# Patient Record
Sex: MALE | Race: WHITE | NOT HISPANIC OR LATINO | Employment: OTHER | URBAN - METROPOLITAN AREA
[De-identification: names, ages, dates, MRNs, and addresses within clinical notes are randomized per-mention and may not be internally consistent; named-entity substitution may affect disease eponyms.]

---

## 2019-04-24 ENCOUNTER — APPOINTMENT (OUTPATIENT)
Dept: GENERAL RADIOLOGY | Facility: HOSPITAL | Age: 64
End: 2019-04-24

## 2019-04-24 PROCEDURE — 71046 X-RAY EXAM CHEST 2 VIEWS: CPT | Performed by: EMERGENCY MEDICINE

## 2019-04-25 DIAGNOSIS — R93.89 ABNORMAL CHEST X-RAY: Primary | ICD-10-CM

## 2019-04-28 ENCOUNTER — APPOINTMENT (OUTPATIENT)
Dept: CT IMAGING | Facility: HOSPITAL | Age: 64
End: 2019-04-28

## 2019-04-28 ENCOUNTER — HOSPITAL ENCOUNTER (INPATIENT)
Facility: HOSPITAL | Age: 64
LOS: 5 days | Discharge: HOME | End: 2019-05-03
Attending: EMERGENCY MEDICINE | Admitting: INTERNAL MEDICINE

## 2019-04-28 DIAGNOSIS — N19 RENAL FAILURE, UNSPECIFIED CHRONICITY: ICD-10-CM

## 2019-04-28 DIAGNOSIS — D72.829 LEUKOCYTOSIS, UNSPECIFIED TYPE: ICD-10-CM

## 2019-04-28 DIAGNOSIS — C91.10 CLL (CHRONIC LYMPHOCYTIC LEUKEMIA) (HCC): ICD-10-CM

## 2019-04-28 DIAGNOSIS — A41.9 SEPSIS, DUE TO UNSPECIFIED ORGANISM: Primary | ICD-10-CM

## 2019-04-28 DIAGNOSIS — R06.00 DYSPNEA, UNSPECIFIED TYPE: ICD-10-CM

## 2019-04-28 DIAGNOSIS — R59.0 MEDIASTINAL LYMPHADENOPATHY: ICD-10-CM

## 2019-04-28 DIAGNOSIS — J18.9 COMMUNITY ACQUIRED PNEUMONIA, UNSPECIFIED LATERALITY: ICD-10-CM

## 2019-04-28 LAB
ALBUMIN SERPL-MCNC: 4.3 G/DL (ref 3.5–5.2)
ALBUMIN/GLOB SERPL: 1.7 G/DL
ALP SERPL-CCNC: 92 U/L (ref 39–117)
ALT SERPL W P-5'-P-CCNC: 11 U/L (ref 1–41)
ANION GAP SERPL CALCULATED.3IONS-SCNC: 14.6 MMOL/L
AST SERPL-CCNC: 12 U/L (ref 1–40)
B PARAPERT DNA SPEC QL NAA+PROBE: NOT DETECTED
B PERT DNA SPEC QL NAA+PROBE: NOT DETECTED
BILIRUB SERPL-MCNC: 0.2 MG/DL (ref 0.2–1.2)
BUN BLD-MCNC: 28 MG/DL (ref 8–23)
BUN/CREAT SERPL: 16.5 (ref 7–25)
C PNEUM DNA NPH QL NAA+NON-PROBE: NOT DETECTED
CALCIUM SPEC-SCNC: 9.2 MG/DL (ref 8.6–10.5)
CHLORIDE SERPL-SCNC: 98 MMOL/L (ref 98–107)
CO2 SERPL-SCNC: 26.4 MMOL/L (ref 22–29)
CREAT BLD-MCNC: 1.7 MG/DL (ref 0.76–1.27)
D-LACTATE SERPL-SCNC: 0.9 MMOL/L (ref 0.5–2)
DEPRECATED RDW RBC AUTO: 61.7 FL (ref 37–54)
EOSINOPHIL # BLD MANUAL: 0.72 10*3/MM3 (ref 0–0.4)
EOSINOPHIL NFR BLD MANUAL: 1 % (ref 0.3–6.2)
ERYTHROCYTE [DISTWIDTH] IN BLOOD BY AUTOMATED COUNT: 18.1 % (ref 12.3–15.4)
FLUAV H1 2009 PAND RNA NPH QL NAA+PROBE: NOT DETECTED
FLUAV H1 HA GENE NPH QL NAA+PROBE: NOT DETECTED
FLUAV H3 RNA NPH QL NAA+PROBE: NOT DETECTED
FLUAV SUBTYP SPEC NAA+PROBE: NOT DETECTED
FLUBV RNA ISLT QL NAA+PROBE: NOT DETECTED
GFR SERPL CREATININE-BSD FRML MDRD: 41 ML/MIN/1.73
GLOBULIN UR ELPH-MCNC: 2.5 GM/DL
GLUCOSE BLD-MCNC: 103 MG/DL (ref 65–99)
HADV DNA SPEC NAA+PROBE: NOT DETECTED
HCOV 229E RNA SPEC QL NAA+PROBE: NOT DETECTED
HCOV HKU1 RNA SPEC QL NAA+PROBE: NOT DETECTED
HCOV NL63 RNA SPEC QL NAA+PROBE: NOT DETECTED
HCOV OC43 RNA SPEC QL NAA+PROBE: NOT DETECTED
HCT VFR BLD AUTO: 39.6 % (ref 37.5–51)
HGB BLD-MCNC: 11.6 G/DL (ref 13–17.7)
HMPV RNA NPH QL NAA+NON-PROBE: NOT DETECTED
HPIV1 RNA SPEC QL NAA+PROBE: NOT DETECTED
HPIV2 RNA SPEC QL NAA+PROBE: NOT DETECTED
HPIV3 RNA NPH QL NAA+PROBE: NOT DETECTED
HPIV4 P GENE NPH QL NAA+PROBE: NOT DETECTED
LYMPHOCYTES # BLD MANUAL: 53.01 10*3/MM3 (ref 0.7–3.1)
LYMPHOCYTES NFR BLD MANUAL: 74 % (ref 19.6–45.3)
LYMPHOCYTES NFR BLD MANUAL: 9 % (ref 5–12)
M PNEUMO IGG SER IA-ACNC: NOT DETECTED
MAGNESIUM SERPL-MCNC: 2 MG/DL (ref 1.6–2.4)
MCH RBC QN AUTO: 27.9 PG (ref 26.6–33)
MCHC RBC AUTO-ENTMCNC: 29.3 G/DL (ref 31.5–35.7)
MCV RBC AUTO: 95.2 FL (ref 79–97)
MONOCYTES # BLD AUTO: 6.45 10*3/MM3 (ref 0.1–0.9)
NEUTROPHILS # BLD AUTO: 7.88 10*3/MM3 (ref 1.7–7)
NEUTROPHILS NFR BLD MANUAL: 11 % (ref 42.7–76)
NRBC BLD AUTO-RTO: 0.3 /100 WBC (ref 0–0.2)
NT-PROBNP SERPL-MCNC: 256.9 PG/ML (ref 5–900)
PLAT MORPH BLD: NORMAL
PLATELET # BLD AUTO: 163 10*3/MM3 (ref 140–450)
PMV BLD AUTO: 10.9 FL (ref 6–12)
POTASSIUM BLD-SCNC: 5.1 MMOL/L (ref 3.5–5.2)
PROCALCITONIN SERPL-MCNC: 0.14 NG/ML (ref 0.1–0.25)
PROT SERPL-MCNC: 6.8 G/DL (ref 6–8.5)
RBC # BLD AUTO: 4.16 10*6/MM3 (ref 4.14–5.8)
RBC MORPH BLD: NORMAL
RHINOVIRUS RNA SPEC NAA+PROBE: NOT DETECTED
RSV RNA NPH QL NAA+NON-PROBE: NOT DETECTED
SMUDGE CELLS BLD QL SMEAR: ABNORMAL
SODIUM BLD-SCNC: 139 MMOL/L (ref 136–145)
TROPONIN T SERPL-MCNC: <0.01 NG/ML (ref 0–0.03)
VARIANT LYMPHS NFR BLD MANUAL: 5 % (ref 0–5)
WBC NRBC COR # BLD: 71.63 10*3/MM3 (ref 3.4–10.8)

## 2019-04-28 PROCEDURE — 83605 ASSAY OF LACTIC ACID: CPT | Performed by: EMERGENCY MEDICINE

## 2019-04-28 PROCEDURE — 87798 DETECT AGENT NOS DNA AMP: CPT | Performed by: EMERGENCY MEDICINE

## 2019-04-28 PROCEDURE — 83735 ASSAY OF MAGNESIUM: CPT | Performed by: EMERGENCY MEDICINE

## 2019-04-28 PROCEDURE — 25010000002 VANCOMYCIN 10 G RECONSTITUTED SOLUTION: Performed by: EMERGENCY MEDICINE

## 2019-04-28 PROCEDURE — 94799 UNLISTED PULMONARY SVC/PX: CPT

## 2019-04-28 PROCEDURE — 87486 CHLMYD PNEUM DNA AMP PROBE: CPT | Performed by: EMERGENCY MEDICINE

## 2019-04-28 PROCEDURE — 25010000002 CEFEPIME PER 500 MG: Performed by: EMERGENCY MEDICINE

## 2019-04-28 PROCEDURE — 25010000002 CEFEPIME PER 500 MG: Performed by: INTERNAL MEDICINE

## 2019-04-28 PROCEDURE — 93010 ELECTROCARDIOGRAM REPORT: CPT | Performed by: INTERNAL MEDICINE

## 2019-04-28 PROCEDURE — 84145 PROCALCITONIN (PCT): CPT | Performed by: EMERGENCY MEDICINE

## 2019-04-28 PROCEDURE — 88189 FLOWCYTOMETRY/READ 16 & >: CPT | Performed by: INTERNAL MEDICINE

## 2019-04-28 PROCEDURE — 99284 EMERGENCY DEPT VISIT MOD MDM: CPT

## 2019-04-28 PROCEDURE — 93005 ELECTROCARDIOGRAM TRACING: CPT | Performed by: EMERGENCY MEDICINE

## 2019-04-28 PROCEDURE — 88185 FLOWCYTOMETRY/TC ADD-ON: CPT | Performed by: INTERNAL MEDICINE

## 2019-04-28 PROCEDURE — 87633 RESP VIRUS 12-25 TARGETS: CPT | Performed by: EMERGENCY MEDICINE

## 2019-04-28 PROCEDURE — 71260 CT THORAX DX C+: CPT

## 2019-04-28 PROCEDURE — 25010000002 AZITHROMYCIN PER 500 MG: Performed by: INTERNAL MEDICINE

## 2019-04-28 PROCEDURE — 85025 COMPLETE CBC W/AUTO DIFF WBC: CPT | Performed by: EMERGENCY MEDICINE

## 2019-04-28 PROCEDURE — 87581 M.PNEUMON DNA AMP PROBE: CPT | Performed by: EMERGENCY MEDICINE

## 2019-04-28 PROCEDURE — 87040 BLOOD CULTURE FOR BACTERIA: CPT | Performed by: EMERGENCY MEDICINE

## 2019-04-28 PROCEDURE — 80053 COMPREHEN METABOLIC PANEL: CPT | Performed by: EMERGENCY MEDICINE

## 2019-04-28 PROCEDURE — 88184 FLOWCYTOMETRY/ TC 1 MARKER: CPT | Performed by: INTERNAL MEDICINE

## 2019-04-28 PROCEDURE — 84484 ASSAY OF TROPONIN QUANT: CPT | Performed by: EMERGENCY MEDICINE

## 2019-04-28 PROCEDURE — 25010000002 IOPAMIDOL 61 % SOLUTION: Performed by: EMERGENCY MEDICINE

## 2019-04-28 PROCEDURE — 85007 BL SMEAR W/DIFF WBC COUNT: CPT | Performed by: EMERGENCY MEDICINE

## 2019-04-28 PROCEDURE — 94640 AIRWAY INHALATION TREATMENT: CPT

## 2019-04-28 PROCEDURE — 83880 ASSAY OF NATRIURETIC PEPTIDE: CPT | Performed by: EMERGENCY MEDICINE

## 2019-04-28 RX ORDER — SODIUM CHLORIDE 9 MG/ML
75 INJECTION, SOLUTION INTRAVENOUS CONTINUOUS
Status: DISCONTINUED | OUTPATIENT
Start: 2019-04-28 | End: 2019-04-30

## 2019-04-28 RX ORDER — ASPIRIN 325 MG
650 TABLET, DELAYED RELEASE (ENTERIC COATED) ORAL EVERY 6 HOURS PRN
COMMUNITY
End: 2020-10-13

## 2019-04-28 RX ORDER — IPRATROPIUM BROMIDE AND ALBUTEROL SULFATE 2.5; .5 MG/3ML; MG/3ML
3 SOLUTION RESPIRATORY (INHALATION)
Status: DISCONTINUED | OUTPATIENT
Start: 2019-04-28 | End: 2019-05-03 | Stop reason: HOSPADM

## 2019-04-28 RX ORDER — IPRATROPIUM BROMIDE AND ALBUTEROL SULFATE 2.5; .5 MG/3ML; MG/3ML
3 SOLUTION RESPIRATORY (INHALATION) ONCE
Status: COMPLETED | OUTPATIENT
Start: 2019-04-28 | End: 2019-04-28

## 2019-04-28 RX ORDER — GUAIFENESIN 600 MG/1
400 TABLET, EXTENDED RELEASE ORAL 2 TIMES DAILY
Status: ON HOLD | COMMUNITY
End: 2020-11-15

## 2019-04-28 RX ORDER — CHOLECALCIFEROL (VITAMIN D3) 125 MCG
5 CAPSULE ORAL NIGHTLY PRN
Status: DISCONTINUED | OUTPATIENT
Start: 2019-04-28 | End: 2019-05-03 | Stop reason: HOSPADM

## 2019-04-28 RX ORDER — IPRATROPIUM BROMIDE AND ALBUTEROL SULFATE 2.5; .5 MG/3ML; MG/3ML
3 SOLUTION RESPIRATORY (INHALATION)
Status: DISCONTINUED | OUTPATIENT
Start: 2019-04-28 | End: 2019-04-30

## 2019-04-28 RX ADMIN — IOPAMIDOL 75 ML: 612 INJECTION, SOLUTION INTRAVENOUS at 13:36

## 2019-04-28 RX ADMIN — SODIUM CHLORIDE 1000 ML: 9 INJECTION, SOLUTION INTRAVENOUS at 12:29

## 2019-04-28 RX ADMIN — VANCOMYCIN HYDROCHLORIDE 1250 MG: 10 INJECTION, POWDER, LYOPHILIZED, FOR SOLUTION INTRAVENOUS at 14:03

## 2019-04-28 RX ADMIN — CEFEPIME HYDROCHLORIDE 2 G: 2 INJECTION, POWDER, FOR SOLUTION INTRAVENOUS at 20:53

## 2019-04-28 RX ADMIN — IPRATROPIUM BROMIDE AND ALBUTEROL SULFATE 3 ML: 2.5; .5 SOLUTION RESPIRATORY (INHALATION) at 23:09

## 2019-04-28 RX ADMIN — Medication 5 MG: at 20:53

## 2019-04-28 RX ADMIN — SODIUM CHLORIDE 125 ML/HR: 9 INJECTION, SOLUTION INTRAVENOUS at 17:17

## 2019-04-28 RX ADMIN — CEFEPIME HYDROCHLORIDE 2 G: 2 INJECTION, POWDER, FOR SOLUTION INTRAVENOUS at 13:24

## 2019-04-28 RX ADMIN — IPRATROPIUM BROMIDE AND ALBUTEROL SULFATE 3 ML: 2.5; .5 SOLUTION RESPIRATORY (INHALATION) at 19:13

## 2019-04-28 RX ADMIN — AZITHROMYCIN MONOHYDRATE 500 MG: 500 INJECTION, POWDER, LYOPHILIZED, FOR SOLUTION INTRAVENOUS at 18:56

## 2019-04-28 RX ADMIN — IPRATROPIUM BROMIDE AND ALBUTEROL SULFATE 3 ML: 2.5; .5 SOLUTION RESPIRATORY (INHALATION) at 12:24

## 2019-04-28 RX ADMIN — IPRATROPIUM BROMIDE AND ALBUTEROL SULFATE 3 ML: 2.5; .5 SOLUTION RESPIRATORY (INHALATION) at 16:54

## 2019-04-29 ENCOUNTER — ANESTHESIA (OUTPATIENT)
Dept: GASTROENTEROLOGY | Facility: HOSPITAL | Age: 64
End: 2019-04-29

## 2019-04-29 ENCOUNTER — APPOINTMENT (OUTPATIENT)
Dept: GENERAL RADIOLOGY | Facility: HOSPITAL | Age: 64
End: 2019-04-29

## 2019-04-29 ENCOUNTER — ANESTHESIA EVENT (OUTPATIENT)
Dept: GASTROENTEROLOGY | Facility: HOSPITAL | Age: 64
End: 2019-04-29

## 2019-04-29 LAB
APPEARANCE FLD: ABNORMAL
APPEARANCE FLD: ABNORMAL
APTT PPP: 30 SECONDS (ref 22.7–35.4)
COLOR FLD: ABNORMAL
COLOR FLD: YELLOW
EOSINOPHIL NFR FLD MANUAL: 1 %
INR PPP: 1.01 (ref 0.9–1.1)
LYMPHOCYTES NFR FLD MANUAL: 15 %
LYMPHOCYTES NFR FLD MANUAL: 29 %
MONOS+MACROS NFR FLD: 1 %
MONOS+MACROS NFR FLD: 28 %
NEUTROPHILS NFR FLD MANUAL: 42 %
NEUTROPHILS NFR FLD MANUAL: 84 %
PROTHROMBIN TIME: 13 SECONDS (ref 11.7–14.2)
RBC # FLD AUTO: 200 /MM3
RBC # FLD AUTO: ABNORMAL /MM3
WBC # FLD AUTO: ABNORMAL /MM3
WBC # FLD AUTO: ABNORMAL /MM3

## 2019-04-29 PROCEDURE — 25010000002 AZITHROMYCIN PER 500 MG: Performed by: INTERNAL MEDICINE

## 2019-04-29 PROCEDURE — 88342 IMHCHEM/IMCYTCHM 1ST ANTB: CPT | Performed by: INTERNAL MEDICINE

## 2019-04-29 PROCEDURE — 87116 MYCOBACTERIA CULTURE: CPT | Performed by: INTERNAL MEDICINE

## 2019-04-29 PROCEDURE — 71045 X-RAY EXAM CHEST 1 VIEW: CPT

## 2019-04-29 PROCEDURE — 25010000002 METHYLPREDNISOLONE PER 125 MG: Performed by: INTERNAL MEDICINE

## 2019-04-29 PROCEDURE — 0B9M7ZX DRAINAGE OF BILATERAL LUNGS, VIA NATURAL OR ARTIFICIAL OPENING, DIAGNOSTIC: ICD-10-PCS | Performed by: INTERNAL MEDICINE

## 2019-04-29 PROCEDURE — 0WBC3ZX EXCISION OF MEDIASTINUM, PERCUTANEOUS APPROACH, DIAGNOSTIC: ICD-10-PCS | Performed by: INTERNAL MEDICINE

## 2019-04-29 PROCEDURE — 88173 CYTOPATH EVAL FNA REPORT: CPT | Performed by: INTERNAL MEDICINE

## 2019-04-29 PROCEDURE — 25010000002 SUCCINYLCHOLINE PER 20 MG: Performed by: NURSE ANESTHETIST, CERTIFIED REGISTERED

## 2019-04-29 PROCEDURE — 25010000002 PROPOFOL 10 MG/ML EMULSION: Performed by: NURSE ANESTHETIST, CERTIFIED REGISTERED

## 2019-04-29 PROCEDURE — 25010000002 ENOXAPARIN PER 10 MG: Performed by: INTERNAL MEDICINE

## 2019-04-29 PROCEDURE — 88305 TISSUE EXAM BY PATHOLOGIST: CPT | Performed by: INTERNAL MEDICINE

## 2019-04-29 PROCEDURE — 94799 UNLISTED PULMONARY SVC/PX: CPT

## 2019-04-29 PROCEDURE — 87070 CULTURE OTHR SPECIMN AEROBIC: CPT | Performed by: INTERNAL MEDICINE

## 2019-04-29 PROCEDURE — 87205 SMEAR GRAM STAIN: CPT | Performed by: INTERNAL MEDICINE

## 2019-04-29 PROCEDURE — 88112 CYTOPATH CELL ENHANCE TECH: CPT | Performed by: INTERNAL MEDICINE

## 2019-04-29 PROCEDURE — 89051 BODY FLUID CELL COUNT: CPT | Performed by: INTERNAL MEDICINE

## 2019-04-29 PROCEDURE — 88341 IMHCHEM/IMCYTCHM EA ADD ANTB: CPT | Performed by: INTERNAL MEDICINE

## 2019-04-29 PROCEDURE — 87206 SMEAR FLUORESCENT/ACID STAI: CPT | Performed by: INTERNAL MEDICINE

## 2019-04-29 PROCEDURE — 99254 IP/OBS CNSLTJ NEW/EST MOD 60: CPT | Performed by: INTERNAL MEDICINE

## 2019-04-29 PROCEDURE — 25010000002 CEFEPIME PER 500 MG: Performed by: INTERNAL MEDICINE

## 2019-04-29 PROCEDURE — 85730 THROMBOPLASTIN TIME PARTIAL: CPT | Performed by: INTERNAL MEDICINE

## 2019-04-29 PROCEDURE — 85610 PROTHROMBIN TIME: CPT | Performed by: INTERNAL MEDICINE

## 2019-04-29 PROCEDURE — C1726 CATH, BAL DIL, NON-VASCULAR: HCPCS | Performed by: INTERNAL MEDICINE

## 2019-04-29 RX ORDER — METHYLPREDNISOLONE SODIUM SUCCINATE 125 MG/2ML
125 INJECTION, POWDER, LYOPHILIZED, FOR SOLUTION INTRAMUSCULAR; INTRAVENOUS ONCE
Status: COMPLETED | OUTPATIENT
Start: 2019-04-29 | End: 2019-04-29

## 2019-04-29 RX ORDER — METHYLPREDNISOLONE SODIUM SUCCINATE 40 MG/ML
40 INJECTION, POWDER, LYOPHILIZED, FOR SOLUTION INTRAMUSCULAR; INTRAVENOUS EVERY 12 HOURS
Status: COMPLETED | OUTPATIENT
Start: 2019-04-30 | End: 2019-05-02

## 2019-04-29 RX ORDER — IPRATROPIUM BROMIDE AND ALBUTEROL SULFATE 2.5; .5 MG/3ML; MG/3ML
3 SOLUTION RESPIRATORY (INHALATION) ONCE
Status: COMPLETED | OUTPATIENT
Start: 2019-04-29 | End: 2019-04-29

## 2019-04-29 RX ORDER — MIDAZOLAM HYDROCHLORIDE 1 MG/ML
1 INJECTION INTRAMUSCULAR; INTRAVENOUS
Status: CANCELLED | OUTPATIENT
Start: 2019-04-29

## 2019-04-29 RX ORDER — IPRATROPIUM BROMIDE AND ALBUTEROL SULFATE 2.5; .5 MG/3ML; MG/3ML
3 SOLUTION RESPIRATORY (INHALATION)
Status: DISCONTINUED | OUTPATIENT
Start: 2019-04-29 | End: 2019-04-29 | Stop reason: HOSPADM

## 2019-04-29 RX ORDER — LIDOCAINE HYDROCHLORIDE 20 MG/ML
INJECTION, SOLUTION INFILTRATION; PERINEURAL AS NEEDED
Status: DISCONTINUED | OUTPATIENT
Start: 2019-04-29 | End: 2019-04-29 | Stop reason: SURG

## 2019-04-29 RX ORDER — SUCCINYLCHOLINE CHLORIDE 20 MG/ML
INJECTION INTRAMUSCULAR; INTRAVENOUS AS NEEDED
Status: DISCONTINUED | OUTPATIENT
Start: 2019-04-29 | End: 2019-04-29 | Stop reason: SURG

## 2019-04-29 RX ORDER — PROPOFOL 10 MG/ML
VIAL (ML) INTRAVENOUS AS NEEDED
Status: DISCONTINUED | OUTPATIENT
Start: 2019-04-29 | End: 2019-04-29 | Stop reason: SURG

## 2019-04-29 RX ORDER — PROPOFOL 10 MG/ML
VIAL (ML) INTRAVENOUS CONTINUOUS PRN
Status: DISCONTINUED | OUTPATIENT
Start: 2019-04-29 | End: 2019-04-29 | Stop reason: SURG

## 2019-04-29 RX ORDER — LIDOCAINE HYDROCHLORIDE 10 MG/ML
INJECTION, SOLUTION EPIDURAL; INFILTRATION; INTRACAUDAL; PERINEURAL AS NEEDED
Status: DISCONTINUED | OUTPATIENT
Start: 2019-04-29 | End: 2019-04-29 | Stop reason: HOSPADM

## 2019-04-29 RX ORDER — MIDAZOLAM HYDROCHLORIDE 1 MG/ML
2 INJECTION INTRAMUSCULAR; INTRAVENOUS
Status: CANCELLED | OUTPATIENT
Start: 2019-04-29

## 2019-04-29 RX ORDER — NICOTINE 21 MG/24HR
1 PATCH, TRANSDERMAL 24 HOURS TRANSDERMAL
Status: CANCELLED | OUTPATIENT
Start: 2019-04-29

## 2019-04-29 RX ORDER — LIDOCAINE HYDROCHLORIDE 20 MG/ML
INJECTION, SOLUTION EPIDURAL; INFILTRATION; INTRACAUDAL; PERINEURAL AS NEEDED
Status: DISCONTINUED | OUTPATIENT
Start: 2019-04-29 | End: 2019-04-29 | Stop reason: HOSPADM

## 2019-04-29 RX ORDER — SODIUM CHLORIDE, SODIUM LACTATE, POTASSIUM CHLORIDE, CALCIUM CHLORIDE 600; 310; 30; 20 MG/100ML; MG/100ML; MG/100ML; MG/100ML
INJECTION, SOLUTION INTRAVENOUS CONTINUOUS PRN
Status: DISCONTINUED | OUTPATIENT
Start: 2019-04-29 | End: 2019-04-29 | Stop reason: SURG

## 2019-04-29 RX ORDER — SODIUM CHLORIDE 9 MG/ML
30 INJECTION, SOLUTION INTRAVENOUS CONTINUOUS PRN
Status: DISCONTINUED | OUTPATIENT
Start: 2019-04-29 | End: 2019-04-30

## 2019-04-29 RX ADMIN — CEFEPIME HYDROCHLORIDE 2 G: 2 INJECTION, POWDER, FOR SOLUTION INTRAVENOUS at 21:30

## 2019-04-29 RX ADMIN — SODIUM CHLORIDE, POTASSIUM CHLORIDE, SODIUM LACTATE AND CALCIUM CHLORIDE: 600; 310; 30; 20 INJECTION, SOLUTION INTRAVENOUS at 12:03

## 2019-04-29 RX ADMIN — ENOXAPARIN SODIUM 30 MG: 30 INJECTION SUBCUTANEOUS at 21:30

## 2019-04-29 RX ADMIN — Medication 5 MG: at 21:30

## 2019-04-29 RX ADMIN — IPRATROPIUM BROMIDE AND ALBUTEROL SULFATE 3 ML: 2.5; .5 SOLUTION RESPIRATORY (INHALATION) at 23:22

## 2019-04-29 RX ADMIN — IPRATROPIUM BROMIDE AND ALBUTEROL SULFATE 3 ML: 2.5; .5 SOLUTION RESPIRATORY (INHALATION) at 03:29

## 2019-04-29 RX ADMIN — PROPOFOL 300 MCG/KG/MIN: 10 INJECTION, EMULSION INTRAVENOUS at 12:15

## 2019-04-29 RX ADMIN — SODIUM CHLORIDE 30 ML/HR: 9 INJECTION, SOLUTION INTRAVENOUS at 09:59

## 2019-04-29 RX ADMIN — CEFEPIME HYDROCHLORIDE 2 G: 2 INJECTION, POWDER, FOR SOLUTION INTRAVENOUS at 08:47

## 2019-04-29 RX ADMIN — SUCCINYLCHOLINE CHLORIDE 80 MG: 20 INJECTION, SOLUTION INTRAMUSCULAR; INTRAVENOUS at 12:16

## 2019-04-29 RX ADMIN — LIDOCAINE HYDROCHLORIDE 100 MG: 20 INJECTION, SOLUTION INFILTRATION; PERINEURAL at 12:15

## 2019-04-29 RX ADMIN — IPRATROPIUM BROMIDE AND ALBUTEROL SULFATE 3 ML: 2.5; .5 SOLUTION RESPIRATORY (INHALATION) at 20:26

## 2019-04-29 RX ADMIN — IPRATROPIUM BROMIDE AND ALBUTEROL SULFATE 3 ML: 2.5; .5 SOLUTION RESPIRATORY (INHALATION) at 11:06

## 2019-04-29 RX ADMIN — SODIUM CHLORIDE 125 ML/HR: 9 INJECTION, SOLUTION INTRAVENOUS at 00:39

## 2019-04-29 RX ADMIN — AZITHROMYCIN MONOHYDRATE 500 MG: 500 INJECTION, POWDER, LYOPHILIZED, FOR SOLUTION INTRAVENOUS at 17:36

## 2019-04-29 RX ADMIN — IPRATROPIUM BROMIDE AND ALBUTEROL SULFATE 3 ML: 2.5; .5 SOLUTION RESPIRATORY (INHALATION) at 05:10

## 2019-04-29 RX ADMIN — IPRATROPIUM BROMIDE AND ALBUTEROL SULFATE 3 ML: 2.5; .5 SOLUTION RESPIRATORY (INHALATION) at 07:09

## 2019-04-29 RX ADMIN — IPRATROPIUM BROMIDE AND ALBUTEROL SULFATE 3 ML: 2.5; .5 SOLUTION RESPIRATORY (INHALATION) at 14:41

## 2019-04-29 RX ADMIN — METHYLPREDNISOLONE SODIUM SUCCINATE 125 MG: 125 INJECTION, POWDER, FOR SOLUTION INTRAMUSCULAR; INTRAVENOUS at 18:30

## 2019-04-29 RX ADMIN — PROPOFOL 200 MG: 10 INJECTION, EMULSION INTRAVENOUS at 12:15

## 2019-04-29 NOTE — ANESTHESIA POSTPROCEDURE EVALUATION
"Patient: Dav Freitas    Procedure Summary     Date:  04/29/19 Room / Location:   [facility]    Anesthesia Start:  1202 Anesthesia Stop:  1356    Procedure:  BRONCHOSCOPY WITH WASHING ENDOBRONCHIAL ULTRASOUND WITH FNA'S (Bilateral Bronchus) Diagnosis:       Mediastinal lymphadenopathy      (Mediastinal lymphadenopathy [R59.0])    Surgeon:  Selina Lloyd MD Provider:  Jeremias Woodruff MD    Anesthesia Type:  MAC ASA Status:  3          Anesthesia Type: MAC  Last vitals  BP   116/55 (04/29/19 1520)   Temp   36.8 °C (98.3 °F) (04/29/19 0949)   Pulse   (!) 121 (04/29/19 1520)   Resp   20 (04/29/19 1520)     SpO2   94 % (04/29/19 1520)     Post Anesthesia Care and Evaluation      Comments: Patient discharged before being evaluated by an Anesthesiologist. No apparent complications per the record.  This case was not medically directed. I am completing this chart for medical records purposes; I personally have no medical involvement with this patient.    /55 (BP Location: Left arm, Patient Position: Sitting)   Pulse (!) 121   Temp 36.8 °C (98.3 °F) (Oral)   Resp 20   Ht 180.3 cm (71\")   Wt 67.7 kg (149 lb 3.2 oz)   SpO2 94%   BMI 20.81 kg/m²           "

## 2019-04-29 NOTE — ANESTHESIA PREPROCEDURE EVALUATION
Anesthesia Evaluation     Patient summary reviewed and Nursing notes reviewed   NPO Solid Status: > 8 hours  NPO Liquid Status: > 8 hours           Airway   Mallampati: II  Neck ROM: full  No difficulty expected  Dental    (+) partials    Pulmonary    (+) pneumonia , a smoker Current, wheezes,   Cardiovascular     Rhythm: regular    (+) PVD,       Neuro/Psych  GI/Hepatic/Renal/Endo      Musculoskeletal     Abdominal    Substance History      OB/GYN          Other                        Anesthesia Plan    ASA 3     MAC     intravenous induction   Anesthetic plan, all risks, benefits, and alternatives have been provided, discussed and informed consent has been obtained with: patient.

## 2019-04-29 NOTE — ANESTHESIA PROCEDURE NOTES
Airway  Urgency: elective    Date/Time: 4/29/2019 12:20 PM  End Time:4/29/2019 12:20 PM    General Information and Staff    Patient location during procedure: OR  CRNA: Isabela Moyer CRNA    Indications and Patient Condition  Indications for airway management: airway protection    Preoxygenated: yes      Final Airway Details  Final airway type: supraglottic airway      Successful airway: Size 4    Number of attempts at approach: 1

## 2019-04-30 ENCOUNTER — APPOINTMENT (OUTPATIENT)
Dept: CT IMAGING | Facility: HOSPITAL | Age: 64
End: 2019-04-30

## 2019-04-30 LAB
ALBUMIN SERPL-MCNC: 3.9 G/DL (ref 3.5–5.2)
ALBUMIN/GLOB SERPL: 2 G/DL
ALP SERPL-CCNC: 72 U/L (ref 39–117)
ALT SERPL W P-5'-P-CCNC: 12 U/L (ref 1–41)
ANION GAP SERPL CALCULATED.3IONS-SCNC: 11 MMOL/L
AST SERPL-CCNC: 15 U/L (ref 1–40)
BILIRUB SERPL-MCNC: 0.3 MG/DL (ref 0.2–1.2)
BLASTS NFR BLD MANUAL: 1 % (ref 0–0)
BUN BLD-MCNC: 18 MG/DL (ref 8–23)
BUN/CREAT SERPL: 19.8 (ref 7–25)
CALCIUM SPEC-SCNC: 8.4 MG/DL (ref 8.6–10.5)
CHLORIDE SERPL-SCNC: 107 MMOL/L (ref 98–107)
CO2 SERPL-SCNC: 24 MMOL/L (ref 22–29)
CREAT BLD-MCNC: 0.91 MG/DL (ref 0.76–1.27)
DEPRECATED RDW RBC AUTO: 62.7 FL (ref 37–54)
ERYTHROCYTE [DISTWIDTH] IN BLOOD BY AUTOMATED COUNT: 18 % (ref 12.3–15.4)
FERRITIN SERPL-MCNC: 92 NG/ML (ref 30–400)
GFR SERPL CREATININE-BSD FRML MDRD: 84 ML/MIN/1.73
GLOBULIN UR ELPH-MCNC: 2 GM/DL
GLUCOSE BLD-MCNC: 164 MG/DL (ref 65–99)
HCT VFR BLD AUTO: 34.5 % (ref 37.5–51)
HGB BLD-MCNC: 10.1 G/DL (ref 13–17.7)
HGB RETIC QN AUTO: 28 PG (ref 29.8–36.1)
HYPOCHROMIA BLD QL: ABNORMAL
IMM RETICS NFR: 37.9 % (ref 3–15.8)
IRON 24H UR-MRATE: 40 MCG/DL (ref 59–158)
IRON SATN MFR SERPL: 12 % (ref 20–50)
LDH SERPL-CCNC: 180 U/L (ref 135–225)
LYMPHOCYTES # BLD MANUAL: 50.54 10*3/MM3 (ref 0.7–3.1)
LYMPHOCYTES NFR BLD MANUAL: 5.9 % (ref 5–12)
LYMPHOCYTES NFR BLD MANUAL: 72.3 % (ref 19.6–45.3)
MCH RBC QN AUTO: 28 PG (ref 26.6–33)
MCHC RBC AUTO-ENTMCNC: 29.3 G/DL (ref 31.5–35.7)
MCV RBC AUTO: 95.6 FL (ref 79–97)
MONOCYTES # BLD AUTO: 4.12 10*3/MM3 (ref 0.1–0.9)
NEUTROPHILS # BLD AUTO: 14.54 10*3/MM3 (ref 1.7–7)
NEUTROPHILS NFR BLD MANUAL: 20.8 % (ref 42.7–76)
NRBC BLD AUTO-RTO: 0.3 /100 WBC (ref 0–0.2)
NRBC SPEC MANUAL: 1 /100 WBC (ref 0–0.2)
PLAT MORPH BLD: NORMAL
PLATELET # BLD AUTO: 154 10*3/MM3 (ref 140–450)
PMV BLD AUTO: 11.1 FL (ref 6–12)
POTASSIUM BLD-SCNC: 4.9 MMOL/L (ref 3.5–5.2)
PROT SERPL-MCNC: 5.9 G/DL (ref 6–8.5)
RBC # BLD AUTO: 3.61 10*6/MM3 (ref 4.14–5.8)
REF LAB TEST METHOD: NORMAL
RETICS/RBC NFR AUTO: 1.57 % (ref 0.7–1.9)
SODIUM BLD-SCNC: 142 MMOL/L (ref 136–145)
TIBC SERPL-MCNC: 331 MCG/DL (ref 298–536)
TRANSFERRIN SERPL-MCNC: 222 MG/DL (ref 200–360)
URATE SERPL-MCNC: 5.1 MG/DL (ref 3.4–7)
WBC MORPH BLD: NORMAL
WBC NRBC COR # BLD: 69.91 10*3/MM3 (ref 3.4–10.8)

## 2019-04-30 PROCEDURE — 84466 ASSAY OF TRANSFERRIN: CPT | Performed by: INTERNAL MEDICINE

## 2019-04-30 PROCEDURE — 84165 PROTEIN E-PHORESIS SERUM: CPT | Performed by: INTERNAL MEDICINE

## 2019-04-30 PROCEDURE — 80053 COMPREHEN METABOLIC PANEL: CPT | Performed by: INTERNAL MEDICINE

## 2019-04-30 PROCEDURE — 70486 CT MAXILLOFACIAL W/O DYE: CPT

## 2019-04-30 PROCEDURE — 83540 ASSAY OF IRON: CPT | Performed by: INTERNAL MEDICINE

## 2019-04-30 PROCEDURE — 94799 UNLISTED PULMONARY SVC/PX: CPT

## 2019-04-30 PROCEDURE — 83883 ASSAY NEPHELOMETRY NOT SPEC: CPT | Performed by: INTERNAL MEDICINE

## 2019-04-30 PROCEDURE — 70491 CT SOFT TISSUE NECK W/DYE: CPT

## 2019-04-30 PROCEDURE — 0 DIATRIZOATE MEGLUMINE & SODIUM PER 1 ML: Performed by: INTERNAL MEDICINE

## 2019-04-30 PROCEDURE — 84550 ASSAY OF BLOOD/URIC ACID: CPT | Performed by: INTERNAL MEDICINE

## 2019-04-30 PROCEDURE — 99233 SBSQ HOSP IP/OBS HIGH 50: CPT | Performed by: INTERNAL MEDICINE

## 2019-04-30 PROCEDURE — 86334 IMMUNOFIX E-PHORESIS SERUM: CPT | Performed by: INTERNAL MEDICINE

## 2019-04-30 PROCEDURE — 82728 ASSAY OF FERRITIN: CPT | Performed by: INTERNAL MEDICINE

## 2019-04-30 PROCEDURE — 85007 BL SMEAR W/DIFF WBC COUNT: CPT | Performed by: INTERNAL MEDICINE

## 2019-04-30 PROCEDURE — 25010000002 ENOXAPARIN PER 10 MG: Performed by: INTERNAL MEDICINE

## 2019-04-30 PROCEDURE — 83615 LACTATE (LD) (LDH) ENZYME: CPT | Performed by: INTERNAL MEDICINE

## 2019-04-30 PROCEDURE — 85046 RETICYTE/HGB CONCENTRATE: CPT | Performed by: INTERNAL MEDICINE

## 2019-04-30 PROCEDURE — 25010000002 CEFEPIME 2 G/NS 100 ML SOLUTION: Performed by: INTERNAL MEDICINE

## 2019-04-30 PROCEDURE — 85025 COMPLETE CBC W/AUTO DIFF WBC: CPT | Performed by: INTERNAL MEDICINE

## 2019-04-30 PROCEDURE — 82785 ASSAY OF IGE: CPT | Performed by: INTERNAL MEDICINE

## 2019-04-30 PROCEDURE — 25010000002 METHYLPREDNISOLONE PER 40 MG: Performed by: INTERNAL MEDICINE

## 2019-04-30 PROCEDURE — 82784 ASSAY IGA/IGD/IGG/IGM EACH: CPT | Performed by: INTERNAL MEDICINE

## 2019-04-30 PROCEDURE — 25010000002 CEFEPIME PER 500 MG: Performed by: INTERNAL MEDICINE

## 2019-04-30 PROCEDURE — 74178 CT ABD&PLV WO CNTR FLWD CNTR: CPT

## 2019-04-30 PROCEDURE — 25010000002 IOPAMIDOL 61 % SOLUTION: Performed by: INTERNAL MEDICINE

## 2019-04-30 RX ORDER — CEFDINIR 300 MG/1
300 CAPSULE ORAL EVERY 12 HOURS SCHEDULED
Status: DISCONTINUED | OUTPATIENT
Start: 2019-05-01 | End: 2019-05-03 | Stop reason: HOSPADM

## 2019-04-30 RX ORDER — AZITHROMYCIN 250 MG/1
500 TABLET, FILM COATED ORAL
Status: COMPLETED | OUTPATIENT
Start: 2019-04-30 | End: 2019-05-02

## 2019-04-30 RX ORDER — IPRATROPIUM BROMIDE AND ALBUTEROL SULFATE 2.5; .5 MG/3ML; MG/3ML
3 SOLUTION RESPIRATORY (INHALATION)
Status: DISCONTINUED | OUTPATIENT
Start: 2019-04-30 | End: 2019-05-03 | Stop reason: HOSPADM

## 2019-04-30 RX ORDER — FERROUS SULFATE 325(65) MG
325 TABLET ORAL 2 TIMES DAILY WITH MEALS
Status: DISCONTINUED | OUTPATIENT
Start: 2019-04-30 | End: 2019-05-03 | Stop reason: HOSPADM

## 2019-04-30 RX ADMIN — DIATRIZOATE MEGLUMINE AND DIATRIZOATE SODIUM 30 ML: 600; 100 SOLUTION ORAL; RECTAL at 14:06

## 2019-04-30 RX ADMIN — IOPAMIDOL 85 ML: 612 INJECTION, SOLUTION INTRAVENOUS at 16:20

## 2019-04-30 RX ADMIN — FERROUS SULFATE TAB 325 MG (65 MG ELEMENTAL FE) 325 MG: 325 (65 FE) TAB at 18:02

## 2019-04-30 RX ADMIN — IPRATROPIUM BROMIDE AND ALBUTEROL SULFATE 3 ML: 2.5; .5 SOLUTION RESPIRATORY (INHALATION) at 22:56

## 2019-04-30 RX ADMIN — Medication 5 MG: at 23:05

## 2019-04-30 RX ADMIN — IPRATROPIUM BROMIDE AND ALBUTEROL SULFATE 3 ML: 2.5; .5 SOLUTION RESPIRATORY (INHALATION) at 10:47

## 2019-04-30 RX ADMIN — METHYLPREDNISOLONE SODIUM SUCCINATE 40 MG: 40 INJECTION, POWDER, FOR SOLUTION INTRAMUSCULAR; INTRAVENOUS at 18:02

## 2019-04-30 RX ADMIN — CEFEPIME HYDROCHLORIDE 2 G: 2 INJECTION, POWDER, FOR SOLUTION INTRAVENOUS at 08:19

## 2019-04-30 RX ADMIN — ENOXAPARIN SODIUM 30 MG: 30 INJECTION SUBCUTANEOUS at 22:43

## 2019-04-30 RX ADMIN — METHYLPREDNISOLONE SODIUM SUCCINATE 40 MG: 40 INJECTION, POWDER, FOR SOLUTION INTRAMUSCULAR; INTRAVENOUS at 06:30

## 2019-04-30 RX ADMIN — CEFEPIME HYDROCHLORIDE 2 G: 2 INJECTION, POWDER, FOR SOLUTION INTRAVENOUS at 22:44

## 2019-04-30 RX ADMIN — IPRATROPIUM BROMIDE AND ALBUTEROL SULFATE 3 ML: 2.5; .5 SOLUTION RESPIRATORY (INHALATION) at 20:18

## 2019-04-30 RX ADMIN — IPRATROPIUM BROMIDE AND ALBUTEROL SULFATE 3 ML: 2.5; .5 SOLUTION RESPIRATORY (INHALATION) at 03:27

## 2019-04-30 RX ADMIN — AZITHROMYCIN 500 MG: 250 TABLET, FILM COATED ORAL at 18:02

## 2019-04-30 RX ADMIN — IPRATROPIUM BROMIDE AND ALBUTEROL SULFATE 3 ML: 2.5; .5 SOLUTION RESPIRATORY (INHALATION) at 15:23

## 2019-04-30 RX ADMIN — IPRATROPIUM BROMIDE AND ALBUTEROL SULFATE 3 ML: 2.5; .5 SOLUTION RESPIRATORY (INHALATION) at 06:51

## 2019-05-01 ENCOUNTER — DOCUMENTATION (OUTPATIENT)
Dept: ONCOLOGY | Facility: CLINIC | Age: 64
End: 2019-05-01

## 2019-05-01 PROBLEM — C91.10 CLL (CHRONIC LYMPHOCYTIC LEUKEMIA): Status: ACTIVE | Noted: 2019-05-01

## 2019-05-01 LAB
ALBUMIN SERPL-MCNC: 3.3 G/DL (ref 2.9–4.4)
ALBUMIN/GLOB SERPL: 1.4 {RATIO} (ref 0.7–1.7)
ALPHA1 GLOB FLD ELPH-MCNC: 0.3 G/DL (ref 0–0.4)
ALPHA2 GLOB SERPL ELPH-MCNC: 0.9 G/DL (ref 0.4–1)
ANISOCYTOSIS BLD QL: ABNORMAL
B-GLOBULIN SERPL ELPH-MCNC: 0.9 G/DL (ref 0.7–1.3)
BACTERIA SPEC RESP CULT: NORMAL
BACTERIA SPEC RESP CULT: NORMAL
CYTO UR: NORMAL
CYTO UR: NORMAL
DEPRECATED RDW RBC AUTO: 62.4 FL (ref 37–54)
ERYTHROCYTE [DISTWIDTH] IN BLOOD BY AUTOMATED COUNT: 18 % (ref 12.3–15.4)
GAMMA GLOB SERPL ELPH-MCNC: 0.3 G/DL (ref 0.4–1.8)
GLOBULIN SER CALC-MCNC: 2.5 G/DL (ref 2.2–3.9)
GRAM STN SPEC: NORMAL
HBV SURFACE AB SER RIA-ACNC: NORMAL
HBV SURFACE AG SERPL QL IA: NORMAL
HCT VFR BLD AUTO: 30.9 % (ref 37.5–51)
HGB BLD-MCNC: 9.1 G/DL (ref 13–17.7)
IGA SERPL-MCNC: 14 MG/DL (ref 61–437)
IGG SERPL-MCNC: 263 MG/DL (ref 700–1600)
IGM SERPL-MCNC: 5 MG/DL (ref 20–172)
INTERPRETATION SERPL IEP-IMP: ABNORMAL
KAPPA LC SERPL-MCNC: 153 MG/L (ref 3.3–19.4)
KAPPA LC/LAMBDA SER: 26.38 {RATIO} (ref 0.26–1.65)
LAB AP CASE REPORT: NORMAL
LAB AP CASE REPORT: NORMAL
LAB AP DIAGNOSIS COMMENT: NORMAL
LAB AP NON-GYN SPECIMEN ADEQUACY: NORMAL
LAB AP SPECIAL STAINS: NORMAL
LAMBDA LC FREE SERPL-MCNC: 5.8 MG/L (ref 5.7–26.3)
LYMPHOCYTES # BLD MANUAL: 59.37 10*3/MM3 (ref 0.7–3.1)
LYMPHOCYTES NFR BLD MANUAL: 1 % (ref 5–12)
LYMPHOCYTES NFR BLD MANUAL: 80 % (ref 19.6–45.3)
Lab: ABNORMAL
M-SPIKE: ABNORMAL G/DL
MCH RBC QN AUTO: 28 PG (ref 26.6–33)
MCHC RBC AUTO-ENTMCNC: 29.4 G/DL (ref 31.5–35.7)
MCV RBC AUTO: 95.1 FL (ref 79–97)
METAMYELOCYTES NFR BLD MANUAL: 1 % (ref 0–0)
MONOCYTES # BLD AUTO: 0.74 10*3/MM3 (ref 0.1–0.9)
NEUTROPHILS # BLD AUTO: 12.62 10*3/MM3 (ref 1.7–7)
NEUTROPHILS NFR BLD MANUAL: 17 % (ref 42.7–76)
NRBC BLD AUTO-RTO: 0.5 /100 WBC (ref 0–0.2)
PATH REPORT.FINAL DX SPEC: NORMAL
PATH REPORT.FINAL DX SPEC: NORMAL
PATH REPORT.GROSS SPEC: NORMAL
PATH REPORT.GROSS SPEC: NORMAL
PLAT MORPH BLD: NORMAL
PLATELET # BLD AUTO: 153 10*3/MM3 (ref 140–450)
PMV BLD AUTO: 10.9 FL (ref 6–12)
PROT SERPL-MCNC: 5.8 G/DL (ref 6–8.5)
RBC # BLD AUTO: 3.25 10*6/MM3 (ref 4.14–5.8)
SMUDGE CELLS BLD QL SMEAR: ABNORMAL
SPHEROCYTES BLD QL SMEAR: ABNORMAL
URATE SERPL-MCNC: 6.1 MG/DL (ref 3.4–7)
VARIANT LYMPHS NFR BLD MANUAL: 1 % (ref 0–5)
WBC NRBC COR # BLD: 74.21 10*3/MM3 (ref 3.4–10.8)

## 2019-05-01 PROCEDURE — 84550 ASSAY OF BLOOD/URIC ACID: CPT | Performed by: INTERNAL MEDICINE

## 2019-05-01 PROCEDURE — 25010000002 METHYLPREDNISOLONE PER 40 MG: Performed by: INTERNAL MEDICINE

## 2019-05-01 PROCEDURE — 85025 COMPLETE CBC W/AUTO DIFF WBC: CPT | Performed by: INTERNAL MEDICINE

## 2019-05-01 PROCEDURE — 94799 UNLISTED PULMONARY SVC/PX: CPT

## 2019-05-01 PROCEDURE — 87340 HEPATITIS B SURFACE AG IA: CPT | Performed by: INTERNAL MEDICINE

## 2019-05-01 PROCEDURE — 99233 SBSQ HOSP IP/OBS HIGH 50: CPT | Performed by: INTERNAL MEDICINE

## 2019-05-01 PROCEDURE — 25010000002 ENOXAPARIN PER 10 MG: Performed by: INTERNAL MEDICINE

## 2019-05-01 PROCEDURE — 25010000002 RITUXIMAB 10 MG/ML SOLUTION 10 ML VIAL: Performed by: INTERNAL MEDICINE

## 2019-05-01 PROCEDURE — 86706 HEP B SURFACE ANTIBODY: CPT | Performed by: INTERNAL MEDICINE

## 2019-05-01 PROCEDURE — 25010000002 ONDANSETRON PER 1 MG: Performed by: INTERNAL MEDICINE

## 2019-05-01 PROCEDURE — 25010000002 RITUXIMAB 10 MG/ML SOLUTION 50 ML VIAL: Performed by: INTERNAL MEDICINE

## 2019-05-01 PROCEDURE — 25010000002 DEXAMETHASONE SODIUM PHOSPHATE 20 MG/5ML SOLUTION 5 ML VIAL: Performed by: INTERNAL MEDICINE

## 2019-05-01 PROCEDURE — 25010000002 DIPHENHYDRAMINE PER 50 MG: Performed by: INTERNAL MEDICINE

## 2019-05-01 PROCEDURE — 85007 BL SMEAR W/DIFF WBC COUNT: CPT | Performed by: INTERNAL MEDICINE

## 2019-05-01 PROCEDURE — 86704 HEP B CORE ANTIBODY TOTAL: CPT | Performed by: INTERNAL MEDICINE

## 2019-05-01 PROCEDURE — 25010000002 BENDAMUSTINE HCL 100 MG/4ML SOLUTION 4 ML VIAL: Performed by: INTERNAL MEDICINE

## 2019-05-01 RX ORDER — SODIUM CHLORIDE 9 MG/ML
250 INJECTION, SOLUTION INTRAVENOUS ONCE
Status: COMPLETED | OUTPATIENT
Start: 2019-05-01 | End: 2019-05-01

## 2019-05-01 RX ORDER — MEPERIDINE HYDROCHLORIDE 50 MG/ML
25 INJECTION INTRAMUSCULAR; INTRAVENOUS; SUBCUTANEOUS
Status: ACTIVE | OUTPATIENT
Start: 2019-05-01 | End: 2019-05-02

## 2019-05-01 RX ORDER — DIPHENHYDRAMINE HYDROCHLORIDE 50 MG/ML
50 INJECTION INTRAMUSCULAR; INTRAVENOUS AS NEEDED
Status: DISCONTINUED | OUTPATIENT
Start: 2019-05-01 | End: 2019-05-03 | Stop reason: HOSPADM

## 2019-05-01 RX ORDER — ALLOPURINOL 300 MG/1
300 TABLET ORAL DAILY
Status: DISCONTINUED | OUTPATIENT
Start: 2019-05-01 | End: 2019-05-03 | Stop reason: HOSPADM

## 2019-05-01 RX ORDER — PALONOSETRON 0.05 MG/ML
0.25 INJECTION, SOLUTION INTRAVENOUS ONCE
Status: DISCONTINUED | OUTPATIENT
Start: 2019-05-01 | End: 2019-05-01

## 2019-05-01 RX ORDER — FAMOTIDINE 10 MG/ML
20 INJECTION, SOLUTION INTRAVENOUS AS NEEDED
Status: DISCONTINUED | OUTPATIENT
Start: 2019-05-01 | End: 2019-05-03 | Stop reason: HOSPADM

## 2019-05-01 RX ORDER — ACETAMINOPHEN 325 MG/1
650 TABLET ORAL ONCE
Status: COMPLETED | OUTPATIENT
Start: 2019-05-01 | End: 2019-05-01

## 2019-05-01 RX ADMIN — SODIUM CHLORIDE 250 ML: 9 INJECTION, SOLUTION INTRAVENOUS at 14:31

## 2019-05-01 RX ADMIN — IPRATROPIUM BROMIDE AND ALBUTEROL SULFATE 3 ML: 2.5; .5 SOLUTION RESPIRATORY (INHALATION) at 23:49

## 2019-05-01 RX ADMIN — FERROUS SULFATE TAB 325 MG (65 MG ELEMENTAL FE) 325 MG: 325 (65 FE) TAB at 08:17

## 2019-05-01 RX ADMIN — RITUXIMAB 700 MG: 10 INJECTION, SOLUTION INTRAVENOUS at 16:13

## 2019-05-01 RX ADMIN — METHYLPREDNISOLONE SODIUM SUCCINATE 40 MG: 40 INJECTION, POWDER, FOR SOLUTION INTRAMUSCULAR; INTRAVENOUS at 20:02

## 2019-05-01 RX ADMIN — IPRATROPIUM BROMIDE AND ALBUTEROL SULFATE 3 ML: 2.5; .5 SOLUTION RESPIRATORY (INHALATION) at 19:15

## 2019-05-01 RX ADMIN — BENDAMUSTINE HYDROCHLORIDE 165 MG: 25 INJECTION, SOLUTION INTRAVENOUS at 15:34

## 2019-05-01 RX ADMIN — ENOXAPARIN SODIUM 30 MG: 30 INJECTION SUBCUTANEOUS at 20:02

## 2019-05-01 RX ADMIN — IPRATROPIUM BROMIDE AND ALBUTEROL SULFATE 3 ML: 2.5; .5 SOLUTION RESPIRATORY (INHALATION) at 08:01

## 2019-05-01 RX ADMIN — ALLOPURINOL 300 MG: 300 TABLET ORAL at 14:31

## 2019-05-01 RX ADMIN — IPRATROPIUM BROMIDE AND ALBUTEROL SULFATE 3 ML: 2.5; .5 SOLUTION RESPIRATORY (INHALATION) at 15:42

## 2019-05-01 RX ADMIN — DIPHENHYDRAMINE HYDROCHLORIDE 50 MG: 50 INJECTION, SOLUTION INTRAMUSCULAR; INTRAVENOUS at 14:32

## 2019-05-01 RX ADMIN — IPRATROPIUM BROMIDE AND ALBUTEROL SULFATE 3 ML: 2.5; .5 SOLUTION RESPIRATORY (INHALATION) at 11:35

## 2019-05-01 RX ADMIN — DEXAMETHASONE SODIUM PHOSPHATE: 4 INJECTION, SOLUTION INTRA-ARTICULAR; INTRALESIONAL; INTRAMUSCULAR; INTRAVENOUS; SOFT TISSUE at 14:32

## 2019-05-01 RX ADMIN — METHYLPREDNISOLONE SODIUM SUCCINATE 40 MG: 40 INJECTION, POWDER, FOR SOLUTION INTRAMUSCULAR; INTRAVENOUS at 06:02

## 2019-05-01 RX ADMIN — CEFDINIR 300 MG: 300 CAPSULE ORAL at 20:02

## 2019-05-01 RX ADMIN — FERROUS SULFATE TAB 325 MG (65 MG ELEMENTAL FE) 325 MG: 325 (65 FE) TAB at 17:36

## 2019-05-01 RX ADMIN — CEFDINIR 300 MG: 300 CAPSULE ORAL at 08:17

## 2019-05-01 RX ADMIN — AZITHROMYCIN 500 MG: 250 TABLET, FILM COATED ORAL at 08:17

## 2019-05-01 RX ADMIN — ACETAMINOPHEN 650 MG: 325 TABLET, FILM COATED ORAL at 14:35

## 2019-05-01 NOTE — PROGRESS NOTES
Discharge Planning Assessment  [facility]     Patient Name: Dav Freitas  MRN: [id number]  Today's Date: 5/1/2019    Admit Date: 4/28/2019    Discharge Needs Assessment     Row Name 05/01/19 1329       Living Environment    Lives With  spouse    Current Living Arrangements  home/apartment/condo    Potentially Unsafe Housing Conditions  other (see comments) no concerns     Primary Care Provided by  self    Provides Primary Care For  pet(s)    Family Caregiver if Needed  spouse    Quality of Family Relationships  helpful;involved;supportive    Able to Return to Prior Arrangements  yes       Resource/Environmental Concerns    Resource/Environmental Concerns  none    Transportation Concerns  car, none       Transition Planning    Patient/Family Anticipates Transition to  home with family    Patient/Family Anticipated Services at Transition  none    Transportation Anticipated  family or friend will provide       Discharge Needs Assessment    Readmission Within the Last 30 Days  no previous admission in last 30 days    Concerns to be Addressed  adjustment to diagnosis/illness    Equipment Currently Used at Home  cane, straight;walker, rolling;shower chair;commode    Anticipated Changes Related to Illness  none    Equipment Needed After Discharge  none        Discharge Plan     Row Name 05/01/19 2755       Plan    Plan  Home with assistance of spouse     Patient/Family in Agreement with Plan  yes    Plan Comments  CCP met with patient at bedside. CCP role explained and discharge planning discussed. Face sheet verified. Patient's PCP is Dr. Irvin. Patient lives with his spouse and three dogs, in a tri-level home. Patient states there are 9 steps from the living room to the kitchen (handrails present) and 9 steps leading to the bedroom and bathroom (handrails present). Patient is independent with his ADLS and does not use DME. Patient states they do have a walker, shower chair, cane and commode at home from his mother  in law. Patient denies any HH/SNF history but states his wife has used [facility] in the past. Patient's preferred pharmacy is [email or web address] on [address] and [address]; patient denies having trouble affording his medications but is unsure of what he will be discharged on from the hospital. CCP discussed meds to beds; patient consented. Patient currently on Lovenox. Patient states he is to start chemotherapy today. Patient states he has a good support system and his wife is able to assist as needed at home. Patient states he will have transportation to and from his chemo appointments. Patient does not anticipate any discharge needs at this time. CCP discussed HH/SNf list with patient and left at bedside. Patient states he has been ambulating since being admitted to the hospital and denies any PT needs. CCP will follow for any skilled needs that may arise and follow for Lovenox. Ayde Tejada CSW         Destination      No service coordination in this encounter.      Durable Medical Equipment      No service coordination in this encounter.      Dialysis/Infusion      No service coordination in this encounter.      Home Medical Care      No service coordination in this encounter.      Therapy      No service coordination in this encounter.      Community Resources      No service coordination in this encounter.          Demographic Summary     Row Name 05/01/19 132       General Information    Admission Type  inpatient    Arrived From  emergency department    Referral Source  admission list    Reason for Consult  discharge planning    Preferred Language  English     Used During This Interaction  no        Functional Status     Row Name 05/01/19 1322       Functional Status    Usual Activity Tolerance  good    Current Activity Tolerance  good       Functional Status, IADL    Medications  independent    Meal Preparation  independent    Housekeeping  independent    Laundry  independent    Shopping  independent       Mental  Status    General Appearance WDL  WDL       Mental Status Summary    Recent Changes in Mental Status/Cognitive Functioning  no changes        Psychosocial    No documentation.       Abuse/Neglect    No documentation.       Legal    No documentation.       Substance Abuse    No documentation.       Patient Forms    No documentation.           OMARI Parkinson

## 2019-05-01 NOTE — PLAN OF CARE
Problem: Patient Care Overview  Goal: Plan of Care Review  Outcome: Ongoing (interventions implemented as appropriate)   05/01/19 0700   Coping/Psychosocial   Plan of Care Reviewed With patient   Plan of Care Review   Progress no change   OTHER   Outcome Summary Pt has done well through the night. Remained on Room air but does have NC set up for PRN use. Pt does become SOA with excertion. Denies pain. Up ad hazel. Awaiting results of CT scans from yesterday to determine extent of CLL diagnisis. will monitor.       Problem: Infection, Risk/Actual (Adult)  Goal: Identify Related Risk Factors and Signs and Symptoms  Outcome: Outcome(s) achieved Date Met: 05/01/19    Goal: Infection Prevention/Resolution  Outcome: Ongoing (interventions implemented as appropriate)      Problem: Breathing Pattern Ineffective (Adult)  Goal: Identify Related Risk Factors and Signs and Symptoms  Outcome: Outcome(s) achieved Date Met: 05/01/19    Goal: Effective Oxygenation/Ventilation  Outcome: Ongoing (interventions implemented as appropriate)    Goal: Anxiety/Fear Reduction  Outcome: Ongoing (interventions implemented as appropriate)

## 2019-05-01 NOTE — PLAN OF CARE
Problem: Patient Care Overview  Goal: Plan of Care Review   05/01/19 1545   OTHER   Outcome Summary pt continues mini neb treatments.

## 2019-05-01 NOTE — PROGRESS NOTES
Staff message rec from Dr Church about Imbruvica for pt. See below.    Tyrone Church MD sent to Janelle Irvin.             Can you please work on getting Imbruvica for this patient? He has new diagnosis CLL     Thanks   Dr ALEMAN      I asked Dr Church for clarification on dose and he responded that he discussed with Dr Parekh and pt will be taking Treanda/Rituxan.

## 2019-05-01 NOTE — PROGRESS NOTES
Subjective   REASON FOR FOLLOW UP:  1.  New diagnosis chronic lymphocytic leukemia with extensive lymphadenopathy and possible pleural involvement.    HISTORY OF PRESENT ILLNESS:   The patient is a  63 y.o. Male  who was admitted on 4/28/2019 with worsening complaints of cough wheezing and shortness of breath.  He had been to an urgent care center and was started on antibiotics and received a shot of Solu-Medrol.  His symptoms did not improve and on his admission he was noted to have profound lymphocytosis with a total white count of 70,074% lymphocytes on his white cell differential.     He also underwent CT scan of the chest that showed infiltrates and nodules in the lungs as well as significant lymphadenopathy within the chest and in the axillary regions.  He had peripheral blood sent for flow cytometry leukemia lymphoma panel but this is still pending at time of this dictation.     He also underwent bronchoscopy on 4/29/2019.  Results from this procedure are pendingl.  His respiratory viral panel was negative and markers of sepsis were unremarkable.    4/30/19  His peripheral blood flow cytometry and flow cytometry from the EBUS needle biopsy at bronchoscopy are both consistent with chronic lymphocytic leukemia/small lymphocytic lymphoma.    May 1, 2019  The patient underwent a CT of abdomen and pelvis April 30 demonstrating extensive splenomegaly and bulky lymphadenopathy throughout the abdomen and pelvis.  There is a tiny lesion at the superior aspect lateral hepatic segment and 2 hyperenhancing foci within the liver thought to be hemangiomas, moderate size right inguinal hernia containing a long segment of small bowel without obstruction or incarceration.  CT of soft tissue again reveals extensive cervical adenopathy as well as evidence of pansinusitis.  The patient's case was discussed with Dr. Church and the patient has stage III-IV disease should be treated during this hospitalization.  I met with the  patient and discussed in detail the use of Treanda/ Rituxan which we hope to initiate at this afternoon.          History of Present Illness      Past Medical History, Past Surgical History, Social History, Family History have been reviewed and are without significant changes except as mentioned.    Review of Systems   Respiratory: Positive for shortness of breath.       Constitutional: Positive for fatigue. Negative for activity change, chills and fever.   HENT: Negative for mouth sores, trouble swallowing and voice change.    Eyes: Negative for pain and visual disturbance.   Respiratory: Positive for cough, shortness of breath and wheezing.    Cardiovascular: Negative for chest pain and palpitations.   Gastrointestinal: Negative for abdominal pain, constipation, diarrhea, nausea and vomiting.   Genitourinary: Negative for difficulty urinating, frequency and urgency.   Musculoskeletal: Negative for arthralgias and joint swelling.   Skin: Negative for rash.   Neurological: Negative for dizziness, seizures, weakness and headaches.   Hematological: Negative for adenopathy. Does not bruise/bleed easily.   Psychiatric/Behavioral: Negative for behavioral problems and confusion. The patient is not nervous/anxious.      A comprehensive 14 point review of systems was performed and was negative except as mentioned.    Medications:  The current medication list was reviewed in the EMR    ALLERGIES:  No Known Allergies    Objective      Vitals:    04/30/19 2259 05/01/19 0411 05/01/19 0740 05/01/19 0801   BP:  118/59 143/73    BP Location:  Left arm Right arm    Patient Position:  Lying Lying    Pulse: 92 79 78 84   Resp: 20 18 18 16   Temp:  98 °F (36.7 °C) 98 °F (36.7 °C)    TempSrc:  Oral Oral    SpO2:  91% 93%    Weight:       Height:         No flowsheet data found.    Physical Exam    Constitutional: He is oriented to person, place, and time. He appears well-developed and well-nourished. No distress.   HENT:   Head:  Normocephalic.   Eyes: Conjunctivae and EOM are normal. Pupils are equal, round, and reactive to light. No scleral icterus.   Neck: Normal range of motion. Neck supple. No JVD present. No thyromegaly present.   Cardiovascular: Regular rhythm. Tachycardia present. Exam reveals no gallop and no friction rub.   No murmur heard.  Pulmonary/Chest: He has wheezes. He has no rales.   Abdominal: Soft. He exhibits no distension and no mass. There is no tenderness.   Musculoskeletal: Normal range of motion. He exhibits no edema or deformity.   Lymphadenopathy:        Head (right side): Submandibular adenopathy present.        Head (left side): Submandibular adenopathy present.     He has cervical adenopathy.        Right cervical: Superficial cervical and deep cervical adenopathy present.        Left cervical: Superficial cervical and deep cervical adenopathy present.     He has axillary adenopathy.        Right axillary: Pectoral and lateral adenopathy present.        Left axillary: Pectoral and lateral adenopathy present.   Neurological: He is alert and oriented to person, place, and time. He has normal reflexes. No cranial nerve deficit.   Skin: Skin is warm and dry. No rash noted. No erythema.   Psychiatric: He has a normal mood and affect. His behavior is normal. Judgment normal    RECENT LABS:  Hematology WBC   Date Value Ref Range Status   05/01/2019 74.21 (C) 3.40 - 10.80 10*3/mm3 Final     RBC   Date Value Ref Range Status   05/01/2019 3.25 (L) 4.14 - 5.80 10*6/mm3 Final     Hemoglobin   Date Value Ref Range Status   05/01/2019 9.1 (L) 13.0 - 17.7 g/dL Final     Hematocrit   Date Value Ref Range Status   05/01/2019 30.9 (L) 37.5 - 51.0 % Final     Platelets   Date Value Ref Range Status   05/01/2019 153 140 - 450 10*3/mm3 Final            Lab Results   Component Value Date    GLUCOSE 164 (H) 04/30/2019    BUN 18 04/30/2019    CREATININE 0.91 04/30/2019    EGFRIFNONA 84 04/30/2019    BCR 19.8 04/30/2019    K 4.9  04/30/2019    CO2 24.0 04/30/2019    CALCIUM 8.4 (L) 04/30/2019    ALBUMIN 3.90 04/30/2019    AST 15 04/30/2019    ALT 12 04/30/2019      - 225 U/L 180      Uric Acid 3.4 - 7.0 mg/dL 5.1      Lab Results   Component Value Date    IRON 40 (L) 04/30/2019    TIBC 331 04/30/2019    FERRITIN 92.00 04/30/2019     Immature Reticulocyte Fraction 3.0 - 15.8 % 37.9 Abnormally high     Reticulocyte % 0.70 - 1.90 % 1.57    Reticulocyte Hgb 29.8 - 36.1 pg 28.0 Abnormally low       Flow cytometry report on the peripheral blood as well as the lymph node biopsied at bronchoscopy are consistent with chronic lymphocytic leukemia/small lymphocytic lymphoma.    FISH prognostic panel requested    Assessment/Plan   1.  CLL presenting with significant lymphocytosis, lymphadenopathy and splenomegaly.    2.  Pulmonary nodules/infiltrates.  He is status post bronchoscopy.  Is unclear at this time whether these findings are infectious or possibly related to his lymphoproliferative disorder.  3.  Iron deficiency     Recommendations  1.  We discussed, again, the diagnosis with the patient at length.  2.  We have completed his staging as above.  3.  We also will request a FISH prognostic panel on his peripheral blood flow cytometry specimen.  4.  We discussed potential treatment options with patient.  He may be a candidate for Imbruvica therapy but given his young age and extensive lymphadenopathy and symptoms have decided to proceed initially with Treanda Rituxan, reevaluate after 2 cycles for potential move to Imbruvica.  At this point we will plan to initiate Treanda Rituxan again in May 1, 2019  5.  We will initiate iron supplementation with ferrous sulfate 325 mg p.o. twice daily. Patient will need colonoscopy at some point once he is stable.  6.  Check quantitative immunoglobulins                      5/1/2019      CC:

## 2019-05-01 NOTE — PROGRESS NOTES
Pharmacy Chemotherapy Education - Rituximab/Bendamustine    Dav Freitas is a 63 y.o. male admitted with CLL. Patient to start rituximab/bendamustine therapy for CLL with plan to give cycle 1 while inpatient. Met with patient to discuss schedule and expected effects of rituximab.    Reviewed with patient common and clinically significant effects including the risk of infusion reactions, tumor lysis syndrome, bleeding risk, fatigue, flu-like symptoms including low grade fever/chills in the first 24 hours, headache, cough or runny nose/sinusitis. Infection prevention precautions were reviewed including hand washing, food safety and the avoidance of crowds/use of mask; patient was advised to contact provider in the event of a sustained fever >100.4 for more than 1 hour.  Bleeding precautions including the use of a soft bristle toothbrush, electric razor, monitoring for blood in urine/stool and falls precautions reviewed.     Patient was counseled on when to notify a provider including in the event of the following:   - Infusion reactions, including the development of hives, phlebitis, SOA, dizziness, chills, swelling, palpitations or chest pain.   - Fever >100.4   - Prolonged bleeding or blood in urine/stool    Reassured patient that treatment is generally well tolerated. Provided patient with handout regarding medication. Patient engaged in counseling throughout and asked appropriate questions as needed to confirm understanding. Patient without any further questions at this time; patient verbalized understanding.    Thank you for the opportunity to provide education on chemotherapy; please do not hesitate if further assistance is needed.    Gaby Gordon, Pharm.D., [facility]  Oncology Pharmacy Specialist  [phone]

## 2019-05-01 NOTE — NURSING NOTE
"Falls Prevention Teach-Back Education     Dav Freitas is a 63 y.o. male admitted to [facility] on 4/28/2019 12:01 PM. The primary encounter diagnosis was Sepsis, due to unspecified organism (CMS/[facility]). Diagnoses of Community acquired pneumonia, unspecified laterality, Leukocytosis, unspecified type, Dyspnea, unspecified type, Renal failure, unspecified chronicity, and Mediastinal lymphadenopathy were also pertinent to this visit.    Fall Risk Assessment  [facility] High Risk Falls Assessment (If Fall score is >/=13, add the Fall Risk CPG to the care plan)   Fallen in past 6 months: 0--> No  Mental Status: 0--> no mental status change  Elimination: 0--> No elimination issues  Mobility: 0--> No mobility issues  Medications: 1--> Narcotics  Nurses' Clinical Judgement: 2  Total Fall Risk Score: 4  Total Fall Risk Score: 4    The patient viewed the informational video on strategies to prevent falling while hospitalized entitled \"Patient Safety: Protecting Yourself in the Hospital (Part 3)\".  The patient was able to teach back at least three things that can be done to lessen the risk for falling while in the hospital.  Additionally, the patient was able to verbalize what they need to do before getting out of bed in an effort to prevent a fall.    Nurse: Sandra Galvez RN  "

## 2019-05-01 NOTE — PLAN OF CARE
Problem: Patient Care Overview  Goal: Plan of Care Review   05/01/19 0357   Coping/Psychosocial   Plan of Care Reviewed With patient   Plan of Care Review   Progress improving   OTHER   Outcome Summary Pt on RA and feels treatments help. Breath sounds still very coarse but no SOA present.

## 2019-05-01 NOTE — NURSING NOTE
"Nursing Chemotherapy Verification    Chemotherapy Regimen:   Treatment Plans     Name Type Plan dates Plan Provider         Active    OP LYMPHOMA Bendamustine 90 mg/m2 / RiTUXimab 375 mg/m2 ONCOLOGY TREATMENT  4/30/2019 - Present Jostin Parekh MD                     Current height and weight: 180.3 cm (71\") 67.7 kg (149 lb 3.2 oz)  Calculated BSA from current height and weight (Andrew): 1.86    Relevant Labs  Results from last 7 days   Lab Units 05/01/19  0552 04/30/19  0314 04/28/19  1229   WBC 10*3/mm3 74.21* 69.91* 71.63*   HEMOGLOBIN g/dL 9.1* 10.1* 11.6*   HEMATOCRIT % 30.9* 34.5* 39.6   PLATELETS 10*3/mm3 153 154 163     Lab Results   Component Value Date    NEUTROABS 12.62 (H) 05/01/2019       Results from last 7 days   Lab Units 04/30/19  0314 04/28/19  1229   CREATININE mg/dL 0.91 1.70*       Serum creatinine: 0.91 mg/dL 04/30/19 0314  Estimated creatinine clearance: 78.5 mL/min    No results found for: HAV, HEPAIGM, HEPBIGM, HEPBCAB, HBEAG, HEPCAB    Verification attestation:  I have personally reviewed the planned regimen and its administration and dosing. I understand the potential side effects.The patient has been instructed on the regimen, potential side effects, and self care measures; the consent form has been completed. I have confirmed that the appropriate premedication, prehydration, post medication and/or emergency medications are ordered in addition to the chemotherapy.    I have independently verified that the height and weight is current and calculated the BSA. I have verified the doses with the planned regimen and have clarified any deviations with the physician (Dr. Parekh). I have confirmed the route of administration and patient IV access.    Nurse: Sandra Galvez RN  2nd verification Nurse:Larissa Penaloza RN    "

## 2019-05-01 NOTE — PROGRESS NOTES
[facility]  [phone]  Connor Dove MD    Subjective:  LOS: 3    Now starting chemo for CLL. Overall much better. Wheezing but improved. Cough and congestion is less.    Objective   Vital Signs past 24hrs    Temp range: Temp (24hrs), Av.1 °F (36.7 °C), Min:98 °F (36.7 °C), Max:98.2 °F (36.8 °C)    BP range: BP: (115-143)/(57-73) 120/60  Pulse range: Heart Rate:  [] 98  Resp rate range: Resp:  [16-20] 16    Device (Oxygen Therapy): nasal cannulaFlow (L/min):  [2] 2  Oxygen range:SpO2:  [90 %-97 %] 97 %      67.7 kg (149 lb 3.2 oz); Body mass index is 20.81 kg/m².    Intake/Output Summary (Last 24 hours) at 2019 1804  Last data filed at 2019 0627  Gross per 24 hour   Intake 120 ml   Output --   Net 120 ml       Physical Exam   Constitutional: He appears well-developed.   Cardiovascular: Normal rate and regular rhythm.   No murmur heard.  Pulmonary/Chest: He has decreased breath sounds. He has wheezes (mild only now). He has no rales.   Abdominal: Soft. Bowel sounds are normal. There is no tenderness.   Musculoskeletal: He exhibits no edema.   Neurological: He is alert.   Nursing note and vitals reviewed.    Results Review:    I have reviewed the laboratory and imaging data since the last note by [facility] physician.  My annotations are noted in assessment and plan.    Medication Review:  I have reviewed the current MAR.  My annotations are noted in assessment and plan.       Plan   PCCM Problems  Suspicious for lymphoma  Mediastinal and hilar adenopathy  Patchy pulmonary infiltrates  Acute versus chronic kidney injury  COPD exacerbation  Current cigarette smoker   Leukocytosis    Plan of Treatment  CLL - appreciate oncology input - now on chemo.    Patchy infiltrates - could be infection. On abx. Switch to po, finish out 7 days, bal -ve.    COPD exacerbation nebs and steroids. Slowly taper.    Renal function resolved.    Smoker - declines nicotine patch, plans to quit.    Lovenox for dvt  prophylaxis.    Connor Dove MD  05/01/19  6:04 PM    Part of this note may be an electronic transcription/translation of spoken language to printed text using the Dragon Dictation System.

## 2019-05-01 NOTE — PLAN OF CARE
Problem: Patient Care Overview  Goal: Plan of Care Review  Outcome: Ongoing (interventions implemented as appropriate)   05/01/19 3062   Coping/Psychosocial   Plan of Care Reviewed With patient   Plan of Care Review   Progress no change   OTHER   Outcome Summary Pt with increased WBC workup for CLL. Started on Rituxan treatments today. Up ad hazel. A&O. No c/o pain. NO N/V. Will continue to monitor.      Goal: Individualization and Mutuality  Outcome: Ongoing (interventions implemented as appropriate)    Goal: Discharge Needs Assessment  Outcome: Ongoing (interventions implemented as appropriate)    Goal: Interprofessional Rounds/Family Conf  Outcome: Ongoing (interventions implemented as appropriate)      Problem: Infection, Risk/Actual (Adult)  Goal: Infection Prevention/Resolution  Outcome: Ongoing (interventions implemented as appropriate)      Problem: Breathing Pattern Ineffective (Adult)  Goal: Effective Oxygenation/Ventilation  Outcome: Ongoing (interventions implemented as appropriate)    Goal: Anxiety/Fear Reduction  Outcome: Ongoing (interventions implemented as appropriate)

## 2019-05-02 LAB
ABNORMAL WBC NFR SPEC FC: NORMAL %
ALBUMIN SERPL-MCNC: 3.9 G/DL (ref 3.5–5.2)
ALBUMIN/GLOB SERPL: 2 G/DL
ALP SERPL-CCNC: 75 U/L (ref 39–117)
ALT SERPL W P-5'-P-CCNC: 14 U/L (ref 1–41)
ANION GAP SERPL CALCULATED.3IONS-SCNC: 13 MMOL/L
ANISOCYTOSIS BLD QL: ABNORMAL
ANISOCYTOSIS BLD QL: ABNORMAL
ANNOTATION COMMENT IMP: NORMAL
ASSESSMENT OF LEUKOCYTES: NORMAL
AST SERPL-CCNC: 21 U/L (ref 1–40)
BILIRUB SERPL-MCNC: 0.2 MG/DL (ref 0.2–1.2)
BUN BLD-MCNC: 24 MG/DL (ref 8–23)
BUN/CREAT SERPL: 22.4 (ref 7–25)
CALCIUM SPEC-SCNC: 8.1 MG/DL (ref 8.6–10.5)
CHLORIDE SERPL-SCNC: 106 MMOL/L (ref 98–107)
CLINICAL INFO: NORMAL
CO2 SERPL-SCNC: 25 MMOL/L (ref 22–29)
CONV COMMENT: NORMAL
CREAT BLD-MCNC: 1.07 MG/DL (ref 0.76–1.27)
DEPRECATED RDW RBC AUTO: 62.3 FL (ref 37–54)
DEPRECATED RDW RBC AUTO: 65 FL (ref 37–54)
EOSINOPHIL # BLD MANUAL: 0.47 10*3/MM3 (ref 0–0.4)
EOSINOPHIL NFR BLD MANUAL: 1 % (ref 0.3–6.2)
ERYTHROCYTE [DISTWIDTH] IN BLOOD BY AUTOMATED COUNT: 18.1 % (ref 12.3–15.4)
ERYTHROCYTE [DISTWIDTH] IN BLOOD BY AUTOMATED COUNT: 18.3 % (ref 12.3–15.4)
GATING STRATEGY: NORMAL
GFR SERPL CREATININE-BSD FRML MDRD: 70 ML/MIN/1.73
GLOBULIN UR ELPH-MCNC: 2 GM/DL
GLUCOSE BLD-MCNC: 138 MG/DL (ref 65–99)
HBV CORE AB SER DONR QL IA: NEGATIVE
HCT VFR BLD AUTO: 31.5 % (ref 37.5–51)
HCT VFR BLD AUTO: 32.7 % (ref 37.5–51)
HGB BLD-MCNC: 9.3 G/DL (ref 13–17.7)
HGB BLD-MCNC: 9.8 G/DL (ref 13–17.7)
HYPOCHROMIA BLD QL: ABNORMAL
LDH SERPL-CCNC: 290 U/L (ref 135–225)
LYMPHOCYTES # BLD MANUAL: 31.13 10*3/MM3 (ref 0.7–3.1)
LYMPHOCYTES # BLD MANUAL: 34.78 10*3/MM3 (ref 0.7–3.1)
LYMPHOCYTES NFR BLD MANUAL: 1 % (ref 5–12)
LYMPHOCYTES NFR BLD MANUAL: 1 % (ref 5–12)
LYMPHOCYTES NFR BLD MANUAL: 64.9 % (ref 19.6–45.3)
LYMPHOCYTES NFR BLD MANUAL: 74 % (ref 19.6–45.3)
Lab: NORMAL
MCH RBC QN AUTO: 28.7 PG (ref 26.6–33)
MCH RBC QN AUTO: 28.8 PG (ref 26.6–33)
MCHC RBC AUTO-ENTMCNC: 29.5 G/DL (ref 31.5–35.7)
MCHC RBC AUTO-ENTMCNC: 30 G/DL (ref 31.5–35.7)
MCV RBC AUTO: 95.6 FL (ref 79–97)
MCV RBC AUTO: 97.5 FL (ref 79–97)
METAMYELOCYTES NFR BLD MANUAL: 1 % (ref 0–0)
MONOCYTES # BLD AUTO: 0.47 10*3/MM3 (ref 0.1–0.9)
MONOCYTES # BLD AUTO: 0.48 10*3/MM3 (ref 0.1–0.9)
MYELOCYTES NFR BLD MANUAL: 1 % (ref 0–0)
MYELOCYTES NFR BLD MANUAL: 2 % (ref 0–0)
NEUTROPHILS # BLD AUTO: 15.83 10*3/MM3 (ref 1.7–7)
NEUTROPHILS # BLD AUTO: 8.46 10*3/MM3 (ref 1.7–7)
NEUTROPHILS NFR BLD MANUAL: 18 % (ref 42.7–76)
NEUTROPHILS NFR BLD MANUAL: 33 % (ref 42.7–76)
NRBC SPEC MANUAL: 2 /100 WBC (ref 0–0.2)
NRBC SPEC MANUAL: 3.1 /100 WBC (ref 0–0.2)
PATH INTERP SPEC-IMP: NORMAL
PHENOTYPE CHART: NORMAL
PHOSPHATE SERPL-MCNC: 4.8 MG/DL (ref 2.5–4.5)
PLAT MORPH BLD: NORMAL
PLAT MORPH BLD: NORMAL
PLATELET # BLD AUTO: 150 10*3/MM3 (ref 140–450)
PLATELET # BLD AUTO: 155 10*3/MM3 (ref 140–450)
PMV BLD AUTO: 10.9 FL (ref 6–12)
PMV BLD AUTO: 11 FL (ref 6–12)
POTASSIUM BLD-SCNC: 5 MMOL/L (ref 3.5–5.2)
PROMYELOCYTES NFR BLD MANUAL: 1 % (ref 0–0)
PROT SERPL-MCNC: 5.9 G/DL (ref 6–8.5)
RBC # BLD AUTO: 3.23 10*6/MM3 (ref 4.14–5.8)
RBC # BLD AUTO: 3.42 10*6/MM3 (ref 4.14–5.8)
SODIUM BLD-SCNC: 144 MMOL/L (ref 136–145)
SPECIMEN SOURCE: NORMAL
SPHEROCYTES BLD QL SMEAR: ABNORMAL
URATE SERPL-MCNC: 5.3 MG/DL (ref 3.4–7)
VARIANT LYMPHS NFR BLD MANUAL: 2 % (ref 0–5)
VIABLE CELLS NFR SPEC: NORMAL %
WBC MORPH BLD: NORMAL
WBC MORPH BLD: NORMAL
WBC NRBC COR # BLD: 47 10*3/MM3 (ref 3.4–10.8)
WBC NRBC COR # BLD: 47.97 10*3/MM3 (ref 3.4–10.8)

## 2019-05-02 PROCEDURE — 25010000002 ENOXAPARIN PER 10 MG: Performed by: INTERNAL MEDICINE

## 2019-05-02 PROCEDURE — 25010000002 BENDAMUSTINE HCL 100 MG/4ML SOLUTION 4 ML VIAL: Performed by: INTERNAL MEDICINE

## 2019-05-02 PROCEDURE — 25010000002 METHYLPREDNISOLONE PER 40 MG: Performed by: INTERNAL MEDICINE

## 2019-05-02 PROCEDURE — 94799 UNLISTED PULMONARY SVC/PX: CPT

## 2019-05-02 PROCEDURE — 85007 BL SMEAR W/DIFF WBC COUNT: CPT | Performed by: INTERNAL MEDICINE

## 2019-05-02 PROCEDURE — 84100 ASSAY OF PHOSPHORUS: CPT | Performed by: INTERNAL MEDICINE

## 2019-05-02 PROCEDURE — 84550 ASSAY OF BLOOD/URIC ACID: CPT | Performed by: INTERNAL MEDICINE

## 2019-05-02 PROCEDURE — 25010000002 DEXAMETHASONE SODIUM PHOSPHATE 20 MG/5ML SOLUTION 5 ML VIAL: Performed by: INTERNAL MEDICINE

## 2019-05-02 PROCEDURE — 83615 LACTATE (LD) (LDH) ENZYME: CPT | Performed by: INTERNAL MEDICINE

## 2019-05-02 PROCEDURE — 85025 COMPLETE CBC W/AUTO DIFF WBC: CPT | Performed by: INTERNAL MEDICINE

## 2019-05-02 PROCEDURE — 80053 COMPREHEN METABOLIC PANEL: CPT | Performed by: INTERNAL MEDICINE

## 2019-05-02 PROCEDURE — 99233 SBSQ HOSP IP/OBS HIGH 50: CPT | Performed by: INTERNAL MEDICINE

## 2019-05-02 RX ORDER — SODIUM CHLORIDE 9 MG/ML
250 INJECTION, SOLUTION INTRAVENOUS ONCE
Status: COMPLETED | OUTPATIENT
Start: 2019-05-02 | End: 2019-05-02

## 2019-05-02 RX ORDER — ACETAMINOPHEN 325 MG/1
650 TABLET ORAL ONCE
Status: COMPLETED | OUTPATIENT
Start: 2019-05-03 | End: 2019-05-03

## 2019-05-02 RX ORDER — PREDNISONE 20 MG/1
40 TABLET ORAL
Status: DISCONTINUED | OUTPATIENT
Start: 2019-05-03 | End: 2019-05-03 | Stop reason: HOSPADM

## 2019-05-02 RX ORDER — ACETYLCYSTEINE 200 MG/ML
4 SOLUTION ORAL; RESPIRATORY (INHALATION)
Status: DISCONTINUED | OUTPATIENT
Start: 2019-05-02 | End: 2019-05-03 | Stop reason: HOSPADM

## 2019-05-02 RX ORDER — DIPHENHYDRAMINE HCL 50 MG
50 CAPSULE ORAL ONCE
Status: COMPLETED | OUTPATIENT
Start: 2019-05-03 | End: 2019-05-03

## 2019-05-02 RX ORDER — ACYCLOVIR 200 MG/1
400 CAPSULE ORAL 2 TIMES DAILY
Status: DISCONTINUED | OUTPATIENT
Start: 2019-05-02 | End: 2019-05-03 | Stop reason: HOSPADM

## 2019-05-02 RX ORDER — FAMOTIDINE 10 MG/ML
20 INJECTION, SOLUTION INTRAVENOUS ONCE AS NEEDED
Status: DISCONTINUED | OUTPATIENT
Start: 2019-05-03 | End: 2019-05-03 | Stop reason: HOSPADM

## 2019-05-02 RX ORDER — DIPHENHYDRAMINE HCL 50 MG
50 CAPSULE ORAL EVERY 6 HOURS PRN
Status: DISCONTINUED | OUTPATIENT
Start: 2019-05-03 | End: 2019-05-03 | Stop reason: HOSPADM

## 2019-05-02 RX ADMIN — ACYCLOVIR 400 MG: 200 CAPSULE ORAL at 12:46

## 2019-05-02 RX ADMIN — CEFDINIR 300 MG: 300 CAPSULE ORAL at 08:08

## 2019-05-02 RX ADMIN — SODIUM CHLORIDE 250 ML: 9 INJECTION, SOLUTION INTRAVENOUS at 15:25

## 2019-05-02 RX ADMIN — ACYCLOVIR 400 MG: 200 CAPSULE ORAL at 23:01

## 2019-05-02 RX ADMIN — IPRATROPIUM BROMIDE AND ALBUTEROL SULFATE 3 ML: 2.5; .5 SOLUTION RESPIRATORY (INHALATION) at 16:57

## 2019-05-02 RX ADMIN — FERROUS SULFATE TAB 325 MG (65 MG ELEMENTAL FE) 325 MG: 325 (65 FE) TAB at 08:08

## 2019-05-02 RX ADMIN — BENDAMUSTINE HYDROCHLORIDE 165 MG: 25 INJECTION, SOLUTION INTRAVENOUS at 16:18

## 2019-05-02 RX ADMIN — AZITHROMYCIN 500 MG: 250 TABLET, FILM COATED ORAL at 08:08

## 2019-05-02 RX ADMIN — IPRATROPIUM BROMIDE AND ALBUTEROL SULFATE 3 ML: 2.5; .5 SOLUTION RESPIRATORY (INHALATION) at 20:31

## 2019-05-02 RX ADMIN — CEFDINIR 300 MG: 300 CAPSULE ORAL at 23:01

## 2019-05-02 RX ADMIN — FERROUS SULFATE TAB 325 MG (65 MG ELEMENTAL FE) 325 MG: 325 (65 FE) TAB at 18:30

## 2019-05-02 RX ADMIN — ENOXAPARIN SODIUM 30 MG: 30 INJECTION SUBCUTANEOUS at 23:01

## 2019-05-02 RX ADMIN — IPRATROPIUM BROMIDE AND ALBUTEROL SULFATE 3 ML: 2.5; .5 SOLUTION RESPIRATORY (INHALATION) at 07:46

## 2019-05-02 RX ADMIN — ALLOPURINOL 300 MG: 300 TABLET ORAL at 08:08

## 2019-05-02 RX ADMIN — DEXAMETHASONE SODIUM PHOSPHATE 12 MG: 4 INJECTION, SOLUTION INTRA-ARTICULAR; INTRALESIONAL; INTRAMUSCULAR; INTRAVENOUS; SOFT TISSUE at 15:25

## 2019-05-02 RX ADMIN — METHYLPREDNISOLONE SODIUM SUCCINATE 40 MG: 40 INJECTION, POWDER, FOR SOLUTION INTRAMUSCULAR; INTRAVENOUS at 18:30

## 2019-05-02 RX ADMIN — Medication 5 MG: at 23:01

## 2019-05-02 RX ADMIN — METHYLPREDNISOLONE SODIUM SUCCINATE 40 MG: 40 INJECTION, POWDER, FOR SOLUTION INTRAMUSCULAR; INTRAVENOUS at 06:34

## 2019-05-02 NOTE — PROGRESS NOTES
[facility]  [phone]  Connor Dove MD    Subjective:  LOS: 4    He feels much better.  His wheezing is improved and he feels stronger.  He is completed his first round of chemo for CLL.    Objective   Vital Signs past 24hrs    Temp range: Temp (24hrs), Av.6 °F (36.4 °C), Min:97.2 °F (36.2 °C), Max:98.1 °F (36.7 °C)    BP range: BP: (112-133)/(54-66) 132/62  Pulse range: Heart Rate:  [] 76  Resp rate range: Resp:  [16-20] 20    Device (Oxygen Therapy): room airFlow (L/min):  [2] 2  Oxygen range:SpO2:  [90 %-96 %] 93 %      67.7 kg (149 lb 3.2 oz); Body mass index is 20.81 kg/m².    Intake/Output Summary (Last 24 hours) at 2019 1801  Last data filed at 2019 1300  Gross per 24 hour   Intake 700 ml   Output --   Net 700 ml       Physical Exam   Constitutional: He appears well-developed.   Cardiovascular: Normal rate and regular rhythm.   No murmur heard.  Pulmonary/Chest: He has decreased breath sounds. He has wheezes (mild only now). He has no rales.   Abdominal: Soft. Bowel sounds are normal. There is no tenderness.   Musculoskeletal: He exhibits no edema.   Neurological: He is alert.   Nursing note and vitals reviewed.    Results Review:    I have reviewed the laboratory and imaging data since the last note by [facility] physician.  My annotations are noted in assessment and plan.    Medication Review:  I have reviewed the current MAR.  My annotations are noted in assessment and plan.       Plan   PCCM Problems  Suspicious for lymphoma  Mediastinal and hilar adenopathy  Patchy pulmonary infiltrates  Acute versus chronic kidney injury  COPD exacerbation  Current cigarette smoker   Leukocytosis    Plan of Treatment  CLL - appreciate oncology input - now on chemo.    Patchy infiltrates - could be infection. On abx. Switch to po, finish out 7 days, bal -ve.    COPD exacerbation nebs and steroids. Slowly taper. Give few doses mucolytics.    Renal function resolved.    Smoker - declines  nicotine patch, plans to quit.    Lovenox for dvt prophylaxis.    Note evaluation planned by ENT for sinusitis.    If no objections I can discharge him home tomorrow evening.    Connor Dove MD  05/02/19  6:01 PM    Part of this note may be an electronic transcription/translation of spoken language to printed text using the Dragon Dictation System.

## 2019-05-02 NOTE — PLAN OF CARE
Problem: Patient Care Overview  Goal: Plan of Care Review   05/02/19 4618   Coping/Psychosocial   Plan of Care Reviewed With patient   OTHER   Outcome Summary Bs larissa - recc c/db/hydration.

## 2019-05-02 NOTE — PLAN OF CARE
Problem: Breathing Pattern Ineffective (Adult)  Intervention: Optimize Oxygenation/Ventilation/Perfusion  Breath sounds improving -clear t/o except for RLL-wheezing noted - Oxygenation improving-weaned to ra at this time and pt advised to report any soa.   05/02/19 8833   Respiratory Interventions   Airway/Ventilation Management airway patency maintained;pulmonary hygiene promoted   Positioning   Head of Bed (HOB) HOB at 45 degrees

## 2019-05-02 NOTE — PLAN OF CARE
Problem: Patient Care Overview  Goal: Plan of Care Review  Outcome: Ongoing (interventions implemented as appropriate)   05/02/19 1748   Coping/Psychosocial   Plan of Care Reviewed With patient   Plan of Care Review   Progress improving   OTHER   Outcome Summary pt had uneventful day, recieved chemo and tolerated well. no complaints of shortness of breath, oxygen weaned off and has not had to use it all day. VSS will continue to monitor.      Goal: Individualization and Mutuality  Outcome: Ongoing (interventions implemented as appropriate)   05/02/19 1748   Individualization   Patient Specific Goals (Include Timeframe) recieve chemo and tolerate well   Patient Specific Interventions chemo given on time, tolerated well       Problem: Infection, Risk/Actual (Adult)  Goal: Infection Prevention/Resolution  Outcome: Ongoing (interventions implemented as appropriate)      Problem: Breathing Pattern Ineffective (Adult)  Goal: Effective Oxygenation/Ventilation  Outcome: Ongoing (interventions implemented as appropriate)      Problem: Oncology Care (Adult)  Goal: Signs and Symptoms of Listed Potential Problems Will be Absent, Minimized or Managed (Oncology Care)  Outcome: Ongoing (interventions implemented as appropriate)

## 2019-05-02 NOTE — PROGRESS NOTES
Subjective   REASON FOR FOLLOW UP:  1.  New diagnosis chronic lymphocytic leukemia with extensive lymphadenopathy and possible pleural involvement.    HISTORY OF PRESENT ILLNESS:   The patient is a  63 y.o. Male  who was admitted on 4/28/2019 with worsening complaints of cough wheezing and shortness of breath.  He had been to an urgent care center and was started on antibiotics and received a shot of Solu-Medrol.  His symptoms did not improve and on his admission he was noted to have profound lymphocytosis with a total white count of 70,074% lymphocytes on his white cell differential.     He also underwent CT scan of the chest that showed infiltrates and nodules in the lungs as well as significant lymphadenopathy within the chest and in the axillary regions.  He had peripheral blood sent for flow cytometry leukemia lymphoma panel but this is still pending at time of this dictation.     He also underwent bronchoscopy on 4/29/2019.  Results from this procedure are pendingl.  His respiratory viral panel was negative and markers of sepsis were unremarkable.    4/30/19  His peripheral blood flow cytometry and flow cytometry from the EBUS needle biopsy at bronchoscopy are both consistent with chronic lymphocytic leukemia/small lymphocytic lymphoma.    May 1, 2019  The patient underwent a CT of abdomen and pelvis April 30 demonstrating extensive splenomegaly and bulky lymphadenopathy throughout the abdomen and pelvis.  There is a tiny lesion at the superior aspect lateral hepatic segment and 2 hyperenhancing foci within the liver thought to be hemangiomas, moderate size right inguinal hernia containing a long segment of small bowel without obstruction or incarceration.  CT of soft tissue again reveals extensive cervical adenopathy as well as evidence of pansinusitis.  The patient's case was discussed with Dr. Church and the patient has stage III-IV disease should be treated during this hospitalization.  I met with the  patient and discussed in detail the use of Treanda/ Rituxan which we initiated Treanda Rituxan beginning May 1, 2019.     When he is seen May 2 he is already beginning to respond with reducing adenopathy and improved symptoms.  Plan to proceed with day #2.  We have also reviewed his findings including IgA 14, IgG of 263, IgM of 5 and a kappa/lambda ratio of 26.38.  He is hypogammaglobulinemic and replacement therapy will be given this hospitalization.  ENT assessment will also be requested.          Shortness of Breath           Past Medical History, Past Surgical History, Social History, Family History have been reviewed and are without significant changes except as mentioned.    Review of Systems   Respiratory: Positive for shortness of breath.       Constitutional: Positive for fatigue. Negative for activity change, chills and fever.   HENT: Negative for mouth sores, trouble swallowing and voice change.    Eyes: Negative for pain and visual disturbance.   Respiratory: Positive for cough, shortness of breath and wheezing.    Cardiovascular: Negative for chest pain and palpitations.   Gastrointestinal: Negative for abdominal pain, constipation, diarrhea, nausea and vomiting.   Genitourinary: Negative for difficulty urinating, frequency and urgency.   Musculoskeletal: Negative for arthralgias and joint swelling.   Skin: Negative for rash.   Neurological: Negative for dizziness, seizures, weakness and headaches.   Hematological: Negative for adenopathy. Does not bruise/bleed easily.   Psychiatric/Behavioral: Negative for behavioral problems and confusion. The patient is not nervous/anxious.      A comprehensive 14 point review of systems was performed and was negative except as mentioned.    Medications:  The current medication list was reviewed in the EMR    ALLERGIES:  No Known Allergies    Objective      Vitals:    05/01/19 1915 05/01/19 1950 05/01/19 2349 05/02/19 0432   BP:  113/54  112/57   BP Location:  Left  arm  Left arm   Patient Position:    Lying   Pulse: 115 118 117 79   Resp: 16  16 18   Temp:  98.1 °F (36.7 °C)  97.2 °F (36.2 °C)   TempSrc:    Oral   SpO2:  90%  92%   Weight:       Height:         No flowsheet data found.    Physical Exam    Constitutional: He is oriented to person, place, and time. He appears well-developed and well-nourished. No distress.   HENT:   Head: Normocephalic.   Eyes: Conjunctivae and EOM are normal. Pupils are equal, round, and reactive to light. No scleral icterus.   Neck: Normal range of motion. Neck supple. No JVD present. No thyromegaly present.   Cardiovascular:  Normal rate, regular rhythm.  present. Exam reveals no gallop and no friction rub.   No murmur heard.  Pulmonary/Chest: He has wheezes. He has no rales.   Abdominal: Soft. He exhibits no distension and no mass. There is no tenderness.   Musculoskeletal: Normal range of motion. He exhibits no edema or deformity.   Lymphadenopathy: (Sites listed below are gradually improving per degree of adenopathy.                                                Head (right side): Submandibular adenopathy present.        Head (left side): Submandibular adenopathy present.     He has cervical adenopathy.        Right cervical: Superficial cervical and deep cervical adenopathy present.        Left cervical: Superficial cervical and deep cervical adenopathy present.     He has axillary adenopathy.        Right axillary: Pectoral and lateral adenopathy present.        Left axillary: Pectoral and lateral adenopathy present.   Neurological: He is alert and oriented to person, place, and time. He has normal reflexes. No cranial nerve deficit.   Skin: Skin is warm and dry. No rash noted. No erythema.   Psychiatric: He has a normal mood and affect. His behavior is normal. Judgment normal    RECENT LABS:  Hematology WBC   Date Value Ref Range Status   05/02/2019 47.00 (C) 3.40 - 10.80 10*3/mm3 Final     RBC   Date Value Ref Range Status    05/02/2019 3.42 (L) 4.14 - 5.80 10*6/mm3 Final     Hemoglobin   Date Value Ref Range Status   05/02/2019 9.8 (L) 13.0 - 17.7 g/dL Final     Hematocrit   Date Value Ref Range Status   05/02/2019 32.7 (L) 37.5 - 51.0 % Final     Platelets   Date Value Ref Range Status   05/02/2019 155 140 - 450 10*3/mm3 Final            Lab Results   Component Value Date    GLUCOSE 138 (H) 05/02/2019    BUN 24 (H) 05/02/2019    CREATININE 1.07 05/02/2019    EGFRIFNONA 70 05/02/2019    BCR 22.4 05/02/2019    K 5.0 05/02/2019    CO2 25.0 05/02/2019    CALCIUM 8.1 (L) 05/02/2019    PROTENTOTREF 5.8 (L) 04/30/2019    ALBUMIN 3.90 05/02/2019    LABIL2 1.4 04/30/2019    AST 21 05/02/2019    ALT 14 05/02/2019      - 225 U/L 180      Uric Acid 3.4 - 7.0 mg/dL 5.1      Lab Results   Component Value Date    IRON 40 (L) 04/30/2019    TIBC 331 04/30/2019    FERRITIN 92.00 04/30/2019     Immature Reticulocyte Fraction 3.0 - 15.8 % 37.9 Abnormally high     Reticulocyte % 0.70 - 1.90 % 1.57    Reticulocyte Hgb 29.8 - 36.1 pg 28.0 Abnormally low       Flow cytometry report on the peripheral blood as well as the lymph node biopsied at bronchoscopy are consistent with chronic lymphocytic leukemia/small lymphocytic lymphoma.    FISH prognostic panel requested    Assessment/Plan   1.  CLL presenting with significant lymphocytosis, lymphadenopathy and splenomegaly.    2.  Pulmonary nodules/infiltrates.  He is status post bronchoscopy.  Is unclear at this time whether these findings are infectious or possibly related to his lymphoproliferative disorder.  3.  Iron deficiency     Recommendations  1.  We discussed, again, the diagnosis with the patient at length.  2.  We have completed his staging as above.  3.  We also will request a FISH prognostic panel on his peripheral blood flow cytometry specimen.  4.  We discussed potential treatment options with patient.  He may be a candidate for Imbruvica therapy but given his young age and extensive  lymphadenopathy and symptoms have decided to proceed initially with Treanda Rituxan, reevaluate after 2 cycles for potential move to Imbruvica.  At this point we went on to initiate Treanda Rituxan again in May 1, 2019.   As the patient is seen May 2, 2019 he is responding and will proceed with day #2  5.  We will initiate iron supplementation with ferrous sulfate 325 mg p.o. twice daily. Patient will need colonoscopy at some point once he is stable.  6.  Quantitative immunoglobulins noted to be exceedingly reduced, IVIG replacement plan during hospitalization-200 to 400mg/kg, plan to start prophylaxis with acyclovir 400 mg p.o. twice daily  7.  ENT assessment for pansinusitis          5/2/2019      CC:

## 2019-05-03 ENCOUNTER — APPOINTMENT (OUTPATIENT)
Dept: GENERAL RADIOLOGY | Facility: HOSPITAL | Age: 64
End: 2019-05-03

## 2019-05-03 VITALS
WEIGHT: 149.2 LBS | TEMPERATURE: 97.2 F | OXYGEN SATURATION: 92 % | HEIGHT: 71 IN | RESPIRATION RATE: 16 BRPM | SYSTOLIC BLOOD PRESSURE: 141 MMHG | HEART RATE: 84 BPM | BODY MASS INDEX: 20.89 KG/M2 | DIASTOLIC BLOOD PRESSURE: 65 MMHG

## 2019-05-03 PROBLEM — J44.1 COPD EXACERBATION (HCC): Status: ACTIVE | Noted: 2019-05-03

## 2019-05-03 PROBLEM — F17.200 SMOKER: Status: ACTIVE | Noted: 2019-05-03

## 2019-05-03 PROBLEM — N19 RENAL FAILURE: Status: ACTIVE | Noted: 2019-05-03

## 2019-05-03 LAB
ANION GAP SERPL CALCULATED.3IONS-SCNC: 10.1 MMOL/L
ANISOCYTOSIS BLD QL: ABNORMAL
BACTERIA SPEC AEROBE CULT: NORMAL
BACTERIA SPEC AEROBE CULT: NORMAL
BUN BLD-MCNC: 25 MG/DL (ref 8–23)
BUN/CREAT SERPL: 24 (ref 7–25)
CALCIUM SPEC-SCNC: 7.8 MG/DL (ref 8.6–10.5)
CHLORIDE SERPL-SCNC: 107 MMOL/L (ref 98–107)
CO2 SERPL-SCNC: 24.9 MMOL/L (ref 22–29)
CREAT BLD-MCNC: 1.04 MG/DL (ref 0.76–1.27)
DEPRECATED RDW RBC AUTO: 61 FL (ref 37–54)
ERYTHROCYTE [DISTWIDTH] IN BLOOD BY AUTOMATED COUNT: 17.8 % (ref 12.3–15.4)
GFR SERPL CREATININE-BSD FRML MDRD: 72 ML/MIN/1.73
GLUCOSE BLD-MCNC: 123 MG/DL (ref 65–99)
HCT VFR BLD AUTO: 29.4 % (ref 37.5–51)
HGB BLD-MCNC: 8.9 G/DL (ref 13–17.7)
HYPOCHROMIA BLD QL: ABNORMAL
LYMPHOCYTES # BLD MANUAL: 31.85 10*3/MM3 (ref 0.7–3.1)
LYMPHOCYTES NFR BLD MANUAL: 5.1 % (ref 5–12)
LYMPHOCYTES NFR BLD MANUAL: 68.7 % (ref 19.6–45.3)
MCH RBC QN AUTO: 28.5 PG (ref 26.6–33)
MCHC RBC AUTO-ENTMCNC: 30.3 G/DL (ref 31.5–35.7)
MCV RBC AUTO: 94.2 FL (ref 79–97)
MONOCYTES # BLD AUTO: 2.36 10*3/MM3 (ref 0.1–0.9)
NEUTROPHILS # BLD AUTO: 12.19 10*3/MM3 (ref 1.7–7)
NEUTROPHILS NFR BLD MANUAL: 26.3 % (ref 42.7–76)
NRBC SPEC MANUAL: 3 /100 WBC (ref 0–0.2)
OVALOCYTES BLD QL SMEAR: ABNORMAL
PLAT MORPH BLD: NORMAL
PLATELET # BLD AUTO: 130 10*3/MM3 (ref 140–450)
PMV BLD AUTO: 10.7 FL (ref 6–12)
POTASSIUM BLD-SCNC: 5.1 MMOL/L (ref 3.5–5.2)
RBC # BLD AUTO: 3.12 10*6/MM3 (ref 4.14–5.8)
SODIUM BLD-SCNC: 142 MMOL/L (ref 136–145)
URATE SERPL-MCNC: 5.1 MG/DL (ref 3.4–7)
WBC MORPH BLD: NORMAL
WBC NRBC COR # BLD: 46.36 10*3/MM3 (ref 3.4–10.8)

## 2019-05-03 PROCEDURE — 99232 SBSQ HOSP IP/OBS MODERATE 35: CPT | Performed by: INTERNAL MEDICINE

## 2019-05-03 PROCEDURE — 80048 BASIC METABOLIC PNL TOTAL CA: CPT | Performed by: INTERNAL MEDICINE

## 2019-05-03 PROCEDURE — 94799 UNLISTED PULMONARY SVC/PX: CPT

## 2019-05-03 PROCEDURE — 63710000001 DIPHENHYDRAMINE PER 50 MG: Performed by: INTERNAL MEDICINE

## 2019-05-03 PROCEDURE — 85007 BL SMEAR W/DIFF WBC COUNT: CPT | Performed by: INTERNAL MEDICINE

## 2019-05-03 PROCEDURE — 25010000002 IMMUNE GLOBULIN (HUMAN) 5 GM/50ML SOLUTION: Performed by: INTERNAL MEDICINE

## 2019-05-03 PROCEDURE — 25010000002 IMMUNE GLOBULIN (HUMAN) 20 GM/200ML SOLUTION: Performed by: INTERNAL MEDICINE

## 2019-05-03 PROCEDURE — 84550 ASSAY OF BLOOD/URIC ACID: CPT | Performed by: INTERNAL MEDICINE

## 2019-05-03 PROCEDURE — 71046 X-RAY EXAM CHEST 2 VIEWS: CPT

## 2019-05-03 PROCEDURE — 63710000001 PREDNISONE PER 1 MG: Performed by: INTERNAL MEDICINE

## 2019-05-03 PROCEDURE — 85025 COMPLETE CBC W/AUTO DIFF WBC: CPT | Performed by: INTERNAL MEDICINE

## 2019-05-03 RX ORDER — CEFDINIR 300 MG/1
300 CAPSULE ORAL EVERY 12 HOURS SCHEDULED
Qty: 5 CAPSULE | Refills: 0 | Status: SHIPPED | OUTPATIENT
Start: 2019-05-03 | End: 2019-05-06

## 2019-05-03 RX ORDER — BUDESONIDE AND FORMOTEROL FUMARATE DIHYDRATE 160; 4.5 UG/1; UG/1
2 AEROSOL RESPIRATORY (INHALATION) 2 TIMES DAILY
Qty: 1 INHALER | Refills: 11 | Status: SHIPPED | OUTPATIENT
Start: 2019-05-03 | End: 2022-05-06

## 2019-05-03 RX ORDER — FERROUS SULFATE 325(65) MG
325 TABLET ORAL 2 TIMES DAILY WITH MEALS
Qty: 60 TABLET | Refills: 0 | Status: SHIPPED | OUTPATIENT
Start: 2019-05-04 | End: 2019-05-03

## 2019-05-03 RX ORDER — ACYCLOVIR 200 MG/1
400 CAPSULE ORAL 2 TIMES DAILY
Qty: 34 CAPSULE | Refills: 0 | Status: SHIPPED | OUTPATIENT
Start: 2019-05-03 | End: 2019-05-03

## 2019-05-03 RX ORDER — PREDNISONE 10 MG/1
TABLET ORAL
Qty: 30 TABLET | Refills: 0 | Status: SHIPPED | OUTPATIENT
Start: 2019-05-03 | End: 2019-05-03

## 2019-05-03 RX ORDER — BUDESONIDE AND FORMOTEROL FUMARATE DIHYDRATE 160; 4.5 UG/1; UG/1
2 AEROSOL RESPIRATORY (INHALATION) 2 TIMES DAILY
Qty: 1 INHALER | Refills: 11 | Status: SHIPPED | OUTPATIENT
Start: 2019-05-03 | End: 2019-05-03 | Stop reason: SDUPTHER

## 2019-05-03 RX ORDER — ALLOPURINOL 300 MG/1
300 TABLET ORAL DAILY
Qty: 30 TABLET | Refills: 0 | Status: SHIPPED | OUTPATIENT
Start: 2019-05-04 | End: 2019-05-30 | Stop reason: SDUPTHER

## 2019-05-03 RX ORDER — PREDNISONE 10 MG/1
TABLET ORAL
Qty: 30 TABLET | Refills: 0 | Status: SHIPPED | OUTPATIENT
Start: 2019-05-03 | End: 2019-05-16

## 2019-05-03 RX ORDER — CEFDINIR 300 MG/1
300 CAPSULE ORAL EVERY 12 HOURS SCHEDULED
Qty: 5 CAPSULE | Refills: 0 | Status: SHIPPED | OUTPATIENT
Start: 2019-05-03 | End: 2019-05-03

## 2019-05-03 RX ORDER — FERROUS SULFATE 325(65) MG
325 TABLET ORAL 2 TIMES DAILY WITH MEALS
Qty: 60 TABLET | Refills: 0 | Status: SHIPPED | OUTPATIENT
Start: 2019-05-04 | End: 2019-05-30 | Stop reason: SDUPTHER

## 2019-05-03 RX ORDER — ACYCLOVIR 200 MG/1
400 CAPSULE ORAL 2 TIMES DAILY
Qty: 34 CAPSULE | Refills: 0 | Status: SHIPPED | OUTPATIENT
Start: 2019-05-03 | End: 2019-05-12

## 2019-05-03 RX ORDER — ALLOPURINOL 300 MG/1
300 TABLET ORAL DAILY
Qty: 30 TABLET | Refills: 0 | Status: SHIPPED | OUTPATIENT
Start: 2019-05-04 | End: 2019-05-03

## 2019-05-03 RX ADMIN — ACETYLCYSTEINE 4 ML: 200 SOLUTION ORAL; RESPIRATORY (INHALATION) at 07:01

## 2019-05-03 RX ADMIN — FERROUS SULFATE TAB 325 MG (65 MG ELEMENTAL FE) 325 MG: 325 (65 FE) TAB at 17:17

## 2019-05-03 RX ADMIN — IMMUNE GLOBULIN (HUMAN) 20 G: 10 INJECTION INTRAVENOUS; SUBCUTANEOUS at 10:31

## 2019-05-03 RX ADMIN — PREDNISONE 40 MG: 20 TABLET ORAL at 08:53

## 2019-05-03 RX ADMIN — DIPHENHYDRAMINE HYDROCHLORIDE 50 MG: 50 CAPSULE ORAL at 09:40

## 2019-05-03 RX ADMIN — FERROUS SULFATE TAB 325 MG (65 MG ELEMENTAL FE) 325 MG: 325 (65 FE) TAB at 08:53

## 2019-05-03 RX ADMIN — IPRATROPIUM BROMIDE AND ALBUTEROL SULFATE 3 ML: 2.5; .5 SOLUTION RESPIRATORY (INHALATION) at 10:46

## 2019-05-03 RX ADMIN — IPRATROPIUM BROMIDE AND ALBUTEROL SULFATE 3 ML: 2.5; .5 SOLUTION RESPIRATORY (INHALATION) at 07:00

## 2019-05-03 RX ADMIN — ACYCLOVIR 400 MG: 200 CAPSULE ORAL at 08:53

## 2019-05-03 RX ADMIN — IPRATROPIUM BROMIDE AND ALBUTEROL SULFATE 3 ML: 2.5; .5 SOLUTION RESPIRATORY (INHALATION) at 00:19

## 2019-05-03 RX ADMIN — ACETYLCYSTEINE 4 ML: 200 SOLUTION ORAL; RESPIRATORY (INHALATION) at 00:19

## 2019-05-03 RX ADMIN — CEFDINIR 300 MG: 300 CAPSULE ORAL at 08:53

## 2019-05-03 RX ADMIN — IPRATROPIUM BROMIDE AND ALBUTEROL SULFATE 3 ML: 2.5; .5 SOLUTION RESPIRATORY (INHALATION) at 15:41

## 2019-05-03 RX ADMIN — ACETAMINOPHEN 650 MG: 325 TABLET, FILM COATED ORAL at 09:40

## 2019-05-03 RX ADMIN — ALLOPURINOL 300 MG: 300 TABLET ORAL at 08:53

## 2019-05-03 RX ADMIN — ACETYLCYSTEINE 4 ML: 200 SOLUTION ORAL; RESPIRATORY (INHALATION) at 15:44

## 2019-05-03 RX ADMIN — IMMUNE GLOBULIN (HUMAN) 5 G: 10 INJECTION INTRAVENOUS; SUBCUTANEOUS at 13:46

## 2019-05-03 NOTE — PLAN OF CARE
Problem: Patient Care Overview  Goal: Plan of Care Review  Outcome: Ongoing (interventions implemented as appropriate)   05/03/19 1709   Coping/Psychosocial   Plan of Care Reviewed With patient   Plan of Care Review   Progress improving   OTHER   Outcome Summary Pt was able to be seen by ENT today. Will go home on abx and has another apt in 4 weeks to be seen again out pt once d/c. IVIG was admin today, pt tolerated very well. VSS will continue to monitor.     05/03/19 1709       patient       improving       Pt was able to be seen by ENT today. Will go home on abx and be seen again out pt once d/c. IVIG was admin today, pt tolerated very well. VSS will continue to monitor.      Goal: Individualization and Mutuality  Outcome: Ongoing (interventions implemented as appropriate)    Goal: Discharge Needs Assessment  Outcome: Ongoing (interventions implemented as appropriate)    Goal: Interprofessional Rounds/Family Conf  Outcome: Ongoing (interventions implemented as appropriate)      Problem: Infection, Risk/Actual (Adult)  Goal: Infection Prevention/Resolution  Outcome: Ongoing (interventions implemented as appropriate)      Problem: Breathing Pattern Ineffective (Adult)  Goal: Effective Oxygenation/Ventilation  Outcome: Ongoing (interventions implemented as appropriate)    Goal: Anxiety/Fear Reduction  Outcome: Ongoing (interventions implemented as appropriate)      Problem: Oncology Care (Adult)  Goal: Signs and Symptoms of Listed Potential Problems Will be Absent, Minimized or Managed (Oncology Care)  Outcome: Ongoing (interventions implemented as appropriate)

## 2019-05-03 NOTE — PLAN OF CARE
Problem: Patient Care Overview  Goal: Plan of Care Review  Outcome: Ongoing (interventions implemented as appropriate)   05/03/19 2657   OTHER   Outcome Summary Pt continues to wheeze. Will cont to monitor pt's resp needs.

## 2019-05-03 NOTE — PROGRESS NOTES
Subjective Patient continuing to generally improve  REASON FOR FOLLOW UP:  1.  New diagnosis chronic lymphocytic leukemia with extensive lymphadenopathy and possible pleural involvement.    HISTORY OF PRESENT ILLNESS:   The patient is a  63 y.o. Male  who was admitted on 4/28/2019 with worsening complaints of cough wheezing and shortness of breath.  He had been to an urgent care center and was started on antibiotics and received a shot of Solu-Medrol.  His symptoms did not improve and on his admission he was noted to have profound lymphocytosis with a total white count of 70,074% lymphocytes on his white cell differential.     He also underwent CT scan of the chest that showed infiltrates and nodules in the lungs as well as significant lymphadenopathy within the chest and in the axillary regions.  He had peripheral blood sent for flow cytometry leukemia lymphoma panel but this is still pending at time of this dictation.     He also underwent bronchoscopy on 4/29/2019.  Results from this procedure are pendingl.  His respiratory viral panel was negative and markers of sepsis were unremarkable.    4/30/19  His peripheral blood flow cytometry and flow cytometry from the EBUS needle biopsy at bronchoscopy are both consistent with chronic lymphocytic leukemia/small lymphocytic lymphoma.    May 1, 2019  The patient underwent a CT of abdomen and pelvis April 30 demonstrating extensive splenomegaly and bulky lymphadenopathy throughout the abdomen and pelvis.  There is a tiny lesion at the superior aspect lateral hepatic segment and 2 hyperenhancing foci within the liver thought to be hemangiomas, moderate size right inguinal hernia containing a long segment of small bowel without obstruction or incarceration.  CT of soft tissue again reveals extensive cervical adenopathy as well as evidence of pansinusitis.  The patient's case was discussed with Dr. Church and the patient has stage III-IV disease should be treated during  this hospitalization.  I met with the patient and discussed in detail the use of Treanda/ Rituxan which we initiated Treanda Rituxan beginning May 1, 2019.     When he is seen May 2 he is already beginning to respond with reducing adenopathy and improved symptoms.  Plan to proceed with day #2.  We have also reviewed his findings including IgA 14, IgG of 263, IgM of 5 and a kappa/lambda ratio of 26.38.  He is hypogammaglobulinemic and replacement therapy will be given this hospitalization.  ENT assessment will also be requested.     The patient is again seen May 3, 2019, continuing to improve overall.  We have discussed IVIG this morning, ENT assessment and likely discharge this afternoon if all agree.  I will make follow-up appointments for him in office.        Past Medical History, Past Surgical History, Social History, Family History have been reviewed and are without significant changes except as mentioned.    Review of Systems      Constitutional: Positive for fatigue. Negative for activity change, chills and fever.   HENT: Negative for mouth sores, trouble swallowing and voice change.  Sinus pressure and pain persists   Eyes: Negative for pain and visual disturbance.   Respiratory: Improving cough  Cardiovascular: Negative for chest pain and palpitations.   Gastrointestinal: Negative for abdominal pain, constipation, diarrhea, nausea and vomiting.   Genitourinary: Negative for difficulty urinating, frequency and urgency.   Musculoskeletal: Negative for arthralgias and joint swelling.   Skin: Negative for rash.   Neurological: Negative for dizziness, seizures, weakness and headaches.   Hematological: Negative for adenopathy. Does not bruise/bleed easily.   Psychiatric/Behavioral: Negative for behavioral problems and confusion. The patient is not nervous/anxious.        Medications:  The current medication list was reviewed in the EMR    ALLERGIES:  No Known Allergies    Objective      Vitals:    05/03/19 0019  05/03/19 0429 05/03/19 0700 05/03/19 0711   BP:  120/59     BP Location:  Left arm     Patient Position:  Lying     Pulse: 87 84 78 88   Resp: 16 20 18 16   Temp:  96.7 °F (35.9 °C)     TempSrc:  Oral     SpO2:  92%     Weight:       Height:         No flowsheet data found.    Physical Exam    Constitutional: He is oriented to person, place, and time. He appears well-developed and well-nourished. No distress.   HENT:   Head: Normocephalic.   Eyes: Conjunctivae and EOM are normal. Pupils are equal, round, and reactive to light. No scleral icterus.   Neck: Normal range of motion. Neck supple. No JVD present. No thyromegaly present.   Cardiovascular:  Normal rate, regular rhythm.  present. Exam reveals no gallop and no friction rub.   No murmur heard.  Pulmonary/Chest:He has no rales.   Abdominal: Soft. He exhibits no distension and no mass. There is no tenderness.   Musculoskeletal: Normal range of motion. He exhibits no edema or deformity.   Lymphadenopathy: (Sites listed below are gradually improving per degree of adenopathy.  Both submandibular, cervical, supraclavicular and axillary            Neurological: He is alert and oriented to person, place, and time. He has normal reflexes. No cranial nerve deficit.   Skin: Skin is warm and dry. No rash noted. No erythema.   Psychiatric: He has a normal mood and affect. His behavior is normal. Judgment normal    RECENT LABS:  Hematology WBC   Date Value Ref Range Status   05/03/2019 46.36 (C) 3.40 - 10.80 10*3/mm3 Final     RBC   Date Value Ref Range Status   05/03/2019 3.12 (L) 4.14 - 5.80 10*6/mm3 Final     Hemoglobin   Date Value Ref Range Status   05/03/2019 8.9 (L) 13.0 - 17.7 g/dL Final     Hematocrit   Date Value Ref Range Status   05/03/2019 29.4 (L) 37.5 - 51.0 % Final     Platelets   Date Value Ref Range Status   05/03/2019 130 (L) 140 - 450 10*3/mm3 Final            Lab Results   Component Value Date    GLUCOSE 123 (H) 05/03/2019    BUN 25 (H) 05/03/2019     CREATININE 1.04 05/03/2019    EGFRIFNONA 72 05/03/2019    BCR 24.0 05/03/2019    K 5.1 05/03/2019    CO2 24.9 05/03/2019    CALCIUM 7.8 (L) 05/03/2019    PROTENTOTREF 5.8 (L) 04/30/2019    ALBUMIN 3.90 05/02/2019    LABIL2 1.4 04/30/2019    AST 21 05/02/2019    ALT 14 05/02/2019      - 225 U/L 180      Uric Acid 3.4 - 7.0 mg/dL 5.1      Lab Results   Component Value Date    IRON 40 (L) 04/30/2019    TIBC 331 04/30/2019    FERRITIN 92.00 04/30/2019     Immature Reticulocyte Fraction 3.0 - 15.8 % 37.9 Abnormally high     Reticulocyte % 0.70 - 1.90 % 1.57    Reticulocyte Hgb 29.8 - 36.1 pg 28.0 Abnormally low       Flow cytometry report on the peripheral blood as well as the lymph node biopsied at bronchoscopy are consistent with chronic lymphocytic leukemia/small lymphocytic lymphoma.    FISH prognostic panel requested    Assessment/Plan   1.  CLL presenting with significant lymphocytosis, lymphadenopathy and splenomegaly.    2.  Pulmonary nodules/infiltrates.  He is status post bronchoscopy.  Is unclear at this time whether these findings are infectious or possibly related to his lymphoproliferative disorder.  3.  Iron deficiency     Recommendations  1.  Pending FISH prognostic panel on his peripheral blood flow cytometry specimen.  2.  We discussed potential treatment options with patient.  He may be a candidate for Imbruvica therapy but given his young age and extensive lymphadenopathy and symptoms have decided to proceed initially with Treanda Rituxan, reevaluate after 2 cycles for potential move to Imbruvica.  At this point we went on to initiate Treanda Rituxan again in May 1, 2019.   As the patient was seen May 2, 2019 he was responding and we gave day #2.  He continues to respond clinically when seen May 3, 2019  3.  We will continue iron supplementation with ferrous sulfate 325 mg p.o. twice daily. Patient will need colonoscopy at some point once he is stable.  4.  Quantitative immunoglobulins noted to  be exceedingly reduced, IVIG replacement plan on May 3, 2019-200 to 400mg/kg, plan to start prophylaxis with acyclovir 400 mg p.o. twice daily  5.  ENT assessment for pansinusitis  6.  Patient could be discharged this afternoon if all agree.  Again I will have office appointments in place.          5/3/2019      CC:

## 2019-05-03 NOTE — DISCHARGE SUMMARY
Date of Admission: 4/28/2019  Date of Discharge:  5/3/2019    Discharge Diagnosis:    CLL  Mediastinal and hilar adenopathy  Patchy pulmonary infiltrates  Acute versus chronic kidney injury  COPD exacerbation  Current cigarette smoker   Iron deficiency      Hospital Course    Presenting Problem/History of Present Illness    63-year-old male who has been short of breath for the last 2 weeks at least.  He reports wheezing and cough.  He went to an immediate care center and a chest x-ray apparently showed pneumonia.  He was advised antibiotics and given a shot of Solu-Medrol IV.  Due to persistent symptoms he came into the emergency room where a white count was recorded as 70,000.  A CT scan was performed showing mediastinal and hilar adenopathy.  He has been admitted for further treatment.  Patient reports cough wheezing and shortness of breath.  He is a current smoker of 1 pack of cigarettes a day for the last 40 years.  He denies history of medical problems.  He specifically denies history of kidney problems, heart disease, strokes, previous cancers.  His father had lung cancer.     Dav BRENDEN Freitas  reports that he drinks alcohol.,  reports that he has been smoking.  He has been smoking about 1.00 pack per day. He does not have any smokeless tobacco history on file.      Subsequent Course of Management    63-year-old gentleman was admitted with shortness of breath.  His CT showed diffuse lymphadenopathy.  Evidence COPD exacerbation.  Patchy pulmonary infiltrates.  Acute versus chronic kidney injury.  Started on IV steroids nebs and a endobronchial ultrasound-guided biopsy was performed.  He was seen by oncology.  Eventual diagnosis of CLL.  He was given his first round of treatment.  Plan is to give additional chemo.  IVIG replacement given.  Iron supplementation given.  ENT assessment for pansinusitis with follow-up as an outpatient.  COPD exacerbation improved and resolved.  Patient was advised to remain away from  "cigarettes.  He was advised follow-up in the pulmonary office.  His kidney function resolved with hydration.  He will need to follow-up with oncology, ENT and pulmonology along with his primary care physician.  He feels well and his wheezing is resolved.  He will be discharged home today.    Examination on Date of Discharge    /65 (BP Location: Left arm, Patient Position: Lying)   Pulse 84   Temp 97.2 °F (36.2 °C) (Oral)   Resp 16   Ht 180.3 cm (71\")   Wt 67.7 kg (149 lb 3.2 oz)   SpO2 92%   BMI 20.81 kg/m²     Physical Exam   Constitutional: He appears well-developed.   HENT:   Head: Normocephalic.   Eyes: Pupils are equal, round, and reactive to light.   Cardiovascular: Normal rate and regular rhythm.   No murmur heard.  Pulmonary/Chest: He has decreased breath sounds. He has no rales.   Abdominal: Soft. Bowel sounds are normal. There is no tenderness.   Musculoskeletal: He exhibits no edema.   Neurological: He is alert.   Nursing note and vitals reviewed.      Test Results               Results from last 7 days   Lab Units 05/03/19  0639 05/02/19  1041 05/02/19  0519 05/01/19  0552 04/30/19  0314 04/28/19  1229   WBC 10*3/mm3 46.36* 47.97* 47.00* 74.21* 69.91* 71.63*   HEMOGLOBIN g/dL 8.9* 9.3* 9.8* 9.1* 10.1* 11.6*   HEMATOCRIT % 29.4* 31.5* 32.7* 30.9* 34.5* 39.6   PLATELETS 10*3/mm3 130* 150 155 153 154 163       Results from last 7 days   Lab Units 05/03/19  0639 05/02/19  0519 04/30/19  0314 04/28/19  1229   SODIUM mmol/L 142 144 142 139   POTASSIUM mmol/L 5.1 5.0 4.9 5.1   CHLORIDE mmol/L 107 106 107 98   CO2 mmol/L 24.9 25.0 24.0 26.4   BUN mg/dL 25* 24* 18 28*   CREATININE mg/dL 1.04 1.07 0.91 1.70*       Results from last 7 days   Lab Units 04/28/19  1229   TROPONIN T ng/mL <0.010       Microbiology Results (last 10 days)     Procedure Component Value - Date/Time    AFB Culture - Wash, Lung, L [[id number]] Collected:  04/29/19 1241    Lab Status:  Preliminary result Specimen:  Wash from Lung, " L Updated:  04/30/19 1636     AFB Stain No acid fast bacilli seen on concentrated smear    Respiratory Culture - Wash, Lung, L [[id number]] Collected:  04/29/19 1241    Lab Status:  Final result Specimen:  Wash from Lung, L Updated:  05/01/19 0651     Respiratory Culture Scant growth (1+) Normal Respiratory Jocelynn     Gram Stain Rare (1+) WBCs per low power field      Rare (1+) Gram positive cocci in chains      No epithelial cells seen    AFB Culture - Wash, Lung, R [[id number]] Collected:  04/29/19 1241    Lab Status:  Preliminary result Specimen:  Wash from Lung, R Updated:  04/30/19 1635     AFB Stain No acid fast bacilli seen on concentrated smear    Respiratory Culture - Wash, Lung, R [[id number]] Collected:  04/29/19 1241    Lab Status:  Final result Specimen:  Wash from Lung, R Updated:  05/01/19 0639     Respiratory Culture No growth at 2 days     Gram Stain Moderate (3+) WBCs per low power field      No epithelial cells seen      No organisms seen    Respiratory Panel, PCR - Swab, Nasopharynx [[id number]]  (Normal) Collected:  04/28/19 1230    Lab Status:  Final result Specimen:  Swab from Nasopharynx Updated:  04/28/19 1413     ADENOVIRUS, PCR Not Detected     Coronavirus 229E Not Detected     Coronavirus HKU1 Not Detected     Coronavirus NL63 Not Detected     Coronavirus OC43 Not Detected     Human Metapneumovirus Not Detected     Human Rhinovirus/Enterovirus Not Detected     Influenza B PCR Not Detected     Parainfluenza Virus 1 Not Detected     Parainfluenza Virus 2 Not Detected     Parainfluenza Virus 3 Not Detected     Parainfluenza Virus 4 Not Detected     Bordetella pertussis pcr Not Detected     Influenza A H1 2009 PCR Not Detected     Chlamydophila pneumoniae PCR Not Detected     Mycoplasma pneumo by PCR Not Detected     Influenza A PCR Not Detected     Influenza A H3 Not Detected     Influenza A H1 Not Detected     RSV, PCR Not Detected     Bordetella parapertussis PCR Not Detected    Blood Culture -  Blood, Hand, Right [[id number]] Collected:  04/28/19 1229    Lab Status:  Final result Specimen:  Blood from Hand, Right Updated:  05/03/19 1245     Blood Culture No growth at 5 days    Blood Culture - Blood, Hand, Right [[id number]] Collected:  04/28/19 1229    Lab Status:  Final result Specimen:  Blood from Hand, Right Updated:  05/03/19 1245     Blood Culture No growth at 5 days          Ct Abdomen Pelvis With & Without Contrast    Result Date: 5/1/2019  CT ABDOMEN WITHOUT AND WITH IV CONTRAST. CT PELVIS WITH CONTRAST.  HISTORY: 63-year-old male with CLL. Evaluate lymphadenopathy. Liver lesion.  TECHNIQUE: Radiation dose reduction techniques were utilized, including automated exposure control and exposure modulation based on body size. 3 mm images were obtained through the liver without contrast. Subsequently, IV contrast was administered and dynamic postcontrast sequences were obtained through the abdomen at 3 mm intervals and 3 mm images were also obtained through the pelvis. Compared with chest CT from 04/28/2019.  FINDINGS: The spleen is enlarged and measures approximately 18.6 cm in craniocaudad span. There is bulky lymphadenopathy throughout the abdomen and pelvis. One of the largest nodes is at the inocencia hepatis measuring approximately 4.3 x 2.5 cm. One of the mesenteric nodes measures 3.2 x 2.1 cm. One of the right pelvic sidewall nodes measures 4.0 x 1.8 cm. A left pelvic sidewall node measures 5.3 x 2.2 cm. There is also bilateral groin lymphadenopathy. The liver, pancreas, adrenals, and kidneys appear unremarkable. No acute bowel abnormality is seen. There is a moderate-sized right inguinal hernia containing a long segment of small bowel without obstruction or incarceration. The appendix appears normal.  Evaluation for liver lesion is limited as there is breathing artifact at the upper abdomen. The previously noted 1 cm hyperenhancing focus at the superior aspect of the lateral hepatic segment is faintly  visualized on all sequences. There are 2 other hyperenhancing foci within the liver on the early postcontrast phase.      1. There is extensive splenomegaly and bulky lymphadenopathy throughout the abdomen and pelvis. 2. The tiny lesion at the superior aspect of lateral hepatic segment is suboptimally characterized due to the breathing artifact at the upper abdomen, but there are 2 other hyperenhancing foci within the liver. All 3 are likely benign and represent tiny flash filled hemangiomas. 3. Moderate-sized right inguinal hernia containing a long segment of small bowel without obstruction or incarceration.  This report was finalized on 5/1/2019 9:39 AM by Dr. Gaby Hummel M.D.      Xr Chest 2 View    Result Date: 5/3/2019  Two-view chest  HISTORY: Follow-up of pneumonia. COPD.  FINDINGS: The lungs are well-expanded with clearing of previous bilateral infiltrates noted on the study of 4 days ago, particularly at the right base. The findings are consistent with nearly complete resolution of pneumonia. Again noted is bilateral hilar adenopathy and right paratracheal adenopathy as noted on the CT scan performed 5 days ago that remains concerning for lymphoma.  This report was finalized on 5/3/2019 3:37 PM by Dr. Ab Duggan M.D.      Xr Chest 2 View    Result Date: 4/24/2019  XR CHEST 2 VW-  HISTORY: Male who is 63 years-old,  cough for months  TECHNIQUE: Frontal and lateral views of the chest  COMPARISON: None available  FINDINGS: Heart size is normal. Fullness of the philippe may reflect vascular prominence or adenopathy (possibility of malignancy not excluded), CT correlation advised. Pulmonary vasculature is unremarkable. Right perihilar infiltrate is suggested. No pleural effusion, or pneumothorax. No acute osseous process.      Fullness of the philippe, right perihilar infiltrate, further evaluation with CT is recommended.  Discussed by telephone with Dav Canela at time of interpretation, 1515, 04/24/2019.  This  report was finalized on 4/24/2019 3:20 PM by Dr. Gómez Da Silva M.D.      Ct Soft Tissue Neck With Contrast    Result Date: 5/1/2019  CT SINUSES WITHOUT CONTRAST AND CT NECK WITH CONTRAST  HISTORY: CLL, sinusitis.  CT sinuses without contrast: A noncontrasted CT examination of the sinuses was performed utilizing contiguous coronal sections beginning anterior to the frontal sinuses extending posteriorly through the sphenoid sinus.  FINDINGS: A right frontal sinuses not pneumatized. The left frontal sinus is hypoplastic. There is moderate mucosal thickening appreciated involving the frontal recesses bilaterally. There is extensive mucosal thickening involving the ethmoid air cells bilaterally. An air-fluid level is present involving the sphenoid sinus with opacification approximately 50% of the sphenoid sinus bilaterally. There is moderate mucosal thickening involving the inferior aspect of the maxillary sinuses bilaterally. There is partial opacification of the ethmoid air cells bilaterally.      Pansinusitis.  CT examination of the neck with contrast:  There is extensive cervical adenopathy involving all compartments. The largest nodes involving the posterior triangle of the neck bilaterally with the largest nodes measuring approximately 26 mm in size. There is prominence of the parapharyngeal lymphoid tissue and mild to moderate narrowing of the oropharyngeal airway. Epiglottis is normal. The cords are symmetrical. There is no evidence of subglottic edema.  IMPRESSION: Extensive cervical adenopathy.    Radiation dose reduction techniques were utilized, including automated exposure control and exposure modulation based on body size.  This report was finalized on 5/1/2019 11:45 AM by Dr. Regan Gutierres M.D.      Ct Chest With Contrast    Result Date: 4/28/2019  CT CHEST WITH IV CONTRAST  HISTORY: Progressive pneumonia, failing outpatient therapy.  TECHNIQUE: Radiation dose reduction techniques were utilized,  including automated exposure control and exposure modulation based on body size. 3 mm images were obtained through the chest after the administration of IV contrast.  COMPARISON: None.  FINDINGS:  Hyperenhancing lesion measuring 1 cm in the left hepatic dome.  The visualized spleen is enlarged, measuring up to 14.47 cm in length. Extensive abdominal adenopathy is present within the visualized abdomen with representative nodes as below: *  Left periaortic node measuring 1.4 cm in short axis dimension. *  Left anterior mesenteric node measuring 1 cm in short axis dimension.  Extensive mediastinal, hilar, axillary, subpectoral and internal mammary adenopathy is present with representative notes as below: *  Aortic pulmonary window lymph node conglomerate measuring 3.2 x 2.3 cm. *  Subcarinal lymph node conglomerate measuring 2.7 x 5.8 cm.  The heart is normal in size.  Irregular pulmonary and groundglass opacification is present throughout the right lung and within the lingula and left base. Associated tree-in-bud nodularity is present as well. More focal, nodular areas of masslike consolidation are present within the right upper and lower lobes measuring up to 2.2 cm. Associated bronchial wall thickening and endobronchial filling defects are present. Subcentimeter noncalcified pulmonary nodules are scattered bilaterally.  No suspicious lytic or blastic osseous lesions are present.      1.  Extensive adenopathy throughout the chest and visualized abdomen likely representing a lymphoproliferative disorder. The spleen is also mildly enlarged and worrisome for lymphomatous involvement. 2.  Irregular areas of pulmonary and groundglass opacification scattered throughout the bilateral lungs, most notably on the right and bilateral lung bases. Superimposed bronchial wall thickening with tree-in-bud nodularity and endobronchial filling defects are present as well. Findings likely represent pneumonia; however, it has both  typical and atypical features and given the associated adenopathy and possible immunocompromise, both typical and atypical pneumonia are differential considerations. 3.  Somewhat more focal, masslike areas of consolidation are present within the areas of pulmonary opacification. There are also scattered subcentimeter pulmonary nodules bilaterally. While findings may be reactive, lymphomatous involvement could appear similar and attention on followup while on conservative management and staging of the patient's lymphoproliferative disorder is recommended to ensure appropriate evolution and exclude malignant involvement. 4.  A 1 cm hyperenhancing lesion within the left hepatic dome. Findings are nonspecific and further evaluation with nonemergent abdominal MRI with and without contrast is recommended for further characterization.  The above findings were discussed with Dr. Vásquez by telephone by Aydin Enriquez at 2:59 PM on  04/28/2019  .  This report was finalized on 4/28/2019 8:53 PM by Dr. yAdin Enriquez M.D.      Xr Chest 1 View    Result Date: 4/29/2019  PORTABLE CHEST 04/29/2019 3:39 PM  CLINICAL HISTORY: Postop bronchoscopy. Rule out pneumothorax.  Compared to previous chest x-ray dated 04/24/2019.  The lungs are well-expanded. There is no evidence of pneumothorax. There is wedge-shaped area of infiltrate extending inferiorly from the right hilum into the lung base that is new since the preceding chest x-ray. Patchy somewhat nodular infiltrate in the right upper lobe is also new. The heart is normal in size. There are no pleural effusions.  This report was finalized on 4/29/2019 4:35 PM by Dr. Nahum Jerez M.D.      Ct Sinus Without Contrast    Result Date: 5/1/2019  CT SINUSES WITHOUT CONTRAST AND CT NECK WITH CONTRAST  HISTORY: CLL, sinusitis.  CT sinuses without contrast: A noncontrasted CT examination of the sinuses was performed utilizing contiguous coronal sections beginning anterior to the frontal sinuses  extending posteriorly through the sphenoid sinus.  FINDINGS: A right frontal sinuses not pneumatized. The left frontal sinus is hypoplastic. There is moderate mucosal thickening appreciated involving the frontal recesses bilaterally. There is extensive mucosal thickening involving the ethmoid air cells bilaterally. An air-fluid level is present involving the sphenoid sinus with opacification approximately 50% of the sphenoid sinus bilaterally. There is moderate mucosal thickening involving the inferior aspect of the maxillary sinuses bilaterally. There is partial opacification of the ethmoid air cells bilaterally.      Pansinusitis.  CT examination of the neck with contrast:  There is extensive cervical adenopathy involving all compartments. The largest nodes involving the posterior triangle of the neck bilaterally with the largest nodes measuring approximately 26 mm in size. There is prominence of the parapharyngeal lymphoid tissue and mild to moderate narrowing of the oropharyngeal airway. Epiglottis is normal. The cords are symmetrical. There is no evidence of subglottic edema.  IMPRESSION: Extensive cervical adenopathy.    Radiation dose reduction techniques were utilized, including automated exposure control and exposure modulation based on body size.  This report was finalized on 5/1/2019 11:45 AM by Dr. Regan Gutierres M.D.        Consulting Physician(s)     Provider Relationship Specialty    Oren Hoyt MD Consulting Physician Hematology and Oncology    Kassi Sawyer MD Consulting Physician Pulmonary Disease    Dewey Lee MD Consulting Physician Otolaryngology          Procedure(s):  BRONCHOSCOPY WITH WASHING ENDOBRONCHIAL ULTRASOUND WITH FNA'S       Discharge Instructions      Dietary Orders (From admission, onward)    Start     Ordered    04/29/19 1524  Diet Regular  Diet Effective Now     Comments:  RESUME previous diet or tube feeds   Question:  Diet Texture / Consistency  Answer:  Regular     04/29/19 1429                  Your medication list      START taking these medications      Instructions Last Dose Given Next Dose Due   acyclovir 200 MG capsule  Commonly known as:  ZOVIRAX      Take 2 capsules by mouth 2 (Two) Times a Day for 17 doses.       allopurinol 300 MG tablet  Commonly known as:  ZYLOPRIM  Start taking on:  5/4/2019      Take 1 tablet by mouth Daily for 30 days.       budesonide-formoterol 160-4.5 MCG/ACT inhaler  Commonly known as:  SYMBICORT      Inhale 2 puffs 2 (Two) Times a Day.       cefdinir 300 MG capsule  Commonly known as:  OMNICEF      Take 1 capsule by mouth Every 12 (Twelve) Hours for 5 doses.       ferrous sulfate 325 (65 FE) MG tablet  Start taking on:  5/4/2019      Take 1 tablet by mouth 2 (Two) Times a Day With Meals for 30 days.       predniSONE 10 MG tablet  Commonly known as:  DELTASONE      4 tabs daily x 3 days then 3 tabs daily x 3 days then 2 tabs daily x 3 days then 1 tab daily x 3 days then stop; total 30 tabs          CONTINUE taking these medications      Instructions Last Dose Given Next Dose Due   albuterol 108 (90 Base) MCG/ACT inhaler  Commonly known as:  PROAIR RESPICLICK      Inhale 2 puffs Every 4 (Four) Hours As Needed for Wheezing.       aspirin  MG tablet      Take 650 mg by mouth Every 6 (Six) Hours As Needed (headache).       guaiFENesin 600 MG 12 hr tablet  Commonly known as:  MUCINEX      Take 600 mg by mouth 2 (Two) Times a Day.          STOP taking these medications    doxycycline 100 MG capsule  Commonly known as:  MONODOX        guaifenesin-codeine 100-10 MG/5ML liquid  Commonly known as:  GUAIFENESIN AC              Where to Get Your Medications      These medications were sent to [facility] - [address] [address]    Hours:  7:00AM-6PM Mon-Fri Phone:  [phone]   · acyclovir 200 MG capsule  · allopurinol 300 MG tablet  · budesonide-formoterol 160-4.5 MCG/ACT inhaler  · cefdinir 300 MG  capsule  · ferrous sulfate 325 (65 FE) MG tablet  · predniSONE 10 MG tablet         Follow-up Information     Juan Iyer MD Follow up in 1 week(s).    Specialty:  Internal Medicine  Contact information:  [address] [place] [id number] [phone]             Jostin Parekh MD Follow up in 1 week(s).    Specialties:  Hematology and Oncology, Oncology, Hematology  Contact information:  [address] [facility] [address]  [place] [address]  [phone]             Dewey Lee MD Follow up in 2 week(s).    Specialty:  Otolaryngology  Contact information:  [address] [facility] [address]  [place] [address]  [phone]                   Additional Instructions for the Follow-ups that You Need to Schedule     CBC and Differential   May 01, 2019      Manual Differential:  No         Comprehensive metabolic panel   May 01, 2019            Condition on Discharge:  Stable     Connor Dove MD  05/03/19  5:24 PM    Time: I spent over 30mins in the discharge planning of this patient.    Some of this encounter note is an electronic transcription/translation of spoken language to printed text.

## 2019-05-04 ENCOUNTER — READMISSION MANAGEMENT (OUTPATIENT)
Dept: CALL CENTER | Facility: HOSPITAL | Age: 64
End: 2019-05-04

## 2019-05-04 NOTE — OUTREACH NOTE
Prep Survey      Responses   Facility patient discharged from?  [facility]   Is patient eligible?  Yes   Discharge diagnosis  Mediastinal and hilar adenopathy,  CLL,  Patchy pulmonary infiltrates,  COPD exacerbation    Does the patient have one of the following disease processes/diagnoses(primary or secondary)?  COPD/Pneumonia   Does the patient have Home health ordered?  No   Is there a DME ordered?  No   Comments regarding appointments  Needs one week f/u scheduled    Prep survey completed?  Yes          Majo Wade RN

## 2019-05-05 ENCOUNTER — READMISSION MANAGEMENT (OUTPATIENT)
Dept: CALL CENTER | Facility: HOSPITAL | Age: 64
End: 2019-05-05

## 2019-05-05 NOTE — OUTREACH NOTE
COPD/PN Week 1 Survey      Responses   Facility patient discharged from?  [facility]   Does the patient have one of the following disease processes/diagnoses(primary or secondary)?  COPD/Pneumonia   Is there a successful TCM telephone encounter documented?  No   Was the primary reason for admission:  COPD exacerbation   Week 1 attempt successful?  Yes   Call start time  1144   Call end time  1145   Discharge diagnosis  Mediastinal and hilar adenopathy,  CLL,  Patchy pulmonary infiltrates,  COPD exacerbation    Is patient permission given to speak with other caregiver?  Yes   List who call center can speak with  Nikky- Wife   Person spoke with today (if not patient) and relationship  Nikky- Wife   Meds reviewed with patient/caregiver?  Yes   Is the patient having any side effects they believe may be caused by any medication additions or changes?  No   Does the patient have all medications ordered at discharge?  Yes   Is the patient taking all medications as directed (includes completed medication regime)?  Yes   Does the patient have a primary care provider?   Yes   Does the patient have an appointment with their PCP or pulmonologist within 7 days of discharge?  Yes   Has the patient kept scheduled appointments due by today?  N/A   Psychosocial issues?  No   Did the patient receive a copy of their discharge instructions?  Yes   Nursing interventions  Reviewed instructions with patient   What is the patient's perception of their health status since discharge?  Improving   Nursing Interventions  Nurse provided patient education   Are the patient's immunizations up to date?   Yes   Nursing interventions  Educated on importance of maintaining up to date immunizations as advised by provider   Is the patient/caregiver able to teach back the hierarchy of who to call/visit for symptoms/problems? PCP, Specialist, Home health nurse, Urgent Care, ED, 911  Yes   Is the patient able to teach back COPD zones?  Yes   Nursing  interventions  Education provided on various zones   Patient reports what zone on this call?  Yellow Zone   Yellow Zone  Increased shortness of air, Unable to complete daily activities, Increased or thicker phlegm/mucus   Yellow interventions  Continue to use daily medications, Get plenty of rest, Call provider immediatly if symptoms do not improve   Week 1 call completed?  Yes          Mami Keenan RN

## 2019-05-06 ENCOUNTER — DOCUMENTATION (OUTPATIENT)
Dept: OTOLARYNGOLOGY | Facility: HOSPITAL | Age: 64
End: 2019-05-06

## 2019-05-06 NOTE — PROGRESS NOTES
CONSULTATION  MAY 3, 2019    Dav Freitas  :  53  MR# [id number]    Mr. Freitas is a 63-year-old,  gentleman who was admitted to [facility] on 19 for treatment of pneumonia/COPD.    During this hospitalization, a diagnosis of CLL was made.    Mr. Freitas has experienced symptoms of:   1. bifrontal headache.   2. cough   3. posterior nasal drainage   4. intermittent discharge of clear or purulent nasal secretions  during the past seven months, unimproved despite treatment with four courses of antibiotics and oral steroids.     Mr. Freitas denied a past history of sustaining any previous nasal or paranasal sinus trauma or undergoing any nasal or paranasal surgical procedures.    Mr. Freitas denied a history of asthma or undergoing prior allergic evaluation, or attempts at allergic desensitization.      Mr. Freitas underwent a paranasal sinus CT scan on 19 (personally reviewed).     This study showed mucoperiosteal thickening involving each anterior/posterior ethmoid sinus.    Air-fluid level was present within the sphenoid sinus; moderate mucoperiosteal thickening involved the inferior aspect of each maxillary sinus with obstruction of the anatomical drainage system (ostiomeatal complex).    Prior to this admission, Mr. Freitas had experienced good health during his lifetime; he has never sustained any significant injuries, or undergone any major surgical procedures.    Mr. Freitas has smoked one pack of cigarettes daily throughout his lifetime, with recent discontinuation.    Today’s examination of Mr. Freitas’s ears suggested a bilateral middle ear effusion, worse on the left side.    The examination of Mr. Freitas’s oral cavity showed his upper dentition to be partially absent; Mr. Freitas’s remaining maxillary/mandibular dentition appeared in good repair.    I noted no lesions or abnormalities to involve the lateral borders or the dorsum of the tongue, the floor of mouth, the buccal mucosal regions, or  the hard palate.      The examination of the oropharynx showed no visible or palpable abnormalities within the tonsillar fossae or tongue base.    Mr. Freitas’s intranasal cavities were endoscopically examined, following vasoconstriction of the nasal mucosa, and the establishment of topical intranasal anesthesia.      Mr. Freitas’s nasal septum deflected to the left side.      Nasal endoscopy excluded overt intranasal discharge of purulent exudate or nasal polyps.    The endoscopic examination of Mr. Ramos nasopharynx was unremarkable.      Because of complaints of discomfort with further advancement of the endoscope, Mr. Freitas’s hypopharynx and larynx were not endoscopically visualized during today’s examination.        Palpation of Mr. Freitas’s neck demonstrated a bilateral, anterior/posterior cervical adenopathy.    IMPRESSION: 1.   Pansinusitis.    RECOMMENDATION: 1.  Continuation of a three to four week course of Omnicef, 300 mg, po, BID.        2.  Discontinuation of antihistamines.        3.  Mucinex 600 mg, po, QID, with a large glass of water.        4.  Steam inhalation.        5.  Follow-up with in-office re-examination in three weeks.      Rylan Hamlin M.D.

## 2019-05-06 NOTE — PROGRESS NOTES
Case Management Discharge Note    Final Note: Home--no needs    Destination      No service has been selected for the patient.      Durable Medical Equipment      No service has been selected for the patient.      Dialysis/Infusion      No service has been selected for the patient.      Home Medical Care      No service has been selected for the patient.      Therapy      No service has been selected for the patient.      Community Resources      No service has been selected for the patient.             Final Discharge Disposition Code: 01 - home or self-care

## 2019-05-07 LAB
IGA SERPL-MCNC: 15 MG/DL (ref 61–437)
IGG SERPL-MCNC: 270 MG/DL (ref 700–1600)
IGM SERPL-MCNC: 5 MG/DL (ref 20–172)
TOTAL IGE SMQN RAST: 3 IU/ML (ref 6–495)

## 2019-05-08 ENCOUNTER — LAB (OUTPATIENT)
Dept: LAB | Facility: HOSPITAL | Age: 64
End: 2019-05-08

## 2019-05-08 ENCOUNTER — OFFICE VISIT (OUTPATIENT)
Dept: ONCOLOGY | Facility: CLINIC | Age: 64
End: 2019-05-08

## 2019-05-08 VITALS
SYSTOLIC BLOOD PRESSURE: 128 MMHG | TEMPERATURE: 98.6 F | OXYGEN SATURATION: 95 % | HEART RATE: 93 BPM | RESPIRATION RATE: 18 BRPM | WEIGHT: 146.4 LBS | BODY MASS INDEX: 20.5 KG/M2 | DIASTOLIC BLOOD PRESSURE: 76 MMHG | HEIGHT: 71 IN

## 2019-05-08 DIAGNOSIS — D80.1 HYPOGAMMAGLOBULINEMIA (HCC): ICD-10-CM

## 2019-05-08 DIAGNOSIS — C91.10 CLL (CHRONIC LYMPHOCYTIC LEUKEMIA) (HCC): Primary | ICD-10-CM

## 2019-05-08 DIAGNOSIS — C91.10 CLL (CHRONIC LYMPHOCYTIC LEUKEMIA) (HCC): ICD-10-CM

## 2019-05-08 LAB
ALBUMIN SERPL-MCNC: 4.4 G/DL (ref 3.5–5.2)
ALBUMIN/GLOB SERPL: 2.1 G/DL (ref 1.1–2.4)
ALP SERPL-CCNC: 55 U/L (ref 38–116)
ALT SERPL W P-5'-P-CCNC: 14 U/L (ref 0–41)
ANION GAP SERPL CALCULATED.3IONS-SCNC: 12.3 MMOL/L
AST SERPL-CCNC: 10 U/L (ref 0–40)
BASOPHILS # BLD AUTO: 0.04 10*3/MM3 (ref 0–0.2)
BASOPHILS NFR BLD AUTO: 0.2 % (ref 0–1.5)
BILIRUB SERPL-MCNC: 0.5 MG/DL (ref 0.2–1.2)
BUN BLD-MCNC: 31 MG/DL (ref 6–20)
BUN/CREAT SERPL: 26.3 (ref 7.3–30)
CALCIUM SPEC-SCNC: 9 MG/DL (ref 8.5–10.2)
CHLORIDE SERPL-SCNC: 99 MMOL/L (ref 98–107)
CO2 SERPL-SCNC: 22.7 MMOL/L (ref 22–29)
CREAT BLD-MCNC: 1.18 MG/DL (ref 0.7–1.3)
DEPRECATED RDW RBC AUTO: 59.6 FL (ref 37–54)
EOSINOPHIL # BLD AUTO: 0.03 10*3/MM3 (ref 0–0.4)
EOSINOPHIL NFR BLD AUTO: 0.1 % (ref 0.3–6.2)
ERYTHROCYTE [DISTWIDTH] IN BLOOD BY AUTOMATED COUNT: 18.6 % (ref 12.3–15.4)
GFR SERPL CREATININE-BSD FRML MDRD: 62 ML/MIN/1.73
GLOBULIN UR ELPH-MCNC: 2.1 GM/DL (ref 1.8–3.5)
GLUCOSE BLD-MCNC: 121 MG/DL (ref 74–124)
HCT VFR BLD AUTO: 37 % (ref 37.5–51)
HGB BLD-MCNC: 11.5 G/DL (ref 13–17.7)
IMM GRANULOCYTES # BLD AUTO: 0.13 10*3/MM3 (ref 0–0.05)
IMM GRANULOCYTES NFR BLD AUTO: 0.6 % (ref 0–0.5)
LDH SERPL-CCNC: 181 U/L (ref 99–259)
LYMPHOCYTES # BLD AUTO: 13.95 10*3/MM3 (ref 0.7–3.1)
LYMPHOCYTES NFR BLD AUTO: 63 % (ref 19.6–45.3)
MCH RBC QN AUTO: 28.9 PG (ref 26.6–33)
MCHC RBC AUTO-ENTMCNC: 31.1 G/DL (ref 31.5–35.7)
MCV RBC AUTO: 93 FL (ref 79–97)
MONOCYTES # BLD AUTO: 1.21 10*3/MM3 (ref 0.1–0.9)
MONOCYTES NFR BLD AUTO: 5.5 % (ref 5–12)
NEUTROPHILS # BLD AUTO: 6.78 10*3/MM3 (ref 1.7–7)
NEUTROPHILS NFR BLD AUTO: 30.6 % (ref 42.7–76)
NRBC BLD AUTO-RTO: 0.3 /100 WBC (ref 0–0.2)
PLATELET # BLD AUTO: 209 10*3/MM3 (ref 140–450)
PMV BLD AUTO: 9.5 FL (ref 6–12)
POTASSIUM BLD-SCNC: 5.6 MMOL/L (ref 3.5–4.7)
PROT SERPL-MCNC: 6.5 G/DL (ref 6.3–8)
RBC # BLD AUTO: 3.98 10*6/MM3 (ref 4.14–5.8)
SODIUM BLD-SCNC: 134 MMOL/L (ref 134–145)
URATE SERPL-MCNC: 5 MG/DL (ref 2.8–7.4)
WBC NRBC COR # BLD: 22.14 10*3/MM3 (ref 3.4–10.8)

## 2019-05-08 PROCEDURE — 85025 COMPLETE CBC W/AUTO DIFF WBC: CPT | Performed by: INTERNAL MEDICINE

## 2019-05-08 PROCEDURE — 80053 COMPREHEN METABOLIC PANEL: CPT | Performed by: INTERNAL MEDICINE

## 2019-05-08 PROCEDURE — 36415 COLL VENOUS BLD VENIPUNCTURE: CPT | Performed by: INTERNAL MEDICINE

## 2019-05-08 PROCEDURE — 99213 OFFICE O/P EST LOW 20 MIN: CPT | Performed by: NURSE PRACTITIONER

## 2019-05-08 PROCEDURE — 83615 LACTATE (LD) (LDH) ENZYME: CPT | Performed by: INTERNAL MEDICINE

## 2019-05-08 PROCEDURE — 84550 ASSAY OF BLOOD/URIC ACID: CPT | Performed by: INTERNAL MEDICINE

## 2019-05-08 NOTE — PROGRESS NOTES
Subjective   REASON FOR FOLLOW UP:  1.  New diagnosis chronic lymphocytic leukemia with extensive lymphadenopathy and possible pleural involvement.    HISTORY OF PRESENT ILLNESS:    The patient is a  63 y.o. male with the above-mentioned history, who returns the office today for hospital follow-up and lab review.  He was recently admitted 4/28/2019 through 5/3/2019, originally presenting to the emergency department with shortness of breath and cough.  Ultimately, he was diagnosed with CLL with possible pleural involvement.  He did proceed with treatment with Treanda Rituxan initiated 5/1/2019.  Since initiation of treatment, he has noted improvement in his lymphadenopathy in his neck.  Additionally, due to pneumonia and sinusitis he was treated with Omnicef which he continues on currently.  He denies fevers or chills, signs or symptoms of infection.  He reports he is feeling well.  He is very pleased with his tolerance to treatment thus far.  He does have some fatigue though otherwise is feeling well.  He reports he is drinking plenty of fluids.      Past Medical History:   Diagnosis Date   • ED (erectile dysfunction)    • H/O splenomegaly    • Injury of back    • Pneumonia    • Varicose vein of leg      Past Surgical History:   Procedure Laterality Date   • BRONCHOSCOPY Bilateral 4/29/2019    Procedure: BRONCHOSCOPY WITH WASHING ENDOBRONCHIAL ULTRASOUND WITH FNA'S;  Surgeon: Selina Lloyd MD;  Location: [facility] ENDOSCOPY;  Service: Pulmonary   • NO PAST SURGERIES       Social History     Tobacco Use   • Smoking status: Current Every Day Smoker     Packs/day: 1.00     Years: 40.00     Pack years: 40.00   • Smokeless tobacco: Never Used   Substance Use Topics   • Alcohol use: Yes   • Drug use: Defer       Current Outpatient Medications:   •  acyclovir (ZOVIRAX) 200 MG capsule, Take 2 capsules by mouth 2 (Two) Times a Day for 17 doses., Disp: 34 capsule, Rfl: 0  •  albuterol (PROAIR RESPICLICK) 108 (90 Base)  MCG/ACT inhaler, Inhale 2 puffs Every 4 (Four) Hours As Needed for Wheezing., Disp: 1 inhaler, Rfl: 0  •  allopurinol (ZYLOPRIM) 300 MG tablet, Take 1 tablet by mouth Daily for 30 days., Disp: 30 tablet, Rfl: 0  •  aspirin  MG tablet, Take 650 mg by mouth Every 6 (Six) Hours As Needed (headache)., Disp: , Rfl:   •  budesonide-formoterol (SYMBICORT) 160-4.5 MCG/ACT inhaler, Inhale 2 puffs 2 (Two) Times a Day., Disp: 1 inhaler, Rfl: 11  •  ferrous sulfate 325 (65 FE) MG tablet, Take 1 tablet by mouth 2 (Two) Times a Day With Meals for 30 days., Disp: 60 tablet, Rfl: 0  •  guaiFENesin (MUCINEX) 600 MG 12 hr tablet, Take 600 mg by mouth 2 (Two) Times a Day., Disp: , Rfl:   •  predniSONE (DELTASONE) 10 MG tablet, 4 tabs daily x 3 days then 3 tabs daily x 3 days then 2 tabs daily x 3 days then 1 tab daily x 3 days then stop; total 30 tabs, Disp: 30 tablet, Rfl: 0    I have reviewed the patient's medical history in detail and updated the computerized patient record.    Review of Systems   Constitutional: Positive for fatigue. Negative for activity change, appetite change, chills and fever.   HENT: Negative for mouth sores and sore throat.    Eyes: Negative for visual disturbance.   Respiratory: Positive for cough and shortness of breath (improved).    Cardiovascular: Negative for chest pain and leg swelling.   Gastrointestinal: Negative for abdominal pain, diarrhea, nausea and vomiting.   Genitourinary: Negative for dysuria and frequency.   Neurological: Negative for dizziness and weakness.   Hematological: Does not bruise/bleed easily.   Psychiatric/Behavioral: The patient is not nervous/anxious.    All other systems reviewed and are negative.  Review of systems 5/8/2019 unchanged from previous office visit except as updated    Medications:  The current medication list was reviewed in the EMR    ALLERGIES:  No Known Allergies    Objective      Vitals:    05/08/19 1402   BP: 128/76   Pulse: 93   Resp: 18   Temp: 98.6  "°F (37 °C)   SpO2: 95%   Weight: 66.4 kg (146 lb 6.4 oz)   Height: 180.3 cm (70.98\")   PainSc: 0-No pain     Current Status 5/8/2019   ECOG score 0       Physical Exam    GENERAL:  Well-developed, well-nourished in no acute distress.   SKIN:  Warm, dry without rashes, purpura or petechiae.  HEAD:  Normocephalic.  EYES:  Pupils equal, round.  EOMs intact.  Conjunctivae normal.  EARS:  Hearing intact.  NOSE:  Septum midline.  No excoriations or nasal discharge.  MOUTH:  Tongue is well-papillated; no stomatitis or ulcers.  Lips normal.  THROAT:  Oropharynx without lesions or exudates.  LYMPHATICS: There is palpable cervical, submandibular and axillary adenopathy, though this is not bulky and seems to be improved compared to previous exams.  CHEST:  Lungs clear to auscultation. Good airflow.  CARDIAC:  Regular rate and rhythm without murmurs. Normal S1,S2.  ABDOMEN:  Soft, nontender. Bowel sounds present  EXTREMITIES:  No clubbing, cyanosis or edema.  NEUROLOGICAL: No focal neurological deficits.  PSYCHIATRIC:  Normal affect and mood.    Physical exam 5/8/2019 unchanged from previous office visit except as updated    RECENT LABS:  Results from last 7 days   Lab Units 05/08/19  1322 05/03/19  0639 05/02/19  1041 05/02/19  0519   WBC 10*3/mm3 22.14* 46.36* 47.97* 47.00*   NEUTROS ABS 10*3/mm3 6.78 12.19* 15.83* 8.46*   LYMPHS ABS 10*3/mm3  --  31.85* 31.13* 34.78*   HEMOGLOBIN g/dL 11.5* 8.9* 9.3* 9.8*   HEMATOCRIT % 37.0* 29.4* 31.5* 32.7*   PLATELETS 10*3/mm3 209 130* 150 155     Results from last 7 days   Lab Units 05/08/19  1322 05/03/19  0639 05/02/19  0519   SODIUM mmol/L 134 142 144   POTASSIUM mmol/L 5.6* 5.1 5.0   CHLORIDE mmol/L 99 107 106   CO2 mmol/L 22.7 24.9 25.0   BUN mg/dL 31* 25* 24*   CREATININE mg/dL 1.18 1.04 1.07   CALCIUM mg/dL 9.0 7.8* 8.1*   ALBUMIN g/dL 4.40  --  3.90   BILIRUBIN mg/dL 0.5  --  0.2   ALK PHOS U/L 55  --  75   ALT (SGPT) U/L 14  --  14   AST (SGOT) U/L 10  --  21   GLUCOSE mg/dL " 121 123* 138*         Flow cytometry report on the peripheral blood as well as the lymph node biopsied at bronchoscopy are consistent with chronic lymphocytic leukemia/small lymphocytic lymphoma.    FISH prognostic panel requested    Assessment/Plan   1.  CLL presenting with significant lymphocytosis, lymphadenopathy and splenomegaly.  FISH prognostic panel on his peripheral flow cytometry specimen is pending.  Patient went on to initiate treatment while inpatient 5/1/2019 with Treanda Rituxan.  Plans to reevaluate after 2 cycles of Treanda Rituxan and potentially move to [facility].   2.  Pulmonary nodules/infiltrates.  He is status post bronchoscopy.  Is unclear at this time whether these findings are infectious or possibly related to his lymphoproliferative disorder.  He is completing a course of Omnicef.  3.  Iron deficiency.  He continues on ferrous sulfate 325 mg twice daily.  His anemia is actually improved today.  4.  Hypogammaglobulinemia secondary to CLL.  The patient received 25gm IVIG while inpatient 5/3/2019.  He also continues on prophylaxis with acyclovir 400 mg twice daily.     PLAN:  1. Continue Omnicef as prescribed at discharge.  2. Continue prophylaxis with acyclovir 400 mg twice a day.  3. Return in 1 week for CBC, CMP, MD follow-up with Dr. Parekh.  4. Planning 2 cycles of Treanda Rituxan, followed by repeat imaging to possibly move to [facility].    Today's labs including potassium were reviewed with Dr. Parekh, no adjustments as this time.     Noa Zarate, APRN   5/8/2019      CC:

## 2019-05-09 ENCOUNTER — OFFICE VISIT (OUTPATIENT)
Dept: FAMILY MEDICINE CLINIC | Facility: CLINIC | Age: 64
End: 2019-05-09

## 2019-05-09 VITALS
SYSTOLIC BLOOD PRESSURE: 142 MMHG | OXYGEN SATURATION: 96 % | HEART RATE: 100 BPM | BODY MASS INDEX: 20.02 KG/M2 | HEIGHT: 71 IN | TEMPERATURE: 98.5 F | DIASTOLIC BLOOD PRESSURE: 70 MMHG | WEIGHT: 143 LBS

## 2019-05-09 DIAGNOSIS — Z09 HOSPITAL DISCHARGE FOLLOW-UP: Primary | ICD-10-CM

## 2019-05-09 DIAGNOSIS — C91.10 CLL (CHRONIC LYMPHOCYTIC LEUKEMIA) (HCC): ICD-10-CM

## 2019-05-09 PROCEDURE — 99214 OFFICE O/P EST MOD 30 MIN: CPT | Performed by: INTERNAL MEDICINE

## 2019-05-09 RX ORDER — CEFDINIR 300 MG/1
300 CAPSULE ORAL 2 TIMES DAILY
COMMUNITY
End: 2019-05-30

## 2019-05-09 NOTE — PROGRESS NOTES
Subjective   Dav Freitas is a 63 y.o. male. Patient is here today for   Chief Complaint   Patient presents with   • Follow-up     Hospital F/u    • Pneumonia       (Not on file)-  Risk for Readmission (LACE) Score: 11 (5/3/2019  6:00 AM)           Vitals:    05/09/19 1353   BP: 142/70   Pulse: 100   Temp: 98.5 °F (36.9 °C)   SpO2: 96%     The following portions of the patient's history were reviewed and updated as appropriate: allergies, current medications, past family history, past medical history, past social history, past surgical history and problem list.    Past Medical History:   Diagnosis Date   • ED (erectile dysfunction)    • H/O splenomegaly    • Injury of back    • Pneumonia    • Varicose vein of leg       No Known Allergies   Social History     Socioeconomic History   • Marital status:      Spouse name: Nikky   • Number of children: Not on file   • Years of education: Not on file   • Highest education level: Not on file   Occupational History     Employer: [employer]   Tobacco Use   • Smoking status: Current Every Day Smoker     Packs/day: 1.00     Years: 40.00     Pack years: 40.00   • Smokeless tobacco: Never Used   Substance and Sexual Activity   • Alcohol use: Yes   • Drug use: Defer   • Sexual activity: Defer        Current Outpatient Medications:   •  acyclovir (ZOVIRAX) 200 MG capsule, Take 2 capsules by mouth 2 (Two) Times a Day for 17 doses., Disp: 34 capsule, Rfl: 0  •  albuterol (PROAIR RESPICLICK) 108 (90 Base) MCG/ACT inhaler, Inhale 2 puffs Every 4 (Four) Hours As Needed for Wheezing., Disp: 1 inhaler, Rfl: 0  •  allopurinol (ZYLOPRIM) 300 MG tablet, Take 1 tablet by mouth Daily for 30 days., Disp: 30 tablet, Rfl: 0  •  aspirin  MG tablet, Take 650 mg by mouth Every 6 (Six) Hours As Needed (headache)., Disp: , Rfl:   •  budesonide-formoterol (SYMBICORT) 160-4.5 MCG/ACT inhaler, Inhale 2 puffs 2 (Two) Times a Day., Disp: 1 inhaler, Rfl: 11  •  cefdinir (OMNICEF) 300 MG  capsule, Take 300 mg by mouth 2 (Two) Times a Day., Disp: , Rfl:   •  ferrous sulfate 325 (65 FE) MG tablet, Take 1 tablet by mouth 2 (Two) Times a Day With Meals for 30 days., Disp: 60 tablet, Rfl: 0  •  guaiFENesin (MUCINEX) 600 MG 12 hr tablet, Take 600 mg by mouth 2 (Two) Times a Day., Disp: , Rfl:   •  predniSONE (DELTASONE) 10 MG tablet, 4 tabs daily x 3 days then 3 tabs daily x 3 days then 2 tabs daily x 3 days then 1 tab daily x 3 days then stop; total 30 tabs, Disp: 30 tablet, Rfl: 0     Objective     History of Present Illness Dav is here for a hospital discharge follow-up.  He was admitted to [facility] on April 28 and was diagnosed with pneumonia.  CT of the chest showed extensive lymphadenopathy and white blood cell count was over 70,000.  Oncology was consulted and he was diagnosed with CLL and is undergoing chemotherapy.  Pneumonia responded well to antibiotics.  He also was diagnosed with pansinusitis and saw ENT while he was in the hospital.  He continues on Cedinir.  He is a longtime smoker and has COPD.  He quit smoking 18 days ago.    Review of Systems   Constitutional: Positive for fatigue.   HENT: Positive for hearing loss.    Respiratory: Positive for shortness of breath.    Cardiovascular: Negative.    Neurological: Negative for headaches.   Psychiatric/Behavioral: Negative.        Physical Exam   Constitutional: He appears well-developed and well-nourished.   Cardiovascular: Normal rate, regular rhythm and normal heart sounds.   Pulmonary/Chest: Effort normal. He has no wheezes. He has no rales.   Musculoskeletal: He exhibits no edema.   Lymphadenopathy:     He has cervical adenopathy.   Psychiatric: He has a normal mood and affect. His behavior is normal. Judgment and thought content normal.   Vitals reviewed.      ASSESSMENT     Problem List Items Addressed This Visit        Hematopoietic and Hemostatic    CLL (chronic lymphocytic leukemia) (CMS/[facility])       Other    Hospital  discharge follow-up - Primary          Current outpatient and discharge medications have been reconciled for the patient.  Reviewed by: Victoriano Sousa MD      PLAN    Patient Instructions   Reviewed and discussed hospital records as well as hospital discharge medication list.    No Follow-up on file.

## 2019-05-10 NOTE — PROGRESS NOTES
Subjective Patient continuing to generally improve  REASON FOR FOLLOW UP:  1.  New diagnosis chronic lymphocytic leukemia with extensive lymphadenopathy and possible pleural involvement.  2.  Initiation of Treanda, Rituxan May 1, IVIG May 3    HISTORY OF PRESENT ILLNESS:   The patient is a  63 y.o. Male  who was admitted on 4/28/2019 with worsening complaints of cough wheezing and shortness of breath.  He had been to an urgent care center and was started on antibiotics and received a shot of Solu-Medrol.  His symptoms did not improve and on his admission he was noted to have profound lymphocytosis with a total white count of 70,074% lymphocytes on his white cell differential.     He also underwent CT scan of the chest that showed infiltrates and nodules in the lungs as well as significant lymphadenopathy within the chest and in the axillary regions.  He had peripheral blood sent for flow cytometry leukemia lymphoma panel but this is still pending at time of this dictation.     He also underwent bronchoscopy on 4/29/2019.  Results from this procedure are pendingl.  His respiratory viral panel was negative and markers of sepsis were unremarkable.    4/30/19  His peripheral blood flow cytometry and flow cytometry from the EBUS needle biopsy at bronchoscopy are both consistent with chronic lymphocytic leukemia/small lymphocytic lymphoma.    May 1, 2019  The patient underwent a CT of abdomen and pelvis April 30 demonstrating extensive splenomegaly and bulky lymphadenopathy throughout the abdomen and pelvis.  There is a tiny lesion at the superior aspect lateral hepatic segment and 2 hyperenhancing foci within the liver thought to be hemangiomas, moderate size right inguinal hernia containing a long segment of small bowel without obstruction or incarceration.  CT of soft tissue again reveals extensive cervical adenopathy as well as evidence of pansinusitis.  The patient's case was discussed with Dr. Church and the patient  "has stage III-IV disease should be treated during this hospitalization.  I met with the patient and discussed in detail the use of Treanda/ Rituxan which we initiated Treanda Rituxan beginning May 1, 2019.     When he is seen May 2 he is already beginning to respond with reducing adenopathy and improved symptoms.  Plan to proceed with day #2.  We have also reviewed his findings including IgA 14, IgG of 263, IgM of 5 and a kappa/lambda ratio of 26.38.  He is hypogammaglobulinemic and replacement therapy will be given this hospitalization.  ENT assessment will also be requested.     The patient is again seen May 3, 2019, continuing to improve overall.  We did proceed with IVIG 400 mg/kg and had the patient seen by ENT after which the patient was discharged.  He indicates that Dr. Hamlin is going to see him back within the week as he is now seen back in our office May 16 and that surgery may be necessary.  The patient is also still having sinus symptoms.  Further he has developed a more extensive rash involving his abdomen chest and back and previously seen in hospital?.  Otherwise he feels well except for fatigue without fever, chills, nausea, vomiting, weight loss, stable appetite.        Past Medical History, Past Surgical History, Social History, Family History have been reviewed and are without significant changes except as mentioned.    Review of Systems   Constitutional: Positive for fatigue.   Respiratory: Negative for chest tightness, shortness of breath and wheezing.    Gastrointestinal: Negative for abdominal pain, constipation, diarrhea, nausea and vomiting.   Neurological: Positive for weakness.          Medications:  The current medication list was reviewed in the EMR    ALLERGIES:  No Known Allergies    Objective      Vitals:    05/16/19 0842   BP: 125/66   Pulse: 109   Resp: 20   Temp: 98.3 °F (36.8 °C)   TempSrc: Oral   SpO2: 97%   Weight: 67.7 kg (149 lb 3.2 oz)   Height: 180.3 cm (70.98\")   PainSc: " 0-No pain     Current Status 5/16/2019   ECOG score 0       Physical Exam    Constitutional: He is oriented to person, place, and time. He appears well-developed and well-nourished. No distress.   HENT:   Head: Normocephalic.   Eyes: Conjunctivae and EOM are normal. Pupils are equal, round, and reactive to light. No scleral icterus.   Neck: Normal range of motion. Neck supple. No JVD present. No thyromegaly present.   Cardiovascular:  Normal rate, regular rhythm.  present. Exam reveals no gallop and no friction rub.   No murmur heard.  Pulmonary/Chest:He has no rales.   Abdominal: Soft. He exhibits no distension and no mass. There is no tenderness.   Musculoskeletal: Normal range of motion. He exhibits no edema or deformity.   Lymphadenopathy: (Sites listed below are gradually improving per degree of adenopathy.  Both submandibular, cervical, supraclavicular and axillary.  Cervical and submandibular adenopathy is shoddy and there is no current axillary adenopathy.     Skin: Macular papular rash involving anterior chest, abdomen and upper back                                                                                            Neurological: He is alert and oriented to person, place, and time. He has normal reflexes. No cranial nerve deficit.   Skin: Skin is warm and dry. No rash noted. No erythema.   Psychiatric: He has a normal mood and affect. His behavior is normal. Judgment normal    RECENT LABS:  Hematology WBC   Date Value Ref Range Status   05/16/2019 3.79 3.40 - 10.80 10*3/mm3 Final     RBC   Date Value Ref Range Status   05/16/2019 3.49 (L) 4.14 - 5.80 10*6/mm3 Final     Hemoglobin   Date Value Ref Range Status   05/16/2019 10.4 (L) 13.0 - 17.7 g/dL Final     Hematocrit   Date Value Ref Range Status   05/16/2019 34.0 (L) 37.5 - 51.0 % Final     Platelets   Date Value Ref Range Status   05/16/2019 176 140 - 450 10*3/mm3 Final            Lab Results   Component Value Date    GLUCOSE 121 05/08/2019    BUN  31 (H) 05/08/2019    CREATININE 1.18 05/08/2019    EGFRIFNONA 62 05/08/2019    BCR 26.3 05/08/2019    K 5.6 (C) 05/08/2019    CO2 22.7 05/08/2019    CALCIUM 9.0 05/08/2019    PROTENTOTREF 5.8 (L) 04/30/2019    ALBUMIN 4.40 05/08/2019    LABIL2 1.4 04/30/2019    AST 10 05/08/2019    ALT 14 05/08/2019      - 225 U/L 180      Uric Acid 3.4 - 7.0 mg/dL 5.1      Lab Results   Component Value Date    IRON 40 (L) 04/30/2019    TIBC 331 04/30/2019    FERRITIN 92.00 04/30/2019     Immature Reticulocyte Fraction 3.0 - 15.8 % 37.9 Abnormally high     Reticulocyte % 0.70 - 1.90 % 1.57    Reticulocyte Hgb 29.8 - 36.1 pg 28.0 Abnormally low       Flow cytometry report on the peripheral blood as well as the lymph node biopsied at bronchoscopy are consistent with chronic lymphocytic leukemia/small lymphocytic lymphoma.    FISH prognostic panel requested though not available?    Assessment/Plan   1.  CLL presenting with significant lymphocytosis, lymphadenopathy and splenomegaly.    2.  Pulmonary nodules/infiltrates.  He is status post bronchoscopy.  Is unclear at this time whether these findings are infectious or possibly related to his lymphoproliferative disorder.  3.  Iron deficiency  4.  Hypogammaglobulinemia     Recommendations  1.  FISH panel will be drawn again through office today, currently not available for hospital laboratory studies?  2.  We discussed potential treatment options with patient.  He may be a candidate for Imbruvica therapy but given his young age and extensive lymphadenopathy and symptoms have decided to proceed initially with Treanda Rituxan, reevaluate after 2 cycles for potential move to Imbruvica.  At this point we went on to initiate Treanda Rituxan again in May 1, 2019.  A second cycle will be offered in early June, 2019 and thereafter follow-up scans.  Hematologically and clinically he is Rasheed responding to his first cycle.  3.  We will continue iron supplementation with ferrous sulfate 325  mg p.o. twice daily. Patient will need colonoscopy at some point once he is stable.  4.  Quantitative immunoglobulins noted to be exceedingly reduced, IVIG replacement plan on May 3, 2019-200 to 400mg/kg, plan to start prophylaxis with acyclovir 400 mg p.o. twice daily  5.  ENT assessment for pansinusitis  6.  Worsening erythematous, macular rash which the patient states has been present for some time?  Dermatologic assessment requested      Plan:  *Return 1 week for CBC on evaluation  *Add CLL FISH to current laboratory studies today  *Return 2 weeks MD or NP, second cycle Treanda Rituxan, schedule IVIG 2 to 3 days later at 400 mg/kg  *2 weeks later, subsequent CT chest abdomen pelvis and follow-up  *ENT assessment ongoing with potential surgery, requested that this would follow IVIG infusion  *Patient to be assessed after CLL FISH available and scans are completed to determine potential switch to Imbruvica  *Refer to dermatology, Dr. Celena Funez, current patient          5/16/2019      CC:

## 2019-05-13 ENCOUNTER — READMISSION MANAGEMENT (OUTPATIENT)
Dept: CALL CENTER | Facility: HOSPITAL | Age: 64
End: 2019-05-13

## 2019-05-13 NOTE — OUTREACH NOTE
COPD/PN Week 2 Survey      Responses   Facility patient discharged from?  [facility]   Does the patient have one of the following disease processes/diagnoses(primary or secondary)?  COPD/Pneumonia   Was the primary reason for admission:  COPD exacerbation   Week 2 attempt successful?  Yes   Call start time  1458   Call end time  1502   Discharge diagnosis  Mediastinal and hilar adenopathy,  CLL,  Patchy pulmonary infiltrates,  COPD exacerbation    Is patient permission given to speak with other caregiver?  Yes   List who call center can speak with  Nikky, wife   Person spoke with today (if not patient) and relationship  Nikky- Wife   Meds reviewed with patient/caregiver?  Yes   Is the patient having any side effects they believe may be caused by any medication additions or changes?  No   Does the patient have all medications ordered at discharge?  Yes   Is the patient taking all medications as directed (includes completed medication regime)?  Yes   Does the patient have a primary care provider?   Yes   Does the patient have an appointment with their PCP or pulmonologist within 7 days of discharge?  Yes   Comments regarding PCP  Juan Iyer MD   Has the patient kept scheduled appointments due by today?  Yes   Has home health visited the patient within 72 hours of discharge?  N/A   Psychosocial issues?  No   Did the patient receive a copy of their discharge instructions?  Yes   Nursing interventions  Reviewed instructions with patient   What is the patient's perception of their health status since discharge?  Improving   Nursing Interventions  Nurse provided patient education   If the patient is a current smoker, are they able to teach back resources for cessation?  -- [Wife states patient is not smoking. ]   Is the patient/caregiver able to teach back the hierarchy of who to call/visit for symptoms/problems? PCP, Specialist, Home health nurse, Urgent Care, ED, 911  Yes   Is the patient able to teach back  COPD zones?  Yes   Nursing interventions  Education provided on various zones   Patient reports what zone on this call?  Yellow Zone   Yellow Zone  Increased shortness of air, Increased or thicker phlegm/mucus   Yellow interventions  Continue to use daily medications, Use other meds such as steroids or antibiotics as ordered, Do not smoke, Get plenty of rest, Call provider immediatly if symptoms do not improve   Is the patient/caregiver able to teach back signs and symptoms of worsening condition:  Fever/chills, Shortness of breath, Chest pain   Is the patient/caregiver able to teach back importance of completing antibiotic course of treatment?  Yes   Week 2 call completed?  Yes          Anat Byrd RN

## 2019-05-16 ENCOUNTER — LAB (OUTPATIENT)
Dept: LAB | Facility: HOSPITAL | Age: 64
End: 2019-05-16

## 2019-05-16 ENCOUNTER — OFFICE VISIT (OUTPATIENT)
Dept: ONCOLOGY | Facility: CLINIC | Age: 64
End: 2019-05-16

## 2019-05-16 VITALS
SYSTOLIC BLOOD PRESSURE: 125 MMHG | HEART RATE: 109 BPM | OXYGEN SATURATION: 97 % | WEIGHT: 149.2 LBS | RESPIRATION RATE: 20 BRPM | TEMPERATURE: 98.3 F | BODY MASS INDEX: 20.89 KG/M2 | HEIGHT: 71 IN | DIASTOLIC BLOOD PRESSURE: 66 MMHG

## 2019-05-16 DIAGNOSIS — C91.10 CLL (CHRONIC LYMPHOCYTIC LEUKEMIA) (HCC): Primary | ICD-10-CM

## 2019-05-16 LAB
ALBUMIN SERPL-MCNC: 3.9 G/DL (ref 3.5–5.2)
ALBUMIN/GLOB SERPL: 1.9 G/DL (ref 1.1–2.4)
ALP SERPL-CCNC: 53 U/L (ref 38–116)
ALT SERPL W P-5'-P-CCNC: 11 U/L (ref 0–41)
ANION GAP SERPL CALCULATED.3IONS-SCNC: 12.1 MMOL/L
AST SERPL-CCNC: 7 U/L (ref 0–40)
BASOPHILS # BLD AUTO: 0.02 10*3/MM3 (ref 0–0.2)
BASOPHILS NFR BLD AUTO: 0.5 % (ref 0–1.5)
BILIRUB SERPL-MCNC: 0.5 MG/DL (ref 0.2–1.2)
BUN BLD-MCNC: 21 MG/DL (ref 6–20)
BUN/CREAT SERPL: 17.4 (ref 7.3–30)
CALCIUM SPEC-SCNC: 8.5 MG/DL (ref 8.5–10.2)
CHLORIDE SERPL-SCNC: 99 MMOL/L (ref 98–107)
CO2 SERPL-SCNC: 26.9 MMOL/L (ref 22–29)
CREAT BLD-MCNC: 1.21 MG/DL (ref 0.7–1.3)
DEPRECATED RDW RBC AUTO: 71.2 FL (ref 37–54)
EOSINOPHIL # BLD AUTO: 0.03 10*3/MM3 (ref 0–0.4)
EOSINOPHIL NFR BLD AUTO: 0.8 % (ref 0.3–6.2)
ERYTHROCYTE [DISTWIDTH] IN BLOOD BY AUTOMATED COUNT: 20.2 % (ref 12.3–15.4)
GFR SERPL CREATININE-BSD FRML MDRD: 60 ML/MIN/1.73
GLOBULIN UR ELPH-MCNC: 2.1 GM/DL (ref 1.8–3.5)
GLUCOSE BLD-MCNC: 161 MG/DL (ref 74–124)
HCT VFR BLD AUTO: 34 % (ref 37.5–51)
HGB BLD-MCNC: 10.4 G/DL (ref 13–17.7)
IMM GRANULOCYTES # BLD AUTO: 0.02 10*3/MM3 (ref 0–0.05)
IMM GRANULOCYTES NFR BLD AUTO: 0.5 % (ref 0–0.5)
LYMPHOCYTES # BLD AUTO: 1.47 10*3/MM3 (ref 0.7–3.1)
LYMPHOCYTES NFR BLD AUTO: 38.8 % (ref 19.6–45.3)
MCH RBC QN AUTO: 29.8 PG (ref 26.6–33)
MCHC RBC AUTO-ENTMCNC: 30.6 G/DL (ref 31.5–35.7)
MCV RBC AUTO: 97.4 FL (ref 79–97)
MONOCYTES # BLD AUTO: 0.16 10*3/MM3 (ref 0.1–0.9)
MONOCYTES NFR BLD AUTO: 4.2 % (ref 5–12)
NEUTROPHILS # BLD AUTO: 2.09 10*3/MM3 (ref 1.7–7)
NEUTROPHILS NFR BLD AUTO: 55.2 % (ref 42.7–76)
NRBC BLD AUTO-RTO: 0 /100 WBC (ref 0–0.2)
PLATELET # BLD AUTO: 176 10*3/MM3 (ref 140–450)
PMV BLD AUTO: 10.3 FL (ref 6–12)
POTASSIUM BLD-SCNC: 4.1 MMOL/L (ref 3.5–4.7)
PROT SERPL-MCNC: 6 G/DL (ref 6.3–8)
RBC # BLD AUTO: 3.49 10*6/MM3 (ref 4.14–5.8)
SODIUM BLD-SCNC: 138 MMOL/L (ref 134–145)
WBC NRBC COR # BLD: 3.79 10*3/MM3 (ref 3.4–10.8)

## 2019-05-16 PROCEDURE — 80053 COMPREHEN METABOLIC PANEL: CPT

## 2019-05-16 PROCEDURE — 85025 COMPLETE CBC W/AUTO DIFF WBC: CPT

## 2019-05-16 PROCEDURE — 99215 OFFICE O/P EST HI 40 MIN: CPT | Performed by: INTERNAL MEDICINE

## 2019-05-16 PROCEDURE — 36415 COLL VENOUS BLD VENIPUNCTURE: CPT

## 2019-05-20 LAB — REF LAB TEST METHOD: NORMAL

## 2019-05-21 ENCOUNTER — READMISSION MANAGEMENT (OUTPATIENT)
Dept: CALL CENTER | Facility: HOSPITAL | Age: 64
End: 2019-05-21

## 2019-05-21 NOTE — OUTREACH NOTE
COPD/PN Week 3 Survey      Responses   Facility patient discharged from?  [facility]   Does the patient have one of the following disease processes/diagnoses(primary or secondary)?  COPD/Pneumonia   Call end time  0825   Meds reviewed with patient/caregiver?  Yes   Is the patient taking all medications as directed (includes completed medication regime)?  Yes   Has the patient kept scheduled appointments due by today?  Yes   What is the patient's perception of their health status since discharge?  Improving   Is the patient able to teach back COPD zones?  Yes   Patient reports what zone on this call?  Green Zone   Green Zone  Reports doing well, Breathing without shortness of breath, Usual activity and exercise level, Usual amount of phlegm/mucus without difficulty coughing up, Sleeping well, Appetite is good   Green Zone interventions:  Take daily medications, Continue regular exercise/diet plan, Avoid indoor/outdoor triggers   Week 3 call completed?  Yes   Revoked  No further contact(revokes)-requires comment   Graduated/Revoked comments  He states back to baseline, no new issues          Isabela Tyler, RN

## 2019-05-23 ENCOUNTER — CLINICAL SUPPORT (OUTPATIENT)
Dept: ONCOLOGY | Facility: HOSPITAL | Age: 64
End: 2019-05-23

## 2019-05-23 ENCOUNTER — LAB (OUTPATIENT)
Dept: LAB | Facility: HOSPITAL | Age: 64
End: 2019-05-23

## 2019-05-23 DIAGNOSIS — C91.10 CLL (CHRONIC LYMPHOCYTIC LEUKEMIA) (HCC): Primary | ICD-10-CM

## 2019-05-23 LAB
BASOPHILS # BLD AUTO: 0.05 10*3/MM3 (ref 0–0.2)
BASOPHILS NFR BLD AUTO: 1.1 % (ref 0–1.5)
DEPRECATED RDW RBC AUTO: 66.9 FL (ref 37–54)
EOSINOPHIL # BLD AUTO: 0.05 10*3/MM3 (ref 0–0.4)
EOSINOPHIL NFR BLD AUTO: 1.1 % (ref 0.3–6.2)
ERYTHROCYTE [DISTWIDTH] IN BLOOD BY AUTOMATED COUNT: 19.5 % (ref 12.3–15.4)
HCT VFR BLD AUTO: 34.2 % (ref 37.5–51)
HGB BLD-MCNC: 10.7 G/DL (ref 13–17.7)
IMM GRANULOCYTES # BLD AUTO: 0.06 10*3/MM3 (ref 0–0.05)
IMM GRANULOCYTES NFR BLD AUTO: 1.3 % (ref 0–0.5)
LYMPHOCYTES # BLD AUTO: 2.51 10*3/MM3 (ref 0.7–3.1)
LYMPHOCYTES NFR BLD AUTO: 54.6 % (ref 19.6–45.3)
MCH RBC QN AUTO: 29.5 PG (ref 26.6–33)
MCHC RBC AUTO-ENTMCNC: 31.3 G/DL (ref 31.5–35.7)
MCV RBC AUTO: 94.2 FL (ref 79–97)
MONOCYTES # BLD AUTO: 0.48 10*3/MM3 (ref 0.1–0.9)
MONOCYTES NFR BLD AUTO: 10.4 % (ref 5–12)
NEUTROPHILS # BLD AUTO: 1.45 10*3/MM3 (ref 1.7–7)
NEUTROPHILS NFR BLD AUTO: 31.5 % (ref 42.7–76)
NRBC BLD AUTO-RTO: 0.4 /100 WBC (ref 0–0.2)
PLATELET # BLD AUTO: 158 10*3/MM3 (ref 140–450)
PMV BLD AUTO: 10.1 FL (ref 6–12)
RBC # BLD AUTO: 3.63 10*6/MM3 (ref 4.14–5.8)
WBC NRBC COR # BLD: 4.6 10*3/MM3 (ref 3.4–10.8)

## 2019-05-23 PROCEDURE — 85025 COMPLETE CBC W/AUTO DIFF WBC: CPT | Performed by: INTERNAL MEDICINE

## 2019-05-23 PROCEDURE — 36415 COLL VENOUS BLD VENIPUNCTURE: CPT | Performed by: INTERNAL MEDICINE

## 2019-05-23 NOTE — PROGRESS NOTES
Pt is here for lab with RN review.  CBC reviewed with pt, counts are stable for this pt at this time. Pt has no complaints.  Copy of labs given to pt and f/u appt reviewed. Pt is instructed to call with concerns prior to next visit.    Lab Results   Component Value Date    WBC 4.60 05/23/2019    HGB 10.7 (L) 05/23/2019    HCT 34.2 (L) 05/23/2019    MCV 94.2 05/23/2019     05/23/2019

## 2019-05-28 DIAGNOSIS — C91.10 CLL (CHRONIC LYMPHOCYTIC LEUKEMIA) (HCC): ICD-10-CM

## 2019-05-30 ENCOUNTER — OFFICE VISIT (OUTPATIENT)
Dept: ONCOLOGY | Facility: CLINIC | Age: 64
End: 2019-05-30

## 2019-05-30 ENCOUNTER — LAB (OUTPATIENT)
Dept: LAB | Facility: HOSPITAL | Age: 64
End: 2019-05-30

## 2019-05-30 ENCOUNTER — APPOINTMENT (OUTPATIENT)
Dept: ONCOLOGY | Facility: HOSPITAL | Age: 64
End: 2019-05-30

## 2019-05-30 VITALS
BODY MASS INDEX: 21.66 KG/M2 | HEART RATE: 95 BPM | HEIGHT: 71 IN | SYSTOLIC BLOOD PRESSURE: 117 MMHG | RESPIRATION RATE: 16 BRPM | WEIGHT: 154.7 LBS | DIASTOLIC BLOOD PRESSURE: 65 MMHG | OXYGEN SATURATION: 96 % | TEMPERATURE: 98.4 F

## 2019-05-30 DIAGNOSIS — D50.9 IRON DEFICIENCY ANEMIA, UNSPECIFIED IRON DEFICIENCY ANEMIA TYPE: ICD-10-CM

## 2019-05-30 DIAGNOSIS — C91.10 CLL (CHRONIC LYMPHOCYTIC LEUKEMIA) (HCC): Primary | ICD-10-CM

## 2019-05-30 DIAGNOSIS — D70.1 CHEMOTHERAPY INDUCED NEUTROPENIA (HCC): ICD-10-CM

## 2019-05-30 DIAGNOSIS — C91.10 CLL (CHRONIC LYMPHOCYTIC LEUKEMIA) (HCC): ICD-10-CM

## 2019-05-30 DIAGNOSIS — T45.1X5A CHEMOTHERAPY INDUCED NEUTROPENIA (HCC): ICD-10-CM

## 2019-05-30 DIAGNOSIS — D80.1 HYPOGAMMAGLOBULINEMIA (HCC): ICD-10-CM

## 2019-05-30 LAB
BASOPHILS # BLD AUTO: 0.08 10*3/MM3 (ref 0–0.2)
BASOPHILS NFR BLD AUTO: 3 % (ref 0–1.5)
DEPRECATED RDW RBC AUTO: 71.7 FL (ref 37–54)
EOSINOPHIL # BLD AUTO: 0.11 10*3/MM3 (ref 0–0.4)
EOSINOPHIL NFR BLD AUTO: 4.2 % (ref 0.3–6.2)
ERYTHROCYTE [DISTWIDTH] IN BLOOD BY AUTOMATED COUNT: 20.2 % (ref 12.3–15.4)
HCT VFR BLD AUTO: 36.1 % (ref 37.5–51)
HGB BLD-MCNC: 11.2 G/DL (ref 13–17.7)
IMM GRANULOCYTES # BLD AUTO: 0.07 10*3/MM3 (ref 0–0.05)
IMM GRANULOCYTES NFR BLD AUTO: 2.6 % (ref 0–0.5)
LYMPHOCYTES # BLD AUTO: 1.44 10*3/MM3 (ref 0.7–3.1)
LYMPHOCYTES NFR BLD AUTO: 54.3 % (ref 19.6–45.3)
MCH RBC QN AUTO: 30.1 PG (ref 26.6–33)
MCHC RBC AUTO-ENTMCNC: 31 G/DL (ref 31.5–35.7)
MCV RBC AUTO: 97 FL (ref 79–97)
MONOCYTES # BLD AUTO: 0.68 10*3/MM3 (ref 0.1–0.9)
MONOCYTES NFR BLD AUTO: 25.7 % (ref 5–12)
NEUTROPHILS # BLD AUTO: 0.27 10*3/MM3 (ref 1.7–7)
NEUTROPHILS NFR BLD AUTO: 10.2 % (ref 42.7–76)
NRBC BLD AUTO-RTO: 0.8 /100 WBC (ref 0–0.2)
PLATELET # BLD AUTO: 296 10*3/MM3 (ref 140–450)
PMV BLD AUTO: 9.8 FL (ref 6–12)
RBC # BLD AUTO: 3.72 10*6/MM3 (ref 4.14–5.8)
WBC NRBC COR # BLD: 2.65 10*3/MM3 (ref 3.4–10.8)

## 2019-05-30 PROCEDURE — 85025 COMPLETE CBC W/AUTO DIFF WBC: CPT | Performed by: INTERNAL MEDICINE

## 2019-05-30 PROCEDURE — 99214 OFFICE O/P EST MOD 30 MIN: CPT | Performed by: NURSE PRACTITIONER

## 2019-05-30 PROCEDURE — 36415 COLL VENOUS BLD VENIPUNCTURE: CPT | Performed by: INTERNAL MEDICINE

## 2019-05-30 RX ORDER — FERROUS SULFATE 325(65) MG
325 TABLET ORAL 2 TIMES DAILY WITH MEALS
Qty: 60 TABLET | Refills: 2 | Status: SHIPPED | OUTPATIENT
Start: 2019-05-30 | End: 2019-06-29

## 2019-05-30 RX ORDER — ALLOPURINOL 300 MG/1
300 TABLET ORAL DAILY
Qty: 30 TABLET | Refills: 0 | Status: SHIPPED | OUTPATIENT
Start: 2019-05-30 | End: 2019-06-06 | Stop reason: SDUPTHER

## 2019-05-30 NOTE — PROGRESS NOTES
Subjective Patient continuing to generally improve    REASON FOR FOLLOW UP:  1.  New diagnosis chronic lymphocytic leukemia with extensive lymphadenopathy and possible pleural involvement.  2.  Initiation of Treanda/Rituxan 5/1/19, IVIG 5/3/19    HISTORY OF PRESENT ILLNESS:   The patient is a  63 y.o. Male who returns today for follow-up, due for his second cycle of bendamustine/Rituxan, having received his first cycle while hospitalized last month.    When he saw Dr. Parekh in follow-up as an outpatient, the patient was noted to have questionable rash and was asked to follow-up with his dermatologist, Dr. Celena Funez.  Patient tells me today that he did see her in follow-up and she was not concerned.  The patient does tend to have kind of a shannan appearance to his skin which she states is normal for him.  He does not have any appreciated rash today.    Patient has also had issues with sinusitis, seeing Dr. Caldwell, ENT.  Patient just finished a round of Ceftin ear and states his sinus symptoms have cleared.  There is still possibility he may need surgery down the road though this is on the back burner for now due to the bigger issues of treatment for his CLL.    I reviewed with patient today that unfortunately his WBC count is just 2.6 with an ANC surprisingly at 0.2.  He is asymptomatic thankfully.  We discussed the need to delay therapy.  He does have a monocytosis of 25% and I am hopeful his count will be recovered to resume therapy next week.    We discussed that we do plan to give him repeated IVIG after treatment and also plan to repeat CT scans after 2 cycles of therapy, possibly thereafter to transition to Imbruvica.    Patient verbalized understanding of the plan.    Past Medical History, Past Surgical History, Social History, Family History have been reviewed and are without significant changes except as mentioned.    Review of Systems   Constitutional: Positive for fatigue (better).   HENT: Positive for  hearing loss (related to fluid, seeing ENT). Negative for sinus pressure (improved now) and sinus pain.    Eyes: Negative.    Respiratory: Negative.  Negative for chest tightness, shortness of breath and wheezing.    Cardiovascular: Negative.    Gastrointestinal: Negative.  Negative for abdominal pain, constipation, diarrhea, nausea and vomiting.   Genitourinary: Negative.    Musculoskeletal: Negative.    Skin:        See HPI   Neurological: Negative for weakness.   Psychiatric/Behavioral: Negative.       ONCOLOGIC HISTORY:  63 y.o. male who was admitted on 4/28/2019 with worsening complaints of cough wheezing and shortness of breath.  He had been to an urgent care center and was started on antibiotics and received a shot of Solu-Medrol.  His symptoms did not improve and on his admission he was noted to have profound lymphocytosis with a total white count of 70,074% lymphocytes on his white cell differential.     He also underwent CT scan of the chest that showed infiltrates and nodules in the lungs as well as significant lymphadenopathy within the chest and in the axillary regions.  He had peripheral blood sent for flow cytometry leukemia lymphoma panel but this is still pending at time of this dictation.     He also underwent bronchoscopy on 4/29/2019. Negative for malignant cells. His respiratory viral panel was negative and markers of sepsis were unremarkable.    4/30/19 his peripheral blood flow cytometry and flow cytometry from the EBUS needle biopsy at bronchoscopy are both consistent with chronic lymphocytic leukemia/small lymphocytic lymphoma.    CT of abdomen and pelvis 4/30/19 demonstrated extensive splenomegaly and bulky lymphadenopathy throughout the abdomen and pelvis.  There is a tiny lesion at the superior aspect lateral hepatic segment and 2 hyperenhancing foci within the liver thought to be hemangiomas, moderate size right inguinal hernia containing a long segment of small bowel without obstruction  "or incarceration.  CT of soft tissue again reveals extensive cervical adenopathy as well as evidence of pansinusitis.    The patient's case was discussed with Dr. Church and the patient has stage III-IV disease should be treated during this hospitalization. Dr. Parekh met with the patient and discussed in detail the use of Treanda/ Rituxan which we initiated Treanda Rituxan beginning May 1, 2019. Patient see 5/2/19, already beginning to respond with reducing adenopathy and improved symptoms.  Day #2 given.  We also reviewed his findings including IgA 14, IgG of 263, IgM of 5 and a kappa/lambda ratio of 26.38.  He is hypogammaglobulinemic and replacement therapy with IVIG 400 mg/kg was given 5/3/19 in the hospital.     Patient saw Dr. Hamlin, ENT, and surgery may be necessary.  The patient is also still having sinus symptoms.  Further he has developed a more extensive rash involving his abdomen chest and back and previously seen in hospital.    Medications:  The current medication list was reviewed in the EMR    ALLERGIES:  No Known Allergies    Objective      Vitals:    05/30/19 0803   BP: 117/65   Pulse: 95   Resp: 16   Temp: 98.4 °F (36.9 °C)   TempSrc: Oral   SpO2: 96%   Weight: 70.2 kg (154 lb 11.2 oz)   Height: 180.3 cm (70.98\")   PainSc: 0-No pain     Current Status 5/16/2019   ECOG score 0       Physical Exam   Constitutional: He is oriented to person, place, and time. He appears well-developed and well-nourished. No distress.   HENT:   Head: Normocephalic and atraumatic.   Mouth/Throat: No oropharyngeal exudate.   Eyes: Conjunctivae and EOM are normal. Pupils are equal, round, and reactive to light.   Neck: Normal range of motion. Neck supple. No thyromegaly present.   Cardiovascular: Normal rate and regular rhythm.   No murmur heard.  Abdominal: Soft. Bowel sounds are normal. He exhibits no distension.   Musculoskeletal: Normal range of motion. He exhibits no edema or deformity.   Neurological: He is alert " and oriented to person, place, and time. No cranial nerve deficit.   Skin: Skin is warm and dry. No rash noted. He is not diaphoretic.   Skin has a generalized Nilesh appearance but no specified rash.  Patient also has multiple skin tags and actinic keratoses throughout the body.   Psychiatric: He has a normal mood and affect. His behavior is normal. Thought content normal.         RECENT LABS:  Results from last 7 days   Lab Units 05/30/19  0755 05/23/19  1433   WBC 10*3/mm3 2.65* 4.60   NEUTROS ABS 10*3/mm3 0.27* 1.45*   HEMOGLOBIN g/dL 11.2* 10.7*   HEMATOCRIT % 36.1* 34.2*   PLATELETS 10*3/mm3 296 158             Flow cytometry report on the peripheral blood as well as the lymph node biopsied at bronchoscopy are consistent with chronic lymphocytic leukemia/small lymphocytic lymphoma.    FISH prognostic panel requested though not available?    Assessment/Plan   1.  CLL presenting with significant lymphocytosis, lymphadenopathy and splenomegaly.    · Initiated Treanda/Rituxan 5/1/19 while hospitalized  · Repeat FISH panel 5/16/19 positive for deletion 13q14.3  · Plan to reevaluate after 2 cycles for potential move to [facility]  · Patient due for cycle 2 of Treanda/Rituxan today.  However he is neutropenic and we will need to delay 1 week.  We will look into Neulasta approval for cycle 2.  I did review this with Dr. Church and Dr. Parekh's absence.    2.  Pulmonary nodules/infiltrates.  He is status post bronchoscopy. Cytology negative for malignant cells.  Is unclear at this time whether these findings are infectious or possibly related to his lymphoproliferative disorder.    3.  Iron deficiency  · Taking ferrous sulfate 325 mg p.o. Twice daily.   · Patient will need colonoscopy at some point once he is stable.  ·   4.  Hypogammaglobulinemia  · IVIG 400 mg/kg 5/3/19 in hospital  · Started on prophylaxis with acyclovir 400 mg p.o. Twice daily.  · Plan to give additional IVIG after cycle 2 of chemotherapy.    5.  ENT  assessment for pansinusitis. Seeing Dr. Hamlin  · Recent sinusitis has resolved on round of Ceftin ear.  Patient does continue to have fluid behind the ears and just saw Dr. Hamlin in follow-up last week.    6.  Worsening erythematous, macular rash which the patient states has been present for some time.   · Patient saw dermatologist, Dr. Celena Funez, who did not feel the patient had any concerning rash.  · Patient skin does have a generalized gritty appearance which he states is normal for him and not new since beginning Treanda or allopurinol.    7.  Chemo therapy induced neutropenia.  · ANC 0.27 today.  · Patient asymptomatic and having recently completed Ceftin ear I am not inclined to put him on further antibiotics.  He has instead been educated on neutropenic precautions and instructed to stay home and lay low the next 3 to 4 days.  He does have monocytosis of 25% and I expect his count to be recovering within the week.  · We will look into Neulasta approval for cycle 2 of therapy as noted above.    PLAN:  1. Delay cycle 2 of Treanda/Rituxan 1 week.  2. Looking insurance approval for Neulasta.  I have sent a message to our precertification nurses.  3. Patient to be seen in 1 week by nurse practitioner, hopefully to resume therapy with cycle 2 of Treanda/Rituxan.  At that time he will also need to be scheduled for IVIG dose #2, tentatively to be given 6/10/2019.  4. Patient will also need to be scheduled for follow-up CT scans of the chest abdomen pelvis to take place 3 weeks after cycle 2 of therapy.  5. Plan thereafter to see Dr. Parekh with scan review, discussion of FISH results, and determination for timing of switch to Imbruvica.  6. Proceed with Treanda/Rituxan cycle 2 today.  7. ENT assessment ongoing with potential surgery, requested that this would follow IVIG infusion        5/30/2019      CC:

## 2019-05-31 ENCOUNTER — APPOINTMENT (OUTPATIENT)
Dept: ONCOLOGY | Facility: HOSPITAL | Age: 64
End: 2019-05-31

## 2019-06-03 DIAGNOSIS — C91.10 CLL (CHRONIC LYMPHOCYTIC LEUKEMIA) (HCC): ICD-10-CM

## 2019-06-06 ENCOUNTER — LAB (OUTPATIENT)
Dept: LAB | Facility: HOSPITAL | Age: 64
End: 2019-06-06

## 2019-06-06 ENCOUNTER — INFUSION (OUTPATIENT)
Dept: ONCOLOGY | Facility: HOSPITAL | Age: 64
End: 2019-06-06

## 2019-06-06 ENCOUNTER — OFFICE VISIT (OUTPATIENT)
Dept: ONCOLOGY | Facility: CLINIC | Age: 64
End: 2019-06-06

## 2019-06-06 VITALS
BODY MASS INDEX: 21.43 KG/M2 | RESPIRATION RATE: 16 BRPM | SYSTOLIC BLOOD PRESSURE: 147 MMHG | OXYGEN SATURATION: 97 % | TEMPERATURE: 98.5 F | HEART RATE: 89 BPM | HEIGHT: 71 IN | DIASTOLIC BLOOD PRESSURE: 75 MMHG | WEIGHT: 153.1 LBS

## 2019-06-06 VITALS — HEART RATE: 88 BPM | DIASTOLIC BLOOD PRESSURE: 71 MMHG | SYSTOLIC BLOOD PRESSURE: 131 MMHG

## 2019-06-06 DIAGNOSIS — C91.10 CLL (CHRONIC LYMPHOCYTIC LEUKEMIA) (HCC): Primary | ICD-10-CM

## 2019-06-06 DIAGNOSIS — R59.1 LYMPHADENOPATHY: ICD-10-CM

## 2019-06-06 DIAGNOSIS — R16.1 SPLENOMEGALY: ICD-10-CM

## 2019-06-06 DIAGNOSIS — C91.10 CLL (CHRONIC LYMPHOCYTIC LEUKEMIA) (HCC): ICD-10-CM

## 2019-06-06 LAB
ALBUMIN SERPL-MCNC: 4.2 G/DL (ref 3.5–5.2)
ALBUMIN/GLOB SERPL: 2.2 G/DL (ref 1.1–2.4)
ALP SERPL-CCNC: 78 U/L (ref 38–116)
ALT SERPL W P-5'-P-CCNC: 10 U/L (ref 0–41)
ANION GAP SERPL CALCULATED.3IONS-SCNC: 10.8 MMOL/L
AST SERPL-CCNC: 10 U/L (ref 0–40)
BASOPHILS # BLD AUTO: 0.12 10*3/MM3 (ref 0–0.2)
BASOPHILS NFR BLD AUTO: 1.9 % (ref 0–1.5)
BILIRUB SERPL-MCNC: 0.3 MG/DL (ref 0.2–1.2)
BUN BLD-MCNC: 19 MG/DL (ref 6–20)
BUN/CREAT SERPL: 14.8 (ref 7.3–30)
CALCIUM SPEC-SCNC: 9.1 MG/DL (ref 8.5–10.2)
CHLORIDE SERPL-SCNC: 104 MMOL/L (ref 98–107)
CO2 SERPL-SCNC: 27.2 MMOL/L (ref 22–29)
CREAT BLD-MCNC: 1.28 MG/DL (ref 0.7–1.3)
DEPRECATED RDW RBC AUTO: 66.3 FL (ref 37–54)
EOSINOPHIL # BLD AUTO: 0.22 10*3/MM3 (ref 0–0.4)
EOSINOPHIL NFR BLD AUTO: 3.6 % (ref 0.3–6.2)
ERYTHROCYTE [DISTWIDTH] IN BLOOD BY AUTOMATED COUNT: 18.6 % (ref 12.3–15.4)
GFR SERPL CREATININE-BSD FRML MDRD: 57 ML/MIN/1.73
GLOBULIN UR ELPH-MCNC: 1.9 GM/DL (ref 1.8–3.5)
GLUCOSE BLD-MCNC: 87 MG/DL (ref 74–124)
HCT VFR BLD AUTO: 40.3 % (ref 37.5–51)
HGB BLD-MCNC: 12.7 G/DL (ref 13–17.7)
IMM GRANULOCYTES # BLD AUTO: 0.14 10*3/MM3 (ref 0–0.05)
IMM GRANULOCYTES NFR BLD AUTO: 2.3 % (ref 0–0.5)
LYMPHOCYTES # BLD AUTO: 1.51 10*3/MM3 (ref 0.7–3.1)
LYMPHOCYTES NFR BLD AUTO: 24.5 % (ref 19.6–45.3)
MCH RBC QN AUTO: 30.5 PG (ref 26.6–33)
MCHC RBC AUTO-ENTMCNC: 31.5 G/DL (ref 31.5–35.7)
MCV RBC AUTO: 96.9 FL (ref 79–97)
MONOCYTES # BLD AUTO: 1.04 10*3/MM3 (ref 0.1–0.9)
MONOCYTES NFR BLD AUTO: 16.9 % (ref 5–12)
NEUTROPHILS # BLD AUTO: 3.13 10*3/MM3 (ref 1.7–7)
NEUTROPHILS NFR BLD AUTO: 50.8 % (ref 42.7–76)
NRBC BLD AUTO-RTO: 0 /100 WBC (ref 0–0.2)
PLATELET # BLD AUTO: 223 10*3/MM3 (ref 140–450)
PMV BLD AUTO: 10.1 FL (ref 6–12)
POTASSIUM BLD-SCNC: 4.4 MMOL/L (ref 3.5–4.7)
PROT SERPL-MCNC: 6.1 G/DL (ref 6.3–8)
RBC # BLD AUTO: 4.16 10*6/MM3 (ref 4.14–5.8)
SODIUM BLD-SCNC: 142 MMOL/L (ref 134–145)
WBC NRBC COR # BLD: 6.16 10*3/MM3 (ref 3.4–10.8)

## 2019-06-06 PROCEDURE — 96375 TX/PRO/DX INJ NEW DRUG ADDON: CPT | Performed by: NURSE PRACTITIONER

## 2019-06-06 PROCEDURE — 96415 CHEMO IV INFUSION ADDL HR: CPT | Performed by: NURSE PRACTITIONER

## 2019-06-06 PROCEDURE — 96413 CHEMO IV INFUSION 1 HR: CPT | Performed by: NURSE PRACTITIONER

## 2019-06-06 PROCEDURE — 25010000002 DEXAMETHASONE SODIUM PHOSPHATE 100 MG/10ML SOLUTION: Performed by: NURSE PRACTITIONER

## 2019-06-06 PROCEDURE — 25010000002 RITUXIMAB 10 MG/ML SOLUTION 50 ML VIAL: Performed by: NURSE PRACTITIONER

## 2019-06-06 PROCEDURE — 25010000002 PALONOSETRON PER 25 MCG: Performed by: NURSE PRACTITIONER

## 2019-06-06 PROCEDURE — 80053 COMPREHEN METABOLIC PANEL: CPT | Performed by: NURSE PRACTITIONER

## 2019-06-06 PROCEDURE — 96411 CHEMO IV PUSH ADDL DRUG: CPT | Performed by: NURSE PRACTITIONER

## 2019-06-06 PROCEDURE — 99215 OFFICE O/P EST HI 40 MIN: CPT | Performed by: NURSE PRACTITIONER

## 2019-06-06 PROCEDURE — 25010000002 DIPHENHYDRAMINE PER 50 MG: Performed by: NURSE PRACTITIONER

## 2019-06-06 PROCEDURE — 36415 COLL VENOUS BLD VENIPUNCTURE: CPT | Performed by: INTERNAL MEDICINE

## 2019-06-06 PROCEDURE — 25010000002 RITUXIMAB 10 MG/ML SOLUTION 10 ML VIAL: Performed by: NURSE PRACTITIONER

## 2019-06-06 PROCEDURE — 85025 COMPLETE CBC W/AUTO DIFF WBC: CPT | Performed by: INTERNAL MEDICINE

## 2019-06-06 PROCEDURE — 25010000002 BENDAMUSTINE HCL 100 MG/4ML SOLUTION 4 ML VIAL: Performed by: NURSE PRACTITIONER

## 2019-06-06 RX ORDER — SODIUM CHLORIDE 9 MG/ML
250 INJECTION, SOLUTION INTRAVENOUS ONCE
Status: CANCELLED | OUTPATIENT
Start: 2019-06-07

## 2019-06-06 RX ORDER — SODIUM CHLORIDE 9 MG/ML
250 INJECTION, SOLUTION INTRAVENOUS ONCE
Status: COMPLETED | OUTPATIENT
Start: 2019-06-06 | End: 2019-06-06

## 2019-06-06 RX ORDER — PALONOSETRON 0.05 MG/ML
0.25 INJECTION, SOLUTION INTRAVENOUS ONCE
Status: CANCELLED | OUTPATIENT
Start: 2019-06-06

## 2019-06-06 RX ORDER — PALONOSETRON 0.05 MG/ML
0.25 INJECTION, SOLUTION INTRAVENOUS ONCE
Status: COMPLETED | OUTPATIENT
Start: 2019-06-06 | End: 2019-06-06

## 2019-06-06 RX ORDER — DIPHENHYDRAMINE HYDROCHLORIDE 50 MG/ML
50 INJECTION INTRAMUSCULAR; INTRAVENOUS AS NEEDED
Status: CANCELLED | OUTPATIENT
Start: 2019-06-06

## 2019-06-06 RX ORDER — FAMOTIDINE 10 MG/ML
20 INJECTION, SOLUTION INTRAVENOUS AS NEEDED
Status: COMPLETED | OUTPATIENT
Start: 2019-06-06 | End: 2019-06-06

## 2019-06-06 RX ORDER — ACETAMINOPHEN 325 MG/1
650 TABLET ORAL ONCE
Status: COMPLETED | OUTPATIENT
Start: 2019-06-06 | End: 2019-06-06

## 2019-06-06 RX ORDER — ACETAMINOPHEN 325 MG/1
650 TABLET ORAL ONCE
Status: CANCELLED | OUTPATIENT
Start: 2019-06-06

## 2019-06-06 RX ORDER — SODIUM CHLORIDE 9 MG/ML
250 INJECTION, SOLUTION INTRAVENOUS ONCE
Status: CANCELLED | OUTPATIENT
Start: 2019-06-06

## 2019-06-06 RX ORDER — FAMOTIDINE 10 MG/ML
20 INJECTION, SOLUTION INTRAVENOUS AS NEEDED
Status: CANCELLED | OUTPATIENT
Start: 2019-06-06

## 2019-06-06 RX ORDER — ALLOPURINOL 300 MG/1
300 TABLET ORAL DAILY
Qty: 30 TABLET | Refills: 0 | Status: SHIPPED | OUTPATIENT
Start: 2019-06-06 | End: 2019-07-06

## 2019-06-06 RX ADMIN — DEXAMETHASONE SODIUM PHOSPHATE 12 MG: 10 INJECTION, SOLUTION INTRAMUSCULAR; INTRAVENOUS at 09:11

## 2019-06-06 RX ADMIN — FAMOTIDINE 20 MG: 10 INJECTION, SOLUTION INTRAVENOUS at 08:51

## 2019-06-06 RX ADMIN — DIPHENHYDRAMINE HYDROCHLORIDE 50 MG: 50 INJECTION, SOLUTION INTRAMUSCULAR; INTRAVENOUS at 08:54

## 2019-06-06 RX ADMIN — BENDAMUSTINE HYDROCHLORIDE 165 MG: 25 INJECTION, SOLUTION INTRAVENOUS at 10:02

## 2019-06-06 RX ADMIN — ACETAMINOPHEN 650 MG: 325 TABLET, FILM COATED ORAL at 08:51

## 2019-06-06 RX ADMIN — RITUXIMAB 700 MG: 10 INJECTION, SOLUTION INTRAVENOUS at 10:18

## 2019-06-06 RX ADMIN — SODIUM CHLORIDE 250 ML: 9 INJECTION, SOLUTION INTRAVENOUS at 08:51

## 2019-06-06 RX ADMIN — PALONOSETRON HYDROCHLORIDE 0.25 MG: 0.25 INJECTION, SOLUTION INTRAVENOUS at 08:53

## 2019-06-06 NOTE — PROGRESS NOTES
Subjective Patient continuing to generally improve    REASON FOR FOLLOW UP:  1.  New diagnosis chronic lymphocytic leukemia with extensive lymphadenopathy and possible pleural involvement.  2.  Initiation of Treanda/Rituxan 5/1/19, IVIG 5/3/19    HISTORY OF PRESENT ILLNESS:   The patient is a  63 y.o. Male who returns today for follow-up.  When I saw him last week, due for his second cycle of bendamustine/Rituxan, the had neutropenia with ANC of 0.2, WBC of 2.6.  We delayed him until today and sought insurance approval for Neulasta which we will be able to give to him with this treatment.    Thankfully today his counts have recovered with a WBC count of 6.1, ANC of 3.1.  He is feeling well today.    We discussed incorporation of Neulasta and potential for bony discomfort.  We discussed the use of Claritin as potential help for this pain.    We also discussed plan of care going forward, specifically plans to give him additional IVIG next week for hypogammaglobulinemia.  We also plan to repeat CT scans in a few weeks to assess his response to therapy.  Depending on response we may then moved to treatment with Imbruvica.  The patient is requesting a work letter to remain off work until he has completed the second cycle of therapy which I think is reasonable considering potential for neutropenia and potential for decline in performance status.    Otherwise he denies further concerns today.    Please note, there was previous concern for questionable rash.  Patient did see his dermatologist, Dr. Celena Funez, who was not concerned.  The patient does have chronic skin issues with an often Nilesh appearance which is baseline.    Past Medical History, Past Surgical History, Social History, Family History have been reviewed and are without significant changes except as mentioned.    Review of Systems   Constitutional: Negative for fatigue (better).   HENT: Positive for hearing loss (related to fluid, seeing ENT). Negative for  sinus pressure (improved now) and sinus pain.    Eyes: Negative.    Respiratory: Negative.  Negative for chest tightness, shortness of breath and wheezing.    Cardiovascular: Negative.    Gastrointestinal: Negative.  Negative for abdominal pain, constipation, diarrhea, nausea and vomiting.   Genitourinary: Negative.    Musculoskeletal: Negative.    Skin:        See HPI   Neurological: Negative for weakness.   Psychiatric/Behavioral: Negative.       ONCOLOGIC HISTORY:  63 y.o. male who was admitted on 4/28/2019 with worsening complaints of cough wheezing and shortness of breath.  He had been to an urgent care center and was started on antibiotics and received a shot of Solu-Medrol.  His symptoms did not improve and on his admission he was noted to have profound lymphocytosis with a total white count of 70,074% lymphocytes on his white cell differential.     He also underwent CT scan of the chest that showed infiltrates and nodules in the lungs as well as significant lymphadenopathy within the chest and in the axillary regions.  He had peripheral blood sent for flow cytometry leukemia lymphoma panel but this is still pending at time of this dictation.     He also underwent bronchoscopy on 4/29/2019. Negative for malignant cells. His respiratory viral panel was negative and markers of sepsis were unremarkable.    4/30/19 his peripheral blood flow cytometry and flow cytometry from the EBUS needle biopsy at bronchoscopy are both consistent with chronic lymphocytic leukemia/small lymphocytic lymphoma.    CT of abdomen and pelvis 4/30/19 demonstrated extensive splenomegaly and bulky lymphadenopathy throughout the abdomen and pelvis.  There is a tiny lesion at the superior aspect lateral hepatic segment and 2 hyperenhancing foci within the liver thought to be hemangiomas, moderate size right inguinal hernia containing a long segment of small bowel without obstruction or incarceration.  CT of soft tissue again reveals  "extensive cervical adenopathy as well as evidence of pansinusitis.    The patient's case was discussed with Dr. Church and the patient has stage III-IV disease should be treated during this hospitalization. Dr. Parekh met with the patient and discussed in detail the use of Treanda/ Rituxan which we initiated Treanda Rituxan beginning May 1, 2019. Patient see 5/2/19, already beginning to respond with reducing adenopathy and improved symptoms.  Day #2 given.  We also reviewed his findings including IgA 14, IgG of 263, IgM of 5 and a kappa/lambda ratio of 26.38.  He is hypogammaglobulinemic and replacement therapy with IVIG 400 mg/kg was given 5/3/19 in the hospital.     Patient saw Dr. Hamlin, ENT, and surgery may be necessary.  The patient is also still having sinus symptoms.  Further he has developed a more extensive rash involving his abdomen chest and back and previously seen in hospital.    Medications:  The current medication list was reviewed in the EMR    ALLERGIES:  No Known Allergies    Objective      Vitals:    06/06/19 0756   BP: 147/75   Pulse: 89   Resp: 16   Temp: 98.5 °F (36.9 °C)   TempSrc: Oral   SpO2: 97%   Weight: 69.4 kg (153 lb 1.6 oz)   Height: 180.3 cm (70.98\")   PainSc: 0-No pain     Current Status 6/6/2019   ECOG score 0       Physical Exam   Constitutional: He is oriented to person, place, and time. He appears well-developed and well-nourished. No distress.   HENT:   Head: Normocephalic and atraumatic.   Mouth/Throat: No oropharyngeal exudate.   Eyes: Conjunctivae and EOM are normal. Pupils are equal, round, and reactive to light.   Neck: Normal range of motion. Neck supple. No thyromegaly present.   Cardiovascular: Normal rate and regular rhythm.   No murmur heard.  Pulmonary/Chest: Effort normal and breath sounds normal. No respiratory distress.   Abdominal: Soft. Bowel sounds are normal. He exhibits no distension.   Musculoskeletal: Normal range of motion. He exhibits no edema or deformity. "   Neurological: He is alert and oriented to person, place, and time. No cranial nerve deficit.   Skin: Skin is warm and dry. No rash noted. He is not diaphoretic.   Skin has a generalized Nilesh appearance but no specified rash.  Patient also has multiple skin tags and actinic keratoses throughout the body.   Psychiatric: He has a normal mood and affect. His behavior is normal. Thought content normal.         RECENT LABS:  Results from last 7 days   Lab Units 06/06/19  0749   WBC 10*3/mm3 6.16   NEUTROS ABS 10*3/mm3 3.13   HEMOGLOBIN g/dL 12.7*   HEMATOCRIT % 40.3   PLATELETS 10*3/mm3 223             Flow cytometry report on the peripheral blood as well as the lymph node biopsied at bronchoscopy are consistent with chronic lymphocytic leukemia/small lymphocytic lymphoma.    FISH prognostic panel requested though not available?    Assessment/Plan   1.  CLL presenting with significant lymphocytosis, lymphadenopathy and splenomegaly.    · Initiated Treanda/Rituxan 5/1/19 while hospitalized  · Repeat FISH panel 5/16/19 positive for deletion 13q14.3  · Plan to reevaluate after 2 cycles for potential move to Imbruvica  · Cycle 2 Treanda/Rituxan delayed 1 week due to neutropenia.  · Today, 6/6/2019, WBC/ANC has recovered.  We were able to get Neulasta approved and this will be incorporated with cycle 2.  Digital side effects with Neulasta were discussed with the patient today.  · Otherwise patient will undergo repeat CT scans of the neck, chest, abdomen, and pelvis with contrast in roughly 3 weeks.  · Patient to see Dr. Parekh back in 4 weeks for review of scans and some response, possibly transition to Imbruvica at that point.    2.  Pulmonary nodules/infiltrates.  He is status post bronchoscopy. Cytology negative for malignant cells.  Is unclear at this time whether these findings are infectious or possibly related to his lymphoproliferative disorder.    3.  Iron deficiency  · Taking ferrous sulfate 325 mg p.o. Twice  daily.   · Patient will need colonoscopy at some point once he is stable.    4.  Hypogammaglobulinemia  · IVIG 400 mg/kg 5/3/19 in hospital  · Started on prophylaxis with acyclovir 400 mg p.o. Twice daily.  · Plan to receive additional IVIG after cycle 2 of chemotherapy, specifically to take place 6/10/2019 at a dose of 400 mg/kg.  · I have tentatively scheduled him to receive this again in 1 month following appointment with Dr. Parekh though this may not be necessary.  Will recheck Quant Iggs in 3 weeks.    5.  ENT assessment for pansinusitis. Seeing Dr. Hamlin  · Recent sinusitis has resolved on round of Ceftin ear.  Patient does continue to have fluid behind the ears and just saw Dr. Hamlin in follow-up last week.    6.  Worsening erythematous, macular rash which the patient states has been present for some time.   · Patient saw dermatologist, Dr. Celena Funez, who did not feel the patient had any concerning rash.  · Patient skin does have a generalized shannan appearance which he states is normal for him and not new since beginning Treanda or allopurinol.    7.  Chemotherapy induced neutropenia.  · ANC 0.27 4 weeks out from cycle 1 Treanda/Rituxan.  He did not require antibiotic therapy as he just completed a round of antibiotics for a sinus infection and we were therefore more conservative in giving additional antibiotics.  · We were able to get Neulasta approved as noted above and this will be implemented the on body injector to be placed on day 2 of therapy.    · We will plan to recheck a CBC when he returns for CT scans in 3 weeks.        PLAN:  1. Proceed with cycle 2 Treanda/Rituxan today.  2. Neulasta support via on body injector to be placed on day 2 Treanda.  Patient educated on use of Claritin daily x5 for bony pain if desired.  3. Patient to return for IVIG 4 mg/kg on 6/10/2019.  4. In 3 weeks patient undergo CT scan of the neck, chest, abdomen, and pelvis with contrast.  Same day patient to have labs  including CBC, CMP, and quantitative immunoglobulins.  5. In 4 weeks patient will return to see Dr. Parekh in follow-up with review of scans, discussion of FISH results, and determination for timing of switching to Imbruvica.    6. ENT assessment ongoing with potential surgery down the road. This would follow further IVIG infusion and completion of initial chemotherapy.        6/6/2019      CC:

## 2019-06-07 ENCOUNTER — INFUSION (OUTPATIENT)
Dept: ONCOLOGY | Facility: HOSPITAL | Age: 64
End: 2019-06-07

## 2019-06-07 VITALS
TEMPERATURE: 98.2 F | HEART RATE: 96 BPM | BODY MASS INDEX: 21.8 KG/M2 | SYSTOLIC BLOOD PRESSURE: 123 MMHG | DIASTOLIC BLOOD PRESSURE: 67 MMHG | WEIGHT: 156.2 LBS

## 2019-06-07 DIAGNOSIS — C91.10 CLL (CHRONIC LYMPHOCYTIC LEUKEMIA) (HCC): Primary | ICD-10-CM

## 2019-06-07 PROCEDURE — 96409 CHEMO IV PUSH SNGL DRUG: CPT | Performed by: NURSE PRACTITIONER

## 2019-06-07 PROCEDURE — 25010000002 DEXAMETHASONE SODIUM PHOSPHATE 10 MG/ML SOLUTION: Performed by: NURSE PRACTITIONER

## 2019-06-07 PROCEDURE — 25010000002 BENDAMUSTINE HCL 100 MG/4ML SOLUTION 4 ML VIAL: Performed by: NURSE PRACTITIONER

## 2019-06-07 PROCEDURE — 96377 APPLICATON ON-BODY INJECTOR: CPT | Performed by: NURSE PRACTITIONER

## 2019-06-07 PROCEDURE — 96375 TX/PRO/DX INJ NEW DRUG ADDON: CPT | Performed by: NURSE PRACTITIONER

## 2019-06-07 PROCEDURE — 25010000002 PEGFILGRASTIM 6 MG/0.6ML PREFILLED SYRINGE KIT: Performed by: NURSE PRACTITIONER

## 2019-06-07 RX ORDER — DEXAMETHASONE SODIUM PHOSPHATE 4 MG/ML
8 INJECTION, SOLUTION INTRA-ARTICULAR; INTRALESIONAL; INTRAMUSCULAR; INTRAVENOUS; SOFT TISSUE ONCE
Status: COMPLETED | OUTPATIENT
Start: 2019-06-07 | End: 2019-06-07

## 2019-06-07 RX ORDER — SODIUM CHLORIDE 9 MG/ML
250 INJECTION, SOLUTION INTRAVENOUS ONCE
Status: COMPLETED | OUTPATIENT
Start: 2019-06-07 | End: 2019-06-07

## 2019-06-07 RX ADMIN — DEXAMETHASONE SODIUM PHOSPHATE 8 MG: 10 INJECTION, SOLUTION INTRAMUSCULAR; INTRAVENOUS at 08:30

## 2019-06-07 RX ADMIN — BENDAMUSTINE HYDROCHLORIDE 165 MG: 25 INJECTION, SOLUTION INTRAVENOUS at 08:55

## 2019-06-07 RX ADMIN — SODIUM CHLORIDE 250 ML: 9 INJECTION, SOLUTION INTRAVENOUS at 08:15

## 2019-06-07 RX ADMIN — PEGFILGRASTIM 6 MG: KIT SUBCUTANEOUS at 08:56

## 2019-06-10 ENCOUNTER — INFUSION (OUTPATIENT)
Dept: ONCOLOGY | Facility: HOSPITAL | Age: 64
End: 2019-06-10

## 2019-06-10 VITALS
SYSTOLIC BLOOD PRESSURE: 139 MMHG | DIASTOLIC BLOOD PRESSURE: 69 MMHG | BODY MASS INDEX: 21.99 KG/M2 | HEART RATE: 98 BPM | WEIGHT: 157.6 LBS | TEMPERATURE: 98.6 F

## 2019-06-10 DIAGNOSIS — D80.1 HYPOGAMMAGLOBULINEMIA (HCC): Primary | ICD-10-CM

## 2019-06-10 LAB
BASOPHILS # BLD AUTO: 0.1 10*3/MM3 (ref 0–0.2)
BASOPHILS NFR BLD AUTO: 0.2 % (ref 0–1.5)
DEPRECATED RDW RBC AUTO: 65.5 FL (ref 37–54)
EOSINOPHIL # BLD AUTO: 0.65 10*3/MM3 (ref 0–0.4)
EOSINOPHIL NFR BLD AUTO: 1.1 % (ref 0.3–6.2)
ERYTHROCYTE [DISTWIDTH] IN BLOOD BY AUTOMATED COUNT: 18.3 % (ref 12.3–15.4)
HCT VFR BLD AUTO: 38.9 % (ref 37.5–51)
HGB BLD-MCNC: 12.4 G/DL (ref 13–17.7)
IMM GRANULOCYTES # BLD AUTO: 5.78 10*3/MM3 (ref 0–0.05)
IMM GRANULOCYTES NFR BLD AUTO: 10.2 % (ref 0–0.5)
LYMPHOCYTES # BLD AUTO: 0.6 10*3/MM3 (ref 0.7–3.1)
LYMPHOCYTES NFR BLD AUTO: 1.1 % (ref 19.6–45.3)
MCH RBC QN AUTO: 30.9 PG (ref 26.6–33)
MCHC RBC AUTO-ENTMCNC: 31.9 G/DL (ref 31.5–35.7)
MCV RBC AUTO: 97 FL (ref 79–97)
MONOCYTES # BLD AUTO: 2.19 10*3/MM3 (ref 0.1–0.9)
MONOCYTES NFR BLD AUTO: 3.9 % (ref 5–12)
MYCOBACTERIUM SPEC CULT: NORMAL
MYCOBACTERIUM SPEC CULT: NORMAL
NEUTROPHILS # BLD AUTO: 47.25 10*3/MM3 (ref 1.7–7)
NEUTROPHILS NFR BLD AUTO: 83.5 % (ref 42.7–76)
NIGHT BLUE STAIN TISS: NORMAL
NIGHT BLUE STAIN TISS: NORMAL
NRBC BLD AUTO-RTO: 0.1 /100 WBC (ref 0–0.2)
PLATELET # BLD AUTO: 185 10*3/MM3 (ref 140–450)
PMV BLD AUTO: 10.5 FL (ref 6–12)
RBC # BLD AUTO: 4.01 10*6/MM3 (ref 4.14–5.8)
WBC NRBC COR # BLD: 56.57 10*3/MM3 (ref 3.4–10.8)

## 2019-06-10 PROCEDURE — 63710000001 DIPHENHYDRAMINE PER 50 MG: Performed by: INTERNAL MEDICINE

## 2019-06-10 PROCEDURE — 85025 COMPLETE CBC W/AUTO DIFF WBC: CPT | Performed by: INTERNAL MEDICINE

## 2019-06-10 PROCEDURE — 96365 THER/PROPH/DIAG IV INF INIT: CPT | Performed by: INTERNAL MEDICINE

## 2019-06-10 PROCEDURE — 96366 THER/PROPH/DIAG IV INF ADDON: CPT | Performed by: INTERNAL MEDICINE

## 2019-06-10 PROCEDURE — 25010000002 IMMUNE GLOBULIN (HUMAN) 10 GM/100ML SOLUTION: Performed by: INTERNAL MEDICINE

## 2019-06-10 RX ORDER — MEPERIDINE HYDROCHLORIDE 50 MG/ML
25 INJECTION INTRAMUSCULAR; INTRAVENOUS; SUBCUTANEOUS
Status: CANCELLED | OUTPATIENT
Start: 2019-06-10 | End: 2019-06-11

## 2019-06-10 RX ORDER — SODIUM CHLORIDE 9 MG/ML
250 INJECTION, SOLUTION INTRAVENOUS ONCE
Status: COMPLETED | OUTPATIENT
Start: 2019-06-10 | End: 2019-06-10

## 2019-06-10 RX ORDER — DIPHENHYDRAMINE HCL 25 MG
25 CAPSULE ORAL ONCE
Status: COMPLETED | OUTPATIENT
Start: 2019-06-10 | End: 2019-06-10

## 2019-06-10 RX ORDER — DIPHENHYDRAMINE HYDROCHLORIDE 50 MG/ML
50 INJECTION INTRAMUSCULAR; INTRAVENOUS AS NEEDED
Status: CANCELLED | OUTPATIENT
Start: 2019-06-10

## 2019-06-10 RX ORDER — ACETAMINOPHEN 325 MG/1
650 TABLET ORAL ONCE
Status: COMPLETED | OUTPATIENT
Start: 2019-06-10 | End: 2019-06-10

## 2019-06-10 RX ORDER — FAMOTIDINE 10 MG/ML
20 INJECTION, SOLUTION INTRAVENOUS AS NEEDED
Status: CANCELLED | OUTPATIENT
Start: 2019-06-10

## 2019-06-10 RX ADMIN — IMMUNE GLOBULIN INFUSION (HUMAN) 30 G: 100 INJECTION, SOLUTION INTRAVENOUS; SUBCUTANEOUS at 13:48

## 2019-06-10 RX ADMIN — ACETAMINOPHEN 650 MG: 325 TABLET, FILM COATED ORAL at 13:32

## 2019-06-10 RX ADMIN — DIPHENHYDRAMINE HYDROCHLORIDE 25 MG: 25 CAPSULE ORAL at 13:32

## 2019-06-10 RX ADMIN — SODIUM CHLORIDE 250 ML: 9 INJECTION, SOLUTION INTRAVENOUS at 13:48

## 2019-06-27 ENCOUNTER — HOSPITAL ENCOUNTER (OUTPATIENT)
Dept: PET IMAGING | Facility: HOSPITAL | Age: 64
Discharge: HOME | End: 2019-06-27
Admitting: NURSE PRACTITIONER

## 2019-06-27 ENCOUNTER — LAB (OUTPATIENT)
Dept: LAB | Facility: HOSPITAL | Age: 64
End: 2019-06-27

## 2019-06-27 DIAGNOSIS — R59.1 LYMPHADENOPATHY: ICD-10-CM

## 2019-06-27 DIAGNOSIS — R16.1 SPLENOMEGALY: ICD-10-CM

## 2019-06-27 DIAGNOSIS — C91.10 CLL (CHRONIC LYMPHOCYTIC LEUKEMIA) (HCC): ICD-10-CM

## 2019-06-27 LAB
ALBUMIN SERPL-MCNC: 4.4 G/DL (ref 3.5–5.2)
ALBUMIN/GLOB SERPL: 1.8 G/DL (ref 1.1–2.4)
ALP SERPL-CCNC: 89 U/L (ref 38–116)
ALT SERPL W P-5'-P-CCNC: 22 U/L (ref 0–41)
ANION GAP SERPL CALCULATED.3IONS-SCNC: 13.8 MMOL/L (ref 5–15)
AST SERPL-CCNC: 19 U/L (ref 0–40)
BASOPHILS # BLD AUTO: 0.1 10*3/MM3 (ref 0–0.2)
BASOPHILS NFR BLD AUTO: 1.8 % (ref 0–1.5)
BILIRUB SERPL-MCNC: 0.3 MG/DL (ref 0.2–1.2)
BUN BLD-MCNC: 22 MG/DL (ref 6–20)
BUN/CREAT SERPL: 18 (ref 7.3–30)
CALCIUM SPEC-SCNC: 9.4 MG/DL (ref 8.5–10.2)
CHLORIDE SERPL-SCNC: 99 MMOL/L (ref 98–107)
CO2 SERPL-SCNC: 27.2 MMOL/L (ref 22–29)
CREAT BLD-MCNC: 1.22 MG/DL (ref 0.7–1.3)
CREAT BLDA-MCNC: 1.3 MG/DL (ref 0.6–1.3)
DEPRECATED RDW RBC AUTO: 62.4 FL (ref 37–54)
EOSINOPHIL # BLD AUTO: 0.55 10*3/MM3 (ref 0–0.4)
EOSINOPHIL NFR BLD AUTO: 9.8 % (ref 0.3–6.2)
ERYTHROCYTE [DISTWIDTH] IN BLOOD BY AUTOMATED COUNT: 17.2 % (ref 12.3–15.4)
GFR SERPL CREATININE-BSD FRML MDRD: 60 ML/MIN/1.73
GLOBULIN UR ELPH-MCNC: 2.4 GM/DL (ref 1.8–3.5)
GLUCOSE BLD-MCNC: 65 MG/DL (ref 74–124)
HCT VFR BLD AUTO: 44.9 % (ref 37.5–51)
HGB BLD-MCNC: 13.7 G/DL (ref 13–17.7)
IMM GRANULOCYTES # BLD AUTO: 0.01 10*3/MM3 (ref 0–0.05)
IMM GRANULOCYTES NFR BLD AUTO: 0.2 % (ref 0–0.5)
LYMPHOCYTES # BLD AUTO: 0.36 10*3/MM3 (ref 0.7–3.1)
LYMPHOCYTES NFR BLD AUTO: 6.4 % (ref 19.6–45.3)
MCH RBC QN AUTO: 29.8 PG (ref 26.6–33)
MCHC RBC AUTO-ENTMCNC: 30.5 G/DL (ref 31.5–35.7)
MCV RBC AUTO: 97.8 FL (ref 79–97)
MONOCYTES # BLD AUTO: 0.7 10*3/MM3 (ref 0.1–0.9)
MONOCYTES NFR BLD AUTO: 12.4 % (ref 5–12)
NEUTROPHILS # BLD AUTO: 3.92 10*3/MM3 (ref 1.7–7)
NEUTROPHILS NFR BLD AUTO: 69.4 % (ref 42.7–76)
NRBC BLD AUTO-RTO: 0 /100 WBC (ref 0–0.2)
PLATELET # BLD AUTO: 246 10*3/MM3 (ref 140–450)
PMV BLD AUTO: 10.4 FL (ref 6–12)
POTASSIUM BLD-SCNC: 4.4 MMOL/L (ref 3.5–4.7)
PROT SERPL-MCNC: 6.8 G/DL (ref 6.3–8)
RBC # BLD AUTO: 4.59 10*6/MM3 (ref 4.14–5.8)
SODIUM BLD-SCNC: 140 MMOL/L (ref 134–145)
WBC NRBC COR # BLD: 5.64 10*3/MM3 (ref 3.4–10.8)

## 2019-06-27 PROCEDURE — 82565 ASSAY OF CREATININE: CPT

## 2019-06-27 PROCEDURE — 36415 COLL VENOUS BLD VENIPUNCTURE: CPT | Performed by: NURSE PRACTITIONER

## 2019-06-27 PROCEDURE — 0 DIATRIZOATE MEGLUMINE & SODIUM PER 1 ML: Performed by: NURSE PRACTITIONER

## 2019-06-27 PROCEDURE — 70491 CT SOFT TISSUE NECK W/DYE: CPT

## 2019-06-27 PROCEDURE — 74177 CT ABD & PELVIS W/CONTRAST: CPT

## 2019-06-27 PROCEDURE — 85025 COMPLETE CBC W/AUTO DIFF WBC: CPT | Performed by: NURSE PRACTITIONER

## 2019-06-27 PROCEDURE — 71260 CT THORAX DX C+: CPT

## 2019-06-27 PROCEDURE — 25010000002 IOPAMIDOL 61 % SOLUTION: Performed by: NURSE PRACTITIONER

## 2019-06-27 PROCEDURE — 80053 COMPREHEN METABOLIC PANEL: CPT | Performed by: NURSE PRACTITIONER

## 2019-06-27 RX ADMIN — DIATRIZOATE MEGLUMINE AND DIATRIZOATE SODIUM 30 ML: 660; 100 LIQUID ORAL; RECTAL at 07:50

## 2019-06-27 RX ADMIN — IOPAMIDOL 85 ML: 612 INJECTION, SOLUTION INTRAVENOUS at 08:40

## 2019-07-01 LAB
IGA SERPL-MCNC: 20 MG/DL (ref 61–437)
IGG SERPL-MCNC: 667 MG/DL (ref 700–1600)
IGM SERPL-MCNC: 9 MG/DL (ref 20–172)
TOTAL IGE SMQN RAST: 3 IU/ML (ref 6–495)

## 2019-07-03 ENCOUNTER — OFFICE VISIT (OUTPATIENT)
Dept: ONCOLOGY | Facility: CLINIC | Age: 64
End: 2019-07-03

## 2019-07-03 ENCOUNTER — LAB (OUTPATIENT)
Dept: LAB | Facility: HOSPITAL | Age: 64
End: 2019-07-03

## 2019-07-03 ENCOUNTER — DOCUMENTATION (OUTPATIENT)
Dept: ONCOLOGY | Facility: CLINIC | Age: 64
End: 2019-07-03

## 2019-07-03 VITALS
HEART RATE: 100 BPM | TEMPERATURE: 98.7 F | RESPIRATION RATE: 16 BRPM | WEIGHT: 161.9 LBS | DIASTOLIC BLOOD PRESSURE: 73 MMHG | BODY MASS INDEX: 22.67 KG/M2 | OXYGEN SATURATION: 95 % | HEIGHT: 71 IN | SYSTOLIC BLOOD PRESSURE: 116 MMHG

## 2019-07-03 DIAGNOSIS — D80.1 HYPOGAMMAGLOBULINEMIA (HCC): ICD-10-CM

## 2019-07-03 DIAGNOSIS — C91.10 CLL (CHRONIC LYMPHOCYTIC LEUKEMIA) (HCC): ICD-10-CM

## 2019-07-03 DIAGNOSIS — C91.10 CLL (CHRONIC LYMPHOCYTIC LEUKEMIA) (HCC): Primary | ICD-10-CM

## 2019-07-03 DIAGNOSIS — D50.9 IRON DEFICIENCY ANEMIA, UNSPECIFIED IRON DEFICIENCY ANEMIA TYPE: ICD-10-CM

## 2019-07-03 LAB
BASOPHILS # BLD AUTO: 0.09 10*3/MM3 (ref 0–0.2)
BASOPHILS NFR BLD AUTO: 1.5 % (ref 0–1.5)
DEPRECATED RDW RBC AUTO: 58.4 FL (ref 37–54)
EOSINOPHIL # BLD AUTO: 0.95 10*3/MM3 (ref 0–0.4)
EOSINOPHIL NFR BLD AUTO: 15.5 % (ref 0.3–6.2)
ERYTHROCYTE [DISTWIDTH] IN BLOOD BY AUTOMATED COUNT: 16.8 % (ref 12.3–15.4)
HCT VFR BLD AUTO: 43.5 % (ref 37.5–51)
HGB BLD-MCNC: 13.8 G/DL (ref 13–17.7)
IMM GRANULOCYTES # BLD AUTO: 0.03 10*3/MM3 (ref 0–0.05)
IMM GRANULOCYTES NFR BLD AUTO: 0.5 % (ref 0–0.5)
LYMPHOCYTES # BLD AUTO: 0.52 10*3/MM3 (ref 0.7–3.1)
LYMPHOCYTES NFR BLD AUTO: 8.5 % (ref 19.6–45.3)
MCH RBC QN AUTO: 30 PG (ref 26.6–33)
MCHC RBC AUTO-ENTMCNC: 31.7 G/DL (ref 31.5–35.7)
MCV RBC AUTO: 94.6 FL (ref 79–97)
MONOCYTES # BLD AUTO: 0.65 10*3/MM3 (ref 0.1–0.9)
MONOCYTES NFR BLD AUTO: 10.6 % (ref 5–12)
NEUTROPHILS # BLD AUTO: 3.89 10*3/MM3 (ref 1.7–7)
NEUTROPHILS NFR BLD AUTO: 63.4 % (ref 42.7–76)
NRBC BLD AUTO-RTO: 0 /100 WBC (ref 0–0.2)
PLATELET # BLD AUTO: 144 10*3/MM3 (ref 140–450)
PMV BLD AUTO: 10.3 FL (ref 6–12)
RBC # BLD AUTO: 4.6 10*6/MM3 (ref 4.14–5.8)
WBC NRBC COR # BLD: 6.13 10*3/MM3 (ref 3.4–10.8)

## 2019-07-03 PROCEDURE — 36415 COLL VENOUS BLD VENIPUNCTURE: CPT | Performed by: NURSE PRACTITIONER

## 2019-07-03 PROCEDURE — 85025 COMPLETE CBC W/AUTO DIFF WBC: CPT | Performed by: NURSE PRACTITIONER

## 2019-07-03 PROCEDURE — 99215 OFFICE O/P EST HI 40 MIN: CPT | Performed by: INTERNAL MEDICINE

## 2019-07-03 RX ORDER — HYDROXYZINE HYDROCHLORIDE 25 MG/1
25 TABLET, FILM COATED ORAL 3 TIMES DAILY PRN
Qty: 30 TABLET | Refills: 1 | Status: SHIPPED | OUTPATIENT
Start: 2019-07-03 | End: 2019-08-28

## 2019-07-03 NOTE — PROGRESS NOTES
Dr Parekh came to me stating he would like to start pt on Imbruvica 420 mg daily.  While he was speaking with the pt, I submitted the PA to [employer] and the request has been approved from 6/3/19-7/2/22. I have arranged for the copay card for pt so his copay will be $10.    I went and spoke with pt and let him know the above and the process for obtaining the Imbruvica. He states that he will be going to Medicare with a Part D Plan possibly in August. I explained how copay assistance works with Part D and asked him to notify me when he gets his new insurance.    I have sent the rx to [facility] for processing.     [facility] cannot process the Imbruvica Rx. It rejects to filling through [facility] which they cannot obtain. Rx sent to [facility]

## 2019-07-08 NOTE — NURSING NOTE
"Nursing Chemotherapy Verification    Chemotherapy Regimen:   Treatment Plans     Name Type Plan dates Plan Provider         Active    OP LYMPHOMA Bendamustine 90 mg/m2 / RiTUXimab 375 mg/m2 ONCOLOGY TREATMENT  4/30/2019 - Present Jostin Parekh MD                     Current height and weight: 180.3 cm (71\") 67.7 kg (149 lb 3.2 oz)  Calculated BSA from current height and weight (Andrew):1.86    Relevant Labs  Results from last 7 days   Lab Units 05/02/19  1041 05/02/19  0519 05/01/19  0552   WBC 10*3/mm3 47.97* 47.00* 74.21*   HEMOGLOBIN g/dL 9.3* 9.8* 9.1*   HEMATOCRIT % 31.5* 32.7* 30.9*   PLATELETS 10*3/mm3 150 155 153     Lab Results   Component Value Date    NEUTROABS 15.83 (H) 05/02/2019       Results from last 7 days   Lab Units 05/02/19  0519 04/30/19  0314 04/28/19  1229   CREATININE mg/dL 1.07 0.91 1.70*       Serum creatinine: 1.07 mg/dL 05/02/19 0519  Estimated creatinine clearance: 66.8 mL/min    Lab Results   Component Value Date    HEPBCAB Negative 05/01/2019       Verification attestation:  I have personally reviewed the planned regimen and its administration and dosing. I understand the potential side effects.The patient has been instructed on the regimen, potential side effects, and self care measures; the consent form has been completed. I have confirmed that the appropriate premedication, prehydration, post medication and/or emergency medications are ordered in addition to the chemotherapy.    I have independently verified that the height and weight is current and calculated the BSA. I have verified the doses with the planned regimen and have clarified any deviations with the physician (Dr. Parekh). I have confirmed the route of administration and patient IV access.    Nurse: Anisa Zeng RN  2nd verification Nurse: Lisa Rojas RN  " hair removal not indicated

## 2019-07-10 ENCOUNTER — APPOINTMENT (OUTPATIENT)
Dept: LAB | Facility: HOSPITAL | Age: 64
End: 2019-07-10

## 2019-07-10 ENCOUNTER — APPOINTMENT (OUTPATIENT)
Dept: ONCOLOGY | Facility: CLINIC | Age: 64
End: 2019-07-10

## 2019-07-11 ENCOUNTER — APPOINTMENT (OUTPATIENT)
Dept: LAB | Facility: HOSPITAL | Age: 64
End: 2019-07-11

## 2019-07-11 ENCOUNTER — OFFICE VISIT (OUTPATIENT)
Dept: ONCOLOGY | Facility: CLINIC | Age: 64
End: 2019-07-11

## 2019-07-11 VITALS — WEIGHT: 163.3 LBS | BODY MASS INDEX: 22.79 KG/M2

## 2019-07-11 DIAGNOSIS — C91.10 CLL (CHRONIC LYMPHOCYTIC LEUKEMIA) (HCC): Primary | ICD-10-CM

## 2019-07-11 PROCEDURE — 99215 OFFICE O/P EST HI 40 MIN: CPT | Performed by: NURSE PRACTITIONER

## 2019-07-11 PROCEDURE — G0463 HOSPITAL OUTPT CLINIC VISIT: HCPCS | Performed by: NURSE PRACTITIONER

## 2019-07-11 NOTE — PROGRESS NOTES
Subjective     No primary care provider on file.    PATIENT NAME:  Dav Freitas  YOB: 1955  PATIENTS AGE:  63 y.o.  PATIENTS SEX:  male  DATE OF SERVICE:  07/11/2019  PROVIDER:  RORY De Luna      __________ORAL CHEMOTHERAPY PATIENT EDUCATION__________    PATIENT EDUCATION:  Today I met with the patient to discuss ORAL chemotherapy/biotherapy recommended for treatment of his CLL. Also discussed were medication administration, adherence, and proper handling/disposal.  The patient received the Oral Chemotherapy/Biotherapy Plan Summary including diagnosis and specific treatment plan.    SIDE EFFECTS:  Common side effects were discussed with the patient. Discussion included hair loss/discoloration, anemia/fatigue, infection/chills/fever, appetite, bleeding risk/precautions, constipation, diarrhea, mouth sores, taste alteration, loss of appetite,nausea/vomiting, peripheral neuropathy, skin/nail changes, rash, muscle aches/weakness, photosensitivity, weight gain/loss, hearing loss, dizziness, menopausal symptoms, menstrrual irregularity, reproductive risk, high blood pressure, heart damage, liver damage, lung damage, kidney damage, DVT/PE risk, fluid retention, pleural/pericardial effusion, somnolence, electrolyte/LFT imbalance.    Discussion also included side effects specific to drugs in the treatment plan, Imbruvica specifically.      A total of 45 minutes were spent with the patient, with 100% of time spent in education and counseling.      RORY De Luna

## 2019-07-24 ENCOUNTER — DOCUMENTATION (OUTPATIENT)
Dept: ONCOLOGY | Facility: CLINIC | Age: 64
End: 2019-07-24

## 2019-07-31 ENCOUNTER — OFFICE VISIT (OUTPATIENT)
Dept: ONCOLOGY | Facility: CLINIC | Age: 64
End: 2019-07-31

## 2019-07-31 ENCOUNTER — LAB (OUTPATIENT)
Dept: LAB | Facility: HOSPITAL | Age: 64
End: 2019-07-31

## 2019-07-31 VITALS
HEART RATE: 83 BPM | RESPIRATION RATE: 16 BRPM | TEMPERATURE: 98.1 F | HEIGHT: 71 IN | DIASTOLIC BLOOD PRESSURE: 79 MMHG | BODY MASS INDEX: 23.63 KG/M2 | WEIGHT: 168.8 LBS | OXYGEN SATURATION: 97 % | SYSTOLIC BLOOD PRESSURE: 130 MMHG

## 2019-07-31 DIAGNOSIS — C91.10 CLL (CHRONIC LYMPHOCYTIC LEUKEMIA) (HCC): Primary | ICD-10-CM

## 2019-07-31 DIAGNOSIS — D80.1 HYPOGAMMAGLOBULINEMIA (HCC): ICD-10-CM

## 2019-07-31 DIAGNOSIS — D50.9 IRON DEFICIENCY ANEMIA, UNSPECIFIED IRON DEFICIENCY ANEMIA TYPE: ICD-10-CM

## 2019-07-31 LAB
ALBUMIN SERPL-MCNC: 4.4 G/DL (ref 3.5–5.2)
ALBUMIN/GLOB SERPL: 2 G/DL (ref 1.1–2.4)
ALP SERPL-CCNC: 85 U/L (ref 38–116)
ALT SERPL W P-5'-P-CCNC: 9 U/L (ref 0–41)
ANION GAP SERPL CALCULATED.3IONS-SCNC: 13.3 MMOL/L (ref 5–15)
AST SERPL-CCNC: 11 U/L (ref 0–40)
BASOPHILS # BLD AUTO: 0.03 10*3/MM3 (ref 0–0.2)
BASOPHILS NFR BLD AUTO: 0.6 % (ref 0–1.5)
BILIRUB SERPL-MCNC: 0.3 MG/DL (ref 0.2–1.2)
BUN BLD-MCNC: 17 MG/DL (ref 6–20)
BUN/CREAT SERPL: 14.3 (ref 7.3–30)
CALCIUM SPEC-SCNC: 9.6 MG/DL (ref 8.5–10.2)
CHLORIDE SERPL-SCNC: 100 MMOL/L (ref 98–107)
CO2 SERPL-SCNC: 26.7 MMOL/L (ref 22–29)
CREAT BLD-MCNC: 1.19 MG/DL (ref 0.7–1.3)
DEPRECATED RDW RBC AUTO: 55.4 FL (ref 37–54)
EOSINOPHIL # BLD AUTO: 0.05 10*3/MM3 (ref 0–0.4)
EOSINOPHIL NFR BLD AUTO: 1 % (ref 0.3–6.2)
ERYTHROCYTE [DISTWIDTH] IN BLOOD BY AUTOMATED COUNT: 15.3 % (ref 12.3–15.4)
GFR SERPL CREATININE-BSD FRML MDRD: 62 ML/MIN/1.73
GLOBULIN UR ELPH-MCNC: 2.2 GM/DL (ref 1.8–3.5)
GLUCOSE BLD-MCNC: 144 MG/DL (ref 74–124)
HCT VFR BLD AUTO: 47.1 % (ref 37.5–51)
HGB BLD-MCNC: 14.9 G/DL (ref 13–17.7)
IMM GRANULOCYTES # BLD AUTO: 0.02 10*3/MM3 (ref 0–0.05)
IMM GRANULOCYTES NFR BLD AUTO: 0.4 % (ref 0–0.5)
LYMPHOCYTES # BLD AUTO: 0.58 10*3/MM3 (ref 0.7–3.1)
LYMPHOCYTES NFR BLD AUTO: 11.7 % (ref 19.6–45.3)
MCH RBC QN AUTO: 31.1 PG (ref 26.6–33)
MCHC RBC AUTO-ENTMCNC: 31.6 G/DL (ref 31.5–35.7)
MCV RBC AUTO: 98.3 FL (ref 79–97)
MONOCYTES # BLD AUTO: 0.48 10*3/MM3 (ref 0.1–0.9)
MONOCYTES NFR BLD AUTO: 9.7 % (ref 5–12)
NEUTROPHILS # BLD AUTO: 3.81 10*3/MM3 (ref 1.7–7)
NEUTROPHILS NFR BLD AUTO: 76.6 % (ref 42.7–76)
NRBC BLD AUTO-RTO: 0 /100 WBC (ref 0–0.2)
PLATELET # BLD AUTO: 167 10*3/MM3 (ref 140–450)
PMV BLD AUTO: 9.7 FL (ref 6–12)
POTASSIUM BLD-SCNC: 4.5 MMOL/L (ref 3.5–4.7)
PROT SERPL-MCNC: 6.6 G/DL (ref 6.3–8)
RBC # BLD AUTO: 4.79 10*6/MM3 (ref 4.14–5.8)
SODIUM BLD-SCNC: 140 MMOL/L (ref 134–145)
WBC NRBC COR # BLD: 4.97 10*3/MM3 (ref 3.4–10.8)

## 2019-07-31 PROCEDURE — 99213 OFFICE O/P EST LOW 20 MIN: CPT | Performed by: NURSE PRACTITIONER

## 2019-07-31 PROCEDURE — 36415 COLL VENOUS BLD VENIPUNCTURE: CPT | Performed by: INTERNAL MEDICINE

## 2019-07-31 PROCEDURE — 80053 COMPREHEN METABOLIC PANEL: CPT | Performed by: INTERNAL MEDICINE

## 2019-07-31 PROCEDURE — 85025 COMPLETE CBC W/AUTO DIFF WBC: CPT | Performed by: INTERNAL MEDICINE

## 2019-07-31 RX ORDER — FERROUS SULFATE 325(65) MG
TABLET ORAL
COMMUNITY
Start: 2019-07-09 | End: 2019-08-28

## 2019-07-31 RX ORDER — ALLOPURINOL 300 MG/1
TABLET ORAL
COMMUNITY
Start: 2019-07-09 | End: 2019-08-28

## 2019-08-01 LAB
ALBUMIN SERPL-MCNC: 3.7 G/DL (ref 2.9–4.4)
ALBUMIN/GLOB SERPL: 1.5 {RATIO} (ref 0.7–1.7)
ALPHA1 GLOB FLD ELPH-MCNC: 0.3 G/DL (ref 0–0.4)
ALPHA2 GLOB SERPL ELPH-MCNC: 0.7 G/DL (ref 0.4–1)
B-GLOBULIN SERPL ELPH-MCNC: 0.9 G/DL (ref 0.7–1.3)
GAMMA GLOB SERPL ELPH-MCNC: 0.5 G/DL (ref 0.4–1.8)
GLOBULIN SER CALC-MCNC: 2.5 G/DL (ref 2.2–3.9)
IGA SERPL-MCNC: 29 MG/DL (ref 61–437)
IGG SERPL-MCNC: 520 MG/DL (ref 700–1600)
IGM SERPL-MCNC: 10 MG/DL (ref 20–172)
INTERPRETATION SERPL IEP-IMP: ABNORMAL
KAPPA LC SERPL-MCNC: 16.5 MG/L (ref 3.3–19.4)
KAPPA LC/LAMBDA SER: 1.65 {RATIO} (ref 0.26–1.65)
LAMBDA LC FREE SERPL-MCNC: 10 MG/L (ref 5.7–26.3)
Lab: ABNORMAL
M-SPIKE: ABNORMAL G/DL
PROT SERPL-MCNC: 6.2 G/DL (ref 6–8.5)

## 2019-08-14 ENCOUNTER — TELEPHONE (OUTPATIENT)
Dept: ONCOLOGY | Facility: HOSPITAL | Age: 64
End: 2019-08-14

## 2019-08-14 ENCOUNTER — CLINICAL SUPPORT (OUTPATIENT)
Dept: ONCOLOGY | Facility: HOSPITAL | Age: 64
End: 2019-08-14

## 2019-08-14 ENCOUNTER — LAB (OUTPATIENT)
Dept: LAB | Facility: HOSPITAL | Age: 64
End: 2019-08-14

## 2019-08-14 DIAGNOSIS — D80.1 HYPOGAMMAGLOBULINEMIA (HCC): Primary | ICD-10-CM

## 2019-08-14 LAB
ALBUMIN SERPL-MCNC: 4.3 G/DL (ref 3.5–5.2)
ALBUMIN/GLOB SERPL: 2.2 G/DL (ref 1.1–2.4)
ALP SERPL-CCNC: 69 U/L (ref 38–116)
ALT SERPL W P-5'-P-CCNC: 9 U/L (ref 0–41)
ANION GAP SERPL CALCULATED.3IONS-SCNC: 12.9 MMOL/L (ref 5–15)
AST SERPL-CCNC: 11 U/L (ref 0–40)
BASOPHILS # BLD AUTO: 0.06 10*3/MM3 (ref 0–0.2)
BASOPHILS NFR BLD AUTO: 0.9 % (ref 0–1.5)
BILIRUB SERPL-MCNC: 0.3 MG/DL (ref 0.2–1.2)
BUN BLD-MCNC: 18 MG/DL (ref 6–20)
BUN/CREAT SERPL: 15.7 (ref 7.3–30)
CALCIUM SPEC-SCNC: 9.3 MG/DL (ref 8.5–10.2)
CHLORIDE SERPL-SCNC: 103 MMOL/L (ref 98–107)
CO2 SERPL-SCNC: 25.1 MMOL/L (ref 22–29)
CREAT BLD-MCNC: 1.15 MG/DL (ref 0.7–1.3)
DEPRECATED RDW RBC AUTO: 53 FL (ref 37–54)
EOSINOPHIL # BLD AUTO: 0.06 10*3/MM3 (ref 0–0.4)
EOSINOPHIL NFR BLD AUTO: 0.9 % (ref 0.3–6.2)
ERYTHROCYTE [DISTWIDTH] IN BLOOD BY AUTOMATED COUNT: 14.9 % (ref 12.3–15.4)
GFR SERPL CREATININE-BSD FRML MDRD: 64 ML/MIN/1.73
GLOBULIN UR ELPH-MCNC: 2 GM/DL (ref 1.8–3.5)
GLUCOSE BLD-MCNC: 142 MG/DL (ref 74–124)
HCT VFR BLD AUTO: 47.2 % (ref 37.5–51)
HGB BLD-MCNC: 15.3 G/DL (ref 13–17.7)
IMM GRANULOCYTES # BLD AUTO: 0.02 10*3/MM3 (ref 0–0.05)
IMM GRANULOCYTES NFR BLD AUTO: 0.3 % (ref 0–0.5)
LYMPHOCYTES # BLD AUTO: 0.7 10*3/MM3 (ref 0.7–3.1)
LYMPHOCYTES NFR BLD AUTO: 10.7 % (ref 19.6–45.3)
MCH RBC QN AUTO: 31 PG (ref 26.6–33)
MCHC RBC AUTO-ENTMCNC: 32.4 G/DL (ref 31.5–35.7)
MCV RBC AUTO: 95.5 FL (ref 79–97)
MONOCYTES # BLD AUTO: 0.68 10*3/MM3 (ref 0.1–0.9)
MONOCYTES NFR BLD AUTO: 10.4 % (ref 5–12)
NEUTROPHILS # BLD AUTO: 5.02 10*3/MM3 (ref 1.7–7)
NEUTROPHILS NFR BLD AUTO: 76.8 % (ref 42.7–76)
NRBC BLD AUTO-RTO: 0 /100 WBC (ref 0–0.2)
PLATELET # BLD AUTO: 142 10*3/MM3 (ref 140–450)
PMV BLD AUTO: 11.4 FL (ref 6–12)
POTASSIUM BLD-SCNC: 4.5 MMOL/L (ref 3.5–4.7)
PROT SERPL-MCNC: 6.3 G/DL (ref 6.3–8)
RBC # BLD AUTO: 4.94 10*6/MM3 (ref 4.14–5.8)
SODIUM BLD-SCNC: 141 MMOL/L (ref 134–145)
WBC NRBC COR # BLD: 6.54 10*3/MM3 (ref 3.4–10.8)

## 2019-08-14 PROCEDURE — 80053 COMPREHEN METABOLIC PANEL: CPT

## 2019-08-14 PROCEDURE — 85025 COMPLETE CBC W/AUTO DIFF WBC: CPT

## 2019-08-14 PROCEDURE — 36415 COLL VENOUS BLD VENIPUNCTURE: CPT

## 2019-08-14 NOTE — PROGRESS NOTES
Patient here for CBC/CMP and RN review. Patient stated he has been feeling well and is getting ready to leave for a trip to [place] for a few weeks. CBC stable for patient at this time. Told him I would call him if CMP results abnormal.  Patient asking if he needed to take allopurinol and ferrous sulfate still-message sent to Dr. Parekh and I told patient I would let him know. Also asking about whether Imbruvica was covered by Medicare-message sent to Janelle Irvin to contact patient. Patient stated he has been having difficulty sleeping and has been taking melatonin for this. Patient did not want any new medications prescribed for sleep at this time due to his upcoming trip. Recommended OTC Benadryl but he stated his ENT said he should not take antihistamines. Patient said he would mention it to Dr. Parekh at his next appt. If he is still having trouble sleeping. Copy of labs given to patient.   Lab Results   Component Value Date    WBC 6.54 08/14/2019    HGB 15.3 08/14/2019    HCT 47.2 08/14/2019    MCV 95.5 08/14/2019     08/14/2019

## 2019-08-14 NOTE — TELEPHONE ENCOUNTER
Called and left message for patient to dc his allopurinol and hi ferrous sulfate.     ----- Message from Jostin Parekh MD sent at 8/14/2019  3:21 PM EDT -----  No, he can discontinue both. MELLO España  ----- Message -----  From: Ariella Mercado RN  Sent: 8/14/2019   8:33 AM  To: Jostin Parekh MD    Hi Dr. Parekh,    This patient was here for RN Review today and said he did not have any refills left on his allopurinol-does he need to continue this?     Does he also need to continue his ferrous sulfate?     He is leaving on a trip for [place] tomorrow and was wanting to know if he needed to refill before.     Thanks!  Ariella

## 2019-08-23 NOTE — PROGRESS NOTES
Subjective Patient tolerating Imbruvica without complication  REASON FOR FOLLOW UP:  1.  New diagnosis chronic lymphocytic leukemia with extensive lymphadenopathy and possible pleural involvement.  2.  Initiation of Treanda, Rituxan May 1, IVIG May 3  3.  Significant response on scans June 27, 2019, alternative therapy with Imbruvica planned, initiated July 25, 2019    HISTORY OF PRESENT ILLNESS:   The patient is a  63 y.o. Male  who was admitted on 4/28/2019 with worsening complaints of cough wheezing and shortness of breath.  He had been to an urgent care center and was started on antibiotics and received a shot of Solu-Medrol.  His symptoms did not improve and on his admission he was noted to have profound lymphocytosis with a total white count of 70,074% lymphocytes on his white cell differential.     He also underwent CT scan of the chest that showed infiltrates and nodules in the lungs as well as significant lymphadenopathy within the chest and in the axillary regions.  He had peripheral blood sent for flow cytometry leukemia lymphoma panel but this is still pending at time of this dictation.     He also underwent bronchoscopy on 4/29/2019.  Results from this procedure are pendingl.  His respiratory viral panel was negative and markers of sepsis were unremarkable.    4/30/19  His peripheral blood flow cytometry and flow cytometry from the EBUS needle biopsy at bronchoscopy are both consistent with chronic lymphocytic leukemia/small lymphocytic lymphoma.    May 1, 2019  The patient underwent a CT of abdomen and pelvis April 30 demonstrating extensive splenomegaly and bulky lymphadenopathy throughout the abdomen and pelvis.  There is a tiny lesion at the superior aspect lateral hepatic segment and 2 hyperenhancing foci within the liver thought to be hemangiomas, moderate size right inguinal hernia containing a long segment of small bowel without obstruction or incarceration.  CT of soft tissue again reveals  extensive cervical adenopathy as well as evidence of pansinusitis.  The patient's case was discussed with Dr. Church and the patient has stage III-IV disease should be treated during this hospitalization.  I met with the patient and discussed in detail the use of Treanda/ Rituxan which we initiated Treanda Rituxan beginning May 1, 2019.     When he is seen May 2 he is already beginning to respond with reducing adenopathy and improved symptoms.  Plan to proceed with day #2.  We have also reviewed his findings including IgA 14, IgG of 263, IgM of 5 and a kappa/lambda ratio of 26.38.  He is hypogammaglobulinemic and replacement therapy will be given this hospitalization.  ENT assessment will also be requested.     The patient is again seen May 3, 2019, continuing to improve overall.  We did proceed with IVIG 400 mg/kg and had the patient seen by ENT after which the patient was discharged.  He indicates that Dr. Hamlin is going to see him back within the week as he is now seen back in our office May 16 and that surgery may be necessary.  The patient is also still having sinus symptoms.  Further he has developed a more extensive rash involving his abdomen chest and back and previously seen in hospital?.  Otherwise he feels well except for fatigue without fever, chills, nausea, vomiting, weight loss, stable appetite.     The patient went on to take 2 cycles of Treanda, Rituxan with a second cycle given June 6 and 7.  Additional assessments included his dermatologist who felt he had a gradually improving dermatitis and would not require an therapy and ENT indicating that the patient was slowly improving per sinus symptoms the surgery may be necessary at a later point.  When he is reviewed May 30 he was neutropenic and we proceeded with IVIG second treatment on Elenita 10.  He underwent repeat scans June 27 demonstrating a significant reduction in adenopathy in the neck chest abdomen pelvis with index nodes in the neck  previously 2 cm x 1.1 cm, chest with subcarinal lymph node conglomerate measuring 2.1 x 0.9 previously 5.8 x 2.7 and previously seen irregular pulmonary consolidation throughout bilateral lungs having nearly completely resolved.  Spleen is also reduced in size at 4.5 cm previously 18.6 cm and mesenteric nodes had similar reduced in size.  The patient's immunoglobulins were assessed June 27 with an IgG of 667, IgA of 20, IgM of 9 and IgE of 3.Additional information includes FISH analysis for CLL which is positive for deletion of 13 q. 14.3 It should be noted that such finding on FISH analysis generally suggest a favorable prognostic finding.  The patient is next seen July 3, 2019 feeling improved overall but still having a dermatologic issue with pruritus, erythema worsening particularly in the lower abdomen and upper groin.  Again his scans are considerably improved and we discussed, on the basis of the above information, changing his therapy now to Imbruvica.  The patient will return for teaching per Imbruvica July 11 the patient initiated treatment by July 25th 2019.  He was seen July 31 tolerating this well.  He was able to discontinue allopurinol and ferrous sulfate thereafter presents back August 28 having taken the medication over the last month.     He indicates that he is having no issues tolerating the medication and generally feels well except for sinus drainage.  He has had a cough and has a chest x-ray scheduled to primary care August 29, 2019.  He has had no fever, chills, sweats, rash and has gained weight.        Past Medical History, Past Surgical History, Social History, Family History have been reviewed and are without significant changes except as mentioned.    Review of Systems   Constitutional: Negative for fatigue.   Respiratory: Positive for cough. Negative for chest tightness, shortness of breath and wheezing.    Gastrointestinal: Negative for abdominal pain, constipation, diarrhea, nausea  "and vomiting.   Skin: Negative for rash.   Neurological: Negative for weakness.          Medications:  The current medication list was reviewed in the EMR    ALLERGIES:  No Known Allergies    Objective      Vitals:    08/28/19 0759   BP: 155/90   Pulse: 85   Resp: 16   Temp: 98.6 °F (37 °C)   SpO2: 94%   Weight: 78.3 kg (172 lb 11.2 oz)   Height: 180.3 cm (70.98\")   PainSc: 0-No pain     Current Status 8/28/2019   ECOG score 0       Physical Exam    Constitutional: He is oriented to person, place, and time. He appears well-developed and well-nourished. No distress.   HENT:   Head: Normocephalic.   Eyes: Conjunctivae and EOM are normal. Pupils are equal, round, and reactive to light. No scleral icterus.   Neck: Normal range of motion. Neck supple. No JVD present. No thyromegaly present.   Cardiovascular:  Normal rate, regular rhythm.  present. Exam reveals no gallop and no friction rub.   No murmur heard.  Pulmonary/Chest:He has no rales.   Abdominal: Soft. He exhibits no distension and no mass. There is no tenderness.   Musculoskeletal: Normal range of motion. He exhibits no edema or deformity.   Lymphadenopathy: No current lymphadenopathy                                                Skin: Rash resolved                                              Neurological: He is alert and oriented to person, place, and time. He has normal reflexes. No cranial nerve deficit.   Skin: Skin is warm and dry.                         Psychiatric: He has a normal mood and affect. His behavior is normal. Judgment normal    RECENT LABS:  Hematology WBC   Date Value Ref Range Status   08/28/2019 7.35 3.40 - 10.80 10*3/mm3 Final     RBC   Date Value Ref Range Status   08/28/2019 4.81 4.14 - 5.80 10*6/mm3 Final     Hemoglobin   Date Value Ref Range Status   08/28/2019 15.0 13.0 - 17.7 g/dL Final     Hematocrit   Date Value Ref Range Status   08/28/2019 46.0 37.5 - 51.0 % Final     Platelets   Date Value Ref Range Status   08/28/2019 145 " 140 - 450 10*3/mm3 Final            Flow cytometry report on the peripheral blood as well as the lymph node biopsied at bronchoscopy are consistent with chronic lymphocytic leukemia/small lymphocytic lymphoma.    FISH prognostic panel-Positive for deletion of 13 q. 14.3    Assessment/Plan   1.  CLL presenting with significant lymphocytosis, lymphadenopathy and splenomegaly.   Positive for deletion of 13 q. 14.3.  Patient status post 2 cycles of Treanda Rituxan with excellent response.  Reassessment July 3, 2019 with plans to switch Treanda to Imbruvica which began July 25, 2019, tolerated well  2.  Pulmonary nodules/infiltrates.  He is  status post bronchoscopy.  Is unclear at this time whether these findings are infectious or possibly related to his lymphoproliferative disorder.  This is seen to be improving on recent scans status post Treanda Rituxan.  Patient with residual cough, follow-up chest x-ray planned August 29 2019, appears related to ongoing sinus drainage.  3.  Iron deficiency  4.  Hypogammaglobulinemia-status post IVIG with resolution of sinusitis, current levels acceptable.      Plan:  *Continue Imbruvica at current dosing  *Return in 6 weeks CBC, iron evaluation  *11 weeks CMP, CBC, PE, COLLETTE, serum free light chains  *12 weeks follow-up  *Need with business staff today- Janelle Irvin-concerning cost of Imbruvica and assistance available.

## 2019-08-28 ENCOUNTER — APPOINTMENT (OUTPATIENT)
Dept: LAB | Facility: HOSPITAL | Age: 64
End: 2019-08-28

## 2019-08-28 ENCOUNTER — OFFICE VISIT (OUTPATIENT)
Dept: ONCOLOGY | Facility: CLINIC | Age: 64
End: 2019-08-28

## 2019-08-28 ENCOUNTER — DOCUMENTATION (OUTPATIENT)
Dept: ONCOLOGY | Facility: CLINIC | Age: 64
End: 2019-08-28

## 2019-08-28 VITALS
RESPIRATION RATE: 16 BRPM | BODY MASS INDEX: 24.18 KG/M2 | OXYGEN SATURATION: 94 % | HEIGHT: 71 IN | SYSTOLIC BLOOD PRESSURE: 155 MMHG | DIASTOLIC BLOOD PRESSURE: 90 MMHG | TEMPERATURE: 98.6 F | HEART RATE: 85 BPM | WEIGHT: 172.7 LBS

## 2019-08-28 DIAGNOSIS — D50.9 IRON DEFICIENCY ANEMIA, UNSPECIFIED IRON DEFICIENCY ANEMIA TYPE: ICD-10-CM

## 2019-08-28 DIAGNOSIS — C91.10 CLL (CHRONIC LYMPHOCYTIC LEUKEMIA) (HCC): Primary | ICD-10-CM

## 2019-08-28 DIAGNOSIS — D80.1 HYPOGAMMAGLOBULINEMIA (HCC): ICD-10-CM

## 2019-08-28 PROBLEM — R59.0 MEDIASTINAL LYMPHADENOPATHY: Status: RESOLVED | Noted: 2019-04-28 | Resolved: 2019-08-28

## 2019-08-28 LAB
BASOPHILS # BLD AUTO: 0.05 10*3/MM3 (ref 0–0.2)
BASOPHILS NFR BLD AUTO: 0.7 % (ref 0–1.5)
DEPRECATED RDW RBC AUTO: 51.8 FL (ref 37–54)
EOSINOPHIL # BLD AUTO: 0.14 10*3/MM3 (ref 0–0.4)
EOSINOPHIL NFR BLD AUTO: 1.9 % (ref 0.3–6.2)
ERYTHROCYTE [DISTWIDTH] IN BLOOD BY AUTOMATED COUNT: 14.6 % (ref 12.3–15.4)
HCT VFR BLD AUTO: 46 % (ref 37.5–51)
HGB BLD-MCNC: 15 G/DL (ref 13–17.7)
IMM GRANULOCYTES # BLD AUTO: 0.08 10*3/MM3 (ref 0–0.05)
IMM GRANULOCYTES NFR BLD AUTO: 1.1 % (ref 0–0.5)
LYMPHOCYTES # BLD AUTO: 0.4 10*3/MM3 (ref 0.7–3.1)
LYMPHOCYTES NFR BLD AUTO: 5.4 % (ref 19.6–45.3)
MCH RBC QN AUTO: 31.2 PG (ref 26.6–33)
MCHC RBC AUTO-ENTMCNC: 32.6 G/DL (ref 31.5–35.7)
MCV RBC AUTO: 95.6 FL (ref 79–97)
MONOCYTES # BLD AUTO: 0.86 10*3/MM3 (ref 0.1–0.9)
MONOCYTES NFR BLD AUTO: 11.7 % (ref 5–12)
NEUTROPHILS # BLD AUTO: 5.82 10*3/MM3 (ref 1.7–7)
NEUTROPHILS NFR BLD AUTO: 79.2 % (ref 42.7–76)
NRBC BLD AUTO-RTO: 0 /100 WBC (ref 0–0.2)
PLATELET # BLD AUTO: 145 10*3/MM3 (ref 140–450)
PMV BLD AUTO: 11.3 FL (ref 6–12)
RBC # BLD AUTO: 4.81 10*6/MM3 (ref 4.14–5.8)
WBC NRBC COR # BLD: 7.35 10*3/MM3 (ref 3.4–10.8)

## 2019-08-28 PROCEDURE — 36415 COLL VENOUS BLD VENIPUNCTURE: CPT

## 2019-08-28 PROCEDURE — 85025 COMPLETE CBC W/AUTO DIFF WBC: CPT

## 2019-08-28 PROCEDURE — 99214 OFFICE O/P EST MOD 30 MIN: CPT | Performed by: INTERNAL MEDICINE

## 2019-08-28 PROCEDURE — G0463 HOSPITAL OUTPT CLINIC VISIT: HCPCS | Performed by: INTERNAL MEDICINE

## 2019-08-28 NOTE — PROGRESS NOTES
Pt left me a VM yesterday about being on Medicare now and his copay for his Imbruvica is $2400. I attempted to contact him back and had to leave a message.    Pt was here this morning and I was able to speak with him about assistance. I obtained his income information and I have applied to [facility] on his behalf. Pt has been approved for $7900 from 5/30/19-8/27/20.    I have provided the needed information to Miguelito FLORES for processing the copay. I have asked for pt to be contacted again to schedule delivery.     Pt stated to me that he was out of his Imbruvica. I have bridged him until his delivery comes from [place].

## 2019-08-29 ENCOUNTER — HOSPITAL ENCOUNTER (OUTPATIENT)
Dept: CT IMAGING | Facility: HOSPITAL | Age: 64
Discharge: HOME | End: 2019-08-29
Admitting: INTERNAL MEDICINE

## 2019-08-29 PROCEDURE — 71250 CT THORAX DX C-: CPT

## 2019-09-24 ENCOUNTER — TELEPHONE (OUTPATIENT)
Dept: SLEEP MEDICINE | Facility: HOSPITAL | Age: 64
End: 2019-09-24

## 2019-09-24 NOTE — TELEPHONE ENCOUNTER
----- Message from Jostin Parekh MD sent at 9/23/2019  1:26 PM EDT -----  Regarding: RE: reticulonodular infiltrates  Contact: [phone]  No, this is fine.  Thank you! MELLO  ----- Message -----  From: Connor Dove MD  Sent: 9/23/2019   9:19 AM  To: Jostin Parekh MD  Subject: reticulonodular infiltrates                      [facility],     This patient had a CT end-August per Dr. Iyer. Increased reticulonodular infiltrates in bases L>R. Asymptomatic in my office today. I have scheduled for CT chest end-November to see if they get worse.    Let me know if you'd like to do something different, e.g. Early bronchoscopy.    Thanks.    Connor

## 2019-09-25 ENCOUNTER — TRANSCRIBE ORDERS (OUTPATIENT)
Dept: ADMINISTRATIVE | Facility: HOSPITAL | Age: 64
End: 2019-09-25

## 2019-09-25 DIAGNOSIS — R91.8 PULMONARY INFILTRATE: Primary | ICD-10-CM

## 2019-10-09 ENCOUNTER — CLINICAL SUPPORT (OUTPATIENT)
Dept: ONCOLOGY | Facility: HOSPITAL | Age: 64
End: 2019-10-09

## 2019-10-09 ENCOUNTER — LAB (OUTPATIENT)
Dept: LAB | Facility: HOSPITAL | Age: 64
End: 2019-10-09

## 2019-10-09 VITALS
DIASTOLIC BLOOD PRESSURE: 75 MMHG | HEART RATE: 76 BPM | OXYGEN SATURATION: 94 % | SYSTOLIC BLOOD PRESSURE: 154 MMHG | TEMPERATURE: 98.5 F

## 2019-10-09 DIAGNOSIS — C91.10 CLL (CHRONIC LYMPHOCYTIC LEUKEMIA) (HCC): ICD-10-CM

## 2019-10-09 LAB
BASOPHILS # BLD AUTO: 0.08 10*3/MM3 (ref 0–0.2)
BASOPHILS NFR BLD AUTO: 0.8 % (ref 0–1.5)
DEPRECATED RDW RBC AUTO: 47.7 FL (ref 37–54)
EOSINOPHIL # BLD AUTO: 0.15 10*3/MM3 (ref 0–0.4)
EOSINOPHIL NFR BLD AUTO: 1.5 % (ref 0.3–6.2)
ERYTHROCYTE [DISTWIDTH] IN BLOOD BY AUTOMATED COUNT: 13.5 % (ref 12.3–15.4)
HCT VFR BLD AUTO: 48.9 % (ref 37.5–51)
HGB BLD-MCNC: 16.3 G/DL (ref 13–17.7)
IMM GRANULOCYTES # BLD AUTO: 0.19 10*3/MM3 (ref 0–0.05)
IMM GRANULOCYTES NFR BLD AUTO: 2 % (ref 0–0.5)
LYMPHOCYTES # BLD AUTO: 0.82 10*3/MM3 (ref 0.7–3.1)
LYMPHOCYTES NFR BLD AUTO: 8.5 % (ref 19.6–45.3)
MCH RBC QN AUTO: 31.5 PG (ref 26.6–33)
MCHC RBC AUTO-ENTMCNC: 33.3 G/DL (ref 31.5–35.7)
MCV RBC AUTO: 94.6 FL (ref 79–97)
MONOCYTES # BLD AUTO: 1.03 10*3/MM3 (ref 0.1–0.9)
MONOCYTES NFR BLD AUTO: 10.6 % (ref 5–12)
NEUTROPHILS # BLD AUTO: 7.41 10*3/MM3 (ref 1.7–7)
NEUTROPHILS NFR BLD AUTO: 76.6 % (ref 42.7–76)
NRBC BLD AUTO-RTO: 0.2 /100 WBC (ref 0–0.2)
PLATELET # BLD AUTO: 144 10*3/MM3 (ref 140–450)
PMV BLD AUTO: 11.3 FL (ref 6–12)
RBC # BLD AUTO: 5.17 10*6/MM3 (ref 4.14–5.8)
WBC NRBC COR # BLD: 9.68 10*3/MM3 (ref 3.4–10.8)

## 2019-10-09 PROCEDURE — 85025 COMPLETE CBC W/AUTO DIFF WBC: CPT

## 2019-10-09 PROCEDURE — 36415 COLL VENOUS BLD VENIPUNCTURE: CPT

## 2019-10-09 NOTE — PROGRESS NOTES
Pt is here for lab with RN review.  CBC reviewed with pt, counts are stable for this pt at this time. Pt C/O a cough. Suggested he try taking Delsym cough syrup. Pt inquired about getting a flu shot. Reviewed with Naomi Martinez NP. Per Naomi pt can get the  Flu shot if he chooses to .  BP slightly elevated. Advised pt to talk to his PCP regarding his Pressure. Pt continues to take his Inbruvica a prescribed.  Copy of labs given to pt and f/u appt reviewed. Pt is instructed to call the office with any concerns or new symptoms prior to next visit. Pt vu.   Lab Results   Component Value Date    WBC 9.68 10/09/2019    HGB 16.3 10/09/2019    HCT 48.9 10/09/2019    MCV 94.6 10/09/2019     10/09/2019

## 2019-10-10 ENCOUNTER — TELEPHONE (OUTPATIENT)
Dept: ONCOLOGY | Facility: HOSPITAL | Age: 64
End: 2019-10-10

## 2019-10-10 ENCOUNTER — DOCUMENTATION (OUTPATIENT)
Dept: ONCOLOGY | Facility: CLINIC | Age: 64
End: 2019-10-10

## 2019-10-10 NOTE — PROGRESS NOTES
Call rec from Serenity JEAN BAPTISTE, RN in Triage-she states Miryam Chao is calling about pt being on Bendeka. I took the [facility] for the call placed in Clinical Pool message folder. See below.    Judith Rosales sent to [facility] Clinical Pool   Phone Number: [phone]             Miryam Jeremie   Calling about medication pt taking Bendeka.          I attempted to contact Miryam at the number provided in the message. It went to a VM box for Miryam Lopez. I left a message requesting a return call back.    This patient is on Imbruvica for his CLL-NOT Bendeka.

## 2019-10-10 NOTE — TELEPHONE ENCOUNTER
----- Message from Judith Rosales sent at 10/10/2019 10:57 AM EDT -----  Contact: [phone]  Miryam Chao   Calling about medication pt taking Bendeka.

## 2019-10-10 NOTE — PROGRESS NOTES
Miryam from [facility] returned my call and she wanted to verify the dose of Bendeka on 6/6/19.    I was able to verify for her that pt rec 165 mg on 6/6/19 and 6/7/19. She asked vial sizes used and amount of waste. This was provided to her.

## 2019-11-13 ENCOUNTER — LAB (OUTPATIENT)
Dept: LAB | Facility: HOSPITAL | Age: 64
End: 2019-11-13

## 2019-11-13 DIAGNOSIS — C91.10 CLL (CHRONIC LYMPHOCYTIC LEUKEMIA) (HCC): ICD-10-CM

## 2019-11-13 LAB
ALBUMIN SERPL-MCNC: 4.4 G/DL (ref 3.5–5.2)
ALBUMIN/GLOB SERPL: 2.3 G/DL (ref 1.1–2.4)
ALP SERPL-CCNC: 74 U/L (ref 38–116)
ALT SERPL W P-5'-P-CCNC: 11 U/L (ref 0–41)
ANION GAP SERPL CALCULATED.3IONS-SCNC: 13.2 MMOL/L (ref 5–15)
AST SERPL-CCNC: 13 U/L (ref 0–40)
BASOPHILS # BLD AUTO: 0.04 10*3/MM3 (ref 0–0.2)
BASOPHILS NFR BLD AUTO: 0.5 % (ref 0–1.5)
BILIRUB SERPL-MCNC: 0.8 MG/DL (ref 0.2–1.2)
BUN BLD-MCNC: 22 MG/DL (ref 6–20)
BUN/CREAT SERPL: 15.7 (ref 7.3–30)
CALCIUM SPEC-SCNC: 9.2 MG/DL (ref 8.5–10.2)
CHLORIDE SERPL-SCNC: 102 MMOL/L (ref 98–107)
CO2 SERPL-SCNC: 24.8 MMOL/L (ref 22–29)
CREAT BLD-MCNC: 1.4 MG/DL (ref 0.7–1.3)
DEPRECATED RDW RBC AUTO: 48.8 FL (ref 37–54)
EOSINOPHIL # BLD AUTO: 0.11 10*3/MM3 (ref 0–0.4)
EOSINOPHIL NFR BLD AUTO: 1.4 % (ref 0.3–6.2)
ERYTHROCYTE [DISTWIDTH] IN BLOOD BY AUTOMATED COUNT: 13.5 % (ref 12.3–15.4)
GFR SERPL CREATININE-BSD FRML MDRD: 51 ML/MIN/1.73
GLOBULIN UR ELPH-MCNC: 1.9 GM/DL (ref 1.8–3.5)
GLUCOSE BLD-MCNC: 137 MG/DL (ref 74–124)
HCT VFR BLD AUTO: 49.6 % (ref 37.5–51)
HGB BLD-MCNC: 16.3 G/DL (ref 13–17.7)
IMM GRANULOCYTES # BLD AUTO: 0.05 10*3/MM3 (ref 0–0.05)
IMM GRANULOCYTES NFR BLD AUTO: 0.6 % (ref 0–0.5)
LYMPHOCYTES # BLD AUTO: 0.66 10*3/MM3 (ref 0.7–3.1)
LYMPHOCYTES NFR BLD AUTO: 8.5 % (ref 19.6–45.3)
MCH RBC QN AUTO: 32.3 PG (ref 26.6–33)
MCHC RBC AUTO-ENTMCNC: 32.9 G/DL (ref 31.5–35.7)
MCV RBC AUTO: 98.2 FL (ref 79–97)
MONOCYTES # BLD AUTO: 0.64 10*3/MM3 (ref 0.1–0.9)
MONOCYTES NFR BLD AUTO: 8.2 % (ref 5–12)
NEUTROPHILS # BLD AUTO: 6.29 10*3/MM3 (ref 1.7–7)
NEUTROPHILS NFR BLD AUTO: 80.8 % (ref 42.7–76)
NRBC BLD AUTO-RTO: 0 /100 WBC (ref 0–0.2)
PLATELET # BLD AUTO: 145 10*3/MM3 (ref 140–450)
PMV BLD AUTO: 11.9 FL (ref 6–12)
POTASSIUM BLD-SCNC: 4.4 MMOL/L (ref 3.5–4.7)
PROT SERPL-MCNC: 6.3 G/DL (ref 6.3–8)
RBC # BLD AUTO: 5.05 10*6/MM3 (ref 4.14–5.8)
SODIUM BLD-SCNC: 140 MMOL/L (ref 134–145)
WBC NRBC COR # BLD: 7.79 10*3/MM3 (ref 3.4–10.8)

## 2019-11-13 PROCEDURE — 85025 COMPLETE CBC W/AUTO DIFF WBC: CPT

## 2019-11-13 PROCEDURE — 36415 COLL VENOUS BLD VENIPUNCTURE: CPT

## 2019-11-13 PROCEDURE — 80053 COMPREHEN METABOLIC PANEL: CPT

## 2019-11-13 NOTE — PROGRESS NOTES
Subjective Patient tolerating Imbruvica without complication  REASON FOR FOLLOW UP:  1.  New diagnosis chronic lymphocytic leukemia with extensive lymphadenopathy and possible pleural involvement.  2.  Initiation of Treanda, Rituxan May 1, IVIG May   3.  Significant response on scans June 27, 2019, alternative therapy with Imbruvica planned, initiated July 25, 2019        HISTORY OF PRESENT ILLNESS:   The patient is a  64 y.o. Male  who was admitted on 4/28/2019 with worsening complaints of cough wheezing and shortness of breath.  He had been to an urgent care center and was started on antibiotics and received a shot of Solu-Medrol.  His symptoms did not improve and on his admission he was noted to have profound lymphocytosis with a total white count of 70,074% lymphocytes on his white cell differential.     He also underwent CT scan of the chest that showed infiltrates and nodules in the lungs as well as significant lymphadenopathy within the chest and in the axillary regions.  He had peripheral blood sent for flow cytometry leukemia lymphoma panel but this is still pending at time of this dictation.     He also underwent bronchoscopy on 4/29/2019.  Results from this procedure are pendingl.  His respiratory viral panel was negative and markers of sepsis were unremarkable.    4/30/19  His peripheral blood flow cytometry and flow cytometry from the EBUS needle biopsy at bronchoscopy are both consistent with chronic lymphocytic leukemia/small lymphocytic lymphoma.    May 1, 2019  The patient underwent a CT of abdomen and pelvis April 30 demonstrating extensive splenomegaly and bulky lymphadenopathy throughout the abdomen and pelvis.  There is a tiny lesion at the superior aspect lateral hepatic segment and 2 hyperenhancing foci within the liver thought to be hemangiomas, moderate size right inguinal hernia containing a long segment of small bowel without obstruction or incarceration.  CT of soft tissue again reveals  extensive cervical adenopathy as well as evidence of pansinusitis.  The patient's case was discussed with Dr. Church and the patient has stage III-IV disease should be treated during this hospitalization.  I met with the patient and discussed in detail the use of Treanda/ Rituxan which we initiated Treanda Rituxan beginning May 1, 2019.     When he is seen May 2 he is already beginning to respond with reducing adenopathy and improved symptoms.  Plan to proceed with day #2.  We have also reviewed his findings including IgA 14, IgG of 263, IgM of 5 and a kappa/lambda ratio of 26.38.  He is hypogammaglobulinemic and replacement therapy will be given this hospitalization.  ENT assessment will also be requested.     The patient is again seen May 3, 2019, continuing to improve overall.  We did proceed with IVIG 400 mg/kg and had the patient seen by ENT after which the patient was discharged.  He indicates that Dr. Hamlin is going to see him back within the week as he is now seen back in our office May 16 and that surgery may be necessary.  The patient is also still having sinus symptoms.  Further he has developed a more extensive rash involving his abdomen chest and back and previously seen in hospital?.  Otherwise he feels well except for fatigue without fever, chills, nausea, vomiting, weight loss, stable appetite.     The patient went on to take 2 cycles of Treanda, Rituxan with a second cycle given June 6 and 7.  Additional assessments included his dermatologist who felt he had a gradually improving dermatitis and would not require an therapy and ENT indicating that the patient was slowly improving per sinus symptoms the surgery may be necessary at a later point.  When he is reviewed May 30 he was neutropenic and we proceeded with IVIG second treatment on Elenita 10.  He underwent repeat scans June 27 demonstrating a significant reduction in adenopathy in the neck chest abdomen pelvis with index nodes in the neck  previously 2 cm x 1.1 cm, chest with subcarinal lymph node conglomerate measuring 2.1 x 0.9 previously 5.8 x 2.7 and previously seen irregular pulmonary consolidation throughout bilateral lungs having nearly completely resolved.  Spleen is also reduced in size at 4.5 cm previously 18.6 cm and mesenteric nodes had similar reduced in size.  The patient's immunoglobulins were assessed June 27 with an IgG of 667, IgA of 20, IgM of 9 and IgE of 3.Additional information includes FISH analysis for CLL which is positive for deletion of 13 q. 14.3 It should be noted that such finding on FISH analysis generally suggest a favorable prognostic finding.  The patient is next seen July 3, 2019 feeling improved overall but still having a dermatologic issue with pruritus, erythema worsening particularly in the lower abdomen and upper groin.  Again his scans are considerably improved and we discussed, on the basis of the above information, changing his therapy now to Imbruvica.  The patient will return for teaching per Imbruvica July 11 the patient initiated treatment by July 25th 2019.  He was seen July 31 tolerating this well.  He was able to discontinue allopurinol and ferrous sulfate thereafter presents back August 28 having taken the medication over the last month.     He indicates that he is having no issues tolerating the medication and generally feels well except for sinus drainage.  He has had a cough and has a chest x-ray scheduled to primary care August 29, 2019.  He has had no fever, chills, sweats, rash and has gained weight.  The patient is next seen November 20, 2019 continuing to generally improve.  His performance status remains excellent and has had no additional complications thus far with the use of Imbruvica or an additional infectious complications.  He has been seen by pulmonary medicine with reticular infiltrates noted on previous CAT scan on a follow-up study scheduled in the next several days.  This may be  "evolution towards recovery but a follow-up will be necessary.  Finally we have been able to obtain Imbruvica reasonably cost through foundation support.        Past Medical History, Past Surgical History, Social History, Family History have been reviewed and are without significant changes except as mentioned.    Review of Systems   Constitutional: Negative for fatigue.   Respiratory: Positive for cough (productive cough mornings only 11/20/19). Negative for chest tightness, shortness of breath and wheezing.    Gastrointestinal: Positive for abdominal pain (11/20/19 cramps ). Negative for constipation, diarrhea, nausea and vomiting.   Genitourinary: Positive for testicular pain (after cramping 11/20/19).   Skin: Negative for rash.   Neurological: Negative for weakness.          Medications:  The current medication list was reviewed in the EMR    ALLERGIES:  No Known Allergies    Objective      Vitals:    11/20/19 0737   BP: 136/75   Pulse: 76   Resp: 16   Temp: 98.5 °F (36.9 °C)   SpO2: 96%   Weight: 82.2 kg (181 lb 4.8 oz)   Height: 180.3 cm (70.98\")   PainSc: 0-No pain     Current Status 11/20/2019   ECOG score 0       Physical Exam    Constitutional: He is oriented to person, place, and time. He appears well-developed and well-nourished. No distress.   HENT:   Head: Normocephalic.   Eyes: Conjunctivae and EOM are normal. Pupils are equal, round, and reactive to light. No scleral icterus.   Neck: Normal range of motion. Neck supple. No JVD present. No thyromegaly present.   Cardiovascular:  Normal rate, regular rhythm.  present. Exam reveals no gallop and no friction rub.   No murmur heard.  Pulmonary/Chest:He has no rales.   Abdominal: Soft. He exhibits no distension and no mass. There is no tenderness.   Musculoskeletal: Normal range of motion. He exhibits no edema or deformity.   Lymphadenopathy: No current lymphadenopathy                                                Skin: Rash resolved                        "                       Neurological: He is alert and oriented to person, place, and time. He has normal reflexes. No cranial nerve deficit.   Skin: Skin is warm and dry.                         Psychiatric: He has a normal mood and affect. His behavior is normal. Judgment normal    RECENT LABS:  Hematology No results found for: WBC, RBC, HGB, HCT, PLT         Flow cytometry report on the peripheral blood as well as the lymph node biopsied at bronchoscopy are consistent with chronic lymphocytic leukemia/small lymphocytic lymphoma.    FISH prognostic panel-Positive for deletion of 13 q. 14.3    Assessment/Plan   1.  CLL presenting with significant lymphocytosis, lymphadenopathy and splenomegaly.   Positive for deletion of 13 q. 14.3.  Patient status post 2 cycles of Treanda Rituxan with excellent response.  Reassessment July 3, 2019 with plans to switch Treanda to Imbruvica which began July 25, 2019, tolerated well. This continues to be the case.  2.  Pulmonary nodules/infiltrates.  He is  status post bronchoscopy.  Is unclear at this time whether these findings are infectious or possibly related to his lymphoproliferative disorder.  This is followed by repeat scans including late November 2019 which are pending at this point.  3.  Iron deficiency  4.  Hypogammaglobulinemia-status post IVIG with resolution of sinusitis, current levels acceptable.      Plan:  *Continue Imbruvica at current dosing  *Return 11 weeks for repeat paraprotein studies, CMP and CBC  *MD 12 weeks

## 2019-11-15 LAB
ALBUMIN SERPL-MCNC: 3.7 G/DL (ref 2.9–4.4)
ALBUMIN/GLOB SERPL: 1.6 {RATIO} (ref 0.7–1.7)
ALPHA1 GLOB FLD ELPH-MCNC: 0.2 G/DL (ref 0–0.4)
ALPHA2 GLOB SERPL ELPH-MCNC: 0.7 G/DL (ref 0.4–1)
B-GLOBULIN SERPL ELPH-MCNC: 1 G/DL (ref 0.7–1.3)
GAMMA GLOB SERPL ELPH-MCNC: 0.5 G/DL (ref 0.4–1.8)
GLOBULIN SER CALC-MCNC: 2.4 G/DL (ref 2.2–3.9)
IGA SERPL-MCNC: 31 MG/DL (ref 61–437)
IGG SERPL-MCNC: 488 MG/DL (ref 700–1600)
IGM SERPL-MCNC: 12 MG/DL (ref 20–172)
INTERPRETATION SERPL IEP-IMP: ABNORMAL
KAPPA LC SERPL-MCNC: 12 MG/L (ref 3.3–19.4)
KAPPA LC/LAMBDA SER: 1.35 {RATIO} (ref 0.26–1.65)
LAMBDA LC FREE SERPL-MCNC: 8.9 MG/L (ref 5.7–26.3)
Lab: ABNORMAL
M-SPIKE: ABNORMAL G/DL
PROT SERPL-MCNC: 6.1 G/DL (ref 6–8.5)

## 2019-11-20 ENCOUNTER — OFFICE VISIT (OUTPATIENT)
Dept: ONCOLOGY | Facility: CLINIC | Age: 64
End: 2019-11-20

## 2019-11-20 ENCOUNTER — APPOINTMENT (OUTPATIENT)
Dept: LAB | Facility: HOSPITAL | Age: 64
End: 2019-11-20

## 2019-11-20 VITALS
BODY MASS INDEX: 25.38 KG/M2 | RESPIRATION RATE: 16 BRPM | DIASTOLIC BLOOD PRESSURE: 75 MMHG | TEMPERATURE: 98.5 F | OXYGEN SATURATION: 96 % | WEIGHT: 181.3 LBS | HEART RATE: 76 BPM | HEIGHT: 71 IN | SYSTOLIC BLOOD PRESSURE: 136 MMHG

## 2019-11-20 DIAGNOSIS — C91.10 CLL (CHRONIC LYMPHOCYTIC LEUKEMIA) (HCC): Primary | ICD-10-CM

## 2019-11-20 PROCEDURE — G0463 HOSPITAL OUTPT CLINIC VISIT: HCPCS | Performed by: INTERNAL MEDICINE

## 2019-11-20 PROCEDURE — 99214 OFFICE O/P EST MOD 30 MIN: CPT | Performed by: INTERNAL MEDICINE

## 2019-11-25 ENCOUNTER — HOSPITAL ENCOUNTER (OUTPATIENT)
Dept: CT IMAGING | Facility: HOSPITAL | Age: 64
Discharge: HOME | End: 2019-11-25
Admitting: INTERNAL MEDICINE

## 2019-11-25 DIAGNOSIS — R91.8 PULMONARY INFILTRATE: ICD-10-CM

## 2019-11-25 PROCEDURE — 71250 CT THORAX DX C-: CPT

## 2019-12-10 ENCOUNTER — TRANSCRIBE ORDERS (OUTPATIENT)
Dept: ADMINISTRATIVE | Facility: HOSPITAL | Age: 64
End: 2019-12-10

## 2019-12-10 DIAGNOSIS — R91.8 PULMONARY INFILTRATE: Primary | ICD-10-CM

## 2020-02-05 NOTE — PROGRESS NOTES
Subjective Today the patient reports that he has been having intermittent abdominal pain and he has been having trouble sleeping. He has been taking Melatonin which has no effect.  REASON FOR FOLLOW UP:  1.  New diagnosis chronic lymphocytic leukemia with extensive lymphadenopathy and possible pleural involvement.  2.  Initiation of Treanda, Rituxan May 1, IVIG May   3.  Significant response on scans June 27, 2019, alternative therapy with Imbruvica planned, initiated July 25, 2019  4.  Patient seen February 12, 2020, continued control of CLL, discussed Rituxan, Imbruvica, sleep study and potential surgical assessment for hernia        HISTORY OF PRESENT ILLNESS:   The patient is a  64 y.o. Male  who was admitted on 4/28/2019 with worsening complaints of cough wheezing and shortness of breath.  He had been to an urgent care center and was started on antibiotics and received a shot of Solu-Medrol.  His symptoms did not improve and on his admission he was noted to have profound lymphocytosis with a total white count of 70,074% lymphocytes on his white cell differential.     He also underwent CT scan of the chest that showed infiltrates and nodules in the lungs as well as significant lymphadenopathy within the chest and in the axillary regions.  He had peripheral blood sent for flow cytometry leukemia lymphoma panel but this is still pending at time of this dictation.     He also underwent bronchoscopy on 4/29/2019.  Results from this procedure are pendingl.  His respiratory viral panel was negative and markers of sepsis were unremarkable.    4/30/19  His peripheral blood flow cytometry and flow cytometry from the EBUS needle biopsy at bronchoscopy are both consistent with chronic lymphocytic leukemia/small lymphocytic lymphoma.    May 1, 2019  The patient underwent a CT of abdomen and pelvis April 30 demonstrating extensive splenomegaly and bulky lymphadenopathy throughout the abdomen and pelvis.  There is a tiny  lesion at the superior aspect lateral hepatic segment and 2 hyperenhancing foci within the liver thought to be hemangiomas, moderate size right inguinal hernia containing a long segment of small bowel without obstruction or incarceration.  CT of soft tissue again reveals extensive cervical adenopathy as well as evidence of pansinusitis.  The patient's case was discussed with Dr. Church and the patient has stage III-IV disease should be treated during this hospitalization.  I met with the patient and discussed in detail the use of Treanda/ Rituxan which we initiated Treanda Rituxan beginning May 1, 2019.     When he is seen May 2 he is already beginning to respond with reducing adenopathy and improved symptoms.  Plan to proceed with day #2.  We have also reviewed his findings including IgA 14, IgG of 263, IgM of 5 and a kappa/lambda ratio of 26.38.  He is hypogammaglobulinemic and replacement therapy will be given this hospitalization.  ENT assessment will also be requested.     The patient is again seen May 3, 2019, continuing to improve overall.  We did proceed with IVIG 400 mg/kg and had the patient seen by ENT after which the patient was discharged.  He indicates that Dr. Hamlin is going to see him back within the week as he is now seen back in our office May 16 and that surgery may be necessary.  The patient is also still having sinus symptoms.  Further he has developed a more extensive rash involving his abdomen chest and back and previously seen in hospital?.  Otherwise he feels well except for fatigue without fever, chills, nausea, vomiting, weight loss, stable appetite.     The patient went on to take 2 cycles of Treanda, Rituxan with a second cycle given June 6 and 7.  Additional assessments included his dermatologist who felt he had a gradually improving dermatitis and would not require an therapy and ENT indicating that the patient was slowly improving per sinus symptoms the surgery may be necessary at a  later point.  When he is reviewed May 30 he was neutropenic and we proceeded with IVIG second treatment on Elenita 10.  He underwent repeat scans June 27 demonstrating a significant reduction in adenopathy in the neck chest abdomen pelvis with index nodes in the neck previously 2 cm x 1.1 cm, chest with subcarinal lymph node conglomerate measuring 2.1 x 0.9 previously 5.8 x 2.7 and previously seen irregular pulmonary consolidation throughout bilateral lungs having nearly completely resolved.  Spleen is also reduced in size at 4.5 cm previously 18.6 cm and mesenteric nodes had similar reduced in size.  The patient's immunoglobulins were assessed June 27 with an IgG of 667, IgA of 20, IgM of 9 and IgE of 3.Additional information includes FISH analysis for CLL which is positive for deletion of 13 q. 14.3 It should be noted that such finding on FISH analysis generally suggest a favorable prognostic finding.  The patient is next seen July 3, 2019 feeling improved overall but still having a dermatologic issue with pruritus, erythema worsening particularly in the lower abdomen and upper groin.  Again his scans are considerably improved and we discussed, on the basis of the above information, changing his therapy now to Imbruvica.  The patient will return for teaching per Imbruvica July 11 the patient initiated treatment by July 25th 2019.  He was seen July 31 tolerating this well.  He was able to discontinue allopurinol and ferrous sulfate thereafter presents back August 28 having taken the medication over the last month.     He indicates that he is having no issues tolerating the medication and generally feels well except for sinus drainage.  He has had a cough and has a chest x-ray scheduled to primary care August 29, 2019.  He has had no fever, chills, sweats, rash and has gained weight.  The patient is next seen November 20, 2019 continuing to generally improve.  His performance status remains excellent and has had no  "additional complications thus far with the use of Imbruvica or an additional infectious complications.  He has been seen by pulmonary medicine with reticular infiltrates noted on previous CAT scan on a follow-up study scheduled in the next several days.  This may be evolution towards recovery but a follow-up will be necessary.  Finally we have been able to obtain Imbruvica reasonably cost through foundation support.    The patient is next seen February 12, 2020 doing well without additional infectious complications and with stable findings hematologically.  We have discussed the fact that Rituxan could be added potentially to reduce the amount of time he remains on the medication but, additionally, further reduce his immunoglobulins.  Though this remains a concern he is also having difficulty with sleep-?  Sleep apnea, and worsening right direct inguinal hernia.    Past Medical History, Past Surgical History, Social History, Family History have been reviewed and are without significant changes except as mentioned.    Review of Systems   Constitutional: Negative for fatigue.   Respiratory: Negative for cough (productive cough mornings only 11/20/19), chest tightness, shortness of breath and wheezing.    Gastrointestinal: Positive for abdominal pain (11/20/19 cramps ). Negative for constipation, diarrhea, nausea and vomiting.   Genitourinary: Negative for testicular pain (after cramping 11/20/19).   Skin: Negative for rash.   Neurological: Negative for weakness.   Psychiatric/Behavioral: Positive for sleep disturbance.          Medications:  The current medication list was reviewed in the EMR    ALLERGIES:  No Known Allergies    Objective      Vitals:    02/12/20 0855   BP: 171/90   Pulse: 80   Resp: 18   Temp: 98.5 °F (36.9 °C)   TempSrc: Oral   SpO2: 95%   Weight: 84.9 kg (187 lb 3.2 oz)   Height: 180.3 cm (70.98\")   PainSc: 0-No pain     Current Status 2/12/2020   ECOG score 0       Physical Exam    Constitutional: " He is oriented to person, place, and time. He appears well-developed and well-nourished. No distress.   HENT:   Head: Normocephalic.   Eyes: Conjunctivae and EOM are normal. Pupils are equal, round, and reactive to light. No scleral icterus.   Neck: Normal range of motion. Neck supple. No JVD present. No thyromegaly present.   Cardiovascular:  Normal rate, regular rhythm.  present. Exam reveals no gallop and no friction rub.   No murmur heard.  Pulmonary/Chest:He has no rales.   Abdominal: Soft. He exhibits no distension and no mass. There is no tenderness.  He has bilateral hernias, apparently direct, right greater than left  Musculoskeletal: Normal range of motion. He exhibits no edema or deformity.   Lymphadenopathy: No current lymphadenopathy                                                Skin: Rash resolved                                              Neurological: He is alert and oriented to person, place, and time. He has normal reflexes. No cranial nerve deficit.   Skin: Skin is warm and dry.                         Psychiatric: He has a normal mood and affect. His behavior is normal. Judgment normal    RECENT LABS:  Hematology No results found for: WBC, RBC, HGB, HCT, PLT         Flow cytometry report on the peripheral blood as well as the lymph node biopsied at bronchoscopy are consistent with chronic lymphocytic leukemia/small lymphocytic lymphoma.    FISH prognostic panel-Positive for deletion of 13 q. 14.3    Assessment/Plan   1.  CLL presenting with significant lymphocytosis, lymphadenopathy and splenomegaly.   Positive for deletion of 13 q. 14.3.  Patient status post 2 cycles of Treanda Rituxan with excellent response.  Reassessment July 3, 2019 with plans to switch Treanda to Imbruvica which began July 25, 2019, tolerated well. This continues to be the case.  We have discussed the possibility of adding Rituxan to shorten the time he is on  Imbruvica and we will continue to review this.  2.  Pulmonary  nodules/infiltrates.  He is  status post bronchoscopy.  Is unclear at this time whether these findings are infectious or possibly related to his lymphoproliferative disorder.  His subsequent studies in late November are much improved, followed by pulmonary.  3.  Iron deficiency-stable  4.  Hypogammaglobulinemia-status post IVIG with resolution of sinusitis, current levels acceptable.  5.  Discussion for insomnia- certain sleep apnea symptoms developing, refer to pulmonary for assessment  6.  Lower abdominal pain-potentially related to recognizably significant direct inguinal hernias right greater than left, surgical assessment to be pursued.      Plan:  *Continue Imbruvica at current dosing  *Return 11 weeks for repeat paraprotein studies, CMP and CBC  *MD 12 weeks  *Referral to pulmonary for sleep apnea assessment  *Consider surgical assessment for hernias though would like to put this off the next 3 months unless his pain worsens.

## 2020-02-06 ENCOUNTER — LAB (OUTPATIENT)
Dept: LAB | Facility: HOSPITAL | Age: 65
End: 2020-02-06

## 2020-02-06 DIAGNOSIS — C91.10 CLL (CHRONIC LYMPHOCYTIC LEUKEMIA) (HCC): ICD-10-CM

## 2020-02-06 LAB
ALBUMIN SERPL-MCNC: 4.5 G/DL (ref 3.5–5.2)
ALBUMIN/GLOB SERPL: 2.1 G/DL (ref 1.1–2.4)
ALP SERPL-CCNC: 83 U/L (ref 38–116)
ALT SERPL W P-5'-P-CCNC: 16 U/L (ref 0–41)
ANION GAP SERPL CALCULATED.3IONS-SCNC: 14.4 MMOL/L (ref 5–15)
AST SERPL-CCNC: 15 U/L (ref 0–40)
BASOPHILS # BLD AUTO: 0.07 10*3/MM3 (ref 0–0.2)
BASOPHILS NFR BLD AUTO: 0.8 % (ref 0–1.5)
BILIRUB SERPL-MCNC: 0.6 MG/DL (ref 0.2–1.2)
BUN BLD-MCNC: 19 MG/DL (ref 6–20)
BUN/CREAT SERPL: 14.3 (ref 7.3–30)
CALCIUM SPEC-SCNC: 9.2 MG/DL (ref 8.5–10.2)
CHLORIDE SERPL-SCNC: 98 MMOL/L (ref 98–107)
CO2 SERPL-SCNC: 24.6 MMOL/L (ref 22–29)
CREAT BLD-MCNC: 1.33 MG/DL (ref 0.7–1.3)
DEPRECATED RDW RBC AUTO: 46 FL (ref 37–54)
EOSINOPHIL # BLD AUTO: 0.1 10*3/MM3 (ref 0–0.4)
EOSINOPHIL NFR BLD AUTO: 1.1 % (ref 0.3–6.2)
ERYTHROCYTE [DISTWIDTH] IN BLOOD BY AUTOMATED COUNT: 12.9 % (ref 12.3–15.4)
GFR SERPL CREATININE-BSD FRML MDRD: 54 ML/MIN/1.73
GLOBULIN UR ELPH-MCNC: 2.1 GM/DL (ref 1.8–3.5)
GLUCOSE BLD-MCNC: 142 MG/DL (ref 74–124)
HCT VFR BLD AUTO: 48 % (ref 37.5–51)
HGB BLD-MCNC: 15.6 G/DL (ref 13–17.7)
IMM GRANULOCYTES # BLD AUTO: 0.07 10*3/MM3 (ref 0–0.05)
IMM GRANULOCYTES NFR BLD AUTO: 0.8 % (ref 0–0.5)
LYMPHOCYTES # BLD AUTO: 0.6 10*3/MM3 (ref 0.7–3.1)
LYMPHOCYTES NFR BLD AUTO: 6.8 % (ref 19.6–45.3)
MCH RBC QN AUTO: 31.6 PG (ref 26.6–33)
MCHC RBC AUTO-ENTMCNC: 32.5 G/DL (ref 31.5–35.7)
MCV RBC AUTO: 97.4 FL (ref 79–97)
MONOCYTES # BLD AUTO: 0.8 10*3/MM3 (ref 0.1–0.9)
MONOCYTES NFR BLD AUTO: 9.1 % (ref 5–12)
NEUTROPHILS # BLD AUTO: 7.14 10*3/MM3 (ref 1.7–7)
NEUTROPHILS NFR BLD AUTO: 81.4 % (ref 42.7–76)
NRBC BLD AUTO-RTO: 0 /100 WBC (ref 0–0.2)
PLATELET # BLD AUTO: 146 10*3/MM3 (ref 140–450)
PMV BLD AUTO: 11.9 FL (ref 6–12)
POTASSIUM BLD-SCNC: 4.3 MMOL/L (ref 3.5–4.7)
PROT SERPL-MCNC: 6.6 G/DL (ref 6.3–8)
RBC # BLD AUTO: 4.93 10*6/MM3 (ref 4.14–5.8)
SODIUM BLD-SCNC: 137 MMOL/L (ref 134–145)
WBC NRBC COR # BLD: 8.78 10*3/MM3 (ref 3.4–10.8)

## 2020-02-06 PROCEDURE — 80053 COMPREHEN METABOLIC PANEL: CPT

## 2020-02-06 PROCEDURE — 36415 COLL VENOUS BLD VENIPUNCTURE: CPT

## 2020-02-06 PROCEDURE — 85025 COMPLETE CBC W/AUTO DIFF WBC: CPT

## 2020-02-07 LAB
ALBUMIN SERPL-MCNC: 3.9 G/DL (ref 2.9–4.4)
ALBUMIN/GLOB SERPL: 1.6 {RATIO} (ref 0.7–1.7)
ALPHA1 GLOB FLD ELPH-MCNC: 0.2 G/DL (ref 0–0.4)
ALPHA2 GLOB SERPL ELPH-MCNC: 0.9 G/DL (ref 0.4–1)
B-GLOBULIN SERPL ELPH-MCNC: 1 G/DL (ref 0.7–1.3)
GAMMA GLOB SERPL ELPH-MCNC: 0.4 G/DL (ref 0.4–1.8)
GLOBULIN SER CALC-MCNC: 2.5 G/DL (ref 2.2–3.9)
IGA SERPL-MCNC: 31 MG/DL (ref 61–437)
IGG SERPL-MCNC: 422 MG/DL (ref 700–1600)
IGM SERPL-MCNC: 11 MG/DL (ref 20–172)
INTERPRETATION SERPL IEP-IMP: ABNORMAL
KAPPA LC SERPL-MCNC: 13.7 MG/L (ref 3.3–19.4)
KAPPA LC/LAMBDA SER: 1.38 {RATIO} (ref 0.26–1.65)
LAMBDA LC FREE SERPL-MCNC: 9.9 MG/L (ref 5.7–26.3)
Lab: ABNORMAL
M-SPIKE: ABNORMAL G/DL
PROT SERPL-MCNC: 6.4 G/DL (ref 6–8.5)

## 2020-02-12 ENCOUNTER — OFFICE VISIT (OUTPATIENT)
Dept: ONCOLOGY | Facility: CLINIC | Age: 65
End: 2020-02-12

## 2020-02-12 ENCOUNTER — APPOINTMENT (OUTPATIENT)
Dept: LAB | Facility: HOSPITAL | Age: 65
End: 2020-02-12

## 2020-02-12 VITALS
RESPIRATION RATE: 18 BRPM | BODY MASS INDEX: 26.21 KG/M2 | TEMPERATURE: 98.5 F | WEIGHT: 187.2 LBS | OXYGEN SATURATION: 95 % | SYSTOLIC BLOOD PRESSURE: 171 MMHG | HEART RATE: 80 BPM | DIASTOLIC BLOOD PRESSURE: 90 MMHG | HEIGHT: 71 IN

## 2020-02-12 DIAGNOSIS — G47.09 OTHER INSOMNIA: ICD-10-CM

## 2020-02-12 DIAGNOSIS — K40.90 DIRECT INGUINAL HERNIA: ICD-10-CM

## 2020-02-12 DIAGNOSIS — C91.10 CLL (CHRONIC LYMPHOCYTIC LEUKEMIA) (HCC): Primary | ICD-10-CM

## 2020-02-12 DIAGNOSIS — D50.9 IRON DEFICIENCY ANEMIA, UNSPECIFIED IRON DEFICIENCY ANEMIA TYPE: ICD-10-CM

## 2020-02-12 PROCEDURE — 99215 OFFICE O/P EST HI 40 MIN: CPT | Performed by: INTERNAL MEDICINE

## 2020-04-29 ENCOUNTER — LAB (OUTPATIENT)
Dept: LAB | Facility: HOSPITAL | Age: 65
End: 2020-04-29

## 2020-04-29 DIAGNOSIS — C91.10 CLL (CHRONIC LYMPHOCYTIC LEUKEMIA) (HCC): ICD-10-CM

## 2020-04-29 LAB
ALBUMIN SERPL-MCNC: 4.4 G/DL (ref 3.5–5.2)
ALBUMIN/GLOB SERPL: 2.4 G/DL (ref 1.1–2.4)
ALP SERPL-CCNC: 69 U/L (ref 38–116)
ALT SERPL W P-5'-P-CCNC: 14 U/L (ref 0–41)
ANION GAP SERPL CALCULATED.3IONS-SCNC: 14 MMOL/L (ref 5–15)
AST SERPL-CCNC: 15 U/L (ref 0–40)
BASOPHILS # BLD AUTO: 0.04 10*3/MM3 (ref 0–0.2)
BASOPHILS NFR BLD AUTO: 0.6 % (ref 0–1.5)
BILIRUB SERPL-MCNC: 0.6 MG/DL (ref 0.2–1.2)
BUN BLD-MCNC: 20 MG/DL (ref 6–20)
BUN/CREAT SERPL: 13.6 (ref 7.3–30)
CALCIUM SPEC-SCNC: 9.2 MG/DL (ref 8.5–10.2)
CHLORIDE SERPL-SCNC: 101 MMOL/L (ref 98–107)
CO2 SERPL-SCNC: 23 MMOL/L (ref 22–29)
CREAT BLD-MCNC: 1.47 MG/DL (ref 0.7–1.3)
DEPRECATED RDW RBC AUTO: 44.6 FL (ref 37–54)
EOSINOPHIL # BLD AUTO: 0.11 10*3/MM3 (ref 0–0.4)
EOSINOPHIL NFR BLD AUTO: 1.7 % (ref 0.3–6.2)
ERYTHROCYTE [DISTWIDTH] IN BLOOD BY AUTOMATED COUNT: 12.9 % (ref 12.3–15.4)
GFR SERPL CREATININE-BSD FRML MDRD: 48 ML/MIN/1.73
GLOBULIN UR ELPH-MCNC: 1.8 GM/DL (ref 1.8–3.5)
GLUCOSE BLD-MCNC: 130 MG/DL (ref 74–124)
HCT VFR BLD AUTO: 47.6 % (ref 37.5–51)
HGB BLD-MCNC: 15.6 G/DL (ref 13–17.7)
IMM GRANULOCYTES # BLD AUTO: 0.02 10*3/MM3 (ref 0–0.05)
IMM GRANULOCYTES NFR BLD AUTO: 0.3 % (ref 0–0.5)
LDH SERPL-CCNC: 162 U/L (ref 99–259)
LYMPHOCYTES # BLD AUTO: 0.74 10*3/MM3 (ref 0.7–3.1)
LYMPHOCYTES NFR BLD AUTO: 11.7 % (ref 19.6–45.3)
MCH RBC QN AUTO: 31 PG (ref 26.6–33)
MCHC RBC AUTO-ENTMCNC: 32.8 G/DL (ref 31.5–35.7)
MCV RBC AUTO: 94.4 FL (ref 79–97)
MONOCYTES # BLD AUTO: 0.57 10*3/MM3 (ref 0.1–0.9)
MONOCYTES NFR BLD AUTO: 9 % (ref 5–12)
NEUTROPHILS # BLD AUTO: 4.86 10*3/MM3 (ref 1.7–7)
NEUTROPHILS NFR BLD AUTO: 76.7 % (ref 42.7–76)
NRBC BLD AUTO-RTO: 0 /100 WBC (ref 0–0.2)
PLATELET # BLD AUTO: 147 10*3/MM3 (ref 140–450)
PMV BLD AUTO: 11.7 FL (ref 6–12)
POTASSIUM BLD-SCNC: 4.3 MMOL/L (ref 3.5–4.7)
PROT SERPL-MCNC: 6.2 G/DL (ref 6.3–8)
RBC # BLD AUTO: 5.04 10*6/MM3 (ref 4.14–5.8)
SODIUM BLD-SCNC: 138 MMOL/L (ref 134–145)
WBC NRBC COR # BLD: 6.34 10*3/MM3 (ref 3.4–10.8)

## 2020-04-29 PROCEDURE — 36415 COLL VENOUS BLD VENIPUNCTURE: CPT

## 2020-04-29 PROCEDURE — 80053 COMPREHEN METABOLIC PANEL: CPT

## 2020-04-29 PROCEDURE — 83615 LACTATE (LD) (LDH) ENZYME: CPT

## 2020-04-29 PROCEDURE — 85025 COMPLETE CBC W/AUTO DIFF WBC: CPT

## 2020-05-01 LAB
ALBUMIN SERPL-MCNC: 3.7 G/DL (ref 2.9–4.4)
ALBUMIN/GLOB SERPL: 1.7 {RATIO} (ref 0.7–1.7)
ALPHA1 GLOB FLD ELPH-MCNC: 0.2 G/DL (ref 0–0.4)
ALPHA2 GLOB SERPL ELPH-MCNC: 0.8 G/DL (ref 0.4–1)
B-GLOBULIN SERPL ELPH-MCNC: 1 G/DL (ref 0.7–1.3)
GAMMA GLOB SERPL ELPH-MCNC: 0.3 G/DL (ref 0.4–1.8)
GLOBULIN SER CALC-MCNC: 2.3 G/DL (ref 2.2–3.9)
IGA SERPL-MCNC: 32 MG/DL (ref 61–437)
IGG SERPL-MCNC: 371 MG/DL (ref 603–1613)
IGM SERPL-MCNC: 11 MG/DL (ref 20–172)
INTERPRETATION SERPL IEP-IMP: ABNORMAL
KAPPA LC SERPL-MCNC: 11.3 MG/L (ref 3.3–19.4)
KAPPA LC/LAMBDA SER: 1.51 {RATIO} (ref 0.26–1.65)
LAMBDA LC FREE SERPL-MCNC: 7.5 MG/L (ref 5.7–26.3)
Lab: ABNORMAL
M-SPIKE: ABNORMAL G/DL
PROT SERPL-MCNC: 6 G/DL (ref 6–8.5)

## 2020-05-06 ENCOUNTER — APPOINTMENT (OUTPATIENT)
Dept: LAB | Facility: HOSPITAL | Age: 65
End: 2020-05-06

## 2020-05-06 ENCOUNTER — OFFICE VISIT (OUTPATIENT)
Dept: ONCOLOGY | Facility: CLINIC | Age: 65
End: 2020-05-06

## 2020-05-06 VITALS
WEIGHT: 187.9 LBS | TEMPERATURE: 97.7 F | SYSTOLIC BLOOD PRESSURE: 149 MMHG | OXYGEN SATURATION: 97 % | RESPIRATION RATE: 18 BRPM | DIASTOLIC BLOOD PRESSURE: 83 MMHG | HEART RATE: 73 BPM | HEIGHT: 71 IN | BODY MASS INDEX: 26.31 KG/M2

## 2020-05-06 DIAGNOSIS — D50.9 IRON DEFICIENCY ANEMIA, UNSPECIFIED IRON DEFICIENCY ANEMIA TYPE: ICD-10-CM

## 2020-05-06 DIAGNOSIS — D80.1 HYPOGAMMAGLOBULINEMIA (HCC): ICD-10-CM

## 2020-05-06 DIAGNOSIS — C91.10 CLL (CHRONIC LYMPHOCYTIC LEUKEMIA) (HCC): Primary | ICD-10-CM

## 2020-05-06 DIAGNOSIS — K40.90 DIRECT INGUINAL HERNIA: ICD-10-CM

## 2020-05-06 PROCEDURE — 99214 OFFICE O/P EST MOD 30 MIN: CPT | Performed by: INTERNAL MEDICINE

## 2020-06-24 ENCOUNTER — MEDICATION THERAPY MANAGEMENT (OUTPATIENT)
Dept: PHARMACY | Facility: HOSPITAL | Age: 65
End: 2020-06-24

## 2020-06-24 ENCOUNTER — TELEPHONE (OUTPATIENT)
Dept: ONCOLOGY | Facility: CLINIC | Age: 65
End: 2020-06-24

## 2020-06-24 NOTE — PROGRESS NOTES
MT telephone follow up re adherence and side effects- Lupis Demarco is doing well this morning. I introduced him to the MT program and explained the purpose of my call. He is doing well with Imbruvica. His only complaints are easy bruising and muscle soreness- this has not been overly bothersome for him. Medication administration and adherence seem appropriate; he reports just one missed dose this past month. Dav reports that he had a sleep study and he now uses oxygen at night when he sleeps; he denies any other medication changes. He has no additional questions or concerns today.

## 2020-06-24 NOTE — TELEPHONE ENCOUNTER
----- Message from Jessenia Galvez [facility] sent at 6/24/2020 10:18 AM EDT -----  Regarding: Oral chemo education  Could you please schedule a quick, in person, oral chemo education session with Dav at [facility] on 9/9/20 when he comes into the office?     Thank you,  Jessenia Galvez

## 2020-07-08 NOTE — TELEPHONE ENCOUNTER
Imbruvica refill request rec electronically from [facility]. Per last office note from Dr Parekh-pt is to continue. Request approved.

## 2020-08-14 ENCOUNTER — DOCUMENTATION (OUTPATIENT)
Dept: ONCOLOGY | Facility: CLINIC | Age: 65
End: 2020-08-14

## 2020-08-14 NOTE — PROGRESS NOTES
Pt's CLL fund with [facility] has been renewed with a start date of 8/28/2020-8/27/2021  $[phone]

## 2020-08-26 ENCOUNTER — HOSPITAL ENCOUNTER (OUTPATIENT)
Dept: CT IMAGING | Facility: HOSPITAL | Age: 65
Discharge: HOME | End: 2020-08-26
Admitting: INTERNAL MEDICINE

## 2020-08-26 DIAGNOSIS — R91.8 PULMONARY INFILTRATE: ICD-10-CM

## 2020-08-26 PROCEDURE — 71250 CT THORAX DX C-: CPT

## 2020-09-02 ENCOUNTER — TRANSCRIBE ORDERS (OUTPATIENT)
Dept: ADMINISTRATIVE | Facility: HOSPITAL | Age: 65
End: 2020-09-02

## 2020-09-02 DIAGNOSIS — M89.9 RIB LESION: Primary | ICD-10-CM

## 2020-09-02 NOTE — PROGRESS NOTES
Subjective  Discussed findings of apparent non-small cell lung carcinoma metastatic to bone.      REASON FOR FOLLOW UP:  1.  Chronic lymphocytic leukemia with extensive lymphadenopathy and possible pleural involvement.  2.  Initiation of Treanda, Rituxan May 1, 2019 IVIG also utilized                                    3.  Significant response on scans June 27, 2019, alternative therapy with Imbruvica planned, initiated July 25, 2019  4.  Patient seen February 12, 2020, continued control of CLL, discussed Rituxan, Imbruvica, sleep study and potential surgical assessment for hernia  5.  CT of chest per pulmonary August 26 with 1.6 cm spiculated nodule in the left upper lobe, 1.2 cm left adrenal nodule not present on comparison study, bone windows with soft tissue mass involving the right eighth rib measuring 3.7 x 2.6, biopsy positive for adenocarcinoma        HISTORY OF PRESENT ILLNESS:   The patient is a  65 y.o. Male  who was admitted on 4/28/2019 with worsening complaints of cough wheezing and shortness of breath.  He had been to an urgent care center and was started on antibiotics and received a shot of Solu-Medrol.  His symptoms did not improve and on his admission he was noted to have profound lymphocytosis with a total white count of 70,074% lymphocytes on his white cell differential.      He also underwent CT scan of the chest that showed infiltrates and nodules in the lungs as well as significant lymphadenopathy within the chest and in the axillary regions.  He had peripheral blood sent for flow cytometry leukemia lymphoma panel but this is still pending at time of this dictation.           He underwent bronchoscopy on 4/29/2019.  Results from this procedure are pendingl.  His respiratory viral panel was negative and markers of sepsis were unremarkable.      His peripheral blood flow cytometry and flow cytometry from the EBUS needle biopsy at bronchoscopy are both consistent with chronic lymphocytic  leukemia/small lymphocytic lymphoma.     The patient underwent a CT of abdomen and pelvis April 30 demonstrating extensive splenomegaly and bulky lymphadenopathy throughout the abdomen and pelvis.  There is a tiny lesion at the superior aspect lateral hepatic segment and 2 hyperenhancing foci within the liver thought to be hemangiomas, moderate size right inguinal hernia containing a long segment of small bowel without obstruction or incarceration.  CT of soft tissue again reveals extensive cervical adenopathy as well as evidence of pansinusitis.  The patient's case was discussed with Dr. Church and the patient has stage III-IV disease should be treated during this hospitalization.  I met with the patient and discussed in detail the use of Treanda/ Rituxan which we initiated Treanda Rituxan beginning May 1, 2019.     When he is seen May 2 he is already beginning to respond with reducing adenopathy and improved symptoms.  Plan to proceed with day #2.  We have also reviewed his findings including IgA 14, IgG of 263, IgM of 5 and a kappa/lambda ratio of 26.38.  He is hypogammaglobulinemic and replacement therapy will be given this hospitalization.  ENT assessment will also be requested.     The patient is again seen May 3, 2019, continuing to improve overall.  We did proceed with IVIG 400 mg/kg and had the patient seen by ENT after which the patient was discharged.  He indicates that Dr. Hamlin is going to see him back within the week as he is now seen back in our office May 16 and that surgery may be necessary.  The patient is also still having sinus symptoms.  Further he has developed a more extensive rash involving his abdomen chest and back and previously seen in hospital?.  Otherwise he feels well except for fatigue without fever, chills, nausea, vomiting, weight loss, stable appetite.     The patient went on to take 2 cycles of Treanda, Rituxan with a second cycle given June 6 and 7.  Additional assessments  included his dermatologist who felt he had a gradually improving dermatitis and would not require an therapy and ENT indicating that the patient was slowly improving per sinus symptoms the surgery may be necessary at a later point.  When he is reviewed May 30 he was neutropenic and we proceeded with IVIG second treatment on Elenita 10.  He underwent repeat scans June 27 demonstrating a significant reduction in adenopathy in the neck chest abdomen pelvis with index nodes in the neck previously 2 cm x 1.1 cm, chest with subcarinal lymph node conglomerate measuring 2.1 x 0.9 previously 5.8 x 2.7 and previously seen irregular pulmonary consolidation throughout bilateral lungs having nearly completely resolved.  Spleen is also reduced in size at 4.5 cm previously 18.6 cm and mesenteric nodes had similar reduced in size.  The patient's immunoglobulins were assessed June 27 with an IgG of 667, IgA of 20, IgM of 9 and IgE of 3.Additional information includes FISH analysis for CLL which is positive for deletion of 13 q. 14.3 It should be noted that such finding on FISH analysis generally suggest a favorable prognostic finding.  The patient is next seen July 3, 2019 feeling improved overall but still having a dermatologic issue with pruritus, erythema worsening particularly in the lower abdomen and upper groin.  Again his scans are considerably improved and we discussed, on the basis of the above information, changing his therapy now to Imbruvica.  The patient will return for teaching per Imbruvica July 11 the patient initiated treatment by July 25th 2019.  He was seen July 31 tolerating this well.  He was able to discontinue allopurinol and ferrous sulfate thereafter presents back August 28 having taken the medication over the last month.     He indicates that he is having no issues tolerating the medication and generally feels well except for sinus drainage.  He has had a cough and has a chest x-ray scheduled to primary care  August 29, 2019.  He has had no fever, chills, sweats, rash and has gained weight.  The patient is next seen November 20, 2019 continuing to generally improve.  His performance status remains excellent and has had no additional complications thus far with the use of Imbruvica or an additional infectious complications.  He has been seen by pulmonary medicine with reticular infiltrates noted on previous CAT scan on a follow-up study scheduled in the next several days.  This may be evolution towards recovery but a follow-up will be necessary.  Finally we have been able to obtain Imbruvica reasonably cost through foundation support.    The patient is next seen February 12, 2020 doing well without additional infectious complications and with stable findings hematologically.  We have discussed the fact that Rituxan could be added potentially to reduce the amount of time he remains on the medication but, additionally, further reduce his immunoglobulins.  Though this remains a concern he is also having difficulty with sleep-?  Sleep apnea, and worsening right direct inguinal hernia.    Plans for the patient to continue Imbruvica at dosing rechecking his immunoglobulins April 29 now with IVIG down to 371, IgM of 11, IgA level 32, kappa/lambda ratio of 1.51.  Fortunately continues to feel well without additional symptoms though is concerned about easy bruisability and periodic muscle tenderness after activity.  We have discussed his sleep assessment and he very likely has sleep apnea but does not wish to start CPAP at this point and is using oral medications-trazodone-to modest effect.    The patient is followed by pulmonary medicine.He was seen September 2 having had right-sided rib pain for 1 month, shortness of breath on going up stairs or uphill.  Follow-up chest CT revealed left upper lobe nodule 1.6 cm that was noted spiculated, additionally lesion per right rib with a soft tissue mass (3.7 x 2.6 cm) was recognized in  "the tissue biopsy was obtained.  This is now returned as positive for moderately differentiated adenocarcinoma.  This is CK7 positive, CK20 negative with a lung primary suspected clinically.  A molecular panel has not yet been obtained.  We have now, however, sent for foundation CDX analysis.    The patient is seen September 2020 with considerable right chest wall pain only modestly controlled with Toradol and ibuprofen.  He also has been taking additional Xanax to reduce the muscle spasm that he is experiencing.  I have advised him of the findings and their significance as being consistent with metastatic non-small cell lung cancer.  He is dismayed by the conversation but wishes to have pain control as quickly as possible as well as a cady discussion of treatment options.            Past Medical History, Past Surgical History, Social History, Family History have been reviewed and are without significant changes except as mentioned.    Review of Systems   Constitutional: Positive for unexpected weight change. Negative for fatigue.   HENT: Negative.    Respiratory: Negative.  Negative for cough, chest tightness, shortness of breath and wheezing.    Gastrointestinal: Negative for abdominal pain, constipation, diarrhea, nausea and vomiting.   Genitourinary: Negative.  Negative for testicular pain.   Musculoskeletal: Positive for arthralgias.        Patient particular with right lateral rib cage pain   Skin: Negative for rash.   Neurological: Negative.  Negative for weakness.   Psychiatric/Behavioral: Positive for sleep disturbance.          Medications:  The current medication list was reviewed in the EMR    ALLERGIES:  No Known Allergies    Objective      Vitals:    09/09/20 1525   BP: 148/70   Pulse: 87   Resp: 18   Temp: 98.6 °F (37 °C)   TempSrc: Temporal   SpO2: 92%   Weight: 88.9 kg (195 lb 14.4 oz)   Height: 180.3 cm (70.98\")   PainSc:   4   PainLoc: Rib Cage     Current Status 9/9/2020   ECOG score 0 "       Physical Exam    Constitutional: He is oriented to person, place, and time. He appears well-developed and well-nourished.  He is in moderate distress per pain involving his right lateral rib cage.  HENT:   Head: Normocephalic.   Eyes: Conjunctivae and EOM are normal. Pupils are equal, round, and reactive to light. No scleral icterus.   Neck: Normal range of motion. Neck supple. No JVD present. No thyromegaly present.   Cardiovascular:  Normal rate, regular rhythm.  present. Exam reveals no gallop and no friction rub.   No murmur heard.  Pulmonary/Chest:He has no rales.  The patient has a palpable mass approximately right seventh eighth rib location laterally measuring 6 x 8 cm and tender to palpati  Abdominal: Soft. He exhibits no distension and no mass. There is no tenderness.  He has bilateral hernias, apparently direct, right greater than left  Musculoskeletal: Normal range of motion. He exhibits no edema or deformity.   Lymphadenopathy: No current lymphadenopathy                                                Skin: Rash resolved                                              Neurological: He is alert and oriented to person, place, and time. He has normal reflexes. No cranial nerve deficit.   Skin: Skin is warm and dry.                         Psychiatric: He has a normal mood and affect. His behavior is normal. Judgment normal    RECENT LABS:  Hematology WBC   Date Value Ref Range Status   09/09/2020 7.77 3.40 - 10.80 10*3/mm3 Final     RBC   Date Value Ref Range Status   09/09/2020 4.36 4.14 - 5.80 10*6/mm3 Final     Hemoglobin   Date Value Ref Range Status   09/09/2020 13.5 13.0 - 17.7 g/dL Final     Hematocrit   Date Value Ref Range Status   09/09/2020 42.4 37.5 - 51.0 % Final     Platelets   Date Value Ref Range Status   09/09/2020 173 140 - 450 10*3/mm3 Final            Flow cytometry report on the peripheral blood as well as the lymph node biopsied at bronchoscopy are consistent with chronic  lymphocytic leukemia/small lymphocytic lymphoma.    FISH prognostic panel-Positive for deletion of 13 q. 14.3    CT CHEST WITHOUT CONTRAST 8/26/2020    FINDINGS: There is a 1.6 cm spiculated nodule within the left upper lobe  on image 168 suspicious for malignancy. Another consideration would  include infection given patient's history of CLL and presumed  immunosuppression. There is no evidence of suspicious lymphadenopathy.  There is trace right pleural effusion. Limited imaging of the upper  abdomen shows a small 1.2 cm left adrenal nodule, not present on the  comparison study. Bone windows show a soft tissue mass involving the  right eighth rib measuring about 3.7 x 2.6 cm.     IMPRESSION:  1. There is a new 1.6 cm spiculated nodule in the left upper lobe which  is suspicious for malignancy although could possibly reflect infection  given patient's history of CLL and presumed immunosuppression.  2. There is a soft tissue mass involving the right eighth rib measuring  3.7 x 2.6 cm also suspicious for malignancy, possibly a metastasis due  to the left upper lobe suspicious nodule. This would be very amenable  for biopsy.   3. There is a new small 1.2 cm left adrenal nodule, which may prove to  be metastatic disease.          Assessment/Plan   1.  CLL presenting with significant lymphocytosis, lymphadenopathy and splenomegaly.   Positive for deletion of 13 q. 14.3.  Patient status post 2 cycles of Treanda Rituxan with excellent response.  Reassessment July 3, 2019 with plans to switch Treanda to Imbruvica which began July 25, 2019, tolerated well. This continues to be the case.  We have discussed the possibility of adding Rituxan to shorten the time he is on  Imbruvica and we will continue to review this though the patient has hypogammaglobulinemia at this point and we do not wish to worsen this until we are more aware of how to manage COVID-19.  As he is assessed September 9, 2020 his CLL is under good control and  he will continue Imbruvica at this point at unchanged dosing.        2.  Pulmonary nodules/infiltrates.  He is  status post bronchoscopy.  Is unclear at this time whether these findings are infectious or possibly related to his lymphoproliferative disorder.  His subsequent studies in late November are much improved, followed by pulmonary.                                                                   He had follow-up scans performed August 26 revealing a new 1.6 cm spiculated nodule within the left upper lobe, 1.2 cm left adrenal nodule, bone windows with a soft tissue mass involving the right eighth rib measuring 3.7 x 2.6 cm.  Biopsy was consistent with adenocarcinoma CK 7+, CK 20-, lung primary suspected clinically, molecular panel not yet obtained.    3.  Iron deficiency-stable    4.  Hypogammaglobulinemia-status post IVIG with resolution of sinusitis, current levels again reviewed    5.  Need for additional pain control-Norco 10/325 1 every 4 hours as needed pain #100 has been E scribed to his pharmacy, Senokot-S 2 p.o. nightly also initiated to reduce constipation    6.  Xanax 0.25 mg 1 p.o. 3 times daily as needed #40 E scribed to pharmacy      Plan:  *Continue Imbruvica at current dosing      *PET/CT to determine extent of disease(s) next available    *Radiation therapy consultation for new diagnosis of non-small cell lung carcinoma- consideration of treating rib metastasis and primary    *[facility] CDX assessment of initial biopsy for non-small cell lung carcinoma.    Follow-up approximately 3 weeks from now

## 2020-09-04 ENCOUNTER — HOSPITAL ENCOUNTER (OUTPATIENT)
Dept: CT IMAGING | Facility: HOSPITAL | Age: 65
Discharge: HOME | End: 2020-09-04
Admitting: INTERNAL MEDICINE

## 2020-09-04 VITALS
WEIGHT: 196 LBS | SYSTOLIC BLOOD PRESSURE: 149 MMHG | DIASTOLIC BLOOD PRESSURE: 71 MMHG | HEIGHT: 71 IN | HEART RATE: 79 BPM | RESPIRATION RATE: 18 BRPM | BODY MASS INDEX: 27.44 KG/M2 | OXYGEN SATURATION: 96 % | TEMPERATURE: 98.6 F

## 2020-09-04 DIAGNOSIS — M89.9 RIB LESION: ICD-10-CM

## 2020-09-04 DIAGNOSIS — G47.33 OBSTRUCTIVE SLEEP APNEA: Primary | ICD-10-CM

## 2020-09-04 DIAGNOSIS — R07.81 RIB PAIN ON RIGHT SIDE: ICD-10-CM

## 2020-09-04 LAB
INR PPP: 1 (ref 0.8–1.2)
PLATELET # BLD AUTO: 134 10*3/MM3 (ref 140–450)
PROTHROMBIN TIME: 11.7 SECONDS (ref 12.8–15.2)

## 2020-09-04 PROCEDURE — 88341 IMHCHEM/IMCYTCHM EA ADD ANTB: CPT | Performed by: INTERNAL MEDICINE

## 2020-09-04 PROCEDURE — 88307 TISSUE EXAM BY PATHOLOGIST: CPT | Performed by: INTERNAL MEDICINE

## 2020-09-04 PROCEDURE — 85049 AUTOMATED PLATELET COUNT: CPT | Performed by: RADIOLOGY

## 2020-09-04 PROCEDURE — 88360 TUMOR IMMUNOHISTOCHEM/MANUAL: CPT

## 2020-09-04 PROCEDURE — 25010000003 LIDOCAINE 1 % SOLUTION: Performed by: INTERNAL MEDICINE

## 2020-09-04 PROCEDURE — 85610 PROTHROMBIN TIME: CPT

## 2020-09-04 PROCEDURE — 77012 CT SCAN FOR NEEDLE BIOPSY: CPT

## 2020-09-04 PROCEDURE — 88342 IMHCHEM/IMCYTCHM 1ST ANTB: CPT | Performed by: INTERNAL MEDICINE

## 2020-09-04 RX ORDER — TRAMADOL HYDROCHLORIDE 50 MG/1
50 TABLET ORAL EVERY 6 HOURS PRN
Qty: 50 TABLET | Refills: 0 | Status: SHIPPED | OUTPATIENT
Start: 2020-09-04 | End: 2020-10-04

## 2020-09-04 RX ORDER — LIDOCAINE HYDROCHLORIDE 10 MG/ML
20 INJECTION, SOLUTION INFILTRATION; PERINEURAL ONCE
Status: COMPLETED | OUTPATIENT
Start: 2020-09-04 | End: 2020-09-04

## 2020-09-04 RX ADMIN — LIDOCAINE HYDROCHLORIDE 20 ML: 10 INJECTION, SOLUTION INFILTRATION; PERINEURAL at 08:41

## 2020-09-04 NOTE — NURSING NOTE
Dr. Damon called regarding 5x5 area of soft bruising, surrounding biopsy site. Dr. Damon out to view site, new band aid and pressure dressing applied as instructed per Dr. Damon. Dr. Damon OK for patient to be discharged.

## 2020-09-04 NOTE — DISCHARGE INSTRUCTIONS
EDUCATION /DISCHARGE INSTRUCTIONS  CT/US guided biopsy:  A biopsy is a procedure done to remove tissue for further analysis.  Before images are taken to locate the target area.  Images can be obtained using ultrasound, CT or MRI.  A physician will clean your skin with antiseptic soap, place a sterile towel around the site and administer a local anesthetic to numb the area.  The physician will then insert a special needle.  Sometimes images are taken of the needle after it is inserted to ensure the needle is in the correct area to be biopsied.   A sample is obtained and sent to the laboratory for study.  Occasionally the laboratory is unable to make a diagnosis from the sample and the procedure may need to be repeated.  Within a week the radiologist will send a report to your physician.  A pathologist will also examine the tissue and send a report.    Risks of the procedure include but are not limited to:   *  Bleeding    *  Infection   *  Puncture of surrounding organs *  Death     *  Lung collapse if the biopsy is near the chest which may require insertion of a      chest tube to re-inflate the lung if severe.    Benefits of the procedure:  Using x-ray helps to locate the area that requires a biopsy. The procedure is less invasive than a surgical procedure, there are no large incisions and it does not require anesthesia.    Alternatives to the procedure:  A biopsy can be performed surgically.  Risks of a surgical biopsy include exposure to anesthesia, infection, excessive bleeding and injury to abdominal organs.  A benefit of surgical biopsy is the ability to see the area to be biopsied and remove of a larger piece of tissue.    THIS EDUCATION INFORMATION WAS REVIEWED PRIOR TO PROCEDURE AND CONSENT. Patient initials__________________Time____0801_______________    Post Procedure:    *  Expect the biopsy site may be tender up to one week.    *  Rest today (no pushing pulling or straining).   *  Slowly increase  activity tomorrow.    *  If you received sedation do not drive for 24 hours.   *  Keep dressing clean and dry.   *  Leave dressing on puncture site for 24 hours.    *  You may shower when dressing removed.  Call your doctor if experiencing:   *  Signs of infection such as redness, swelling, excessive pain and / or foul        smelling drainage from the puncture site.   *  Chills or fever over 101 degrees (by mouth).   *  Unrelieved pain.   *  Any new or severe symptoms.   *  If experiencing sudden / severe shortness of breath or chest pain go to the       nearest emergency room.   Following the procedure:     Follow-up with the ordering physician as directed.    Continue to take other medications as directed by your physician unless    otherwise instructed.   If applicable, resume taking your blood thinners or Aspirin on ___09/05/2020 1000________.    If you have any concerns please call the Radiology Nurses Desk at [phone].  You are the most important factor in your recovery.  Follow the above instructions carefully.

## 2020-09-04 NOTE — H&P
Above note reviewed. The patient was examined and there are no changes.    Reviewed H&P by Dr. Dove dated 9/2/2020

## 2020-09-09 ENCOUNTER — OFFICE VISIT (OUTPATIENT)
Dept: ONCOLOGY | Facility: CLINIC | Age: 65
End: 2020-09-09

## 2020-09-09 ENCOUNTER — MEDICATION THERAPY MANAGEMENT (OUTPATIENT)
Dept: PHARMACY | Facility: HOSPITAL | Age: 65
End: 2020-09-09

## 2020-09-09 ENCOUNTER — SPECIALTY PHARMACY (OUTPATIENT)
Dept: ONCOLOGY | Facility: CLINIC | Age: 65
End: 2020-09-09

## 2020-09-09 ENCOUNTER — LAB (OUTPATIENT)
Dept: LAB | Facility: HOSPITAL | Age: 65
End: 2020-09-09

## 2020-09-09 VITALS
HEIGHT: 71 IN | TEMPERATURE: 98.6 F | WEIGHT: 195.9 LBS | RESPIRATION RATE: 18 BRPM | DIASTOLIC BLOOD PRESSURE: 70 MMHG | SYSTOLIC BLOOD PRESSURE: 148 MMHG | HEART RATE: 87 BPM | BODY MASS INDEX: 27.43 KG/M2 | OXYGEN SATURATION: 92 %

## 2020-09-09 VITALS — BODY MASS INDEX: 27.28 KG/M2 | WEIGHT: 195.5 LBS

## 2020-09-09 DIAGNOSIS — D50.9 IRON DEFICIENCY ANEMIA, UNSPECIFIED IRON DEFICIENCY ANEMIA TYPE: Primary | ICD-10-CM

## 2020-09-09 DIAGNOSIS — C91.10 CLL (CHRONIC LYMPHOCYTIC LEUKEMIA) (HCC): Primary | ICD-10-CM

## 2020-09-09 DIAGNOSIS — C34.90 LUNG CANCER METASTATIC TO BONE (HCC): ICD-10-CM

## 2020-09-09 DIAGNOSIS — C34.12 MALIGNANT NEOPLASM OF UPPER LOBE OF LEFT LUNG (HCC): ICD-10-CM

## 2020-09-09 DIAGNOSIS — C79.51 LUNG CANCER METASTATIC TO BONE (HCC): ICD-10-CM

## 2020-09-09 LAB
ALBUMIN SERPL-MCNC: 4.2 G/DL (ref 3.5–5.2)
ALBUMIN/GLOB SERPL: 2 G/DL (ref 1.1–2.4)
ALP SERPL-CCNC: 87 U/L (ref 38–116)
ALT SERPL W P-5'-P-CCNC: 9 U/L (ref 0–41)
ANION GAP SERPL CALCULATED.3IONS-SCNC: 10.7 MMOL/L (ref 5–15)
AST SERPL-CCNC: 12 U/L (ref 0–40)
BASOPHILS # BLD AUTO: 0.06 10*3/MM3 (ref 0–0.2)
BASOPHILS NFR BLD AUTO: 0.8 % (ref 0–1.5)
BILIRUB SERPL-MCNC: 0.5 MG/DL (ref 0.2–1.2)
BUN SERPL-MCNC: 22 MG/DL (ref 6–20)
BUN/CREAT SERPL: 14.9 (ref 7.3–30)
CALCIUM SPEC-SCNC: 9.3 MG/DL (ref 8.5–10.2)
CHLORIDE SERPL-SCNC: 103 MMOL/L (ref 98–107)
CO2 SERPL-SCNC: 24.3 MMOL/L (ref 22–29)
CREAT SERPL-MCNC: 1.48 MG/DL (ref 0.7–1.3)
DEPRECATED RDW RBC AUTO: 45.2 FL (ref 37–54)
EOSINOPHIL # BLD AUTO: 0.1 10*3/MM3 (ref 0–0.4)
EOSINOPHIL NFR BLD AUTO: 1.3 % (ref 0.3–6.2)
ERYTHROCYTE [DISTWIDTH] IN BLOOD BY AUTOMATED COUNT: 12.6 % (ref 12.3–15.4)
GFR SERPL CREATININE-BSD FRML MDRD: 48 ML/MIN/1.73
GLOBULIN UR ELPH-MCNC: 2.1 GM/DL (ref 1.8–3.5)
GLUCOSE SERPL-MCNC: 91 MG/DL (ref 74–124)
HCT VFR BLD AUTO: 42.4 % (ref 37.5–51)
HGB BLD-MCNC: 13.5 G/DL (ref 13–17.7)
IMM GRANULOCYTES # BLD AUTO: 0.04 10*3/MM3 (ref 0–0.05)
IMM GRANULOCYTES NFR BLD AUTO: 0.5 % (ref 0–0.5)
LYMPHOCYTES # BLD AUTO: 0.69 10*3/MM3 (ref 0.7–3.1)
LYMPHOCYTES NFR BLD AUTO: 8.9 % (ref 19.6–45.3)
MCH RBC QN AUTO: 31 PG (ref 26.6–33)
MCHC RBC AUTO-ENTMCNC: 31.8 G/DL (ref 31.5–35.7)
MCV RBC AUTO: 97.2 FL (ref 79–97)
MONOCYTES # BLD AUTO: 0.86 10*3/MM3 (ref 0.1–0.9)
MONOCYTES NFR BLD AUTO: 11.1 % (ref 5–12)
NEUTROPHILS NFR BLD AUTO: 6.02 10*3/MM3 (ref 1.7–7)
NEUTROPHILS NFR BLD AUTO: 77.4 % (ref 42.7–76)
NRBC BLD AUTO-RTO: 0 /100 WBC (ref 0–0.2)
PLATELET # BLD AUTO: 173 10*3/MM3 (ref 140–450)
PMV BLD AUTO: 12.2 FL (ref 6–12)
POTASSIUM SERPL-SCNC: 4.4 MMOL/L (ref 3.5–4.7)
PROT SERPL-MCNC: 6.3 G/DL (ref 6.3–8)
RBC # BLD AUTO: 4.36 10*6/MM3 (ref 4.14–5.8)
SODIUM SERPL-SCNC: 138 MMOL/L (ref 134–145)
WBC # BLD AUTO: 7.77 10*3/MM3 (ref 3.4–10.8)

## 2020-09-09 PROCEDURE — 80053 COMPREHEN METABOLIC PANEL: CPT

## 2020-09-09 PROCEDURE — 99215 OFFICE O/P EST HI 40 MIN: CPT | Performed by: INTERNAL MEDICINE

## 2020-09-09 PROCEDURE — 85025 COMPLETE CBC W/AUTO DIFF WBC: CPT

## 2020-09-09 PROCEDURE — 36415 COLL VENOUS BLD VENIPUNCTURE: CPT

## 2020-09-09 RX ORDER — IBUPROFEN 200 MG
200 TABLET ORAL EVERY 6 HOURS PRN
Status: ON HOLD | COMMUNITY
End: 2020-11-15

## 2020-09-09 RX ORDER — HYDROCODONE BITARTRATE AND ACETAMINOPHEN 10; 325 MG/1; MG/1
1 TABLET ORAL EVERY 4 HOURS PRN
Qty: 100 TABLET | Refills: 0 | Status: SHIPPED | OUTPATIENT
Start: 2020-09-09 | End: 2020-09-28 | Stop reason: SDUPTHER

## 2020-09-09 RX ORDER — LIDOCAINE 50 MG/G
1 PATCH TOPICAL
COMMUNITY
Start: 2020-09-04 | End: 2020-10-13

## 2020-09-09 RX ORDER — ALPRAZOLAM 0.25 MG/1
0.25 TABLET ORAL NIGHTLY PRN
COMMUNITY
End: 2020-09-09 | Stop reason: SDUPTHER

## 2020-09-09 RX ORDER — AMOXICILLIN 250 MG
2 CAPSULE ORAL DAILY
Qty: 60 TABLET | Refills: 2 | Status: SHIPPED | OUTPATIENT
Start: 2020-09-09 | End: 2020-12-30

## 2020-09-09 RX ORDER — ALPRAZOLAM 0.25 MG/1
0.25 TABLET ORAL 3 TIMES DAILY PRN
Qty: 60 TABLET | Refills: 0 | Status: SHIPPED | OUTPATIENT
Start: 2020-09-09 | End: 2020-10-08

## 2020-09-09 NOTE — PROGRESS NOTES
MTM in person follow up- Lupis    Dav is doing okay. He admits that he is in quite a bit of pain. He is using lidocaine patches, tramadol, ibuprofen, and xanax to help. Dav will discuss this with MD today. He reports no major issues or side effects with Imbruvica. Medication administration and adherence seem appropriate; Dav admits to missing about 1 dose per month. Dav has no additional questions or concerns for me today. Pharmacy will continue to follow.    Thanks,   Jessenia Galvez, PharmD

## 2020-09-10 ENCOUNTER — MEDICATION THERAPY MANAGEMENT (OUTPATIENT)
Dept: PHARMACY | Facility: HOSPITAL | Age: 65
End: 2020-09-10

## 2020-09-10 NOTE — PROGRESS NOTES
[facility] Lab Review- [facility]      9/9/2020   WBC 3.40 - 10.80 10*3/mm3 7.77   Neutrophils Absolute 1.70 - 7.00 10*3/mm3 6.02   Hemoglobin 13.0 - 17.7 g/dL 13.5   Hematocrit 37.5 - 51.0 % 42.4   Platelets 140 - 450 10*3/mm3 173   Creatinine 0.70 - 1.30 mg/dL 1.48 (A)   eGFR Non African Am >60 mL/min/1.73 48 (A)   BUN 6 - 20 mg/dL 22 (A)   Sodium 134 - 145 mmol/L 138   Potassium 3.5 - 4.7 mmol/L 4.4   Glucose 74 - 124 mg/dL 91   Calcium 8.5 - 10.2 mg/dL 9.3   Albumin 3.50 - 5.20 g/dL 4.20   Total Protein 6.3 - 8.0 g/dL 6.3   AST (SGOT) 0 - 40 U/L 12   ALT (SGPT) 0 - 41 U/L 9   Alkaline Phosphatase 38 - 116 U/L 87   Total Bilirubin 0.2 - 1.2 mg/dL 0.5     MD dictation noted- Norco was sent into his pharmacy.     Thanks,   Jessenia Galvez, PharmD

## 2020-09-23 ENCOUNTER — HOSPITAL ENCOUNTER (OUTPATIENT)
Dept: PET IMAGING | Facility: HOSPITAL | Age: 65
Discharge: HOME | End: 2020-09-23

## 2020-09-23 DIAGNOSIS — C79.51 LUNG CANCER METASTATIC TO BONE (HCC): ICD-10-CM

## 2020-09-23 DIAGNOSIS — C34.12 MALIGNANT NEOPLASM OF UPPER LOBE OF LEFT LUNG (HCC): ICD-10-CM

## 2020-09-23 DIAGNOSIS — C34.90 LUNG CANCER METASTATIC TO BONE (HCC): ICD-10-CM

## 2020-09-23 DIAGNOSIS — C91.10 CLL (CHRONIC LYMPHOCYTIC LEUKEMIA) (HCC): ICD-10-CM

## 2020-09-23 LAB
CYTO UR: NORMAL
GLUCOSE BLDC GLUCOMTR-MCNC: 106 MG/DL (ref 70–130)
LAB AP CASE REPORT: NORMAL
LAB AP CLINICAL INFORMATION: NORMAL
LAB AP DIAGNOSIS COMMENT: NORMAL
LAB AP SPECIAL STAINS: NORMAL
Lab: NORMAL
Lab: NORMAL
PATH REPORT.ADDENDUM SPEC: NORMAL
PATH REPORT.FINAL DX SPEC: NORMAL
PATH REPORT.GROSS SPEC: NORMAL

## 2020-09-23 PROCEDURE — A9552 F18 FDG: HCPCS | Performed by: INTERNAL MEDICINE

## 2020-09-23 PROCEDURE — 0 FLUDEOXYGLUCOSE F18 SOLUTION: Performed by: INTERNAL MEDICINE

## 2020-09-23 PROCEDURE — 82962 GLUCOSE BLOOD TEST: CPT

## 2020-09-23 PROCEDURE — 78815 PET IMAGE W/CT SKULL-THIGH: CPT

## 2020-09-23 RX ADMIN — FLUDEOXYGLUCOSE F18 1 DOSE: 300 INJECTION INTRAVENOUS at 08:57

## 2020-09-23 NOTE — PROGRESS NOTES
Subjective  Discussed treatment plan.      REASON FOR FOLLOW UP:  1.  Chronic lymphocytic leukemia with extensive lymphadenopathy and possible pleural involvement.  2.  Initiation of Treanda, Rituxan May 1, 2019 IVIG also utilized                                    3.  Significant response on scans June 27, 2019, alternative therapy with Imbruvica planned, initiated July 25, 2019  4.  Patient seen February 12, 2020, continued control of CLL, discussed Rituxan, Imbruvica, sleep study and potential surgical assessment for hernia  5.  CT of chest per pulmonary August 26 with 1.6 cm spiculated nodule in the left upper lobe, 1.2 cm left adrenal nodule not present on comparison study, bone windows with soft tissue mass involving the right eighth rib measuring 3.7 x 2.6, biopsy positive for adenocarcinoma  6.  Patient assessed September 30, plans for palliative radiation therapy, port placement, Keytruda considering PDL 1 positivity and high TMB status        HISTORY OF PRESENT ILLNESS:   The patient is a  65 y.o. Male  who was admitted on 4/28/2019 with worsening complaints of cough wheezing and shortness of breath.  He had been to an urgent care center and was started on antibiotics and received a shot of Solu-Medrol.  His symptoms did not improve and on his admission he was noted to have profound lymphocytosis with a total white count of 70,074% lymphocytes on his white cell differential.      He also underwent CT scan of the chest that showed infiltrates and nodules in the lungs as well as significant lymphadenopathy within the chest and in the axillary regions.  He had peripheral blood sent for flow cytometry leukemia lymphoma panel but this is still pending at time of this dictation.           He underwent bronchoscopy on 4/29/2019.  Results from this procedure are pendingl.  His respiratory viral panel was negative and markers of sepsis were unremarkable.      His peripheral blood flow cytometry and flow cytometry  from the EBUS needle biopsy at bronchoscopy are both consistent with chronic lymphocytic leukemia/small lymphocytic lymphoma.     The patient underwent a CT of abdomen and pelvis April 30 demonstrating extensive splenomegaly and bulky lymphadenopathy throughout the abdomen and pelvis.  There is a tiny lesion at the superior aspect lateral hepatic segment and 2 hyperenhancing foci within the liver thought to be hemangiomas, moderate size right inguinal hernia containing a long segment of small bowel without obstruction or incarceration.  CT of soft tissue again reveals extensive cervical adenopathy as well as evidence of pansinusitis.  The patient's case was discussed with Dr. Church and the patient has stage III-IV disease should be treated during this hospitalization.  I met with the patient and discussed in detail the use of Treanda/ Rituxan which we initiated Treanda Rituxan beginning May 1, 2019.     When he is seen May 2 he is already beginning to respond with reducing adenopathy and improved symptoms.  Plan to proceed with day #2.  We have also reviewed his findings including IgA 14, IgG of 263, IgM of 5 and a kappa/lambda ratio of 26.38.  He is hypogammaglobulinemic and replacement therapy will be given this hospitalization.  ENT assessment will also be requested.     The patient is again seen May 3, 2019, continuing to improve overall.  We did proceed with IVIG 400 mg/kg and had the patient seen by ENT after which the patient was discharged.  He indicates that Dr. Hamlin is going to see him back within the week as he is now seen back in our office May 16 and that surgery may be necessary.  The patient is also still having sinus symptoms.  Further he has developed a more extensive rash involving his abdomen chest and back and previously seen in hospital?.  Otherwise he feels well except for fatigue without fever, chills, nausea, vomiting, weight loss, stable appetite.     The patient went on to take 2 cycles of  Treanda, Rituxan with a second cycle given June 6 and 7.  Additional assessments included his dermatologist who felt he had a gradually improving dermatitis and would not require an therapy and ENT indicating that the patient was slowly improving per sinus symptoms the surgery may be necessary at a later point.  When he is reviewed May 30 he was neutropenic and we proceeded with IVIG second treatment on Elenita 10.  He underwent repeat scans June 27 demonstrating a significant reduction in adenopathy in the neck chest abdomen pelvis with index nodes in the neck previously 2 cm x 1.1 cm, chest with subcarinal lymph node conglomerate measuring 2.1 x 0.9 previously 5.8 x 2.7 and previously seen irregular pulmonary consolidation throughout bilateral lungs having nearly completely resolved.  Spleen is also reduced in size at 4.5 cm previously 18.6 cm and mesenteric nodes had similar reduced in size.  The patient's immunoglobulins were assessed June 27 with an IgG of 667, IgA of 20, IgM of 9 and IgE of 3.Additional information includes FISH analysis for CLL which is positive for deletion of 13 q. 14.3 It should be noted that such finding on FISH analysis generally suggest a favorable prognostic finding.  The patient is next seen July 3, 2019 feeling improved overall but still having a dermatologic issue with pruritus, erythema worsening particularly in the lower abdomen and upper groin.  Again his scans are considerably improved and we discussed, on the basis of the above information, changing his therapy now to Imbruvica.  The patient will return for teaching per Imbruvica July 11 the patient initiated treatment by July 25th 2019.  He was seen July 31 tolerating this well.  He was able to discontinue allopurinol and ferrous sulfate thereafter presents back August 28 having taken the medication over the last month.     He indicates that he is having no issues tolerating the medication and generally feels well except for  sinus drainage.  He has had a cough and has a chest x-ray scheduled to primary care August 29, 2019.  He has had no fever, chills, sweats, rash and has gained weight.  The patient is next seen November 20, 2019 continuing to generally improve.  His performance status remains excellent and has had no additional complications thus far with the use of Imbruvica or an additional infectious complications.  He has been seen by pulmonary medicine with reticular infiltrates noted on previous CAT scan on a follow-up study scheduled in the next several days.  This may be evolution towards recovery but a follow-up will be necessary.  Finally we have been able to obtain Imbruvica reasonably cost through foundation support.    The patient is next seen February 12, 2020 doing well without additional infectious complications and with stable findings hematologically.  We have discussed the fact that Rituxan could be added potentially to reduce the amount of time he remains on the medication but, additionally, further reduce his immunoglobulins.  Though this remains a concern he is also having difficulty with sleep-?  Sleep apnea, and worsening right direct inguinal hernia.    Plans for the patient to continue Imbruvica at dosing rechecking his immunoglobulins April 29 now with IVIG down to 371, IgM of 11, IgA level 32, kappa/lambda ratio of 1.51.  Fortunately continues to feel well without additional symptoms though is concerned about easy bruisability and periodic muscle tenderness after activity.  We have discussed his sleep assessment and he very likely has sleep apnea but does not wish to start CPAP at this point and is using oral medications-trazodone-to modest effect.    The patient is followed by pulmonary medicine.He was seen September 2 having had right-sided rib pain for 1 month, shortness of breath on going up stairs or uphill.  Follow-up chest CT revealed left upper lobe nodule 1.6 cm that was noted spiculated,  additionally lesion per right rib with a soft tissue mass (3.7 x 2.6 cm) was recognized in the tissue biopsy was obtained.  This is now returned as positive for moderately differentiated adenocarcinoma.  This is CK7 positive, CK20 negative with a lung primary suspected clinically.  A molecular panel has not yet been obtained.  We have now, however, sent for foundation CDX analysis.    The patient is seen September 2020 with considerable right chest wall pain only modestly controlled with Toradol and ibuprofen.  He also has been taking additional Xanax to reduce the muscle spasm that he is experiencing.  I have advised him of the findings and their significance as being consistent with metastatic non-small cell lung cancer.  He is dismayed by the conversation but wishes to have pain control as quickly as possible as well as a cady discussion of treatment options.    The patient was provided additional anti-pain and antianxiety medications.  A PET/CT was obtained September 23 demonstrating asymmetric uptake within the right parotid gland which is unremarkable appearance on noncontrasted CT, SUV of 6 compared to 2.7.  There is no hilar, mediastinal or axillary adenopathy, findings of asymmetric moderate to intense FDG uptake within superior aspect the right chest wall anterior to the right first rib with an irregular hypodense lesion measuring 1.8 x 1.6 and SUV 4.9.  This had not been seen June 27, 2019.  There is an intensely FDG avid nodule within the lingula measuring 1.9 x 1.5 history of 12.4, FDG avid left adrenal nodule 1.9 x 1.5 with an issue 21.2.  The patient was seen by radiation therapy September 29 and started on radiation therapy to the right chest wall-35 Gy in 10 fractions.  Plans were also then proceed with SBRT to the solitary left upper lobe lesion pending insurance approval.  Further testing including TPS of 20% and foundation CDX with a TMB of greater than 10 mutations per megabase (28 mutations  per megabase) and the indication that several immunotherapies could be useful in this patient's care.  Additional genomic findings include BRAF G469R/that trametinib could be useful and STK11/that everolimus could be useful.      The patient is seen back September 30, 2020 indicating that his pain has improved substantially with his current pain medications.  He also continues laxatives on a regular basis.  Radiation therapy will begin October 1, 2020 as described above and we have discussed on the basis of the findings per his genomic testing that he is a candidate for a number of additional treatment approaches.  Considering he does not have significant organ involvement and that he has a positive PDL 1 at 20% and a TMB 28 mutations per megabase it would seem reasonable (particularly with his history of CLL) to try to use immunotherapy as monotherapy initially.  This might provide control and allow us not to affect his hematologic illness in any significant way.  We discussed that he will need a PowerPort placement in the near future but that we could start the K. Trudo once a, hopefully, is available in the next 2 to 3 weeks.            Past Medical History, Past Surgical History, Social History, Family History have been reviewed and are without significant changes except as mentioned.    Review of Systems   Constitutional: Positive for unexpected weight change. Negative for fatigue.   HENT: Negative.    Eyes: Negative.    Respiratory: Negative.  Negative for cough, chest tightness, shortness of breath and wheezing.    Gastrointestinal: Positive for constipation. Negative for abdominal pain, diarrhea, nausea and vomiting.   Endocrine: Negative.    Genitourinary: Negative.  Negative for testicular pain.   Musculoskeletal: Positive for arthralgias.        Patient particular with right lateral rib cage pain   Skin: Negative.  Negative for rash.   Allergic/Immunologic: Negative.    Neurological: Negative.  Negative  "for weakness.   Psychiatric/Behavioral: Positive for sleep disturbance.          Medications:  The current medication list was reviewed in the EMR    ALLERGIES:  No Known Allergies    Objective      Vitals:    09/30/20 1350   BP: 140/77   Pulse: 90   Resp: 18   Temp: 97.7 °F (36.5 °C)   TempSrc: Temporal   SpO2: 97%   Weight: 85.2 kg (187 lb 12.8 oz)   Height: 180.3 cm (70.98\")   PainSc:   3   PainLoc: Rib Cage  Comment: right side     Current Status 9/30/2020   ECOG score 0       Physical Exam    Constitutional: He is oriented to person, place, and time. He appears well-developed and well-nourished.  He is in moderate distress per pain involving his right lateral rib cage.  HENT:   Head: Normocephalic.   Eyes: Conjunctivae and EOM are normal. Pupils are equal, round, and reactive to light. No scleral icterus.   Neck: Normal range of motion. Neck supple. No JVD present. No thyromegaly present.   Cardiovascular:  Normal rate, regular rhythm.  present. Exam reveals no gallop and no friction rub.   No murmur heard.  Pulmonary/Chest:He has no rales.  The patient has a palpable mass approximately right seventh eighth rib location laterally measuring 6 x 8 cm and tender to palpati  Abdominal: Soft. He exhibits no distension and no mass. There is no tenderness.  He has bilateral hernias, apparently direct, right greater than left  Musculoskeletal: Normal range of motion. He exhibits no edema or deformity.   Lymphadenopathy: No current lymphadenopathy      Skin: Rash resolved                                              Neurological: He is alert and oriented to person, place, and time. He has normal reflexes. No cranial nerve deficit.   Skin: Skin is warm and dry.                         Psychiatric: He has a normal mood and affect. His behavior is normal. Judgment normal    RECENT LABS:  Hematology  WBC   Date Value Ref Range Status   09/09/2020 7.77 3.40 - 10.80 10*3/mm3 Final     RBC   Date Value Ref Range Status "   09/09/2020 4.36 4.14 - 5.80 10*6/mm3 Final     Hemoglobin   Date Value Ref Range Status   09/09/2020 13.5 13.0 - 17.7 g/dL Final     Hematocrit   Date Value Ref Range Status   09/09/2020 42.4 37.5 - 51.0 % Final     MCV   Date Value Ref Range Status   09/09/2020 97.2 (H) 79.0 - 97.0 fL Final     MCH   Date Value Ref Range Status   09/09/2020 31.0 26.6 - 33.0 pg Final     MCHC   Date Value Ref Range Status   09/09/2020 31.8 31.5 - 35.7 g/dL Final     RDW   Date Value Ref Range Status   09/09/2020 12.6 12.3 - 15.4 % Final     RDW-SD   Date Value Ref Range Status   09/09/2020 45.2 37.0 - 54.0 fl Final     MPV   Date Value Ref Range Status   09/09/2020 12.2 (H) 6.0 - 12.0 fL Final     Platelets   Date Value Ref Range Status   09/09/2020 173 140 - 450 10*3/mm3 Final     Neutrophil %   Date Value Ref Range Status   09/09/2020 77.4 (H) 42.7 - 76.0 % Final     Lymphocyte %   Date Value Ref Range Status   09/09/2020 8.9 (L) 19.6 - 45.3 % Final     Monocyte %   Date Value Ref Range Status   09/09/2020 11.1 5.0 - 12.0 % Final     Eosinophil %   Date Value Ref Range Status   09/09/2020 1.3 0.3 - 6.2 % Final     Basophil %   Date Value Ref Range Status   09/09/2020 0.8 0.0 - 1.5 % Final     Immature Grans %   Date Value Ref Range Status   09/09/2020 0.5 0.0 - 0.5 % Final     Neutrophils, Absolute   Date Value Ref Range Status   09/09/2020 6.02 1.70 - 7.00 10*3/mm3 Final     Lymphocytes, Absolute   Date Value Ref Range Status   09/09/2020 0.69 (L) 0.70 - 3.10 10*3/mm3 Final     Monocytes, Absolute   Date Value Ref Range Status   09/09/2020 0.86 0.10 - 0.90 10*3/mm3 Final     Eosinophils, Absolute   Date Value Ref Range Status   09/09/2020 0.10 0.00 - 0.40 10*3/mm3 Final     Basophils, Absolute   Date Value Ref Range Status   09/09/2020 0.06 0.00 - 0.20 10*3/mm3 Final     Immature Grans, Absolute   Date Value Ref Range Status   09/09/2020 0.04 0.00 - 0.05 10*3/mm3 Final     nRBC   Date Value Ref Range Status   09/09/2020 0.0 0.0  - 0.2 /100 WBC Final      No results found for: WBC, RBC, HGB, HCT, PLT         Flow cytometry report on the peripheral blood as well as the lymph node biopsied at bronchoscopy are consistent with chronic lymphocytic leukemia/small lymphocytic lymphoma.    FISH prognostic panel-Positive for deletion of 13 q. 14.3    F-18 FDG PET FROM SKULL BASE TO MID THIGH WITH PET/CT FUSION 9/23/2020      FINDINGS:   There is no cervical adenopathy demonstrating FDG uptake significantly  above that of blood pool. The thyroid and submandibular glands  demonstrate roughly symmetric FDG uptake. There is asymmetric FDG uptake  within the right parotid gland which has an otherwise unremarkable  appearance on noncontrast CT. It demonstrates a max SUV of 6 compared to  2.7 on the left.     There is no hilar, mediastinal or axillary adenopathy demonstrating FDG  uptake significantly above that of blood pool.      Asymmetric moderate to intense FDG uptake is present within the superior  aspect of the right chest wall just anterior to the right 1st rib with  associated irregular hypodense lesion on noncontrast CT measuring 1.8 x  1.6 cm with a max SUV of 4.9 cm. These findings were not visualized on  06/27/2019.     The heart is normal in size. Small-to-moderate right pleural effusion is  present. There is an intensely FDG avid nodule within the lingula  measuring 1.9 x 1.5 cm and demonstrating max SUV of 12.4. Ground glass  opacification extends from the periphery of this lesion. Sub-6 mm  pulmonary nodules within the left lower lobe have decreased in size  since 04/28/2019.     There is a small hiatal hernia. Of note the appendix extends into a  right inguinal hernia.     There is no focal FDG avid abnormality within the liver, gallbladder,  pancreas, spleen or adrenal glands. There is no hydronephrosis.     Intensely FDG avid left adrenal nodule measures 1.9 x 1.5 cm, is new  since 06/27/2019 and demonstrates a max SUV of 21.2.     There  is no abdominopelvic adenopathy demonstrating FDG uptake  significantly above that of blood pool. Focus of moderate to intense FDG  uptake is present within the distal rectum/anus demonstrating max SUV of  11.2.     There is no free intraperitoneal air or fluid.     Focus of sclerosis within the right ischium is present and grossly  unchanged since 06/27/2019. It does not demonstrate FDG uptake  significantly above that of surrounding marrow and likely represents a  bone island. Intensely FDG avid soft tissue mass centered over the right  8th rib with associated destruction is present and measures 7 x 6.3 cm  with soft tissue extension into the pleura and chest wall. It  demonstrates a max SUV of 27.6.     IMPRESSION:  1.  Intensely FDG avid left upper lobe pulmonary nodule likely  representing patient's known primary malignancy. Ground glass  attenuation extending from the periphery of this nodule is favored to  represent atelectasis; however lymphangitic spread of disease could  appear similar and continued close attention on follow-up is  recommended.  2.  Intensely FDG avid mass centered over the right 8th rib with  associated soft tissue extension and cortical destruction and intensely  FDG avid left adrenal nodule likely representing metastatic disease.  3.  Small-to-moderate right pleural effusion is present which given the  pleural invasion by the right eighth rib lesion raises concern for a  malignant effusion.  4.  Moderate to intense asymmetric FDG uptake within the superior aspect  of the right chest wall just anterior to the right 8th rib with  associated hypodense lesion which is favored to represent an  intramuscular metastasis. Reactive changes are less likely.  5.  Focal FDG uptake within the distal rectum/anus. While findings are  nonspecific, please ensure this patient is currently up-to-date on  recommended colonoscopic screening. If this patient has not received a  recent colonoscopy per current  guidelines, recommend correlation with  colonoscopy.  6.  Other findings as above     This report was finalized on 9/25/2020 7:58 AM by Dr. Aydin Enriquez M.D.    Assessment/Plan   1.  CLL presenting with significant lymphocytosis, lymphadenopathy and splenomegaly.   Positive for deletion of 13 q. 14.3.  Patient status post 2 cycles of Treanda Rituxan with excellent response.  Reassessment July 3, 2019 with plans to switch Treanda to Imbruvica which began July 25, 2019, tolerated well. This continues to be the case.  We have discussed the possibility of adding Rituxan to shorten the time he is on  Imbruvica and we will continue to review this though the patient has hypogammaglobulinemia at this point and we do not wish to worsen this until we are more aware of how to manage COVID-19.  As he is assessed September 9, 2020 his CLL is under good control and he will continue Imbruvica at this point at unchanged dosing.  There were no clinical changes when the patient is assessed September 30 and will continue Imbruvica.            2.  Pulmonary nodules/infiltrates.  He is  status post bronchoscopy.  Is unclear at this time whether these findings are infectious or possibly related to his lymphoproliferative disorder.  His subsequent studies in late November are much improved, followed by pulmonary.                                                 He had follow-up scans performed August 26 revealing a new 1.6 cm spiculated nodule within the left upper lobe, 1.2 cm left adrenal nodule, bone windows with a soft tissue mass involving the right eighth rib measuring 3.7 x 2.6 cm.  Biopsy was consistent with adenocarcinoma CK 7+, CK 20-, lung primary suspected clinically, molecular panel not yet obtained.     A PET/CT was obtained September 23 demonstrating asymmetric uptake within the right parotid gland which is unremarkable appearance on noncontrasted CT, SUV of 6 compared to 2.7.  There is no hilar, mediastinal or axillary  adenopathy, findings of asymmetric moderate to intense FDG uptake within superior aspect the right chest wall anterior to the right first rib with an irregular hypodense lesion measuring 1.8 x 1.6 and SUV 4.9.  This had not been seen June 27, 2019.  There is an intensely FDG avid nodule within the lingula measuring 1.9 x 1.5 history of 12.4, FDG avid left adrenal nodule 1.9 x 1.5 with an issue 21.2.  The patient was seen by radiation therapy September 29 and started on radiation therapy to the right chest wall-35 Gy in 10 fractions.  Plans were also then proceed with SBRT to the solitary left upper lobe lesion pending insurance approval.  Further testing including TPS of 20% and foundation CDX with a TMB of greater than 10 mutations per megabase (28 mutations per megabase) and the indication that several immunotherapies could be useful in this patient's care.  Additional genomic findings include BRAF G469R/that trametinib could be useful and STK11/that everolimus could be useful.  *Discussion held as to how to proceed in particular using immunotherapy with Keytruda as monotherapy initially in this patient.  This would reduce his degree of myelosuppression.    3.  Iron deficiency-stable    4.  Hypogammaglobulinemia-status post IVIG with resolution of sinusitis, current levels again reviewed    5.  Pain control now addressed with MS Contin 30 mg every 12 hours, Norco 10/325 1 p.o. every 4 hours, continue laxative therapy with Senokot-S 2 p.o. daily to twice daily    6.  Xanax 0.25 mg 1 p.o. 3 times daily as needed       Plan:  *Continue Imbruvica at current dosing    *Patient asked to undergo teaching this week prior Keytruda    *Referral for vascular surgery PowerPort placement next available    *Radiation therapy to begin today as planned over 10 days with later plans to address primary potentially    *Return 2 to 3 weeks at completion of radiotherapy to initiate Keytruda    *Additional Xgeva will be discussed with  the patient returns for follow-up.    I spent 45 total minutes, face-to-face, caring for Dav today.  Greater than 50% of this time involved counseling and/or coordination of care as documented within this note.

## 2020-09-28 DIAGNOSIS — C79.51 LUNG CANCER METASTATIC TO BONE (HCC): ICD-10-CM

## 2020-09-28 DIAGNOSIS — C34.90 LUNG CANCER METASTATIC TO BONE (HCC): ICD-10-CM

## 2020-09-28 RX ORDER — MORPHINE SULFATE 30 MG/1
30 TABLET, FILM COATED, EXTENDED RELEASE ORAL EVERY 12 HOURS
Qty: 60 TABLET | Refills: 0 | Status: SHIPPED | OUTPATIENT
Start: 2020-09-28 | End: 2020-10-26 | Stop reason: SDUPTHER

## 2020-09-28 RX ORDER — HYDROCODONE BITARTRATE AND ACETAMINOPHEN 10; 325 MG/1; MG/1
1 TABLET ORAL EVERY 4 HOURS PRN
Qty: 100 TABLET | Refills: 0 | Status: SHIPPED | OUTPATIENT
Start: 2020-09-28 | End: 2020-10-26 | Stop reason: SDUPTHER

## 2020-09-28 NOTE — TELEPHONE ENCOUNTER
Patient calling with severe under arm pit down side. Sounds like he is struggling. Taking norco 10 mg every 4 hours with 2 advil. Also taking tramadol that he had from a while back from another provider. Talked to Dr jeanette 2 dr Sims. . Morphine contin 30 mg every 12 hours and a refill on [place] routed to Dr Sims. Patient verbalized understanding.

## 2020-09-29 ENCOUNTER — APPOINTMENT (OUTPATIENT)
Dept: RADIATION ONCOLOGY | Facility: HOSPITAL | Age: 65
End: 2020-09-29

## 2020-09-29 ENCOUNTER — CONSULT (OUTPATIENT)
Dept: RADIATION ONCOLOGY | Facility: CLINIC | Age: 65
End: 2020-09-29

## 2020-09-29 ENCOUNTER — DOCUMENTATION (OUTPATIENT)
Dept: ONCOLOGY | Facility: CLINIC | Age: 65
End: 2020-09-29

## 2020-09-29 VITALS
WEIGHT: 192 LBS | HEART RATE: 85 BPM | OXYGEN SATURATION: 99 % | SYSTOLIC BLOOD PRESSURE: 148 MMHG | TEMPERATURE: 98.2 F | BODY MASS INDEX: 26.79 KG/M2 | DIASTOLIC BLOOD PRESSURE: 82 MMHG

## 2020-09-29 DIAGNOSIS — C34.12 MALIGNANT NEOPLASM OF UPPER LOBE OF LEFT LUNG (HCC): Primary | ICD-10-CM

## 2020-09-29 PROCEDURE — 77333 RADIATION TREATMENT AID(S): CPT | Performed by: RADIOLOGY

## 2020-09-29 PROCEDURE — 77263 THER RADIOLOGY TX PLNG CPLX: CPT | Performed by: RADIOLOGY

## 2020-09-29 PROCEDURE — 99203 OFFICE O/P NEW LOW 30 MIN: CPT | Performed by: RADIOLOGY

## 2020-09-29 PROCEDURE — 77290 THER RAD SIMULAJ FIELD CPLX: CPT | Performed by: RADIOLOGY

## 2020-09-29 PROCEDURE — 77370 RADIATION PHYSICS CONSULT: CPT | Performed by: RADIOLOGY

## 2020-09-29 PROCEDURE — G0463 HOSPITAL OUTPT CLINIC VISIT: HCPCS | Performed by: RADIOLOGY

## 2020-09-29 NOTE — PROGRESS NOTES
"Brief Psychosocial Assessment/[place]: (in-person)    OSW met with the pt today following his initial medical oncology consultation with Dr. Boyd and his treatment simulation planning for radiation.  The pt had completed the PhQ9 and had scored 18, prompting contact with this OSW. The pt also completed the Distress Questionnaire and scored 2/10, marking a host of physical concerns, fears and \"dealing with partner\".     The pt was diagnosed with NSCLC in August 2020. Additionally, he reported that he has bone mets to a rib, for which he will receive 10XRT tx's, along with 3 tx's for a spot on his lung. The pt is taking norco and recently began morphine yesterday for pain. On 9/9/2020, the pt was prescribed xanax. He has has been prescribed trazodone for sleep.    The pt was cooperative, yet somewhat guarded. He was clearly in discomfort and acknowledged that \"100% of his distress is due to pain\". The pt verbalized hopefulness that after the morphine begins to work, he will be more comfortable and will have improved sleep. The contact was abbreviated due to the pt's obvious discomfort; he appeared to be having challenges with breathing as well.    The pt resides in [facility]). The pt reported that he is a retired . His intermediate, he stated, was prompted by his diagnosis in April 2019 with CLL. The pt reported that his wife is a clinical psychologist. He reported that he intends to drive himself to treatments and that his wife is available to assist with transportation if need be. Further, he stated that he has one son, and that his support system is good.  The pt declined information for support options. OSW explained her role and provided an introductory letter explaining OSW services.     The pt does not have a living will scanned into his medical record.    OSW remains available as needs arise.    Adela Aguilar, [facility]  Oncology Social Worker  "

## 2020-09-29 NOTE — PROGRESS NOTES
Subjective     Jostin Parekh MD     Diagnosis Plan   1. Malignant neoplasm of upper lobe of left lung (CMS/HCC)          Chief complaint: Symptomatic metastatic adenocarcinoma of the lung  I am seeing the patient today at the request of Dr. Jostin Parekh of the [facility] group to evaluate the patient for palliative radiation therapy to a right chest wall mass as well as SBRT to a left upper lobe primary.                                Mr. Das is a very pleasant 65-year-old white male who was diagnosed with CLL in late April 2019 currently under good control on Imbruvica.  He complains now of right-sided rib pain progressive since early August of this year and was found on a CT scan of the chest done on 8/26/2020 to have a new spiculated mass in the left upper lobe measuring 1.6 cm with a soft tissue mass measuring 3.7 x 2.6 cm in the area of the right eighth rib.  There was also described a new 1.2 cm nodule in the left adrenal.    Diagnosis was established at a CT-guided needle biopsy of the right rib mass on 9/14/2020 returning grade 2 adenocarcinoma.  Foundation CDX  assessment is still pending.    PET scan was obtained on 9/23/2020 which described 4 areas of hypermetabolic uptake.  1.  Lingula, 1.9 x 1.5 cm with an SUV of 12.4, likely primary.  2.  Right eighth rib, 7.0 x 6.3 cm mass with an SUV of 27.6.  3.  Left adrenal, 1.9 x 1.5 cm with SUV of 21.2  4.  Right chest wall, anterior to right first rib, 1.8 x 1.6 cm with SUV of 4.9    He is having a difficult time with his pain, recently started MS Contin 30 mg p.o. every 12 with Norco 10 every 4 as needed for breakthrough pain.  He also has Senokot-S, Xanax 0.25 and a lidocaine patch.  His pain control is improving and he tells me depending on the situation it may get down to 5/10 or even 2/10 occasionally.    We are asked to evaluate him for a palliative course of radiation therapy for the symptomatic right chest wall mass as well as treatment to the left  lung primary.          Review of Systems   Constitutional: Positive for appetite change, chills and fatigue.   Respiratory: Positive for shortness of breath.         Home O2   Gastrointestinal: Positive for constipation.   Musculoskeletal: Positive for arthralgias, back pain and myalgias.   Neurological: Positive for light-headedness.   Hematological: Positive for adenopathy. Bruises/bleeds easily.   Psychiatric/Behavioral: Positive for sleep disturbance.         Past Medical History:   Diagnosis Date   • CLL (chronic lymphocytic leukemia) (CMS/[facility])    • COPD (chronic obstructive pulmonary disease) (CMS/HCC)    • ED (erectile dysfunction)    • H/O splenomegaly    • Hypertension    • Injury of back    • Pneumonia    • Varicose vein of leg          Past Surgical History:   Procedure Laterality Date   • BRONCHOSCOPY Bilateral 2019    Procedure: BRONCHOSCOPY WITH WASHING ENDOBRONCHIAL ULTRASOUND WITH FNA'S;  Surgeon: Selina Lloyd MD;  Location: [facility];  Service: Pulmonary   • NO PAST SURGERIES           Social History     Socioeconomic History   • Marital status:      Spouse name: Nikky   • Number of children: Not on file   • Years of education: Not on file   • Highest education level: Not on file   Occupational History     Employer: [employer]   Tobacco Use   • Smoking status: Former Smoker     Packs/day: 1.00     Years: 40.00     Pack years: 40.00     Quit date: 2019     Years since quittin.4   • Smokeless tobacco: Never Used   Substance and Sexual Activity   • Alcohol use: Yes   • Drug use: Defer   • Sexual activity: Defer         Family History   Problem Relation Age of Onset   • Arthritis Mother    • Lung cancer Father           Objective    Physical Exam Awake, alert, spontaneous.  Antalgic gait, palpable fullness right chest wall.    Current Outpatient Medications on File Prior to Visit   Medication Sig Dispense Refill   • albuterol (PROAIR RESPICLICK) 108 (90 Base) MCG/ACT  inhaler Inhale 2 puffs Every 4 (Four) Hours As Needed for Wheezing. (Patient taking differently: Inhale 2 puffs Every 4 (Four) Hours As Needed for Wheezing (patient hasnt used yet.).) 1 inhaler 0   • ALPRAZolam (XANAX) 0.25 MG tablet Take 1 tablet by mouth 3 (Three) Times a Day As Needed for Anxiety. 60 tablet 0   • aspirin  MG tablet Take 650 mg by mouth Every 6 (Six) Hours As Needed (headache).     • budesonide-formoterol (SYMBICORT) 160-4.5 MCG/ACT inhaler Inhale 2 puffs 2 (Two) Times a Day. 1 inhaler 11   • guaiFENesin (MUCINEX) 600 MG 12 hr tablet Take  by mouth Daily.     • HYDROcodone-acetaminophen (NORCO)  MG per tablet Take 1 tablet by mouth Every 4 (Four) Hours As Needed for Moderate Pain . 100 tablet 0   • ibrutinib (Imbruvica) 420 MG tablet tablet TAKE ONE CAPSULE BY MOUTH ONCE DAILY. 28 tablet 6   • ibuprofen (ADVIL,MOTRIN) 200 MG tablet Take 200 mg by mouth Every 6 (Six) Hours As Needed for Mild Pain .     • lidocaine (LIDODERM) 5 % APPLY 1 (ONE) PATCH ONCE A DAY TO AFFECTED AREA     • Morphine (MS CONTIN) 30 MG 12 hr tablet Take 1 tablet by mouth Every 12 (Twelve) Hours. 60 tablet 0   • O2 (OXYGEN) Inhale 3 L/min Every Night.     • sennosides-docusate (senna-docusate sodium) 8.6-50 MG per tablet Take 2 tablets by mouth Daily. 60 tablet 2   • traMADol (ULTRAM) 50 MG tablet Take 1 tablet by mouth Every 6 (Six) Hours As Needed for Moderate Pain  or Severe Pain  for up to 30 days. 50 tablet 0   • traZODone (DESYREL) 50 MG tablet TAKE 1 2 TABLETS BY MOUTH EVERY DAY       No current facility-administered medications on file prior to visit.        ALLERGIES:  No Known Allergies    /82   Pulse 85   Temp 98.2 °F (36.8 °C)   Wt 87.1 kg (192 lb)   SpO2 99%   BMI 26.79 kg/m²      Current Status 9/9/2020   ECOG score 0         Assessment/Plan    Grade 2 adenocarcinoma of the left lung metastatic to right chest wall.  We will plan a palliative course of radiation therapy to 35 Gy in 10  fractions.  We started the planning process with immobilization and CT simulation in our department this morning and should be able to get him started in approximately 1 week.  We will then follow with planning for SBRT to the solitary left upper lobe lesion pending insurance approval.      [facility] ECOG Status: (1) Restricted in physically strenuous activity, ambulatory and able to do work of light nature       I spent greater than 30 minutes in face-to-face time, to include simulation, with the patient, and 20 minutes that time was spent in counseling coordination of care to include discussion of indications, goals, logistics, alternatives, and risks both common and rare.      Dav Boyd MD

## 2020-09-30 ENCOUNTER — OFFICE VISIT (OUTPATIENT)
Dept: ONCOLOGY | Facility: CLINIC | Age: 65
End: 2020-09-30

## 2020-09-30 ENCOUNTER — APPOINTMENT (OUTPATIENT)
Dept: LAB | Facility: HOSPITAL | Age: 65
End: 2020-09-30

## 2020-09-30 VITALS
SYSTOLIC BLOOD PRESSURE: 140 MMHG | TEMPERATURE: 97.7 F | OXYGEN SATURATION: 97 % | HEIGHT: 71 IN | DIASTOLIC BLOOD PRESSURE: 77 MMHG | HEART RATE: 90 BPM | BODY MASS INDEX: 26.29 KG/M2 | RESPIRATION RATE: 18 BRPM | WEIGHT: 187.8 LBS

## 2020-09-30 DIAGNOSIS — C91.10 CLL (CHRONIC LYMPHOCYTIC LEUKEMIA) (HCC): ICD-10-CM

## 2020-09-30 DIAGNOSIS — D50.9 IRON DEFICIENCY ANEMIA, UNSPECIFIED IRON DEFICIENCY ANEMIA TYPE: ICD-10-CM

## 2020-09-30 DIAGNOSIS — C34.90 LUNG CANCER METASTATIC TO BONE (HCC): ICD-10-CM

## 2020-09-30 DIAGNOSIS — C34.12 MALIGNANT NEOPLASM OF UPPER LOBE OF LEFT LUNG (HCC): Primary | ICD-10-CM

## 2020-09-30 DIAGNOSIS — C79.51 LUNG CANCER METASTATIC TO BONE (HCC): ICD-10-CM

## 2020-09-30 PROCEDURE — 77300 RADIATION THERAPY DOSE PLAN: CPT | Performed by: RADIOLOGY

## 2020-09-30 PROCEDURE — 99215 OFFICE O/P EST HI 40 MIN: CPT | Performed by: INTERNAL MEDICINE

## 2020-09-30 PROCEDURE — 77334 RADIATION TREATMENT AID(S): CPT | Performed by: RADIOLOGY

## 2020-09-30 PROCEDURE — 77295 3-D RADIOTHERAPY PLAN: CPT | Performed by: RADIOLOGY

## 2020-10-01 ENCOUNTER — APPOINTMENT (OUTPATIENT)
Dept: RADIATION ONCOLOGY | Facility: HOSPITAL | Age: 65
End: 2020-10-01

## 2020-10-01 PROCEDURE — 77427 RADIATION TX MANAGEMENT X5: CPT | Performed by: RADIOLOGY

## 2020-10-01 PROCEDURE — 77412 RADIATION TX DELIVERY LVL 3: CPT | Performed by: RADIOLOGY

## 2020-10-01 PROCEDURE — 77280 THER RAD SIMULAJ FIELD SMPL: CPT | Performed by: RADIOLOGY

## 2020-10-02 PROCEDURE — 77412 RADIATION TX DELIVERY LVL 3: CPT | Performed by: RADIOLOGY

## 2020-10-05 ENCOUNTER — RADIATION ONCOLOGY WEEKLY ASSESSMENT (OUTPATIENT)
Dept: RADIATION ONCOLOGY | Facility: CLINIC | Age: 65
End: 2020-10-05

## 2020-10-05 DIAGNOSIS — C79.51 LUNG CANCER METASTATIC TO BONE (HCC): Primary | ICD-10-CM

## 2020-10-05 DIAGNOSIS — C34.90 LUNG CANCER METASTATIC TO BONE (HCC): Primary | ICD-10-CM

## 2020-10-05 PROCEDURE — 77412 RADIATION TX DELIVERY LVL 3: CPT | Performed by: RADIOLOGY

## 2020-10-05 NOTE — PROGRESS NOTES
Physician Weekly Management Note    Diagnosis:     Diagnosis Plan   1. Lung cancer metastatic to bone (CMS/HCC)         RT Details:     Treatment #3/10    Notes on Treatment course, Films, Medical progress:  No treatment related issues, may possibly have a  small improvement in his pain already.  Continue as planned.      [facility] ECOG Status: (1) Restricted in physically strenuous activity, ambulatory and able to do work of light nature      Weekly Management:  Medication reviewed?   Yes  New medications given?   No  Problemlist reviewed?   Yes  Problem added?   No      Technical aspects reviewed:  Weekly OBI approved?   Yes  Weekly port films approved?   Yes  Change requests noted on port film?   No  Patient setup and plan reviewed?   Yes    Chart Reviewed:  Continue current treatment plan?   Yes  Treatment plan change requested?   No  CBC reviewed?   No  Concurrent Chemo?   No    Objective     Toxicities:   As above     Review of Systems   As above    There were no vitals filed for this visit.    Current Status 9/30/2020   ECOG score 0       Physical Exam  As above      Problem Summary List    Diagnosis:     Diagnosis Plan   1. Lung cancer metastatic to bone (CMS/HCC)       Pathology:       Past Medical History:   Diagnosis Date   • CLL (chronic lymphocytic leukemia) (CMS/HCC)    • COPD (chronic obstructive pulmonary disease) (CMS/HCC)    • ED (erectile dysfunction)    • H/O splenomegaly    • Hypertension    • Injury of back    • Pneumonia    • Varicose vein of leg          Past Surgical History:   Procedure Laterality Date   • BRONCHOSCOPY Bilateral 4/29/2019    Procedure: BRONCHOSCOPY WITH WASHING ENDOBRONCHIAL ULTRASOUND WITH FNA'S;  Surgeon: Selina Lloyd MD;  Location: [facility] ENDOSCOPY;  Service: Pulmonary   • NO PAST SURGERIES           Current Outpatient Medications on File Prior to Visit   Medication Sig Dispense Refill   • albuterol (PROAIR RESPICLICK) 108 (90 Base) MCG/ACT inhaler Inhale 2  puffs Every 4 (Four) Hours As Needed for Wheezing. (Patient taking differently: Inhale 2 puffs Every 4 (Four) Hours As Needed for Wheezing (patient hasnt used yet.).) 1 inhaler 0   • ALPRAZolam (XANAX) 0.25 MG tablet Take 1 tablet by mouth 3 (Three) Times a Day As Needed for Anxiety. 60 tablet 0   • aspirin  MG tablet Take 650 mg by mouth Every 6 (Six) Hours As Needed (headache).     • budesonide-formoterol (SYMBICORT) 160-4.5 MCG/ACT inhaler Inhale 2 puffs 2 (Two) Times a Day. 1 inhaler 11   • guaiFENesin (MUCINEX) 600 MG 12 hr tablet Take  by mouth Daily.     • HYDROcodone-acetaminophen (NORCO)  MG per tablet Take 1 tablet by mouth Every 4 (Four) Hours As Needed for Moderate Pain . 100 tablet 0   • ibrutinib (Imbruvica) 420 MG tablet tablet TAKE ONE CAPSULE BY MOUTH ONCE DAILY. 28 tablet 6   • ibuprofen (ADVIL,MOTRIN) 200 MG tablet Take 200 mg by mouth Every 6 (Six) Hours As Needed for Mild Pain .     • lidocaine (LIDODERM) 5 % APPLY 1 (ONE) PATCH ONCE A DAY TO AFFECTED AREA     • Morphine (MS CONTIN) 30 MG 12 hr tablet Take 1 tablet by mouth Every 12 (Twelve) Hours. 60 tablet 0   • O2 (OXYGEN) Inhale 3 L/min Every Night.     • sennosides-docusate (senna-docusate sodium) 8.6-50 MG per tablet Take 2 tablets by mouth Daily. 60 tablet 2   • [] traMADol (ULTRAM) 50 MG tablet Take 1 tablet by mouth Every 6 (Six) Hours As Needed for Moderate Pain  or Severe Pain  for up to 30 days. 50 tablet 0   • traZODone (DESYREL) 50 MG tablet TAKE 1 2 TABLETS BY MOUTH EVERY DAY       No current facility-administered medications on file prior to visit.        No Known Allergies      Primary care MD:    Juan Iyer MD    Oncologist:  Patient Care Team:  Connor Dove MD as Referring Physician (Pulmonary Disease)  Jostin Parekh MD as Consulting Physician (Hematology and Oncology)  Dav Boyd MD as Consulting Physician (Radiation Oncology)       Seen and approved by:  Dav Boyd  MD  10/05/2020

## 2020-10-06 PROCEDURE — 77412 RADIATION TX DELIVERY LVL 3: CPT | Performed by: RADIOLOGY

## 2020-10-06 PROCEDURE — 77336 RADIATION PHYSICS CONSULT: CPT | Performed by: RADIOLOGY

## 2020-10-07 ENCOUNTER — TELEMEDICINE (OUTPATIENT)
Dept: ONCOLOGY | Facility: CLINIC | Age: 65
End: 2020-10-07

## 2020-10-07 DIAGNOSIS — C91.10 CLL (CHRONIC LYMPHOCYTIC LEUKEMIA) (HCC): ICD-10-CM

## 2020-10-07 DIAGNOSIS — C34.12 MALIGNANT NEOPLASM OF UPPER LOBE OF LEFT LUNG (HCC): Primary | ICD-10-CM

## 2020-10-07 PROCEDURE — 77412 RADIATION TX DELIVERY LVL 3: CPT | Performed by: RADIOLOGY

## 2020-10-07 PROCEDURE — 99214 OFFICE O/P EST MOD 30 MIN: CPT | Performed by: NURSE PRACTITIONER

## 2020-10-07 NOTE — PROGRESS NOTES
Subjective     PATIENT NAME:  Dav Freitas  YOB: 1955  PATIENTS AGE:  65 y.o.  PATIENTS SEX:  male  DATE OF SERVICE:  10/07/2020  PROVIDER:  RORY Zhao      ____________________PATIENT EDUCATION____________________    PATIENT EDUCATION:  Today I had a video visit with the patient to discuss the immunotherapy regimen recommended for treatment of his left lung cancer.  The patient was given explanation of treatment premed side effects including office policy that prohibits patients to drive if sedating medications are administered, MD explanation given regarding benefits, side effects, toxicities and goals of treatment.     SIDE EFFECTS:  Common side effects were discussed with the patient.  Discussion included hair loss/discoloration, anemia/fatigue, infection/chills/fever, appetite, bleeding risk/precautions, constipation, diarrhea, mouth sores, taste alteration, loss of appetite,nausea/vomiting, skin/nail changes, rash, muscle aches/weakness,  liver damage, lung damage, kidney damage, DVT/PE risk, fluid retention, pleural/pericardial effusion, somnolence, electrolyte/LFT imbalance, vein exercises and/or the possible need for vascular access/port placement.  The patient was advice that although uncommon, leakage of an infused medication from the vein or venous access device (port) may lead to skin breakdown and/or other tissue damage.  The patient was advised that he/she may have pain, bleeding, and/or bruising from the insertion of a needle in their vein or venous access device (port).  The patient was further advised that, in spite of proper technique, infection with redness and irritation may rarely occur at the site where the needle was inserted.  The patient was advised that if complications occur, additional medical treatment is available.    Discussion also included side effects specific to drugs in the treatment plan, specifically Keytruda.    We have reviewed rare, but  potential immune mediated side effects including shortness of breath, cough, chest pain (pneumonitis), abdominal pain, diarrhea (colitis), thyroiditis (hypothyroid or hyperthyroid), hepatitis and liver dysfunction, nephritis and renal dysfunction.     A total of 25 minutes were spent with the patient, with 100% of time spent in education and counseling.    Noa Zarate, APRN  10/07/2020

## 2020-10-08 DIAGNOSIS — C79.51 LUNG CANCER METASTATIC TO BONE (HCC): ICD-10-CM

## 2020-10-08 DIAGNOSIS — C34.90 LUNG CANCER METASTATIC TO BONE (HCC): ICD-10-CM

## 2020-10-08 PROCEDURE — 77427 RADIATION TX MANAGEMENT X5: CPT | Performed by: RADIOLOGY

## 2020-10-08 PROCEDURE — 77412 RADIATION TX DELIVERY LVL 3: CPT | Performed by: RADIOLOGY

## 2020-10-08 PROCEDURE — 77417 THER RADIOLOGY PORT IMAGE(S): CPT | Performed by: RADIOLOGY

## 2020-10-08 RX ORDER — ALPRAZOLAM 0.25 MG/1
0.25 TABLET ORAL 3 TIMES DAILY PRN
Qty: 60 TABLET | Refills: 0 | Status: SHIPPED | OUTPATIENT
Start: 2020-10-08 | End: 2022-12-09

## 2020-10-09 PROCEDURE — 77412 RADIATION TX DELIVERY LVL 3: CPT | Performed by: RADIOLOGY

## 2020-10-12 ENCOUNTER — RADIATION ONCOLOGY WEEKLY ASSESSMENT (OUTPATIENT)
Dept: RADIATION ONCOLOGY | Facility: CLINIC | Age: 65
End: 2020-10-12

## 2020-10-12 DIAGNOSIS — C79.51 LUNG CANCER METASTATIC TO BONE (HCC): Primary | ICD-10-CM

## 2020-10-12 DIAGNOSIS — C34.90 LUNG CANCER METASTATIC TO BONE (HCC): Primary | ICD-10-CM

## 2020-10-12 PROCEDURE — 77412 RADIATION TX DELIVERY LVL 3: CPT | Performed by: RADIOLOGY

## 2020-10-12 NOTE — PROGRESS NOTES
Physician Weekly Management Note    Diagnosis:     Diagnosis Plan   1. Lung cancer metastatic to bone (CMS/HCC)         RT Details:    Treatment #8/10       Right eighth rib mass    Notes on Treatment course, Films, Medical progress:  Pain control much improved (on meds).  States the last 4 days his pain has been 1/10 with occasional flares.  We will schedule follow-up CT scan of the chest in about 5 weeks        [facility] On[facility] ECOG Status: (1) Restricted in physically strenuous activity, ambulatory and able to do work of light nature      Weekly Management:  Medication reviewed?   Yes  New medications given?   No  Problemlist reviewed?   Yes  Problem added?   No      Technical aspects reviewed:  Weekly OBI approved?   Yes  Weekly port films approved?   Yes  Change requests noted on port film?   No  Patient setup and plan reviewed?   Yes    Chart Reviewed:  Continue current treatment plan?   Yes  Treatment plan change requested?   No  CBC reviewed?   No  Concurrent Chemo?   No    Objective     Toxicities:     As above     Review of Systems     As above    There were no vitals filed for this visit.      Current Status 9/30/2020   ECOG score 0       Physical Exam    As above      Problem Summary List    Diagnosis:     Diagnosis Plan   1. Lung cancer metastatic to bone (CMS/HCC)       Pathology:         Past Medical History:   Diagnosis Date   • CLL (chronic lymphocytic leukemia) (CMS/HCC)    • COPD (chronic obstructive pulmonary disease) (CMS/HCC)    • ED (erectile dysfunction)    • H/O splenomegaly    • Hypertension    • Injury of back    • Pneumonia    • Varicose vein of leg            Past Surgical History:   Procedure Laterality Date   • BRONCHOSCOPY Bilateral 4/29/2019    Procedure: BRONCHOSCOPY WITH WASHING ENDOBRONCHIAL ULTRASOUND WITH FNA'S;  Surgeon: Selina Lloyd MD;  Location: [facility] ENDOSCOPY;  Service: Pulmonary   • NO PAST SURGERIES                No Known Allergies      Primary care MD:     Juan Iyer MD    Oncologist:  Patient Care Team:  Connor Dove MD as Referring Physician (Pulmonary Disease)  Jostin Parekh MD as Consulting Physician (Hematology and Oncology)  Dav Boyd MD as Consulting Physician (Radiation Oncology)       Seen and approved by:  Dav Boyd MD  10/12/2020

## 2020-10-13 ENCOUNTER — APPOINTMENT (OUTPATIENT)
Dept: PREADMISSION TESTING | Facility: HOSPITAL | Age: 65
End: 2020-10-13

## 2020-10-13 VITALS
HEIGHT: 71 IN | BODY MASS INDEX: 25.62 KG/M2 | HEART RATE: 89 BPM | OXYGEN SATURATION: 96 % | RESPIRATION RATE: 20 BRPM | DIASTOLIC BLOOD PRESSURE: 78 MMHG | WEIGHT: 183 LBS | SYSTOLIC BLOOD PRESSURE: 161 MMHG | TEMPERATURE: 98.5 F

## 2020-10-13 LAB
ANION GAP SERPL CALCULATED.3IONS-SCNC: 9.2 MMOL/L (ref 5–15)
BASOPHILS # BLD AUTO: 0.03 10*3/MM3 (ref 0–0.2)
BASOPHILS NFR BLD AUTO: 0.4 % (ref 0–1.5)
BUN SERPL-MCNC: 12 MG/DL (ref 8–23)
BUN/CREAT SERPL: 8.5 (ref 7–25)
CALCIUM SPEC-SCNC: 9.3 MG/DL (ref 8.6–10.5)
CHLORIDE SERPL-SCNC: 99 MMOL/L (ref 98–107)
CO2 SERPL-SCNC: 27.8 MMOL/L (ref 22–29)
CREAT SERPL-MCNC: 1.41 MG/DL (ref 0.76–1.27)
DEPRECATED RDW RBC AUTO: 41.1 FL (ref 37–54)
EOSINOPHIL # BLD AUTO: 0.06 10*3/MM3 (ref 0–0.4)
EOSINOPHIL NFR BLD AUTO: 0.8 % (ref 0.3–6.2)
ERYTHROCYTE [DISTWIDTH] IN BLOOD BY AUTOMATED COUNT: 12.2 % (ref 12.3–15.4)
GFR SERPL CREATININE-BSD FRML MDRD: 50 ML/MIN/1.73
GLUCOSE SERPL-MCNC: 86 MG/DL (ref 65–99)
HCT VFR BLD AUTO: 36.4 % (ref 37.5–51)
HGB BLD-MCNC: 12 G/DL (ref 13–17.7)
IMM GRANULOCYTES # BLD AUTO: 0.03 10*3/MM3 (ref 0–0.05)
IMM GRANULOCYTES NFR BLD AUTO: 0.4 % (ref 0–0.5)
LYMPHOCYTES # BLD AUTO: 0.63 10*3/MM3 (ref 0.7–3.1)
LYMPHOCYTES NFR BLD AUTO: 8.6 % (ref 19.6–45.3)
MCH RBC QN AUTO: 30.3 PG (ref 26.6–33)
MCHC RBC AUTO-ENTMCNC: 33 G/DL (ref 31.5–35.7)
MCV RBC AUTO: 91.9 FL (ref 79–97)
MONOCYTES # BLD AUTO: 0.88 10*3/MM3 (ref 0.1–0.9)
MONOCYTES NFR BLD AUTO: 12 % (ref 5–12)
NEUTROPHILS NFR BLD AUTO: 5.72 10*3/MM3 (ref 1.7–7)
NEUTROPHILS NFR BLD AUTO: 77.8 % (ref 42.7–76)
NRBC BLD AUTO-RTO: 0 /100 WBC (ref 0–0.2)
PLATELET # BLD AUTO: 265 10*3/MM3 (ref 140–450)
PMV BLD AUTO: 11.8 FL (ref 6–12)
POTASSIUM SERPL-SCNC: 4.6 MMOL/L (ref 3.5–5.2)
RBC # BLD AUTO: 3.96 10*6/MM3 (ref 4.14–5.8)
SODIUM SERPL-SCNC: 136 MMOL/L (ref 136–145)
WBC # BLD AUTO: 7.35 10*3/MM3 (ref 3.4–10.8)

## 2020-10-13 PROCEDURE — 93010 ELECTROCARDIOGRAM REPORT: CPT | Performed by: INTERNAL MEDICINE

## 2020-10-13 PROCEDURE — C9803 HOPD COVID-19 SPEC COLLECT: HCPCS | Performed by: NURSE PRACTITIONER

## 2020-10-13 PROCEDURE — U0004 COV-19 TEST NON-CDC HGH THRU: HCPCS | Performed by: NURSE PRACTITIONER

## 2020-10-13 PROCEDURE — 36415 COLL VENOUS BLD VENIPUNCTURE: CPT

## 2020-10-13 PROCEDURE — 77412 RADIATION TX DELIVERY LVL 3: CPT | Performed by: RADIOLOGY

## 2020-10-13 PROCEDURE — 93005 ELECTROCARDIOGRAM TRACING: CPT

## 2020-10-13 PROCEDURE — 77336 RADIATION PHYSICS CONSULT: CPT | Performed by: RADIOLOGY

## 2020-10-13 PROCEDURE — 85025 COMPLETE CBC W/AUTO DIFF WBC: CPT | Performed by: SURGERY

## 2020-10-13 PROCEDURE — 80048 BASIC METABOLIC PNL TOTAL CA: CPT | Performed by: SURGERY

## 2020-10-13 RX ORDER — LIDOCAINE 40 MG/G
CREAM TOPICAL AS NEEDED
Status: ON HOLD | COMMUNITY
End: 2020-11-15

## 2020-10-13 RX ORDER — CHLORHEXIDINE GLUCONATE 500 MG/1
CLOTH TOPICAL
COMMUNITY
End: 2020-10-15 | Stop reason: HOSPADM

## 2020-10-13 NOTE — DISCHARGE INSTRUCTIONS
CHLORHEXIDINE CLOTH INSTRUCTIONS  The morning of surgery follow these instructions using the Chlorhexidine cloths you've been given.  These steps reduce bacteria on the body.  Do not use the cloths near your eyes, ears mouth, genitalia or on open wounds.  Throw the cloths away after use but do not try to flush them down a toilet.      • Open and remove one cloth at a time from the package.    • Leave the cloth unfolded and begin the bathing.  • Massage the skin with the cloths using gentle pressure to remove bacteria.  Do not scrub harshly.   • Follow the steps below with one 2% CHG cloth per area (6 total cloths).  • One cloth for neck, shoulders and chest.  • One cloth for both arms, hands, fingers and underarms (do underarms last).  • One cloth for the abdomen followed by groin.  • One cloth for right leg and foot including between the toes.  • One cloth for left leg and foot including between the toes.  • The last cloth is to be used for the back of the neck, back and buttocks.    Allow the CHG to air dry 3 minutes on the skin which will give it time to work and decrease the chance of irritation.  The skin may feel sticky until it is dry.  Do not rinse with water or any other liquid or you will lose the beneficial effects of the CHG.  If mild skin irritation occurs, do rinse the skin to remove the CHG.  Report this to the nurse at time of admission.  Do not apply lotions, creams, ointments, deodorants or perfumes after using the clothes. Dress in clean clothes before coming to the hospital.    Take the following medications the morning of surgery:  PAIN PILLS AS NEEDED, INHALER    ARRIVAL TIME 0830 TO MAIN OR       If you are on prescription narcotic pain medication to control your pain you may also take that medication the morning of surgery.    General Instructions:  • Do not eat solid food after midnight the night before surgery.  • You may drink clear liquids day of surgery but must stop at least one hour  before your hospital arrival time - CUTOFF TIME 0730  • It is beneficial for you to have a clear drink that contains carbohydrates the day of surgery.  We suggest a 12 to 20 ounce bottle of Gatorade or Powerade for non-diabetic patients or a 12 to 20 ounce bottle of G2 or Powerade Zero for diabetic patients. (Pediatric patients, are not advised to drink a 12 to 20 ounce carbohydrate drink)    Clear liquids are liquids you can see through.  Nothing red in color.     Plain water                               Sports drinks  Sodas                                   Gelatin (Jell-O)  Fruit juices without pulp such as white grape juice and apple juice  Popsicles that contain no fruit or yogurt  Tea or coffee (no cream or milk added)  Gatorade / Powerade  G2 / Powerade Zero    • Infants may have breast milk up to four hours before surgery.  • Infants drinking formula may drink formula up to six hours before surgery.   • Patients who avoid smoking, chewing tobacco and alcohol for 4 weeks prior to surgery have a reduced risk of post-operative complications.  Quit smoking as many days before surgery as you can.  • Do not smoke, use chewing tobacco or drink alcohol the day of surgery.   • If applicable bring your C-PAP/ BI-PAP machine.  • Bring any papers given to you in the doctor’s office.  • Wear clean comfortable clothes.  • Do not wear contact lenses, false eyelashes or make-up.  Bring a case for your glasses.   • Bring crutches or walker if applicable.  • Remove all piercings.  Leave jewelry and any other valuables at home.  • Hair extensions with metal clips must be removed prior to surgery.  • The Pre-Admission Testing nurse will instruct you to bring medications if unable to obtain an accurate list in Pre-Admission Testing.            Preventing a Surgical Site Infection:  • For 2 to 3 days before surgery, avoid shaving with a razor because the razor can irritate skin and make it easier to develop an infection.     • Any areas of open skin can increase the risk of a post-operative wound infection by allowing bacteria to enter and travel throughout the body.  Notify your surgeon if you have any skin wounds / rashes even if it is not near the expected surgical site.  The area will need assessed to determine if surgery should be delayed until it is healed.  • The night prior to surgery shower using a fresh bar of anti-bacterial soap (such as Dial) and clean washcloth.  Sleep in a clean bed with clean clothing.  Do not allow pets to sleep with you.  • Shower on the morning of surgery using a fresh bar of anti-bacterial soap (such as Dial) and clean washcloth.  Dry with a clean towel and dress in clean clothing.  • Ask your surgeon if you will be receiving antibiotics prior to surgery.  • Make sure you, your family, and all healthcare providers clean their hands with soap and water or an alcohol based hand  before caring for you or your wound.    Day of surgery:  Your arrival time is approximately two hours before your scheduled surgery time.  Upon arrival, a Pre-op nurse and Anesthesiologist will review your health history, obtain vital signs, and answer questions you may have.  The only belongings needed at this time will be a list of your home medications and if applicable your C-PAP/BI-PAP machine.  If you are staying overnight your family can leave the rest of your belongings in the car and bring them to your room later.  A Pre-op nurse will start an IV and you may receive medication in preparation for surgery, including something to help you relax.  While you are in surgery your family should notify the waiting room  if they leave the waiting room area and provide a contact phone number.    Please be aware that surgery does come with discomfort.  We want to make every effort to control your discomfort so please discuss any uncontrolled symptoms with your nurse.   Your doctor will most likely have  prescribed pain medications.      If you are going home after surgery you will receive individualized written care instructions before being discharged.  A responsible adult must drive you to and from the hospital on the day of your surgery and stay with you for 24 hours.    If you are staying overnight following surgery, you will be transported to your hospital room following the recovery period.  [facility] has all private rooms.    If you have any questions please call Pre-Admission Testing at [phone].  Deductibles and co-payments are collected on the day of service. Please be prepared to pay the required co-pay, deductible or deposit on the day of service as defined by your plan.    Patient Education for Self-Quarantine Process    Following your COVID testing, we strongly recommend that you do not leave your home after you have been tested for COVID except to get medical care. This includes not going to work, school or to public areas.  If this is not possible for you to do please limit your activities to only required outings.  Be sure to wear a mask when you are with other people, practice social distancing and wash your hands frequently.      The following items provide additional details to keep you safe.  • Wash your hands with soap and water frequently for at least 20 seconds.   • Avoid touching your eyes, nose and mouth with unwashed hands.  • Do not share anything - utensils, towels, food from the same bowl.   • Have your own utensils, drinking glass, dishes, towels and bedding.   • Do not have visitors.   • Do use FaceTime to stay in touch with family and friends.  • You should stay in a specific room away from others if possible.   • Stay at least 6 feet away from others in the home if you cannot have a dedicated room to yourself.   • Do not snuggle with your pet. While the CDC says there is no evidence that pets can spread COVID-19 or be infected from humans, it is probably best  to avoid “petting, snuggling, being kissed or licked and sharing food (during self-quarantine)”, according to the CDC.   • Sanitize household surfaces daily. Include all high touch areas (door handles, light switches, phones, countertops, etc.)  • Do not share a bathroom with others, if possible.   • Wear a mask around others in your home if you are unable to stay in a separate room or 6 feet apart. If  you are unable to wear a mask, have your family member wear a mask if they must be within 6 feet of you.   Call your surgeon immediately if you experience any of the following symptoms:  • Sore Throat  • Shortness of Breath or difficulty breathing  • Cough  • Chills  • Body soreness or muscle pain  • Headache  • Fever  • New loss of taste or smell  • Do not arrive for your surgery ill.  Your procedure will need to be rescheduled to another time.  You will need to call your physician before the day of surgery to avoid any unnecessary exposure to hospital staff as well as other patients.

## 2020-10-14 ENCOUNTER — DOCUMENTATION (OUTPATIENT)
Dept: RADIATION ONCOLOGY | Facility: HOSPITAL | Age: 65
End: 2020-10-14

## 2020-10-14 LAB — SARS-COV-2 RNA RESP QL NAA+PROBE: NOT DETECTED

## 2020-10-14 PROCEDURE — 77412 RADIATION TX DELIVERY LVL 3: CPT | Performed by: RADIOLOGY

## 2020-10-14 NOTE — PROGRESS NOTES
Subjective  Patient noting pain markedly improved from previous      REASON FOR FOLLOW UP:  1.  Chronic lymphocytic leukemia with extensive lymphadenopathy and possible pleural involvement.  2.  Initiation of Treanda, Rituxan May 1, 2019 IVIG also utilized                                    3.  Significant response on scans June 27, 2019, alternative therapy with Imbruvica planned, initiated July 25, 2019  4.  Patient seen February 12, 2020, continued control of CLL, discussed Rituxan, Imbruvica, sleep study and potential surgical assessment for hernia  5.  CT of chest per pulmonary August 26 with 1.6 cm spiculated nodule in the left upper lobe, 1.2 cm left adrenal nodule not present on comparison study, bone windows with soft tissue mass involving the right eighth rib measuring 3.7 x 2.6, biopsy positive for adenocarcinoma  6.  Patient assessed September 30, plans for palliative radiation therapy, port placement, Keytruda considering PDL 1 positivity and high TMB status  7.  Patient reviewed October 21, 2020, Keytruda initiated, pain much improved status post radiation therapy        HISTORY OF PRESENT ILLNESS:   The patient is a  65 y.o. Male  who was admitted on 4/28/2019 with worsening complaints of cough wheezing and shortness of breath.  He had been to an urgent care center and was started on antibiotics and received a shot of Solu-Medrol.  His symptoms did not improve and on his admission he was noted to have profound lymphocytosis with a total white count of 70,074% lymphocytes on his white cell differential.      He also underwent CT scan of the chest that showed infiltrates and nodules in the lungs as well as significant lymphadenopathy within the chest and in the axillary regions.  He had peripheral blood sent for flow cytometry leukemia lymphoma panel but this is still pending at time of this dictation.           He underwent bronchoscopy on 4/29/2019.  Results from this procedure are pendingl.  His  respiratory viral panel was negative and markers of sepsis were unremarkable.      His peripheral blood flow cytometry and flow cytometry from the EBUS needle biopsy at bronchoscopy are both consistent with chronic lymphocytic leukemia/small lymphocytic lymphoma.     The patient underwent a CT of abdomen and pelvis April 30 demonstrating extensive splenomegaly and bulky lymphadenopathy throughout the abdomen and pelvis.  There is a tiny lesion at the superior aspect lateral hepatic segment and 2 hyperenhancing foci within the liver thought to be hemangiomas, moderate size right inguinal hernia containing a long segment of small bowel without obstruction or incarceration.  CT of soft tissue again reveals extensive cervical adenopathy as well as evidence of pansinusitis.  The patient's case was discussed with Dr. Church and the patient has stage III-IV disease should be treated during this hospitalization.  I met with the patient and discussed in detail the use of Treanda/ Rituxan which we initiated Treanda Rituxan beginning May 1, 2019.     When he is seen May 2 he is already beginning to respond with reducing adenopathy and improved symptoms.  Plan to proceed with day #2.  We have also reviewed his findings including IgA 14, IgG of 263, IgM of 5 and a kappa/lambda ratio of 26.38.  He is hypogammaglobulinemic and replacement therapy will be given this hospitalization.  ENT assessment will also be requested.     The patient is again seen May 3, 2019, continuing to improve overall.  We did proceed with IVIG 400 mg/kg and had the patient seen by ENT after which the patient was discharged.  He indicates that Dr. Hamlin is going to see him back within the week as he is now seen back in our office May 16 and that surgery may be necessary.  The patient is also still having sinus symptoms.  Further he has developed a more extensive rash involving his abdomen chest and back and previously seen in hospital?.  Otherwise he feels  well except for fatigue without fever, chills, nausea, vomiting, weight loss, stable appetite.     The patient went on to take 2 cycles of Treanda, Rituxan with a second cycle given June 6 and 7.  Additional assessments included his dermatologist who felt he had a gradually improving dermatitis and would not require an therapy and ENT indicating that the patient was slowly improving per sinus symptoms the surgery may be necessary at a later point.  When he is reviewed May 30 he was neutropenic and we proceeded with IVIG second treatment on Elenita 10.  He underwent repeat scans June 27 demonstrating a significant reduction in adenopathy in the neck chest abdomen pelvis with index nodes in the neck previously 2 cm x 1.1 cm, chest with subcarinal lymph node conglomerate measuring 2.1 x 0.9 previously 5.8 x 2.7 and previously seen irregular pulmonary consolidation throughout bilateral lungs having nearly completely resolved.  Spleen is also reduced in size at 4.5 cm previously 18.6 cm and mesenteric nodes had similar reduced in size.  The patient's immunoglobulins were assessed June 27 with an IgG of 667, IgA of 20, IgM of 9 and IgE of 3.Additional information includes FISH analysis for CLL which is positive for deletion of 13 q. 14.3 It should be noted that such finding on FISH analysis generally suggest a favorable prognostic finding.  The patient is next seen July 3, 2019 feeling improved overall but still having a dermatologic issue with pruritus, erythema worsening particularly in the lower abdomen and upper groin.  Again his scans are considerably improved and we discussed, on the basis of the above information, changing his therapy now to Imbruvica.  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  He was seen July 31 tolerating this well.  He was able to discontinue allopurinol and ferrous sulfate thereafter presents back August 28 having taken the  medication over the last month.     He indicates that he is having no issues tolerating the medication and generally feels well except for sinus drainage.  He has had a cough and has a chest x-ray scheduled to primary care August 29, 2019.  He has had no fever, chills, sweats, rash and has gained weight.  The patient is next seen November 20, 2019 continuing to generally improve.  His performance status remains excellent and has had no additional complications thus far with the use of Imbruvica or an additional infectious complications.  He has been seen by pulmonary medicine with reticular infiltrates noted on previous CAT scan on a follow-up study scheduled in the next several days.  This may be evolution towards recovery but a follow-up will be necessary.  Finally we have been able to obtain Imbruvica reasonably cost through foundation support.    The patient is next seen February 12, 2020 doing well without additional infectious complications and with stable findings hematologically.  We have discussed the fact that Rituxan could be added potentially to reduce the amount of time he remains on the medication but, additionally, further reduce his immunoglobulins.  Though this remains a concern he is also having difficulty with sleep-?  Sleep apnea, and worsening right direct inguinal hernia.    Plans for the patient to continue Imbruvica at dosing rechecking his immunoglobulins April 29 now with IVIG down to 371, IgM of 11, IgA level 32, kappa/lambda ratio of 1.51.  Fortunately continues to feel well without additional symptoms though is concerned about easy bruisability and periodic muscle tenderness after activity.  We have discussed his sleep assessment and he very likely has sleep apnea but does not wish to start CPAP at this point and is using oral medications-trazodone-to modest effect.    The patient is followed by pulmonary medicine.He was seen September 2 having had right-sided rib pain for 1 month,  shortness of breath on going up stairs or uphill.  Follow-up chest CT revealed left upper lobe nodule 1.6 cm that was noted spiculated, additionally lesion per right rib with a soft tissue mass (3.7 x 2.6 cm) was recognized in the tissue biopsy was obtained.  This is now returned as positive for moderately differentiated adenocarcinoma.  This is CK7 positive, CK20 negative with a lung primary suspected clinically.  A molecular panel has not yet been obtained.  We have now, however, sent for foundation CDX analysis.    The patient is seen September 2020 with considerable right chest wall pain only modestly controlled with Toradol and ibuprofen.  He also has been taking additional Xanax to reduce the muscle spasm that he is experiencing.  I have advised him of the findings and their significance as being consistent with metastatic non-small cell lung cancer.  He is dismayed by the conversation but wishes to have pain control as quickly as possible as well as a cady discussion of treatment options.    The patient was provided additional anti-pain and antianxiety medications.  A PET/CT was obtained September 23 demonstrating asymmetric uptake within the right parotid gland which is unremarkable appearance on noncontrasted CT, SUV of 6 compared to 2.7.  There is no hilar, mediastinal or axillary adenopathy, findings of asymmetric moderate to intense FDG uptake within superior aspect the right chest wall anterior to the right first rib with an irregular hypodense lesion measuring 1.8 x 1.6 and SUV 4.9.  This had not been seen June 27, 2019.  There is an intensely FDG avid nodule within the lingula measuring 1.9 x 1.5 history of 12.4, FDG avid left adrenal nodule 1.9 x 1.5 with an issue 21.2.  The patient was seen by radiation therapy September 29 and started on radiation therapy to the right chest wall-35 Gy in 10 fractions.  Plans were also then proceed with SBRT to the solitary left upper lobe lesion pending insurance  approval.  Further testing including TPS of 20% and foundation CDX with a TMB of greater than 10 mutations per megabase (28 mutations per megabase) and the indication that several immunotherapies could be useful in this patient's care.  Additional genomic findings include BRAF G469R/that trametinib could be useful and STK11/that everolimus could be useful.      The patient is seen back September 30, 2020 indicating that his pain has improved substantially with his current pain medications.  He also continues laxatives on a regular basis.  Radiation therapy will begin October 1, 2020 as described above and we have discussed on the basis of the findings per his genomic testing that he is a candidate for a number of additional treatment approaches.  Considering he does not have significant organ involvement and that he has a positive PDL 1 at 20% and a TMB 28 mutations per megabase it would seem reasonable (particularly with his history of CLL) to try to use immunotherapy as monotherapy initially.  This might provide control and allow us not to affect his hematologic illness in any significant way.  We discussed that he will need a PowerPort placement in the near future but that we could start Keytruda shortly thereafter.  Patient was able to proceed into palliative therapy undergoing radiation therapy to the right eighth rib 3 and 50 cGy per fraction x10 between October 1 of October 14.  A PowerPort was placed October 15, 2020.        Mr. Freitas returns to the office October 21, 2020 indicating, wonderfully, that his pain has nearly resolved and he does not need to take short acting pain medications at this point.  Unfortunately he has had severe constipation we discussed his laxatives in depth as to the use, schedule and expected results as he reduces his narcotic use and moves into immunotherapy.  We have also discussed his PET/CT that does refer to an abnormality seen in the rectum that will need to be assessed by  "colonoscopy.  He states further that he is having lower extremity swelling beginning over the last several weeks right greater than left.  His breathing remains generally improved though he does have cough periodically and mild degree of hemoptysis.              Past Medical History, Past Surgical History, Social History, Family History have been reviewed and are without significant changes except as mentioned.    Review of Systems   Constitutional: Positive for fatigue and unexpected weight change.   HENT: Negative.    Eyes: Negative.    Respiratory: Negative.  Negative for cough, chest tightness, shortness of breath and wheezing.         Infrequent mild hemoptysis   Cardiovascular: Positive for leg swelling.   Gastrointestinal: Positive for constipation. Negative for abdominal pain, diarrhea, nausea and vomiting.   Endocrine: Negative.    Genitourinary: Negative.  Negative for testicular pain.   Skin: Negative.  Negative for rash.   Allergic/Immunologic: Negative.    Neurological: Negative.  Negative for weakness.   Psychiatric/Behavioral: Positive for sleep disturbance.   All other systems reviewed and are negative.         Medications:  The current medication list was reviewed in the EMR    ALLERGIES:  No Known Allergies    Objective      Vitals:    10/21/20 0819   BP: 105/56   Pulse: 94   Resp: 18   Temp: 98 °F (36.7 °C)   TempSrc: Temporal   SpO2: 95%   Weight: 83.1 kg (183 lb 1.6 oz)   Height: 180.3 cm (70.98\")   PainSc: 0-No pain     Current Status 10/21/2020   ECOG score 0       Physical Exam    Constitutional: He is oriented to person, place, and time. He appears well-developed and well-nourished.  He is in moderate distress per pain involving his right lateral rib cage.  HENT:   Head: Normocephalic.   Eyes: Conjunctivae and EOM are normal. Pupils are equal, round, and reactive to light. No scleral icterus.   Neck: Normal range of motion. Neck supple. No JVD present. No thyromegaly present. "   Cardiovascular:  Normal rate, regular rhythm.  present. Exam reveals no gallop and no friction rub.   No murmur heard.  Pulmonary/Chest:He has no rales.  The patient has a palpable mass approximately right seventh eighth rib location laterally measuring 6 x 8 cm and tender to palpati  Abdominal: Soft. He exhibits no distension and no mass. There is no tenderness.  He has bilateral hernias, apparently direct, right greater than left  Musculoskeletal: Normal range of motion. He exhibits 1+ edema right lower extremity-trace edema left lower extremity, negative Homans' sign  Lymphadenopathy: No current lymphadenopathy      Skin: Rash resolved                                              Neurological: He is alert and oriented to person, place, and time. He has normal reflexes. No cranial nerve deficit.   Skin: Skin is warm and dry.                         Psychiatric: He has a normal mood and affect. His behavior is normal. Judgment normal    RECENT LABS:  Hematology     WBC   Date Value Ref Range Status   10/21/2020 9.69 3.40 - 10.80 10*3/mm3 Final     RBC   Date Value Ref Range Status   10/21/2020 3.89 (L) 4.14 - 5.80 10*6/mm3 Final     Hemoglobin   Date Value Ref Range Status   10/21/2020 11.8 (L) 13.0 - 17.7 g/dL Final     Hematocrit   Date Value Ref Range Status   10/21/2020 37.4 (L) 37.5 - 51.0 % Final     Platelets   Date Value Ref Range Status   10/21/2020 232 140 - 450 10*3/mm3 Final            Flow cytometry report on the peripheral blood as well as the lymph node biopsied at bronchoscopy are consistent with chronic lymphocytic leukemia/small lymphocytic lymphoma.    FISH prognostic panel-Positive for deletion of 13 q. 14.3    F-18 FDG PET FROM SKULL BASE TO MID THIGH WITH PET/CT FUSION 9/23/2020      FINDINGS:   There is no cervical adenopathy demonstrating FDG uptake significantly  above that of blood pool. The thyroid and submandibular glands  demonstrate roughly symmetric FDG uptake. There is asymmetric  FDG uptake  within the right parotid gland which has an otherwise unremarkable  appearance on noncontrast CT. It demonstrates a max SUV of 6 compared to  2.7 on the left.     There is no hilar, mediastinal or axillary adenopathy demonstrating FDG  uptake significantly above that of blood pool.      Asymmetric moderate to intense FDG uptake is present within the superior  aspect of the right chest wall just anterior to the right 1st rib with  associated irregular hypodense lesion on noncontrast CT measuring 1.8 x  1.6 cm with a max SUV of 4.9 cm. These findings were not visualized on  06/27/2019.     The heart is normal in size. Small-to-moderate right pleural effusion is  present. There is an intensely FDG avid nodule within the lingula  measuring 1.9 x 1.5 cm and demonstrating max SUV of 12.4. Ground glass  opacification extends from the periphery of this lesion. Sub-6 mm  pulmonary nodules within the left lower lobe have decreased in size  since 04/28/2019.     There is a small hiatal hernia. Of note the appendix extends into a  right inguinal hernia.     There is no focal FDG avid abnormality within the liver, gallbladder,  pancreas, spleen or adrenal glands. There is no hydronephrosis.     Intensely FDG avid left adrenal nodule measures 1.9 x 1.5 cm, is new  since 06/27/2019 and demonstrates a max SUV of 21.2.     There is no abdominopelvic adenopathy demonstrating FDG uptake  significantly above that of blood pool. Focus of moderate to intense FDG  uptake is present within the distal rectum/anus demonstrating max SUV of  11.2.     There is no free intraperitoneal air or fluid.     Focus of sclerosis within the right ischium is present and grossly  unchanged since 06/27/2019. It does not demonstrate FDG uptake  significantly above that of surrounding marrow and likely represents a  bone island. Intensely FDG avid soft tissue mass centered over the right  8th rib with associated destruction is present and  measures 7 x 6.3 cm  with soft tissue extension into the pleura and chest wall. It  demonstrates a max SUV of 27.6.     IMPRESSION:  1.  Intensely FDG avid left upper lobe pulmonary nodule likely  representing patient's known primary malignancy. Ground glass  attenuation extending from the periphery of this nodule is favored to  represent atelectasis; however lymphangitic spread of disease could  appear similar and continued close attention on follow-up is  recommended.  2.  Intensely FDG avid mass centered over the right 8th rib with  associated soft tissue extension and cortical destruction and intensely  FDG avid left adrenal nodule likely representing metastatic disease.  3.  Small-to-moderate right pleural effusion is present which given the  pleural invasion by the right eighth rib lesion raises concern for a  malignant effusion.  4.  Moderate to intense asymmetric FDG uptake within the superior aspect  of the right chest wall just anterior to the right 8th rib with  associated hypodense lesion which is favored to represent an  intramuscular metastasis. Reactive changes are less likely.  5.  Focal FDG uptake within the distal rectum/anus. While findings are  nonspecific, please ensure this patient is currently up-to-date on  recommended colonoscopic screening. If this patient has not received a  recent colonoscopy per current guidelines, recommend correlation with  colonoscopy.  6.  Other findings as above     This report was finalized on 9/25/2020 7:58 AM by Dr. Aydin Enriquez M.D.    Assessment/Plan   1.  CLL presenting with significant lymphocytosis, lymphadenopathy and splenomegaly.   Positive for deletion of 13 q. 14.3.  Patient status post 2 cycles of Treanda Rituxan with excellent response.  Reassessment July 3, 2019 with plans to switch Treanda to Imbruvica which began July 25, 2019, tolerated well. This continues to be the case.  We have discussed the possibility of adding Rituxan to shorten the time  he is on  Imbruvica and we will continue to review this though the patient has hypogammaglobulinemia at this point and we do not wish to worsen this until we are more aware of how to manage COVID-19.  As he is assessed September 9, 2020 his CLL is under good control and he will continue Imbruvica at this point at unchanged dosing.  There were no clinical changes when the patient is reassessed September 30 and October 21 and he will continue Imbruvica.            2.  Pulmonary nodules/infiltrates.  He is  status post bronchoscopy.  Is unclear at this time whether these findings are infectious or possibly related to his lymphoproliferative disorder.  His subsequent studies in late November are much improved, followed by pulmonary.                                                 He had follow-up scans performed August 26 revealing a new 1.6 cm spiculated nodule within the left upper lobe, 1.2 cm left adrenal nodule, bone windows with a soft tissue mass involving the right eighth rib measuring 3.7 x 2.6 cm.  Biopsy was consistent with adenocarcinoma CK 7+, CK 20-, lung primary suspected clinically, molecular panel not yet obtained.     A PET/CT was obtained September 23 demonstrating asymmetric uptake within the right parotid gland which is unremarkable appearance on noncontrasted CT, SUV of 6 compared to 2.7.  There is no hilar, mediastinal or axillary adenopathy, findings of asymmetric moderate to intense FDG uptake within superior aspect the right chest wall anterior to the right first rib with an irregular hypodense lesion measuring 1.8 x 1.6 and SUV 4.9.  This had not been seen June 27, 2019.  There is an intensely FDG avid nodule within the lingula measuring 1.9 x 1.5 history of 12.4, FDG avid left adrenal nodule 1.9 x 1.5 with an issue 21.2.  The patient was seen by radiation therapy September 29 and started on radiation therapy to the right chest wall-35 Gy in 10 fractions.  Plans were also then proceed with  SBRT to the solitary left upper lobe lesion pending insurance approval.  Further testing including TPS of 20% and foundation CDX with a TMB of greater than 10 mutations per megabase (28 mutations per megabase) and the indication that several immunotherapies could be useful in this patient's care.  Additional genomic findings include BRAF G469R/that trametinib could be useful and STK11/that everolimus could be useful.  *Discussion held as to how to proceed in particular using immunotherapy with Keytruda as monotherapy initially in this patient.  This would reduce his degree of myelosuppression.  Patient completed radiotherapy October 14, October 21 status post port placement the patient began Keytruda.  Plan follow-up in 2 weeks-NP, 3 weeks, MD, second Keytruda    3.  Iron deficiency-stable    4.  Hypogammaglobulinemia-status post IVIG with resolution of sinusitis, current levels again reviewed    5.  Pain control now addressed with MS Contin 30 mg every 12 hours, Norco 10/325 1 p.o. every 4 hours, continue laxative therapy with Senokot-S 2 p.o. daily to twice daily.  October 21, 2020 he indicates he only rarely needs his breakthrough pain medications.    6.  Xanax 0.25 mg 1 p.o. 3 times daily as needed     7.  Lower extremity swelling-follow-up Doppler examinations bilateral lower extremities      Plan:  *Continue Imbruvica at current dosing    *Proceed with Keytruda first dose October 21, 2020    *Follow-up CT scan scheduled mid November by radiation therapy, status post XRT    *Schedule bilateral lower extremity Doppler examinations    *Ongoing constipation, abnormality seen on PET scan per distal rectum.  GI consultation requested- likely endoscopy    *Additional Xgeva will be discussed with the patient returns for follow-up.    I spent 50 total minutes, face-to-face, caring for Dav rocha.  Greater than 50% of this time involved counseling and/or coordination of care as documented within this note.

## 2020-10-15 ENCOUNTER — ANESTHESIA EVENT (OUTPATIENT)
Dept: PERIOP | Facility: HOSPITAL | Age: 65
End: 2020-10-15

## 2020-10-15 ENCOUNTER — ANESTHESIA (OUTPATIENT)
Dept: PERIOP | Facility: HOSPITAL | Age: 65
End: 2020-10-15

## 2020-10-15 ENCOUNTER — APPOINTMENT (OUTPATIENT)
Dept: GENERAL RADIOLOGY | Facility: HOSPITAL | Age: 65
End: 2020-10-15

## 2020-10-15 ENCOUNTER — HOSPITAL ENCOUNTER (OUTPATIENT)
Facility: HOSPITAL | Age: 65
Setting detail: HOSPITAL OUTPATIENT SURGERY
Discharge: HOME | End: 2020-10-15
Attending: SURGERY | Admitting: SURGERY

## 2020-10-15 VITALS
SYSTOLIC BLOOD PRESSURE: 116 MMHG | HEIGHT: 71 IN | BODY MASS INDEX: 25.9 KG/M2 | HEART RATE: 85 BPM | RESPIRATION RATE: 16 BRPM | DIASTOLIC BLOOD PRESSURE: 73 MMHG | WEIGHT: 184.97 LBS | OXYGEN SATURATION: 98 % | TEMPERATURE: 98.4 F

## 2020-10-15 PROCEDURE — 25010000002 HEPARIN (PORCINE) PER 1000 UNITS: Performed by: SURGERY

## 2020-10-15 PROCEDURE — 25010000003 LIDOCAINE 1 % SOLUTION 20 ML VIAL: Performed by: SURGERY

## 2020-10-15 PROCEDURE — 25010000003 CEFAZOLIN IN DEXTROSE 2-4 GM/100ML-% SOLUTION: Performed by: SURGERY

## 2020-10-15 PROCEDURE — 25010000002 PROPOFOL 10 MG/ML EMULSION: Performed by: ANESTHESIOLOGY

## 2020-10-15 PROCEDURE — 76000 FLUOROSCOPY <1 HR PHYS/QHP: CPT

## 2020-10-15 PROCEDURE — C1788 PORT, INDWELLING, IMP: HCPCS | Performed by: SURGERY

## 2020-10-15 PROCEDURE — 25010000002 MIDAZOLAM PER 1 MG: Performed by: ANESTHESIOLOGY

## 2020-10-15 DEVICE — POWERPORT M.R.I. IMPLANTABLE PORT WITH ATTACHABLE 9.6F OPEN-ENDED SINGLE-LUMEN VENOUS CATHETER INTERMEDIATE KIT (WITH SUTURE PLUGS)
Type: IMPLANTABLE DEVICE | Site: CHEST | Status: FUNCTIONAL
Brand: POWERPORT M.R.I.

## 2020-10-15 RX ORDER — HEPARIN SODIUM 1000 [USP'U]/ML
INJECTION, SOLUTION INTRAVENOUS; SUBCUTANEOUS AS NEEDED
Status: DISCONTINUED | OUTPATIENT
Start: 2020-10-15 | End: 2020-10-15 | Stop reason: HOSPADM

## 2020-10-15 RX ORDER — HYDROMORPHONE HYDROCHLORIDE 1 MG/ML
0.5 INJECTION, SOLUTION INTRAMUSCULAR; INTRAVENOUS; SUBCUTANEOUS
Status: DISCONTINUED | OUTPATIENT
Start: 2020-10-15 | End: 2020-10-15 | Stop reason: HOSPADM

## 2020-10-15 RX ORDER — FENTANYL CITRATE 50 UG/ML
50 INJECTION, SOLUTION INTRAMUSCULAR; INTRAVENOUS
Status: DISCONTINUED | OUTPATIENT
Start: 2020-10-15 | End: 2020-10-15 | Stop reason: HOSPADM

## 2020-10-15 RX ORDER — ONDANSETRON 2 MG/ML
4 INJECTION INTRAMUSCULAR; INTRAVENOUS ONCE AS NEEDED
Status: DISCONTINUED | OUTPATIENT
Start: 2020-10-15 | End: 2020-10-15 | Stop reason: HOSPADM

## 2020-10-15 RX ORDER — MIDAZOLAM HYDROCHLORIDE 1 MG/ML
1 INJECTION INTRAMUSCULAR; INTRAVENOUS
Status: DISCONTINUED | OUTPATIENT
Start: 2020-10-15 | End: 2020-10-15 | Stop reason: HOSPADM

## 2020-10-15 RX ORDER — EPHEDRINE SULFATE 50 MG/ML
5 INJECTION, SOLUTION INTRAVENOUS ONCE AS NEEDED
Status: DISCONTINUED | OUTPATIENT
Start: 2020-10-15 | End: 2020-10-15 | Stop reason: HOSPADM

## 2020-10-15 RX ORDER — DIPHENHYDRAMINE HCL 25 MG
25 CAPSULE ORAL
Status: DISCONTINUED | OUTPATIENT
Start: 2020-10-15 | End: 2020-10-15 | Stop reason: HOSPADM

## 2020-10-15 RX ORDER — NALOXONE HCL 0.4 MG/ML
0.2 VIAL (ML) INJECTION AS NEEDED
Status: DISCONTINUED | OUTPATIENT
Start: 2020-10-15 | End: 2020-10-15 | Stop reason: HOSPADM

## 2020-10-15 RX ORDER — HYDROCODONE BITARTRATE AND ACETAMINOPHEN 7.5; 325 MG/1; MG/1
1 TABLET ORAL ONCE AS NEEDED
Status: DISCONTINUED | OUTPATIENT
Start: 2020-10-15 | End: 2020-10-15 | Stop reason: HOSPADM

## 2020-10-15 RX ORDER — LIDOCAINE HYDROCHLORIDE 20 MG/ML
INJECTION, SOLUTION INFILTRATION; PERINEURAL AS NEEDED
Status: DISCONTINUED | OUTPATIENT
Start: 2020-10-15 | End: 2020-10-15 | Stop reason: SURG

## 2020-10-15 RX ORDER — FLUMAZENIL 0.1 MG/ML
0.2 INJECTION INTRAVENOUS AS NEEDED
Status: DISCONTINUED | OUTPATIENT
Start: 2020-10-15 | End: 2020-10-15 | Stop reason: HOSPADM

## 2020-10-15 RX ORDER — HYDRALAZINE HYDROCHLORIDE 20 MG/ML
5 INJECTION INTRAMUSCULAR; INTRAVENOUS
Status: DISCONTINUED | OUTPATIENT
Start: 2020-10-15 | End: 2020-10-15 | Stop reason: HOSPADM

## 2020-10-15 RX ORDER — PROPOFOL 10 MG/ML
VIAL (ML) INTRAVENOUS AS NEEDED
Status: DISCONTINUED | OUTPATIENT
Start: 2020-10-15 | End: 2020-10-15 | Stop reason: SURG

## 2020-10-15 RX ORDER — LIDOCAINE HYDROCHLORIDE 10 MG/ML
0.5 INJECTION, SOLUTION EPIDURAL; INFILTRATION; INTRACAUDAL; PERINEURAL ONCE AS NEEDED
Status: DISCONTINUED | OUTPATIENT
Start: 2020-10-15 | End: 2020-10-15 | Stop reason: HOSPADM

## 2020-10-15 RX ORDER — LABETALOL HYDROCHLORIDE 5 MG/ML
5 INJECTION, SOLUTION INTRAVENOUS
Status: DISCONTINUED | OUTPATIENT
Start: 2020-10-15 | End: 2020-10-15 | Stop reason: HOSPADM

## 2020-10-15 RX ORDER — SODIUM CHLORIDE 0.9 % (FLUSH) 0.9 %
3 SYRINGE (ML) INJECTION EVERY 12 HOURS SCHEDULED
Status: DISCONTINUED | OUTPATIENT
Start: 2020-10-15 | End: 2020-10-15 | Stop reason: HOSPADM

## 2020-10-15 RX ORDER — OXYCODONE AND ACETAMINOPHEN 7.5; 325 MG/1; MG/1
1 TABLET ORAL ONCE AS NEEDED
Status: DISCONTINUED | OUTPATIENT
Start: 2020-10-15 | End: 2020-10-15 | Stop reason: HOSPADM

## 2020-10-15 RX ORDER — SODIUM CHLORIDE, SODIUM LACTATE, POTASSIUM CHLORIDE, CALCIUM CHLORIDE 600; 310; 30; 20 MG/100ML; MG/100ML; MG/100ML; MG/100ML
9 INJECTION, SOLUTION INTRAVENOUS CONTINUOUS
Status: DISCONTINUED | OUTPATIENT
Start: 2020-10-15 | End: 2020-10-15 | Stop reason: HOSPADM

## 2020-10-15 RX ORDER — DIPHENHYDRAMINE HYDROCHLORIDE 50 MG/ML
12.5 INJECTION INTRAMUSCULAR; INTRAVENOUS
Status: DISCONTINUED | OUTPATIENT
Start: 2020-10-15 | End: 2020-10-15 | Stop reason: HOSPADM

## 2020-10-15 RX ORDER — SODIUM CHLORIDE 0.9 % (FLUSH) 0.9 %
3-10 SYRINGE (ML) INJECTION AS NEEDED
Status: DISCONTINUED | OUTPATIENT
Start: 2020-10-15 | End: 2020-10-15 | Stop reason: HOSPADM

## 2020-10-15 RX ORDER — PROMETHAZINE HYDROCHLORIDE 25 MG/1
25 SUPPOSITORY RECTAL ONCE AS NEEDED
Status: DISCONTINUED | OUTPATIENT
Start: 2020-10-15 | End: 2020-10-15 | Stop reason: HOSPADM

## 2020-10-15 RX ORDER — CEFAZOLIN SODIUM 2 G/100ML
2 INJECTION, SOLUTION INTRAVENOUS ONCE
Status: COMPLETED | OUTPATIENT
Start: 2020-10-15 | End: 2020-10-15

## 2020-10-15 RX ORDER — PROPOFOL 10 MG/ML
VIAL (ML) INTRAVENOUS CONTINUOUS PRN
Status: DISCONTINUED | OUTPATIENT
Start: 2020-10-15 | End: 2020-10-15 | Stop reason: SURG

## 2020-10-15 RX ORDER — FAMOTIDINE 10 MG/ML
20 INJECTION, SOLUTION INTRAVENOUS ONCE
Status: COMPLETED | OUTPATIENT
Start: 2020-10-15 | End: 2020-10-15

## 2020-10-15 RX ORDER — PROMETHAZINE HYDROCHLORIDE 12.5 MG/1
25 TABLET ORAL ONCE AS NEEDED
Status: DISCONTINUED | OUTPATIENT
Start: 2020-10-15 | End: 2020-10-15 | Stop reason: HOSPADM

## 2020-10-15 RX ADMIN — LIDOCAINE HYDROCHLORIDE 50 MG: 20 INJECTION, SOLUTION INFILTRATION; PERINEURAL at 09:16

## 2020-10-15 RX ADMIN — PROPOFOL 50 MG: 10 INJECTION, EMULSION INTRAVENOUS at 09:17

## 2020-10-15 RX ADMIN — CEFAZOLIN SODIUM 2 G: 2 INJECTION, SOLUTION INTRAVENOUS at 09:11

## 2020-10-15 RX ADMIN — PROPOFOL 120 MCG/KG/MIN: 10 INJECTION, EMULSION INTRAVENOUS at 09:17

## 2020-10-15 RX ADMIN — FAMOTIDINE 20 MG: 10 INJECTION, SOLUTION INTRAVENOUS at 08:22

## 2020-10-15 RX ADMIN — MIDAZOLAM 1 MG: 1 INJECTION INTRAMUSCULAR; INTRAVENOUS at 08:22

## 2020-10-15 RX ADMIN — SODIUM CHLORIDE, POTASSIUM CHLORIDE, SODIUM LACTATE AND CALCIUM CHLORIDE 9 ML/HR: 600; 310; 30; 20 INJECTION, SOLUTION INTRAVENOUS at 08:22

## 2020-10-15 NOTE — ANESTHESIA PREPROCEDURE EVALUATION
Anesthesia Evaluation     Patient summary reviewed and Nursing notes reviewed                Airway   Mallampati: II  TM distance: >3 FB  Neck ROM: full  Dental    (+) upper dentures and partials    Pulmonary    (+) a smoker Former, lung cancer, COPD severe, shortness of breath,   Cardiovascular - negative cardio ROS    ECG reviewed  Rhythm: regular  Rate: normal        Neuro/Psych  (+) psychiatric history Anxiety and Depression,     GI/Hepatic/Renal/Endo    (+)   renal disease CRI,     Musculoskeletal (-) negative ROS    Abdominal    Substance History - negative use     OB/GYN negative ob/gyn ROS         Other   blood dyscrasia,   history of cancer                    Anesthesia Plan    ASA 3     MAC   (Partial upper denture has been removed for surgery )  intravenous induction     Anesthetic plan, all risks, benefits, and alternatives have been provided, discussed and informed consent has been obtained with: patient.

## 2020-10-15 NOTE — ANESTHESIA POSTPROCEDURE EVALUATION
"Patient: Dav Freitas    Procedure Summary     Date: 10/15/20 Room / Location: [facility]  / [facility]    Anesthesia Start: 0911 Anesthesia Stop: 0954    Procedure: MEDIPORT PLACEMENT (N/A ) Diagnosis:     Surgeon: Herlinda Magaña Jr., MD Provider: Messi Alva MD    Anesthesia Type: MAC ASA Status: 3          Anesthesia Type: MAC    Vitals  Vitals Value Taken Time   /62 10/15/20 0953   Temp 36.9 °C (98.4 °F) 10/15/20 0953   Pulse 86 10/15/20 0953   Resp 16 10/15/20 0953   SpO2 96 % 10/15/20 0953           Post Anesthesia Care and Evaluation    Patient location during evaluation: PACU  Patient participation: complete - patient participated  Level of consciousness: awake and alert  Pain management: adequate  Airway patency: patent  Anesthetic complications: No anesthetic complications    Cardiovascular status: acceptable  Respiratory status: acceptable  Hydration status: acceptable    Comments: /62 (BP Location: Left arm, Patient Position: Lying)   Pulse 86   Temp 36.9 °C (98.4 °F) (Oral)   Resp 16   Ht 180.3 cm (71\")   Wt 83.9 kg (184 lb 15.5 oz)   SpO2 96%   BMI 25.80 kg/m²         "

## 2020-10-15 NOTE — DISCHARGE INSTRUCTIONS
[facility]  Gómez Tate, Dejan Vargas Rachel, Scherrer, Thomas  [address]  [place] [address]  [phone]  Discharge Instructions for Port Placement    • Go home, rest and take it easy today.    • You may experience some dizziness or memory loss from the anesthesia.  This may last for the next 24 hours.  Someone should plan on staying with you for the first 24 hours for your safety.  • Do not make any important legal decisions or sign any legal papers for the next 24 hours.    • Eat and drink lightly today.  Start off with liquids, jello, soup, crackers or other bland foods at first. You may advance your diet tomorrow as tolerated as long as you do not experience any nausea or vomiting.   • If skin glue was used, your incision will be open to air.  No care is required.  If you have a dressing, you may remove it in 2-3 days or until your first treatment whichever comes first. If you have the little white tapes known as steri-strips, leave them alone.  They usually fall off in 1-2 weeks.  Do not worry if they come off sooner.   • You may notice some bleeding/drainage. A little bloody drainage is normal.  Some bruising is also normal.  • You may shower tomorrow.  No tub baths until your incisions are completely healed.  • You have received a prescription for a narcotic pain medicine, as you may have some pain/discomfort following surgery.   You will not be totally pain free, but your pain medicine should make the pain tolerable.  Please take your pain medicine as prescribed and always take your pills with food to prevent nausea. If you are having severe pain that cannot be controlled by the pain medicine, please contact me.  If the pain is such that narcotic pain medicine is not required, you may take Tylenol or Ibuprofen as directed unless indicated otherwise.    • No driving for 24 hours and for as long as you are taking your prescription pain medicine.      Remember to  contact me for any of the following:    • Fever> 101 degrees  • Severe pain that cannot be controlled by taking your pain pills  • Severe nausea or vomiting   • Significant bleeding from your incision  • Drainage that has a bad smell or is yellow or green in appearance  • Any other questions or concerns

## 2020-10-16 NOTE — PROGRESS NOTES
RADIATION THERAPY TREATMENT SUMMARY  Patient: Dav Freitas  YOB: 1955  Diagnosis: Lung cancer metastatic to bone (CMS/HCC)      Dav Freitas has recently completed the course of radiation therapy prescribed for the above-mentioned diagnosis.  Below, please find the specifics of the course of treatment delivered:    Radiation Details:    Tx Start Date  10/1/2020   Tx End date  10/14/2020  Intent   Palliative   Total Treatments 10    Prescription Name RIGHT 8TH RIB  Primary/Boost  PRIMARY  Dose Per Fraction 350 cGy/Frac  Number of Fractions 10  Prescribe To  Isocenter 3500 cGy  350 cGy/Frac  Mode    Photon  Technique  TANGENTS  Frequency  5 Times a week  Energy   6X/18X  Prescription Note Prescribe 350cGy x 10TX to 100% isodose line at [place]              Tolerance and Toxicities: he tolerated the treatments well, requiring no treatment breaks. he completed the treatments in 13 elapsed days.    Follow-up Plans: We will schedule follow-up CT scan of the chest in about 5 weeks.  I have also made a referral to our Survivorship Clinic.

## 2020-10-16 NOTE — PROGRESS NOTES
RADIATION THERAPY TREATMENT SUMMARY  Patient: Dav Freitas  YOB: 1955  Diagnosis:     Dav Freitas has recently completed the course of radiation therapy prescribed for the above-mentioned diagnosis.  Below, please find the specifics of the course of treatment delivered:    Radiation Details:        Tolerance and Toxicities: he tolerated the treatments *** , requiring *** treatment breaks. he completed the treatments in *** elapsed days.    Follow-up Plans:  I have asked the patient to return to see me in approximately *** .  I have also made a referral to our Survivorship Clinic.    The patient has continued treatment and follow up established with the [facility] physicians. Therefore, I have not given the patient an appointment to return here but encouraged them to call if we could be of further help.

## 2020-10-20 NOTE — OP NOTE
Operative Note  Date of Admission:  10/15/2020  OR Date: 10/15/2020    Pre-op Diagnosis: Chronic lymphocytic leukemia and lung cancer, in need of access for chemotherapy    Post-op Diagnosis:     Same    Procedure:   1) Duplex ultrasound guided percutaneous access of the right internal jugular vein with placement of right internal jugular PowerPort with fluoroscopy    Surgeon: Herlinda Magaña Jr, MD    Assistant: TEX Barrios CSA    Anesthesia: Monitored Anesthesia Care    Staff:   Circulator: Noa Minor RN  Radiology Technologist: Fernanda Otto  Scrub Person: Serenity Hughes  Assistant: Nallely Martinez CSA    Estimated Blood Loss: Minimal    Specimens:   None    Complications: None    Findings: Catheter tip in the superior vena cava near the right atrium.  No kinking of the catheter.  No pneumothorax.    Indications:  As in preop diagnosis    Procedure: The patient was placed on the operating table in the supine position.  Intravenous sedation was then given.  The patient was prepped from the chin, shoulder to shoulder, to the nipple line using ChloraPrep and draped in the usual sterile fashion.  Using duplex ultrasound the right internal jugular vein was identified.  Local anesthetic was infiltrated into the skin and subcutaneous tissue just cephalad to the probe.  The vein was accessed percutaneously under direct vision.  A guidewire was advanced.  Fluoroscopy confirmed the position to be in the superior vena cava.  More local anesthetic was infiltrated into the skin and subtendinous tissue inferior and lateral to the clavicle.  The bridge between the guidewire exit site and this location was anesthetized as well.  A transverse incision was made on the chest and the electrocautery was used to dissected the subcutaneous tissue down to the pectoralis fascia.  A subcutaneous pocket was fashioned.  The port was assembled and placed into the port.  It was brought through a subcutaneous  tunnel to the guidewire exit site.  A dilator and sheath were placed over the guidewire.  The dilator and guidewire were removed and the catheter was cut to the appropriate length and placed through the sheath and the sheath removed.  There was easy aspiration and flow through the port.  The port was flushed with heparinized saline solution and 2000 units of heparin instilled.  The port was secured to the fascia with 3-0 Prolene suture.  Fluoroscopy confirmed satisfactory positioning of the port with no pneumothorax.  No kinking of the catheter.  The catheter tip was in the superior vena cava near the right atrium.  The subcutaneous tissue and both incisions were closed with 3-0 Vicryl suture and the skin was closed with a running 4-0 Vicryl in a subcuticular fashion.  Dermabond was placed over each incision.  Sponge, needle, and estimate counts were all reported as correct.  The patient was then transported to the recovery room in  satisfactory condition.        There are no hospital problems to display for this patient.     Herlinda Magaña Jr., MD     Date: 10/15/2020  Time: 09:45 EDT       Abdominal lipoma  s/p excision   H/O  section  10/9/2015 @ 24weeks 1#11 (PPROM- admitted for 15 days) classical incision  History of removal of ovarian cyst    Intermenstrual spotting due to intrauterine device (IUD)

## 2020-10-21 ENCOUNTER — APPOINTMENT (OUTPATIENT)
Dept: ONCOLOGY | Facility: HOSPITAL | Age: 65
End: 2020-10-21

## 2020-10-21 ENCOUNTER — INFUSION (OUTPATIENT)
Dept: ONCOLOGY | Facility: HOSPITAL | Age: 65
End: 2020-10-21

## 2020-10-21 ENCOUNTER — OFFICE VISIT (OUTPATIENT)
Dept: ONCOLOGY | Facility: CLINIC | Age: 65
End: 2020-10-21

## 2020-10-21 VITALS
RESPIRATION RATE: 18 BRPM | WEIGHT: 183.1 LBS | HEART RATE: 94 BPM | DIASTOLIC BLOOD PRESSURE: 56 MMHG | SYSTOLIC BLOOD PRESSURE: 105 MMHG | HEIGHT: 71 IN | BODY MASS INDEX: 25.63 KG/M2 | OXYGEN SATURATION: 95 % | TEMPERATURE: 98 F

## 2020-10-21 DIAGNOSIS — C34.90 LUNG CANCER METASTATIC TO BONE (HCC): ICD-10-CM

## 2020-10-21 DIAGNOSIS — C91.10 CLL (CHRONIC LYMPHOCYTIC LEUKEMIA) (HCC): ICD-10-CM

## 2020-10-21 DIAGNOSIS — C79.51 LUNG CANCER METASTATIC TO BONE (HCC): ICD-10-CM

## 2020-10-21 DIAGNOSIS — Z79.899 HIGH RISK MEDICATION USE: ICD-10-CM

## 2020-10-21 DIAGNOSIS — T80.1XXA VASCULAR COMPLICATIONS FOLLOWING INFUSION, TRANSFUSION AND THERAPEUTIC INJECTION, INITIAL ENCOUNTER: ICD-10-CM

## 2020-10-21 DIAGNOSIS — C34.12 MALIGNANT NEOPLASM OF UPPER LOBE OF LEFT LUNG (HCC): ICD-10-CM

## 2020-10-21 DIAGNOSIS — C91.10 CLL (CHRONIC LYMPHOCYTIC LEUKEMIA) (HCC): Primary | ICD-10-CM

## 2020-10-21 DIAGNOSIS — K62.9 RECTAL LESION: ICD-10-CM

## 2020-10-21 DIAGNOSIS — Z79.899 HIGH RISK MEDICATION USE: Primary | ICD-10-CM

## 2020-10-21 LAB
ALBUMIN SERPL-MCNC: 3.6 G/DL (ref 3.5–5.2)
ALBUMIN/GLOB SERPL: 1.7 G/DL (ref 1.1–2.4)
ALP SERPL-CCNC: 75 U/L (ref 38–116)
ALT SERPL W P-5'-P-CCNC: 8 U/L (ref 0–41)
ANION GAP SERPL CALCULATED.3IONS-SCNC: 8.8 MMOL/L (ref 5–15)
AST SERPL-CCNC: 10 U/L (ref 0–40)
BASOPHILS # BLD AUTO: 0.04 10*3/MM3 (ref 0–0.2)
BASOPHILS NFR BLD AUTO: 0.4 % (ref 0–1.5)
BILIRUB SERPL-MCNC: 0.5 MG/DL (ref 0.2–1.2)
BUN SERPL-MCNC: 14 MG/DL (ref 6–20)
BUN/CREAT SERPL: 10.7 (ref 7.3–30)
CALCIUM SPEC-SCNC: 8.8 MG/DL (ref 8.5–10.2)
CHLORIDE SERPL-SCNC: 101 MMOL/L (ref 98–107)
CO2 SERPL-SCNC: 25.2 MMOL/L (ref 22–29)
CREAT SERPL-MCNC: 1.31 MG/DL (ref 0.7–1.3)
DEPRECATED RDW RBC AUTO: 45.7 FL (ref 37–54)
EOSINOPHIL # BLD AUTO: 0.09 10*3/MM3 (ref 0–0.4)
EOSINOPHIL NFR BLD AUTO: 0.9 % (ref 0.3–6.2)
ERYTHROCYTE [DISTWIDTH] IN BLOOD BY AUTOMATED COUNT: 13 % (ref 12.3–15.4)
GFR SERPL CREATININE-BSD FRML MDRD: 55 ML/MIN/1.73
GLOBULIN UR ELPH-MCNC: 2.1 GM/DL (ref 1.8–3.5)
GLUCOSE SERPL-MCNC: 120 MG/DL (ref 74–124)
HCT VFR BLD AUTO: 37.4 % (ref 37.5–51)
HGB BLD-MCNC: 11.8 G/DL (ref 13–17.7)
IMM GRANULOCYTES # BLD AUTO: 0.05 10*3/MM3 (ref 0–0.05)
IMM GRANULOCYTES NFR BLD AUTO: 0.5 % (ref 0–0.5)
LYMPHOCYTES # BLD AUTO: 0.45 10*3/MM3 (ref 0.7–3.1)
LYMPHOCYTES NFR BLD AUTO: 4.6 % (ref 19.6–45.3)
MCH RBC QN AUTO: 30.3 PG (ref 26.6–33)
MCHC RBC AUTO-ENTMCNC: 31.6 G/DL (ref 31.5–35.7)
MCV RBC AUTO: 96.1 FL (ref 79–97)
MONOCYTES # BLD AUTO: 0.97 10*3/MM3 (ref 0.1–0.9)
MONOCYTES NFR BLD AUTO: 10 % (ref 5–12)
NEUTROPHILS NFR BLD AUTO: 8.09 10*3/MM3 (ref 1.7–7)
NEUTROPHILS NFR BLD AUTO: 83.6 % (ref 42.7–76)
NRBC BLD AUTO-RTO: 0 /100 WBC (ref 0–0.2)
PLATELET # BLD AUTO: 232 10*3/MM3 (ref 140–450)
PMV BLD AUTO: 11.8 FL (ref 6–12)
POTASSIUM SERPL-SCNC: 4.1 MMOL/L (ref 3.5–4.7)
PROT SERPL-MCNC: 5.7 G/DL (ref 6.3–8)
RBC # BLD AUTO: 3.89 10*6/MM3 (ref 4.14–5.8)
SODIUM SERPL-SCNC: 135 MMOL/L (ref 134–145)
T4 FREE SERPL-MCNC: 2.11 NG/DL (ref 0.93–1.7)
TSH SERPL DL<=0.05 MIU/L-ACNC: 2.63 UIU/ML (ref 0.27–4.2)
WBC # BLD AUTO: 9.69 10*3/MM3 (ref 3.4–10.8)

## 2020-10-21 PROCEDURE — 84439 ASSAY OF FREE THYROXINE: CPT | Performed by: INTERNAL MEDICINE

## 2020-10-21 PROCEDURE — 85025 COMPLETE CBC W/AUTO DIFF WBC: CPT

## 2020-10-21 PROCEDURE — 99215 OFFICE O/P EST HI 40 MIN: CPT | Performed by: INTERNAL MEDICINE

## 2020-10-21 PROCEDURE — 25010000002 PEMBROLIZUMAB 100 MG/4ML SOLUTION 4 ML VIAL: Performed by: INTERNAL MEDICINE

## 2020-10-21 PROCEDURE — 96413 CHEMO IV INFUSION 1 HR: CPT

## 2020-10-21 PROCEDURE — 84443 ASSAY THYROID STIM HORMONE: CPT | Performed by: INTERNAL MEDICINE

## 2020-10-21 PROCEDURE — 80053 COMPREHEN METABOLIC PANEL: CPT

## 2020-10-21 RX ORDER — SODIUM CHLORIDE 9 MG/ML
250 INJECTION, SOLUTION INTRAVENOUS ONCE
Status: COMPLETED | OUTPATIENT
Start: 2020-10-21 | End: 2020-10-21

## 2020-10-21 RX ORDER — SODIUM CHLORIDE 9 MG/ML
250 INJECTION, SOLUTION INTRAVENOUS ONCE
Status: CANCELLED | OUTPATIENT
Start: 2020-10-21

## 2020-10-21 RX ADMIN — SODIUM CHLORIDE 250 ML: 9 INJECTION, SOLUTION INTRAVENOUS at 09:49

## 2020-10-21 RX ADMIN — SODIUM CHLORIDE 200 MG: 9 INJECTION, SOLUTION INTRAVENOUS at 09:49

## 2020-10-22 ENCOUNTER — HOSPITAL ENCOUNTER (OUTPATIENT)
Dept: CARDIOLOGY | Facility: HOSPITAL | Age: 65
Discharge: HOME | End: 2020-10-22
Admitting: INTERNAL MEDICINE

## 2020-10-22 ENCOUNTER — MEDICATION THERAPY MANAGEMENT (OUTPATIENT)
Dept: PHARMACY | Facility: HOSPITAL | Age: 65
End: 2020-10-22

## 2020-10-22 DIAGNOSIS — T80.1XXA VASCULAR COMPLICATIONS FOLLOWING INFUSION, TRANSFUSION AND THERAPEUTIC INJECTION, INITIAL ENCOUNTER: ICD-10-CM

## 2020-10-22 DIAGNOSIS — C34.12 MALIGNANT NEOPLASM OF UPPER LOBE OF LEFT LUNG (HCC): ICD-10-CM

## 2020-10-22 LAB
BH CV LOW VAS RIGHT MID FEMORAL SPONT: 1
BH CV LOWER VASCULAR LEFT COMMON FEMORAL AUGMENT: NORMAL
BH CV LOWER VASCULAR LEFT COMMON FEMORAL COMPETENT: NORMAL
BH CV LOWER VASCULAR LEFT COMMON FEMORAL COMPRESS: NORMAL
BH CV LOWER VASCULAR LEFT COMMON FEMORAL PHASIC: NORMAL
BH CV LOWER VASCULAR LEFT COMMON FEMORAL SPONT: NORMAL
BH CV LOWER VASCULAR LEFT DISTAL FEMORAL COMPRESS: NORMAL
BH CV LOWER VASCULAR LEFT GASTRONEMIUS COMPRESS: NORMAL
BH CV LOWER VASCULAR LEFT GREATER SAPH AK COMPRESS: NORMAL
BH CV LOWER VASCULAR LEFT GREATER SAPH BK COMPRESS: NORMAL
BH CV LOWER VASCULAR LEFT LESSER SAPH COMPRESS: NORMAL
BH CV LOWER VASCULAR LEFT MID FEMORAL AUGMENT: NORMAL
BH CV LOWER VASCULAR LEFT MID FEMORAL COMPETENT: NORMAL
BH CV LOWER VASCULAR LEFT MID FEMORAL COMPRESS: NORMAL
BH CV LOWER VASCULAR LEFT MID FEMORAL PHASIC: NORMAL
BH CV LOWER VASCULAR LEFT MID FEMORAL SPONT: NORMAL
BH CV LOWER VASCULAR LEFT PERONEAL COMPRESS: NORMAL
BH CV LOWER VASCULAR LEFT POPLITEAL AUGMENT: NORMAL
BH CV LOWER VASCULAR LEFT POPLITEAL COMPETENT: NORMAL
BH CV LOWER VASCULAR LEFT POPLITEAL COMPRESS: NORMAL
BH CV LOWER VASCULAR LEFT POPLITEAL PHASIC: NORMAL
BH CV LOWER VASCULAR LEFT POPLITEAL SPONT: NORMAL
BH CV LOWER VASCULAR LEFT POSTERIOR TIBIAL COMPRESS: NORMAL
BH CV LOWER VASCULAR LEFT PROFUNDA FEMORAL COMPRESS: NORMAL
BH CV LOWER VASCULAR LEFT PROXIMAL FEMORAL COMPRESS: NORMAL
BH CV LOWER VASCULAR LEFT SAPHENOFEMORAL JUNCTION COMPRESS: NORMAL
BH CV LOWER VASCULAR RIGHT COMMON FEMORAL AUGMENT: NORMAL
BH CV LOWER VASCULAR RIGHT COMMON FEMORAL COMPETENT: NORMAL
BH CV LOWER VASCULAR RIGHT COMMON FEMORAL COMPRESS: NORMAL
BH CV LOWER VASCULAR RIGHT COMMON FEMORAL PHASIC: NORMAL
BH CV LOWER VASCULAR RIGHT COMMON FEMORAL SPONT: NORMAL
BH CV LOWER VASCULAR RIGHT DISTAL FEMORAL COMPRESS: NORMAL
BH CV LOWER VASCULAR RIGHT GASTRONEMIUS COMPRESS: NORMAL
BH CV LOWER VASCULAR RIGHT GREATER SAPH AK COMPRESS: NORMAL
BH CV LOWER VASCULAR RIGHT GREATER SAPH BK COMPRESS: NORMAL
BH CV LOWER VASCULAR RIGHT LESSER SAPH COMPRESS: NORMAL
BH CV LOWER VASCULAR RIGHT MID FEMORAL AUGMENT: NORMAL
BH CV LOWER VASCULAR RIGHT MID FEMORAL COMPETENT: NORMAL
BH CV LOWER VASCULAR RIGHT MID FEMORAL COMPRESS: NORMAL
BH CV LOWER VASCULAR RIGHT MID FEMORAL PHASIC: NORMAL
BH CV LOWER VASCULAR RIGHT MID FEMORAL SPONT: NORMAL
BH CV LOWER VASCULAR RIGHT POPLITEAL AUGMENT: NORMAL
BH CV LOWER VASCULAR RIGHT POPLITEAL COMPETENT: NORMAL
BH CV LOWER VASCULAR RIGHT POPLITEAL COMPRESS: NORMAL
BH CV LOWER VASCULAR RIGHT POPLITEAL PHASIC: NORMAL
BH CV LOWER VASCULAR RIGHT POPLITEAL SPONT: NORMAL
BH CV LOWER VASCULAR RIGHT POSTERIOR TIBIAL COMPRESS: NORMAL
BH CV LOWER VASCULAR RIGHT PROFUNDA FEMORAL COMPRESS: NORMAL
BH CV LOWER VASCULAR RIGHT PROXIMAL FEMORAL COMPRESS: NORMAL
BH CV LOWER VASCULAR RIGHT SAPHENOFEMORAL JUNCTION COMPRESS: NORMAL
BH CV LOWER VASCULAR RIGHT VARICOSITY BK COMPRESS: NORMAL

## 2020-10-22 PROCEDURE — 93970 EXTREMITY STUDY: CPT

## 2020-10-22 NOTE — PROGRESS NOTES
[facility]  Lab Review- [facility]      10/21/2020  Day 1   WBC 3.40 - 10.80 10*3/mm3 9.69   Neutrophils Absolute 1.70 - 7.00 10*3/mm3 8.09 (A)   Hemoglobin 13.0 - 17.7 g/dL 11.8 (A)   Hematocrit 37.5 - 51.0 % 37.4 (A)   Platelets 140 - 450 10*3/mm3 232   Creatinine 0.70 - 1.30 mg/dL 1.31 (A)   eGFR Non African Am >60 mL/min/1.73 55 (A)   BUN 6 - 20 mg/dL 14   Sodium 134 - 145 mmol/L 135   Potassium 3.5 - 4.7 mmol/L 4.1   Glucose 74 - 124 mg/dL 120   Calcium 8.5 - 10.2 mg/dL 8.8   Albumin 3.50 - 5.20 g/dL 3.60   Total Protein 6.3 - 8.0 g/dL 5.7 (A)   AST (SGOT) 0 - 40 U/L 10   ALT (SGPT) 0 - 41 U/L 8   Alkaline Phosphatase 38 - 116 U/L 75   Total Bilirubin 0.2 - 1.2 mg/dL 0.5   TSH 0.270 - 4.200 uIU/mL 2.630   Free T4 0.93 - 1.70 ng/dL 2.11 (A)     MD dictation noted. Dav has also started Keytruda. Pharmacy will continue to follow.     Thanks,   Jessenia Galvez, PharmD

## 2020-10-26 DIAGNOSIS — C79.51 LUNG CANCER METASTATIC TO BONE (HCC): ICD-10-CM

## 2020-10-26 DIAGNOSIS — C34.90 LUNG CANCER METASTATIC TO BONE (HCC): ICD-10-CM

## 2020-10-26 RX ORDER — MORPHINE SULFATE 30 MG/1
30 TABLET, FILM COATED, EXTENDED RELEASE ORAL EVERY 12 HOURS
Qty: 60 TABLET | Refills: 0 | Status: SHIPPED | OUTPATIENT
Start: 2020-10-26 | End: 2020-11-27 | Stop reason: SDUPTHER

## 2020-10-26 RX ORDER — HYDROCODONE BITARTRATE AND ACETAMINOPHEN 10; 325 MG/1; MG/1
1 TABLET ORAL EVERY 4 HOURS PRN
Qty: 100 TABLET | Refills: 0 | Status: SHIPPED | OUTPATIENT
Start: 2020-10-26 | End: 2020-11-30 | Stop reason: SDUPTHER

## 2020-10-26 RX ORDER — HYDROCODONE BITARTRATE AND ACETAMINOPHEN 10; 325 MG/1; MG/1
1 TABLET ORAL EVERY 4 HOURS PRN
Qty: 100 TABLET | Refills: 0 | Status: CANCELLED | OUTPATIENT
Start: 2020-10-26

## 2020-10-26 RX ORDER — MORPHINE SULFATE 30 MG/1
30 TABLET, FILM COATED, EXTENDED RELEASE ORAL EVERY 12 HOURS
Qty: 60 TABLET | Refills: 0 | Status: CANCELLED | OUTPATIENT
Start: 2020-10-26

## 2020-11-04 ENCOUNTER — INFUSION (OUTPATIENT)
Dept: ONCOLOGY | Facility: HOSPITAL | Age: 65
End: 2020-11-04

## 2020-11-04 ENCOUNTER — OFFICE VISIT (OUTPATIENT)
Dept: ONCOLOGY | Facility: CLINIC | Age: 65
End: 2020-11-04

## 2020-11-04 VITALS
OXYGEN SATURATION: 95 % | TEMPERATURE: 97.9 F | RESPIRATION RATE: 21 BRPM | DIASTOLIC BLOOD PRESSURE: 79 MMHG | WEIGHT: 179.7 LBS | BODY MASS INDEX: 25.16 KG/M2 | HEIGHT: 71 IN | HEART RATE: 93 BPM | SYSTOLIC BLOOD PRESSURE: 158 MMHG

## 2020-11-04 DIAGNOSIS — C34.90 LUNG CANCER METASTATIC TO BONE (HCC): Primary | ICD-10-CM

## 2020-11-04 DIAGNOSIS — C91.10 CLL (CHRONIC LYMPHOCYTIC LEUKEMIA) (HCC): Primary | ICD-10-CM

## 2020-11-04 DIAGNOSIS — C79.51 LUNG CANCER METASTATIC TO BONE (HCC): ICD-10-CM

## 2020-11-04 DIAGNOSIS — D50.9 IRON DEFICIENCY ANEMIA, UNSPECIFIED IRON DEFICIENCY ANEMIA TYPE: ICD-10-CM

## 2020-11-04 DIAGNOSIS — C34.90 LUNG CANCER METASTATIC TO BONE (HCC): ICD-10-CM

## 2020-11-04 DIAGNOSIS — C79.51 LUNG CANCER METASTATIC TO BONE (HCC): Primary | ICD-10-CM

## 2020-11-04 LAB
ALBUMIN SERPL-MCNC: 3.7 G/DL (ref 3.5–5.2)
ALBUMIN/GLOB SERPL: 1.7 G/DL (ref 1.1–2.4)
ALP SERPL-CCNC: 85 U/L (ref 38–116)
ALT SERPL W P-5'-P-CCNC: 15 U/L (ref 0–41)
ANION GAP SERPL CALCULATED.3IONS-SCNC: 10.5 MMOL/L (ref 5–15)
AST SERPL-CCNC: 12 U/L (ref 0–40)
BASOPHILS # BLD AUTO: 0.05 10*3/MM3 (ref 0–0.2)
BASOPHILS NFR BLD AUTO: 0.5 % (ref 0–1.5)
BILIRUB SERPL-MCNC: 0.7 MG/DL (ref 0.2–1.2)
BUN SERPL-MCNC: 15 MG/DL (ref 6–20)
BUN/CREAT SERPL: 12.5 (ref 7.3–30)
CALCIUM SPEC-SCNC: 9.1 MG/DL (ref 8.5–10.2)
CHLORIDE SERPL-SCNC: 98 MMOL/L (ref 98–107)
CO2 SERPL-SCNC: 25.5 MMOL/L (ref 22–29)
CREAT SERPL-MCNC: 1.2 MG/DL (ref 0.7–1.3)
DEPRECATED RDW RBC AUTO: 45.2 FL (ref 37–54)
EOSINOPHIL # BLD AUTO: 0.06 10*3/MM3 (ref 0–0.4)
EOSINOPHIL NFR BLD AUTO: 0.6 % (ref 0.3–6.2)
ERYTHROCYTE [DISTWIDTH] IN BLOOD BY AUTOMATED COUNT: 13.2 % (ref 12.3–15.4)
FERRITIN SERPL-MCNC: 285.5 NG/ML (ref 30–400)
GFR SERPL CREATININE-BSD FRML MDRD: 61 ML/MIN/1.73
GLOBULIN UR ELPH-MCNC: 2.2 GM/DL (ref 1.8–3.5)
GLUCOSE SERPL-MCNC: 127 MG/DL (ref 74–124)
HCT VFR BLD AUTO: 37.2 % (ref 37.5–51)
HGB BLD-MCNC: 11.5 G/DL (ref 13–17.7)
IMM GRANULOCYTES # BLD AUTO: 0.06 10*3/MM3 (ref 0–0.05)
IMM GRANULOCYTES NFR BLD AUTO: 0.6 % (ref 0–0.5)
IRON 24H UR-MRATE: 28 MCG/DL (ref 59–158)
IRON SATN MFR SERPL: 12 % (ref 14–48)
LYMPHOCYTES # BLD AUTO: 0.65 10*3/MM3 (ref 0.7–3.1)
LYMPHOCYTES NFR BLD AUTO: 6 % (ref 19.6–45.3)
MCH RBC QN AUTO: 29.1 PG (ref 26.6–33)
MCHC RBC AUTO-ENTMCNC: 30.9 G/DL (ref 31.5–35.7)
MCV RBC AUTO: 94.2 FL (ref 79–97)
MONOCYTES # BLD AUTO: 1.03 10*3/MM3 (ref 0.1–0.9)
MONOCYTES NFR BLD AUTO: 9.6 % (ref 5–12)
NEUTROPHILS NFR BLD AUTO: 8.93 10*3/MM3 (ref 1.7–7)
NEUTROPHILS NFR BLD AUTO: 82.7 % (ref 42.7–76)
NRBC BLD AUTO-RTO: 0 /100 WBC (ref 0–0.2)
PLATELET # BLD AUTO: 251 10*3/MM3 (ref 140–450)
PMV BLD AUTO: 11.8 FL (ref 6–12)
POTASSIUM SERPL-SCNC: 4.8 MMOL/L (ref 3.5–4.7)
PROT SERPL-MCNC: 5.9 G/DL (ref 6.3–8)
RBC # BLD AUTO: 3.95 10*6/MM3 (ref 4.14–5.8)
SODIUM SERPL-SCNC: 134 MMOL/L (ref 134–145)
TIBC SERPL-MCNC: 237 MCG/DL (ref 249–505)
TRANSFERRIN SERPL-MCNC: 169 MG/DL (ref 200–360)
WBC # BLD AUTO: 10.78 10*3/MM3 (ref 3.4–10.8)

## 2020-11-04 PROCEDURE — 83540 ASSAY OF IRON: CPT | Performed by: NURSE PRACTITIONER

## 2020-11-04 PROCEDURE — 99213 OFFICE O/P EST LOW 20 MIN: CPT | Performed by: NURSE PRACTITIONER

## 2020-11-04 PROCEDURE — 36591 DRAW BLOOD OFF VENOUS DEVICE: CPT

## 2020-11-04 PROCEDURE — 84466 ASSAY OF TRANSFERRIN: CPT | Performed by: NURSE PRACTITIONER

## 2020-11-04 PROCEDURE — 85025 COMPLETE CBC W/AUTO DIFF WBC: CPT

## 2020-11-04 PROCEDURE — 25010000003 HEPARIN LOCK FLUSH PER 10 UNITS: Performed by: INTERNAL MEDICINE

## 2020-11-04 PROCEDURE — 80053 COMPREHEN METABOLIC PANEL: CPT

## 2020-11-04 PROCEDURE — 82728 ASSAY OF FERRITIN: CPT | Performed by: NURSE PRACTITIONER

## 2020-11-04 RX ORDER — HEPARIN SODIUM (PORCINE) LOCK FLUSH IV SOLN 100 UNIT/ML 100 UNIT/ML
500 SOLUTION INTRAVENOUS AS NEEDED
Status: DISCONTINUED | OUTPATIENT
Start: 2020-11-04 | End: 2020-11-04 | Stop reason: HOSPADM

## 2020-11-04 RX ORDER — HEPARIN SODIUM (PORCINE) LOCK FLUSH IV SOLN 100 UNIT/ML 100 UNIT/ML
500 SOLUTION INTRAVENOUS AS NEEDED
Status: CANCELLED | OUTPATIENT
Start: 2020-11-04

## 2020-11-04 RX ORDER — SODIUM CHLORIDE 0.9 % (FLUSH) 0.9 %
10 SYRINGE (ML) INJECTION AS NEEDED
Status: DISCONTINUED | OUTPATIENT
Start: 2020-11-04 | End: 2020-11-04 | Stop reason: HOSPADM

## 2020-11-04 RX ORDER — SODIUM CHLORIDE 0.9 % (FLUSH) 0.9 %
10 SYRINGE (ML) INJECTION AS NEEDED
Status: CANCELLED | OUTPATIENT
Start: 2020-11-04

## 2020-11-04 RX ADMIN — Medication 500 UNITS: at 09:54

## 2020-11-04 RX ADMIN — SODIUM CHLORIDE, PRESERVATIVE FREE 10 ML: 5 INJECTION INTRAVENOUS at 09:54

## 2020-11-04 NOTE — PROGRESS NOTES
Subjective  Patient left lower extremity pain and weakness      REASON FOR FOLLOW UP:  1.  Chronic lymphocytic leukemia with extensive lymphadenopathy and possible pleural involvement.  2.  Initiation of Treanda, Rituxan May 1, 2019 IVIG also utilized                                    3.  Significant response on scans June 27, 2019, alternative therapy with Imbruvica planned, initiated July 25, 2019  4.  Patient seen February 12, 2020, continued control of CLL, discussed Rituxan, Imbruvica, sleep study and potential surgical assessment for hernia  5.  CT of chest per pulmonary August 26 with 1.6 cm spiculated nodule in the left upper lobe, 1.2 cm left adrenal nodule not present on comparison study, bone windows with soft tissue mass involving the right eighth rib measuring 3.7 x 2.6, biopsy positive for adenocarcinoma  6.  Patient assessed September 30, plans for palliative radiation therapy, port placement, Keytruda considering PDL 1 positivity and high TMB status  7.  Patient reviewed October 21, 2020, Keytruda initiated, pain much improved status post radiation therapy        HISTORY OF PRESENT ILLNESS:   The patient is a  65 y.o. Male  who was admitted on 4/28/2019 with worsening complaints of cough wheezing and shortness of breath.  He had been to an urgent care center and was started on antibiotics and received a shot of Solu-Medrol.  His symptoms did not improve and on his admission he was noted to have profound lymphocytosis with a total white count of 70,074% lymphocytes on his white cell differential.      He also underwent CT scan of the chest that showed infiltrates and nodules in the lungs as well as significant lymphadenopathy within the chest and in the axillary regions.  He had peripheral blood sent for flow cytometry leukemia lymphoma panel but this is still pending at time of this dictation.           He underwent bronchoscopy on 4/29/2019.  Results from this procedure are pendingl.  His  respiratory viral panel was negative and markers of sepsis were unremarkable.      His peripheral blood flow cytometry and flow cytometry from the EBUS needle biopsy at bronchoscopy are both consistent with chronic lymphocytic leukemia/small lymphocytic lymphoma.     The patient underwent a CT of abdomen and pelvis April 30 demonstrating extensive splenomegaly and bulky lymphadenopathy throughout the abdomen and pelvis.  There is a tiny lesion at the superior aspect lateral hepatic segment and 2 hyperenhancing foci within the liver thought to be hemangiomas, moderate size right inguinal hernia containing a long segment of small bowel without obstruction or incarceration.  CT of soft tissue again reveals extensive cervical adenopathy as well as evidence of pansinusitis.  The patient's case was discussed with Dr. Church and the patient has stage III-IV disease should be treated during this hospitalization.  I met with the patient and discussed in detail the use of Treanda/ Rituxan which we initiated Treanda Rituxan beginning May 1, 2019.     When he is seen May 2 he is already beginning to respond with reducing adenopathy and improved symptoms.  Plan to proceed with day #2.  We have also reviewed his findings including IgA 14, IgG of 263, IgM of 5 and a kappa/lambda ratio of 26.38.  He is hypogammaglobulinemic and replacement therapy will be given this hospitalization.  ENT assessment will also be requested.     The patient is again seen May 3, 2019, continuing to improve overall.  We did proceed with IVIG 400 mg/kg and had the patient seen by ENT after which the patient was discharged.  He indicates that Dr. Hamlin is going to see him back within the week as he is now seen back in our office May 16 and that surgery may be necessary.  The patient is also still having sinus symptoms.  Further he has developed a more extensive rash involving his abdomen chest and back and previously seen in hospital?.  Otherwise he feels  well except for fatigue without fever, chills, nausea, vomiting, weight loss, stable appetite.     The patient went on to take 2 cycles of Treanda, Rituxan with a second cycle given June 6 and 7.  Additional assessments included his dermatologist who felt he had a gradually improving dermatitis and would not require an therapy and ENT indicating that the patient was slowly improving per sinus symptoms the surgery may be necessary at a later point.  When he is reviewed May 30 he was neutropenic and we proceeded with IVIG second treatment on Elenita 10.  He underwent repeat scans June 27 demonstrating a significant reduction in adenopathy in the neck chest abdomen pelvis with index nodes in the neck previously 2 cm x 1.1 cm, chest with subcarinal lymph node conglomerate measuring 2.1 x 0.9 previously 5.8 x 2.7 and previously seen irregular pulmonary consolidation throughout bilateral lungs having nearly completely resolved.  Spleen is also reduced in size at 4.5 cm previously 18.6 cm and mesenteric nodes had similar reduced in size.  The patient's immunoglobulins were assessed June 27 with an IgG of 667, IgA of 20, IgM of 9 and IgE of 3.Additional information includes FISH analysis for CLL which is positive for deletion of 13 q. 14.3 It should be noted that such finding on FISH analysis generally suggest a favorable prognostic finding.  The patient is next seen July 3, 2019 feeling improved overall but still having a dermatologic issue with pruritus, erythema worsening particularly in the lower abdomen and upper groin.  Again his scans are considerably improved and we discussed, on the basis of the above information, changing his therapy now to Imbruvica.  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  He was seen July 31 tolerating this well.  He was able to discontinue allopurinol and ferrous sulfate thereafter presents back August 28 having taken the  medication over the last month.     He indicates that he is having no issues tolerating the medication and generally feels well except for sinus drainage.  He has had a cough and has a chest x-ray scheduled to primary care August 29, 2019.  He has had no fever, chills, sweats, rash and has gained weight.  The patient is next seen November 20, 2019 continuing to generally improve.  His performance status remains excellent and has had no additional complications thus far with the use of Imbruvica or an additional infectious complications.  He has been seen by pulmonary medicine with reticular infiltrates noted on previous CAT scan on a follow-up study scheduled in the next several days.  This may be evolution towards recovery but a follow-up will be necessary.  Finally we have been able to obtain Imbruvica reasonably cost through foundation support.    The patient is next seen February 12, 2020 doing well without additional infectious complications and with stable findings hematologically.  We have discussed the fact that Rituxan could be added potentially to reduce the amount of time he remains on the medication but, additionally, further reduce his immunoglobulins.  Though this remains a concern he is also having difficulty with sleep-?  Sleep apnea, and worsening right direct inguinal hernia.    Plans for the patient to continue Imbruvica at dosing rechecking his immunoglobulins April 29 now with IVIG down to 371, IgM of 11, IgA level 32, kappa/lambda ratio of 1.51.  Fortunately continues to feel well without additional symptoms though is concerned about easy bruisability and periodic muscle tenderness after activity.  We have discussed his sleep assessment and he very likely has sleep apnea but does not wish to start CPAP at this point and is using oral medications-trazodone-to modest effect.    The patient is followed by pulmonary medicine.He was seen September 2 having had right-sided rib pain for 1 month,  shortness of breath on going up stairs or uphill.  Follow-up chest CT revealed left upper lobe nodule 1.6 cm that was noted spiculated, additionally lesion per right rib with a soft tissue mass (3.7 x 2.6 cm) was recognized in the tissue biopsy was obtained.  This is now returned as positive for moderately differentiated adenocarcinoma.  This is CK7 positive, CK20 negative with a lung primary suspected clinically.  A molecular panel has not yet been obtained.  We have now, however, sent for foundation CDX analysis.    The patient is seen September 2020 with considerable right chest wall pain only modestly controlled with Toradol and ibuprofen.  He also has been taking additional Xanax to reduce the muscle spasm that he is experiencing.  I have advised him of the findings and their significance as being consistent with metastatic non-small cell lung cancer.  He is dismayed by the conversation but wishes to have pain control as quickly as possible as well as a cady discussion of treatment options.    The patient was provided additional anti-pain and antianxiety medications.  A PET/CT was obtained September 23 demonstrating asymmetric uptake within the right parotid gland which is unremarkable appearance on noncontrasted CT, SUV of 6 compared to 2.7.  There is no hilar, mediastinal or axillary adenopathy, findings of asymmetric moderate to intense FDG uptake within superior aspect the right chest wall anterior to the right first rib with an irregular hypodense lesion measuring 1.8 x 1.6 and SUV 4.9.  This had not been seen June 27, 2019.  There is an intensely FDG avid nodule within the lingula measuring 1.9 x 1.5 history of 12.4, FDG avid left adrenal nodule 1.9 x 1.5 with an issue 21.2.  The patient was seen by radiation therapy September 29 and started on radiation therapy to the right chest wall-35 Gy in 10 fractions.  Plans were also then proceed with SBRT to the solitary left upper lobe lesion pending insurance  approval.  Further testing including TPS of 20% and foundation CDX with a TMB of greater than 10 mutations per megabase (28 mutations per megabase) and the indication that several immunotherapies could be useful in this patient's care.  Additional genomic findings include BRAF G469R/that trametinib could be useful and STK11/that everolimus could be useful.      The patient is seen back September 30, 2020 indicating that his pain has improved substantially with his current pain medications.  He also continues laxatives on a regular basis.  Radiation therapy will begin October 1, 2020 as described above and we have discussed on the basis of the findings per his genomic testing that he is a candidate for a number of additional treatment approaches.  Considering he does not have significant organ involvement and that he has a positive PDL 1 at 20% and a TMB 28 mutations per megabase it would seem reasonable (particularly with his history of CLL) to try to use immunotherapy as monotherapy initially.  This might provide control and allow us not to affect his hematologic illness in any significant way.  We discussed that he will need a PowerPort placement in the near future but that we could start Keytruda shortly thereafter.  Patient was able to proceed into palliative therapy undergoing radiation therapy to the right eighth rib 3 and 50 cGy per fraction x10 between October 1 of October 14.  A PowerPort was placed October 15, 2020.        Mr. Freitas returns to the office October 21, 2020 indicating, wonderfully, that his pain has nearly resolved and he does not need to take short acting pain medications at this point.  Unfortunately he has had severe constipation we discussed his laxatives in depth as to the use, schedule and expected results as he reduces his narcotic use and moves into immunotherapy.  We have also discussed his PET/CT that does refer to an abnormality seen in the rectum that will need to be assessed by  colonoscopy.  He states further that he is having lower extremity swelling beginning over the last several weeks right greater than left.  His breathing remains generally improved though he does have cough periodically and mild degree of hemoptysis.    Patient is seen on 11/4/2020 follow-up.  He reports once again mild degree of hemoptysis, unchanged from previous visit.  Does note that his left lower extremity weakness and discomfort continues.  He did have bilateral lower extremity venous Doppler that was negative for deep vein thrombosis.  He reports pain in the knee is the highest point of pain but otherwise encompassing the whole left lower extremity.  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  He is planning on starting a stretching routine to see if this helps.  He denies fevers, increased shortness of breath, or mouth sores.  He does continue on MS Contin as well as the hydrocodone for breakthrough pain.  He reports taking the hydrocodone a couple of times a day typically.  He has been weaning this recently.  He continues to lose some weight.  He is not using any supplemental drinks.              Past Medical History, Past Surgical History, Social History, Family History have been reviewed and are without significant changes except as mentioned.    Review of Systems   Constitutional: Positive for fatigue and unexpected weight change.   HENT: Negative.    Eyes: Negative.    Respiratory: Negative.  Negative for cough, chest tightness, shortness of breath and wheezing.         Infrequent mild hemoptysis   Cardiovascular: Positive for leg swelling (improved, however continued LLE pain and weakness).   Gastrointestinal: Positive for constipation. Negative for abdominal pain, diarrhea, nausea and vomiting.   Endocrine: Negative.    Genitourinary: Negative.  Negative for testicular pain.   Skin: Negative.  Negative for rash.   Allergic/Immunologic: Negative.   "  Neurological: Negative.  Negative for weakness.   Psychiatric/Behavioral: Positive for sleep disturbance.   All other systems reviewed and are negative.         Medications:  The current medication list was reviewed in the EMR    ALLERGIES:  No Known Allergies    Objective      Vitals:    11/04/20 1006   BP: 158/79   Pulse: 93   Resp: 21   Temp: 97.9 °F (36.6 °C)   TempSrc: Temporal   SpO2: 95%   Weight: 81.5 kg (179 lb 11.2 oz)   Height: 180.3 cm (70.98\")   PainSc:   4   PainLoc: Leg     Current Status 11/4/2020   ECOG score 0       Physical Exam    Constitutional: He is oriented to person, place, and time. He appears well-developed and well-nourished.    HENT:   Head: Normocephalic.   Eyes: Conjunctivae and EOM are normal. Pupils are equal, round, and reactive to light. No scleral icterus.   Neck: Normal range of motion. Neck supple. No JVD present. No thyromegaly present.   Cardiovascular:  Normal rate, regular rhythm.  present. Exam reveals no gallop and no friction rub.   No murmur heard.  Pulmonary/Chest:He has no rales.  The patient has a palpable mass approximately right seventh eighth rib location laterally measuring 6 x 8 cm  Abdominal: Soft. He exhibits no distension and no mass. There is no tenderness.  He has bilateral hernias, apparently direct, right greater than left  Musculoskeletal: Normal range of motion. Trace BLE edema  Lymphadenopathy: No current lymphadenopathy        Skin: Rash resolved                                                Neurological: He is alert and oriented to person, place, and time.  Skin: Skin is warm and dry.                           Psychiatric: He has a normal mood and affect. His behavior is normal. Judgment normal    RECENT LABS:  Hematology     WBC   Date Value Ref Range Status   11/04/2020 10.78 3.40 - 10.80 10*3/mm3 Final     RBC   Date Value Ref Range Status   11/04/2020 3.95 (L) 4.14 - 5.80 10*6/mm3 Final     Hemoglobin   Date Value Ref Range Status   11/04/2020 " 11.5 (L) 13.0 - 17.7 g/dL Final     Hematocrit   Date Value Ref Range Status   11/04/2020 37.2 (L) 37.5 - 51.0 % Final     Platelets   Date Value Ref Range Status   11/04/2020 251 140 - 450 10*3/mm3 Final            Flow cytometry report on the peripheral blood as well as the lymph node biopsied at bronchoscopy are consistent with chronic lymphocytic leukemia/small lymphocytic lymphoma.    FISH prognostic panel-Positive for deletion of 13 q. 14.3    F-18 FDG PET FROM SKULL BASE TO MID THIGH WITH PET/CT FUSION 9/23/2020      FINDINGS:   There is no cervical adenopathy demonstrating FDG uptake significantly  above that of blood pool. The thyroid and submandibular glands  demonstrate roughly symmetric FDG uptake. There is asymmetric FDG uptake  within the right parotid gland which has an otherwise unremarkable  appearance on noncontrast CT. It demonstrates a max SUV of 6 compared to  2.7 on the left.     There is no hilar, mediastinal or axillary adenopathy demonstrating FDG  uptake significantly above that of blood pool.      Asymmetric moderate to intense FDG uptake is present within the superior  aspect of the right chest wall just anterior to the right 1st rib with  associated irregular hypodense lesion on noncontrast CT measuring 1.8 x  1.6 cm with a max SUV of 4.9 cm. These findings were not visualized on  06/27/2019.     The heart is normal in size. Small-to-moderate right pleural effusion is  present. There is an intensely FDG avid nodule within the lingula  measuring 1.9 x 1.5 cm and demonstrating max SUV of 12.4. Ground glass  opacification extends from the periphery of this lesion. Sub-6 mm  pulmonary nodules within the left lower lobe have decreased in size  since 04/28/2019.     There is a small hiatal hernia. Of note the appendix extends into a  right inguinal hernia.     There is no focal FDG avid abnormality within the liver, gallbladder,  pancreas, spleen or adrenal glands. There is no  hydronephrosis.     Intensely FDG avid left adrenal nodule measures 1.9 x 1.5 cm, is new  since 06/27/2019 and demonstrates a max SUV of 21.2.     There is no abdominopelvic adenopathy demonstrating FDG uptake  significantly above that of blood pool. Focus of moderate to intense FDG  uptake is present within the distal rectum/anus demonstrating max SUV of  11.2.     There is no free intraperitoneal air or fluid.     Focus of sclerosis within the right ischium is present and grossly  unchanged since 06/27/2019. It does not demonstrate FDG uptake  significantly above that of surrounding marrow and likely represents a  bone island. Intensely FDG avid soft tissue mass centered over the right  8th rib with associated destruction is present and measures 7 x 6.3 cm  with soft tissue extension into the pleura and chest wall. It  demonstrates a max SUV of 27.6.     IMPRESSION:  1.  Intensely FDG avid left upper lobe pulmonary nodule likely  representing patient's known primary malignancy. Ground glass  attenuation extending from the periphery of this nodule is favored to  represent atelectasis; however lymphangitic spread of disease could  appear similar and continued close attention on follow-up is  recommended.  2.  Intensely FDG avid mass centered over the right 8th rib with  associated soft tissue extension and cortical destruction and intensely  FDG avid left adrenal nodule likely representing metastatic disease.  3.  Small-to-moderate right pleural effusion is present which given the  pleural invasion by the right eighth rib lesion raises concern for a  malignant effusion.  4.  Moderate to intense asymmetric FDG uptake within the superior aspect  of the right chest wall just anterior to the right 8th rib with  associated hypodense lesion which is favored to represent an  intramuscular metastasis. Reactive changes are less likely.  5.  Focal FDG uptake within the distal rectum/anus. While findings are  nonspecific,  please ensure this patient is currently up-to-date on  recommended colonoscopic screening. If this patient has not received a  recent colonoscopy per current guidelines, recommend correlation with  colonoscopy.  6.  Other findings as above     This report was finalized on 9/25/2020 7:58 AM by Dr. Aydin Enriquez M.D.    Assessment/Plan   1.  CLL presenting with significant lymphocytosis, lymphadenopathy and splenomegaly.   Positive for deletion of 13 q. 14.3.  Patient status post 2 cycles of Treanda Rituxan with excellent response.  Reassessment July 3, 2019 with plans to switch Treanda to Imbruvica which began July 25, 2019, tolerated well. This continues to be the case.  We have discussed the possibility of adding Rituxan to shorten the time he is on  Imbruvica and we will continue to review this though the patient has hypogammaglobulinemia at this point and we do not wish to worsen this until we are more aware of how to manage COVID-19.  As he is assessed September 9, 2020 his CLL is under good control and he will continue Imbruvica at this point at unchanged dosing.  There were no clinical changes when the patient is reassessed September 30 and October 21 and he will continue Imbruvica.            2.  Pulmonary nodules/infiltrates.  He is  status post bronchoscopy.  Is unclear at this time whether these findings are infectious or possibly related to his lymphoproliferative disorder.  His subsequent studies in late November are much improved, followed by pulmonary.                                                 He had follow-up scans performed August 26 revealing a new 1.6 cm spiculated nodule within the left upper lobe, 1.2 cm left adrenal nodule, bone windows with a soft tissue mass involving the right eighth rib measuring 3.7 x 2.6 cm.  Biopsy was consistent with adenocarcinoma CK 7+, CK 20-, lung primary suspected clinically, molecular panel not yet obtained.     A PET/CT was obtained September 23  demonstrating asymmetric uptake within the right parotid gland which is unremarkable appearance on noncontrasted CT, SUV of 6 compared to 2.7.  There is no hilar, mediastinal or axillary adenopathy, findings of asymmetric moderate to intense FDG uptake within superior aspect the right chest wall anterior to the right first rib with an irregular hypodense lesion measuring 1.8 x 1.6 and SUV 4.9.  This had not been seen June 27, 2019.  There is an intensely FDG avid nodule within the lingula measuring 1.9 x 1.5 history of 12.4, FDG avid left adrenal nodule 1.9 x 1.5 with an issue 21.2.  The patient was seen by radiation therapy September 29 and started on radiation therapy to the right chest wall-35 Gy in 10 fractions.  Plans were also then proceed with SBRT to the solitary left upper lobe lesion pending insurance approval.  Further testing including TPS of 20% and foundation CDX with a TMB of greater than 10 mutations per megabase (28 mutations per megabase) and the indication that several immunotherapies could be useful in this patient's care.  Additional genomic findings include BRAF G469R/that trametinib could be useful and STK11/that everolimus could be useful.  *Discussion held as to how to proceed in particular using immunotherapy with Keytruda as monotherapy initially in this patient.  This would reduce his degree of myelosuppression.  Patient completed radiotherapy October 14, October 21 status post port placement the patient began Keytruda.  Patient tolerating the Keytruda well thus far but does continue to have weight loss and left lower extremity pain and weakness.  Will follow with Dr. Parekh in 1 week for Keytruda and labs.    3.  Iron deficiency-iron studies rechecked today and can be evaluated at the next visit with the patient.    4.  Hypogammaglobulinemia-status post IVIG with resolution of sinusitis, current levels again reviewed    5.  Pain control now addressed with MS Contin 30 mg every 12 hours,  Norco 10/325 1 p.o. every 4 hours, continue laxative therapy with Senokot-S 2 p.o. daily to twice daily.  October 21, 2020 he indicates he only rarely needs his breakthrough pain medications.  11/4/2020 patient reports he is taking approximately 2 doses of hydrocodone per day.     6.  Xanax 0.25 mg 1 p.o. 3 times daily as needed     7.  Lower extremity swelling-follow-up Doppler examinations bilateral lower extremities-negative.  Patient    8.  Patient reports pain and weakness in his left lower extremity and has been present for 4 to 6 weeks approximately.  He did have some improvement recently after stretching.  We will try to have a daily stretching routine.  We discussed further evaluation of this and the patient was adamant that he wanted to wait until his next imaging.  He did not want any other intervention currently.  He will discuss this with Dr. Parekh next week.  I encouraged patient to call the office in the interim if there is any worsening of symptoms.      Plan:  *Continue Imbruvica at current dosing    *Patient will be due in follow-up with Dr. Parekh in 1 week for his second dose of Keytruda.    *Follow-up CT scan scheduled mid November by radiation therapy, status post XRT    *Ongoing constipation, abnormality seen on PET scan per distal rectum.  GI consultation requested- likely endoscopy    *Additional Xgeva will be discussed with the patient returns for follow-up.      RORY Freitas

## 2020-11-04 NOTE — PROGRESS NOTES
Subjective  Patient noting increasing left knee pain?      REASON FOR FOLLOW UP:  1.  Chronic lymphocytic leukemia with extensive lymphadenopathy and possible pleural involvement.  2.  Initiation of Treanda, Rituxan May 1, 2019 IVIG also utilized                                    3.  Significant response on scans June 27, 2019, alternative therapy with Imbruvica planned, initiated July 25, 2019  4.  Patient seen February 12, 2020, continued control of CLL, discussed Rituxan, Imbruvica, sleep study and potential surgical assessment for hernia  5.  CT of chest per pulmonary August 26 with 1.6 cm spiculated nodule in the left upper lobe, 1.2 cm left adrenal nodule not present on comparison study, bone windows with soft tissue mass involving the right eighth rib measuring 3.7 x 2.6, biopsy positive for adenocarcinoma  6.  Patient assessed September 30, plans for palliative radiation therapy, port placement, Keytruda considering PDL 1 positivity and high TMB status  7.  Patient reviewed October 21, 2020, Keytruda initiated, pain much improved status post radiation therapy        HISTORY OF PRESENT ILLNESS:   The patient is a  65 y.o. Male  who was admitted on 4/28/2019 with worsening complaints of cough wheezing and shortness of breath.  He had been to an urgent care center and was started on antibiotics and received a shot of Solu-Medrol.  His symptoms did not improve and on his admission he was noted to have profound lymphocytosis with a total white count of 70,074% lymphocytes on his white cell differential.      He also underwent CT scan of the chest that showed infiltrates and nodules in the lungs as well as significant lymphadenopathy within the chest and in the axillary regions.  He had peripheral blood sent for flow cytometry leukemia lymphoma panel but this is still pending at time of this dictation.           He underwent bronchoscopy on 4/29/2019.  Results from this procedure are pendingl.  His respiratory  viral panel was negative and markers of sepsis were unremarkable.      His peripheral blood flow cytometry and flow cytometry from the EBUS needle biopsy at bronchoscopy are both consistent with chronic lymphocytic leukemia/small lymphocytic lymphoma.     The patient underwent a CT of abdomen and pelvis April 30 demonstrating extensive splenomegaly and bulky lymphadenopathy throughout the abdomen and pelvis.  There is a tiny lesion at the superior aspect lateral hepatic segment and 2 hyperenhancing foci within the liver thought to be hemangiomas, moderate size right inguinal hernia containing a long segment of small bowel without obstruction or incarceration.  CT of soft tissue again reveals extensive cervical adenopathy as well as evidence of pansinusitis.  The patient's case was discussed with Dr. Church and the patient has stage III-IV disease should be treated during this hospitalization.  I met with the patient and discussed in detail the use of Treanda/ Rituxan which we initiated Treanda Rituxan beginning May 1, 2019.     When he is seen May 2 he is already beginning to respond with reducing adenopathy and improved symptoms.  Plan to proceed with day #2.  We have also reviewed his findings including IgA 14, IgG of 263, IgM of 5 and a kappa/lambda ratio of 26.38.  He is hypogammaglobulinemic and replacement therapy will be given this hospitalization.  ENT assessment will also be requested.     The patient is again seen May 3, 2019, continuing to improve overall.  We did proceed with IVIG 400 mg/kg and had the patient seen by ENT after which the patient was discharged.  He indicates that Dr. Hamlin is going to see him back within the week as he is now seen back in our office May 16 and that surgery may be necessary.  The patient is also still having sinus symptoms.  Further he has developed a more extensive rash involving his abdomen chest and back and previously seen in hospital?.  Otherwise he feels well except  for fatigue without fever, chills, nausea, vomiting, weight loss, stable appetite.     The patient went on to take 2 cycles of Treanda, Rituxan with a second cycle given June 6 and 7.  Additional assessments included his dermatologist who felt he had a gradually improving dermatitis and would not require an therapy and ENT indicating that the patient was slowly improving per sinus symptoms the surgery may be necessary at a later point.  When he is reviewed May 30 he was neutropenic and we proceeded with IVIG second treatment on Elenita 10.  He underwent repeat scans June 27 demonstrating a significant reduction in adenopathy in the neck chest abdomen pelvis with index nodes in the neck previously 2 cm x 1.1 cm, chest with subcarinal lymph node conglomerate measuring 2.1 x 0.9 previously 5.8 x 2.7 and previously seen irregular pulmonary consolidation throughout bilateral lungs having nearly completely resolved.  Spleen is also reduced in size at 4.5 cm previously 18.6 cm and mesenteric nodes had similar reduced in size.  The patient's immunoglobulins were assessed June 27 with an IgG of 667, IgA of 20, IgM of 9 and IgE of 3.Additional information includes FISH analysis for CLL which is positive for deletion of 13 q. 14.3 It should be noted that such finding on FISH analysis generally suggest a favorable prognostic finding.  The patient is next seen July 3, 2019 feeling improved overall but still having a dermatologic issue with pruritus, erythema worsening particularly in the lower abdomen and upper groin.  Again his scans are considerably improved and we discussed, on the basis of the above information, changing his therapy now to Imbruvica.  The patient will return for teaching per Imbruvica July 11 the patient initiated treatment by July 25th 2019.  He was seen July 31 tolerating this well.  He was able to discontinue allopurinol and ferrous sulfate thereafter presents back August 28 having taken the medication over  the last month.     He indicates that he is having no issues tolerating the medication and generally feels well except for sinus drainage.  He has had a cough and has a chest x-ray scheduled to primary care August 29, 2019.  He has had no fever, chills, sweats, rash and has gained weight.  The patient is next seen November 20, 2019 continuing to generally improve.  His performance status remains excellent and has had no additional complications thus far with the use of Imbruvica or an additional infectious complications.  He has been seen by pulmonary medicine with reticular infiltrates noted on previous CAT scan on a follow-up study scheduled in the next several days.  This may be evolution towards recovery but a follow-up will be necessary.  Finally we have been able to obtain Imbruvica reasonably cost through foundation support.    The patient is next seen February 12, 2020 doing well without additional infectious complications and with stable findings hematologically.  We have discussed the fact that Rituxan could be added potentially to reduce the amount of time he remains on the medication but, additionally, further reduce his immunoglobulins.  Though this remains a concern he is also having difficulty with sleep-?  Sleep apnea, and worsening right direct inguinal hernia.    Plans for the patient to continue Imbruvica at dosing rechecking his immunoglobulins April 29 now with IVIG down to 371, IgM of 11, IgA level 32, kappa/lambda ratio of 1.51.  Fortunately continues to feel well without additional symptoms though is concerned about easy bruisability and periodic muscle tenderness after activity.  We have discussed his sleep assessment and he very likely has sleep apnea but does not wish to start CPAP at this point and is using oral medications-trazodone-to modest effect.    The patient is followed by pulmonary medicine.He was seen September 2 having had right-sided rib pain for 1 month, shortness of breath  on going up stairs or uphill.  Follow-up chest CT revealed left upper lobe nodule 1.6 cm that was noted spiculated, additionally lesion per right rib with a soft tissue mass (3.7 x 2.6 cm) was recognized in the tissue biopsy was obtained.  This is now returned as positive for moderately differentiated adenocarcinoma.  This is CK7 positive, CK20 negative with a lung primary suspected clinically.  A molecular panel has not yet been obtained.  We have now, however, sent for foundation CDX analysis.    The patient is seen September 2020 with considerable right chest wall pain only modestly controlled with Toradol and ibuprofen.  He also has been taking additional Xanax to reduce the muscle spasm that he is experiencing.  I have advised him of the findings and their significance as being consistent with metastatic non-small cell lung cancer.  He is dismayed by the conversation but wishes to have pain control as quickly as possible as well as a cady discussion of treatment options.    The patient was provided additional anti-pain and antianxiety medications.  A PET/CT was obtained September 23 demonstrating asymmetric uptake within the right parotid gland which is unremarkable appearance on noncontrasted CT, SUV of 6 compared to 2.7.  There is no hilar, mediastinal or axillary adenopathy, findings of asymmetric moderate to intense FDG uptake within superior aspect the right chest wall anterior to the right first rib with an irregular hypodense lesion measuring 1.8 x 1.6 and SUV 4.9.  This had not been seen June 27, 2019.  There is an intensely FDG avid nodule within the lingula measuring 1.9 x 1.5 history of 12.4, FDG avid left adrenal nodule 1.9 x 1.5 with an issue 21.2.  The patient was seen by radiation therapy September 29 and started on radiation therapy to the right chest wall-35 Gy in 10 fractions.  Plans were also then proceed with SBRT to the solitary left upper lobe lesion pending insurance approval.  Further  testing including TPS of 20% and foundation CDX with a TMB of greater than 10 mutations per megabase (28 mutations per megabase) and the indication that several immunotherapies could be useful in this patient's care.  Additional genomic findings include BRAF G469R/that trametinib could be useful and STK11/that everolimus could be useful.      The patient is seen back September 30, 2020 indicating that his pain has improved substantially with his current pain medications.  He also continues laxatives on a regular basis.  Radiation therapy will begin October 1, 2020 as described above and we have discussed on the basis of the findings per his genomic testing that he is a candidate for a number of additional treatment approaches.  Considering he does not have significant organ involvement and that he has a positive PDL 1 at 20% and a TMB 28 mutations per megabase it would seem reasonable (particularly with his history of CLL) to try to use immunotherapy as monotherapy initially.  This might provide control and allow us not to affect his hematologic illness in any significant way.  We discussed that he will need a PowerPort placement in the near future but that we could start Keytruda shortly thereafter.  Patient was able to proceed into palliative therapy undergoing radiation therapy to the right eighth rib 3 and 50 cGy per fraction x10 between October 1 of October 14.  A PowerPort was placed October 15, 2020.        Mr. Freitas returns to the office October 21, 2020 indicating, wonderfully, that his pain has nearly resolved and he does not need to take short acting pain medications at this point.  Unfortunately he has had severe constipation we discussed his laxatives in depth as to the use, schedule and expected results as he reduces his narcotic use and moves into immunotherapy.  We have also discussed his PET/CT that does refer to an abnormality seen in the rectum that will need to be assessed by colonoscopy.  He  states further that he is having lower extremity swelling beginning over the last several weeks right greater than left.  His breathing remains generally improved though he does have cough periodically and mild degree of hemoptysis.    The patient proceeded with his first Keytruda October 21, 2020 and, fortunately, had little side effects from its use thereafter.  He had noted mild hemoptysis as well as left lower extremity weakness and discomfort requiring periodic pain medication.  As he is next seen November 11, 2020 and a second cycle as planned of Keytruda his knee pain has worsened and he is currently using a knee brace, alternating heat and cold with variable results.  He has not had previous injury to the knee.  The patient is also clearly suffering in terms of his concern about his multiple ongoing issues noting that his symptoms of depression are worsening.              Past Medical History, Past Surgical History, Social History, Family History have been reviewed and are without significant changes except as mentioned.    Review of Systems   Constitutional: Positive for fatigue and unexpected weight change.   HENT: Negative.    Eyes: Negative.    Respiratory: Negative.  Negative for cough, chest tightness, shortness of breath and wheezing.         Infrequent mild hemoptysis   Cardiovascular: Positive for leg swelling.   Gastrointestinal: Positive for constipation. Negative for abdominal pain, diarrhea, nausea and vomiting.   Endocrine: Negative.    Genitourinary: Negative.  Negative for testicular pain.   Musculoskeletal: Positive for arthralgias.   Skin: Negative.  Negative for rash.   Allergic/Immunologic: Negative.    Neurological: Negative.  Negative for weakness.   Psychiatric/Behavioral: Positive for sleep disturbance.   All other systems reviewed and are negative.         Medications:  The current medication list was reviewed in the EMR    ALLERGIES:  No Known Allergies    Objective      Vitals:     "11/11/20 1226   BP: 127/74   Pulse: 104   Resp: 18   Temp: 97.7 °F (36.5 °C)   TempSrc: Temporal   SpO2: 95%   Weight: 80.6 kg (177 lb 12.8 oz)   Height: 180.3 cm (70.98\")   PainSc: 0-No pain     Current Status 11/11/2020   ECOG score 0       Physical Exam    Constitutional: He is oriented to person, place, and time. He appears well-developed and well-nourished.  He is in moderate distress per pain involving his left knee  HENT:   Head: Normocephalic.   Eyes: Conjunctivae and EOM are normal. Pupils are equal, round, and reactive to light. No scleral icterus.   Neck: Normal range of motion. Neck supple. No JVD present. No thyromegaly present.   Cardiovascular:  Normal rate, regular rhythm.  present. Exam reveals no gallop and no friction rub.   No murmur heard.  Pulmonary/Chest:He has no rales.  The patient has a palpable mass approximately right seventh eighth rib location laterally measuring 6 x 8 cm and tender to palpati  Abdominal: Soft. He exhibits no distension and no mass. There is no tenderness.  He has bilateral hernias, apparently direct, right greater than left  Musculoskeletal: Normal range of motion. He exhibits 1+ edema right lower extremity-trace edema left lower extremity, negative Homans' sign bilaterally, no effusion involving either knee.  Lymphadenopathy: No current lymphadenopathy      Skin: Rash resolved                                              Neurological: He is alert and oriented to person, place, and time. He has normal reflexes. No cranial nerve deficit.   Skin: Skin is warm and dry.                         Psychiatric: He has a normal mood and affect. His behavior is normal. Judgment normal    RECENT LABS:  Hematology     WBC   Date Value Ref Range Status   11/11/2020 10.82 (H) 3.40 - 10.80 10*3/mm3 Final     RBC   Date Value Ref Range Status   11/11/2020 3.83 (L) 4.14 - 5.80 10*6/mm3 Final     Hemoglobin   Date Value Ref Range Status   11/11/2020 11.0 (L) 13.0 - 17.7 g/dL Final "     Hematocrit   Date Value Ref Range Status   11/11/2020 35.6 (L) 37.5 - 51.0 % Final     Platelets   Date Value Ref Range Status   11/11/2020 279 140 - 450 10*3/mm3 Final            Flow cytometry report on the peripheral blood as well as the lymph node biopsied at bronchoscopy are consistent with chronic lymphocytic leukemia/small lymphocytic lymphoma.    FISH prognostic panel-Positive for deletion of 13 q. 14.3    DUPLEX VENOUS LOWER EXTREMITIES 10/22/2020    Study Impression •     Right Common Femoral:  No deep vein thrombosis noted.   •     Right Saphenofemoral Junction:  No superficial thrombophlebitis noted.   •     Right Proximal Femoral: No deep vein thrombosis noted.   •     Right Mid Femoral: No deep vein thrombosis noted. There was evidence of valvular incompetence noted.   •     Right Distal Femoral: No deep vein thrombosis noted.   •     Right Popliteal: No deep vein thrombosis noted.   •     Right Posterior Tibial: No deep vein thrombosis noted.   •     Right Peroneal: No deep vein thrombosis noted.   •     Right Gastrocnemius: No deep vein thrombosis noted.   •     Right Greater Saphenous Above Knee: No superficial thrombophlebitis noted.   •     Right Greater Saphenous Below Knee: No superficial thrombophlebitis noted.   •     Left Common Femoral: No deep vein thrombosis noted.   •     Left Saphenofemoral Junction: No superficial thrombophlebitis noted.   •     Left Proximal Femoral: No deep vein thrombosis noted.   •     Left Mid Femoral: No deep vein thrombosis noted.   •     Left Distal Femoral: No deep vein thrombosis noted.   •     Left Popliteal: No deep vein thrombosis noted.   •     Left Posterior Tibial: No deep vein thrombosis noted.    •     Left Peroneal: No deep vein thrombosis noted.   •     Left Gastrocnemius: No deep vein thrombosis noted.    •     Left Greater Saphenous Above Knee: No superficial thrombophlebitis noted.   •     Left Greater Saphenous Below Knee: No superficial  thrombophlebitis noted.         Assessment/Plan   1.  CLL presenting with significant lymphocytosis, lymphadenopathy and splenomegaly.   Positive for deletion of 13 q. 14.3.  Patient status post 2 cycles of Treanda Rituxan with excellent response.  Reassessment July 3, 2019 with plans to switch Treanda to Imbruvica which began July 25, 2019, tolerated well. This continues to be the case.  We have discussed the possibility of adding Rituxan to shorten the time he is on  Imbruvica and we will continue to review this though the patient has hypogammaglobulinemia at this point and we do not wish to worsen this until we are more aware of how to manage COVID-19.  As he is assessed September 9, 2020 his CLL is under good control and he will continue Imbruvica at this point at unchanged dosing.  There were no clinical changes when the patient is reassessed September 30 and October 21 and again November 11 concerning CLL.            2.  Pulmonary nodules/infiltrates.  He is  status post bronchoscopy.  Is unclear at this time whether these findings are infectious or possibly related to his lymphoproliferative disorder.  His subsequent studies in late November are much improved, followed by pulmonary.                                                 He had follow-up scans performed August 26 revealing a new 1.6 cm spiculated nodule within the left upper lobe, 1.2 cm left adrenal nodule, bone windows with a soft tissue mass involving the right eighth rib measuring 3.7 x 2.6 cm.  Biopsy was consistent with adenocarcinoma CK 7+, CK 20-, lung primary suspected clinically, molecular panel not yet obtained.     A PET/CT was obtained September 23 demonstrating asymmetric uptake within the right parotid gland which is unremarkable appearance on noncontrasted CT, SUV of 6 compared to 2.7.  There is no hilar, mediastinal or axillary adenopathy, findings of asymmetric moderate to intense FDG uptake within superior aspect the right chest  wall anterior to the right first rib with an irregular hypodense lesion measuring 1.8 x 1.6 and SUV 4.9.  This had not been seen June 27, 2019.  There is an intensely FDG avid nodule within the lingula measuring 1.9 x 1.5 history of 12.4, FDG avid left adrenal nodule 1.9 x 1.5 with an issue 21.2.  The patient was seen by radiation therapy September 29 and started on radiation therapy to the right chest wall-35 Gy in 10 fractions.  Plans were also then proceed with SBRT to the solitary left upper lobe lesion pending insurance approval.  Further testing including TPS of 20% and foundation CDX with a TMB of greater than 10 mutations per megabase (28 mutations per megabase) and the indication that several immunotherapies could be useful in this patient's care.  Additional genomic findings include BRAF G469R/that trametinib could be useful and STK11/that everolimus could be useful.  *Discussion held as to how to proceed in particular using immunotherapy with Keytruda as monotherapy initially in this patient.  This would reduce his degree of myelosuppression.  Patient completed radiotherapy October 14, October 21 status post port placement the patient began Keytruda which she tolerated well and as he is seen November 11 we plan a second cycle.  At present he is scheduled for follow-up CT of the chest November 17, radiation therapy follow-up November 24.                                                                                                                                              3.  Iron deficiency-stable    4.  Hypogammaglobulinemia-status post IVIG with resolution of sinusitis, current levels again reviewed    5.  Pain control now addressed with MS Contin 30 mg every 12 hours, Norco 10/325 1 p.o. every 4 hours, continue laxative therapy with Senokot-S 2 p.o. daily to twice daily    6.  Xanax 0.25 mg 1 p.o. 3 times daily as needed     7.  Lower extremity swelling-plain films left knee planned when he has a  CT of chest.      Plan:  *Continue Imbruvica at current dosing    *Proceed with second cycle Keytruda November 11, 2020    *Follow-up CT scan scheduled mid November by radiation therapy, status post XRT    *Plain films of his left knee requested when a CT of chest is obtained    *Ongoing constipation, abnormality seen on PET scan per distal rectum.  GI consultation requested- likely endoscopy.  Repeat request sent November 11, 2020    *Additional Xgeva will be discussed with the patient returns for follow-up with the patient in the next seen 3 weeks, MD, Keytruda and Xgeva planned.    *Referral to supportive oncology concerning the patient's depression and anxiety    I spent 60 total minutes, face-to-face, caring for Dav today.  Greater than 50% of this time involved counseling and/or coordination of care as documented within this note.

## 2020-11-06 ENCOUNTER — MEDICATION THERAPY MANAGEMENT (OUTPATIENT)
Dept: PHARMACY | Facility: HOSPITAL | Age: 65
End: 2020-11-06

## 2020-11-11 ENCOUNTER — INFUSION (OUTPATIENT)
Dept: ONCOLOGY | Facility: HOSPITAL | Age: 65
End: 2020-11-11

## 2020-11-11 ENCOUNTER — OFFICE VISIT (OUTPATIENT)
Dept: ONCOLOGY | Facility: CLINIC | Age: 65
End: 2020-11-11

## 2020-11-11 VITALS
BODY MASS INDEX: 24.89 KG/M2 | DIASTOLIC BLOOD PRESSURE: 74 MMHG | HEIGHT: 71 IN | HEART RATE: 104 BPM | SYSTOLIC BLOOD PRESSURE: 127 MMHG | RESPIRATION RATE: 18 BRPM | OXYGEN SATURATION: 95 % | WEIGHT: 177.8 LBS | TEMPERATURE: 97.7 F

## 2020-11-11 DIAGNOSIS — C79.51 LUNG CANCER METASTATIC TO BONE (HCC): ICD-10-CM

## 2020-11-11 DIAGNOSIS — Z79.899 HIGH RISK MEDICATION USE: ICD-10-CM

## 2020-11-11 DIAGNOSIS — C34.12 MALIGNANT NEOPLASM OF UPPER LOBE OF LEFT LUNG (HCC): ICD-10-CM

## 2020-11-11 DIAGNOSIS — C34.12 MALIGNANT NEOPLASM OF UPPER LOBE OF LEFT LUNG (HCC): Primary | ICD-10-CM

## 2020-11-11 DIAGNOSIS — C34.90 LUNG CANCER METASTATIC TO BONE (HCC): ICD-10-CM

## 2020-11-11 DIAGNOSIS — C91.10 CLL (CHRONIC LYMPHOCYTIC LEUKEMIA) (HCC): ICD-10-CM

## 2020-11-11 DIAGNOSIS — M25.562 ACUTE PAIN OF LEFT KNEE: ICD-10-CM

## 2020-11-11 DIAGNOSIS — D50.9 IRON DEFICIENCY ANEMIA, UNSPECIFIED IRON DEFICIENCY ANEMIA TYPE: ICD-10-CM

## 2020-11-11 LAB
ALBUMIN SERPL-MCNC: 3.5 G/DL (ref 3.5–5.2)
ALBUMIN/GLOB SERPL: 1.5 G/DL (ref 1.1–2.4)
ALP SERPL-CCNC: 100 U/L (ref 38–116)
ALT SERPL W P-5'-P-CCNC: 12 U/L (ref 0–41)
ANION GAP SERPL CALCULATED.3IONS-SCNC: 8.2 MMOL/L (ref 5–15)
AST SERPL-CCNC: 11 U/L (ref 0–40)
BASOPHILS # BLD AUTO: 0.05 10*3/MM3 (ref 0–0.2)
BASOPHILS NFR BLD AUTO: 0.5 % (ref 0–1.5)
BILIRUB SERPL-MCNC: 0.5 MG/DL (ref 0.2–1.2)
BUN SERPL-MCNC: 17 MG/DL (ref 6–20)
BUN/CREAT SERPL: 14.4 (ref 7.3–30)
CALCIUM SPEC-SCNC: 9 MG/DL (ref 8.5–10.2)
CHLORIDE SERPL-SCNC: 97 MMOL/L (ref 98–107)
CO2 SERPL-SCNC: 27.8 MMOL/L (ref 22–29)
CREAT SERPL-MCNC: 1.18 MG/DL (ref 0.7–1.3)
DEPRECATED RDW RBC AUTO: 46 FL (ref 37–54)
EOSINOPHIL # BLD AUTO: 0.03 10*3/MM3 (ref 0–0.4)
EOSINOPHIL NFR BLD AUTO: 0.3 % (ref 0.3–6.2)
ERYTHROCYTE [DISTWIDTH] IN BLOOD BY AUTOMATED COUNT: 13.5 % (ref 12.3–15.4)
GFR SERPL CREATININE-BSD FRML MDRD: 62 ML/MIN/1.73
GLOBULIN UR ELPH-MCNC: 2.4 GM/DL (ref 1.8–3.5)
GLUCOSE SERPL-MCNC: 121 MG/DL (ref 74–124)
HCT VFR BLD AUTO: 35.6 % (ref 37.5–51)
HGB BLD-MCNC: 11 G/DL (ref 13–17.7)
IMM GRANULOCYTES # BLD AUTO: 0.05 10*3/MM3 (ref 0–0.05)
IMM GRANULOCYTES NFR BLD AUTO: 0.5 % (ref 0–0.5)
LYMPHOCYTES # BLD AUTO: 0.64 10*3/MM3 (ref 0.7–3.1)
LYMPHOCYTES NFR BLD AUTO: 5.9 % (ref 19.6–45.3)
MCH RBC QN AUTO: 28.7 PG (ref 26.6–33)
MCHC RBC AUTO-ENTMCNC: 30.9 G/DL (ref 31.5–35.7)
MCV RBC AUTO: 93 FL (ref 79–97)
MONOCYTES # BLD AUTO: 0.99 10*3/MM3 (ref 0.1–0.9)
MONOCYTES NFR BLD AUTO: 9.1 % (ref 5–12)
NEUTROPHILS NFR BLD AUTO: 83.7 % (ref 42.7–76)
NEUTROPHILS NFR BLD AUTO: 9.06 10*3/MM3 (ref 1.7–7)
NRBC BLD AUTO-RTO: 0 /100 WBC (ref 0–0.2)
PLATELET # BLD AUTO: 279 10*3/MM3 (ref 140–450)
PMV BLD AUTO: 11.4 FL (ref 6–12)
POTASSIUM SERPL-SCNC: 4.8 MMOL/L (ref 3.5–4.7)
PROT SERPL-MCNC: 5.9 G/DL (ref 6.3–8)
RBC # BLD AUTO: 3.83 10*6/MM3 (ref 4.14–5.8)
SODIUM SERPL-SCNC: 133 MMOL/L (ref 134–145)
WBC # BLD AUTO: 10.82 10*3/MM3 (ref 3.4–10.8)

## 2020-11-11 PROCEDURE — 25010000003 HEPARIN LOCK FLUSH PER 10 UNITS: Performed by: INTERNAL MEDICINE

## 2020-11-11 PROCEDURE — 85025 COMPLETE CBC W/AUTO DIFF WBC: CPT

## 2020-11-11 PROCEDURE — 25010000002 PEMBROLIZUMAB 100 MG/4ML SOLUTION 4 ML VIAL: Performed by: INTERNAL MEDICINE

## 2020-11-11 PROCEDURE — 80053 COMPREHEN METABOLIC PANEL: CPT

## 2020-11-11 PROCEDURE — 96413 CHEMO IV INFUSION 1 HR: CPT

## 2020-11-11 PROCEDURE — 99215 OFFICE O/P EST HI 40 MIN: CPT | Performed by: INTERNAL MEDICINE

## 2020-11-11 RX ORDER — HEPARIN SODIUM (PORCINE) LOCK FLUSH IV SOLN 100 UNIT/ML 100 UNIT/ML
500 SOLUTION INTRAVENOUS AS NEEDED
Status: DISCONTINUED | OUTPATIENT
Start: 2020-11-11 | End: 2020-11-11 | Stop reason: HOSPADM

## 2020-11-11 RX ORDER — SODIUM CHLORIDE 9 MG/ML
250 INJECTION, SOLUTION INTRAVENOUS ONCE
Status: COMPLETED | OUTPATIENT
Start: 2020-11-11 | End: 2020-11-11

## 2020-11-11 RX ORDER — SODIUM CHLORIDE 0.9 % (FLUSH) 0.9 %
10 SYRINGE (ML) INJECTION AS NEEDED
Status: DISCONTINUED | OUTPATIENT
Start: 2020-11-11 | End: 2020-11-11 | Stop reason: HOSPADM

## 2020-11-11 RX ORDER — SODIUM CHLORIDE 9 MG/ML
250 INJECTION, SOLUTION INTRAVENOUS ONCE
Status: CANCELLED | OUTPATIENT
Start: 2020-11-11

## 2020-11-11 RX ORDER — HEPARIN SODIUM (PORCINE) LOCK FLUSH IV SOLN 100 UNIT/ML 100 UNIT/ML
500 SOLUTION INTRAVENOUS AS NEEDED
Status: CANCELLED | OUTPATIENT
Start: 2020-11-11

## 2020-11-11 RX ORDER — SODIUM CHLORIDE 0.9 % (FLUSH) 0.9 %
10 SYRINGE (ML) INJECTION AS NEEDED
Status: CANCELLED | OUTPATIENT
Start: 2020-11-11

## 2020-11-11 RX ADMIN — Medication 500 UNITS: at 14:20

## 2020-11-11 RX ADMIN — SODIUM CHLORIDE 200 MG: 9 INJECTION, SOLUTION INTRAVENOUS at 13:52

## 2020-11-11 RX ADMIN — SODIUM CHLORIDE, PRESERVATIVE FREE 10 ML: 5 INJECTION INTRAVENOUS at 14:20

## 2020-11-11 RX ADMIN — SODIUM CHLORIDE 250 ML: 900 INJECTION, SOLUTION INTRAVENOUS at 13:30

## 2020-11-12 ENCOUNTER — MEDICATION THERAPY MANAGEMENT (OUTPATIENT)
Dept: PHARMACY | Facility: HOSPITAL | Age: 65
End: 2020-11-12

## 2020-11-12 NOTE — PROGRESS NOTES
[facility] Lab Review- [facility]      11/11/2020  Day 1   WBC 3.40 - 10.80 10*3/mm3 10.82 (A)   Neutrophils Absolute 1.70 - 7.00 10*3/mm3 9.06 (A)   Hemoglobin 13.0 - 17.7 g/dL 11.0 (A)   Hematocrit 37.5 - 51.0 % 35.6 (A)   Platelets 140 - 450 10*3/mm3 279   Creatinine 0.70 - 1.30 mg/dL 1.18   eGFR Non African Am >60 mL/min/1.73 62   BUN 6 - 20 mg/dL 17   Sodium 134 - 145 mmol/L 133 (A)   Potassium 3.5 - 4.7 mmol/L 4.8 (A)   Glucose 74 - 124 mg/dL 121   Calcium 8.5 - 10.2 mg/dL 9.0   Albumin 3.50 - 5.20 g/dL 3.50   Total Protein 6.3 - 8.0 g/dL 5.9 (A)   AST (SGOT) 0 - 40 U/L 11   ALT (SGPT) 0 - 41 U/L 12   Alkaline Phosphatase 38 - 116 U/L 100   Total Bilirubin 0.2 - 1.2 mg/dL 0.5     MD dictation noted. Pharmacy will continue to follow.     Thanks,   Jessenia Galvez, PharmD

## 2020-11-15 ENCOUNTER — HOSPITAL ENCOUNTER (OUTPATIENT)
Facility: HOSPITAL | Age: 65
Setting detail: OBSERVATION
Discharge: HOME | End: 2020-11-18
Attending: EMERGENCY MEDICINE | Admitting: HOSPITALIST

## 2020-11-15 ENCOUNTER — APPOINTMENT (OUTPATIENT)
Dept: CT IMAGING | Facility: HOSPITAL | Age: 65
End: 2020-11-15

## 2020-11-15 DIAGNOSIS — K40.90 RIGHT INGUINAL HERNIA: ICD-10-CM

## 2020-11-15 DIAGNOSIS — K56.7 ILEUS (HCC): Primary | ICD-10-CM

## 2020-11-15 LAB
ALBUMIN SERPL-MCNC: 3.3 G/DL (ref 3.5–5.2)
ALBUMIN/GLOB SERPL: 1.4 G/DL
ALP SERPL-CCNC: 98 U/L (ref 39–117)
ALT SERPL W P-5'-P-CCNC: 14 U/L (ref 1–41)
ANION GAP SERPL CALCULATED.3IONS-SCNC: 10.9 MMOL/L (ref 5–15)
APTT PPP: 28.7 SECONDS (ref 22.7–35.4)
AST SERPL-CCNC: 16 U/L (ref 1–40)
B PARAPERT DNA SPEC QL NAA+PROBE: NOT DETECTED
B PERT DNA SPEC QL NAA+PROBE: NOT DETECTED
BASOPHILS # BLD AUTO: 0.07 10*3/MM3 (ref 0–0.2)
BASOPHILS NFR BLD AUTO: 0.6 % (ref 0–1.5)
BILIRUB SERPL-MCNC: 0.5 MG/DL (ref 0–1.2)
BILIRUB UR QL STRIP: NEGATIVE
BUN SERPL-MCNC: 16 MG/DL (ref 8–23)
BUN/CREAT SERPL: 13.3 (ref 7–25)
C PNEUM DNA NPH QL NAA+NON-PROBE: NOT DETECTED
CALCIUM SPEC-SCNC: 9 MG/DL (ref 8.6–10.5)
CHLORIDE SERPL-SCNC: 100 MMOL/L (ref 98–107)
CLARITY UR: CLEAR
CO2 SERPL-SCNC: 26.1 MMOL/L (ref 22–29)
COLOR UR: YELLOW
CREAT SERPL-MCNC: 1.2 MG/DL (ref 0.76–1.27)
DEPRECATED RDW RBC AUTO: 40.1 FL (ref 37–54)
EOSINOPHIL # BLD AUTO: 0.01 10*3/MM3 (ref 0–0.4)
EOSINOPHIL NFR BLD AUTO: 0.1 % (ref 0.3–6.2)
ERYTHROCYTE [DISTWIDTH] IN BLOOD BY AUTOMATED COUNT: 12.8 % (ref 12.3–15.4)
FLUAV SUBTYP SPEC NAA+PROBE: NOT DETECTED
FLUBV RNA ISLT QL NAA+PROBE: NOT DETECTED
GFR SERPL CREATININE-BSD FRML MDRD: 61 ML/MIN/1.73
GLOBULIN UR ELPH-MCNC: 2.3 GM/DL
GLUCOSE SERPL-MCNC: 138 MG/DL (ref 65–99)
GLUCOSE UR STRIP-MCNC: NEGATIVE MG/DL
HADV DNA SPEC NAA+PROBE: NOT DETECTED
HCOV 229E RNA SPEC QL NAA+PROBE: NOT DETECTED
HCOV HKU1 RNA SPEC QL NAA+PROBE: NOT DETECTED
HCOV NL63 RNA SPEC QL NAA+PROBE: NOT DETECTED
HCOV OC43 RNA SPEC QL NAA+PROBE: NOT DETECTED
HCT VFR BLD AUTO: 34.8 % (ref 37.5–51)
HGB BLD-MCNC: 11.4 G/DL (ref 13–17.7)
HGB UR QL STRIP.AUTO: NEGATIVE
HMPV RNA NPH QL NAA+NON-PROBE: NOT DETECTED
HPIV1 RNA SPEC QL NAA+PROBE: NOT DETECTED
HPIV2 RNA SPEC QL NAA+PROBE: NOT DETECTED
HPIV3 RNA NPH QL NAA+PROBE: NOT DETECTED
HPIV4 P GENE NPH QL NAA+PROBE: NOT DETECTED
IMM GRANULOCYTES # BLD AUTO: 0.06 10*3/MM3 (ref 0–0.05)
IMM GRANULOCYTES NFR BLD AUTO: 0.5 % (ref 0–0.5)
INR PPP: 1.06 (ref 0.9–1.1)
KETONES UR QL STRIP: ABNORMAL
LEUKOCYTE ESTERASE UR QL STRIP.AUTO: NEGATIVE
LIPASE SERPL-CCNC: 23 U/L (ref 13–60)
LYMPHOCYTES # BLD AUTO: 0.3 10*3/MM3 (ref 0.7–3.1)
LYMPHOCYTES NFR BLD AUTO: 2.6 % (ref 19.6–45.3)
M PNEUMO IGG SER IA-ACNC: NOT DETECTED
MCH RBC QN AUTO: 28.4 PG (ref 26.6–33)
MCHC RBC AUTO-ENTMCNC: 32.8 G/DL (ref 31.5–35.7)
MCV RBC AUTO: 86.8 FL (ref 79–97)
MONOCYTES # BLD AUTO: 0.59 10*3/MM3 (ref 0.1–0.9)
MONOCYTES NFR BLD AUTO: 5.1 % (ref 5–12)
NEUTROPHILS NFR BLD AUTO: 10.43 10*3/MM3 (ref 1.7–7)
NEUTROPHILS NFR BLD AUTO: 91.1 % (ref 42.7–76)
NITRITE UR QL STRIP: NEGATIVE
NRBC BLD AUTO-RTO: 0 /100 WBC (ref 0–0.2)
PH UR STRIP.AUTO: <=5 [PH] (ref 5–8)
PLATELET # BLD AUTO: 292 10*3/MM3 (ref 140–450)
PMV BLD AUTO: 12.3 FL (ref 6–12)
POTASSIUM SERPL-SCNC: 5.2 MMOL/L (ref 3.5–5.2)
PROT SERPL-MCNC: 5.6 G/DL (ref 6–8.5)
PROT UR QL STRIP: NEGATIVE
PROTHROMBIN TIME: 13.6 SECONDS (ref 11.7–14.2)
RBC # BLD AUTO: 4.01 10*6/MM3 (ref 4.14–5.8)
RHINOVIRUS RNA SPEC NAA+PROBE: NOT DETECTED
RSV RNA NPH QL NAA+NON-PROBE: NOT DETECTED
SARS-COV-2 RNA NPH QL NAA+NON-PROBE: NOT DETECTED
SODIUM SERPL-SCNC: 137 MMOL/L (ref 136–145)
SP GR UR STRIP: >=1.03 (ref 1–1.03)
UROBILINOGEN UR QL STRIP: ABNORMAL
WBC # BLD AUTO: 11.46 10*3/MM3 (ref 3.4–10.8)

## 2020-11-15 PROCEDURE — 74177 CT ABD & PELVIS W/CONTRAST: CPT

## 2020-11-15 PROCEDURE — 99284 EMERGENCY DEPT VISIT MOD MDM: CPT

## 2020-11-15 PROCEDURE — G0378 HOSPITAL OBSERVATION PER HR: HCPCS

## 2020-11-15 PROCEDURE — 80053 COMPREHEN METABOLIC PANEL: CPT | Performed by: EMERGENCY MEDICINE

## 2020-11-15 PROCEDURE — 85730 THROMBOPLASTIN TIME PARTIAL: CPT | Performed by: EMERGENCY MEDICINE

## 2020-11-15 PROCEDURE — 83690 ASSAY OF LIPASE: CPT | Performed by: EMERGENCY MEDICINE

## 2020-11-15 PROCEDURE — 25010000002 IOPAMIDOL 61 % SOLUTION: Performed by: EMERGENCY MEDICINE

## 2020-11-15 PROCEDURE — 96361 HYDRATE IV INFUSION ADD-ON: CPT

## 2020-11-15 PROCEDURE — 85025 COMPLETE CBC W/AUTO DIFF WBC: CPT | Performed by: EMERGENCY MEDICINE

## 2020-11-15 PROCEDURE — 96374 THER/PROPH/DIAG INJ IV PUSH: CPT

## 2020-11-15 PROCEDURE — 0202U NFCT DS 22 TRGT SARS-COV-2: CPT | Performed by: EMERGENCY MEDICINE

## 2020-11-15 PROCEDURE — 25010000002 MORPHINE PER 10 MG: Performed by: EMERGENCY MEDICINE

## 2020-11-15 PROCEDURE — 25010000002 ONDANSETRON PER 1 MG: Performed by: EMERGENCY MEDICINE

## 2020-11-15 PROCEDURE — 94640 AIRWAY INHALATION TREATMENT: CPT

## 2020-11-15 PROCEDURE — 81003 URINALYSIS AUTO W/O SCOPE: CPT | Performed by: EMERGENCY MEDICINE

## 2020-11-15 PROCEDURE — 96375 TX/PRO/DX INJ NEW DRUG ADDON: CPT

## 2020-11-15 PROCEDURE — 85610 PROTHROMBIN TIME: CPT | Performed by: EMERGENCY MEDICINE

## 2020-11-15 RX ORDER — MORPHINE SULFATE 2 MG/ML
4 INJECTION, SOLUTION INTRAMUSCULAR; INTRAVENOUS ONCE
Status: COMPLETED | OUTPATIENT
Start: 2020-11-15 | End: 2020-11-15

## 2020-11-15 RX ORDER — MORPHINE SULFATE 2 MG/ML
4 INJECTION, SOLUTION INTRAMUSCULAR; INTRAVENOUS ONCE AS NEEDED
Status: DISCONTINUED | OUTPATIENT
Start: 2020-11-15 | End: 2020-11-15

## 2020-11-15 RX ORDER — MORPHINE SULFATE 2 MG/ML
4 INJECTION, SOLUTION INTRAMUSCULAR; INTRAVENOUS
Status: DISCONTINUED | OUTPATIENT
Start: 2020-11-15 | End: 2020-11-18 | Stop reason: HOSPADM

## 2020-11-15 RX ORDER — ONDANSETRON 2 MG/ML
8 INJECTION INTRAMUSCULAR; INTRAVENOUS ONCE
Status: COMPLETED | OUTPATIENT
Start: 2020-11-15 | End: 2020-11-15

## 2020-11-15 RX ORDER — ACETAMINOPHEN 650 MG/1
650 SUPPOSITORY RECTAL EVERY 4 HOURS PRN
Status: DISCONTINUED | OUTPATIENT
Start: 2020-11-15 | End: 2020-11-18 | Stop reason: HOSPADM

## 2020-11-15 RX ORDER — ACETAMINOPHEN 325 MG/1
650 TABLET ORAL EVERY 4 HOURS PRN
Status: DISCONTINUED | OUTPATIENT
Start: 2020-11-15 | End: 2020-11-18 | Stop reason: HOSPADM

## 2020-11-15 RX ORDER — ALPRAZOLAM 0.25 MG/1
0.25 TABLET ORAL 3 TIMES DAILY PRN
Status: DISCONTINUED | OUTPATIENT
Start: 2020-11-15 | End: 2020-11-18 | Stop reason: HOSPADM

## 2020-11-15 RX ORDER — SODIUM CHLORIDE 0.9 % (FLUSH) 0.9 %
10 SYRINGE (ML) INJECTION EVERY 12 HOURS SCHEDULED
Status: DISCONTINUED | OUTPATIENT
Start: 2020-11-15 | End: 2020-11-18 | Stop reason: HOSPADM

## 2020-11-15 RX ORDER — ALBUTEROL SULFATE 2.5 MG/3ML
2.5 SOLUTION RESPIRATORY (INHALATION) EVERY 6 HOURS PRN
Status: DISCONTINUED | OUTPATIENT
Start: 2020-11-15 | End: 2020-11-18 | Stop reason: HOSPADM

## 2020-11-15 RX ORDER — HYDROCODONE BITARTRATE AND ACETAMINOPHEN 10; 325 MG/1; MG/1
1 TABLET ORAL EVERY 4 HOURS PRN
Status: DISCONTINUED | OUTPATIENT
Start: 2020-11-15 | End: 2020-11-18 | Stop reason: HOSPADM

## 2020-11-15 RX ORDER — GUAIFENESIN 400 MG/1
400 TABLET ORAL 2 TIMES DAILY
COMMUNITY
End: 2021-04-08 | Stop reason: SDDI

## 2020-11-15 RX ORDER — ONDANSETRON 2 MG/ML
4 INJECTION INTRAMUSCULAR; INTRAVENOUS EVERY 6 HOURS PRN
Status: DISCONTINUED | OUTPATIENT
Start: 2020-11-15 | End: 2020-11-18 | Stop reason: HOSPADM

## 2020-11-15 RX ORDER — GUAIFENESIN 200 MG/1
400 TABLET ORAL 2 TIMES DAILY
Status: DISCONTINUED | OUTPATIENT
Start: 2020-11-15 | End: 2020-11-18 | Stop reason: HOSPADM

## 2020-11-15 RX ORDER — MORPHINE SULFATE 30 MG/1
30 TABLET, FILM COATED, EXTENDED RELEASE ORAL EVERY 12 HOURS
Status: DISCONTINUED | OUTPATIENT
Start: 2020-11-15 | End: 2020-11-18 | Stop reason: HOSPADM

## 2020-11-15 RX ORDER — BUDESONIDE AND FORMOTEROL FUMARATE DIHYDRATE 160; 4.5 UG/1; UG/1
2 AEROSOL RESPIRATORY (INHALATION) 2 TIMES DAILY
Status: DISCONTINUED | OUTPATIENT
Start: 2020-11-15 | End: 2020-11-18 | Stop reason: HOSPADM

## 2020-11-15 RX ORDER — SODIUM CHLORIDE 0.9 % (FLUSH) 0.9 %
10 SYRINGE (ML) INJECTION AS NEEDED
Status: DISCONTINUED | OUTPATIENT
Start: 2020-11-15 | End: 2020-11-18 | Stop reason: HOSPADM

## 2020-11-15 RX ORDER — ACETAMINOPHEN 160 MG/5ML
650 SOLUTION ORAL EVERY 4 HOURS PRN
Status: DISCONTINUED | OUTPATIENT
Start: 2020-11-15 | End: 2020-11-18 | Stop reason: HOSPADM

## 2020-11-15 RX ORDER — MORPHINE SULFATE 2 MG/ML
4 INJECTION, SOLUTION INTRAMUSCULAR; INTRAVENOUS
Status: DISCONTINUED | OUTPATIENT
Start: 2020-11-15 | End: 2020-11-15 | Stop reason: SDUPTHER

## 2020-11-15 RX ORDER — TRAZODONE HYDROCHLORIDE 100 MG/1
100 TABLET ORAL NIGHTLY
Status: DISCONTINUED | OUTPATIENT
Start: 2020-11-15 | End: 2020-11-18 | Stop reason: HOSPADM

## 2020-11-15 RX ORDER — AMOXICILLIN 250 MG
2 CAPSULE ORAL 2 TIMES DAILY
Status: DISCONTINUED | OUTPATIENT
Start: 2020-11-15 | End: 2020-11-18 | Stop reason: HOSPADM

## 2020-11-15 RX ADMIN — MORPHINE SULFATE 4 MG: 2 INJECTION, SOLUTION INTRAMUSCULAR; INTRAVENOUS at 11:59

## 2020-11-15 RX ADMIN — MORPHINE SULFATE 30 MG: 30 TABLET, FILM COATED, EXTENDED RELEASE ORAL at 20:41

## 2020-11-15 RX ADMIN — SODIUM CHLORIDE, PRESERVATIVE FREE 10 ML: 5 INJECTION INTRAVENOUS at 20:42

## 2020-11-15 RX ADMIN — ONDANSETRON 8 MG: 2 INJECTION INTRAMUSCULAR; INTRAVENOUS at 12:00

## 2020-11-15 RX ADMIN — GUAIFENESIN 400 MG: 200 TABLET ORAL at 20:42

## 2020-11-15 RX ADMIN — TRAZODONE HYDROCHLORIDE 100 MG: 100 TABLET ORAL at 20:41

## 2020-11-15 RX ADMIN — IOPAMIDOL 85 ML: 612 INJECTION, SOLUTION INTRAVENOUS at 13:06

## 2020-11-15 RX ADMIN — SODIUM CHLORIDE 500 ML: 9 INJECTION, SOLUTION INTRAVENOUS at 11:58

## 2020-11-15 RX ADMIN — BUDESONIDE AND FORMOTEROL FUMARATE DIHYDRATE 2 PUFF: 160; 4.5 AEROSOL RESPIRATORY (INHALATION) at 20:33

## 2020-11-15 RX ADMIN — DOCUSATE SODIUM 50MG AND SENNOSIDES 8.6MG 2 TABLET: 8.6; 5 TABLET, FILM COATED ORAL at 20:41

## 2020-11-15 NOTE — ED NOTES
Pt to ED with c/o lower R sided abdominal hernia, ahs had x years but states worse this am approx 0800. Edema noted to area, evaluated by dr garner. Pt in mild trendelenburg for pain relief. Pt is on morphine at home. Receiving immunotherapy via port for lung CA, sees dr bangura. Denies upper abd pain, n/v/d but states did vomit PTA.     Patient was placed in face mask in first look. Patient was wearing facemask when I entered the room and throughout our encounter. I wore full protective equipment throughout this patient encounter including a face mask, eye shield, gown and gloves. Hand hygiene was performed before donning protective equipment and after removal when leaving the room.       Naomi Reed, RN  11/15/20 [id number]

## 2020-11-15 NOTE — ED NOTES
Patient to er from home with c/o pain in hernia site with nausea. Patient reported the pain got worse this am. Patient has mask on in triage along with staff.      Christopher Christy RN  11/15/20 [id number]

## 2020-11-15 NOTE — PLAN OF CARE
Goal Outcome Evaluation:  Plan of Care Reviewed With: patient  Progress: no change  Outcome Summary: pt admitted from er for ileus. clear liquid diet aat. no complaints of pain. port not accessed will continue to monitor.

## 2020-11-15 NOTE — ED NOTES
Pt unable to urinate at this time. Pt provided a urinal and asked to call out when he is able to go.      Melia Lomeli  11/15/20 [id number]

## 2020-11-15 NOTE — ED PROVIDER NOTES
EMERGENCY DEPARTMENT ENCOUNTER  Patient was placed in face mask in first look and the following protective measures were taken unless additional measures were taken and documented below in the ED course. Patient was wearing facemask when I entered the room and throughout our encounter. I wore full protective equipment throughout this patient encounter including a face mask, and gloves. Hand hygiene was performed before donning protective equipment and after removal when leaving the room.    Room Number:  19/19  Date of encounter:  11/15/2020  PCP: Juan Iyer MD    HPI:  Context: Dav Freitas is a 65 y.o. male who presents to the ED c/o chief complaint of hernia.  Patient reports he has a right inguinal hernia that has been there for 2 years.  Patient is not seeing a surgeon about it.  Hernia size changes throughout the day, depends on what patient is doing.  Patient is reports that he is usually able to spontaneously reduce it.  Patient reports that hernia has enlarged in size today, is not reducible, does complain of cramping lower abdominal pain, greatest in right lower quadrant.  Patient had one-time emesis prior to arrival, nonbloody nonbilious.  Patient reports he is currently constipated, believes he has passed gas but is unsure, is belching excessively.  Patient has no history of bowel obstruction in the past, no prior abdominal surgeries.  Patient is followed by CBC, has CLL in remission, has active lung cancer.    MEDICAL HISTORY REVIEW  Reviewed in EPIC    PAST MEDICAL HISTORY  Active Ambulatory Problems     Diagnosis Date Noted   • Varicose vein of leg 08/09/2016   • Leg pain, right 08/09/2016   • Sepsis (CMS/[facility]) 04/28/2019   • CLL (chronic lymphocytic leukemia) (CMS/[facility]) 05/01/2019   • COPD exacerbation (CMS/[facility]) 05/03/2019   • Smoker 05/03/2019   • Renal failure 05/03/2019   • Hospital discharge follow-up 05/09/2019   • Chemotherapy induced neutropenia (CMS/[facility]) 05/30/2019   •  Hypogammaglobulinemia (CMS/[facility]) 2019   • Iron deficiency anemia 2019   • Other insomnia 2020   • Direct inguinal hernia 2020   • Malignant neoplasm of upper lobe of left lung (CMS/[facility]) 2020   • Lung cancer metastatic to bone (CMS/[facility]) 2020   • High risk medication use    • Rectal lesion 10/21/2020   • Left knee pain 2020     Resolved Ambulatory Problems     Diagnosis Date Noted   • Mediastinal lymphadenopathy 2019     Past Medical History:   Diagnosis Date   • Anxiety    • Bruises easily    • Constipation    • COPD (chronic obstructive pulmonary disease) (CMS/[facility])    • Dyspnea    • ED (erectile dysfunction)    • H/O splenomegaly    • Lung cancer (CMS/[facility]) 2020   • On home O2        PAST SURGICAL HISTORY  Past Surgical History:   Procedure Laterality Date   • BRONCHOSCOPY Bilateral 2019    Procedure: BRONCHOSCOPY WITH WASHING ENDOBRONCHIAL ULTRASOUND WITH FNA'S;  Surgeon: Selina Lloyd MD;  Location: [facility] ENDOSCOPY;  Service: Pulmonary   • VENOUS ACCESS DEVICE (PORT) INSERTION N/A 10/15/2020    Procedure: MEDIPORT PLACEMENT;  Surgeon: Herlinda Magaña Jr., MD;  Location: [facility] MAIN OR;  Service: Vascular;  Laterality: N/A;       FAMILY HISTORY  Family History   Problem Relation Age of Onset   • Arthritis Mother    • Lung cancer Father    • Malig Hyperthermia Neg Hx        SOCIAL HISTORY  Social History     Socioeconomic History   • Marital status:      Spouse name: Nikky   • Number of children: Not on file   • Years of education: Not on file   • Highest education level: Not on file   Occupational History     Employer: [employer]   Tobacco Use   • Smoking status: Former Smoker     Packs/day: 1.00     Years: 40.00     Pack years: 40.00     Types: Cigarettes     Quit date: 2019     Years since quittin.5   • Smokeless tobacco: Never Used   Substance and Sexual Activity   • Alcohol use: Yes     Comment: OCCASIONAL    • Drug use: Not  Currently   • Sexual activity: Defer       ALLERGIES  Patient has no known allergies.    The patient's allergies have been reviewed    REVIEW OF SYSTEMS  All systems reviewed and negative except for those discussed in HPI.     PHYSICAL EXAM  I have reviewed the triage vital signs and nursing notes.  ED Triage Vitals [11/15/20 1040]   Temp Heart Rate Resp BP SpO2   96.5 °F (35.8 °C) 95 -- -- 97 %      Temp src Heart Rate Source Patient Position BP Location FiO2 (%)   Tympanic -- -- -- --     General: No acute distress  HENT: NCAT, PERRL, Nares patent  Eyes: no scleral icterus  Neck: trachea midline, no ROM limitations  CV: regular rhythm, regular rate  Respiratory: normal effort, CTAB  Abdomen: soft, mildly distended, hyperactive bowel sounds, occasional high-pitched tinkling, lower abdominal tenderness, greatest in right lower quadrant, negative McBurney's, no rebound tenderness, no guarding or rigidity.  Right inguinal hernia, large, tender, hard, nonreducible.  : deferred  Musculoskeletal: no deformity  Neuro: alert, moves all extremities, follows commands  Skin: warm, dry    LAB RESULTS  Recent Results (from the past 24 hour(s))   Comprehensive Metabolic Panel    Collection Time: 11/15/20 11:56 AM    Specimen: Blood   Result Value Ref Range    Glucose 138 (H) 65 - 99 mg/dL    BUN 16 8 - 23 mg/dL    Creatinine 1.20 0.76 - 1.27 mg/dL    Sodium 137 136 - 145 mmol/L    Potassium 5.2 3.5 - 5.2 mmol/L    Chloride 100 98 - 107 mmol/L    CO2 26.1 22.0 - 29.0 mmol/L    Calcium 9.0 8.6 - 10.5 mg/dL    Total Protein 5.6 (L) 6.0 - 8.5 g/dL    Albumin 3.30 (L) 3.50 - 5.20 g/dL    ALT (SGPT) 14 1 - 41 U/L    AST (SGOT) 16 1 - 40 U/L    Alkaline Phosphatase 98 39 - 117 U/L    Total Bilirubin 0.5 0.0 - 1.2 mg/dL    eGFR Non African Amer 61 >60 mL/min/1.73    Globulin 2.3 gm/dL    A/G Ratio 1.4 g/dL    BUN/Creatinine Ratio 13.3 7.0 - 25.0    Anion Gap 10.9 5.0 - 15.0 mmol/L   Lipase    Collection Time: 11/15/20 11:56 AM     Specimen: Blood   Result Value Ref Range    Lipase 23 13 - 60 U/L   CBC Auto Differential    Collection Time: 11/15/20 11:56 AM    Specimen: Blood   Result Value Ref Range    WBC 11.46 (H) 3.40 - 10.80 10*3/mm3    RBC 4.01 (L) 4.14 - 5.80 10*6/mm3    Hemoglobin 11.4 (L) 13.0 - 17.7 g/dL    Hematocrit 34.8 (L) 37.5 - 51.0 %    MCV 86.8 79.0 - 97.0 fL    MCH 28.4 26.6 - 33.0 pg    MCHC 32.8 31.5 - 35.7 g/dL    RDW 12.8 12.3 - 15.4 %    RDW-SD 40.1 37.0 - 54.0 fl    MPV 12.3 (H) 6.0 - 12.0 fL    Platelets 292 140 - 450 10*3/mm3    Neutrophil % 91.1 (H) 42.7 - 76.0 %    Lymphocyte % 2.6 (L) 19.6 - 45.3 %    Monocyte % 5.1 5.0 - 12.0 %    Eosinophil % 0.1 (L) 0.3 - 6.2 %    Basophil % 0.6 0.0 - 1.5 %    Immature Grans % 0.5 0.0 - 0.5 %    Neutrophils, Absolute 10.43 (H) 1.70 - 7.00 10*3/mm3    Lymphocytes, Absolute 0.30 (L) 0.70 - 3.10 10*3/mm3    Monocytes, Absolute 0.59 0.10 - 0.90 10*3/mm3    Eosinophils, Absolute 0.01 0.00 - 0.40 10*3/mm3    Basophils, Absolute 0.07 0.00 - 0.20 10*3/mm3    Immature Grans, Absolute 0.06 (H) 0.00 - 0.05 10*3/mm3    nRBC 0.0 0.0 - 0.2 /100 WBC   Protime-INR    Collection Time: 11/15/20 11:56 AM    Specimen: Blood   Result Value Ref Range    Protime 13.6 11.7 - 14.2 Seconds    INR 1.06 0.90 - 1.10   aPTT    Collection Time: 11/15/20 11:56 AM    Specimen: Blood   Result Value Ref Range    PTT 28.7 22.7 - 35.4 seconds   Respiratory Panel PCR w/COVID-19(SARS-CoV-2) [facility]/NATHALIE/E[facility]RA/[facility] In-House, NP Swab in UTM/VTM, 3-4 HR TAT - Swab, Nasopharynx    Collection Time: 11/15/20 12:06 PM    Specimen: Nasopharynx; Swab   Result Value Ref Range    ADENOVIRUS, PCR Not Detected Not Detected    Coronavirus 229E Not Detected Not Detected    Coronavirus HKU1 Not Detected Not Detected    Coronavirus NL63 Not Detected Not Detected    Coronavirus OC43 Not Detected Not Detected    COVID19 Not Detected Not Detected - Ref. Range    Human Metapneumovirus Not Detected Not Detected    Human  Rhinovirus/Enterovirus Not Detected Not Detected    Influenza A PCR Not Detected Not Detected    Influenza B PCR Not Detected Not Detected    Parainfluenza Virus 1 Not Detected Not Detected    Parainfluenza Virus 2 Not Detected Not Detected    Parainfluenza Virus 3 Not Detected Not Detected    Parainfluenza Virus 4 Not Detected Not Detected    RSV, PCR Not Detected Not Detected    Bordetella pertussis pcr Not Detected Not Detected    Bordetella parapertussis PCR Not Detected Not Detected    Chlamydophila pneumoniae PCR Not Detected Not Detected    Mycoplasma pneumo by PCR Not Detected Not Detected       I ordered the above labs and reviewed the results.    RADIOLOGY  Ct Abdomen Pelvis With Contrast    Result Date: 11/15/2020  CT ABDOMEN PELVIS W CONTRAST-  HISTORY:  Enlarged and tender right inguinal hernia, reducible. Lower abdominal tenderness and vomiting, rule out bowel obstruction. CLL and lung cancer.  TECHNIQUE:  CT of the abdomen and pelvis was performed following the administration of intravenous contrast. Radiation dose reduction techniques were utilized, including automated exposure control and exposure modulation based on body size.  COMPARISON:  PET/CT 09/23/2020. CT chest without contrast 08/26/2020. CT abdomen and pelvis with contrast 06/27/2019.  FINDINGS: Heart size is normal. There is no pericardial effusion. A heterogeneous soft tissue mass measuring approximately 9.5 x 6.7 cm in the lateral right chest wall with involvement of lateral right rib 8 has increased in size from 09/23/2020, previously 7.0 x 6.3 cm. This has significantly increased in size from 08/26/2020. A 0.4 cm nodule in the left lower lobe is new from 09/23/2020. A smaller pulmonary nodule in the left lower lobe is not significantly changed. There is an at least moderate right pleural effusion, which has increased in size from 09/23/2020, with adjacent atelectasis.  The liver is normal in size. No suspicious focal intrahepatic  lesion is identified. The gallbladder is present. The pancreas and spleen are not significantly changed. A 3.3 x 2.9 cm heterogeneous left adrenal nodule has increased in size from 09/23/2020, but grossly 1.9 x 1.5 cm. Kidneys are within normal limits. There are no pathologically enlarged abdominal or pelvic lymph nodes. There is extensive calcific aortoiliac atherosclerosis.  Duodenum and proximal jejunum are mildly dilated up to 3.3 cm with nonspecific small fluid levels, suggestive of ileus. No discrete transition point is identified. The bladder is moderately distended and within normal limits. The prostate is present. There is a small fat-containing left inguinal hernia. There is a small to moderate right inguinal hernia, which contains a small amount of fluid, as well as a nondilated appendix. There is mild stranding at the opening of the hernia sac, which extends to the base of the cecum.  There is multilevel degenerative disc disease. There is a new 1.8 cm lytic lesion in the left iliac bone with a new adjacent 1.5 cm hepatic lesion.       1.  Small to moderate right inguinal hernia, which contains a small amount of fluid and a nondilated appendix. Mild fat stranding at the opening of the hernia sac extends to the base of the cecum, suggestive of recent development within the hernia sac, now reduced. The fluid component of the hernia is new from 09/23/2020. Mildly dilated proximal small bowel without a discrete transition point, suggestive of mild ileus. 2.  Increased size of a 9.5 cm metastatic lesion in the lateral right chest wall with 2 new osseous metastases measuring up to 1.8 cm in the left iliac bone. 3.  Increased size of a metastatic left adrenal nodule measuring 3.3 cm. 4.  New 0.4 cm left lower lobe nodule, which is nonspecific but could be metastatic. 5.  Increased size of an at least moderate right pleural effusion with adjacent atelectasis.  Discussed with Dr. Ellison at 1:30 PM.        I  ordered the above noted radiological studies. I reviewed the images and results. I agree with the radiologist interpretation.    PROCEDURES  Procedures    MEDICATIONS GIVEN IN ER  Medications   morphine injection 4 mg (has no administration in time range)   sodium chloride 0.9 % bolus 500 mL (0 mL Intravenous Stopped 11/15/20 1258)   ondansetron (ZOFRAN) injection 8 mg (8 mg Intravenous Given 11/15/20 1200)   morphine injection 4 mg (4 mg Intravenous Given 11/15/20 1159)   iopamidol (ISOVUE-300) 61 % injection 100 mL (85 mL Intravenous Given by Other 11/15/20 1306)   iopamidol (ISOVUE-300) 61 % injection  - ADS Override Pull (has no administration in time range)       PROGRESS, DATA ANALYSIS, CONSULTS, AND MEDICAL DECISION MAKING  A complete history and physical exam have been performed.  All available laboratory and imaging results have been reviewed by myself prior to disposition.    MDM  After the initial H&P, I discussed pertinent information from history and physical exam with patient/family.  Discussed differential diagnosis.  Discussed plan for ED evaluation/work-up/treatment.  All questions answered.  Patient/family is agreeable with plan.  ED Course as of Nov 15 1435   Sun Nov 15, 2020   1116 Reducible right inguinal hernia.  Patient placed in Trendelenburg position, giving pain medicine, ice, will attempt repeat reduction later.  Obtain lab work including coagulation factors, Covid swab as patient may require OR, CT imaging to rule out obstruction.    [JG]   7904 Phone call with radiologist.  I had previously reviewed the images. I discussed the patient, imaging, and their interpretation.  CT abdomen pelvis shows a right inguinal hernia containing a nondilated appendix, no signs of adjacent obstruction but there is some fat stranding and nearby cecum suggesting that cecum may have been contained in the hernia and have previously reduced, distal ileus, also noted progression of metastatic disease.  See  dictated report for final interpretation.        [JG]   1418 Patient reassessed.  Hernia now is soft, easily reducible.  Patient reports improvement of abdominal pain but still does have some pain and nausea.  Discussed ED work-up and results including incidental finding of ileus, worsened metastatic disease.  From hernia standpoint patient is stable but given patient has ileus on imaging, unsure if it will resolve with resolution of hernia, offered patient admission.  Patient does not feel comfortable with discharge, would prefer to be admitted.  Consulting hospitalist for admission.    [JG]   1420 Chronic anemia, hemoglobin at baseline.  Mild leukocytosis, similar to 4 days ago, patient denies any infectious symptoms at present.  CMP shows mild hyperglycemia, no DKA.  CMP otherwise unremarkable.  RVP negative, Covid negative.  Patient currently pending urinalysis.    [JG]   1422 Patient reassessed.  Discussed ED findings, differential diagnosis, and the need for admission for evaluation/treatment.  They are agreeable to admission and all questions were answered.        [JG]   1434 Phone call with Dr Parkinson.  Discussed the patient, relevant history, exam, diagnostics, ED findings/progress, and concerns. They agree to admit the patient to med-surg. Care assumed by the admitting physician at this time.        [JG]      ED Course User Index  [JG] John Ellison MD       AS OF 14:35 EST VITALS:    BP - 115/57  HR - 89  TEMP - 96.5 °F (35.8 °C) (Tympanic)  O2 SATS - 95%    DIAGNOSIS  Final diagnoses:   Ileus (CMS/HCC)   Right inguinal hernia         DISPOSITION  ADMISSION    Discussed treatment plan and reason for admission with pt/family and admitting physician.  Pt/family voiced understanding of the plan for admission for further testing/treatment as needed.          John Ellison MD  11/15/20 0807

## 2020-11-15 NOTE — H&P
Internal medicine history and physical  INTERNAL MEDICINE   [facility]       Patient Identification:  Name: Dav Freitas  Age: 65 y.o.  Sex: male  :  1955  MRN: [id number]                   Primary Care Physician: Juan Iyer MD                               Date of admission:11/15/2020    Chief Complaint: Right inguinal pain with associated nausea since this morning.    History of Present Illness:   Patient is a 65-year-old male with past medical history as noted below has been battling with right inguinal hernia for quite some time.  Patient develops hernial bulge which reduces itself without any untoward effects until this morning when he developed right inguinal hernia that enlarged to the size of the grapefruit and this time it was associated with significant desire to have bowel movement with nothing happening and unable to pass any flatus.  It was getting painful and he was becoming nauseated.  Because of this change in status from the routine in which hernia resolved by itself patient decided to come to the emergency room for further evaluation.  Patient did have one episode of nonbloody nonbilious vomiting prior to coming to the hospital.  Patient has underlying history of CLL as well as lung cancer for which he follows up with hematology oncology service and has had last treatment with Keytruda on 2020.  Attempted reduction of his right inguinal hernia by emergency room physician initially failed.  Patient was sent for CT scan of the abdomen and pelvis which showed small to moderate right inguinal hernia which contains a new small amount of fluid within dilated appendix and mild fat stranding at the opening of the hernia sac extends to the base of the cecum suggesting recent cecal herniation with spontaneous reduction.  Patient was noted to have mildly dilated small bowel without a discrete transition point suggesting reactive ileus.  Incidentally increase in the  size of his metastatic lesions in the right chest wall to new osseous metastatic process is seen involving the left iliac bone.  Also metastatic left adrenal nodule was noted to be enlarging.  Anemia of 0.4 cm left lower lobe nodule was noted.  Patient was noted to have slight increase in the size of previously known right pleural effusion with adjacent atelectasis.  After patient arrived to the ER room from CT scan his hernia spontaneously resolved.  Patient started passing flatus and started to feel hungry.  Patient is being admitted for further care.    Past Medical History:  Past Medical History:   Diagnosis Date   • Anxiety    • Bruises easily     SIDE EFFECT D/T CHEMO TABLET    • CLL (chronic lymphocytic leukemia) (CMS/HCC) 04/2019    LAST CHEMO 7/2019   • Constipation    • COPD (chronic obstructive pulmonary disease) (CMS/HCC)    • Dyspnea    • ED (erectile dysfunction)    • H/O splenomegaly    • Lung cancer (CMS/HCC) 08/2020    WITH BONE METS  - LAST RADIATION TREATMENT 10/13/20   • On home O2     3L NC AT NIGHT    • Varicose vein of leg      Past Surgical History:  Past Surgical History:   Procedure Laterality Date   • BRONCHOSCOPY Bilateral 4/29/2019    Procedure: BRONCHOSCOPY WITH WASHING ENDOBRONCHIAL ULTRASOUND WITH FNA'S;  Surgeon: Selina Lloyd MD;  Location: [facility] ENDOSCOPY;  Service: Pulmonary   • VENOUS ACCESS DEVICE (PORT) INSERTION N/A 10/15/2020    Procedure: MEDIPORT PLACEMENT;  Surgeon: Herlinda Magaña Jr., MD;  Location: [facility] MAIN OR;  Service: Vascular;  Laterality: N/A;      Home Meds:  Medications Prior to Admission   Medication Sig Dispense Refill Last Dose   • ALPRAZolam (XANAX) 0.25 MG tablet TAKE 1 TABLET BY MOUTH 3 (THREE) TIMES A DAY AS NEEDED FOR ANXIETY. 60 tablet 0 Past Week at Unknown time   • budesonide-formoterol (SYMBICORT) 160-4.5 MCG/ACT inhaler Inhale 2 puffs 2 (Two) Times a Day. 1 inhaler 11 11/15/2020 at Unknown time   • guaiFENesin 200 MG tablet Take 400 mg by  mouth 2 (two) times a day.      • HYDROcodone-acetaminophen (NORCO)  MG per tablet Take 1 tablet by mouth Every 4 (Four) Hours As Needed for Moderate Pain . 100 tablet 0 11/15/2020 at Unknown time   • ibrutinib (Imbruvica) 420 MG tablet tablet TAKE ONE CAPSULE BY MOUTH ONCE DAILY. (Patient taking differently: Take 420 mg by mouth Daily.) 28 tablet 6 11/15/2020 at Unknown time   • Morphine (MS CONTIN) 30 MG 12 hr tablet Take 1 tablet by mouth Every 12 (Twelve) Hours. 60 tablet 0 11/15/2020 at Unknown time   • sennosides-docusate (senna-docusate sodium) 8.6-50 MG per tablet Take 2 tablets by mouth Daily. (Patient taking differently: Take 2 tablets by mouth 2 (Two) Times a Day.) 60 tablet 2 2020 at Unknown time   • traZODone (DESYREL) 50 MG tablet Take 100 mg by mouth Every Night.   2020 at Unknown time   • albuterol (PROAIR RESPICLICK) 108 (90 Base) MCG/ACT inhaler Inhale 2 puffs Every 4 (Four) Hours As Needed for Wheezing. 1 inhaler 0 Unknown at Unknown time   • O2 (OXYGEN) Inhale 3 L/min Every Night. Wears at night only        Current Meds:     Current Facility-Administered Medications:   •  morphine injection 4 mg, 4 mg, Intravenous, Once PRN, John Ellison MD  Allergies:  No Known Allergies  Social History:   Social History     Tobacco Use   • Smoking status: Former Smoker     Packs/day: 1.00     Years: 40.00     Pack years: 40.00     Types: Cigarettes     Quit date: 2019     Years since quittin.5   • Smokeless tobacco: Never Used   Substance Use Topics   • Alcohol use: Yes     Comment: OCCASIONAL       Family History:  Family History   Problem Relation Age of Onset   • Arthritis Mother    • Lung cancer Father    • Malig Hyperthermia Neg Hx           Review of Systems  See history of present illness and past medical history.    Constitutional: Positive for fatigue and unexpected weight change.   HENT: Negative.    Eyes: Negative.    Respiratory: Negative.  Negative for cough, chest  "tightness, shortness of breath and wheezing.         Infrequent mild hemoptysis   Cardiovascular: Positive for leg swelling.   Gastrointestinal: Positive for constipation. Negative for abdominal pain, diarrhea, nausea and vomiting.   Endocrine: Negative.    Genitourinary: Negative.  Negative for testicular pain.   Musculoskeletal: Positive for arthralgias.   Skin: Negative.  Negative for rash.   Allergic/Immunologic: Negative.    Neurological: Negative.  Negative for weakness.   Psychiatric/Behavioral: Positive for sleep disturbance.   All other systems reviewed and are negative.    Vitals:   /65 (BP Location: Right arm, Patient Position: Lying)   Pulse 95   Temp 97.7 °F (36.5 °C) (Oral)   Resp 16   Ht 180.3 cm (71\")   Wt 78.6 kg (173 lb 3.2 oz)   SpO2 95%   BMI 24.16 kg/m²   I/O: No intake or output data in the 24 hours ending 11/15/20 7023  Exam:  Patient is examined using the personal protective equipment as per guidelines from infection control for this particular patient as enacted.  Hand washing was performed before and after patient interaction.  General Appearance:    Alert, cooperative, no distress, appears stated age, chronically ill but nontoxic-appearing male who does not appear to be in any acute distress.   Head:    Normocephalic, without obvious abnormality, atraumatic   Eyes:    PERRL, conjunctiva/corneas clear, EOM's intact, both eyes   Ears:    Normal external ear canals, both ears   Nose:   Nares normal, septum midline, mucosa normal, no drainage    or sinus tenderness   Throat:   Lips, tongue, gums normal; oral mucosa pink and moist   Neck:   Supple, symmetrical, trachea midline, no adenopathy;     thyroid:  no enlargement/tenderness/nodules; no carotid    bruit or JVD   Back:     Symmetric, no curvature, ROM normal, no CVA tenderness   Lungs:    Decreased breath sound at the right lung base compared to left.   Chest Wall:    No tenderness or deformity, Mediport in place    Heart:    " Regular rate and rhythm, S1 and S2 normal, no murmur, rub   or gallop   Abdomen:    Soft nontender mild tenderness in the right groin but no obvious hernial sac or hernial enlargement noted.   Extremities:   Extremities normal, atraumatic, no cyanosis or edema   Pulses:   Pulses palpable in all extremities; symmetric all extremities   Skin:   Skin color normal, Skin is warm and dry,  no rashes or palpable lesions   Neurologic:  Grossly nonfocal       Data Review:      I reviewed the patient's new clinical results.  Results from last 7 days   Lab Units 11/15/20  1156 11/11/20  1205   WBC 10*3/mm3 11.46* 10.82*   HEMOGLOBIN g/dL 11.4* 11.0*   PLATELETS 10*3/mm3 292 279     Results from last 7 days   Lab Units 11/15/20  1156 11/11/20  1205   SODIUM mmol/L 137 133*   POTASSIUM mmol/L 5.2 4.8*   CHLORIDE mmol/L 100 97*   CO2 mmol/L 26.1 27.8   BUN mg/dL 16 17   CREATININE mg/dL 1.20 1.18   CALCIUM mg/dL 9.0 9.0   GLUCOSE mg/dL 138* 121     Ct Abdomen Pelvis With Contrast    Result Date: 11/15/2020   1.  Small to moderate right inguinal hernia, which contains a new small amount of fluid and a nondilated appendix. Mild fat stranding at the opening of the hernia sac extends to the base of the cecum, suggestive of possible recent involvement with the hernia sac, now reduced. 2.  Mildly dilated proximal small bowel without a discrete transition point, suggestive of mild ileus. 3.  Increased size of a 9.5 cm metastatic lesion in the lateral right chest wall with 2 new osseous metastases measuring up to 1.8 cm in the left iliac bone. 4.  Increased size of a metastatic left adrenal nodule measuring 3.3 cm. 5.  New 0.4 cm left lower lobe nodule, which is nonspecific but could be metastatic. 6.  Increased size of an at least moderate right pleural effusion with adjacent atelectasis.  Discussed with Dr. Ellison at 1:30 PM.  This report was finalized on 11/15/2020 2:39 PM by Dr. Usha Cloud M.D.      Microbiology Results (last 10  days)     Procedure Component Value - Date/Time    COVID PRE-OP / PRE-PROCEDURE SCREENING ORDER (NO ISOLATION) - Swab, Nasopharynx [[id number]]  (Normal) Collected: 11/15/20 1206    Lab Status: Final result Specimen: Swab from Nasopharynx Updated: 11/15/20 1357    Narrative:      The following orders were created for panel order COVID PRE-OP / PRE-PROCEDURE SCREENING ORDER (NO ISOLATION) - Swab, Nasopharynx.  Procedure                               Abnormality         Status                     ---------                               -----------         ------                     Respiratory Panel PCR w/...[[id number]]  Normal              Final result                 Please view results for these tests on the individual orders.    Respiratory Panel PCR w/COVID-19(SARS-CoV-2) [facility]O/NATHALIE/EZRA/PAD/COR/MAD/DELMI In-House, NP Swab in UTM/VTM, 3-4 HR TAT - Swab, Nasopharynx [[id number]]  (Normal) Collected: 11/15/20 1206    Lab Status: Final result Specimen: Swab from Nasopharynx Updated: 11/15/20 1357     ADENOVIRUS, PCR Not Detected     Coronavirus 229E Not Detected     Coronavirus HKU1 Not Detected     Coronavirus NL63 Not Detected     Coronavirus OC43 Not Detected     COVID19 Not Detected     Human Metapneumovirus Not Detected     Human Rhinovirus/Enterovirus Not Detected     Influenza A PCR Not Detected     Influenza B PCR Not Detected     Parainfluenza Virus 1 Not Detected     Parainfluenza Virus 2 Not Detected     Parainfluenza Virus 3 Not Detected     Parainfluenza Virus 4 Not Detected     RSV, PCR Not Detected     Bordetella pertussis pcr Not Detected     Bordetella parapertussis PCR Not Detected     Chlamydophila pneumoniae PCR Not Detected     Mycoplasma pneumo by PCR Not Detected    Narrative:      Fact sheet for providers: https://docs.[employer]/wp-content/uploads/NIH5185-9540-AQ1.1-EUA-Provider-Fact-Sheet-3.pdf    Fact sheet for patients:  https://docs.[employer]/wp-content/uploads/VSH2034-0206-RY1.1-EUA-Patient-Fact-Sheet-1.pdf        No orders to display       Assessment:  Active Hospital Problems    Diagnosis POA   • **Ileus (CMS/HCC) [K56.7] Yes   • COPD (chronic obstructive pulmonary disease) (CMS/HCC) [J44.9] Unknown   • On home O2 [Z99.81] Not Applicable     3L NC AT NIGHT      • Lung cancer metastatic to bone (CMS/HCC) [C34.90, C79.51] Yes   • Direct inguinal hernia [K40.90] Yes   • Iron deficiency anemia [D50.9] Yes   • CLL (chronic lymphocytic leukemia) (CMS/HCC) [C91.10] Yes       Medical decision making:  Reactive ileus following transient incarceration of right sided direct inguinal hernia with cecal and appendiceal elements with spontaneous reduction-plan is to admit the patient keep him n.p.o. with gradual advancement of diet depending upon how he tolerates and consult general surgery service to see if this episode is significant enough for consideration for hernial sac repair.  Metastatic lung cancer with progression of disease status post second round of Keytruda on 11/11/2020-consult hematology oncology service.  History of CLL currently controlled on current regimen of ibrutinib-continue current regimen and consult hematology service.  Chronic right pleural effusion with atelectasis-monitor as patient is not significantly hampered functionally because of that.  History of COPD on home oxygen at night currently stable-continue as needed nebulizer treatment and oxygen supplementation.      Karthik Parkinson MD   11/15/2020  17:44 EST  Much of this encounter note is an electronic transcription/translation of spoken language to printed text. The electronic translation of spoken language may permit erroneous, or at times, nonsensical words or phrases to be inadvertently transcribed; Although I have reviewed the note for such errors, some may still exist

## 2020-11-16 PROBLEM — K40.90 DIRECT INGUINAL HERNIA: Status: RESOLVED | Noted: 2020-02-12 | Resolved: 2020-11-16

## 2020-11-16 PROBLEM — K40.90 RIGHT INGUINAL HERNIA: Status: ACTIVE | Noted: 2020-11-16

## 2020-11-16 LAB
ALBUMIN SERPL-MCNC: 2.9 G/DL (ref 3.5–5.2)
ALBUMIN/GLOB SERPL: 1.4 G/DL
ALP SERPL-CCNC: 85 U/L (ref 39–117)
ALT SERPL W P-5'-P-CCNC: 12 U/L (ref 1–41)
ANION GAP SERPL CALCULATED.3IONS-SCNC: 9.9 MMOL/L (ref 5–15)
AST SERPL-CCNC: 8 U/L (ref 1–40)
BASOPHILS # BLD AUTO: 0.05 10*3/MM3 (ref 0–0.2)
BASOPHILS NFR BLD AUTO: 0.6 % (ref 0–1.5)
BILIRUB SERPL-MCNC: 0.3 MG/DL (ref 0–1.2)
BUN SERPL-MCNC: 12 MG/DL (ref 8–23)
BUN/CREAT SERPL: 13.3 (ref 7–25)
CALCIUM SPEC-SCNC: 8.6 MG/DL (ref 8.6–10.5)
CHLORIDE SERPL-SCNC: 102 MMOL/L (ref 98–107)
CO2 SERPL-SCNC: 25.1 MMOL/L (ref 22–29)
CREAT SERPL-MCNC: 0.9 MG/DL (ref 0.76–1.27)
DEPRECATED RDW RBC AUTO: 39.8 FL (ref 37–54)
EOSINOPHIL # BLD AUTO: 0.06 10*3/MM3 (ref 0–0.4)
EOSINOPHIL NFR BLD AUTO: 0.7 % (ref 0.3–6.2)
ERYTHROCYTE [DISTWIDTH] IN BLOOD BY AUTOMATED COUNT: 12.7 % (ref 12.3–15.4)
GFR SERPL CREATININE-BSD FRML MDRD: 84 ML/MIN/1.73
GLOBULIN UR ELPH-MCNC: 2.1 GM/DL
GLUCOSE SERPL-MCNC: 95 MG/DL (ref 65–99)
HCT VFR BLD AUTO: 31.3 % (ref 37.5–51)
HGB BLD-MCNC: 10.2 G/DL (ref 13–17.7)
IMM GRANULOCYTES # BLD AUTO: 0.06 10*3/MM3 (ref 0–0.05)
IMM GRANULOCYTES NFR BLD AUTO: 0.7 % (ref 0–0.5)
LYMPHOCYTES # BLD AUTO: 0.45 10*3/MM3 (ref 0.7–3.1)
LYMPHOCYTES NFR BLD AUTO: 5.1 % (ref 19.6–45.3)
MCH RBC QN AUTO: 28.6 PG (ref 26.6–33)
MCHC RBC AUTO-ENTMCNC: 32.6 G/DL (ref 31.5–35.7)
MCV RBC AUTO: 87.7 FL (ref 79–97)
MONOCYTES # BLD AUTO: 1.09 10*3/MM3 (ref 0.1–0.9)
MONOCYTES NFR BLD AUTO: 12.3 % (ref 5–12)
NEUTROPHILS NFR BLD AUTO: 7.14 10*3/MM3 (ref 1.7–7)
NEUTROPHILS NFR BLD AUTO: 80.6 % (ref 42.7–76)
NRBC BLD AUTO-RTO: 0 /100 WBC (ref 0–0.2)
PLATELET # BLD AUTO: 250 10*3/MM3 (ref 140–450)
PMV BLD AUTO: 12 FL (ref 6–12)
POTASSIUM SERPL-SCNC: 4.1 MMOL/L (ref 3.5–5.2)
PROT SERPL-MCNC: 5 G/DL (ref 6–8.5)
RBC # BLD AUTO: 3.57 10*6/MM3 (ref 4.14–5.8)
SODIUM SERPL-SCNC: 137 MMOL/L (ref 136–145)
WBC # BLD AUTO: 8.85 10*3/MM3 (ref 3.4–10.8)

## 2020-11-16 PROCEDURE — 80053 COMPREHEN METABOLIC PANEL: CPT | Performed by: INTERNAL MEDICINE

## 2020-11-16 PROCEDURE — 99214 OFFICE O/P EST MOD 30 MIN: CPT | Performed by: INTERNAL MEDICINE

## 2020-11-16 PROCEDURE — 94799 UNLISTED PULMONARY SVC/PX: CPT

## 2020-11-16 PROCEDURE — 99214 OFFICE O/P EST MOD 30 MIN: CPT | Performed by: SURGERY

## 2020-11-16 PROCEDURE — 85025 COMPLETE CBC W/AUTO DIFF WBC: CPT | Performed by: INTERNAL MEDICINE

## 2020-11-16 PROCEDURE — G0378 HOSPITAL OBSERVATION PER HR: HCPCS

## 2020-11-16 RX ORDER — POLYETHYLENE GLYCOL 3350 17 G/17G
17 POWDER, FOR SOLUTION ORAL EVERY 12 HOURS
Status: DISCONTINUED | OUTPATIENT
Start: 2020-11-16 | End: 2020-11-18 | Stop reason: HOSPADM

## 2020-11-16 RX ORDER — LACTULOSE 10 G/15ML
20 SOLUTION ORAL 3 TIMES DAILY
Status: DISCONTINUED | OUTPATIENT
Start: 2020-11-16 | End: 2020-11-18 | Stop reason: HOSPADM

## 2020-11-16 RX ADMIN — MORPHINE SULFATE 30 MG: 30 TABLET, FILM COATED, EXTENDED RELEASE ORAL at 20:34

## 2020-11-16 RX ADMIN — HYDROCODONE BITARTRATE AND ACETAMINOPHEN 1 TABLET: 10; 325 TABLET ORAL at 08:19

## 2020-11-16 RX ADMIN — TRAZODONE HYDROCHLORIDE 100 MG: 100 TABLET ORAL at 20:34

## 2020-11-16 RX ADMIN — HYDROCODONE BITARTRATE AND ACETAMINOPHEN 1 TABLET: 10; 325 TABLET ORAL at 16:31

## 2020-11-16 RX ADMIN — DOCUSATE SODIUM 50MG AND SENNOSIDES 8.6MG 2 TABLET: 8.6; 5 TABLET, FILM COATED ORAL at 08:17

## 2020-11-16 RX ADMIN — HYDROCODONE BITARTRATE AND ACETAMINOPHEN 1 TABLET: 10; 325 TABLET ORAL at 22:16

## 2020-11-16 RX ADMIN — POLYETHYLENE GLYCOL 3350 17 G: 17 POWDER, FOR SOLUTION ORAL at 16:31

## 2020-11-16 RX ADMIN — GUAIFENESIN 400 MG: 200 TABLET ORAL at 20:34

## 2020-11-16 RX ADMIN — MORPHINE SULFATE 30 MG: 30 TABLET, FILM COATED, EXTENDED RELEASE ORAL at 08:17

## 2020-11-16 RX ADMIN — BUDESONIDE AND FORMOTEROL FUMARATE DIHYDRATE 2 PUFF: 160; 4.5 AEROSOL RESPIRATORY (INHALATION) at 08:42

## 2020-11-16 RX ADMIN — GUAIFENESIN 400 MG: 200 TABLET ORAL at 08:17

## 2020-11-16 RX ADMIN — BUDESONIDE AND FORMOTEROL FUMARATE DIHYDRATE 2 PUFF: 160; 4.5 AEROSOL RESPIRATORY (INHALATION) at 20:32

## 2020-11-16 RX ADMIN — LACTULOSE 20 G: 20 SOLUTION ORAL at 22:16

## 2020-11-16 RX ADMIN — SODIUM CHLORIDE, PRESERVATIVE FREE 10 ML: 5 INJECTION INTRAVENOUS at 20:35

## 2020-11-16 RX ADMIN — SODIUM CHLORIDE, PRESERVATIVE FREE 10 ML: 5 INJECTION INTRAVENOUS at 08:17

## 2020-11-16 RX ADMIN — DOCUSATE SODIUM 50MG AND SENNOSIDES 8.6MG 2 TABLET: 8.6; 5 TABLET, FILM COATED ORAL at 20:34

## 2020-11-16 NOTE — PLAN OF CARE
Goal Outcome Evaluation:  Plan of Care Reviewed With: patient  Progress: no change  Outcome Summary: Pt has little to no pain. Norco given x1. Diet advanced to regular. If no significant BM by tomorrow morning, patient will needs fleets enema. Oncology to see to give okay on hernia surgery. Possible d/c home tomorrow.

## 2020-11-16 NOTE — NURSING NOTE
Nursing communication from Dr. Monsalve: Please do not start Imbruvica on this patient as he has to go for hernia surgery and Imbruvica will make him bleed.  He has to wait 7 days after stopping Imbruvica to undergo surgery.  Last dose of Imbruvica per patient's report was 11/15/2020.

## 2020-11-16 NOTE — H&P (VIEW-ONLY)
Inpatient General Surgery Consult  Consult performed by: Lloyd Magaña Jr., MD  Consult ordered by: Karthik Parkinson MD          Patient Care Team:  Juan Iyer MD as PCP - General (Internal Medicine)  Connor Dove MD as Referring Physician (Pulmonary Disease)  Jostin Parekh MD as Consulting Physician (Hematology and Oncology)  Dav Boyd MD as Consulting Physician (Radiation Oncology)    Chief complaint: Right inguinal hernia    Subjective     History of Present Illness     The patient is a very pleasant 65-year-old male who presented to the emergency room yesterday with severe pain in his right groin.  He has a known right inguinal hernia but no treatment has been sought because he has a history of CLL and is currently being treated for metastatic lung cancer.  He developed a significant increase in the size of his right inguinal hernia with pain as well as some nausea.  He presented to the emergency room where he was diagnosed with an incarcerated right inguinal hernia.  It was attempted to be reduced in the emergency room but those attempts were not successful initially.  He was sent for a CT scan of the abdomen and pelvis and the majority of the right inguinal hernia spontaneously reduced.  He now feels much better.  He no longer has pain.  He has been tolerating a clear liquid diet and has had bowel function.    Review of Systems   Constitutional: Negative for fatigue and fever.   HENT: Negative for trouble swallowing and voice change.    Respiratory: Negative for chest tightness and shortness of breath.    Cardiovascular: Negative for chest pain and palpitations.   Gastrointestinal: Positive for abdominal pain. Negative for blood in stool, constipation, diarrhea, nausea and vomiting.   Psychiatric/Behavioral: Negative for agitation and confusion.        Past Medical History:   Diagnosis Date   • Anxiety    • Bruises easily     SIDE EFFECT D/T CHEMO TABLET    • CLL (chronic lymphocytic  leukemia) (CMS/[facility]) 2019    LAST CHEMO 2019   • Constipation    • COPD (chronic obstructive pulmonary disease) (CMS/[facility])    • Dyspnea    • ED (erectile dysfunction)    • H/O splenomegaly    • Lung cancer (CMS/[facility]) 2020    WITH BONE METS  - LAST RADIATION TREATMENT 10/13/20   • On home O2     3L NC AT NIGHT    • Varicose vein of leg    ,   Past Surgical History:   Procedure Laterality Date   • BRONCHOSCOPY Bilateral 2019    Procedure: BRONCHOSCOPY WITH WASHING ENDOBRONCHIAL ULTRASOUND WITH FNA'S;  Surgeon: Selina Lloyd MD;  Location: [facility] ENDOSCOPY;  Service: Pulmonary   • VENOUS ACCESS DEVICE (PORT) INSERTION N/A 10/15/2020    Procedure: MEDIPORT PLACEMENT;  Surgeon: Herlinda Magaña Jr., MD;  Location: [facility] MAIN OR;  Service: Vascular;  Laterality: N/A;   ,   Family History   Problem Relation Age of Onset   • Arthritis Mother    • Lung cancer Father    • Malig Hyperthermia Neg Hx    ,   Social History     Tobacco Use   • Smoking status: Former Smoker     Packs/day: 1.00     Years: 40.00     Pack years: 40.00     Types: Cigarettes     Quit date: 2019     Years since quittin.5   • Smokeless tobacco: Never Used   Substance Use Topics   • Alcohol use: Yes     Comment: OCCASIONAL    • Drug use: Not Currently     E-cigarette/Vaping   • E-cigarette/Vaping Use Never User      E-cigarette/Vaping Substances     E-cigarette/Vaping Devices        and Allergies:  Patient has no known allergies.    Objective      Vital Signs  Temp:  [96.5 °F (35.8 °C)-97.7 °F (36.5 °C)] 97.4 °F (36.3 °C)  Heart Rate:  [78-95] 92  Resp:  [16-20] 20  BP: (100-134)/(56-66) 115/60    Physical Exam  Constitutional:       Appearance: Normal appearance. He is well-developed. He is not toxic-appearing.   Eyes:      General: No scleral icterus.  Pulmonary:      Effort: Pulmonary effort is normal. No respiratory distress.   Abdominal:      Palpations: Abdomen is soft.      Tenderness: There is no abdominal tenderness.       Hernia: A hernia is present. Hernia is present in the right inguinal area (Large but reducible right inguinal hernia). There is no hernia in the left inguinal area.   Skin:     General: Skin is warm and dry.   Neurological:      Mental Status: He is alert and oriented to person, place, and time.   Psychiatric:         Behavior: Behavior normal.         Thought Content: Thought content normal.         Judgment: Judgment normal.         Results Review:    I reviewed the patient's new clinical results.        Assessment/Plan       Ileus (CMS/HCC)    CLL (chronic lymphocytic leukemia) (CMS/HCC)    Iron deficiency anemia    Lung cancer metastatic to bone (CMS/HCC)    COPD (chronic obstructive pulmonary disease) (CMS/HCC)    On home O2    Right inguinal hernia      Assessment & Plan     1.  Right inguinal hernia: The patient has a known right inguinal hernia and developed an incarceration of the right inguinal hernia yesterday.  It is now reduced and minimally symptomatic.  His diet has been advanced.  He will need a right inguinal hernia repair to prevent future incarceration but this is an elective surgery since there is no incarceration at this time.  The timing of surgery will depend upon oncology input.  He appears to be a good candidate for a da Kavin robot-assisted laparoscopic right inguinal hernia repair so that his recovery is shorter in his ongoing treatment for lung cancer can proceed with minimal interruption.    I discussed the patients findings and my recommendations with patient and nursing staff    Lloyd Magaña Jr., MD  11/16/20  10:05 EST

## 2020-11-16 NOTE — NURSING NOTE
"Falls Prevention Teach-Back Education     Dav Freitas is a 65 y.o. male admitted to [facility] on 11/15/2020 10:42 AM. The primary encounter diagnosis was Ileus (CMS/formerly [facility]). A diagnosis of Right inguinal hernia was also pertinent to this visit.    Fall Risk Assessment  [facility] High Risk Falls Assessment (If Fall score is >/=13, add the Fall Risk CPG to the care plan)   Fallen in past 6 months: 0--> No  Mental Status: 0--> no mental status change  Elimination: 0--> No elimination issues  Mobility: 0--> No mobility issues  Medications: 0--> No meds  Nurses' Clinical Judgement: 1  Total Fall Risk Score: 2  Total Fall Risk Score: 2    The patient viewed the informational video on strategies to prevent falling while hospitalized entitled \"Patient Safety: Protecting Yourself in the Hospital (Part 3)\".  The patient was able to teach back at least three things that can be done to lessen the risk for falling while in the hospital.  Additionally, the patient was able to verbalize what they need to do before getting out of bed in an effort to prevent a fall.    Nurse: Lisa Rojas RN  "

## 2020-11-16 NOTE — CONSULTS
Subjective     REASON FOR CONSULTATION:    1.  Chronic lymphocytic leukemia with extensive lymphadenopathy and possible pleural involvement.  2.  Initiation of Treanda, Rituxan May 1, 2019 IVIG also utilized                                    3.  Significant response on scans June 27, 2019, alternative therapy with Imbruvica planned, initiated July 25, 2019  4.  Patient seen February 12, 2020, continued control of CLL, discussed Rituxan, Imbruvica, sleep study and potential surgical assessment for hernia  5.  CT of chest per pulmonary August 26 with 1.6 cm spiculated nodule in the left upper lobe, 1.2 cm left adrenal nodule not present on comparison study, bone windows with soft tissue mass involving the right eighth rib measuring 3.7 x 2.6, biopsy positive for adenocarcinoma  6.  Patient assessed September 30, plans for palliative radiation therapy, port placement, Keytruda considering PDL 1 positivity and high TMB status  7.  Patient reviewed October 21, 2020, Keytruda initiated, pain much improved status post radiation therapy     Provide an opinion on any further workup or treatment                             REQUESTING PHYSICIAN:      RECORDS OBTAINED:  Records of the patients history including those obtained from the referring provider were reviewed and summarized in detail.    HISTORY OF PRESENT ILLNESS:  The patient is a 65 y.o. year old male who is here for an opinion about the above issue.    History of Present Illness patient is 65-year-old gentleman with history of CLL on Imbruvica currently and history of metastatic lung cancer on Keytruda.  He follows up with Dr. Workman in our office.  He is last dose of Imbruvica with was yesterday.  His last dose of Keytruda was last Wednesday.    He had a incarcerated hernia on the right side and they had to reduce it.  But patient currently cannot have surgery because up to 7 days since Imbruvica can make patients bleed.    He also has history of significant  constipation which has not improved on the regimen with laxatives and stool softeners.  Will consult GI to see if they have input on that.    Patient's incarcerated right inguinal hernia has improved after reducing it and he is not as symptomatic now.  Dr. Magaña plans to do robot-assisted laparoscopic right inguinal hernia repair.    Past Medical History:   Diagnosis Date   • Anxiety    • Bruises easily     SIDE EFFECT D/T CHEMO TABLET    • CLL (chronic lymphocytic leukemia) (CMS/formerly [facility]) 04/2019    LAST CHEMO 7/2019   • Constipation    • COPD (chronic obstructive pulmonary disease) (CMS/formerly [facility])    • Dyspnea    • ED (erectile dysfunction)    • H/O splenomegaly    • Lung cancer (CMS/HCC) 08/2020    WITH BONE METS  - LAST RADIATION TREATMENT 10/13/20   • On home O2     3L NC AT NIGHT    • Varicose vein of leg         Past Surgical History:   Procedure Laterality Date   • BRONCHOSCOPY Bilateral 4/29/2019    Procedure: BRONCHOSCOPY WITH WASHING ENDOBRONCHIAL ULTRASOUND WITH FNA'S;  Surgeon: Selina Lloyd MD;  Location: [facility] ENDOSCOPY;  Service: Pulmonary   • VENOUS ACCESS DEVICE (PORT) INSERTION N/A 10/15/2020    Procedure: MEDIPORT PLACEMENT;  Surgeon: Herlinda Magaña Jr., MD;  Location: [facility] MAIN OR;  Service: Vascular;  Laterality: N/A;        No current facility-administered medications on file prior to encounter.      Current Outpatient Medications on File Prior to Encounter   Medication Sig Dispense Refill   • ALPRAZolam (XANAX) 0.25 MG tablet TAKE 1 TABLET BY MOUTH 3 (THREE) TIMES A DAY AS NEEDED FOR ANXIETY. 60 tablet 0   • budesonide-formoterol (SYMBICORT) 160-4.5 MCG/ACT inhaler Inhale 2 puffs 2 (Two) Times a Day. 1 inhaler 11   • guaiFENesin 200 MG tablet Take 400 mg by mouth 2 (two) times a day.     • HYDROcodone-acetaminophen (NORCO)  MG per tablet Take 1 tablet by mouth Every 4 (Four) Hours As Needed for Moderate Pain . 100 tablet 0   • ibrutinib (Imbruvica) 420 MG tablet tablet TAKE ONE  CAPSULE BY MOUTH ONCE DAILY. (Patient taking differently: Take 420 mg by mouth Daily.) 28 tablet 6   • Morphine (MS CONTIN) 30 MG 12 hr tablet Take 1 tablet by mouth Every 12 (Twelve) Hours. 60 tablet 0   • sennosides-docusate (senna-docusate sodium) 8.6-50 MG per tablet Take 2 tablets by mouth Daily. (Patient taking differently: Take 2 tablets by mouth 2 (Two) Times a Day.) 60 tablet 2   • traZODone (DESYREL) 50 MG tablet Take 100 mg by mouth Every Night.     • albuterol (PROAIR RESPICLICK) 108 (90 Base) MCG/ACT inhaler Inhale 2 puffs Every 4 (Four) Hours As Needed for Wheezing. 1 inhaler 0   • O2 (OXYGEN) Inhale 3 L/min Every Night. Wears at night only          ALLERGIES:  No Known Allergies     Social History     Socioeconomic History   • Marital status:      Spouse name: Nikky   • Number of children: Not on file   • Years of education: Not on file   • Highest education level: Not on file   Occupational History     Employer: [employer]   Tobacco Use   • Smoking status: Former Smoker     Packs/day: 1.00     Years: 40.00     Pack years: 40.00     Types: Cigarettes     Quit date: 2019     Years since quittin.5   • Smokeless tobacco: Never Used   Substance and Sexual Activity   • Alcohol use: Yes     Comment: OCCASIONAL    • Drug use: Not Currently   • Sexual activity: Defer        Family History   Problem Relation Age of Onset   • Arthritis Mother    • Lung cancer Father    • Malig Hyperthermia Neg Hx         Review of Systems   Constitutional: Negative for appetite change, chills, diaphoresis, fatigue, fever and unexpected weight change.   HENT: Negative for hearing loss, sore throat and trouble swallowing.    Respiratory: Negative for cough, chest tightness, shortness of breath and wheezing.    Cardiovascular: Negative for chest pain, palpitations and leg swelling.   Gastrointestinal: Negative for abdominal distention, abdominal pain, constipation, diarrhea, nausea and vomiting.    Genitourinary: Negative for dysuria, frequency, hematuria and urgency.   Musculoskeletal: Negative for joint swelling.        No muscle weakness.   Skin: Negative for rash and wound.   Neurological: Negative for seizures, syncope, speech difficulty, weakness, numbness and headaches.   Hematological: Negative for adenopathy. Does not bruise/bleed easily.   Psychiatric/Behavioral: Negative for behavioral problems, confusion and suicidal ideas.   Patient complains of right inguinal hernia pain which has improved after reduction    Objective     Vitals:    11/16/20 0808 11/16/20 0842 11/16/20 1201 11/16/20 1642   BP: 115/60  133/67 130/66   BP Location: Right arm  Right arm Right arm   Patient Position: Lying  Lying Lying   Pulse: 95 92 87 92   Resp: 16 20 18 16   Temp: 97.4 °F (36.3 °C)  97.3 °F (36.3 °C) 97.7 °F (36.5 °C)   TempSrc: Oral  Oral Oral   SpO2: 95% 94% 98% 96%   Weight:       Height:         Current Status 11/11/2020   ECOG score 0       Physical Exam    CONSTITUTIONAL:  Vital signs reviewed.    No distress, looks comfortable.  EYES:  Conjunctiva and lids unremarkable.  Extraocular eye movements intact.  EARS,NOSE,MOUTH,THROAT:  Ears and nose appear unremarkable.  Lips, teeth, gums appear unremarkable.  RESPIRATORY:  Normal respiratory effort.    CARDIOVASCULAR: Regular rate rhythm, no murmur  Abdomen soft nontender positive bowel sounds.  Right inguinal hernia swelling is improved  No significant lower extremity edema.  SKIN: No wounds.  No rashes.  MUSCULOSKELETAL/EXTREMITIES: No clubbing or cyanosis.  No apparent unilateral weakness.  NEURO: CN 2-12 appear intact. No focal neurological deficits noted.  PSYCHIATRIC:  Normal judgment and insight.  Normal mood and affect.        RECENT LABS:  Hematology WBC   Date Value Ref Range Status   11/16/2020 8.85 3.40 - 10.80 10*3/mm3 Final     RBC   Date Value Ref Range Status   11/16/2020 3.57 (L) 4.14 - 5.80 10*6/mm3 Final     Hemoglobin   Date Value Ref Range  Status   11/16/2020 10.2 (L) 13.0 - 17.7 g/dL Final     Hematocrit   Date Value Ref Range Status   11/16/2020 31.3 (L) 37.5 - 51.0 % Final     Platelets   Date Value Ref Range Status   11/16/2020 250 140 - 450 10*3/mm3 Final          Assessment/Plan     1.  CLL with significant lymphocytosis lymphadenopathy and splenomegaly  · S/p Treanda Rituxan with excellent response.  · July 25, 2019 started Imbruvica  · Last dose of Imbruvica was yesterday.  It can make patients bleed and hence needs to hold off Imbruvica for 7 days prior to surgery      2.  Metastatic lung cancer currently on Keytruda, last dose of Keytruda was last Wednesday.    3.  Severe constipation not improved on current regimen    Plan  · Hold Imbruvica x7 days prior to surgery  · Consult GI for significant constipation which is not improved with current stool softeners and laxatives, will get GI input    Adriana Monsalve MD

## 2020-11-16 NOTE — CONSULTS
Inpatient General Surgery Consult  Consult performed by: Lloyd Magaña Jr., MD  Consult ordered by: Karthik Parkinson MD          Patient Care Team:  Juan Iyer MD as PCP - General (Internal Medicine)  Connor Dove MD as Referring Physician (Pulmonary Disease)  Jostin Parekh MD as Consulting Physician (Hematology and Oncology)  Dav Boyd MD as Consulting Physician (Radiation Oncology)    Chief complaint: Right inguinal hernia    Subjective     History of Present Illness     The patient is a very pleasant 65-year-old male who presented to the emergency room yesterday with severe pain in his right groin.  He has a known right inguinal hernia but no treatment has been sought because he has a history of CLL and is currently being treated for metastatic lung cancer.  He developed a significant increase in the size of his right inguinal hernia with pain as well as some nausea.  He presented to the emergency room where he was diagnosed with an incarcerated right inguinal hernia.  It was attempted to be reduced in the emergency room but those attempts were not successful initially.  He was sent for a CT scan of the abdomen and pelvis and the majority of the right inguinal hernia spontaneously reduced.  He now feels much better.  He no longer has pain.  He has been tolerating a clear liquid diet and has had bowel function.    Review of Systems   Constitutional: Negative for fatigue and fever.   HENT: Negative for trouble swallowing and voice change.    Respiratory: Negative for chest tightness and shortness of breath.    Cardiovascular: Negative for chest pain and palpitations.   Gastrointestinal: Positive for abdominal pain. Negative for blood in stool, constipation, diarrhea, nausea and vomiting.   Psychiatric/Behavioral: Negative for agitation and confusion.        Past Medical History:   Diagnosis Date   • Anxiety    • Bruises easily     SIDE EFFECT D/T CHEMO TABLET    • CLL (chronic lymphocytic  leukemia) (CMS/Former[facility]) 2019    LAST CHEMO 2019   • Constipation    • COPD (chronic obstructive pulmonary disease) (CMS/[facility])    • Dyspnea    • ED (erectile dysfunction)    • H/O splenomegaly    • Lung cancer (CMS/[facility]) 2020    WITH BONE METS  - LAST RADIATION TREATMENT 10/13/20   • On home O2     3L NC AT NIGHT    • Varicose vein of leg    ,   Past Surgical History:   Procedure Laterality Date   • BRONCHOSCOPY Bilateral 2019    Procedure: BRONCHOSCOPY WITH WASHING ENDOBRONCHIAL ULTRASOUND WITH FNA'S;  Surgeon: Selina Lloyd MD;  Location: [facility] ENDOSCOPY;  Service: Pulmonary   • VENOUS ACCESS DEVICE (PORT) INSERTION N/A 10/15/2020    Procedure: MEDIPORT PLACEMENT;  Surgeon: Herlinda Magaña Jr., MD;  Location: [facility] MAIN OR;  Service: Vascular;  Laterality: N/A;   ,   Family History   Problem Relation Age of Onset   • Arthritis Mother    • Lung cancer Father    • Malig Hyperthermia Neg Hx    ,   Social History     Tobacco Use   • Smoking status: Former Smoker     Packs/day: 1.00     Years: 40.00     Pack years: 40.00     Types: Cigarettes     Quit date: 2019     Years since quittin.5   • Smokeless tobacco: Never Used   Substance Use Topics   • Alcohol use: Yes     Comment: OCCASIONAL    • Drug use: Not Currently     E-cigarette/Vaping   • E-cigarette/Vaping Use Never User      E-cigarette/Vaping Substances     E-cigarette/Vaping Devices        and Allergies:  Patient has no known allergies.    Objective      Vital Signs  Temp:  [96.5 °F (35.8 °C)-97.7 °F (36.5 °C)] 97.4 °F (36.3 °C)  Heart Rate:  [78-95] 92  Resp:  [16-20] 20  BP: (100-134)/(56-66) 115/60    Physical Exam  Constitutional:       Appearance: Normal appearance. He is well-developed. He is not toxic-appearing.   Eyes:      General: No scleral icterus.  Pulmonary:      Effort: Pulmonary effort is normal. No respiratory distress.   Abdominal:      Palpations: Abdomen is soft.      Tenderness: There is no abdominal tenderness.       Hernia: A hernia is present. Hernia is present in the right inguinal area (Large but reducible right inguinal hernia). There is no hernia in the left inguinal area.   Skin:     General: Skin is warm and dry.   Neurological:      Mental Status: He is alert and oriented to person, place, and time.   Psychiatric:         Behavior: Behavior normal.         Thought Content: Thought content normal.         Judgment: Judgment normal.         Results Review:    I reviewed the patient's new clinical results.        Assessment/Plan       Ileus (CMS/HCC)    CLL (chronic lymphocytic leukemia) (CMS/HCC)    Iron deficiency anemia    Lung cancer metastatic to bone (CMS/HCC)    COPD (chronic obstructive pulmonary disease) (CMS/HCC)    On home O2    Right inguinal hernia      Assessment & Plan     1.  Right inguinal hernia: The patient has a known right inguinal hernia and developed an incarceration of the right inguinal hernia yesterday.  It is now reduced and minimally symptomatic.  His diet has been advanced.  He will need a right inguinal hernia repair to prevent future incarceration but this is an elective surgery since there is no incarceration at this time.  The timing of surgery will depend upon oncology input.  He appears to be a good candidate for a da Kavin robot-assisted laparoscopic right inguinal hernia repair so that his recovery is shorter in his ongoing treatment for lung cancer can proceed with minimal interruption.    I discussed the patients findings and my recommendations with patient and nursing staff    Lloyd Magaña Jr., MD  11/16/20  10:05 EST

## 2020-11-17 ENCOUNTER — HOSPITAL ENCOUNTER (OUTPATIENT)
Dept: CT IMAGING | Facility: HOSPITAL | Age: 65
End: 2020-11-17

## 2020-11-17 ENCOUNTER — TELEPHONE (OUTPATIENT)
Dept: GASTROENTEROLOGY | Facility: CLINIC | Age: 65
End: 2020-11-17

## 2020-11-17 ENCOUNTER — APPOINTMENT (OUTPATIENT)
Dept: CT IMAGING | Facility: HOSPITAL | Age: 65
End: 2020-11-17

## 2020-11-17 DIAGNOSIS — K40.90 RIGHT INGUINAL HERNIA: Primary | ICD-10-CM

## 2020-11-17 PROBLEM — T40.2X5A CONSTIPATION DUE TO OPIOID THERAPY: Status: ACTIVE | Noted: 2020-11-17

## 2020-11-17 PROBLEM — K59.03 CONSTIPATION DUE TO OPIOID THERAPY: Status: ACTIVE | Noted: 2020-11-17

## 2020-11-17 LAB
ALBUMIN SERPL-MCNC: 3 G/DL (ref 3.5–5.2)
ALBUMIN/GLOB SERPL: 1.5 G/DL
ALP SERPL-CCNC: 93 U/L (ref 39–117)
ALT SERPL W P-5'-P-CCNC: 12 U/L (ref 1–41)
ANION GAP SERPL CALCULATED.3IONS-SCNC: 7.5 MMOL/L (ref 5–15)
AST SERPL-CCNC: 11 U/L (ref 1–40)
BASOPHILS # BLD AUTO: 0.05 10*3/MM3 (ref 0–0.2)
BASOPHILS NFR BLD AUTO: 0.7 % (ref 0–1.5)
BILIRUB SERPL-MCNC: 0.3 MG/DL (ref 0–1.2)
BUN SERPL-MCNC: 12 MG/DL (ref 8–23)
BUN/CREAT SERPL: 13.2 (ref 7–25)
CALCIUM SPEC-SCNC: 8.2 MG/DL (ref 8.6–10.5)
CHLORIDE SERPL-SCNC: 102 MMOL/L (ref 98–107)
CO2 SERPL-SCNC: 26.5 MMOL/L (ref 22–29)
CREAT SERPL-MCNC: 0.91 MG/DL (ref 0.76–1.27)
DEPRECATED RDW RBC AUTO: 39 FL (ref 37–54)
EOSINOPHIL # BLD AUTO: 0.11 10*3/MM3 (ref 0–0.4)
EOSINOPHIL NFR BLD AUTO: 1.6 % (ref 0.3–6.2)
ERYTHROCYTE [DISTWIDTH] IN BLOOD BY AUTOMATED COUNT: 12.9 % (ref 12.3–15.4)
GFR SERPL CREATININE-BSD FRML MDRD: 83 ML/MIN/1.73
GLOBULIN UR ELPH-MCNC: 2 GM/DL
GLUCOSE SERPL-MCNC: 102 MG/DL (ref 65–99)
HCT VFR BLD AUTO: 31.1 % (ref 37.5–51)
HGB BLD-MCNC: 10.1 G/DL (ref 13–17.7)
IMM GRANULOCYTES # BLD AUTO: 0.04 10*3/MM3 (ref 0–0.05)
IMM GRANULOCYTES NFR BLD AUTO: 0.6 % (ref 0–0.5)
LYMPHOCYTES # BLD AUTO: 0.57 10*3/MM3 (ref 0.7–3.1)
LYMPHOCYTES NFR BLD AUTO: 8.1 % (ref 19.6–45.3)
MCH RBC QN AUTO: 27.7 PG (ref 26.6–33)
MCHC RBC AUTO-ENTMCNC: 32.5 G/DL (ref 31.5–35.7)
MCV RBC AUTO: 85.2 FL (ref 79–97)
MONOCYTES # BLD AUTO: 1 10*3/MM3 (ref 0.1–0.9)
MONOCYTES NFR BLD AUTO: 14.2 % (ref 5–12)
NEUTROPHILS NFR BLD AUTO: 5.29 10*3/MM3 (ref 1.7–7)
NEUTROPHILS NFR BLD AUTO: 74.8 % (ref 42.7–76)
NRBC BLD AUTO-RTO: 0 /100 WBC (ref 0–0.2)
PLATELET # BLD AUTO: 244 10*3/MM3 (ref 140–450)
PMV BLD AUTO: 11.5 FL (ref 6–12)
POTASSIUM SERPL-SCNC: 4 MMOL/L (ref 3.5–5.2)
PROT SERPL-MCNC: 5 G/DL (ref 6–8.5)
RBC # BLD AUTO: 3.65 10*6/MM3 (ref 4.14–5.8)
SODIUM SERPL-SCNC: 136 MMOL/L (ref 136–145)
WBC # BLD AUTO: 7.06 10*3/MM3 (ref 3.4–10.8)

## 2020-11-17 PROCEDURE — 94799 UNLISTED PULMONARY SVC/PX: CPT

## 2020-11-17 PROCEDURE — 85025 COMPLETE CBC W/AUTO DIFF WBC: CPT | Performed by: INTERNAL MEDICINE

## 2020-11-17 PROCEDURE — 99204 OFFICE O/P NEW MOD 45 MIN: CPT | Performed by: INTERNAL MEDICINE

## 2020-11-17 PROCEDURE — 71275 CT ANGIOGRAPHY CHEST: CPT

## 2020-11-17 PROCEDURE — 96361 HYDRATE IV INFUSION ADD-ON: CPT

## 2020-11-17 PROCEDURE — 0 IOPAMIDOL PER 1 ML: Performed by: HOSPITALIST

## 2020-11-17 PROCEDURE — 80053 COMPREHEN METABOLIC PANEL: CPT | Performed by: INTERNAL MEDICINE

## 2020-11-17 PROCEDURE — 99214 OFFICE O/P EST MOD 30 MIN: CPT | Performed by: INTERNAL MEDICINE

## 2020-11-17 PROCEDURE — G0378 HOSPITAL OBSERVATION PER HR: HCPCS

## 2020-11-17 PROCEDURE — 99213 OFFICE O/P EST LOW 20 MIN: CPT | Performed by: SURGERY

## 2020-11-17 RX ORDER — POLYETHYLENE GLYCOL 3350 17 G/17G
17 POWDER, FOR SOLUTION ORAL EVERY 12 HOURS
Start: 2020-11-17 | End: 2021-04-26

## 2020-11-17 RX ORDER — CEFAZOLIN SODIUM 2 G/100ML
2 INJECTION, SOLUTION INTRAVENOUS ONCE
Status: CANCELLED | OUTPATIENT
Start: 2020-11-23 | End: 2020-11-17

## 2020-11-17 RX ORDER — SODIUM CHLORIDE 9 MG/ML
100 INJECTION, SOLUTION INTRAVENOUS CONTINUOUS
Status: DISCONTINUED | OUTPATIENT
Start: 2020-11-17 | End: 2020-11-18 | Stop reason: HOSPADM

## 2020-11-17 RX ORDER — LACTULOSE 10 G/15ML
20 SOLUTION ORAL 3 TIMES DAILY
Qty: 1892 ML | Refills: 0 | Status: SHIPPED | OUTPATIENT
Start: 2020-11-17 | End: 2020-12-09

## 2020-11-17 RX ADMIN — SODIUM CHLORIDE, PRESERVATIVE FREE 10 ML: 5 INJECTION INTRAVENOUS at 21:09

## 2020-11-17 RX ADMIN — LACTULOSE 20 G: 20 SOLUTION ORAL at 16:05

## 2020-11-17 RX ADMIN — HYDROCODONE BITARTRATE AND ACETAMINOPHEN 1 TABLET: 10; 325 TABLET ORAL at 16:41

## 2020-11-17 RX ADMIN — IOPAMIDOL 95 ML: 755 INJECTION, SOLUTION INTRAVENOUS at 13:00

## 2020-11-17 RX ADMIN — DOCUSATE SODIUM 50MG AND SENNOSIDES 8.6MG 2 TABLET: 8.6; 5 TABLET, FILM COATED ORAL at 08:45

## 2020-11-17 RX ADMIN — DOCUSATE SODIUM 50MG AND SENNOSIDES 8.6MG 2 TABLET: 8.6; 5 TABLET, FILM COATED ORAL at 21:15

## 2020-11-17 RX ADMIN — TRAZODONE HYDROCHLORIDE 100 MG: 100 TABLET ORAL at 21:15

## 2020-11-17 RX ADMIN — HYDROCODONE BITARTRATE AND ACETAMINOPHEN 1 TABLET: 10; 325 TABLET ORAL at 21:13

## 2020-11-17 RX ADMIN — MORPHINE SULFATE 30 MG: 30 TABLET, FILM COATED, EXTENDED RELEASE ORAL at 21:14

## 2020-11-17 RX ADMIN — HYDROCODONE BITARTRATE AND ACETAMINOPHEN 1 TABLET: 10; 325 TABLET ORAL at 08:44

## 2020-11-17 RX ADMIN — GUAIFENESIN 400 MG: 200 TABLET ORAL at 21:15

## 2020-11-17 RX ADMIN — POLYETHYLENE GLYCOL 3350 17 G: 17 POWDER, FOR SOLUTION ORAL at 16:05

## 2020-11-17 RX ADMIN — POLYETHYLENE GLYCOL 3350 17 G: 17 POWDER, FOR SOLUTION ORAL at 07:05

## 2020-11-17 RX ADMIN — BUDESONIDE AND FORMOTEROL FUMARATE DIHYDRATE 2 PUFF: 160; 4.5 AEROSOL RESPIRATORY (INHALATION) at 08:44

## 2020-11-17 RX ADMIN — MORPHINE SULFATE 30 MG: 30 TABLET, FILM COATED, EXTENDED RELEASE ORAL at 08:45

## 2020-11-17 RX ADMIN — SODIUM CHLORIDE 100 ML/HR: 9 INJECTION, SOLUTION INTRAVENOUS at 16:05

## 2020-11-17 RX ADMIN — GUAIFENESIN 400 MG: 200 TABLET ORAL at 08:45

## 2020-11-17 RX ADMIN — LACTULOSE 20 G: 20 SOLUTION ORAL at 08:45

## 2020-11-17 RX ADMIN — BUDESONIDE AND FORMOTEROL FUMARATE DIHYDRATE 2 PUFF: 160; 4.5 AEROSOL RESPIRATORY (INHALATION) at 20:04

## 2020-11-17 NOTE — PLAN OF CARE
Goal Outcome Evaluation:  Plan of Care Reviewed With: patient  Progress: no change  Outcome Summary: Pt c/o SOA this morning, CT angio obtained. Hopeful for d/c home today but likely home tomorrow. Had 2BMs this shift. Ad hazel. regular diet

## 2020-11-17 NOTE — PROGRESS NOTES
Subjective .     REASONS FOR FOLLOWUP:    1.  Chronic lymphocytic leukemia with extensive lymphadenopathy and possible pleural involvement.  2.  Initiation of Treanda, Rituxan May 1, 2019 IVIG also utilized                                    3.  Significant response on scans June 27, 2019, alternative therapy with Imbruvica planned, initiated July 25, 2019  4.  Patient seen February 12, 2020, continued control of CLL, discussed Rituxan, Imbruvica, sleep study and potential surgical assessment for hernia  5.  CT of chest per pulmonary August 26 with 1.6 cm spiculated nodule in the left upper lobe, 1.2 cm left adrenal nodule not present on comparison study, bone windows with soft tissue mass involving the right eighth rib measuring 3.7 x 2.6, biopsy positive for adenocarcinoma  6.  Patient assessed September 30, plans for palliative radiation therapy, port placement, Keytruda considering PDL 1 positivity and high TMB status  7.  Patient reviewed October 21, 2020, Keytruda initiated, pain much improved status post radiation therapy      Interval history: Patient seen by Dr. Lloyd Magaña with plans to delay surgery on his inguinal hernia next week to be 7 days after stopping Imbruvica.Patient seems to have slight ileus.  Patient is on narcotics and currently has constipation for which patient is receiving MiraLAX and stool softeners.  Given ideas and severity of his constipation GI has been consulted and awaiting input.     CT scan does show evidence of progressive disease in the lung, this can be followed as outpatient with Dr. JAVED    CT angiogram of the chest done which shows small left lower lobe pulmonary embolism but it is not definite.  There is considerable enlargement of the right ninth rib lesion and enlargement of the left upper lobe lung mass.  There is moderate to large right-sided pleural effusion with compressive atelectasis consolidation right lower lobe and a new left adrenal mass.    I had  lengthy discussion with Dr. Zepeda in radiology and he was unsure whether there is pulmonary embolism or not because of the way the flow pattern worse and because the bolus was not appropriate.  He wants to do a repeat bolus and see if there is definite pulmonary embolism or not.  I have explained to the patient in detail and he is willing to go through another CT angiogram with a repeat bolus.      History of Present Illness     HISTORY OF PRESENT ILLNESS:  The patient is a 65 y.o. year old male who is here for an opinion about the above issue.     History of Present Illness patient is 65-year-old gentleman with history of CLL on Imbruvica currently and history of metastatic lung cancer on Keytruda.  He follows up with Dr. Workman in our office.  He is last dose of Imbruvica with was yesterday.  His last dose of Keytruda was last Wednesday.     He had a incarcerated hernia on the right side and they had to reduce it.  But patient currently cannot have surgery because up to 7 days since Imbruvica can make patients bleed.     He also has history of significant constipation which has not improved on the regimen with laxatives and stool softeners.  Will consult GI to see if they have input on that.     Patient's incarcerated right inguinal hernia has improved after reducing it and he is not as symptomatic now.  Dr. Magaña plans to do robot-assisted laparoscopic right inguinal hernia repair.       Past Medical History:   Diagnosis Date   • Anxiety    • Bruises easily     SIDE EFFECT D/T CHEMO TABLET    • CLL (chronic lymphocytic leukemia) (CMS/HCC) 04/2019    LAST CHEMO 7/2019   • Constipation    • COPD (chronic obstructive pulmonary disease) (CMS/HCC)    • Dyspnea    • ED (erectile dysfunction)    • H/O splenomegaly    • Lung cancer (CMS/HCC) 08/2020    WITH BONE METS  - LAST RADIATION TREATMENT 10/13/20   • On home O2     3L NC AT NIGHT    • Varicose vein of leg        ONCOLOGIC HISTORY:  (History from previous dates can  be found in the separate document.)    No current facility-administered medications on file prior to encounter.      Current Outpatient Medications on File Prior to Encounter   Medication Sig Dispense Refill   • ALPRAZolam (XANAX) 0.25 MG tablet TAKE 1 TABLET BY MOUTH 3 (THREE) TIMES A DAY AS NEEDED FOR ANXIETY. 60 tablet 0   • budesonide-formoterol (SYMBICORT) 160-4.5 MCG/ACT inhaler Inhale 2 puffs 2 (Two) Times a Day. 1 inhaler 11   • guaiFENesin 200 MG tablet Take 400 mg by mouth 2 (two) times a day.     • HYDROcodone-acetaminophen (NORCO)  MG per tablet Take 1 tablet by mouth Every 4 (Four) Hours As Needed for Moderate Pain . 100 tablet 0   • ibrutinib (Imbruvica) 420 MG tablet tablet TAKE ONE CAPSULE BY MOUTH ONCE DAILY. (Patient taking differently: Take 420 mg by mouth Daily.) 28 tablet 6   • Morphine (MS CONTIN) 30 MG 12 hr tablet Take 1 tablet by mouth Every 12 (Twelve) Hours. 60 tablet 0   • sennosides-docusate (senna-docusate sodium) 8.6-50 MG per tablet Take 2 tablets by mouth Daily. (Patient taking differently: Take 2 tablets by mouth 2 (Two) Times a Day.) 60 tablet 2   • traZODone (DESYREL) 50 MG tablet Take 100 mg by mouth Every Night.     • albuterol (PROAIR RESPICLICK) 108 (90 Base) MCG/ACT inhaler Inhale 2 puffs Every 4 (Four) Hours As Needed for Wheezing. 1 inhaler 0   • O2 (OXYGEN) Inhale 3 L/min Every Night. Wears at night only         ALLERGIES:   No Known Allergies    Social History     Socioeconomic History   • Marital status:      Spouse name: Nikky   • Number of children: Not on file   • Years of education: Not on file   • Highest education level: Not on file   Occupational History     Employer: [employer]   Tobacco Use   • Smoking status: Former Smoker     Packs/day: 1.00     Years: 40.00     Pack years: 40.00     Types: Cigarettes     Quit date: 2019     Years since quittin.5   • Smokeless tobacco: Never Used   Substance and Sexual Activity   • Alcohol use: Yes      Comment: OCCASIONAL    • Drug use: Not Currently   • Sexual activity: Defer         Cancer-related family history includes Lung cancer in his father.     Review of Systems   Constitutional: Negative for appetite change, chills, diaphoresis, fatigue, fever and unexpected weight change.   HENT: Negative for hearing loss, sore throat and trouble swallowing.    Respiratory: Negative for cough, chest tightness, shortness of breath and wheezing.    Cardiovascular: Negative for chest pain, palpitations and leg swelling.   Gastrointestinal: Negative for abdominal distention, abdominal pain, constipation, diarrhea, nausea and vomiting.   Genitourinary: Negative for dysuria, frequency, hematuria and urgency.   Musculoskeletal: Negative for joint swelling.        No muscle weakness.   Skin: Negative for rash and wound.   Neurological: Negative for seizures, syncope, speech difficulty, weakness, numbness and headaches.   Hematological: Negative for adenopathy. Does not bruise/bleed easily.   Psychiatric/Behavioral: Negative for behavioral problems, confusion and suicidal ideas.   Patient was slightly short of breath when he went to the bathroom and came but then resolved    Objective      Vitals:    11/16/20 1642 11/16/20 1940 11/17/20 0411 11/17/20 0726   BP: 130/66 113/60 119/63 121/59   BP Location: Right arm Right arm Right arm Right arm   Patient Position: Lying Lying Lying Lying   Pulse: 92 95 84 83   Resp: 16 18 18 18   Temp: 97.7 °F (36.5 °C) 98.2 °F (36.8 °C) 98.1 °F (36.7 °C) 98.2 °F (36.8 °C)   TempSrc: Oral Oral Oral Oral   SpO2: 96% 95% 98% 94%   Weight:       Height:         Current Status 11/11/2020   ECOG score 0       Physical Exam    CONSTITUTIONAL:  Vital signs reviewed.    No distress, looks comfortable.  EYES:  Conjunctiva and lids unremarkable.  Extraocular eye movements intact.  EARS,NOSE,MOUTH,THROAT:  Ears and nose appear unremarkable.  Lips, teeth, gums appear unremarkable.  RESPIRATORY:  Normal  respiratory effort.    CARDIOVASCULAR: Regular rate rhythm, no murmur  No significant lower extremity edema.  SKIN: No wounds.  No rashes.  MUSCULOSKELETAL/EXTREMITIES: No clubbing or cyanosis.  No apparent unilateral weakness.  NEURO: CN 2-12 appear intact. No focal neurological deficits noted.  PSYCHIATRIC:  Normal judgment and insight.  Normal mood and affect.      RECENT LABS:  Hematology WBC   Date Value Ref Range Status   11/17/2020 7.06 3.40 - 10.80 10*3/mm3 Final     RBC   Date Value Ref Range Status   11/17/2020 3.65 (L) 4.14 - 5.80 10*6/mm3 Final     Hemoglobin   Date Value Ref Range Status   11/17/2020 10.1 (L) 13.0 - 17.7 g/dL Final     Hematocrit   Date Value Ref Range Status   11/17/2020 31.1 (L) 37.5 - 51.0 % Final     Platelets   Date Value Ref Range Status   11/17/2020 244 140 - 450 10*3/mm3 Final        Assessment/Plan   .  CLL with significant lymphocytosis lymphadenopathy and splenomegaly  · S/p Treanda Rituxan with excellent response.  · July 25, 2019 started Imbruvica  · Last dose of Imbruvica was yesterday.  It can make patients bleed and hence needs to hold off Imbruvica for 7 days prior to surgery        2.  Metastatic lung cancer currently on Keytruda, last dose of Keytruda was last Wednesday.     3.  Severe constipation not improved on current regimen    4.  Questionable pulmonary embolism, patient had mild shortness of breath which resolved but CT angiogram of the chest was done given his history of shortness of breath which was slightly worse.  Radiology was unsure if the patient had a small pulmonary embolism or not because he did not get good IV bolus of contrast.  Their suggestion was to repeat CT angiogram of the chest with repeat bolus to see if there is definitely pulmonary embolism or not.  They are to call me with the results    If repeats CT with IV bolus contrast is negative then he likely would not require anticoagulation.  His oxygen saturations are 97%.     Plan  · Hold  Imbruvica x7 days prior to surgery  · Consult GI for significant constipation which is not improved with current stool softeners and laxatives, will get GI input  · From our point I have reviewed the CT scan of the abdomen pelvis which shows evidence of progression in the lung.  This was on CT abdomen pelvis and hence will obtain CT chest today so we can  · CT angiogram of the chest does show evidence of progressive disease but also questionable small pulmonary embolism.  On discussion with Dr. Zepeda in radiology he was unsure if this is pulmonary embolism or not and the suggestion was to do repeat CT angiogram with IV bolus to see if there is definite clot of not.  · We will repeat CT angiogram of the chest with repeat bolus and radiology to call me with results.  · I will also start IV fluids on this patient as patient has had 2 IV boluses of the contrast  ·   · Adriana Monsalve MD.                    Cc:  Karthik Parkinson MD

## 2020-11-17 NOTE — DISCHARGE SUMMARY
[facility]    Date of Admit:  11/15/2020  Date of Discharge:  11/18/2020    PCP: Juan Iyer MD    Discharge Diagnosis:   Active Hospital Problems    Diagnosis  POA   • **Ileus (CMS/HCC) [K56.7]  Yes   • Constipation due to opioid therapy [K59.03, T40.2X5A]  Unknown   • Incarcerated inguinal hernia, unilateral [K40.30]  Yes   • COPD (chronic obstructive pulmonary disease) (CMS/HCC) [J44.9]  Yes   • On home O2 [Z99.81]  Not Applicable   • Lung cancer metastatic to bone (CMS/HCC) [C34.90, C79.51]  Yes   • Iron deficiency anemia [D50.9]  Yes   • CLL (chronic lymphocytic leukemia) (CMS/HCC) [C91.10]  Yes      Resolved Hospital Problems    Diagnosis Date Resolved POA   • Direct inguinal hernia [K40.90] 11/16/2020 Yes          Consults     Date and Time Order Name Status Description    11/16/2020 1805 Inpatient Gastroenterology Consult Completed     11/16/2020 1446 Hematology & Oncology Inpatient Consult      11/16/2020 0217 Inpatient General Surgery Consult Completed     11/15/2020 1421 [facility] (on-call MD unless specified) Details Completed         Hospital Course  Please see history and physical for details.  He has a history of metastatic lung cancer with progression disease status post second round of Keytruda on 11/11, history of CLL, COPD, and chronic right pleural effusion that is stable. He was admitted with right incarcerated inguinal hernia that has been reduced by general surgery.  No more strangulation of the hernia.  Surgery planning outpatient repair once Imbruvica  has been stopped for 7days.Patient has a reactive ileus along with constipation. Hematology/ oncology has been following this admission for patient's CLL and metastatic lung cancer. Oncology asked GI to see the patient for constipation and started patient on lactulose and miralax until and after surgery.  Agree that he will likely need Amitiza or Movantik moving forward given  opioid-induced constipation.  Patient looks stable this afternoon on room air sat for some mild conversational dyspnea which he states is at baseline. When seen this morning,  Oncology did feel that his shortness of breath was more pronounced today so a CTA of the chest was been ordered. Imaging demonstrated no evidence of PE.  Other findings consistent with no malignancy identified.    PPatient will follow up with general surgery, Dr. Magaña, after holding Imbruvica x1 week prior to proceeding with right inguinal hernia repair.      Condition on Discharge: Improved.     Temp:  [97.7 °F (36.5 °C)-98.2 °F (36.8 °C)] 98.2 °F (36.8 °C)  Heart Rate:  [83-95] 90  Resp:  [16-18] 16  BP: (113-130)/(59-66) 122/61  Body mass index is 24.17 kg/m².    Physical Exam  Constitutional:       Appearance: He is ill-appearing (chronic, thin).   Cardiovascular:      Rate and Rhythm: Normal rate and regular rhythm.   Pulmonary:      Effort: Pulmonary effort is normal.      Breath sounds: No wheezing or rales.      Comments: Mild conversational dyspnea which pt states is at baseline   Abdominal:      General: Bowel sounds are normal.      Palpations: Abdomen is soft.   Skin:     General: Skin is warm and dry.   Neurological:      Mental Status: He is alert and oriented to person, place, and time.   Psychiatric:         Mood and Affect: Mood normal.         Behavior: Behavior normal.         Disposition: Home or Self Care       Discharge Medications      New Medications      Instructions Start Date   lactulose 10 GM/15ML solution  Commonly known as: CHRONULAC   20 g, Oral, 3 Times Daily      polyethylene glycol 17 g packet  Commonly known as: MIRALAX   17 g, Oral, Every 12 Hours         Changes to Medications      Instructions Start Date   sennosides-docusate 8.6-50 MG per tablet  Commonly known as: PERICOLACE  What changed: when to take this   2 tablets, Oral, Daily         Continue These Medications      Instructions Start Date    albuterol 108 (90 Base) MCG/ACT inhaler  Commonly known as: ProAir RespiClick   2 puffs, Inhalation, Every 4 Hours PRN      ALPRAZolam 0.25 MG tablet  Commonly known as: XANAX   0.25 mg, Oral, 3 Times Daily PRN      budesonide-formoterol 160-4.5 MCG/ACT inhaler  Commonly known as: SYMBICORT   2 puffs, Inhalation, 2 Times Daily      guaiFENesin 200 MG tablet   400 mg, Oral, 2 times daily      HYDROcodone-acetaminophen  MG per tablet  Commonly known as: NORCO   1 tablet, Oral, Every 4 Hours PRN      Morphine 30 MG 12 hr tablet  Commonly known as: MS CONTIN   30 mg, Oral, Every 12 Hours      O2  Commonly known as: OXYGEN   3 L/min, Inhalation, Nightly, Wears at night only      traZODone 50 MG tablet  Commonly known as: DESYREL   100 mg, Oral, Nightly         Stop These Medications    ibrutinib 420 MG tablet tablet  Commonly known as: IMBRUVICA           Diet Instructions     Diet: Regular, Cardiac      Discharge Diet:  Regular  Cardiac           Resume home diet as tolerated                 Activity Instructions     Activity as Tolerated      Additional Activity Instructions:     Activity as tolerated                 Follow-up Information     Juan Iyer MD .    Specialty: Internal Medicine  Contact information:  [id number] [place], [place] [id number] [phone]                     Ryder Emerson MD  [facility]  11/18/20  10:10 EST    Discharge time spent greater than 35 minutes.

## 2020-11-17 NOTE — PROGRESS NOTES
Case Management Discharge Note      Final Note: Home--no needs         Selected Continued Care - Admitted Since 11/15/2020     Destination    No services have been selected for the patient.              Durable Medical Equipment    No services have been selected for the patient.              Dialysis/Infusion    No services have been selected for the patient.              Home Medical Care    No services have been selected for the patient.              Therapy    No services have been selected for the patient.              Community Resources    No services have been selected for the patient.                       Final Discharge Disposition Code: 01 - home or self-care

## 2020-11-17 NOTE — TELEPHONE ENCOUNTER
----- Message from Miguel Torres MD sent at 11/17/2020 11:54 AM EST -----  Regarding: Office visit with me  Please call Dav in his hospital room [address] or on his cell phone and lets get him an appointment with me December 8 at 12:15 PM thank you

## 2020-11-17 NOTE — CONSULTS
[facility]  Initial Inpatient Consult Note    Referring Provider: Dr. Monsalve    Reason for Consultation: Opioid-induced constipation    Subjective     History of present illness:    65 y.o. male with chronic lymphocytic leukemia extensive lymphadenopathy, lung cancer, widespread mets, on chemotherapy, admitted over the last 48 hours with an incarcerated hernia on the right side.  Reduced in the emergency room, now being followed by Dr. Magaña who is planning on surgery for this hernia next week.  He has had significant issues with constipation mainly due to to opioid used for his lung cancer with widespread metastasis.  He has never had a colonoscopy.  He has hard stools, difficulty passing stools.  He has never been tried on a medication for opioid-induced constipation.  He has been given MiraLAX and lactulose over the last 48 hours and his bowels are moving nicely today.    Past Medical History:  Past Medical History:   Diagnosis Date   • Anxiety    • Bruises easily     SIDE EFFECT D/T CHEMO TABLET    • CLL (chronic lymphocytic leukemia) (CMS/HCC) 04/2019    LAST CHEMO 7/2019   • Constipation    • COPD (chronic obstructive pulmonary disease) (CMS/HCC)    • Dyspnea    • ED (erectile dysfunction)    • H/O splenomegaly    • Lung cancer (CMS/HCC) 08/2020    WITH BONE METS  - LAST RADIATION TREATMENT 10/13/20   • On home O2     3L NC AT NIGHT    • Varicose vein of leg      Past Surgical History:  Past Surgical History:   Procedure Laterality Date   • BRONCHOSCOPY Bilateral 4/29/2019    Procedure: BRONCHOSCOPY WITH WASHING ENDOBRONCHIAL ULTRASOUND WITH FNA'S;  Surgeon: Selina Lloyd MD;  Location: [facility] ENDOSCOPY;  Service: Pulmonary   • VENOUS ACCESS DEVICE (PORT) INSERTION N/A 10/15/2020    Procedure: MEDIPORT PLACEMENT;  Surgeon: Herlinda Magaña Jr., MD;  Location: [facility] MAIN OR;  Service: Vascular;  Laterality: N/A;      Social History:   Social History     Tobacco Use   •  Smoking status: Former Smoker     Packs/day: 1.00     Years: 40.00     Pack years: 40.00     Types: Cigarettes     Quit date: 2019     Years since quittin.5   • Smokeless tobacco: Never Used   Substance Use Topics   • Alcohol use: Yes     Comment: OCCASIONAL       Family History:  Family History   Problem Relation Age of Onset   • Arthritis Mother    • Lung cancer Father    • Malig Hyperthermia Neg Hx        Home Meds:  Medications Prior to Admission   Medication Sig Dispense Refill Last Dose   • ALPRAZolam (XANAX) 0.25 MG tablet TAKE 1 TABLET BY MOUTH 3 (THREE) TIMES A DAY AS NEEDED FOR ANXIETY. 60 tablet 0 Past Week at Unknown time   • budesonide-formoterol (SYMBICORT) 160-4.5 MCG/ACT inhaler Inhale 2 puffs 2 (Two) Times a Day. 1 inhaler 11 11/15/2020 at Unknown time   • guaiFENesin 200 MG tablet Take 400 mg by mouth 2 (two) times a day.      • HYDROcodone-acetaminophen (NORCO)  MG per tablet Take 1 tablet by mouth Every 4 (Four) Hours As Needed for Moderate Pain . 100 tablet 0 11/15/2020 at Unknown time   • ibrutinib (Imbruvica) 420 MG tablet tablet TAKE ONE CAPSULE BY MOUTH ONCE DAILY. (Patient taking differently: Take 420 mg by mouth Daily.) 28 tablet 6 11/15/2020 at Unknown time   • Morphine (MS CONTIN) 30 MG 12 hr tablet Take 1 tablet by mouth Every 12 (Twelve) Hours. 60 tablet 0 11/15/2020 at Unknown time   • sennosides-docusate (senna-docusate sodium) 8.6-50 MG per tablet Take 2 tablets by mouth Daily. (Patient taking differently: Take 2 tablets by mouth 2 (Two) Times a Day.) 60 tablet 2 2020 at Unknown time   • traZODone (DESYREL) 50 MG tablet Take 100 mg by mouth Every Night.   2020 at Unknown time   • albuterol (PROAIR RESPICLICK) 108 (90 Base) MCG/ACT inhaler Inhale 2 puffs Every 4 (Four) Hours As Needed for Wheezing. 1 inhaler 0 Unknown at Unknown time   • O2 (OXYGEN) Inhale 3 L/min Every Night. Wears at night only        Current Meds:   budesonide-formoterol, 2 puff,  Inhalation, BID  guaiFENesin, 400 mg, Oral, BID  lactulose, 20 g, Oral, TID  Morphine, 30 mg, Oral, Q12H  O2, 3 L/min, Inhalation, Nightly  polyethylene glycol, 17 g, Oral, Q12H  sennosides-docusate, 2 tablet, Oral, BID  sodium chloride, 10 mL, Intravenous, Q12H  traZODone, 100 mg, Oral, Nightly      Allergies:  No Known Allergies  Review of Systems  He has weakness of fatigue all other systems reviewed and negative     Objective     Vital Signs  Temp:  [97.3 °F (36.3 °C)-98.2 °F (36.8 °C)] 98.2 °F (36.8 °C)  Heart Rate:  [83-95] 90  Resp:  [16-18] 16  BP: (113-133)/(59-67) 122/61  Physical Exam:  General Appearance:    Alert, cooperative, in no acute distress   Head:    Normocephalic, without obvious abnormality, atraumatic   Eyes:          conjunctivae and sclerae normal, no   icterus   Throat:   no thrush, oral mucosa moist   Neck:   Supple, no adenopathy   Lungs:     Clear to auscultation bilaterally    Heart:    Regular rhythm and normal rate    Chest Wall:    No abnormalities observed   Abdomen:     Soft, nondistended, nontender; normal bowel sounds   Extremities:   no edema, no redness   Skin:   No bruising or rash   Psychiatric:  normal mood and insight     Results Review:   I reviewed the patient's new clinical results.    Results from last 7 days   Lab Units 11/17/20  0535 11/16/20  0555 11/15/20  1156   WBC 10*3/mm3 7.06 8.85 11.46*   HEMOGLOBIN g/dL 10.1* 10.2* 11.4*   HEMATOCRIT % 31.1* 31.3* 34.8*   PLATELETS 10*3/mm3 244 250 292     Results from last 7 days   Lab Units 11/17/20  0536 11/16/20  0555 11/15/20  1156   SODIUM mmol/L 136 137 137   POTASSIUM mmol/L 4.0 4.1 5.2   CHLORIDE mmol/L 102 102 100   CO2 mmol/L 26.5 25.1 26.1   BUN mg/dL 12 12 16   CREATININE mg/dL 0.91 0.90 1.20   CALCIUM mg/dL 8.2* 8.6 9.0   BILIRUBIN mg/dL 0.3 0.3 0.5   ALK PHOS U/L 93 85 98   ALT (SGPT) U/L 12 12 14   AST (SGOT) U/L 11 8 16   GLUCOSE mg/dL 102* 95 138*     Results from last 7 days   Lab Units 11/15/20  6793    INR  1.06     Lab Results   Lab Value Date/Time    LIPASE 23 11/15/2020 1156       Radiology:  CT Abdomen Pelvis With Contrast   Final Result       1.  Small to moderate right inguinal hernia, which contains a new small   amount of fluid and a nondilated appendix. Mild fat stranding at the   opening of the hernia sac extends to the base of the cecum, suggestive   of possible recent involvement with the hernia sac, now reduced.    2.  Mildly dilated proximal small bowel without a discrete transition   point, suggestive of mild ileus.   3.  Increased size of a 9.5 cm metastatic lesion in the lateral right   chest wall with 2 new osseous metastases measuring up to 1.8 cm in the   left iliac bone.   4.  Increased size of a metastatic left adrenal nodule measuring 3.3 cm.   5.  New 0.4 cm left lower lobe nodule, which is nonspecific but could be   metastatic.   6.  Increased size of an at least moderate right pleural effusion with   adjacent atelectasis.       Discussed with Dr. Ellison at 1:30 PM.       This report was finalized on 11/15/2020 2:39 PM by Dr. Usha Cloud M.D.          CT Angiogram Chest With & Without Contrast    (Results Pending)       Assessment/Plan   Patient Active Problem List   Diagnosis   • Varicose vein of leg   • Leg pain, right   • Sepsis (CMS/HCC)   • CLL (chronic lymphocytic leukemia) (CMS/HCC)   • COPD exacerbation (CMS/HCC)   • Smoker   • Renal failure   • Hospital discharge follow-up   • Chemotherapy induced neutropenia (CMS/HCC)   • Hypogammaglobulinemia (CMS/HCC)   • Iron deficiency anemia   • Other insomnia   • Malignant neoplasm of upper lobe of left lung (CMS/HCC)   • Lung cancer metastatic to bone (CMS/HCC)   • High risk medication use   • Rectal lesion   • Left knee pain   • Ileus (CMS/HCC)   • COPD (chronic obstructive pulmonary disease) (CMS/HCC)   • On home O2   • Right inguinal hernia       Assessment:  1. Incarcerated hernia, reduced with resultant bowel obstruction which  is resolving  2. Chronic opioid-induced constipation, opioid use secondary to CLL, lung cancer with widespread metastatic disease    Plan:  · In the setting of the hernia which has been reduced and bowel obstruction mild ileus which is resolving, medications for opioid-induced constipation would be contraindicated at this point (in the setting of ileus or recent bowel obstruction)  · I would continue MiraLAX and lactulose this week and through his surgery next week and during the perioperative and postoperative period  · I envision starting him on Movantik or Amitiza for opioid-induced constipation as an outpatient  · He will also need A colonoscopy at some point,  this gives him some time to recover from his hernia surgery next week and we will discuss colonoscopy and a medication for opioid-induced constipation at that office visit  · Office visit with me December 8 at 12:15 PM      I discussed the patients findings and my recommendations with patient and nursing staff.    Miguel Torres MD

## 2020-11-17 NOTE — PROGRESS NOTES
"Adult Nutrition  Assessment/PES    Patient Name:  Dav Freitas  YOB: 1955  MRN: [id number]  Admit Date:  11/15/2020    Assessment Date:  11/17/2020  Nutrition assessment triggered by MST score-3.  Admitted with ileus, hiatal hernia-plan is surgery next week.  Spoke with patient via room phone-reported nausea and opoid induced constipation prior to admission. Good po intake-average 75% of meals.  Provided nutrition therapy. Went over ways to meet nutritional needs.  Will follow/monitor.   Reason for Assessment     Row Name 11/17/20 1312          Reason for Assessment    Reason For Assessment  identified at risk by screening criteria     Diagnosis   ileus, CLL, met lung cancer, COPD;  plan to proceed with a right inguinal hernia repair next week.     Identified At Risk by Screening Criteria  MST SCORE 2+;reduced oral intake over the last month;unintentional loss of 10 lbs or more in the past 2 mos 10# weight loss in the past month         Nutrition/Diet History     Row Name 11/17/20 1313          Nutrition/Diet History    Typical Food/Fluid Intake  regular diet and tolerating-average 75% of meals         Anthropometrics     Row Name 11/17/20 1313          Anthropometrics    Height  180.3 cm (70.98\")        Admit Weight    Admit Weight  78.6 kg (173 lb 4.5 oz)        Ideal Body Weight (IBW)    Ideal Body Weight (IBW) (kg)  79.23     % of Ideal Body Weight Assessment  -- 99%        Body Mass Index (BMI)    BMI Assessment  BMI 18.5-24.9: normal BMI-24.1         Labs/Tests/Procedures/Meds     Row Name 11/17/20 1313          Labs/Procedures/Meds    Lab Results Reviewed  reviewed, pertinent        Diagnostic Tests/Procedures    Diagnostic Test/Procedure Reviewed  reviewed, pertinent        Medications    Pertinent Medications Reviewed  reviewed, pertinent     Pertinent Medications Comments  lactulose, miralax, NaCl         Physical Findings     Row Name 11/17/20 1314          Physical Findings    Overall " "Physical Appearance  -- B=22     Gastrointestinal  constipation;nausea         Estimated/Assessed Needs     Row Name 11/17/20 1314 11/17/20 1313       Calculation Measurements    Weight Used For Calculations  78.6 kg (173 lb 4.5 oz)      Height    180.3 cm (70.98\")       Estimated/Assessed Needs    Additional Documentation  KCAL/KG (Group);Protein Requirements (Group);Fluid Requirements (Group)         KCAL/KG    KCAL/KG  25 Kcal/Kg (kcal);30 Kcal/Kg (kcal)      25 Kcal/Kg (kcal)  1965      30 Kcal/Kg (kcal)  2358         Protein Requirements    Weight Used For Protein Calculations  78.6 kg (173 lb 4.5 oz)      Est Protein Requirement Amount (gms/kg)  1.2 gm protein      Estimated Protein Requirements (gms/day)  94.32         Fluid Requirements    Fluid Requirements (mL/day)  2000      RDA Method (mL)  2000          Nutrition Prescription Ordered     Row Name 11/17/20 1314          Nutrition Prescription PO    Current PO Diet  Regular         Evaluation of Received Nutrient/Fluid Intake     Row Name 11/17/20 1314          PO Evaluation    Number of Meals  4     % PO Intake  75               Problem/Interventions:  Problem 1     Row Name 11/17/20 1314          Nutrition Diagnoses Problem 1    Problem 1  Inadequate Nutrient Intake     Etiology (related to)  Medical Diagnosis     Gastrointestinal  Hiatal hernia     Signs/Symptoms (evidenced by)  Unintended Weight Change     Unintended Weight Change  Loss     Number of Pounds Lost  10#     Weight loss time period  1 month               Intervention Goal     Row Name 11/17/20 1315          Intervention Goal    General  Maintain nutrition;Reduce/improve symptoms;Meet nutritional needs for age/condition     PO  Tolerate PO;Maintain intake     Weight  Maintain weight         Nutrition Intervention     Row Name 11/17/20 1315          Nutrition Intervention    RD/Tech Action  Follow Tx progress;Care plan reviewd;Interview for preference;Encourage intake     "           Electronically signed by:  Michelle Sousa RD  11/17/20 13:39 EST

## 2020-11-17 NOTE — NURSING NOTE
Dr. Monsalve spoke with radiologist and no PE was found on imaging. Dr. Monsalve wants pt to have IVF for the night and said he can be d/c'd home in the morning when [facility] rounds.

## 2020-11-17 NOTE — PROGRESS NOTES
Chief Complaint:    Right inguinal hernia    Subjective:    The patient is very short of breath this morning after minimal activity.  He is not having any current symptoms related to his right inguinal hernia.  He had a bowel movement last night.    Objective:    Temp:  [97.3 °F (36.3 °C)-98.2 °F (36.8 °C)] 98.1 °F (36.7 °C)  Heart Rate:  [84-95] 84  Resp:  [16-20] 18  BP: (113-133)/(60-67) 119/63    Physical Exam  Abdominal:      Palpations: Abdomen is soft.      Tenderness: There is no abdominal tenderness.      Hernia: A hernia is present. Hernia is present in the right inguinal area (Easily reducible).   Neurological:      Mental Status: He is alert.   Psychiatric:         Behavior: Behavior is cooperative.         Results:    WBC is 7.06.  H/H is 10.1/31.1.    Assessment/Plan:    The patient has a right inguinal hernia that was incarcerated but is now reduced.  He will need a right inguinal hernia repair.  He received Imbruvica on 11/15/2020 and will have to wait 7 days before proceeding with surgery.  He can be discharged to home at any time from a surgical standpoint and we will plan to proceed with a right inguinal hernia repair next week.    Lloyd Magaña Jr., M.D.

## 2020-11-17 NOTE — PROGRESS NOTES
Name: Dav Freitas ADMIT: 11/15/2020   : 1955  PCP: Juan Iyer MD    MRN: [id number] LOS: 0 days   AGE/SEX: 65 y.o. male  ROOM: [facility]     Subjective   Subjective       No nausea or vomiting, tolerated diet.  No abdominal pain.  He has now had several substantial bowel movements.  Chronically short of breath and at this time states it is stable but he does have some mild conversational dyspnea noted during our conversation.  He is on 3 L nasal cannula at night but since it is convenient to use while he is here since behind him on the wall has been using it intermittently throughout the day.  Denies chest pain, fever, chills      Objective   Objective   Vital Signs  Temp:  [97.7 °F (36.5 °C)-98.2 °F (36.8 °C)] 98.2 °F (36.8 °C)  Heart Rate:  [83-95] 90  Resp:  [16-18] 16  BP: (113-130)/(59-66) 122/61  SpO2:  [94 %-98 %] 97 %  on   ;   Device (Oxygen Therapy): room air  Body mass index is 24.17 kg/m².  Physical Exam  Constitutional:       General: He is not in acute distress.     Appearance: Normal appearance. He is normal weight. He is not ill-appearing.   HENT:      Head: Normocephalic and atraumatic.      Mouth/Throat:      Mouth: Mucous membranes are moist.   Eyes:      General:         Right eye: No discharge.   Neck:      Musculoskeletal: Normal range of motion and neck supple.   Cardiovascular:      Rate and Rhythm: Normal rate and regular rhythm.      Pulses: Normal pulses.      Heart sounds: Normal heart sounds.   Pulmonary:      Effort: Pulmonary effort is normal. No respiratory distress.      Comments: Mild conversational dyspnea. Decreased breath sounds  Abdominal:      General: Bowel sounds are normal. There is no distension.      Palpations: Abdomen is soft.      Tenderness: There is no abdominal tenderness.   Musculoskeletal: Normal range of motion.   Skin:     General: Skin is warm and dry.   Neurological:      General: No focal deficit present.      Mental Status: He is alert and  oriented to person, place, and time.   Psychiatric:         Mood and Affect: Mood normal.         Behavior: Behavior normal.         Thought Content: Thought content normal.         Results Review     I reviewed the patient's new clinical results.  Results from last 7 days   Lab Units 11/17/20  0535 11/16/20  0555 11/15/20  1156 11/11/20  1205   WBC 10*3/mm3 7.06 8.85 11.46* 10.82*   HEMOGLOBIN g/dL 10.1* 10.2* 11.4* 11.0*   PLATELETS 10*3/mm3 244 250 292 279     Results from last 7 days   Lab Units 11/17/20  0536 11/16/20  0555 11/15/20  1156 11/11/20  1205   SODIUM mmol/L 136 137 137 133*   POTASSIUM mmol/L 4.0 4.1 5.2 4.8*   CHLORIDE mmol/L 102 102 100 97*   CO2 mmol/L 26.5 25.1 26.1 27.8   BUN mg/dL 12 12 16 17   CREATININE mg/dL 0.91 0.90 1.20 1.18   GLUCOSE mg/dL 102* 95 138* 121   Estimated Creatinine Clearance: 88.8 mL/min (by C-G formula based on SCr of 0.91 mg/dL).  Results from last 7 days   Lab Units 11/17/20  0536 11/16/20  0555 11/15/20  1156 11/11/20  1205   ALBUMIN g/dL 3.00* 2.90* 3.30* 3.50   BILIRUBIN mg/dL 0.3 0.3 0.5 0.5   ALK PHOS U/L 93 85 98 100   AST (SGOT) U/L 11 8 16 11   ALT (SGPT) U/L 12 12 14 12     Results from last 7 days   Lab Units 11/17/20  0536 11/16/20  0555 11/15/20  1156 11/11/20  1205   CALCIUM mg/dL 8.2* 8.6 9.0 9.0   ALBUMIN g/dL 3.00* 2.90* 3.30* 3.50       COVID19   Date Value Ref Range Status   11/15/2020 Not Detected Not Detected - Ref. Range Final     SARS-CoV-2 PCR   Date Value Ref Range Status   10/13/2020 Not Detected Not Detected Final     Comment:     Nucleic acid specific to SARS-CoV-2 (COVID-19) virus was not detected inthis sample by the TaqPath (TM) COVID-19 Combo Kit.SARS-CoV-2 (COVID-19) nucleic acid testing performed using [employer] Aptima (R) SARS-CoV-2 Assay or [employer] TaqPath   (TM)COVID-19 Combo Kit as indicated above under Emergency Use Authorization (EUA) from the FDA. Aptima (R) and TaqPath (TM) test performancecharacteristics verified  by [facility] in accordance with the FDAs Guidance Document (Policy for Diagnostic   Tests for Coronavirus Disease-2019during the Public Health Emergency) issued on March 16, 2020. The laboratory is regulated under CLIA as qualified to perform high-complexity testingUnless otherwise noted, all testing was performed at [facility],   CLIA No. [id number], [place] State Licensee No. [id number]     No results found for: HGBA1C, POCGLU    CT Angiogram Chest With & Without Contrast  CT ANGIOGRAM CHEST W WO CONTRAST-     Radiation dose reduction techniques were utilized, including automated  exposure control and exposure modulation based on body size.     CLINICAL INFORMATION: History of lung carcinoma, history of CLL, acute  shortness of breath.     TECHNIQUE: CT scan of the chest with intravenous contrast, pulmonary  embolus protocol to include CT angiogram and 3-dimensional  reconstruction.     COMPARISON: 08/26/2020     FINDINGS: The right 8th [] rib lesion has considerably increased in size  and currently measures 8.2 cm transverse, 9.4 cm AP and 8.4 cm  craniocaudal. It projects into the thoracic cavity. There is a moderate  to large right-sided pleural effusion. Attenuation compatible with  serous fluid. There is compressive atelectasis/consolidation involving a  large portion of the right lower lobe. No endobronchial or right hilar  abnormality is seen. Enlargement of the spiculated left upper lobe lung  mass which is currently 2.4 cm in length by 1.6 cm in width. There are  other sub-6 mm nodular densities within the left lower lobe which have  not changed in size within the interim.     Cardiac size stable, no pericardial abnormality. The esophagus is  satisfactory in course and caliber. Stable mildly enlarged mediastinal  lymph nodes again seen.     Partially within the field-of-view is a new 3.5 cm left adrenal mass.  Also enlarging retrocrural lymph node.     Diameter of the thoracic aorta is normal.     The  contrast bolus within the pulmonary arteries is less than optimal,  there is a suggestion of a possible small pulmonary embolus within the  left lower lobe. This defect however could be flow-related as well.     CONCLUSION:  1. Possible small left lower lobe pulmonary embolus.  2. Considerable enlargement of the right 9th [] rib lesion.  3. Enlargement of the spiculated left upper lobe lung mass.  4. Moderate to large right-sided pleural effusion with compressive  atelectasis/consolidation right lower lobe.  5. New left adrenal mass, enlarged retrocrural lymph node.     Findings of this report called to the [address] [place] [address] at the time  of completion, 3 PM.                      Scheduled Medications  budesonide-formoterol, 2 puff, Inhalation, BID  guaiFENesin, 400 mg, Oral, BID  lactulose, 20 g, Oral, TID  Morphine, 30 mg, Oral, Q12H  O2, 3 L/min, Inhalation, Nightly  polyethylene glycol, 17 g, Oral, Q12H  sennosides-docusate, 2 tablet, Oral, BID  sodium chloride, 10 mL, Intravenous, Q12H  traZODone, 100 mg, Oral, Nightly    Infusions   Diet  Diet Regular       Assessment/Plan     Active Hospital Problems    Diagnosis  POA   • **Ileus (CMS/HCC) [K56.7]  Yes   • Constipation due to opioid therapy [K59.03, T40.2X5A]  Unknown   • Right inguinal hernia [K40.90]  Yes   • COPD (chronic obstructive pulmonary disease) (CMS/HCC) [J44.9]  Yes   • On home O2 [Z99.81]  Not Applicable   • Lung cancer metastatic to bone (CMS/HCC) [C34.90, C79.51]  Yes   • Iron deficiency anemia [D50.9]  Yes   • CLL (chronic lymphocytic leukemia) (CMS/HCC) [C91.10]  Yes      Resolved Hospital Problems    Diagnosis Date Resolved POA   • Direct inguinal hernia [K40.90] 11/16/2020 Yes     Reactive ileus/ Constipation  Following transient incarceration of right sided direct inguinal hernia.   Tolerating advance to regular diet, N/V. He is now having BMs.   Constipation is chronic with opioid use secondary to lung cancer and metastatic  disease.  GI consulted and is now on MiraLAX and lactulose until surgery and advised to continue after.  He will likely need Movantik or Amitiza moving forward    Right inguinal hernia incarcerated on admission  Hernia reduced by general surgery.  Will need right inguinal hernia repair when he is off of Imbruvica for 7 days.  He will follow-up with general surgery as an outpatient for surgery next week.     Metastatic lung cancer with progression of disease status post second round of Keytruda on 11/11/2020.   Oncology following.  CTA ordered today for  increased dyspnea.  Demonstrates possible small left lower lobe pulmonary embolus, enlarging right ninth rib lesion, enlarging left upper lobe lung mass, large right-sided pleural effusion, new left adrenal mass    History of CLL currently controlled on current regimen of ibrutinib     History of COPD on home oxygen at night currently stable without signs of exacerbation-continue as needed nebulizer treatment and oxygen supplementation. He has been using 3L intermittently during day but only on it at night when at home.         Given CTA findings today we will cancel discharge and await oncology opinion.    RORY Perez  [facility]  11/17/20  15:16 EST    Patient was placed in face mask on first look.  I wore protective equipment throughout this patient encounter including a face mask, gloves and protective eyewear.  Hand hygiene was performed before donning protective equipment and after removal when leaving the room.

## 2020-11-18 ENCOUNTER — TRANSCRIBE ORDERS (OUTPATIENT)
Dept: SLEEP MEDICINE | Facility: HOSPITAL | Age: 65
End: 2020-11-18

## 2020-11-18 ENCOUNTER — READMISSION MANAGEMENT (OUTPATIENT)
Dept: CALL CENTER | Facility: HOSPITAL | Age: 65
End: 2020-11-18

## 2020-11-18 VITALS
BODY MASS INDEX: 24.25 KG/M2 | WEIGHT: 173.2 LBS | SYSTOLIC BLOOD PRESSURE: 132 MMHG | HEART RATE: 96 BPM | DIASTOLIC BLOOD PRESSURE: 57 MMHG | RESPIRATION RATE: 18 BRPM | HEIGHT: 71 IN | OXYGEN SATURATION: 94 % | TEMPERATURE: 97.8 F

## 2020-11-18 DIAGNOSIS — Z01.818 OTHER SPECIFIED PRE-OPERATIVE EXAMINATION: Primary | ICD-10-CM

## 2020-11-18 PROBLEM — K40.30 INCARCERATED INGUINAL HERNIA, UNILATERAL: Status: ACTIVE | Noted: 2020-11-16

## 2020-11-18 LAB
ALBUMIN SERPL-MCNC: 2.9 G/DL (ref 3.5–5.2)
ALBUMIN/GLOB SERPL: 0.7 G/DL
ALP SERPL-CCNC: 186 U/L (ref 39–117)
ALT SERPL W P-5'-P-CCNC: 21 U/L (ref 1–41)
ANION GAP SERPL CALCULATED.3IONS-SCNC: 7.3 MMOL/L (ref 5–15)
AST SERPL-CCNC: 24 U/L (ref 1–40)
BILIRUB SERPL-MCNC: 1.3 MG/DL (ref 0–1.2)
BUN SERPL-MCNC: 12 MG/DL (ref 8–23)
BUN/CREAT SERPL: 18.5 (ref 7–25)
CALCIUM SPEC-SCNC: 8.6 MG/DL (ref 8.6–10.5)
CHLORIDE SERPL-SCNC: 107 MMOL/L (ref 98–107)
CO2 SERPL-SCNC: 23.7 MMOL/L (ref 22–29)
CREAT SERPL-MCNC: 0.65 MG/DL (ref 0.76–1.27)
DEPRECATED RDW RBC AUTO: 53.8 FL (ref 37–54)
ERYTHROCYTE [DISTWIDTH] IN BLOOD BY AUTOMATED COUNT: 18.1 % (ref 12.3–15.4)
GFR SERPL CREATININE-BSD FRML MDRD: 123 ML/MIN/1.73
GLOBULIN UR ELPH-MCNC: 4.4 GM/DL
GLUCOSE SERPL-MCNC: 124 MG/DL (ref 65–99)
HCT VFR BLD AUTO: 32.3 % (ref 37.5–51)
HGB BLD-MCNC: 10.5 G/DL (ref 13–17.7)
LYMPHOCYTES # BLD MANUAL: 1.3 10*3/MM3 (ref 0.7–3.1)
LYMPHOCYTES NFR BLD MANUAL: 16 % (ref 19.6–45.3)
LYMPHOCYTES NFR BLD MANUAL: 3 % (ref 5–12)
MCH RBC QN AUTO: 26.9 PG (ref 26.6–33)
MCHC RBC AUTO-ENTMCNC: 32.5 G/DL (ref 31.5–35.7)
MCV RBC AUTO: 82.8 FL (ref 79–97)
MONOCYTES # BLD AUTO: 0.24 10*3/MM3 (ref 0.1–0.9)
NEUTROPHILS # BLD AUTO: 6.58 10*3/MM3 (ref 1.7–7)
NEUTROPHILS NFR BLD MANUAL: 81 % (ref 42.7–76)
PLAT MORPH BLD: NORMAL
PLATELET # BLD AUTO: 215 10*3/MM3 (ref 140–450)
PMV BLD AUTO: 9.9 FL (ref 6–12)
POTASSIUM SERPL-SCNC: 4.2 MMOL/L (ref 3.5–5.2)
PROT SERPL-MCNC: 7.3 G/DL (ref 6–8.5)
RBC # BLD AUTO: 3.9 10*6/MM3 (ref 4.14–5.8)
RBC MORPH BLD: NORMAL
SODIUM SERPL-SCNC: 138 MMOL/L (ref 136–145)
WBC # BLD AUTO: 8.12 10*3/MM3 (ref 3.4–10.8)
WBC MORPH BLD: NORMAL

## 2020-11-18 PROCEDURE — 94799 UNLISTED PULMONARY SVC/PX: CPT

## 2020-11-18 PROCEDURE — 80053 COMPREHEN METABOLIC PANEL: CPT | Performed by: INTERNAL MEDICINE

## 2020-11-18 PROCEDURE — 96361 HYDRATE IV INFUSION ADD-ON: CPT

## 2020-11-18 PROCEDURE — G0378 HOSPITAL OBSERVATION PER HR: HCPCS

## 2020-11-18 PROCEDURE — 85027 COMPLETE CBC AUTOMATED: CPT | Performed by: INTERNAL MEDICINE

## 2020-11-18 RX ADMIN — DOCUSATE SODIUM 50MG AND SENNOSIDES 8.6MG 2 TABLET: 8.6; 5 TABLET, FILM COATED ORAL at 07:57

## 2020-11-18 RX ADMIN — MORPHINE SULFATE 30 MG: 30 TABLET, FILM COATED, EXTENDED RELEASE ORAL at 07:57

## 2020-11-18 RX ADMIN — POLYETHYLENE GLYCOL 3350 17 G: 17 POWDER, FOR SOLUTION ORAL at 06:37

## 2020-11-18 RX ADMIN — GUAIFENESIN 400 MG: 200 TABLET ORAL at 07:57

## 2020-11-18 RX ADMIN — SODIUM CHLORIDE 100 ML/HR: 9 INJECTION, SOLUTION INTRAVENOUS at 03:34

## 2020-11-18 RX ADMIN — HYDROCODONE BITARTRATE AND ACETAMINOPHEN 1 TABLET: 10; 325 TABLET ORAL at 07:57

## 2020-11-18 RX ADMIN — BUDESONIDE AND FORMOTEROL FUMARATE DIHYDRATE 2 PUFF: 160; 4.5 AEROSOL RESPIRATORY (INHALATION) at 07:35

## 2020-11-18 NOTE — OUTREACH NOTE
Prep Survey      Responses   [facility] patient discharged from?  [place]   Is LACE score < 7 ?  No   Eligibility  [facility]   Date of Admission  11/15/20   Date of Discharge  11/18/20   Discharge Disposition  Home or Self Care   Discharge diagnosis  right inguinal hernia    Does the patient have one of the following disease processes/diagnoses(primary or secondary)?  Other   Does the patient have Home health ordered?  No   Is there a DME ordered?  No   Prep survey completed?  Yes          Nola Kraft RN

## 2020-11-18 NOTE — PROGRESS NOTES
Name: Dav Freitas ADMIT: 11/15/2020   : 1955  PCP: Juan Iyer MD    MRN: [id number] LOS: 0 days   AGE/SEX: 65 y.o. male  ROOM: [facility]     Subjective   Subjective   Feels well, breathing better today. No medical complaints. Agrees with discharge home today    Review of Systems   Constitutional: Negative.    HENT: Negative.    Respiratory: Negative.    Cardiovascular: Negative.    Gastrointestinal: Negative.    Genitourinary: Negative.    Musculoskeletal: Negative.    Skin: Negative.    Neurological: Negative.    Psychiatric/Behavioral: Negative.         Objective   Objective   Vital Signs  Temp:  [97.8 °F (36.6 °C)-98.9 °F (37.2 °C)] 97.8 °F (36.6 °C)  Heart Rate:  [] 96  Resp:  [16-18] 18  BP: (112-132)/(57-70) 132/57  SpO2:  [93 %-98 %] 94 %  on  Flow (L/min):  [3] 3;   Device (Oxygen Therapy): room air  Body mass index is 24.17 kg/m².  Physical Exam  Vitals signs and nursing note reviewed.   Constitutional:       General: He is not in acute distress.  HENT:      Head: Normocephalic.   Eyes:      Conjunctiva/sclera: Conjunctivae normal.   Neck:      Musculoskeletal: Normal range of motion and neck supple.   Cardiovascular:      Rate and Rhythm: Regular rhythm. Tachycardia present.   Pulmonary:      Effort: Pulmonary effort is normal. No respiratory distress.      Breath sounds: Normal breath sounds.   Abdominal:      General: Bowel sounds are normal.      Palpations: Abdomen is soft.   Musculoskeletal:      Right lower leg: No edema.      Left lower leg: No edema.   Skin:     General: Skin is warm and dry.   Neurological:      Mental Status: He is alert and oriented to person, place, and time.   Psychiatric:         Mood and Affect: Mood normal.         Results Review     I reviewed the patient's new clinical results.  Results from last 7 days   Lab Units 20  0548 20  0535 20  0555 11/15/20  1156   WBC 10*3/mm3 8.12 7.06 8.85 11.46*   HEMOGLOBIN g/dL 10.5* 10.1* 10.2*  11.4*   PLATELETS 10*3/mm3 215 244 250 292     Results from last 7 days   Lab Units 11/18/20  0548 11/17/20  0536 11/16/20  0555 11/15/20  1156   SODIUM mmol/L 138 136 137 137   POTASSIUM mmol/L 4.2 4.0 4.1 5.2   CHLORIDE mmol/L 107 102 102 100   CO2 mmol/L 23.7 26.5 25.1 26.1   BUN mg/dL 12 12 12 16   CREATININE mg/dL 0.65* 0.91 0.90 1.20   GLUCOSE mg/dL 124* 102* 95 138*   Estimated Creatinine Clearance: 101 mL/min (A) (by C-G formula based on SCr of 0.65 mg/dL (L)).  Results from last 7 days   Lab Units 11/18/20  0548 11/17/20  0536 11/16/20  0555 11/15/20  1156   ALBUMIN g/dL 2.90* 3.00* 2.90* 3.30*   BILIRUBIN mg/dL 1.3* 0.3 0.3 0.5   ALK PHOS U/L 186* 93 85 98   AST (SGOT) U/L 24 11 8 16   ALT (SGPT) U/L 21 12 12 14     Results from last 7 days   Lab Units 11/18/20  0548 11/17/20  0536 11/16/20  0555 11/15/20  1156   CALCIUM mg/dL 8.6 8.2* 8.6 9.0   ALBUMIN g/dL 2.90* 3.00* 2.90* 3.30*       COVID19   Date Value Ref Range Status   11/15/2020 Not Detected Not Detected - Ref. Range Final     SARS-CoV-2 PCR   Date Value Ref Range Status   10/13/2020 Not Detected Not Detected Final     Comment:     Nucleic acid specific to SARS-CoV-2 (COVID-19) virus was not detected inthis sample by the TaqPath (TM) COVID-19 Combo Kit.SARS-CoV-2 (COVID-19) nucleic acid testing performed using [employer] Aptima (R) SARS-CoV-2 Assay or [employer]MD TaqPath   (TM)COVID-19 Combo Kit as indicated above under Emergency Use Authorization (EUA) from the FDA. Aptima (R) and TaqPath (TM) test performancecharacteristics verified by [facility] in accordance with the FDAs Guidance Document (Policy for Diagnostic   Tests for Coronavirus Disease-2019during the Public Health Emergency) issued on March 16, 2020. The laboratory is regulated under CLIA as qualified to perform high-complexity testingUnless otherwise noted, all testing was performed at [facility],   CLIA No. [id number], [place] State Licensee No. [id number]     No results  found for: HGBA1C, POCGLU    CT Angiogram Chest With & Without Contrast  CT ANGIOGRAM CHEST W WO CONTRAST-     Radiation dose reduction techniques were utilized, including automated  exposure control and exposure modulation based on body size.     CLINICAL INFORMATION: History of lung carcinoma, history of CLL, acute  shortness of breath.     TECHNIQUE: CT scan of the chest with intravenous contrast, pulmonary  embolus protocol to include CT angiogram and 3-dimensional  reconstruction.     COMPARISON: 08/26/2020     FINDINGS: The right 8th rib lesion has considerably increased in size  and currently measures 8.2 cm transverse, 9.4 cm AP and 8.4 cm  craniocaudal. It projects into the thoracic cavity. There is a moderate  to large right-sided pleural effusion. Attenuation compatible with  serous fluid. There is compressive atelectasis/consolidation involving a  large portion of the right lower lobe. No endobronchial or right hilar  abnormality is seen. Enlargement of the spiculated left upper lobe lung  mass which is currently 2.4 cm in length by 1.6 cm in width. There are  other sub-6 mm nodular densities within the left lower lobe which have  not changed in size within the interim.     Cardiac size stable, no pericardial abnormality. The esophagus is  satisfactory in course and caliber. Stable mildly enlarged mediastinal  lymph nodes again seen.     Partially within the field-of-view is a new 3.5 cm left adrenal mass.  Also enlarging retrocrural lymph node.     Diameter of the thoracic aorta is normal.     Thin axial and CT Angio reconstructed images fails to demonstrate  pulmonary embolus.     CONCLUSION:  1. No pulmonary embolus demonstrated  2. Considerable enlargement of the right 8th rib lesion.  3. Enlargement of the spiculated left upper lobe lung mass.  4. Moderate to large right-sided pleural effusion with compressive  atelectasis/consolidation right lower lobe.  5. New left adrenal mass, enlarged  retrocrural lymph node.     Findings of this report called to Dr Canchola at the time of completion,  3 PM.              This report was finalized on 11/17/2020 3:50 PM by Dr. Howard Mcmahon M.D.       Scheduled Medications  budesonide-formoterol, 2 puff, Inhalation, BID  guaiFENesin, 400 mg, Oral, BID  lactulose, 20 g, Oral, TID  Morphine, 30 mg, Oral, Q12H  O2, 3 L/min, Inhalation, Nightly  polyethylene glycol, 17 g, Oral, Q12H  sennosides-docusate, 2 tablet, Oral, BID  sodium chloride, 10 mL, Intravenous, Q12H  traZODone, 100 mg, Oral, Nightly    Infusions  sodium chloride, 100 mL/hr, Last Rate: Stopped (11/18/20 1043)    Diet  Diet Regular       Assessment/Plan     Active Hospital Problems    Diagnosis  POA   • **Ileus (CMS/[facility]) [K56.7]  Yes   • Constipation due to opioid therapy [K59.03, T40.2X5A]  Unknown   • Incarcerated inguinal hernia, unilateral [K40.30]  Yes   • COPD (chronic obstructive pulmonary disease) (CMS/HCC) [J44.9]  Yes   • On home O2 [Z99.81]  Not Applicable   • Lung cancer metastatic to bone (CMS/HCC) [C34.90, C79.51]  Yes   • Iron deficiency anemia [D50.9]  Yes   • CLL (chronic lymphocytic leukemia) (CMS/[facility]) [C91.10]  Yes      Resolved Hospital Problems    Diagnosis Date Resolved POA   • Direct inguinal hernia [K40.90] 11/16/2020 Yes       65 y.o. male admitted with Ileus (CMS/HCC).    Discharge home today; see discharge summary  F/U GI, Surgery 11/23, Dr.Kommor Olga Esposito [facility]  11/18/20  11:03 EST    Patient was wearing facemask when I entered the room and throughout our encounter.  I wore protective equipment throughout this patient encounter including a face mask, gloves and protective eyewear.  Hand hygiene was performed before donning protective equipment and after removal when leaving the room.

## 2020-11-18 NOTE — PROGRESS NOTES
Case Management Discharge Note      Final Note: Discharge cancelled 11/17/2020. Home today no needs noted.         Selected Continued Care - Discharged on 11/18/2020 Admission date: 11/15/2020 - Discharge disposition: Home or Self Care    Destination    No services have been selected for the patient.              Durable Medical Equipment    No services have been selected for the patient.              Dialysis/Infusion    No services have been selected for the patient.              Home Medical Care    No services have been selected for the patient.              Therapy    No services have been selected for the patient.              Community Resources    No services have been selected for the patient.                       Final Discharge Disposition Code: 01 - home or self-care

## 2020-11-18 NOTE — PLAN OF CARE
Goal Outcome Evaluation:  Plan of Care Reviewed With: patient  Progress: no change  Outcome Summary: Pt has had PRN pain medications x 1 thus far this shift. D/C home today.

## 2020-11-19 ENCOUNTER — TRANSITIONAL CARE MANAGEMENT TELEPHONE ENCOUNTER (OUTPATIENT)
Dept: CALL CENTER | Facility: HOSPITAL | Age: 65
End: 2020-11-19

## 2020-11-19 NOTE — OUTREACH NOTE
Call Center TCM Note      Responses   [facility] patient discharged from?  [place]   Does the patient have one of the following disease processes/diagnoses(primary or secondary)?  Other   TCM attempt successful?  Yes   Call start time  0946   Call end time  0951   Discharge diagnosis  right inguinal hernia    Meds reviewed with patient/caregiver?  Yes   Is the patient having any side effects they believe may be caused by any medication additions or changes?  No   Does the patient have all medications ordered at discharge?  Yes   Is the patient taking all medications as directed (includes completed medication regime)?  Yes   Comments regarding appointments  Pt will have surgery on 11/23/20 at 9:30 am with Dr. Magaña   Does the patient have a primary care provider?   Yes   Does the patient have an appointment with their PCP within 7 days of discharge?  No   What is preventing the patient from scheduling follow up appointments within 7 days of discharge?  -- [Pt is not going to f/u with PCP at this time. ]   Nursing Interventions  Advised patient to make appointment   Has the patient kept scheduled appointments due by today?  N/A   Psychosocial issues?  No   Did the patient receive a copy of their discharge instructions?  Yes   Nursing interventions  Reviewed instructions with patient   What is the patient's perception of their health status since discharge?  Improving [Pt reports he's a little SOB and has some ABD cramping]   Is the patient/caregiver able to teach back signs and symptoms related to disease process for when to call PCP?  Yes   Is the patient/caregiver able to teach back signs and symptoms related to disease process for when to call 911?  Yes   Is the patient/caregiver able to teach back the hierarchy of who to call/visit for symptoms/problems? PCP, Specialist, Home health nurse, Urgent Care, ED, 911  Yes   If the patient is a current smoker, are they able to teach back resources for cessation?   Not a smoker [Pt has not smoked since 4/22/19]   TCM call completed?  Yes          Brenda Loja RN    11/19/2020, 09:53 EST

## 2020-11-20 ENCOUNTER — LAB (OUTPATIENT)
Dept: LAB | Facility: HOSPITAL | Age: 65
End: 2020-11-20

## 2020-11-20 DIAGNOSIS — Z01.818 OTHER SPECIFIED PRE-OPERATIVE EXAMINATION: ICD-10-CM

## 2020-11-20 PROBLEM — K40.90 RIGHT INGUINAL HERNIA: Status: ACTIVE | Noted: 2020-11-17

## 2020-11-20 PROCEDURE — C9803 HOPD COVID-19 SPEC COLLECT: HCPCS

## 2020-11-20 PROCEDURE — U0004 COV-19 TEST NON-CDC HGH THRU: HCPCS

## 2020-11-21 LAB — SARS-COV-2 RNA RESP QL NAA+PROBE: NOT DETECTED

## 2020-11-23 ENCOUNTER — ANESTHESIA (OUTPATIENT)
Dept: PERIOP | Facility: HOSPITAL | Age: 65
End: 2020-11-23

## 2020-11-23 ENCOUNTER — ANESTHESIA EVENT (OUTPATIENT)
Dept: PERIOP | Facility: HOSPITAL | Age: 65
End: 2020-11-23

## 2020-11-23 ENCOUNTER — HOSPITAL ENCOUNTER (OUTPATIENT)
Facility: HOSPITAL | Age: 65
Setting detail: HOSPITAL OUTPATIENT SURGERY
Discharge: HOME | End: 2020-11-23
Attending: SURGERY | Admitting: SURGERY

## 2020-11-23 VITALS
TEMPERATURE: 97.7 F | BODY MASS INDEX: 24.96 KG/M2 | HEIGHT: 71 IN | WEIGHT: 178.3 LBS | DIASTOLIC BLOOD PRESSURE: 67 MMHG | SYSTOLIC BLOOD PRESSURE: 122 MMHG | RESPIRATION RATE: 20 BRPM | OXYGEN SATURATION: 93 % | HEART RATE: 100 BPM

## 2020-11-23 DIAGNOSIS — K40.90 RIGHT INGUINAL HERNIA: ICD-10-CM

## 2020-11-23 PROCEDURE — C1781 MESH (IMPLANTABLE): HCPCS | Performed by: SURGERY

## 2020-11-23 PROCEDURE — 25010000002 FENTANYL CITRATE (PF) 100 MCG/2ML SOLUTION: Performed by: STUDENT IN AN ORGANIZED HEALTH CARE EDUCATION/TRAINING PROGRAM

## 2020-11-23 PROCEDURE — 25010000002 NEOSTIGMINE PER 0.5 MG: Performed by: STUDENT IN AN ORGANIZED HEALTH CARE EDUCATION/TRAINING PROGRAM

## 2020-11-23 PROCEDURE — 25010000002 ONDANSETRON PER 1 MG: Performed by: STUDENT IN AN ORGANIZED HEALTH CARE EDUCATION/TRAINING PROGRAM

## 2020-11-23 PROCEDURE — 94799 UNLISTED PULMONARY SVC/PX: CPT

## 2020-11-23 PROCEDURE — 25010000002 MIDAZOLAM PER 1 MG: Performed by: ANESTHESIOLOGY

## 2020-11-23 PROCEDURE — 25010000003 CEFAZOLIN IN DEXTROSE 2-4 GM/100ML-% SOLUTION: Performed by: SURGERY

## 2020-11-23 PROCEDURE — 25010000002 DEXAMETHASONE PER 1 MG: Performed by: STUDENT IN AN ORGANIZED HEALTH CARE EDUCATION/TRAINING PROGRAM

## 2020-11-23 PROCEDURE — 25010000002 PHENYLEPHRINE PER 1 ML: Performed by: STUDENT IN AN ORGANIZED HEALTH CARE EDUCATION/TRAINING PROGRAM

## 2020-11-23 PROCEDURE — 25010000002 HYDROMORPHONE PER 4 MG: Performed by: STUDENT IN AN ORGANIZED HEALTH CARE EDUCATION/TRAINING PROGRAM

## 2020-11-23 PROCEDURE — S2900 ROBOTIC SURGICAL SYSTEM: HCPCS | Performed by: SURGERY

## 2020-11-23 PROCEDURE — 49650 LAP ING HERNIA REPAIR INIT: CPT | Performed by: SURGERY

## 2020-11-23 PROCEDURE — 25010000002 PROPOFOL 10 MG/ML EMULSION: Performed by: STUDENT IN AN ORGANIZED HEALTH CARE EDUCATION/TRAINING PROGRAM

## 2020-11-23 DEVICE — KNOTLESS TISSUE CONTROL DEVICE, UNDYED UNIDIRECTIONAL (ANTIBACTERIAL) SYNTHETIC ABSORBABLE DEVICE
Type: IMPLANTABLE DEVICE | Site: ABDOMEN | Status: FUNCTIONAL
Brand: STRATAFIX

## 2020-11-23 DEVICE — BARD 3DMAX MESH LEFT LARGE
Type: IMPLANTABLE DEVICE | Site: ABDOMEN | Status: FUNCTIONAL
Brand: BARD 3DMAX MESH

## 2020-11-23 DEVICE — BARD 3DMAX MESH RIGHT LARGE
Type: IMPLANTABLE DEVICE | Site: ABDOMEN | Status: FUNCTIONAL
Brand: BARD 3DMAX MESH

## 2020-11-23 RX ORDER — SODIUM CHLORIDE 0.9 % (FLUSH) 0.9 %
3 SYRINGE (ML) INJECTION EVERY 12 HOURS SCHEDULED
Status: DISCONTINUED | OUTPATIENT
Start: 2020-11-23 | End: 2020-11-23 | Stop reason: HOSPADM

## 2020-11-23 RX ORDER — KETAMINE HYDROCHLORIDE 10 MG/ML
INJECTION INTRAMUSCULAR; INTRAVENOUS AS NEEDED
Status: DISCONTINUED | OUTPATIENT
Start: 2020-11-23 | End: 2020-11-23 | Stop reason: SURG

## 2020-11-23 RX ORDER — HYDROMORPHONE HCL 110MG/55ML
PATIENT CONTROLLED ANALGESIA SYRINGE INTRAVENOUS AS NEEDED
Status: DISCONTINUED | OUTPATIENT
Start: 2020-11-23 | End: 2020-11-23 | Stop reason: SURG

## 2020-11-23 RX ORDER — ONDANSETRON 2 MG/ML
4 INJECTION INTRAMUSCULAR; INTRAVENOUS ONCE AS NEEDED
Status: DISCONTINUED | OUTPATIENT
Start: 2020-11-23 | End: 2020-11-23 | Stop reason: HOSPADM

## 2020-11-23 RX ORDER — EPHEDRINE SULFATE 50 MG/ML
INJECTION, SOLUTION INTRAVENOUS AS NEEDED
Status: DISCONTINUED | OUTPATIENT
Start: 2020-11-23 | End: 2020-11-23 | Stop reason: SURG

## 2020-11-23 RX ORDER — SODIUM CHLORIDE, SODIUM LACTATE, POTASSIUM CHLORIDE, CALCIUM CHLORIDE 600; 310; 30; 20 MG/100ML; MG/100ML; MG/100ML; MG/100ML
9 INJECTION, SOLUTION INTRAVENOUS CONTINUOUS
Status: DISCONTINUED | OUTPATIENT
Start: 2020-11-23 | End: 2020-11-23 | Stop reason: HOSPADM

## 2020-11-23 RX ORDER — ONDANSETRON 2 MG/ML
INJECTION INTRAMUSCULAR; INTRAVENOUS AS NEEDED
Status: DISCONTINUED | OUTPATIENT
Start: 2020-11-23 | End: 2020-11-23 | Stop reason: SURG

## 2020-11-23 RX ORDER — ROCURONIUM BROMIDE 10 MG/ML
INJECTION, SOLUTION INTRAVENOUS AS NEEDED
Status: DISCONTINUED | OUTPATIENT
Start: 2020-11-23 | End: 2020-11-23 | Stop reason: SURG

## 2020-11-23 RX ORDER — NALOXONE HCL 0.4 MG/ML
0.2 VIAL (ML) INJECTION AS NEEDED
Status: DISCONTINUED | OUTPATIENT
Start: 2020-11-23 | End: 2020-11-23 | Stop reason: HOSPADM

## 2020-11-23 RX ORDER — MIDAZOLAM HYDROCHLORIDE 1 MG/ML
1 INJECTION INTRAMUSCULAR; INTRAVENOUS
Status: DISCONTINUED | OUTPATIENT
Start: 2020-11-23 | End: 2020-11-23 | Stop reason: HOSPADM

## 2020-11-23 RX ORDER — DIPHENHYDRAMINE HCL 25 MG
25 CAPSULE ORAL
Status: DISCONTINUED | OUTPATIENT
Start: 2020-11-23 | End: 2020-11-23 | Stop reason: HOSPADM

## 2020-11-23 RX ORDER — FLUMAZENIL 0.1 MG/ML
0.2 INJECTION INTRAVENOUS AS NEEDED
Status: DISCONTINUED | OUTPATIENT
Start: 2020-11-23 | End: 2020-11-23 | Stop reason: HOSPADM

## 2020-11-23 RX ORDER — GLYCOPYRROLATE 0.2 MG/ML
INJECTION INTRAMUSCULAR; INTRAVENOUS AS NEEDED
Status: DISCONTINUED | OUTPATIENT
Start: 2020-11-23 | End: 2020-11-23 | Stop reason: SURG

## 2020-11-23 RX ORDER — EPHEDRINE SULFATE 50 MG/ML
5 INJECTION, SOLUTION INTRAVENOUS ONCE AS NEEDED
Status: DISCONTINUED | OUTPATIENT
Start: 2020-11-23 | End: 2020-11-23 | Stop reason: HOSPADM

## 2020-11-23 RX ORDER — PROPOFOL 10 MG/ML
VIAL (ML) INTRAVENOUS AS NEEDED
Status: DISCONTINUED | OUTPATIENT
Start: 2020-11-23 | End: 2020-11-23 | Stop reason: SURG

## 2020-11-23 RX ORDER — PROMETHAZINE HYDROCHLORIDE 25 MG/1
25 TABLET ORAL ONCE AS NEEDED
Status: DISCONTINUED | OUTPATIENT
Start: 2020-11-23 | End: 2020-11-23 | Stop reason: HOSPADM

## 2020-11-23 RX ORDER — IPRATROPIUM BROMIDE AND ALBUTEROL SULFATE 2.5; .5 MG/3ML; MG/3ML
3 SOLUTION RESPIRATORY (INHALATION) ONCE AS NEEDED
Status: COMPLETED | OUTPATIENT
Start: 2020-11-23 | End: 2020-11-23

## 2020-11-23 RX ORDER — SODIUM CHLORIDE 0.9 % (FLUSH) 0.9 %
3-10 SYRINGE (ML) INJECTION AS NEEDED
Status: DISCONTINUED | OUTPATIENT
Start: 2020-11-23 | End: 2020-11-23 | Stop reason: HOSPADM

## 2020-11-23 RX ORDER — OXYCODONE AND ACETAMINOPHEN 7.5; 325 MG/1; MG/1
1 TABLET ORAL ONCE AS NEEDED
Status: DISCONTINUED | OUTPATIENT
Start: 2020-11-23 | End: 2020-11-23 | Stop reason: HOSPADM

## 2020-11-23 RX ORDER — HYDROCODONE BITARTRATE AND ACETAMINOPHEN 10; 325 MG/1; MG/1
1 TABLET ORAL ONCE AS NEEDED
Status: COMPLETED | OUTPATIENT
Start: 2020-11-23 | End: 2020-11-23

## 2020-11-23 RX ORDER — BUPIVACAINE HYDROCHLORIDE AND EPINEPHRINE 5; 5 MG/ML; UG/ML
INJECTION, SOLUTION PERINEURAL AS NEEDED
Status: DISCONTINUED | OUTPATIENT
Start: 2020-11-23 | End: 2020-11-23 | Stop reason: HOSPADM

## 2020-11-23 RX ORDER — DEXAMETHASONE SODIUM PHOSPHATE 10 MG/ML
INJECTION INTRAMUSCULAR; INTRAVENOUS AS NEEDED
Status: DISCONTINUED | OUTPATIENT
Start: 2020-11-23 | End: 2020-11-23 | Stop reason: SURG

## 2020-11-23 RX ORDER — LABETALOL HYDROCHLORIDE 5 MG/ML
5 INJECTION, SOLUTION INTRAVENOUS
Status: DISCONTINUED | OUTPATIENT
Start: 2020-11-23 | End: 2020-11-23 | Stop reason: HOSPADM

## 2020-11-23 RX ORDER — HYDROCODONE BITARTRATE AND ACETAMINOPHEN 7.5; 325 MG/1; MG/1
1 TABLET ORAL ONCE AS NEEDED
Status: DISCONTINUED | OUTPATIENT
Start: 2020-11-23 | End: 2020-11-23 | Stop reason: HOSPADM

## 2020-11-23 RX ORDER — FENTANYL CITRATE 50 UG/ML
INJECTION, SOLUTION INTRAMUSCULAR; INTRAVENOUS AS NEEDED
Status: DISCONTINUED | OUTPATIENT
Start: 2020-11-23 | End: 2020-11-23 | Stop reason: SURG

## 2020-11-23 RX ORDER — PROMETHAZINE HYDROCHLORIDE 25 MG/1
25 SUPPOSITORY RECTAL ONCE AS NEEDED
Status: DISCONTINUED | OUTPATIENT
Start: 2020-11-23 | End: 2020-11-23 | Stop reason: HOSPADM

## 2020-11-23 RX ORDER — LIDOCAINE HYDROCHLORIDE 10 MG/ML
0.5 INJECTION, SOLUTION EPIDURAL; INFILTRATION; INTRACAUDAL; PERINEURAL ONCE AS NEEDED
Status: DISCONTINUED | OUTPATIENT
Start: 2020-11-23 | End: 2020-11-23 | Stop reason: HOSPADM

## 2020-11-23 RX ORDER — FENTANYL CITRATE 50 UG/ML
50 INJECTION, SOLUTION INTRAMUSCULAR; INTRAVENOUS
Status: DISCONTINUED | OUTPATIENT
Start: 2020-11-23 | End: 2020-11-23 | Stop reason: HOSPADM

## 2020-11-23 RX ORDER — FAMOTIDINE 10 MG/ML
20 INJECTION, SOLUTION INTRAVENOUS ONCE
Status: COMPLETED | OUTPATIENT
Start: 2020-11-23 | End: 2020-11-23

## 2020-11-23 RX ORDER — DIPHENHYDRAMINE HYDROCHLORIDE 50 MG/ML
12.5 INJECTION INTRAMUSCULAR; INTRAVENOUS
Status: DISCONTINUED | OUTPATIENT
Start: 2020-11-23 | End: 2020-11-23 | Stop reason: HOSPADM

## 2020-11-23 RX ORDER — CEFAZOLIN SODIUM 2 G/100ML
2 INJECTION, SOLUTION INTRAVENOUS ONCE
Status: COMPLETED | OUTPATIENT
Start: 2020-11-23 | End: 2020-11-23

## 2020-11-23 RX ORDER — SODIUM CHLORIDE 9 MG/ML
INJECTION, SOLUTION INTRAVENOUS AS NEEDED
Status: DISCONTINUED | OUTPATIENT
Start: 2020-11-23 | End: 2020-11-23 | Stop reason: HOSPADM

## 2020-11-23 RX ORDER — LIDOCAINE HYDROCHLORIDE 20 MG/ML
INJECTION, SOLUTION INFILTRATION; PERINEURAL AS NEEDED
Status: DISCONTINUED | OUTPATIENT
Start: 2020-11-23 | End: 2020-11-23 | Stop reason: SURG

## 2020-11-23 RX ORDER — HYDROMORPHONE HYDROCHLORIDE 1 MG/ML
0.5 INJECTION, SOLUTION INTRAMUSCULAR; INTRAVENOUS; SUBCUTANEOUS
Status: DISCONTINUED | OUTPATIENT
Start: 2020-11-23 | End: 2020-11-23 | Stop reason: HOSPADM

## 2020-11-23 RX ADMIN — PHENYLEPHRINE HYDROCHLORIDE 100 MCG: 10 INJECTION INTRAVENOUS at 12:02

## 2020-11-23 RX ADMIN — IPRATROPIUM BROMIDE AND ALBUTEROL SULFATE 3 ML: 2.5; .5 SOLUTION RESPIRATORY (INHALATION) at 13:54

## 2020-11-23 RX ADMIN — PHENYLEPHRINE HYDROCHLORIDE 100 MCG: 10 INJECTION INTRAVENOUS at 12:09

## 2020-11-23 RX ADMIN — NEOSTIGMINE METHYLSULFATE 4 MG: 1 INJECTION INTRAMUSCULAR; INTRAVENOUS; SUBCUTANEOUS at 13:17

## 2020-11-23 RX ADMIN — HYDROCODONE BITARTRATE AND ACETAMINOPHEN 1 TABLET: 10; 325 TABLET ORAL at 14:10

## 2020-11-23 RX ADMIN — EPHEDRINE SULFATE 5 MG: 50 INJECTION INTRAVENOUS at 12:09

## 2020-11-23 RX ADMIN — PHENYLEPHRINE HYDROCHLORIDE 100 MCG: 10 INJECTION INTRAVENOUS at 11:40

## 2020-11-23 RX ADMIN — PHENYLEPHRINE HYDROCHLORIDE 100 MCG: 10 INJECTION INTRAVENOUS at 11:45

## 2020-11-23 RX ADMIN — KETAMINE HYDROCHLORIDE 50 MG: 10 INJECTION INTRAMUSCULAR; INTRAVENOUS at 11:40

## 2020-11-23 RX ADMIN — LIDOCAINE HYDROCHLORIDE 80 MG: 20 INJECTION, SOLUTION INFILTRATION; PERINEURAL at 11:40

## 2020-11-23 RX ADMIN — PROPOFOL 150 MG: 10 INJECTION, EMULSION INTRAVENOUS at 11:40

## 2020-11-23 RX ADMIN — SODIUM CHLORIDE, POTASSIUM CHLORIDE, SODIUM LACTATE AND CALCIUM CHLORIDE 9 ML/HR: 600; 310; 30; 20 INJECTION, SOLUTION INTRAVENOUS at 10:05

## 2020-11-23 RX ADMIN — EPHEDRINE SULFATE 5 MG: 50 INJECTION INTRAVENOUS at 12:02

## 2020-11-23 RX ADMIN — EPHEDRINE SULFATE 10 MG: 50 INJECTION INTRAVENOUS at 11:54

## 2020-11-23 RX ADMIN — GLYCOPYRROLATE 0.6 MG: 0.2 INJECTION INTRAMUSCULAR; INTRAVENOUS at 13:17

## 2020-11-23 RX ADMIN — HYDROMORPHONE HYDROCHLORIDE 1 MG: 2 INJECTION, SOLUTION INTRAMUSCULAR; INTRAVENOUS; SUBCUTANEOUS at 12:18

## 2020-11-23 RX ADMIN — ONDANSETRON HYDROCHLORIDE 4 MG: 2 SOLUTION INTRAMUSCULAR; INTRAVENOUS at 13:17

## 2020-11-23 RX ADMIN — ROCURONIUM BROMIDE 40 MG: 10 INJECTION INTRAVENOUS at 11:40

## 2020-11-23 RX ADMIN — FAMOTIDINE 20 MG: 10 INJECTION INTRAVENOUS at 10:45

## 2020-11-23 RX ADMIN — HYDROMORPHONE HYDROCHLORIDE 1 MG: 2 INJECTION, SOLUTION INTRAMUSCULAR; INTRAVENOUS; SUBCUTANEOUS at 12:57

## 2020-11-23 RX ADMIN — PHENYLEPHRINE HYDROCHLORIDE 100 MCG: 10 INJECTION INTRAVENOUS at 11:54

## 2020-11-23 RX ADMIN — CEFAZOLIN SODIUM 2 G: 2 INJECTION, SOLUTION INTRAVENOUS at 11:46

## 2020-11-23 RX ADMIN — PHENYLEPHRINE HYDROCHLORIDE 100 MCG: 10 INJECTION INTRAVENOUS at 11:49

## 2020-11-23 RX ADMIN — MIDAZOLAM 1 MG: 1 INJECTION INTRAMUSCULAR; INTRAVENOUS at 10:56

## 2020-11-23 RX ADMIN — DEXAMETHASONE SODIUM PHOSPHATE 8 MG: 10 INJECTION INTRAMUSCULAR; INTRAVENOUS at 11:40

## 2020-11-23 RX ADMIN — FENTANYL CITRATE 100 MCG: 50 INJECTION INTRAMUSCULAR; INTRAVENOUS at 11:40

## 2020-11-23 NOTE — ANESTHESIA PREPROCEDURE EVALUATION
Anesthesia Evaluation                  Airway   Mallampati: II  TM distance: >3 FB  Neck ROM: full  No difficulty expected  Dental      Comment: Partial upper .  Missing teeth    Pulmonary - normal exam   (+) a smoker Former, lung cancer, COPD, home oxygen, shortness of breath,   Cardiovascular - normal exam        Neuro/Psych  (+) psychiatric history,     GI/Hepatic/Renal/Endo    (+)   renal disease,     Musculoskeletal         ROS comment: Mets to rib on right side 7th or 8th rib  Abdominal    Substance History      OB/GYN          Other   blood dyscrasia,   history of cancer active      Other Comment: CLL                  Anesthesia Plan    ASA 4     general     intravenous induction     Anesthetic plan, all risks, benefits, and alternatives have been provided, discussed and informed consent has been obtained with: patient.

## 2020-11-23 NOTE — SIGNIFICANT NOTE
S/w pt's brother Anjum. Advised that pt has left for OR, and surgeon will call with update post-procedure. Verbalized understanding.

## 2020-11-23 NOTE — OP NOTE
Surgeon: Lloyd Magaña Jr., M.D.    Assistant: Eliu Velazquez CSA    Pre-Operative Diagnosis:     Right inguinal hernia [K40.90]    Post-Operative Diagnosis:    Bilateral inguinal hernia    Procedure Performed:     Da Kavin robot-assisted laparoscopic bilateral inguinal hernia repairs    Indications:     The patient is a pleasant 65-year-old male who is being treated for lung cancer and developed an incarcerated right inguinal hernia.  It was successfully reduced and he now presents for a da Kavin robot-assisted laparoscopic right inguinal repair, possible bilateral inguinal repairs.  The patient understands the indications, alternatives, risks, and benefits of the procedure and wishes to proceed.    Procedure:     The patient was identified and taken to the operating room where he was placed in the supine position on the operating table.  Monitors were placed and he underwent general endotracheal anesthesia and was appropriately monitored throughout the case by the anesthesia personnel.  The abdomen was prepped and draped in the standard surgical fashion.  Each incision was infiltrated with 0.5% Marcaine with epinephrine prior to making the incision.  A supraumbilical incision was made and carried through the skin into the subcutaneous tissue.  The abdominal wall was elevated with skin hooks and a Veress needle was inserted through the incision into the abdomen without difficulty.  The abdomen was then insufflated with low pressures encountered initially.  Once fully insufflated, the Veress needle was removed and an 8 mm da Kavin port was placed in the same incision, again with traction applied to the abdominal wall using skin hooks.  The laparoscope was inserted and the area of Veress needle and port insertion was inspected, no injury had occurred.  An 8 mm right mid abdominal port and left mid abdominal port were then placed by making a skin incision carried through the skin into the subcutaneous tissue and  inserting the port through the incision into the abdomen with direct visualization intra-abdominal using laparoscope.  Attention was then turned to the pelvis.   There was a large direct right inguinal hernia and also a large direct left inguinal hernia.  The right and left groin were then infiltrated with 0.5% Marcaine with epinephrine.  The robot was then docked.  Each hernia was repaired in the same fashion with attention first turned to the right inguinal hernia followed by the left inguinal hernia.  The peritoneum was then incised from the anterior superior iliac spine all the way to the midline about 6 cm above the hernia defect.  The peritoneum was then reflected off the abdominal wall, first a laterally and then medially.  Along the medial aspect dissection was taken below Juvenal's ligament to fully expose Juvenal's ligament.  The hernia sac was then completely reduced.  A Bard 3D max mesh was then placed in the pocket and secured at Juvenal's ligament with 2-0 Vicryl suture and then to the left and right of the epigastric artery with 2-0 Vicryl suture.  This provided excellent coverage of the hernia.  The peritoneum was then reapproximated with 2-0 barbed suture.  Hemostasis was noted be adequate.  The needles were removed from the abdomen.  The robot was undocked.  The ports were removed.  All skin incisions were closed with a 4-0 Monocryl in a subcuticular fashion followed by Mastisol and Steri-Strips.  The sponge, needle, and instrument counts were correct at the end of the case.  The patient tolerated the procedure well and was transferred to the recovery room in stable condition.    Estimated Blood Loss:      minimal    Specimens:     None    Lloyd Magaña Jr., M.D.

## 2020-11-23 NOTE — ANESTHESIA PROCEDURE NOTES
Airway  Urgency: elective    Date/Time: 11/23/2020 11:44 AM  Airway not difficult    General Information and Staff    Patient location during procedure: OR  Anesthesiologist: Delio Kruse MD    Indications and Patient Condition  Indications for airway management: airway protection    Preoxygenated: yes  MILS maintained throughout  Mask difficulty assessment: 1 - vent by mask    Final Airway Details  Final airway type: endotracheal airway      Successful airway: ETT  Cuffed: yes   Successful intubation technique: direct laryngoscopy  Endotracheal tube insertion site: oral  Blade: Howie  Blade size: 4  ETT size (mm): 7.5  Cormack-Lehane Classification: grade I - full view of glottis  Placement verified by: chest auscultation and capnometry   Measured from: teeth  Number of attempts at approach: 1  Assessment: lips, teeth, and gum same as pre-op and atraumatic intubation

## 2020-11-23 NOTE — ANESTHESIA POSTPROCEDURE EVALUATION
Patient: Dav Freitas    Procedure Summary     Date: 11/23/20 Room / Location: [facility] OR  / [facility] MAIN OR    Anesthesia Start: 1134 Anesthesia Stop: 1336    Procedure: Davinci assisted laparoscopic bilateral  inguinal hernia repair,   (Bilateral Abdomen) Diagnosis:       Right inguinal hernia      (Right inguinal hernia [K40.90])    Surgeon: Lloyd Magaña Jr., MD Provider: Delio Kruse MD    Anesthesia Type: general ASA Status: 4          Anesthesia Type: general    Vitals  Vitals Value Taken Time   /67 11/23/20 1533   Temp 36.5 °C (97.7 °F) 11/23/20 1530   Pulse 98 11/23/20 1543   Resp 20 11/23/20 1530   SpO2 93 % 11/23/20 1607   Vitals shown include unvalidated device data.        Anesthesia Post Evaluation

## 2020-11-24 ENCOUNTER — TELEPHONE (OUTPATIENT)
Dept: SURGERY | Facility: CLINIC | Age: 65
End: 2020-11-24

## 2020-11-24 NOTE — TELEPHONE ENCOUNTER
Mr Freitas calls asking if it is ok to restart his Imbruvica 420 mg.    Per Dr Magaña he can restart it tomorrow

## 2020-11-27 DIAGNOSIS — C79.51 LUNG CANCER METASTATIC TO BONE (HCC): ICD-10-CM

## 2020-11-27 DIAGNOSIS — C34.90 LUNG CANCER METASTATIC TO BONE (HCC): ICD-10-CM

## 2020-11-27 RX ORDER — MORPHINE SULFATE 30 MG/1
30 TABLET, FILM COATED, EXTENDED RELEASE ORAL EVERY 12 HOURS
Qty: 60 TABLET | Refills: 0 | Status: SHIPPED | OUTPATIENT
Start: 2020-11-27 | End: 2020-12-29 | Stop reason: SDUPTHER

## 2020-11-27 RX ORDER — MORPHINE SULFATE 30 MG/1
30 TABLET, FILM COATED, EXTENDED RELEASE ORAL EVERY 12 HOURS
Qty: 60 TABLET | Refills: 0 | Status: CANCELLED | OUTPATIENT
Start: 2020-11-27

## 2020-11-27 RX ORDER — HYDROCODONE BITARTRATE AND ACETAMINOPHEN 10; 325 MG/1; MG/1
1 TABLET ORAL EVERY 4 HOURS PRN
Qty: 100 TABLET | Refills: 0 | Status: CANCELLED | OUTPATIENT
Start: 2020-11-27

## 2020-11-30 ENCOUNTER — OFFICE VISIT (OUTPATIENT)
Dept: PSYCHIATRY | Facility: HOSPITAL | Age: 65
End: 2020-11-30

## 2020-11-30 DIAGNOSIS — F33.1 MAJOR DEPRESSIVE DISORDER, RECURRENT EPISODE, MODERATE (HCC): Primary | ICD-10-CM

## 2020-11-30 DIAGNOSIS — C34.90 LUNG CANCER METASTATIC TO BONE (HCC): ICD-10-CM

## 2020-11-30 DIAGNOSIS — C79.51 LUNG CANCER METASTATIC TO BONE (HCC): ICD-10-CM

## 2020-11-30 PROCEDURE — 90792 PSYCH DIAG EVAL W/MED SRVCS: CPT | Performed by: NURSE PRACTITIONER

## 2020-11-30 RX ORDER — HYDROCODONE BITARTRATE AND ACETAMINOPHEN 10; 325 MG/1; MG/1
1 TABLET ORAL EVERY 4 HOURS PRN
Qty: 100 TABLET | Refills: 0 | Status: SHIPPED | OUTPATIENT
Start: 2020-11-30 | End: 2021-02-08 | Stop reason: SDUPTHER

## 2020-11-30 RX ORDER — MIRTAZAPINE 15 MG/1
15 TABLET, FILM COATED ORAL NIGHTLY
Qty: 30 TABLET | Refills: 2 | Status: SHIPPED | OUTPATIENT
Start: 2020-11-30 | End: 2021-05-04 | Stop reason: SDUPTHER

## 2020-11-30 NOTE — PROGRESS NOTES
Chief Complaint: Sleep, anxiety, depression    Subjective  Patient ID: Dav Freitas is a 65 y.o. male who presents to the [facility] for initial consultation at the request of Jostin Parekh MD     PHQ9 Total Score: 15  CASSANDRA 7 Total Score: 5    HPI:  Patient is 65-year-old male referred to supportive oncology clinic for increasing tearfulness, symptoms of anxiety, depression, and insomnia alongside diagnosis of metastatic non-small cell lung cancer in September of this year.  Reviewed past medical history to include diagnosis with CLL in April 2019, responsive to treatment.  Patient reviews recent diagnosis with sense of demoralization.  Discusses lack of positivity, consistently feeling as though things are getting worse instead of better.  Patient reviews recent increase in tearfulness, stating this is not normal for him.  Explorers feeling weak.  Has always been able to take a mind over matter approach and nonpharmacologically maintain a positive mental health.    Mental health reviewed; patient denies any history of mental health disruption.  Discusses positive childhood with  parents, fourth of 6 children.  Patient denies any impact of trauma in physical, emotional, or sexual domains.  Patient denies mental health hospitalization or substance abuse, aside from cigarettes which he quit in 2019.  Denies childhood ACEs.  Patient reviews impact of insomnia, first noted prior to diagnosis of CLL in 2019.  Was initiated on trazodone at that time, minimally helpful.  Patient denies feeling there is a correlation to this medication and any improvement in sleep.  Patient describes sleep to be quite variable; generally has troubles both initiating and maintaining sleep.  Awakens feeling nonrestorative.  Patient has been evaluated per sleep medicine, stating he does not have sleep apnea.  Patient has been recommended to utilize oxygen at night, although finds this is difficult to  "do due to the sound keeping him awake.  Has not tried earplugs.  Patient does describe tension in neck and shoulders at times; uses 0.25 mg of Xanax approximately once weekly which is helpful.    Patient general quality of life reviewed; pt reports feeling physically \"bad.\" Discusses feeling of weakness with constant pain from one area of another. Finds he has pain medication available for this that \"works well enough\" with minimal use of PRN medications. Pt reviews concern related to potential for addiction to opioid medication. Additionally reviews impact on constipation, which has been a big problem. States he is on a bowel regimen, although this is now fluctuating to diarrhea. Has been referred to GI with apt next Tuesday for potential treatment for OIC.    Prognostic understanding explored; pt describes goal of treatment to be to prolong life; does not feel he is reponding as hoped.  Patient reports primary concern being for his wife, although states she is coping quite well at this time. 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 Pt reports macrina and quality in interaction with patients. Has appreciated ability to assist others in achieving QOL. Pt recognizes energy level is low, which is disruptive. Feels this is impacted by poor sleep, fatigue, as well as depression.  Denies ability to make self do things around the house such as dishes or laundry.  Pt reports this lack of desire to be bothersome, desiring to be helpful and active. Pt remains engaged in various activities including crossworld puzzles, word searches, and soduko. Remains able to do this. Pt additionally reports signifant engagement in news. Explores challenges accepting the demoralization in this, although does not feel it to be a negative source of anxiety.     Appetite reported to be reduced with appx 20 pounds in last 2 months.     Social " History  Raised by  parents; 4th of 6 children  Marital Status:  to wife of 24 years; pt reports worrying about her getting along after he is gone  Children: 1 son in [place], very supportive. No grandchildren. 3 [employer] at home.  Support Community: Wife, son, brother, brother-in-law, some friends   Highest Level of Education: some college  Career: Marketing; retired in 2019  Tobacco Use: Hx of tobacco use; quit April 22 last year.   Alcohol Use: Hx 1-2 beers daily. None since lung cancer dx.  Marijuana/ Other drug Use: denied.   Patient was raised Jehovah's witness, current estrangement from formal Jain.  Describes individual, personal donnie relationship    Medical History  Psychiatric History: Pt denies any psych history  Xanax 0.25 mg tid PRN anxiety; pt describes taking this appx once weekly  Trazodone 100 mg q hs    Family History  Family Psychiatric History: Denied, including substance abuse    The following portions of the patient's history were reviewed and updated as appropriate: He  has a past medical history of Anxiety, Bruises easily, CLL (chronic lymphocytic leukemia) (CMS/[facility]) (04/2019), Constipation, COPD (chronic obstructive pulmonary disease) (CMS/[facility]), Dyspnea, ED (erectile dysfunction), H/O splenomegaly, Inguinal hernia, Lung cancer (CMS/[facility]) (08/2020), On home O2, and Varicose vein of leg.  He  has a past surgical history that includes Bronchoscopy (Bilateral, 4/29/2019); Venous Access Device (Port) (N/A, 10/15/2020); and Inguinal Hernia Repair (Bilateral, 11/23/2020).  His family history includes Arthritis in his mother; Lung cancer in his father.  He  reports that he quit smoking about 19 months ago. His smoking use included cigarettes. He has a 40.00 pack-year smoking history. He has never used smokeless tobacco. He reports current alcohol use. He reports previous drug use.  Current Outpatient Medications   Medication Sig Dispense Refill   • albuterol (PROAIR RESPICLICK) 108 (90  Base) MCG/ACT inhaler Inhale 2 puffs Every 4 (Four) Hours As Needed for Wheezing. 1 inhaler 0   • ALPRAZolam (XANAX) 0.25 MG tablet TAKE 1 TABLET BY MOUTH 3 (THREE) TIMES A DAY AS NEEDED FOR ANXIETY. 60 tablet 0   • budesonide-formoterol (SYMBICORT) 160-4.5 MCG/ACT inhaler Inhale 2 puffs 2 (Two) Times a Day. 1 inhaler 11   • guaiFENesin 200 MG tablet Take 400 mg by mouth 2 (two) times a day.     • HYDROcodone-acetaminophen (NORCO)  MG per tablet Take 1 tablet by mouth Every 4 (Four) Hours As Needed for Moderate Pain . 100 tablet 0   • ibrutinib (Imbruvica) 420 MG tablet tablet Take 420 mg by mouth Daily.     • lactulose (CHRONULAC) 10 GM/15ML solution Take 30 mL by mouth 3 (Three) Times a Day for 22 days. 1892 mL 0   • mirtazapine (REMERON) 15 MG tablet Take 1 tablet by mouth Every Night. 30 tablet 2   • Morphine (MS CONTIN) 30 MG 12 hr tablet Take 1 tablet by mouth Every 12 (Twelve) Hours. 60 tablet 0   • O2 (OXYGEN) Inhale 3 L/min Every Night. Wears at night only     • polyethylene glycol (polyethylene glycol) 17 g packet Take 17 g by mouth Every 12 (Twelve) Hours.     • sennosides-docusate (senna-docusate sodium) 8.6-50 MG per tablet Take 2 tablets by mouth Daily. (Patient taking differently: Take 2 tablets by mouth 2 (Two) Times a Day.) 60 tablet 2     No current facility-administered medications for this visit.      Current Outpatient Medications on File Prior to Visit   Medication Sig   • albuterol (PROAIR RESPICLICK) 108 (90 Base) MCG/ACT inhaler Inhale 2 puffs Every 4 (Four) Hours As Needed for Wheezing.   • ALPRAZolam (XANAX) 0.25 MG tablet TAKE 1 TABLET BY MOUTH 3 (THREE) TIMES A DAY AS NEEDED FOR ANXIETY.   • budesonide-formoterol (SYMBICORT) 160-4.5 MCG/ACT inhaler Inhale 2 puffs 2 (Two) Times a Day.   • guaiFENesin 200 MG tablet Take 400 mg by mouth 2 (two) times a day.   • ibrutinib (Imbruvica) 420 MG tablet tablet Take 420 mg by mouth Daily.   • lactulose (CHRONULAC) 10 GM/15ML solution Take 30  mL by mouth 3 (Three) Times a Day for 22 days.   • Morphine (MS CONTIN) 30 MG 12 hr tablet Take 1 tablet by mouth Every 12 (Twelve) Hours.   • O2 (OXYGEN) Inhale 3 L/min Every Night. Wears at night only   • polyethylene glycol (polyethylene glycol) 17 g packet Take 17 g by mouth Every 12 (Twelve) Hours.   • sennosides-docusate (senna-docusate sodium) 8.6-50 MG per tablet Take 2 tablets by mouth Daily. (Patient taking differently: Take 2 tablets by mouth 2 (Two) Times a Day.)   • [DISCONTINUED] HYDROcodone-acetaminophen (NORCO)  MG per tablet Take 1 tablet by mouth Every 4 (Four) Hours As Needed for Moderate Pain .   • [DISCONTINUED] traZODone (DESYREL) 50 MG tablet Take 100 mg by mouth Every Night.     No current facility-administered medications on file prior to visit.      He has No Known Allergies..    Review of Systems   Constitutional: Positive for activity change, appetite change, fatigue and unexpected weight change.   Gastrointestinal: Positive for constipation and diarrhea.   Musculoskeletal: Positive for arthralgias and gait problem.   Psychiatric/Behavioral: Positive for dysphoric mood and sleep disturbance.       Objective   Mental Status Exam  Appearance:  clean and casually dressed, appropriate  Attitude toward clinician:  cooperative and agreeable   Speech:    Rate:  regular rate and rhythm   Volume:  normal  Motor:  no abnormal movements present  Mood: Demoralized  Affect:  euthymic and mood congruent  Thought Processes:  linear, logical, and goal directed  Thought Content:  normal  Suicidal Thoughts:  absent  Homicidal Thoughts:  absent  Perceptual Disturbance: no perceptual disturbance  Attention and Concentration:  good  Insight and Judgement:  good  Memory:  memory appears to be intact    Physical Exam  Constitutional:       Appearance: Normal appearance.   Neurological:      Mental Status: He is alert.   Psychiatric:         Mood and Affect: Mood is depressed.         Behavior: Behavior  normal.         Thought Content: Thought content normal.         Judgment: Judgment normal.       Station and gait observed to be steady, assisted by cane.  Stiffness noted in left leg.    Lab Review:   Lab on 11/20/2020   Component Date Value   • SARS-CoV-2 PCR 11/20/2020 Not Detected    No results displayed because visit has over 200 results.      Infusion on 11/11/2020   Component Date Value   • Glucose 11/11/2020 121    • BUN 11/11/2020 17    • Creatinine 11/11/2020 1.18    • Sodium 11/11/2020 133*   • Potassium 11/11/2020 4.8*   • Chloride 11/11/2020 97*   • CO2 11/11/2020 27.8    • Calcium 11/11/2020 9.0    • Total Protein 11/11/2020 5.9*   • Albumin 11/11/2020 3.50    • ALT (SGPT) 11/11/2020 12    • AST (SGOT) 11/11/2020 11    • Alkaline Phosphatase 11/11/2020 100    • Total Bilirubin 11/11/2020 0.5    • eGFR Non African Amer 11/11/2020 62    • Globulin 11/11/2020 2.4    • A/G Ratio 11/11/2020 1.5    • BUN/Creatinine Ratio 11/11/2020 14.4    • Anion Gap 11/11/2020 8.2    • WBC 11/11/2020 10.82*   • RBC 11/11/2020 3.83*   • Hemoglobin 11/11/2020 11.0*   • Hematocrit 11/11/2020 35.6*   • MCV 11/11/2020 93.0    • MCH 11/11/2020 28.7    • MCHC 11/11/2020 30.9*   • RDW 11/11/2020 13.5    • RDW-SD 11/11/2020 46.0    • MPV 11/11/2020 11.4    • Platelets 11/11/2020 279    • Neutrophil % 11/11/2020 83.7*   • Lymphocyte % 11/11/2020 5.9*   • Monocyte % 11/11/2020 9.1    • Eosinophil % 11/11/2020 0.3    • Basophil % 11/11/2020 0.5    • Immature Grans % 11/11/2020 0.5    • Neutrophils, Absolute 11/11/2020 9.06*   • Lymphocytes, Absolute 11/11/2020 0.64*   • Monocytes, Absolute 11/11/2020 0.99*   • Eosinophils, Absolute 11/11/2020 0.03    • Basophils, Absolute 11/11/2020 0.05    • Immature Grans, Absolute 11/11/2020 0.05    • nRBC 11/11/2020 0.0    Office Visit on 11/04/2020   Component Date Value   • Ferritin 11/04/2020 285.50    • Iron 11/04/2020 28*   • Iron Saturation 11/04/2020 12*   • Transferrin 11/04/2020 169*   •  TIBC 11/04/2020 237*   Infusion on 11/04/2020   Component Date Value   • Glucose 11/04/2020 127*   • BUN 11/04/2020 15    • Creatinine 11/04/2020 1.20    • Sodium 11/04/2020 134    • Potassium 11/04/2020 4.8*   • Chloride 11/04/2020 98    • CO2 11/04/2020 25.5    • Calcium 11/04/2020 9.1    • Total Protein 11/04/2020 5.9*   • Albumin 11/04/2020 3.70    • ALT (SGPT) 11/04/2020 15    • AST (SGOT) 11/04/2020 12    • Alkaline Phosphatase 11/04/2020 85    • Total Bilirubin 11/04/2020 0.7    • eGFR Non African Amer 11/04/2020 61    • Globulin 11/04/2020 2.2    • A/G Ratio 11/04/2020 1.7    • BUN/Creatinine Ratio 11/04/2020 12.5    • Anion Gap 11/04/2020 10.5    • WBC 11/04/2020 10.78    • RBC 11/04/2020 3.95*   • Hemoglobin 11/04/2020 11.5*   • Hematocrit 11/04/2020 37.2*   • MCV 11/04/2020 94.2    • MCH 11/04/2020 29.1    • MCHC 11/04/2020 30.9*   • RDW 11/04/2020 13.2    • RDW-SD 11/04/2020 45.2    • MPV 11/04/2020 11.8    • Platelets 11/04/2020 251    • Neutrophil % 11/04/2020 82.7*   • Lymphocyte % 11/04/2020 6.0*   • Monocyte % 11/04/2020 9.6    • Eosinophil % 11/04/2020 0.6    • Basophil % 11/04/2020 0.5    • Immature Grans % 11/04/2020 0.6*   • Neutrophils, Absolute 11/04/2020 8.93*   • Lymphocytes, Absolute 11/04/2020 0.65*   • Monocytes, Absolute 11/04/2020 1.03*   • Eosinophils, Absolute 11/04/2020 0.06    • Basophils, Absolute 11/04/2020 0.05    • Immature Grans, Absolute 11/04/2020 0.06*   • nRBC 11/04/2020 0.0    Hospital Outpatient Visit on 10/22/2020   Component Date Value   • Right Mid Femoral Spont 10/22/2020 1    • Right Common Femoral Spo* 10/22/2020 Y    • Right Common Femoral Pha* 10/22/2020 Y    • Right Common Femoral Aug* 10/22/2020 Y    • Right Common Femoral Com* 10/22/2020 Y    • Right Common Femoral Com* 10/22/2020 C    • Right Saphenofemoral Joe* 10/22/2020 C    • Right Profunda Femoral C* 10/22/2020 C    • Right Proximal Femoral C* 10/22/2020 C    • Right Mid Femoral Spont 10/22/2020 Y    •  Right Mid Femoral Phasic 10/22/2020 Y    • Right Mid Femoral Augment 10/22/2020 Y    • Right Mid Femoral Compet* 10/22/2020 N    • Right Mid Femoral Compre* 10/22/2020 C    • Right Distal Femoral Com* 10/22/2020 C    • Right Popliteal Spont 10/22/2020 Y    • Right Popliteal Phasic 10/22/2020 Y    • Right Popliteal Augment 10/22/2020 Y    • Right Popliteal Competent 10/22/2020 N    • Right Popliteal Compress 10/22/2020 C    • Right Posterior Tibial C* 10/22/2020 C    • Right GastronemiusSoleal* 10/22/2020 C    • Right Greater Saph AK Co* 10/22/2020 C    • Right Greater Saph BK Co* 10/22/2020 C    • Right Lesser Saph Compre* 10/22/2020 C    • Left Common Femoral Spont 10/22/2020 Y    • Left Common Femoral Phas* 10/22/2020 Y    • Left Common Femoral Augm* 10/22/2020 Y    • Left Common Femoral Comp* 10/22/2020 Y    • Left Common Femoral Comp* 10/22/2020 C    • Left Saphenofemoral Junc* 10/22/2020 C    • Left Profunda Femoral Co* 10/22/2020 C    • Left Proximal Femoral Co* 10/22/2020 C    • Left Mid Femoral Spont 10/22/2020 Y    • Left Mid Femoral Phasic 10/22/2020 Y    • Left Mid Femoral Augment 10/22/2020 Y    • Left Mid Femoral Compete* 10/22/2020 Y    • Left Mid Femoral Compress 10/22/2020 C    • Left Distal Femoral Comp* 10/22/2020 C    • Left Popliteal Spont 10/22/2020 Y    • Left Popliteal Phasic 10/22/2020 Y    • Left Popliteal Augment 10/22/2020 Y    • Left Popliteal Competent 10/22/2020 Y    • Left Popliteal Compress 10/22/2020 C    • Left Posterior Tibial Co* 10/22/2020 C    • Left Peroneal Compress 10/22/2020 C    • Left GastronemiusSoleal * 10/22/2020 C    • Left Greater Saph AK Com* 10/22/2020 C    • Left Greater Saph BK Com* 10/22/2020 C    • Left Lesser Saph Compress 10/22/2020 C    • Right Varicosity BK Comp* 10/22/2020 C    Infusion on 10/21/2020   Component Date Value   • Glucose 10/21/2020 120    • BUN 10/21/2020 14    • Creatinine 10/21/2020 1.31*   • Sodium 10/21/2020 135    • Potassium 10/21/2020  4.1    • Chloride 10/21/2020 101    • CO2 10/21/2020 25.2    • Calcium 10/21/2020 8.8    • Total Protein 10/21/2020 5.7*   • Albumin 10/21/2020 3.60    • ALT (SGPT) 10/21/2020 8    • AST (SGOT) 10/21/2020 10    • Alkaline Phosphatase 10/21/2020 75    • Total Bilirubin 10/21/2020 0.5    • eGFR Non African Amer 10/21/2020 55*   • Globulin 10/21/2020 2.1    • A/G Ratio 10/21/2020 1.7    • BUN/Creatinine Ratio 10/21/2020 10.7    • Anion Gap 10/21/2020 8.8    • TSH 10/21/2020 2.630    • Free T4 10/21/2020 2.11*   • WBC 10/21/2020 9.69    • RBC 10/21/2020 3.89*   • Hemoglobin 10/21/2020 11.8*   • Hematocrit 10/21/2020 37.4*   • MCV 10/21/2020 96.1    • MCH 10/21/2020 30.3    • MCHC 10/21/2020 31.6    • RDW 10/21/2020 13.0    • RDW-SD 10/21/2020 45.7    • MPV 10/21/2020 11.8    • Platelets 10/21/2020 232    • Neutrophil % 10/21/2020 83.6*   • Lymphocyte % 10/21/2020 4.6*   • Monocyte % 10/21/2020 10.0    • Eosinophil % 10/21/2020 0.9    • Basophil % 10/21/2020 0.4    • Immature Grans % 10/21/2020 0.5    • Neutrophils, Absolute 10/21/2020 8.09*   • Lymphocytes, Absolute 10/21/2020 0.45*   • Monocytes, Absolute 10/21/2020 0.97*   • Eosinophils, Absolute 10/21/2020 0.09    • Basophils, Absolute 10/21/2020 0.04    • Immature Grans, Absolute 10/21/2020 0.05    • nRBC 10/21/2020 0.0    Appointment on 10/13/2020   Component Date Value   • Glucose 10/13/2020 86    • BUN 10/13/2020 12    • Creatinine 10/13/2020 1.41*   • Sodium 10/13/2020 136    • Potassium 10/13/2020 4.6    • Chloride 10/13/2020 99    • CO2 10/13/2020 27.8    • Calcium 10/13/2020 9.3    • eGFR Non African Amer 10/13/2020 50*   • BUN/Creatinine Ratio 10/13/2020 8.5    • Anion Gap 10/13/2020 9.2    • WBC 10/13/2020 7.35    • RBC 10/13/2020 3.96*   • Hemoglobin 10/13/2020 12.0*   • Hematocrit 10/13/2020 36.4*   • MCV 10/13/2020 91.9    • MCH 10/13/2020 30.3    • MCHC 10/13/2020 33.0    • RDW 10/13/2020 12.2*   • RDW-SD 10/13/2020 41.1    • MPV 10/13/2020 11.8    •  Platelets 10/13/2020 265    • Neutrophil % 10/13/2020 77.8*   • Lymphocyte % 10/13/2020 8.6*   • Monocyte % 10/13/2020 12.0    • Eosinophil % 10/13/2020 0.8    • Basophil % 10/13/2020 0.4    • Immature Grans % 10/13/2020 0.4    • Neutrophils, Absolute 10/13/2020 5.72    • Lymphocytes, Absolute 10/13/2020 0.63*   • Monocytes, Absolute 10/13/2020 0.88    • Eosinophils, Absolute 10/13/2020 0.06    • Basophils, Absolute 10/13/2020 0.03    • Immature Grans, Absolute 10/13/2020 0.03    • nRBC 10/13/2020 0.0    • SARS-CoV-2 PCR 10/13/2020 Not Detected        Diagnoses and all orders for this visit:    1. Major depressive disorder, recurrent episode, moderate (CMS/HCC) (Primary)    Other orders  -     mirtazapine (REMERON) 15 MG tablet; Take 1 tablet by mouth Every Night.  Dispense: 30 tablet; Refill: 2    Plan of Care  Patient with symptoms of major depressive disorder, moderate, first occurrence.  Reviewed with patient target symptoms to include sleep initiation and continuity, reduced appetite, reduced interests, and increase in tearfulness. Explored recommendation for remeron 15 mg nightly.  Realistic expectations provided to patient, specifically related to length of time required for initial response.  Alternate recommendations explored, specifically given impact of pain.  Opted not to utilize Cymbalta due to current GI disruption.  May consider Zyprexa if nausea worsens, although without depressant benefit.  Could utilize gabapentin for symptoms of pain and sleep, although will begin with monotherapy.  Patient aware medication is more sedating at lower doses. Reviewed ability to transition to ODT if dumping felt to be prohibitive of benefit.  Current medications reviewed; patient aware to reduce trazodone to 50 mg nightly and discontinue.  May continue to use Xanax on as needed basis.  Follow-up arranged in clinic in 4 weeks.

## 2020-12-02 ENCOUNTER — APPOINTMENT (OUTPATIENT)
Dept: ONCOLOGY | Facility: HOSPITAL | Age: 65
End: 2020-12-02

## 2020-12-02 ENCOUNTER — OFFICE VISIT (OUTPATIENT)
Dept: ONCOLOGY | Facility: CLINIC | Age: 65
End: 2020-12-02

## 2020-12-02 ENCOUNTER — INFUSION (OUTPATIENT)
Dept: ONCOLOGY | Facility: HOSPITAL | Age: 65
End: 2020-12-02

## 2020-12-02 VITALS
HEART RATE: 99 BPM | DIASTOLIC BLOOD PRESSURE: 81 MMHG | BODY MASS INDEX: 24.33 KG/M2 | RESPIRATION RATE: 16 BRPM | OXYGEN SATURATION: 98 % | TEMPERATURE: 99.1 F | SYSTOLIC BLOOD PRESSURE: 157 MMHG | WEIGHT: 173.8 LBS | HEIGHT: 71 IN

## 2020-12-02 DIAGNOSIS — C91.10 CLL (CHRONIC LYMPHOCYTIC LEUKEMIA) (HCC): ICD-10-CM

## 2020-12-02 DIAGNOSIS — C34.90 LUNG CANCER METASTATIC TO BONE (HCC): ICD-10-CM

## 2020-12-02 DIAGNOSIS — C34.12 MALIGNANT NEOPLASM OF UPPER LOBE OF LEFT LUNG (HCC): Primary | ICD-10-CM

## 2020-12-02 DIAGNOSIS — C79.51 LUNG CANCER METASTATIC TO BONE (HCC): ICD-10-CM

## 2020-12-02 DIAGNOSIS — K59.03 CONSTIPATION DUE TO OPIOID THERAPY: ICD-10-CM

## 2020-12-02 DIAGNOSIS — C34.12 MALIGNANT NEOPLASM OF UPPER LOBE OF LEFT LUNG (HCC): ICD-10-CM

## 2020-12-02 DIAGNOSIS — Z79.899 HIGH RISK MEDICATION USE: ICD-10-CM

## 2020-12-02 DIAGNOSIS — T40.2X5A CONSTIPATION DUE TO OPIOID THERAPY: ICD-10-CM

## 2020-12-02 DIAGNOSIS — M25.562 ACUTE PAIN OF LEFT KNEE: ICD-10-CM

## 2020-12-02 LAB
ALBUMIN SERPL-MCNC: 3.6 G/DL (ref 3.5–5.2)
ALBUMIN/GLOB SERPL: 1.4 G/DL (ref 1.1–2.4)
ALP SERPL-CCNC: 128 U/L (ref 38–116)
ALT SERPL W P-5'-P-CCNC: 14 U/L (ref 0–41)
ANION GAP SERPL CALCULATED.3IONS-SCNC: 11.1 MMOL/L (ref 5–15)
AST SERPL-CCNC: 13 U/L (ref 0–40)
BASOPHILS # BLD AUTO: 0.07 10*3/MM3 (ref 0–0.2)
BASOPHILS NFR BLD AUTO: 0.6 % (ref 0–1.5)
BILIRUB SERPL-MCNC: 0.4 MG/DL (ref 0.2–1.2)
BUN SERPL-MCNC: 20 MG/DL (ref 6–20)
BUN/CREAT SERPL: 18.3 (ref 7.3–30)
CALCIUM SPEC-SCNC: 8.9 MG/DL (ref 8.5–10.2)
CHLORIDE SERPL-SCNC: 98 MMOL/L (ref 98–107)
CO2 SERPL-SCNC: 24.9 MMOL/L (ref 22–29)
CREAT SERPL-MCNC: 1.09 MG/DL (ref 0.7–1.3)
DEPRECATED RDW RBC AUTO: 49.2 FL (ref 37–54)
EOSINOPHIL # BLD AUTO: 0.08 10*3/MM3 (ref 0–0.4)
EOSINOPHIL NFR BLD AUTO: 0.7 % (ref 0.3–6.2)
ERYTHROCYTE [DISTWIDTH] IN BLOOD BY AUTOMATED COUNT: 14.7 % (ref 12.3–15.4)
GFR SERPL CREATININE-BSD FRML MDRD: 68 ML/MIN/1.73
GLOBULIN UR ELPH-MCNC: 2.5 GM/DL (ref 1.8–3.5)
GLUCOSE SERPL-MCNC: 127 MG/DL (ref 74–124)
HCT VFR BLD AUTO: 36.8 % (ref 37.5–51)
HGB BLD-MCNC: 11.1 G/DL (ref 13–17.7)
IMM GRANULOCYTES # BLD AUTO: 0.05 10*3/MM3 (ref 0–0.05)
IMM GRANULOCYTES NFR BLD AUTO: 0.5 % (ref 0–0.5)
LYMPHOCYTES # BLD AUTO: 0.97 10*3/MM3 (ref 0.7–3.1)
LYMPHOCYTES NFR BLD AUTO: 8.8 % (ref 19.6–45.3)
MAGNESIUM SERPL-MCNC: 2 MG/DL (ref 1.8–2.5)
MCH RBC QN AUTO: 27.5 PG (ref 26.6–33)
MCHC RBC AUTO-ENTMCNC: 30.2 G/DL (ref 31.5–35.7)
MCV RBC AUTO: 91.3 FL (ref 79–97)
MONOCYTES # BLD AUTO: 0.75 10*3/MM3 (ref 0.1–0.9)
MONOCYTES NFR BLD AUTO: 6.8 % (ref 5–12)
NEUTROPHILS NFR BLD AUTO: 82.6 % (ref 42.7–76)
NEUTROPHILS NFR BLD AUTO: 9.15 10*3/MM3 (ref 1.7–7)
NRBC BLD AUTO-RTO: 0 /100 WBC (ref 0–0.2)
PHOSPHATE SERPL-MCNC: 3.7 MG/DL (ref 2.5–4.5)
PLATELET # BLD AUTO: 319 10*3/MM3 (ref 140–450)
PMV BLD AUTO: 11 FL (ref 6–12)
POTASSIUM SERPL-SCNC: 4.8 MMOL/L (ref 3.5–4.7)
PROT SERPL-MCNC: 6.1 G/DL (ref 6.3–8)
RBC # BLD AUTO: 4.03 10*6/MM3 (ref 4.14–5.8)
SODIUM SERPL-SCNC: 134 MMOL/L (ref 134–145)
T4 FREE SERPL-MCNC: 1.64 NG/DL (ref 0.93–1.7)
TSH SERPL DL<=0.05 MIU/L-ACNC: 1.77 UIU/ML (ref 0.27–4.2)
WBC # BLD AUTO: 11.07 10*3/MM3 (ref 3.4–10.8)

## 2020-12-02 PROCEDURE — 96372 THER/PROPH/DIAG INJ SC/IM: CPT

## 2020-12-02 PROCEDURE — 25010000002 DENOSUMAB 120 MG/1.7ML SOLUTION: Performed by: INTERNAL MEDICINE

## 2020-12-02 PROCEDURE — 99215 OFFICE O/P EST HI 40 MIN: CPT | Performed by: INTERNAL MEDICINE

## 2020-12-02 PROCEDURE — 80053 COMPREHEN METABOLIC PANEL: CPT

## 2020-12-02 PROCEDURE — 25010000002 PEMBROLIZUMAB 100 MG/4ML SOLUTION 4 ML VIAL: Performed by: INTERNAL MEDICINE

## 2020-12-02 PROCEDURE — 83735 ASSAY OF MAGNESIUM: CPT

## 2020-12-02 PROCEDURE — 96413 CHEMO IV INFUSION 1 HR: CPT

## 2020-12-02 PROCEDURE — 84443 ASSAY THYROID STIM HORMONE: CPT | Performed by: INTERNAL MEDICINE

## 2020-12-02 PROCEDURE — 84100 ASSAY OF PHOSPHORUS: CPT

## 2020-12-02 PROCEDURE — 84439 ASSAY OF FREE THYROXINE: CPT | Performed by: INTERNAL MEDICINE

## 2020-12-02 PROCEDURE — 85025 COMPLETE CBC W/AUTO DIFF WBC: CPT

## 2020-12-02 RX ORDER — SODIUM CHLORIDE 9 MG/ML
250 INJECTION, SOLUTION INTRAVENOUS ONCE
Status: COMPLETED | OUTPATIENT
Start: 2020-12-02 | End: 2020-12-02

## 2020-12-02 RX ORDER — SODIUM CHLORIDE 9 MG/ML
250 INJECTION, SOLUTION INTRAVENOUS ONCE
Status: CANCELLED | OUTPATIENT
Start: 2020-12-02

## 2020-12-02 RX ADMIN — SODIUM CHLORIDE 250 ML: 9 INJECTION, SOLUTION INTRAVENOUS at 13:42

## 2020-12-02 RX ADMIN — DENOSUMAB 120 MG: 120 INJECTION SUBCUTANEOUS at 14:03

## 2020-12-02 RX ADMIN — SODIUM CHLORIDE 200 MG: 9 INJECTION, SOLUTION INTRAVENOUS at 14:01

## 2020-12-03 ENCOUNTER — READMISSION MANAGEMENT (OUTPATIENT)
Dept: CALL CENTER | Facility: HOSPITAL | Age: 65
End: 2020-12-03

## 2020-12-03 NOTE — OUTREACH NOTE
Medical Week 3 Survey      Responses   [facility] patient discharged from?  [facility]   Does the patient have one of the following disease processes/diagnoses(primary or secondary)?  Other   Week 3 attempt successful?  Yes   Call start time  1656   Call end time  1658   Discharge diagnosis  right inguinal hernia    Meds reviewed with patient/caregiver?  Yes   Is the patient having any side effects they believe may be caused by any medication additions or changes?  No   Does the patient have all medications ordered at discharge?  Yes   Is the patient taking all medications as directed (includes completed medication regime)?  Yes   Does the patient have a primary care provider?   Yes   Does the patient have an appointment with their PCP within 7 days of discharge?  Yes   Has the patient kept scheduled appointments due by today?  Yes   Has home health visited the patient within 72 hours of discharge?  N/A   Psychosocial issues?  No   Did the patient receive a copy of their discharge instructions?  Yes   Nursing interventions  Reviewed instructions with patient   What is the patient's perception of their health status since discharge?  Returned to baseline/stable   Is the patient/caregiver able to teach back signs and symptoms related to disease process for when to call PCP?  Yes   Is the patient/caregiver able to teach back signs and symptoms related to disease process for when to call 911?  Yes   Is the patient/caregiver able to teach back the hierarchy of who to call/visit for symptoms/problems? PCP, Specialist, Home health nurse, Urgent Care, ED, 911  Yes   Week 3 Call Completed?  Yes   Graduated  Yes          Nahum Mckinnon RN

## 2020-12-04 ENCOUNTER — MEDICATION THERAPY MANAGEMENT (OUTPATIENT)
Dept: PHARMACY | Facility: HOSPITAL | Age: 65
End: 2020-12-04

## 2020-12-04 ENCOUNTER — HOSPITAL ENCOUNTER (OUTPATIENT)
Dept: GENERAL RADIOLOGY | Facility: HOSPITAL | Age: 65
Discharge: HOME | End: 2020-12-04
Admitting: INTERNAL MEDICINE

## 2020-12-04 DIAGNOSIS — C34.12 MALIGNANT NEOPLASM OF UPPER LOBE OF LEFT LUNG (HCC): ICD-10-CM

## 2020-12-04 DIAGNOSIS — C79.51 LUNG CANCER METASTATIC TO BONE (HCC): ICD-10-CM

## 2020-12-04 DIAGNOSIS — C34.90 LUNG CANCER METASTATIC TO BONE (HCC): ICD-10-CM

## 2020-12-04 PROCEDURE — 73562 X-RAY EXAM OF KNEE 3: CPT

## 2020-12-04 NOTE — PROGRESS NOTES
[facility] Lab Review- [facility]      12/2/2020  Day 1   WBC 3.40 - 10.80 10*3/mm3 11.07 (A)   Neutrophils Absolute 1.70 - 7.00 10*3/mm3 9.15 (A)   Hemoglobin 13.0 - 17.7 g/dL 11.1 (A)   Hematocrit 37.5 - 51.0 % 36.8 (A)   Platelets 140 - 450 10*3/mm3 319   Creatinine 0.70 - 1.30 mg/dL 1.09   eGFR Non African Am >60 mL/min/1.73 68   BUN 6 - 20 mg/dL 20   Sodium 134 - 145 mmol/L 134   Potassium 3.5 - 4.7 mmol/L 4.8 (A)   Glucose 74 - 124 mg/dL 127 (A)   Magnesium 1.8 - 2.5 mg/dL 2.0   Calcium 8.5 - 10.2 mg/dL 8.9   Albumin 3.50 - 5.20 g/dL 3.60   Total Protein 6.3 - 8.0 g/dL 6.1 (A)   AST (SGOT) 0 - 40 U/L 13   ALT (SGPT) 0 - 41 U/L 14   Alkaline Phosphatase 38 - 116 U/L 128 (A)   Total Bilirubin 0.2 - 1.2 mg/dL 0.4   TSH 0.270 - 4.200 uIU/mL 1.770   Free T4 0.93 - 1.70 ng/dL 1.64   Phosphorus 2.5 - 4.5 mg/dL 3.7     MD dictation noted. Pharmacy will continue to follow.     Thanks,   Jessenia Galvez, PharmD

## 2020-12-07 ENCOUNTER — TELEPHONE (OUTPATIENT)
Dept: ONCOLOGY | Facility: CLINIC | Age: 65
End: 2020-12-07

## 2020-12-07 DIAGNOSIS — C34.90 LUNG CANCER METASTATIC TO BONE (HCC): ICD-10-CM

## 2020-12-07 DIAGNOSIS — C79.51 LUNG CANCER METASTATIC TO BONE (HCC): ICD-10-CM

## 2020-12-07 DIAGNOSIS — M25.562 ACUTE PAIN OF LEFT KNEE: Primary | ICD-10-CM

## 2020-12-07 NOTE — TELEPHONE ENCOUNTER
Called pt and informed him of results and the need of an MRI. He v/u. Orders placed and message sent to scheduling.     ----- Message from Jostin Parekh MD sent at 12/7/2020  8:13 AM EST -----  Please see me about this patient.  He had a plain film of his left knee with a lucency on the medial aspect of his proximal tibia.  He should have an MRI of his left knee.  Thanks, MELLO

## 2020-12-08 ENCOUNTER — TELEPHONE (OUTPATIENT)
Dept: GASTROENTEROLOGY | Facility: CLINIC | Age: 65
End: 2020-12-08

## 2020-12-08 ENCOUNTER — OFFICE VISIT (OUTPATIENT)
Dept: GASTROENTEROLOGY | Facility: CLINIC | Age: 65
End: 2020-12-08

## 2020-12-08 ENCOUNTER — OFFICE VISIT (OUTPATIENT)
Dept: SURGERY | Facility: CLINIC | Age: 65
End: 2020-12-08

## 2020-12-08 VITALS — WEIGHT: 172 LBS | HEIGHT: 71 IN | BODY MASS INDEX: 24.08 KG/M2

## 2020-12-08 DIAGNOSIS — K59.01 SLOW TRANSIT CONSTIPATION: Primary | ICD-10-CM

## 2020-12-08 DIAGNOSIS — Z48.89 POSTOPERATIVE VISIT: Primary | ICD-10-CM

## 2020-12-08 PROCEDURE — 99213 OFFICE O/P EST LOW 20 MIN: CPT | Performed by: INTERNAL MEDICINE

## 2020-12-08 PROCEDURE — 99024 POSTOP FOLLOW-UP VISIT: CPT | Performed by: SURGERY

## 2020-12-08 NOTE — TELEPHONE ENCOUNTER
spoke with pt in office schedule at [facility] march 19 arrive at 0700 am ramiro hernandez-michael

## 2020-12-08 NOTE — PROGRESS NOTES
Subjective   Dav Freitas is a 65 y.o. male who returns to the office after undergoing a da Kavin robot-assisted laparoscopic bilateral inguinal hernia repair on 11/23/2020.     History of Present Illness     The patient is recovering well with no significant postop symptoms.  He is having no abdominal pain.  He has a good appetite and normal bowel function.  His energy level is good.      Review of Systems   Constitutional: Negative for activity change, appetite change, fatigue and fever.   Respiratory: Negative for chest tightness and shortness of breath.    Cardiovascular: Negative for chest pain and palpitations.   Gastrointestinal: Negative for abdominal pain, constipation, diarrhea and nausea.   Skin: Negative for rash and wound.   Psychiatric/Behavioral: Negative for agitation and confusion.       Objective   Physical Exam  Constitutional:       General: He is not in acute distress.     Appearance: Normal appearance. He is not ill-appearing or toxic-appearing.   Pulmonary:      Effort: Pulmonary effort is normal. No respiratory distress.   Abdominal:      Palpations: Abdomen is soft.      Tenderness: There is no abdominal tenderness.   Skin:     Comments: Incision: intact with no evidence of infection.   Neurological:      Mental Status: He is alert.   Psychiatric:         Behavior: Behavior normal.         Assessment/Plan   The encounter diagnosis was Postoperative visit.    The patient is recovering well from his da Kavin robot-assisted laparoscopic bilateral inguinal hernia repair.  At this point he has no limitations.  He will follow-up on an as-needed basis.

## 2020-12-08 NOTE — PROGRESS NOTES
Chief Complaint   Patient presents with   • Hospital Follow Up Visit   • Constipation       Dav Freitas is a  65 y.o. male here for a follow up visit for constipation    Recent admission for hernia, reduced as an inpatient and then with surgical repair a week later    He is 2 weeks out from surgical repair doing well minimal pain  He is plagued with opioid-induced constipation  He is decreasing his dose of opiates and his bowels are moving more regularly on lactulose and MiraLAX    We originally had discussed starting Movantik or Amitiza but he would like to defer that for the time being      Past Medical History:   Diagnosis Date   • Anxiety    • Bruises easily     SIDE EFFECT D/T CHEMO TABLET    • CLL (chronic lymphocytic leukemia) (CMS/[facility]) 04/2019    LAST CHEMO 7/2019   • Constipation    • COPD (chronic obstructive pulmonary disease) (CMS/[facility])    • Dyspnea    • ED (erectile dysfunction)    • H/O splenomegaly    • Ileus (CMS/[facility]) 11/2020   • Inguinal hernia    • Lung cancer (CMS/[facility]) 08/2020    WITH BONE METS  - LAST RADIATION TREATMENT 10/13/20   • On home O2     3L NC AT NIGHT    • Varicose vein of leg        Past Surgical History:   Procedure Laterality Date   • BRONCHOSCOPY Bilateral 4/29/2019    Procedure: BRONCHOSCOPY WITH WASHING ENDOBRONCHIAL ULTRASOUND WITH FNA'S;  Surgeon: Selina Lloyd MD;  Location: [facility] ENDOSCOPY;  Service: Pulmonary   • INGUINAL HERNIA REPAIR Bilateral 11/23/2020    Procedure: Davinci assisted laparoscopic bilateral  inguinal hernia repair,  ;  Surgeon: Lloyd Magaña Jr., MD;  Location: [facility] OR;  Service: DaVinci;  Laterality: Bilateral;   • VENOUS ACCESS DEVICE (PORT) INSERTION N/A 10/15/2020    Procedure: MEDIPORT PLACEMENT;  Surgeon: Herlinda Magaña Jr., MD;  Location: [facility] OR;  Service: Vascular;  Laterality: N/A;       Scheduled Meds:    Continuous Infusions:No current facility-administered medications for this visit.       PRN Meds:.    No Known  Allergies    Social History     Socioeconomic History   • Marital status:      Spouse name: Nikky   • Number of children: Not on file   • Years of education: Not on file   • Highest education level: Not on file   Occupational History     Employer: [employer]   Tobacco Use   • Smoking status: Former Smoker     Packs/day: 1.00     Years: 40.00     Pack years: 40.00     Types: Cigarettes     Quit date: 2019     Years since quittin.6   • Smokeless tobacco: Never Used   Substance and Sexual Activity   • Alcohol use: Not Currently     Comment: OCCASIONAL    • Drug use: Not Currently   • Sexual activity: Defer       Family History   Problem Relation Age of Onset   • Arthritis Mother    • Lung cancer Father    • Malig Hyperthermia Neg Hx        Review of Systems   Gastrointestinal: Positive for constipation. Negative for abdominal distention, abdominal pain, anal bleeding, blood in stool and vomiting.   All other systems reviewed and are negative.      There were no vitals filed for this visit.    Physical Exam  Vitals signs and nursing note reviewed.   Constitutional:       Appearance: He is well-developed.   HENT:      Head: Normocephalic and atraumatic.   Eyes:      Conjunctiva/sclera: Conjunctivae normal.   Neck:      Musculoskeletal: Normal range of motion.      Trachea: No tracheal deviation.   Cardiovascular:      Rate and Rhythm: Normal rate and regular rhythm.   Pulmonary:      Effort: Pulmonary effort is normal. No respiratory distress.      Breath sounds: Normal breath sounds.   Abdominal:      General: Bowel sounds are normal. There is no distension.      Palpations: Abdomen is soft. There is no mass.      Tenderness: There is no abdominal tenderness. There is no guarding or rebound.   Musculoskeletal: Normal range of motion.   Skin:     General: Skin is warm and dry.   Neurological:      Mental Status: He is alert and oriented to person, place, and time.   Psychiatric:         Judgment:  Judgment normal.       Problem list    Chronic opioid-induced constipation      Assessment/Plan    Plan for colonoscopy early next year, he would like to be vaccinated for Covid first  Plan for continued MiraLAX and lactulose but to reduce doses as he comes off of the opiates  He does not want to start Amitiza or Movantik at this time for OIC which I agree with, will likely cause him severe diarrhea

## 2020-12-09 ENCOUNTER — OFFICE VISIT (OUTPATIENT)
Dept: ORTHOPEDIC SURGERY | Facility: CLINIC | Age: 65
End: 2020-12-09

## 2020-12-09 VITALS — HEIGHT: 71 IN | WEIGHT: 172 LBS | BODY MASS INDEX: 24.08 KG/M2 | TEMPERATURE: 97.3 F

## 2020-12-09 DIAGNOSIS — G89.29 CHRONIC PAIN OF LEFT KNEE: Primary | ICD-10-CM

## 2020-12-09 DIAGNOSIS — M25.562 CHRONIC PAIN OF LEFT KNEE: Primary | ICD-10-CM

## 2020-12-09 PROCEDURE — 73562 X-RAY EXAM OF KNEE 3: CPT | Performed by: NURSE PRACTITIONER

## 2020-12-09 PROCEDURE — 99212 OFFICE O/P EST SF 10 MIN: CPT | Performed by: NURSE PRACTITIONER

## 2020-12-15 NOTE — PROGRESS NOTES
Patient: Dav Freitas    YOB: 1955    Medical Record Number: [id number]    Chief Complaints:  Left knee pain    History of Present Illness:     65 y.o. male patient who presents for evaluation of left knee pain and swelling.   Onset of symptoms began 2 months ago.  He is currently being followed by Dr. Parekh for chronic lymphocytic leukemia and lung cancer with bone metastasis.  For the left leg, he has previously had a venous doppler study and x-rays completed.  A MRI has been ordered, but this study has not completed at present.  He was referred to me for further evaluation.        Denies injury.  Denies prior history of knee pain.  In the beginning, pain would radiate from his thigh to the lower leg.  He reports associated swelling.  Now, pain is localized mostly in the anterior knee region.  Reports his symptoms seem at least 50% better now compared to onset.  Pain is described as moderate, constant, and aching.  Pain is worse with standing.  Ice, heat, rest, and Percocet all help somewhat.  Reports he has been using a straight cane with ambulation since symptom onset.  Denies numbness or tingling in lower leg or foot.      Allergies: No Known Allergies    Home Medications:    Current Outpatient Medications:   •  albuterol (PROAIR RESPICLICK) 108 (90 Base) MCG/ACT inhaler, Inhale 2 puffs Every 4 (Four) Hours As Needed for Wheezing., Disp: 1 inhaler, Rfl: 0  •  ALPRAZolam (XANAX) 0.25 MG tablet, TAKE 1 TABLET BY MOUTH 3 (THREE) TIMES A DAY AS NEEDED FOR ANXIETY., Disp: 60 tablet, Rfl: 0  •  budesonide-formoterol (SYMBICORT) 160-4.5 MCG/ACT inhaler, Inhale 2 puffs 2 (Two) Times a Day., Disp: 1 inhaler, Rfl: 11  •  guaiFENesin 200 MG tablet, Take 400 mg by mouth 2 (two) times a day., Disp: , Rfl:   •  HYDROcodone-acetaminophen (NORCO)  MG per tablet, Take 1 tablet by mouth Every 4 (Four) Hours As Needed for Moderate Pain ., Disp: 100 tablet, Rfl: 0  •  ibrutinib (Imbruvica) 420 MG tablet  tablet, Take 420 mg by mouth Daily., Disp: , Rfl:   •  mirtazapine (REMERON) 15 MG tablet, Take 1 tablet by mouth Every Night., Disp: 30 tablet, Rfl: 2  •  Morphine (MS CONTIN) 30 MG 12 hr tablet, Take 1 tablet by mouth Every 12 (Twelve) Hours., Disp: 60 tablet, Rfl: 0  •  O2 (OXYGEN), Inhale 3 L/min Every Night. Wears at night only, Disp: , Rfl:   •  polyethylene glycol (polyethylene glycol) 17 g packet, Take 17 g by mouth Every 12 (Twelve) Hours., Disp:  , Rfl:   •  sennosides-docusate (senna-docusate sodium) 8.6-50 MG per tablet, Take 2 tablets by mouth Daily. (Patient taking differently: Take 2 tablets by mouth 2 (Two) Times a Day.), Disp: 60 tablet, Rfl: 2    Past Medical History:   Diagnosis Date   • Anxiety    • Bruises easily     SIDE EFFECT D/T CHEMO TABLET    • CLL (chronic lymphocytic leukemia) (CMS/[facility]) 04/2019    LAST CHEMO 7/2019   • Constipation    • COPD (chronic obstructive pulmonary disease) (CMS/[facility])    • Dyspnea    • ED (erectile dysfunction)    • H/O splenomegaly    • Ileus (CMS/[facility]) 11/2020   • Inguinal hernia    • Lung cancer (CMS/[facility]) 08/2020    WITH BONE METS  - LAST RADIATION TREATMENT 10/13/20   • On home O2     3L NC AT NIGHT    • Varicose vein of leg        Past Surgical History:   Procedure Laterality Date   • BRONCHOSCOPY Bilateral 4/29/2019    Procedure: BRONCHOSCOPY WITH WASHING ENDOBRONCHIAL ULTRASOUND WITH FNA'S;  Surgeon: Selina Lloyd MD;  Location: [facility] ENDOSCOPY;  Service: Pulmonary   • INGUINAL HERNIA REPAIR Bilateral 11/23/2020    Procedure: Davinci assisted laparoscopic bilateral  inguinal hernia repair,  ;  Surgeon: Lloyd Magaña Jr., MD;  Location: [facility] MAIN OR;  Service: DaVinci;  Laterality: Bilateral;   • VENOUS ACCESS DEVICE (PORT) INSERTION N/A 10/15/2020    Procedure: MEDIPORT PLACEMENT;  Surgeon: Herlinda Magaña Jr., MD;  Location: [facility] MAIN OR;  Service: Vascular;  Laterality: N/A;       Social History     Occupational History     Employer: [employer]   Tobacco Use   • Smoking status: Former Smoker     Packs/day: 1.00     Years: 40.00     Pack years: 40.00     Types: Cigarettes     Quit date: 2019     Years since quittin.6   • Smokeless tobacco: Never Used   Substance and Sexual Activity   • Alcohol use: Not Currently     Comment: OCCASIONAL    • Drug use: Not Currently   • Sexual activity: Defer      Social History     Social History Narrative   • Not on file       Family History   Problem Relation Age of Onset   • Arthritis Mother    • Lung cancer Father    • Malig Hyperthermia Neg Hx        Review of Systems:      Constitutional: Denies fever, shaking or chills   Eyes: Denies change in visual acuity   HEENT: Denies nasal congestion or sore throat   Respiratory: Denies cough or shortness of breath   Cardiovascular: Denies chest pain or edema  Endocrine: Denies tremors, palpitations, intolerance of heat or cold, polyuria, polydipsia.  GI: Denies abdominal pain, nausea, vomiting, bloody stools or diarrhea  : Denies frequency, urgency, incontinence, retention, or nocturia.  Musculoskeletal: Denies numbness, tingling or loss of motor function except as above  Integument: Denies rash, lesion or ulceration   Neurologic: Denies headache or focal weakness, deficits  Heme: Denies spontaneous or excessive bleeding, epistaxis, hematuria, melena, fatigue, enlarged or tender lymph nodes.      All other pertinent positives and negatives as noted above in HPI.    Physical Exam: 65 y.o. male    Vitals:    20 1127   Temp: 97.3 °F (36.3 °C)   Weight: 78 kg (172 lb)   Height: 180.3 cm (71\")       General:  Patient is awake and alert.  Appears in no acute distress or discomfort.    Psych:  Affect and demeanor are appropriate.    Eyes:  Conjunctiva and sclera appear grossly normal.  Eyes track well and EOM seem to be intact.    Ears:  No gross abnormalities.  Hearing adequate for the exam.    Cardiovascular:  Regular rate and rhythm.    Lungs:  Good chest " expansion.  Breathing unlabored.    Lymph:  No palpable masses or adenopathy in the affected extremity    Extremities:  Left knee:  Skin is benign.  No gross abnormalities on inspection.  No palpable masses or adenopathy.  No effusion.  Mild edema.  Generalized tenderness to palpation.  Full motion.  No instability.  Good strength with hip flexion, knee extension, ankle and toe plantarflexion and dorsiflexion.  Sensation is intact distally.  Brisk capillary refill in the toes with good skin turgor.         Radiology:   Dr. Rodriguez and I reviewed the x-rays together.  Bilateral standing AP views, bilateral merchants views, and a lateral view of the left knee are ordered by myself and reviewed to evaluate the patient's complaint.  These are compared to previous x-rays.  In the AP view, the x-rays show what appears to be an area of calcification along the medial border of the distal femur, bilaterally.  Otherwise, no obvious acute abnormalities, lesions, masses, significant degenerative changes, or other concerning findings.    Assessment/Plan:  Chronic left knee pain of unknown etiology    For now, I recommend he continue his current conservative treatments.  The left knee MRI is scheduled for next week.  I will call him with the results and come up with a plan at that time.  He acknowledged understanding.    Natalee Gonzales, RORY    12/09/2020    CC to Juan Iyer MD

## 2020-12-18 ENCOUNTER — HOSPITAL ENCOUNTER (OUTPATIENT)
Dept: MRI IMAGING | Facility: HOSPITAL | Age: 65
Discharge: HOME | End: 2020-12-18
Admitting: INTERNAL MEDICINE

## 2020-12-18 ENCOUNTER — MEDICATION THERAPY MANAGEMENT (OUTPATIENT)
Dept: PHARMACY | Facility: HOSPITAL | Age: 65
End: 2020-12-18

## 2020-12-18 DIAGNOSIS — M25.562 ACUTE PAIN OF LEFT KNEE: ICD-10-CM

## 2020-12-18 DIAGNOSIS — C79.51 LUNG CANCER METASTATIC TO BONE (HCC): ICD-10-CM

## 2020-12-18 DIAGNOSIS — C34.90 LUNG CANCER METASTATIC TO BONE (HCC): ICD-10-CM

## 2020-12-18 LAB — CREAT BLDA-MCNC: 1.2 MG/DL (ref 0.6–1.3)

## 2020-12-18 PROCEDURE — 82565 ASSAY OF CREATININE: CPT

## 2020-12-18 PROCEDURE — 73723 MRI JOINT LWR EXTR W/O&W/DYE: CPT

## 2020-12-18 PROCEDURE — A9577 INJ MULTIHANCE: HCPCS | Performed by: INTERNAL MEDICINE

## 2020-12-18 PROCEDURE — 0 GADOBENATE DIMEGLUMINE 529 MG/ML SOLUTION: Performed by: INTERNAL MEDICINE

## 2020-12-18 RX ADMIN — GADOBENATE DIMEGLUMINE 16 ML: 529 INJECTION, SOLUTION INTRAVENOUS at 17:48

## 2020-12-18 NOTE — PROGRESS NOTES
[facility] telephone follow up- Imbruvleslee Demarco is doing well with his Imbruvica. He has no new questions or concerns with his medication. Medication administration and adherence seem appropriate; he reports no missed doses this past month. He denies any changes to his medication list. Pharmacy will continue to follow.     Thanks,   Jessenia Galvez, PharmD

## 2020-12-21 NOTE — PROGRESS NOTES
Subjective  Patient, again, noting increasing left knee pain?  Discussed his multiple issues including recent hospitalizations      REASON FOR FOLLOW UP:  1.  Chronic lymphocytic leukemia with extensive lymphadenopathy and possible pleural involvement.  2.  Initiation of Treanda, Rituxan May 1, 2019 IVIG also utilized                                    3.  Significant response on scans June 27, 2019, alternative therapy with Imbruvica planned, initiated July 25, 2019  4.  Patient seen February 12, 2020, continued control of CLL, discussed Rituxan, Imbruvica, sleep study and potential surgical assessment for hernia  5.  CT of chest per pulmonary August 26 with 1.6 cm spiculated nodule in the left upper lobe, 1.2 cm left adrenal nodule not present on comparison study, bone windows with soft tissue mass involving the right eighth rib measuring 3.7 x 2.6, biopsy positive for adenocarcinoma  6.  Patient assessed September 30, plans for palliative radiation therapy, port placement, Keytruda considering PDL 1 positivity and high TMB status  7.  Patient reviewed October 21, 2020, Keytruda initiated, pain much improved status post radiation therapy  8.  Status post bilateral inguinal hernia repairs November 23, 2020.  Evidence of progression of disease versus pseudoprogression, continued knee pain   9.  Patient seen in office December 2, 2020, continued Keytruda for total of 4 cycles, institution of Xgeva, reassessment per left knee pain        HISTORY OF PRESENT ILLNESS:   The patient is a  65 y.o. Male  who was admitted on 4/28/2019 with worsening complaints of cough wheezing and shortness of breath.  He had been to an urgent care center and was started on antibiotics and received a shot of Solu-Medrol.  His symptoms did not improve and on his admission he was noted to have profound lymphocytosis with a total white count of 70,074% lymphocytes on his white cell differential.      He also underwent CT scan of the chest that  showed infiltrates and nodules in the lungs as well as significant lymphadenopathy within the chest and in the axillary regions.  He had peripheral blood sent for flow cytometry leukemia lymphoma panel but this is still pending at time of this dictation.           He underwent bronchoscopy on 4/29/2019.  Results from this procedure are pendingl.  His respiratory viral panel was negative and markers of sepsis were unremarkable.      His peripheral blood flow cytometry and flow cytometry from the EBUS needle biopsy at bronchoscopy are both consistent with chronic lymphocytic leukemia/small lymphocytic lymphoma.     The patient underwent a CT of abdomen and pelvis April 30 demonstrating extensive splenomegaly and bulky lymphadenopathy throughout the abdomen and pelvis.  There is a tiny lesion at the superior aspect lateral hepatic segment and 2 hyperenhancing foci within the liver thought to be hemangiomas, moderate size right inguinal hernia containing a long segment of small bowel without obstruction or incarceration.  CT of soft tissue again reveals extensive cervical adenopathy as well as evidence of pansinusitis.  The patient's case was discussed with Dr. Church and the patient has stage III-IV disease should be treated during this hospitalization.  I met with the patient and discussed in detail the use of Treanda/ Rituxan which we initiated Treanda Rituxan beginning May 1, 2019.     When he is seen May 2 he is already beginning to respond with reducing adenopathy and improved symptoms.  Plan to proceed with day #2.  We have also reviewed his findings including IgA 14, IgG of 263, IgM of 5 and a kappa/lambda ratio of 26.38.  He is hypogammaglobulinemic and replacement therapy will be given this hospitalization.  ENT assessment will also be requested.     The patient is again seen May 3, 2019, continuing to improve overall.  We did proceed with IVIG 400 mg/kg and had the patient seen by ENT after which the patient  was discharged.  He indicates that Dr. Hamlin is going to see him back within the week as he is now seen back in our office May 16 and that surgery may be necessary.  The patient is also still having sinus symptoms.  Further he has developed a more extensive rash involving his abdomen chest and back and previously seen in hospital?.  Otherwise he feels well except for fatigue without fever, chills, nausea, vomiting, weight loss, stable appetite.     The patient went on to take 2 cycles of Treanda, Rituxan with a second cycle given June 6 and 7.  Additional assessments included his dermatologist who felt he had a gradually improving dermatitis and would not require an therapy and ENT indicating that the patient was slowly improving per sinus symptoms the surgery may be necessary at a later point.  When he is reviewed May 30 he was neutropenic and we proceeded with IVIG second treatment on Elenita 10.  He underwent repeat scans June 27 demonstrating a significant reduction in adenopathy in the neck chest abdomen pelvis with index nodes in the neck previously 2 cm x 1.1 cm, chest with subcarinal lymph node conglomerate measuring 2.1 x 0.9 previously 5.8 x 2.7 and previously seen irregular pulmonary consolidation throughout bilateral lungs having nearly completely resolved.  Spleen is also reduced in size at 4.5 cm previously 18.6 cm and mesenteric nodes had similar reduced in size.  The patient's immunoglobulins were assessed June 27 with an IgG of 667, IgA of 20, IgM of 9 and IgE of 3.Additional information includes FISH analysis for CLL which is positive for deletion of 13 q. 14.3 It should be noted that such finding on FISH analysis generally suggest a favorable prognostic finding.  The patient is next seen July 3, 2019 feeling improved overall but still having a dermatologic issue with pruritus, erythema worsening particularly in the lower abdomen and upper groin.  Again his scans are considerably improved and we  discussed, on the basis of the above information, changing his therapy now to Imbruvica.  The patient will return for teaching per Imbruvica July 11 the patient initiated treatment by July 25th 2019.  He was seen July 31 tolerating this well.  He was able to discontinue allopurinol and ferrous sulfate thereafter presents back August 28 having taken the medication over the last month.     He indicates that he is having no issues tolerating the medication and generally feels well except for sinus drainage.  He has had a cough and has a chest x-ray scheduled to primary care August 29, 2019.  He has had no fever, chills, sweats, rash and has gained weight.  The patient is next seen November 20, 2019 continuing to generally improve.  His performance status remains excellent and has had no additional complications thus far with the use of Imbruvica or an additional infectious complications.  He has been seen by pulmonary medicine with reticular infiltrates noted on previous CAT scan on a follow-up study scheduled in the next several days.  This may be evolution towards recovery but a follow-up will be necessary.  Finally we have been able to obtain Imbruvica reasonably cost through foundation support.    The patient is next seen February 12, 2020 doing well without additional infectious complications and with stable findings hematologically.  We have discussed the fact that Rituxan could be added potentially to reduce the amount of time he remains on the medication but, additionally, further reduce his immunoglobulins.  Though this remains a concern he is also having difficulty with sleep-?  Sleep apnea, and worsening right direct inguinal hernia.    Plans for the patient to continue Imbruvica at dosing rechecking his immunoglobulins April 29 now with IVIG down to 371, IgM of 11, IgA level 32, kappa/lambda ratio of 1.51.  Fortunately continues to feel well without additional symptoms though is concerned about easy  bruisability and periodic muscle tenderness after activity.  We have discussed his sleep assessment and he very likely has sleep apnea but does not wish to start CPAP at this point and is using oral medications-trazodone-to modest effect.    The patient is followed by pulmonary medicine.He was seen September 2 having had right-sided rib pain for 1 month, shortness of breath on going up stairs or uphill.  Follow-up chest CT revealed left upper lobe nodule 1.6 cm that was noted spiculated, additionally lesion per right rib with a soft tissue mass (3.7 x 2.6 cm) was recognized in the tissue biopsy was obtained.  This is now returned as positive for moderately differentiated adenocarcinoma.  This is CK7 positive, CK20 negative with a lung primary suspected clinically.  A molecular panel has not yet been obtained.  We have now, however, sent for foundation CDX analysis.    The patient is seen September 2020 with considerable right chest wall pain only modestly controlled with Toradol and ibuprofen.  He also has been taking additional Xanax to reduce the muscle spasm that he is experiencing.  I have advised him of the findings and their significance as being consistent with metastatic non-small cell lung cancer.  He is dismayed by the conversation but wishes to have pain control as quickly as possible as well as a cady discussion of treatment options.    The patient was provided additional anti-pain and antianxiety medications.  A PET/CT was obtained September 23 demonstrating asymmetric uptake within the right parotid gland which is unremarkable appearance on noncontrasted CT, SUV of 6 compared to 2.7.  There is no hilar, mediastinal or axillary adenopathy, findings of asymmetric moderate to intense FDG uptake within superior aspect the right chest wall anterior to the right first rib with an irregular hypodense lesion measuring 1.8 x 1.6 and SUV 4.9.  This had not been seen June 27, 2019.  There is an intensely FDG  avid nodule within the lingula measuring 1.9 x 1.5 history of 12.4, FDG avid left adrenal nodule 1.9 x 1.5 with an issue 21.2.  The patient was seen by radiation therapy September 29 and started on radiation therapy to the right chest wall-35 Gy in 10 fractions.  Plans were also then proceed with SBRT to the solitary left upper lobe lesion pending insurance approval.  Further testing including TPS of 20% and foundation CDX with a TMB of greater than 10 mutations per megabase (28 mutations per megabase) and the indication that several immunotherapies could be useful in this patient's care.  Additional genomic findings include BRAF G469R/that trametinib could be useful and STK11/that everolimus could be useful.      The patient is seen back September 30, 2020 indicating that his pain has improved substantially with his current pain medications.  He also continues laxatives on a regular basis.  Radiation therapy will begin October 1, 2020 as described above and we have discussed on the basis of the findings per his genomic testing that he is a candidate for a number of additional treatment approaches.  Considering he does not have significant organ involvement and that he has a positive PDL 1 at 20% and a TMB 28 mutations per megabase it would seem reasonable (particularly with his history of CLL) to try to use immunotherapy as monotherapy initially.  This might provide control and allow us not to affect his hematologic illness in any significant way.  We discussed that he will need a PowerPort placement in the near future but that we could start Keytruda shortly thereafter.  Patient was able to proceed into palliative therapy undergoing radiation therapy to the right eighth rib 3 and 50 cGy per fraction x10 between October 1 of October 14.  A PowerPort was placed October 15, 2020.        Mr. Freitas returns to the office October 21, 2020 indicating, wonderfully, that his pain has nearly resolved and he does not need to  take short acting pain medications at this point.  Unfortunately he has had severe constipation we discussed his laxatives in depth as to the use, schedule and expected results as he reduces his narcotic use and moves into immunotherapy.  We have also discussed his PET/CT that does refer to an abnormality seen in the rectum that will need to be assessed by colonoscopy.  He states further that he is having lower extremity swelling beginning over the last several weeks right greater than left.  His breathing remains generally improved though he does have cough periodically and mild degree of hemoptysis.    The patient proceeded with his first Keytruda October 21, 2020 and, fortunately, had little side effects from its use thereafter.  He had noted mild hemoptysis as well as left lower extremity weakness and discomfort requiring periodic pain medication.  As he is next seen November 11, 2020 and a second cycle as planned of Keytruda his knee pain has worsened and he is currently using a knee brace, alternating heat and cold with variable results.  He has not had previous injury to the knee.  The patient is also clearly suffering in terms of his concern about his multiple ongoing issues noting that his symptoms of depression are worsening.       We made plans to continue Imbruvica, and have the patient have follow-up scans.  Unfortunately he presented with incarcerated right inguinal hernia required admission November 16, 2020 ultimately reducing.  He had subsequent mild ileus that slowly resolved been seen by GI.  Repeat scans November 17 that demonstrate rather rapid increase in the right eighth rib lesion and projecting in thoracic cavity, moderate to large right-sided pleural effusion, enlargement of spiculated left upper lobe mass now measuring 2.4 x 1.6 and a new left adrenal mass as well as enlarged retrocrural lymph node.     On November 23, 2020 he underwent da Kavin robot assisted laparoscopic bilateral  inguinal hernia repairs.  His Imbruvica has been held in around the surgery but restarted November 24.     The patient is next seen back in December 2, 2020 and his multiple issues including CLL/SSL on Imbruvica, metastatic non-small cell lung cancer with lack of response using immunotherapy as primary treatment, targeted treatments such as trametinib that given with Imbruvica can accelerate hypertension, recent requirement of bilateral inguinal hernia repair, follow-up with GI with opioid-induced constipation and abnormality seen on PET/CT requiring eventual colonoscopy and worsening depression and anxiety addressed by counseling, and institution of Remeron as well as as needed Xanax.     During his hospitalization we had seen the patient with the possibility of his development of pseudoprogression having had only 2 treatments with Keytruda.  Though he has evidence of progression he request that we continue with Keytruda by itself at this point to determine if it has truly failed to control his disease.  He counters indicating that he is feeling better overall with stable appetite though has lost approximately 4 pounds.  In further discussion we have agreed that he will take 2 additional treatments with Keytruda and then undergo repeat scans to determine his true status and, at that point, if he has progressed we have moved to carboplatin alimta chemotherapy along with Keytruda.  There are targeted agents available though they do interact likely with his Imbruvica accelerated hypertension.  Finally we need to better understand his left knee pain since this is truly affecting his performance status.  Please note that Xgeva was initiated December 2, 2020  The patient was given Keytruda and Xgeva December 2.  A follow-up MRI of the knee revealed a bone lesion along the posterior aspect of the distal femoral diametaphysis just medial of midline representing metastatic lesion measuring 1.9 x 2.2 initiation of cortical  "destruction along the posterior aspect of the femur.  Tumor extends cephalad partially superficial cortex/the lesion overall measures 3.5 cm.    The patient seen in office December 23, 2020 indicating that his knee is manageable at this point as long as he \"starts walking\" his MRI, however, is concerning enough to request orthopedic oncology assessment and radiation therapy consultation as we also try to determine whether his disease is controlled with Keytruda as a single agent or whether we need to move to systemic chemotherapy along with immunotherapy.  Notably he states when seen December 23, 2020 that the swelling per his right upper chest is now resolved.          Past Medical History, Past Surgical History, Social History, Family History have been reviewed and are without significant changes except as mentioned.    Review of Systems   Constitutional: Positive for fatigue. Negative for unexpected weight change.   HENT: Negative.    Eyes: Negative.    Respiratory: Negative.  Negative for cough, chest tightness, shortness of breath and wheezing.         Infrequent mild hemoptysis   Cardiovascular: Positive for leg swelling.   Gastrointestinal: Negative for abdominal pain, constipation, diarrhea, nausea and vomiting.   Endocrine: Negative.    Genitourinary: Negative.  Negative for testicular pain.   Musculoskeletal: Positive for arthralgias.   Skin: Negative.  Negative for rash.   Allergic/Immunologic: Negative.    Neurological: Negative.  Negative for weakness.   Psychiatric/Behavioral: Positive for sleep disturbance.   All other systems reviewed and are negative.         Medications:  The current medication list was reviewed in the EMR    ALLERGIES:  No Known Allergies    Objective      Vitals:    12/23/20 1208   BP: 142/80   Pulse: 87   Resp: 16   Temp: 97.8 °F (36.6 °C)   TempSrc: Temporal   SpO2: 94%   Weight: 78.4 kg (172 lb 14.4 oz)   Height: 180.3 cm (70.98\")   PainSc: 0-No pain     Current Status " 12/23/2020   ECOG score 1       Physical Exam    Constitutional: He is oriented to person, place, and time. He appears well-developed and well-nourished.  He is in moderate distress per pain involving his left knee  HENT:   Head: Normocephalic.   Eyes: Conjunctivae and EOM are normal. Pupils are equal, round, and reactive to light. No scleral icterus.   Neck: Normal range of motion. Neck supple. No JVD present. No thyromegaly present.   Cardiovascular:  Normal rate, regular rhythm.  present. Exam reveals no gallop and no friction rub.   No murmur heard.  Pulmonary/Chest:He has no rales.  The patient no longer has a palpable mass approximately right seventh eighth rib laterally.  Abdominal: Soft. He exhibits no distension and no mass. There is no tenderness.  He has bilateral hernias, apparently direct, right greater than left  Musculoskeletal: Normal range of motion. He exhibits 1+ edema right lower extremity-trace edema left lower extremity, negative Homans' sign bilaterally, no effusion involving either knee.  Lymphadenopathy: No current lymphadenopathy      Skin: Rash resolved                                              Neurological: He is alert and oriented to person, place, and time. He has normal reflexes. No cranial nerve deficit.   Skin: Skin is warm and dry.                         Psychiatric: He has a normal mood and affect. His behavior is normal. Judgment normal    RECENT LABS:  Hematology     WBC   Date Value Ref Range Status   12/23/2020 8.88 3.40 - 10.80 10*3/mm3 Final     RBC   Date Value Ref Range Status   12/23/2020 4.37 4.14 - 5.80 10*6/mm3 Final     Hemoglobin   Date Value Ref Range Status   12/23/2020 11.5 (L) 13.0 - 17.7 g/dL Final     Hematocrit   Date Value Ref Range Status   12/23/2020 38.7 37.5 - 51.0 % Final     Platelets   Date Value Ref Range Status   12/23/2020 269 140 - 450 10*3/mm3 Final            Flow cytometry report on the peripheral blood as well as the lymph node biopsied at  bronchoscopy are consistent with chronic lymphocytic leukemia/small lymphocytic lymphoma.    FISH prognostic panel-Positive for deletion of 13 q. 14.3    DUPLEX VENOUS LOWER EXTREMITIES 10/22/2020    Study Impression •     Right Common Femoral:  No deep vein thrombosis noted.   •     Right Saphenofemoral Junction:  No superficial thrombophlebitis noted.   •     Right Proximal Femoral: No deep vein thrombosis noted.   •     Right Mid Femoral: No deep vein thrombosis noted. There was evidence of valvular incompetence noted.   •     Right Distal Femoral: No deep vein thrombosis noted.   •     Right Popliteal: No deep vein thrombosis noted.   •     Right Posterior Tibial: No deep vein thrombosis noted.   •     Right Peroneal: No deep vein thrombosis noted.   •     Right Gastrocnemius: No deep vein thrombosis noted.   •     Right Greater Saphenous Above Knee: No superficial thrombophlebitis noted.   •     Right Greater Saphenous Below Knee: No superficial thrombophlebitis noted.   •     Left Common Femoral: No deep vein thrombosis noted.   •     Left Saphenofemoral Junction: No superficial thrombophlebitis noted.   •     Left Proximal Femoral: No deep vein thrombosis noted.   •     Left Mid Femoral: No deep vein thrombosis noted.   •     Left Distal Femoral: No deep vein thrombosis noted.   •     Left Popliteal: No deep vein thrombosis noted.   •     Left Posterior Tibial: No deep vein thrombosis noted.    •     Left Peroneal: No deep vein thrombosis noted.   •     Left Gastrocnemius: No deep vein thrombosis noted.    •     Left Greater Saphenous Above Knee: No superficial thrombophlebitis noted.   •     Left Greater Saphenous Below Knee: No superficial thrombophlebitis noted.     LEFT KNEE MRI WITH AND WITHOUT CONTRAST 12/18/2020    FINDINGS: There is a bone lesion along the posterior aspect of the  distal femoral diametaphysis just medial to the midline, representing a  metastatic lesion. The lesion measures 1.9 x 2.2  cm axial and is  associated with cortical destruction along the posterior aspect of the  femur. A component of tumor extends cephalad, partially superficial to  the cortex such that the lesion overall measures 3.5 cm in length. There  is mild abnormal bone marrow and edema and enhancement around the  lesion. There is periosteal edema and enhancement along the medial and  posterior aspect of the femur with some periosteal new bone. The cortex  around most of the distal femur remains normal. There is overlying soft  tissue edema and enhancement.     Marrow signal in the femoral condyles, the patella, the proximal tibia,  and the proximal fibula is normal.     There is a small joint effusion without evidence of synovitis. The  ligaments and tendons of the knee are intact. There is a chronic  appearing, shallow tear of the medial meniscal posterior horn along its  undersurface with mild irregularity at the free edge. The lateral  meniscus appears normal. Cartilage throughout the knee is preserved.     IMPRESSION:  Osseous metastasis along the posterior aspect of the distal  left femoral diametaphysis with adjacent bone marrow and soft tissue  edema. Dimensions of the lesion are described above. There is no  pathologic fracture nor is there any other metastatic lesion elsewhere  around the knee.     A small tear of the medial meniscal posterior horn is incidentally noted  and probably chronic.     This report was finalized on 12/19/2020 11:15 AM by Dr. Jeffy Vick M.D.    Assessment/Plan   1.  CLL presenting with significant lymphocytosis, lymphadenopathy and splenomegaly.   Positive for deletion of 13 q. 14.3.  Patient status post 2 cycles of Treanda Rituxan with excellent response.  Reassessment July 3, 2019 with plans to switch Treanda to Imbruvica which began July 25, 2019, tolerated well. This continues to be the case.  We have discussed the possibility of adding Rituxan to shorten the time he is on  Imbruvica  and we will continue to review this though the patient has hypogammaglobulinemia at this point and we do not wish to worsen this until we are more aware of how to manage COVID-19.  As he is assessed September 9, 2020 his CLL is under good control and he will continue Imbruvica at this point at unchanged dosing.  There were no clinical changes when the patient is reassessed September 30 and October 21 November 11 and now December with the patient stable concerning CLL.            2.  Pulmonary nodules/infiltrates.  He is  status post bronchoscopy.  Is unclear at this time whether these findings are infectious or possibly related to his lymphoproliferative disorder.  His subsequent studies in late November are much improved, followed by pulmonary.       3.  Non-small cell lung carcinoma                                       He had follow-up scans performed August 26 2020 revealing a new 1.6 cm spiculated nodule within the left upper lobe, 1.2 cm left adrenal nodule, bone windows with a soft tissue mass involving the right eighth rib measuring 3.7 x 2.6 cm.  Biopsy was consistent with adenocarcinoma CK 7+, CK 20-, lung primary suspected clinically, molecular panel not yet obtained.     A PET/CT was obtained September 23, 2020 demonstrating asymmetric uptake within the right parotid gland which is unremarkable appearance on noncontrasted CT, SUV of 6 compared to 2.7.  There is no hilar, mediastinal or axillary adenopathy, findings of asymmetric moderate to intense FDG uptake within superior aspect the right chest wall anterior to the right first rib with an irregular hypodense lesion measuring 1.8 x 1.6 and SUV 4.9.  This had not been seen June 27, 2019.  There is an intensely FDG avid nodule within the lingula measuring 1.9 x 1.5 history of 12.4, FDG avid left adrenal nodule 1.9 x 1.5 with an issue 21.2.  The patient was seen by radiation therapy September 29 and started on radiation therapy to the right chest wall-35  Gy in 10 fractions.  Plans were also then proceed with SBRT to the solitary left upper lobe lesion pending insurance approval.  Further testing including TPS of 20% and foundation CDX with a TMB of greater than 10 mutations per megabase (28 mutations per megabase) and the indication that several immunotherapies could be useful in this patient's care.  Additional genomic findings include BRAF G469R/that trametinib could be useful and STK11/that everolimus could be useful.  *Discussion held as to how to proceed in particular using immunotherapy with Keytruda as monotherapy initially in this patient.  This would reduce his degree of myelosuppression.  Patient completed radiotherapy October 14, 2020 October 21 status post port placement the patient began Keytruda which she tolerated well and as he is seen November 11 we plan a second cycle.  At present he is scheduled for follow-up CT of the chest November 17, radiation therapy follow-up November 24.        The patient is reassessed after 2 cycles of Keytruda with enlargement of right eighth rib lesion, enlargement of spiculated left upper lobe lung mass, moderate to large right-sided pleural effusion, new left adrenal mass and enlarged retrocrural lymph node.  This is addressed with him November 2, 2020 with at least the possibility of pseudoprogression present.  Symptomatically he is improved in his right chest wall mass is slightly reduced clinically.  We have discussed proceeding with his third cycle of Keytruda, initiating Xgeva, having his left knee assessed and rescanning him after 4 cycles of Keytruda make a decision about extending his systemic therapy to include carboplatin, alimta and Keytruda.  The patient is seen December 23, 2020 and clinically feels better except for his left knee pain.  We have discussed proceeding with his Keytruda and then reevaluating by follow-up scan.    3.  Xgeva initiated monthly beginning December 2, 2020.  This is next due  December 30, 2020      4.  Bilateral hernia repair status post right incarcerated inguinal hernia November 23, 2020                                                                                                                                                                          5.  Iron deficiency-stable    6.  Hypogammaglobulinemia-status post IVIG with resolution of sinusitis, current levels again reviewed    7.  Pain control now addressed with MS Contin 30 mg every 12 hours, Norco 10/325 1 p.o. every 4 hours, continue laxative therapy with Senokot-S, MiraLAX and now lactulose.  He has been seen by GI with other medications offered though believes he will proceed with these laxatives presently adjusted them as needed    8.  GI follow-up with Dr. Torres planned.    9.  Lower extremity swelling-plain films left knee planned .  We will proceed with these December 2 and also request orthopedic evaluation. Dopplers October 22, 2020 with valvular incompetence in the right mid femoral, otherwise normal bilateral lower extremity venous duplex scans.    10.  Supportive oncology-ongoing therapy for anxiety and depression.  He currently is being treated with Remeron and as needed Xanax and states he is finally sleeping more effectively.      Plan:    *The patient will proceed with [facility] today    *Dr. Eliu Ochoa has been notified about the patient and he will review his MRI films    *Plan to request radiation therapy consultation for left knee lesion    *Patient return in 1 week for Xgeva, B12, start folate 1 mg p.o. daily    *2-week follow-up repeat CT of chest abdomen pelvis    *3-week follow-up MD, Keytruda, Carboplatin Alimta

## 2020-12-23 ENCOUNTER — INFUSION (OUTPATIENT)
Dept: ONCOLOGY | Facility: HOSPITAL | Age: 65
End: 2020-12-23

## 2020-12-23 ENCOUNTER — OFFICE VISIT (OUTPATIENT)
Dept: ONCOLOGY | Facility: CLINIC | Age: 65
End: 2020-12-23

## 2020-12-23 ENCOUNTER — APPOINTMENT (OUTPATIENT)
Dept: MRI IMAGING | Facility: HOSPITAL | Age: 65
End: 2020-12-23

## 2020-12-23 VITALS
SYSTOLIC BLOOD PRESSURE: 142 MMHG | WEIGHT: 172.9 LBS | OXYGEN SATURATION: 94 % | HEIGHT: 71 IN | BODY MASS INDEX: 24.21 KG/M2 | HEART RATE: 87 BPM | DIASTOLIC BLOOD PRESSURE: 80 MMHG | TEMPERATURE: 97.8 F | RESPIRATION RATE: 16 BRPM

## 2020-12-23 DIAGNOSIS — C91.10 CLL (CHRONIC LYMPHOCYTIC LEUKEMIA) (HCC): ICD-10-CM

## 2020-12-23 DIAGNOSIS — C34.12 MALIGNANT NEOPLASM OF UPPER LOBE OF LEFT LUNG (HCC): ICD-10-CM

## 2020-12-23 DIAGNOSIS — Z79.899 HIGH RISK MEDICATION USE: ICD-10-CM

## 2020-12-23 DIAGNOSIS — C34.90 LUNG CANCER METASTATIC TO BONE (HCC): ICD-10-CM

## 2020-12-23 DIAGNOSIS — Z79.899 HIGH RISK MEDICATION USE: Primary | ICD-10-CM

## 2020-12-23 DIAGNOSIS — C79.51 LUNG CANCER METASTATIC TO BONE (HCC): ICD-10-CM

## 2020-12-23 DIAGNOSIS — C34.90 LUNG CANCER METASTATIC TO BONE (HCC): Primary | ICD-10-CM

## 2020-12-23 DIAGNOSIS — C79.51 LUNG CANCER METASTATIC TO BONE (HCC): Primary | ICD-10-CM

## 2020-12-23 LAB
ALBUMIN SERPL-MCNC: 3.7 G/DL (ref 3.5–5.2)
ALBUMIN/GLOB SERPL: 1.5 G/DL (ref 1.1–2.4)
ALP SERPL-CCNC: 108 U/L (ref 38–116)
ALT SERPL W P-5'-P-CCNC: 6 U/L (ref 0–41)
ANION GAP SERPL CALCULATED.3IONS-SCNC: 9.7 MMOL/L (ref 5–15)
AST SERPL-CCNC: 8 U/L (ref 0–40)
BASOPHILS # BLD AUTO: 0.05 10*3/MM3 (ref 0–0.2)
BASOPHILS NFR BLD AUTO: 0.6 % (ref 0–1.5)
BILIRUB SERPL-MCNC: 0.4 MG/DL (ref 0.2–1.2)
BUN SERPL-MCNC: 17 MG/DL (ref 6–20)
BUN/CREAT SERPL: 16.2 (ref 7.3–30)
CALCIUM SPEC-SCNC: 8.4 MG/DL (ref 8.5–10.2)
CHLORIDE SERPL-SCNC: 103 MMOL/L (ref 98–107)
CO2 SERPL-SCNC: 25.3 MMOL/L (ref 22–29)
CREAT SERPL-MCNC: 1.05 MG/DL (ref 0.7–1.3)
DEPRECATED RDW RBC AUTO: 49.6 FL (ref 37–54)
EOSINOPHIL # BLD AUTO: 0.12 10*3/MM3 (ref 0–0.4)
EOSINOPHIL NFR BLD AUTO: 1.4 % (ref 0.3–6.2)
ERYTHROCYTE [DISTWIDTH] IN BLOOD BY AUTOMATED COUNT: 15.2 % (ref 12.3–15.4)
GFR SERPL CREATININE-BSD FRML MDRD: 71 ML/MIN/1.73
GLOBULIN UR ELPH-MCNC: 2.5 GM/DL (ref 1.8–3.5)
GLUCOSE SERPL-MCNC: 95 MG/DL (ref 74–124)
HCT VFR BLD AUTO: 38.7 % (ref 37.5–51)
HGB BLD-MCNC: 11.5 G/DL (ref 13–17.7)
IMM GRANULOCYTES # BLD AUTO: 0.05 10*3/MM3 (ref 0–0.05)
IMM GRANULOCYTES NFR BLD AUTO: 0.6 % (ref 0–0.5)
LYMPHOCYTES # BLD AUTO: 0.96 10*3/MM3 (ref 0.7–3.1)
LYMPHOCYTES NFR BLD AUTO: 10.8 % (ref 19.6–45.3)
MCH RBC QN AUTO: 26.3 PG (ref 26.6–33)
MCHC RBC AUTO-ENTMCNC: 29.7 G/DL (ref 31.5–35.7)
MCV RBC AUTO: 88.6 FL (ref 79–97)
MONOCYTES # BLD AUTO: 0.84 10*3/MM3 (ref 0.1–0.9)
MONOCYTES NFR BLD AUTO: 9.5 % (ref 5–12)
NEUTROPHILS NFR BLD AUTO: 6.86 10*3/MM3 (ref 1.7–7)
NEUTROPHILS NFR BLD AUTO: 77.1 % (ref 42.7–76)
NRBC BLD AUTO-RTO: 0 /100 WBC (ref 0–0.2)
PLATELET # BLD AUTO: 269 10*3/MM3 (ref 140–450)
PMV BLD AUTO: 11 FL (ref 6–12)
POTASSIUM SERPL-SCNC: 4.4 MMOL/L (ref 3.5–4.7)
PROT SERPL-MCNC: 6.2 G/DL (ref 6.3–8)
RBC # BLD AUTO: 4.37 10*6/MM3 (ref 4.14–5.8)
SODIUM SERPL-SCNC: 138 MMOL/L (ref 134–145)
WBC # BLD AUTO: 8.88 10*3/MM3 (ref 3.4–10.8)

## 2020-12-23 PROCEDURE — 99215 OFFICE O/P EST HI 40 MIN: CPT | Performed by: INTERNAL MEDICINE

## 2020-12-23 PROCEDURE — 85025 COMPLETE CBC W/AUTO DIFF WBC: CPT

## 2020-12-23 PROCEDURE — 80053 COMPREHEN METABOLIC PANEL: CPT

## 2020-12-23 PROCEDURE — 96413 CHEMO IV INFUSION 1 HR: CPT

## 2020-12-23 PROCEDURE — 25010000002 PEMBROLIZUMAB 100 MG/4ML SOLUTION 4 ML VIAL: Performed by: INTERNAL MEDICINE

## 2020-12-23 RX ORDER — FOLIC ACID 1 MG/1
1000 TABLET ORAL DAILY
Qty: 30 TABLET | Refills: 1 | Status: SHIPPED | OUTPATIENT
Start: 2020-12-23 | End: 2021-01-12

## 2020-12-23 RX ORDER — SODIUM CHLORIDE 9 MG/ML
250 INJECTION, SOLUTION INTRAVENOUS ONCE
Status: COMPLETED | OUTPATIENT
Start: 2020-12-23 | End: 2020-12-23

## 2020-12-23 RX ORDER — SODIUM CHLORIDE 9 MG/ML
250 INJECTION, SOLUTION INTRAVENOUS ONCE
Status: CANCELLED | OUTPATIENT
Start: 2020-12-23

## 2020-12-23 RX ADMIN — SODIUM CHLORIDE 250 ML: 9 INJECTION, SOLUTION INTRAVENOUS at 14:01

## 2020-12-23 RX ADMIN — SODIUM CHLORIDE 200 MG: 9 INJECTION, SOLUTION INTRAVENOUS at 14:01

## 2020-12-28 ENCOUNTER — OFFICE VISIT (OUTPATIENT)
Dept: PSYCHIATRY | Facility: HOSPITAL | Age: 65
End: 2020-12-28

## 2020-12-28 DIAGNOSIS — F33.1 MAJOR DEPRESSIVE DISORDER, RECURRENT EPISODE, MODERATE (HCC): Primary | ICD-10-CM

## 2020-12-28 PROCEDURE — 99214 OFFICE O/P EST MOD 30 MIN: CPT | Performed by: NURSE PRACTITIONER

## 2020-12-28 NOTE — PROGRESS NOTES
[facility]  In Person Session    Subjective  Patient ID: Dav Freitas is a 65 y.o. male is seen face to face in the [facility] ([facility].    Pt noted to be alert, oriented, and engaged in conversation. Affect and mood somewhat flat, dismissive.  Pt reports sleep to be greatly improved, easy to initiate, although still fragmented. Finds most nights he is waking with ease of falling back to sleep. Feeling more restored in the morning, although by 11-12 he is ready for nap. Energy level remains reduced and is primary target sx.  Physically, pt describes to be feeling well, outside of continued pain in knee. Reports demoralization surrounding continued pain, potential need for surgical intervention, and referral to orthopedic oncologist tomorrow. Pt reviews seeing this as being a radical move that he is not sure he is interested in.  Pt reports continued goals of prioritizing cancer care, currently managing knee pain and immobility OK.  Mood and anxiety sx explored, specifically noting significant decrease in PHQ 9 since last visit/ initiation of mirtazapine.  Patient describes feeling better in all domains except finds his irritability to be especially increased.  With further explanation, patient describes increased time with spouse over holidays, and finds social isolation is challenging for both to manage.  Pt reports significant reductions if feeling down and depression, is not emotional. When queried, pt does note some affective flattening, generally contributing to an even and unexcitable demeanor.  Does recognize this could be contributing to irritability/disconnect with wife.  Reduced energy explored; patient does find this is better on days where he maintains more of a routine.  Continues to deny desire to get up and go, and often is not taking a shower or getting ready for day.  Patient confirms this would likely be helpful.    Medications Reviewed:  Mirtazapine 15 mg  nightly    Diagnoses and all orders for this visit:    1. Major depressive disorder, recurrent episode, moderate (CMS/HCC) (Primary)    Plan of Care  Patient with significant improvement in mood and anxiety symptoms, as well as insomnia, on current dosing of mirtazapine 15 mg nightly.  Despite positive improvement, patient reports increase in irritability and general lassitude.  Reviewed potential for affective flattening on medication, and will reduce to 7.5 mg nightly, acknowledging continued benefits to insomnia.  Explored with patient peer support opportunities, specifically acknowledging caregiver distress, and recommendation for engagement with [employer]  Patient with negative attributions toward this, although has accepted the information.  Additionally reviewed fatigue management strategies, benefits of routine and schedule, and encouragement of waking at same time, showering, and getting ready for day.  Briefly reviewed ability for medication assistance of energy, although patient is not interested in further discussion.  Follow-up arranged in clinic in 4 weeks.    Total time spent  face to face with patient: 28 minutes.   23 minutes spent in supportive counseling surrounding issues of reintroduction to activity through graded tasks, recognition and allowance of need for rest, importance of continued follow up , balancing avoidance with approach , benefits of peer support , early and late treatment effects, benefits of exercise, behavioral activation, medication education and exploration of QOL goals.

## 2020-12-29 DIAGNOSIS — C34.90 LUNG CANCER METASTATIC TO BONE (HCC): Primary | ICD-10-CM

## 2020-12-29 DIAGNOSIS — C79.51 LUNG CANCER METASTATIC TO BONE (HCC): ICD-10-CM

## 2020-12-29 DIAGNOSIS — C79.51 LUNG CANCER METASTATIC TO BONE (HCC): Primary | ICD-10-CM

## 2020-12-29 DIAGNOSIS — C34.90 LUNG CANCER METASTATIC TO BONE (HCC): ICD-10-CM

## 2020-12-29 RX ORDER — MORPHINE SULFATE 30 MG/1
30 TABLET, FILM COATED, EXTENDED RELEASE ORAL EVERY 12 HOURS
Qty: 60 TABLET | Refills: 0 | Status: SHIPPED | OUTPATIENT
Start: 2020-12-29 | End: 2021-02-01 | Stop reason: SDUPTHER

## 2020-12-30 ENCOUNTER — APPOINTMENT (OUTPATIENT)
Dept: ONCOLOGY | Facility: HOSPITAL | Age: 65
End: 2020-12-30

## 2020-12-30 ENCOUNTER — INFUSION (OUTPATIENT)
Dept: ONCOLOGY | Facility: HOSPITAL | Age: 65
End: 2020-12-30

## 2020-12-30 VITALS
WEIGHT: 168.4 LBS | SYSTOLIC BLOOD PRESSURE: 135 MMHG | HEART RATE: 91 BPM | OXYGEN SATURATION: 95 % | HEIGHT: 71 IN | DIASTOLIC BLOOD PRESSURE: 75 MMHG | TEMPERATURE: 98.4 F | BODY MASS INDEX: 23.57 KG/M2 | RESPIRATION RATE: 18 BRPM

## 2020-12-30 VITALS
RESPIRATION RATE: 18 BRPM | HEIGHT: 71 IN | SYSTOLIC BLOOD PRESSURE: 135 MMHG | WEIGHT: 168.4 LBS | DIASTOLIC BLOOD PRESSURE: 75 MMHG | OXYGEN SATURATION: 95 % | HEART RATE: 91 BPM | BODY MASS INDEX: 23.57 KG/M2 | TEMPERATURE: 97.8 F

## 2020-12-30 DIAGNOSIS — C79.51 LUNG CANCER METASTATIC TO BONE (HCC): ICD-10-CM

## 2020-12-30 DIAGNOSIS — C34.90 LUNG CANCER METASTATIC TO BONE (HCC): ICD-10-CM

## 2020-12-30 DIAGNOSIS — C34.12 MALIGNANT NEOPLASM OF UPPER LOBE OF LEFT LUNG (HCC): Primary | ICD-10-CM

## 2020-12-30 DIAGNOSIS — C91.10 CLL (CHRONIC LYMPHOCYTIC LEUKEMIA) (HCC): ICD-10-CM

## 2020-12-30 LAB
ALBUMIN SERPL-MCNC: 3.8 G/DL (ref 3.5–5.2)
ALBUMIN/GLOB SERPL: 1.8 G/DL
ALP SERPL-CCNC: 103 U/L (ref 39–117)
ALT SERPL W P-5'-P-CCNC: 6 U/L (ref 1–41)
ANION GAP SERPL CALCULATED.3IONS-SCNC: 6.7 MMOL/L (ref 5–15)
AST SERPL-CCNC: 7 U/L (ref 1–40)
BASOPHILS # BLD AUTO: 0.04 10*3/MM3 (ref 0–0.2)
BASOPHILS NFR BLD AUTO: 0.6 % (ref 0–1.5)
BILIRUB SERPL-MCNC: 0.3 MG/DL (ref 0–1.2)
BUN SERPL-MCNC: 17 MG/DL (ref 8–23)
BUN/CREAT SERPL: 17.9 (ref 7–25)
CALCIUM SPEC-SCNC: 8.6 MG/DL (ref 8.6–10.5)
CHLORIDE SERPL-SCNC: 106 MMOL/L (ref 98–107)
CO2 SERPL-SCNC: 27.3 MMOL/L (ref 22–29)
CREAT SERPL-MCNC: 0.95 MG/DL (ref 0.76–1.27)
DEPRECATED RDW RBC AUTO: 50.3 FL (ref 37–54)
EOSINOPHIL # BLD AUTO: 0.12 10*3/MM3 (ref 0–0.4)
EOSINOPHIL NFR BLD AUTO: 1.7 % (ref 0.3–6.2)
ERYTHROCYTE [DISTWIDTH] IN BLOOD BY AUTOMATED COUNT: 15.8 % (ref 12.3–15.4)
GFR SERPL CREATININE-BSD FRML MDRD: 79 ML/MIN/1.73
GLOBULIN UR ELPH-MCNC: 2.1 GM/DL
GLUCOSE SERPL-MCNC: 95 MG/DL (ref 65–99)
HCT VFR BLD AUTO: 37.9 % (ref 37.5–51)
HGB BLD-MCNC: 11.3 G/DL (ref 13–17.7)
IMM GRANULOCYTES # BLD AUTO: 0.03 10*3/MM3 (ref 0–0.05)
IMM GRANULOCYTES NFR BLD AUTO: 0.4 % (ref 0–0.5)
LYMPHOCYTES # BLD AUTO: 0.67 10*3/MM3 (ref 0.7–3.1)
LYMPHOCYTES NFR BLD AUTO: 9.5 % (ref 19.6–45.3)
MAGNESIUM SERPL-MCNC: 2 MG/DL (ref 1.6–2.4)
MCH RBC QN AUTO: 26 PG (ref 26.6–33)
MCHC RBC AUTO-ENTMCNC: 29.8 G/DL (ref 31.5–35.7)
MCV RBC AUTO: 87.1 FL (ref 79–97)
MONOCYTES # BLD AUTO: 0.68 10*3/MM3 (ref 0.1–0.9)
MONOCYTES NFR BLD AUTO: 9.7 % (ref 5–12)
NEUTROPHILS NFR BLD AUTO: 5.48 10*3/MM3 (ref 1.7–7)
NEUTROPHILS NFR BLD AUTO: 78.1 % (ref 42.7–76)
NRBC BLD AUTO-RTO: 0 /100 WBC (ref 0–0.2)
PHOSPHATE SERPL-MCNC: 2.4 MG/DL (ref 2.5–4.5)
PLATELET # BLD AUTO: 222 10*3/MM3 (ref 140–450)
PMV BLD AUTO: 11.5 FL (ref 6–12)
POTASSIUM SERPL-SCNC: 4.4 MMOL/L (ref 3.5–5.2)
PROT SERPL-MCNC: 5.9 G/DL (ref 6–8.5)
RBC # BLD AUTO: 4.35 10*6/MM3 (ref 4.14–5.8)
SODIUM SERPL-SCNC: 140 MMOL/L (ref 136–145)
WBC # BLD AUTO: 7.02 10*3/MM3 (ref 3.4–10.8)

## 2020-12-30 PROCEDURE — 85025 COMPLETE CBC W/AUTO DIFF WBC: CPT | Performed by: INTERNAL MEDICINE

## 2020-12-30 PROCEDURE — 83735 ASSAY OF MAGNESIUM: CPT | Performed by: INTERNAL MEDICINE

## 2020-12-30 PROCEDURE — 96523 IRRIG DRUG DELIVERY DEVICE: CPT

## 2020-12-30 PROCEDURE — 25010000002 DENOSUMAB 120 MG/1.7ML SOLUTION: Performed by: NURSE PRACTITIONER

## 2020-12-30 PROCEDURE — 84100 ASSAY OF PHOSPHORUS: CPT | Performed by: INTERNAL MEDICINE

## 2020-12-30 PROCEDURE — 80053 COMPREHEN METABOLIC PANEL: CPT | Performed by: INTERNAL MEDICINE

## 2020-12-30 PROCEDURE — 25010000003 HEPARIN LOCK FLUSH PER 10 UNITS: Performed by: INTERNAL MEDICINE

## 2020-12-30 PROCEDURE — 96372 THER/PROPH/DIAG INJ SC/IM: CPT

## 2020-12-30 RX ORDER — AMOXICILLIN 250 MG
2 CAPSULE ORAL 2 TIMES DAILY
Qty: 60 TABLET | Refills: 1 | Status: SHIPPED | OUTPATIENT
Start: 2020-12-30 | End: 2021-03-18

## 2020-12-30 RX ORDER — HEPARIN SODIUM (PORCINE) LOCK FLUSH IV SOLN 100 UNIT/ML 100 UNIT/ML
500 SOLUTION INTRAVENOUS AS NEEDED
Status: CANCELLED | OUTPATIENT
Start: 2020-12-30

## 2020-12-30 RX ORDER — SODIUM CHLORIDE 0.9 % (FLUSH) 0.9 %
10 SYRINGE (ML) INJECTION AS NEEDED
Status: CANCELLED | OUTPATIENT
Start: 2020-12-30

## 2020-12-30 RX ORDER — HEPARIN SODIUM (PORCINE) LOCK FLUSH IV SOLN 100 UNIT/ML 100 UNIT/ML
500 SOLUTION INTRAVENOUS AS NEEDED
Status: DISCONTINUED | OUTPATIENT
Start: 2020-12-30 | End: 2020-12-30 | Stop reason: HOSPADM

## 2020-12-30 RX ORDER — SODIUM CHLORIDE 0.9 % (FLUSH) 0.9 %
10 SYRINGE (ML) INJECTION AS NEEDED
Status: DISCONTINUED | OUTPATIENT
Start: 2020-12-30 | End: 2020-12-30 | Stop reason: HOSPADM

## 2020-12-30 RX ADMIN — DENOSUMAB 120 MG: 120 INJECTION SUBCUTANEOUS at 11:18

## 2020-12-30 RX ADMIN — SODIUM CHLORIDE, PRESERVATIVE FREE 10 ML: 5 INJECTION INTRAVENOUS at 10:29

## 2020-12-30 RX ADMIN — Medication 500 UNITS: at 10:29

## 2021-01-05 ENCOUNTER — APPOINTMENT (OUTPATIENT)
Dept: PREADMISSION TESTING | Facility: HOSPITAL | Age: 66
End: 2021-01-05

## 2021-01-05 VITALS
TEMPERATURE: 98 F | SYSTOLIC BLOOD PRESSURE: 125 MMHG | OXYGEN SATURATION: 96 % | WEIGHT: 170 LBS | RESPIRATION RATE: 20 BRPM | DIASTOLIC BLOOD PRESSURE: 76 MMHG | HEART RATE: 98 BPM | HEIGHT: 71 IN | BODY MASS INDEX: 23.8 KG/M2

## 2021-01-05 PROCEDURE — U0004 COV-19 TEST NON-CDC HGH THRU: HCPCS

## 2021-01-05 PROCEDURE — C9803 HOPD COVID-19 SPEC COLLECT: HCPCS

## 2021-01-05 RX ORDER — CHLORHEXIDINE GLUCONATE 500 MG/1
CLOTH TOPICAL
Status: ON HOLD | COMMUNITY
End: 2021-01-07

## 2021-01-05 NOTE — DISCHARGE INSTRUCTIONS
ARRIVE DAY OF SURGERY AT  6:00 AM TO MAIN SURGERY        Take the following medications the morning of surgery: SYMBICORT INHALER & GUAFINESIN      If you are on prescription narcotic pain medication to control your pain you may also take that medication the morning of surgery.    General Instructions:  • Do not eat solid food after midnight the night before surgery.  • You may drink clear liquids day of surgery but must stop at least one hour before your hospital arrival time.  • It is beneficial for you to have a clear drink that contains carbohydrates the day of surgery.  We suggest a 12 to 20 ounce bottle of Gatorade or Powerade for non-diabetic patients or a 12 to 20 ounce bottle of G2 or Powerade Zero for diabetic patients. (Pediatric patients, are not advised to drink a 12 to 20 ounce carbohydrate drink)    Clear liquids are liquids you can see through.  Nothing red in color.     Plain water                               Sports drinks  Sodas                                   Gelatin (Jell-O)  Fruit juices without pulp such as white grape juice and apple juice  Popsicles that contain no fruit or yogurt  Tea or coffee (no cream or milk added)  Gatorade / Powerade  G2 / Powerade Zero    • Infants may have breast milk up to four hours before surgery.  • Infants drinking formula may drink formula up to six hours before surgery.   • Patients who avoid smoking, chewing tobacco and alcohol for 4 weeks prior to surgery have a reduced risk of post-operative complications.  Quit smoking as many days before surgery as you can.  • Do not smoke, use chewing tobacco or drink alcohol the day of surgery.   • If applicable bring your C-PAP/ BI-PAP machine.  • Bring any papers given to you in the doctor’s office.  • Wear clean comfortable clothes.  • Do not wear contact lenses, false eyelashes or make-up.  Bring a case for your glasses.   • Bring crutches or walker if applicable.  • Remove all piercings.  Leave jewelry and any  other valuables at home.  • Hair extensions with metal clips must be removed prior to surgery.  • The Pre-Admission Testing nurse will instruct you to bring medications if unable to obtain an accurate list in Pre-Admission Testing.            Preventing a Surgical Site Infection:  • For 2 to 3 days before surgery, avoid shaving with a razor because the razor can irritate skin and make it easier to develop an infection.    • Any areas of open skin can increase the risk of a post-operative wound infection by allowing bacteria to enter and travel throughout the body.  Notify your surgeon if you have any skin wounds / rashes even if it is not near the expected surgical site.  The area will need assessed to determine if surgery should be delayed until it is healed.  • The night prior to surgery shower using a fresh bar of anti-bacterial soap (such as Dial) and clean washcloth.  Sleep in a clean bed with clean clothing.  Do not allow pets to sleep with you.  • Shower on the morning of surgery using a fresh bar of anti-bacterial soap (such as Dial) and clean washcloth.  Dry with a clean towel and dress in clean clothing.  • Ask your surgeon if you will be receiving antibiotics prior to surgery.  • Make sure you, your family, and all healthcare providers clean their hands with soap and water or an alcohol based hand  before caring for you or your wound.    Day of surgery:  Your arrival time is approximately two hours before your scheduled surgery time.  Upon arrival, a Pre-op nurse and Anesthesiologist will review your health history, obtain vital signs, and answer questions you may have.  The only belongings needed at this time will be a list of your home medications and if applicable your C-PAP/BI-PAP machine.  A Pre-op nurse will start an IV and you may receive medication in preparation for surgery, including something to help you relax.     Please be aware that surgery does come with discomfort.  We want to  make every effort to control your discomfort so please discuss any uncontrolled symptoms with your nurse.   Your doctor will most likely have prescribed pain medications.      If you are going home after surgery you will receive individualized written care instructions before being discharged.  A responsible adult must drive you to and from the hospital on the day of your surgery and stay with you for 24 hours.  Discharge prescriptions can be filled by the hospital pharmacy during regular pharmacy hours.  If you are having surgery late in the day/evening your prescription may be e-prescribed to your pharmacy.  Please verify your pharmacy hours or chose a 24 hour pharmacy to avoid not having access to your prescription because your pharmacy has closed for the day.    If you are staying overnight following surgery, you will be transported to your hospital room following the recovery period.  [facility] has all private rooms.    If you have any questions please call Pre-Admission Testing at [phone].  Deductibles and co-payments are collected on the day of service. Please be prepared to pay the required co-pay, deductible or deposit on the day of service as defined by your plan.    Patient Education for Self-Quarantine Process    Following your COVID testing, we strongly recommend that you do not leave your home after you have been tested for COVID except to get medical care. This includes not going to work, school or to public areas.  If this is not possible for you to do please limit your activities to only required outings.  Be sure to wear a mask when you are with other people, practice social distancing and wash your hands frequently.      The following items provide additional details to keep you safe.  • Wash your hands with soap and water frequently for at least 20 seconds.   • Avoid touching your eyes, nose and mouth with unwashed hands.  • Do not share anything - utensils, towels, food  from the same bowl.   • Have your own utensils, drinking glass, dishes, towels and bedding.   • Do not have visitors.   • Do use FaceTime to stay in touch with family and friends.  • You should stay in a specific room away from others if possible.   • Stay at least 6 feet away from others in the home if you cannot have a dedicated room to yourself.   • Do not snuggle with your pet. While the CDC says there is no evidence that pets can spread COVID-19 or be infected from humans, it is probably best to avoid “petting, snuggling, being kissed or licked and sharing food (during self-quarantine)”, according to the CDC.   • Sanitize household surfaces daily. Include all high touch areas (door handles, light switches, phones, countertops, etc.)  • Do not share a bathroom with others, if possible.   • Wear a mask around others in your home if you are unable to stay in a separate room or 6 feet apart. If  you are unable to wear a mask, have your family member wear a mask if they must be within 6 feet of you.   Call your surgeon immediately if you experience any of the following symptoms:  • Sore Throat  • Shortness of Breath or difficulty breathing  • Cough  • Chills  • Body soreness or muscle pain  • Headache  • Fever  • New loss of taste or smell  • Do not arrive for your surgery ill.  Your procedure will need to be rescheduled to another time.  You will need to call your physician before the day of surgery to avoid any unnecessary exposure to hospital staff as well as other patients.

## 2021-01-06 ENCOUNTER — TELEPHONE (OUTPATIENT)
Dept: ORTHOPEDIC SURGERY | Facility: CLINIC | Age: 66
End: 2021-01-06

## 2021-01-06 ENCOUNTER — HOSPITAL ENCOUNTER (OUTPATIENT)
Dept: CT IMAGING | Facility: HOSPITAL | Age: 66
Discharge: HOME | End: 2021-01-06
Admitting: INTERNAL MEDICINE

## 2021-01-06 ENCOUNTER — ANESTHESIA EVENT (OUTPATIENT)
Dept: PERIOP | Facility: HOSPITAL | Age: 66
End: 2021-01-06

## 2021-01-06 DIAGNOSIS — C34.90 LUNG CANCER METASTATIC TO BONE (HCC): ICD-10-CM

## 2021-01-06 DIAGNOSIS — C34.12 MALIGNANT NEOPLASM OF UPPER LOBE OF LEFT LUNG (HCC): ICD-10-CM

## 2021-01-06 DIAGNOSIS — C79.51 LUNG CANCER METASTATIC TO BONE (HCC): ICD-10-CM

## 2021-01-06 LAB — SARS-COV-2 RNA RESP QL NAA+PROBE: NOT DETECTED

## 2021-01-06 PROCEDURE — 71260 CT THORAX DX C+: CPT

## 2021-01-06 PROCEDURE — 0 DIATRIZOATE MEGLUMINE & SODIUM PER 1 ML: Performed by: INTERNAL MEDICINE

## 2021-01-06 PROCEDURE — 25010000002 IOPAMIDOL 61 % SOLUTION: Performed by: INTERNAL MEDICINE

## 2021-01-06 PROCEDURE — 82565 ASSAY OF CREATININE: CPT

## 2021-01-06 PROCEDURE — 74177 CT ABD & PELVIS W/CONTRAST: CPT

## 2021-01-06 RX ADMIN — DIATRIZOATE MEGLUMINE AND DIATRIZOATE SODIUM 30 ML: 660; 100 LIQUID ORAL; RECTAL at 13:00

## 2021-01-06 RX ADMIN — IOPAMIDOL 90 ML: 612 INJECTION, SOLUTION INTRAVENOUS at 14:01

## 2021-01-06 NOTE — TELEPHONE ENCOUNTER
I spoke to Mr. Freitas.  I provided him with the left knee MRI results as reviewed by Dr. Rodriguez.  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  Additionally, he has a meniscal tear that appears to be chronic. There is a high probability he has metastasis to the right femur as well based on similar findings on x-ray.  The right knee was not evaluated by MRI.  Dr. Rodriguez does not recommend surgery for this, however, he recommends patient continue cancer treatment care with Dr. Hope.  I am happy to refer him to another orthopedic if he chooses to get a second opinion.  Patient reports his pain has improved.  Going forward, patient will follow-up with Dr. Rodriguez in 3 months.  I will have our office call him to schedule this appointment.  He acknowledged understanding and appreciated the call.

## 2021-01-07 ENCOUNTER — HOSPITAL ENCOUNTER (OUTPATIENT)
Facility: HOSPITAL | Age: 66
Discharge: HOME HEALTH | End: 2021-01-08
Attending: ORTHOPAEDIC SURGERY | Admitting: ORTHOPAEDIC SURGERY

## 2021-01-07 ENCOUNTER — APPOINTMENT (OUTPATIENT)
Dept: GENERAL RADIOLOGY | Facility: HOSPITAL | Age: 66
End: 2021-01-07

## 2021-01-07 ENCOUNTER — ANESTHESIA (OUTPATIENT)
Dept: PERIOP | Facility: HOSPITAL | Age: 66
End: 2021-01-07

## 2021-01-07 DIAGNOSIS — C79.51 LUNG CANCER METASTATIC TO BONE (HCC): ICD-10-CM

## 2021-01-07 DIAGNOSIS — C34.90 LUNG CANCER METASTATIC TO BONE (HCC): ICD-10-CM

## 2021-01-07 LAB
ABO GROUP BLD: NORMAL
BLD GP AB SCN SERPL QL: NEGATIVE
CREAT BLDA-MCNC: 1.1 MG/DL (ref 0.6–1.3)
RH BLD: POSITIVE
T&S EXPIRATION DATE: NORMAL

## 2021-01-07 PROCEDURE — 25010000002 NEOSTIGMINE PER 0.5 MG: Performed by: NURSE ANESTHETIST, CERTIFIED REGISTERED

## 2021-01-07 PROCEDURE — 88342 IMHCHEM/IMCYTCHM 1ST ANTB: CPT | Performed by: ORTHOPAEDIC SURGERY

## 2021-01-07 PROCEDURE — 94799 UNLISTED PULMONARY SVC/PX: CPT

## 2021-01-07 PROCEDURE — G0378 HOSPITAL OBSERVATION PER HR: HCPCS

## 2021-01-07 PROCEDURE — 86850 RBC ANTIBODY SCREEN: CPT | Performed by: ORTHOPAEDIC SURGERY

## 2021-01-07 PROCEDURE — 25010000002 FENTANYL CITRATE (PF) 100 MCG/2ML SOLUTION: Performed by: ANESTHESIOLOGY

## 2021-01-07 PROCEDURE — 25010000002 HYDROMORPHONE PER 4 MG: Performed by: NURSE ANESTHETIST, CERTIFIED REGISTERED

## 2021-01-07 PROCEDURE — 25010000002 DEXAMETHASONE PER 1 MG: Performed by: NURSE ANESTHETIST, CERTIFIED REGISTERED

## 2021-01-07 PROCEDURE — 73552 X-RAY EXAM OF FEMUR 2/>: CPT

## 2021-01-07 PROCEDURE — 25010000002 ONDANSETRON PER 1 MG: Performed by: NURSE ANESTHETIST, CERTIFIED REGISTERED

## 2021-01-07 PROCEDURE — 88305 TISSUE EXAM BY PATHOLOGIST: CPT | Performed by: ORTHOPAEDIC SURGERY

## 2021-01-07 PROCEDURE — 86901 BLOOD TYPING SEROLOGIC RH(D): CPT | Performed by: ORTHOPAEDIC SURGERY

## 2021-01-07 PROCEDURE — 94640 AIRWAY INHALATION TREATMENT: CPT

## 2021-01-07 PROCEDURE — 86900 BLOOD TYPING SEROLOGIC ABO: CPT | Performed by: ORTHOPAEDIC SURGERY

## 2021-01-07 PROCEDURE — 76000 FLUOROSCOPY <1 HR PHYS/QHP: CPT

## 2021-01-07 PROCEDURE — 25010000002 SUCCINYLCHOLINE PER 20 MG: Performed by: NURSE ANESTHETIST, CERTIFIED REGISTERED

## 2021-01-07 PROCEDURE — 25010000002 FENTANYL CITRATE (PF) 100 MCG/2ML SOLUTION: Performed by: NURSE ANESTHETIST, CERTIFIED REGISTERED

## 2021-01-07 PROCEDURE — C1769 GUIDE WIRE: HCPCS | Performed by: ORTHOPAEDIC SURGERY

## 2021-01-07 PROCEDURE — 88311 DECALCIFY TISSUE: CPT | Performed by: ORTHOPAEDIC SURGERY

## 2021-01-07 PROCEDURE — 25010000002 PROPOFOL 10 MG/ML EMULSION: Performed by: NURSE ANESTHETIST, CERTIFIED REGISTERED

## 2021-01-07 PROCEDURE — 25010000003 CEFAZOLIN IN DEXTROSE 2-4 GM/100ML-% SOLUTION: Performed by: ORTHOPAEDIC SURGERY

## 2021-01-07 PROCEDURE — 25010000002 PHENYLEPHRINE PER 1 ML: Performed by: NURSE ANESTHETIST, CERTIFIED REGISTERED

## 2021-01-07 PROCEDURE — C1713 ANCHOR/SCREW BN/BN,TIS/BN: HCPCS | Performed by: ORTHOPAEDIC SURGERY

## 2021-01-07 DEVICE — PHOTODYNAMIC BONE STABILIZATION PROCEDURE PACK
Type: IMPLANTABLE DEVICE | Site: FEMUR | Status: FUNCTIONAL
Brand: PHOTODYNAMIC BONE STABILIZATION PROCEDURE PACK

## 2021-01-07 RX ORDER — LABETALOL HYDROCHLORIDE 5 MG/ML
5 INJECTION, SOLUTION INTRAVENOUS
Status: DISCONTINUED | OUTPATIENT
Start: 2021-01-07 | End: 2021-01-07 | Stop reason: HOSPADM

## 2021-01-07 RX ORDER — FENTANYL CITRATE 50 UG/ML
50 INJECTION, SOLUTION INTRAMUSCULAR; INTRAVENOUS
Status: DISCONTINUED | OUTPATIENT
Start: 2021-01-07 | End: 2021-01-07 | Stop reason: HOSPADM

## 2021-01-07 RX ORDER — GLYCOPYRROLATE 0.2 MG/ML
INJECTION INTRAMUSCULAR; INTRAVENOUS AS NEEDED
Status: DISCONTINUED | OUTPATIENT
Start: 2021-01-07 | End: 2021-01-07 | Stop reason: SURG

## 2021-01-07 RX ORDER — NALOXONE HCL 0.4 MG/ML
0.2 VIAL (ML) INJECTION AS NEEDED
Status: DISCONTINUED | OUTPATIENT
Start: 2021-01-07 | End: 2021-01-07 | Stop reason: HOSPADM

## 2021-01-07 RX ORDER — ONDANSETRON 2 MG/ML
4 INJECTION INTRAMUSCULAR; INTRAVENOUS ONCE AS NEEDED
Status: COMPLETED | OUTPATIENT
Start: 2021-01-07 | End: 2021-01-07

## 2021-01-07 RX ORDER — EPHEDRINE SULFATE 50 MG/ML
5 INJECTION, SOLUTION INTRAVENOUS ONCE AS NEEDED
Status: DISCONTINUED | OUTPATIENT
Start: 2021-01-07 | End: 2021-01-07 | Stop reason: HOSPADM

## 2021-01-07 RX ORDER — HYDROCODONE BITARTRATE AND ACETAMINOPHEN 10; 325 MG/1; MG/1
1 TABLET ORAL EVERY 4 HOURS PRN
Status: DISCONTINUED | OUTPATIENT
Start: 2021-01-07 | End: 2021-01-08 | Stop reason: HOSPADM

## 2021-01-07 RX ORDER — ALPRAZOLAM 0.25 MG/1
0.25 TABLET ORAL 3 TIMES DAILY PRN
Status: DISCONTINUED | OUTPATIENT
Start: 2021-01-07 | End: 2021-01-08 | Stop reason: HOSPADM

## 2021-01-07 RX ORDER — GUAIFENESIN 200 MG/1
400 TABLET ORAL 2 TIMES DAILY
Status: DISCONTINUED | OUTPATIENT
Start: 2021-01-07 | End: 2021-01-08 | Stop reason: HOSPADM

## 2021-01-07 RX ORDER — POLYETHYLENE GLYCOL 3350 17 G/17G
17 POWDER, FOR SOLUTION ORAL EVERY 12 HOURS
Status: DISCONTINUED | OUTPATIENT
Start: 2021-01-07 | End: 2021-01-08 | Stop reason: HOSPADM

## 2021-01-07 RX ORDER — SUCCINYLCHOLINE CHLORIDE 20 MG/ML
INJECTION INTRAMUSCULAR; INTRAVENOUS AS NEEDED
Status: DISCONTINUED | OUTPATIENT
Start: 2021-01-07 | End: 2021-01-07 | Stop reason: SURG

## 2021-01-07 RX ORDER — MAGNESIUM HYDROXIDE 1200 MG/15ML
LIQUID ORAL AS NEEDED
Status: DISCONTINUED | OUTPATIENT
Start: 2021-01-07 | End: 2021-01-07 | Stop reason: HOSPADM

## 2021-01-07 RX ORDER — ONDANSETRON 2 MG/ML
INJECTION INTRAMUSCULAR; INTRAVENOUS AS NEEDED
Status: DISCONTINUED | OUTPATIENT
Start: 2021-01-07 | End: 2021-01-07 | Stop reason: SURG

## 2021-01-07 RX ORDER — AMOXICILLIN 250 MG
2 CAPSULE ORAL 2 TIMES DAILY
Status: DISCONTINUED | OUTPATIENT
Start: 2021-01-07 | End: 2021-01-08 | Stop reason: HOSPADM

## 2021-01-07 RX ORDER — CEFAZOLIN SODIUM 2 G/100ML
2 INJECTION, SOLUTION INTRAVENOUS ONCE
Status: COMPLETED | OUTPATIENT
Start: 2021-01-07 | End: 2021-01-07

## 2021-01-07 RX ORDER — ONDANSETRON 2 MG/ML
4 INJECTION INTRAMUSCULAR; INTRAVENOUS EVERY 6 HOURS PRN
Status: DISCONTINUED | OUTPATIENT
Start: 2021-01-07 | End: 2021-01-08 | Stop reason: HOSPADM

## 2021-01-07 RX ORDER — ROCURONIUM BROMIDE 10 MG/ML
INJECTION, SOLUTION INTRAVENOUS AS NEEDED
Status: DISCONTINUED | OUTPATIENT
Start: 2021-01-07 | End: 2021-01-07 | Stop reason: SURG

## 2021-01-07 RX ORDER — FOLIC ACID 1 MG/1
1000 TABLET ORAL DAILY
Status: DISCONTINUED | OUTPATIENT
Start: 2021-01-07 | End: 2021-01-08 | Stop reason: HOSPADM

## 2021-01-07 RX ORDER — PROMETHAZINE HYDROCHLORIDE 25 MG/1
25 TABLET ORAL ONCE AS NEEDED
Status: DISCONTINUED | OUTPATIENT
Start: 2021-01-07 | End: 2021-01-07 | Stop reason: HOSPADM

## 2021-01-07 RX ORDER — HYDROMORPHONE HYDROCHLORIDE 1 MG/ML
0.5 INJECTION, SOLUTION INTRAMUSCULAR; INTRAVENOUS; SUBCUTANEOUS
Status: DISCONTINUED | OUTPATIENT
Start: 2021-01-07 | End: 2021-01-07 | Stop reason: HOSPADM

## 2021-01-07 RX ORDER — CEFAZOLIN SODIUM 2 G/100ML
2 INJECTION, SOLUTION INTRAVENOUS EVERY 8 HOURS
Status: COMPLETED | OUTPATIENT
Start: 2021-01-07 | End: 2021-01-08

## 2021-01-07 RX ORDER — OXYCODONE AND ACETAMINOPHEN 7.5; 325 MG/1; MG/1
1 TABLET ORAL ONCE AS NEEDED
Status: DISCONTINUED | OUTPATIENT
Start: 2021-01-07 | End: 2021-01-07 | Stop reason: HOSPADM

## 2021-01-07 RX ORDER — MIRTAZAPINE 15 MG/1
15 TABLET, FILM COATED ORAL NIGHTLY
Status: DISCONTINUED | OUTPATIENT
Start: 2021-01-07 | End: 2021-01-08 | Stop reason: HOSPADM

## 2021-01-07 RX ORDER — HYDROCODONE BITARTRATE AND ACETAMINOPHEN 7.5; 325 MG/1; MG/1
1 TABLET ORAL ONCE AS NEEDED
Status: DISCONTINUED | OUTPATIENT
Start: 2021-01-07 | End: 2021-01-07 | Stop reason: HOSPADM

## 2021-01-07 RX ORDER — ASPIRIN 325 MG
325 TABLET, DELAYED RELEASE (ENTERIC COATED) ORAL DAILY
Status: DISCONTINUED | OUTPATIENT
Start: 2021-01-08 | End: 2021-01-08 | Stop reason: HOSPADM

## 2021-01-07 RX ORDER — BUPIVACAINE HYDROCHLORIDE AND EPINEPHRINE 5; 5 MG/ML; UG/ML
INJECTION, SOLUTION PERINEURAL AS NEEDED
Status: DISCONTINUED | OUTPATIENT
Start: 2021-01-07 | End: 2021-01-07 | Stop reason: HOSPADM

## 2021-01-07 RX ORDER — DIPHENHYDRAMINE HYDROCHLORIDE 50 MG/ML
12.5 INJECTION INTRAMUSCULAR; INTRAVENOUS
Status: DISCONTINUED | OUTPATIENT
Start: 2021-01-07 | End: 2021-01-07 | Stop reason: HOSPADM

## 2021-01-07 RX ORDER — MORPHINE SULFATE 30 MG/1
30 TABLET, FILM COATED, EXTENDED RELEASE ORAL EVERY 12 HOURS
Status: DISCONTINUED | OUTPATIENT
Start: 2021-01-07 | End: 2021-01-08 | Stop reason: HOSPADM

## 2021-01-07 RX ORDER — PROMETHAZINE HYDROCHLORIDE 25 MG/1
25 SUPPOSITORY RECTAL ONCE AS NEEDED
Status: DISCONTINUED | OUTPATIENT
Start: 2021-01-07 | End: 2021-01-07 | Stop reason: HOSPADM

## 2021-01-07 RX ORDER — LIDOCAINE HYDROCHLORIDE 20 MG/ML
INJECTION, SOLUTION INFILTRATION; PERINEURAL AS NEEDED
Status: DISCONTINUED | OUTPATIENT
Start: 2021-01-07 | End: 2021-01-07 | Stop reason: SURG

## 2021-01-07 RX ORDER — HYDRALAZINE HYDROCHLORIDE 20 MG/ML
5 INJECTION INTRAMUSCULAR; INTRAVENOUS
Status: DISCONTINUED | OUTPATIENT
Start: 2021-01-07 | End: 2021-01-07 | Stop reason: HOSPADM

## 2021-01-07 RX ORDER — ALBUTEROL SULFATE 2.5 MG/3ML
2.5 SOLUTION RESPIRATORY (INHALATION) EVERY 4 HOURS PRN
Status: DISCONTINUED | OUTPATIENT
Start: 2021-01-07 | End: 2021-01-08 | Stop reason: HOSPADM

## 2021-01-07 RX ORDER — BUDESONIDE AND FORMOTEROL FUMARATE DIHYDRATE 160; 4.5 UG/1; UG/1
2 AEROSOL RESPIRATORY (INHALATION) 2 TIMES DAILY
Status: DISCONTINUED | OUTPATIENT
Start: 2021-01-07 | End: 2021-01-08 | Stop reason: HOSPADM

## 2021-01-07 RX ORDER — PROPOFOL 10 MG/ML
VIAL (ML) INTRAVENOUS AS NEEDED
Status: DISCONTINUED | OUTPATIENT
Start: 2021-01-07 | End: 2021-01-07 | Stop reason: SURG

## 2021-01-07 RX ORDER — LIDOCAINE HYDROCHLORIDE 10 MG/ML
0.5 INJECTION, SOLUTION EPIDURAL; INFILTRATION; INTRACAUDAL; PERINEURAL ONCE AS NEEDED
Status: DISCONTINUED | OUTPATIENT
Start: 2021-01-07 | End: 2021-01-07 | Stop reason: HOSPADM

## 2021-01-07 RX ORDER — SODIUM CHLORIDE, SODIUM LACTATE, POTASSIUM CHLORIDE, CALCIUM CHLORIDE 600; 310; 30; 20 MG/100ML; MG/100ML; MG/100ML; MG/100ML
75 INJECTION, SOLUTION INTRAVENOUS CONTINUOUS
Status: DISCONTINUED | OUTPATIENT
Start: 2021-01-07 | End: 2021-01-08 | Stop reason: HOSPADM

## 2021-01-07 RX ORDER — SODIUM CHLORIDE, SODIUM LACTATE, POTASSIUM CHLORIDE, CALCIUM CHLORIDE 600; 310; 30; 20 MG/100ML; MG/100ML; MG/100ML; MG/100ML
9 INJECTION, SOLUTION INTRAVENOUS CONTINUOUS
Status: DISCONTINUED | OUTPATIENT
Start: 2021-01-07 | End: 2021-01-07

## 2021-01-07 RX ORDER — SODIUM CHLORIDE 0.9 % (FLUSH) 0.9 %
3 SYRINGE (ML) INJECTION EVERY 12 HOURS SCHEDULED
Status: DISCONTINUED | OUTPATIENT
Start: 2021-01-07 | End: 2021-01-07 | Stop reason: HOSPADM

## 2021-01-07 RX ORDER — FLUMAZENIL 0.1 MG/ML
0.2 INJECTION INTRAVENOUS AS NEEDED
Status: DISCONTINUED | OUTPATIENT
Start: 2021-01-07 | End: 2021-01-07 | Stop reason: HOSPADM

## 2021-01-07 RX ORDER — DEXAMETHASONE SODIUM PHOSPHATE 10 MG/ML
INJECTION INTRAMUSCULAR; INTRAVENOUS AS NEEDED
Status: DISCONTINUED | OUTPATIENT
Start: 2021-01-07 | End: 2021-01-07 | Stop reason: SURG

## 2021-01-07 RX ORDER — MIDAZOLAM HYDROCHLORIDE 1 MG/ML
1 INJECTION INTRAMUSCULAR; INTRAVENOUS
Status: DISCONTINUED | OUTPATIENT
Start: 2021-01-07 | End: 2021-01-07 | Stop reason: HOSPADM

## 2021-01-07 RX ORDER — DIPHENHYDRAMINE HCL 25 MG
25 CAPSULE ORAL
Status: DISCONTINUED | OUTPATIENT
Start: 2021-01-07 | End: 2021-01-07 | Stop reason: HOSPADM

## 2021-01-07 RX ORDER — SODIUM CHLORIDE 0.9 % (FLUSH) 0.9 %
3-10 SYRINGE (ML) INJECTION AS NEEDED
Status: DISCONTINUED | OUTPATIENT
Start: 2021-01-07 | End: 2021-01-07 | Stop reason: HOSPADM

## 2021-01-07 RX ADMIN — FENTANYL CITRATE 100 MCG: 50 INJECTION INTRAMUSCULAR; INTRAVENOUS at 08:17

## 2021-01-07 RX ADMIN — PHENYLEPHRINE HYDROCHLORIDE 200 MCG: 10 INJECTION INTRAVENOUS at 08:32

## 2021-01-07 RX ADMIN — BUDESONIDE AND FORMOTEROL FUMARATE DIHYDRATE 2 PUFF: 160; 4.5 AEROSOL RESPIRATORY (INHALATION) at 19:32

## 2021-01-07 RX ADMIN — SODIUM CHLORIDE, POTASSIUM CHLORIDE, SODIUM LACTATE AND CALCIUM CHLORIDE 75 ML/HR: 600; 310; 30; 20 INJECTION, SOLUTION INTRAVENOUS at 13:18

## 2021-01-07 RX ADMIN — SUCCINYLCHOLINE CHLORIDE 140 MG: 20 INJECTION, SOLUTION INTRAMUSCULAR; INTRAVENOUS; PARENTERAL at 08:19

## 2021-01-07 RX ADMIN — MORPHINE SULFATE 30 MG: 30 TABLET, FILM COATED, EXTENDED RELEASE ORAL at 13:23

## 2021-01-07 RX ADMIN — PHENYLEPHRINE HYDROCHLORIDE 200 MCG: 10 INJECTION INTRAVENOUS at 08:41

## 2021-01-07 RX ADMIN — LIDOCAINE HYDROCHLORIDE 60 MG: 20 INJECTION, SOLUTION INFILTRATION; PERINEURAL at 08:19

## 2021-01-07 RX ADMIN — CEFAZOLIN SODIUM 2 G: 2 INJECTION, SOLUTION INTRAVENOUS at 16:32

## 2021-01-07 RX ADMIN — PHENYLEPHRINE HYDROCHLORIDE 200 MCG: 10 INJECTION INTRAVENOUS at 09:55

## 2021-01-07 RX ADMIN — ONDANSETRON 4 MG: 2 INJECTION INTRAMUSCULAR; INTRAVENOUS at 10:29

## 2021-01-07 RX ADMIN — PHENYLEPHRINE HYDROCHLORIDE 200 MCG: 10 INJECTION INTRAVENOUS at 08:57

## 2021-01-07 RX ADMIN — DEXAMETHASONE SODIUM PHOSPHATE 6 MG: 10 INJECTION INTRAMUSCULAR; INTRAVENOUS at 08:44

## 2021-01-07 RX ADMIN — GUAIFENESIN 400 MG: 200 TABLET ORAL at 15:25

## 2021-01-07 RX ADMIN — PHENYLEPHRINE HYDROCHLORIDE 200 MCG: 10 INJECTION INTRAVENOUS at 09:38

## 2021-01-07 RX ADMIN — HYDROMORPHONE HYDROCHLORIDE 0.5 MG: 1 INJECTION, SOLUTION INTRAMUSCULAR; INTRAVENOUS; SUBCUTANEOUS at 10:47

## 2021-01-07 RX ADMIN — PHENYLEPHRINE HYDROCHLORIDE 200 MCG: 10 INJECTION INTRAVENOUS at 09:17

## 2021-01-07 RX ADMIN — ONDANSETRON HYDROCHLORIDE 4 MG: 2 SOLUTION INTRAMUSCULAR; INTRAVENOUS at 09:57

## 2021-01-07 RX ADMIN — DOCUSATE SODIUM 50MG AND SENNOSIDES 8.6MG 2 TABLET: 8.6; 5 TABLET, FILM COATED ORAL at 13:17

## 2021-01-07 RX ADMIN — FENTANYL CITRATE 50 MCG: 50 INJECTION, SOLUTION INTRAMUSCULAR; INTRAVENOUS at 10:42

## 2021-01-07 RX ADMIN — MIRTAZAPINE 15 MG: 15 TABLET, FILM COATED ORAL at 20:47

## 2021-01-07 RX ADMIN — POLYETHYLENE GLYCOL 3350 17 G: 17 POWDER, FOR SOLUTION ORAL at 13:17

## 2021-01-07 RX ADMIN — GUAIFENESIN 400 MG: 200 TABLET ORAL at 20:47

## 2021-01-07 RX ADMIN — NEOSTIGMINE METHYLSULFATE 3 MG: 1 INJECTION INTRAMUSCULAR; INTRAVENOUS; SUBCUTANEOUS at 10:12

## 2021-01-07 RX ADMIN — FENTANYL CITRATE 50 MCG: 50 INJECTION, SOLUTION INTRAMUSCULAR; INTRAVENOUS at 10:31

## 2021-01-07 RX ADMIN — PROPOFOL 170 MG: 10 INJECTION, EMULSION INTRAVENOUS at 08:19

## 2021-01-07 RX ADMIN — SODIUM CHLORIDE, POTASSIUM CHLORIDE, SODIUM LACTATE AND CALCIUM CHLORIDE: 600; 310; 30; 20 INJECTION, SOLUTION INTRAVENOUS at 08:14

## 2021-01-07 RX ADMIN — SODIUM CHLORIDE, POTASSIUM CHLORIDE, SODIUM LACTATE AND CALCIUM CHLORIDE: 600; 310; 30; 20 INJECTION, SOLUTION INTRAVENOUS at 09:00

## 2021-01-07 RX ADMIN — PHENYLEPHRINE HYDROCHLORIDE 100 MCG: 10 INJECTION INTRAVENOUS at 08:37

## 2021-01-07 RX ADMIN — PHENYLEPHRINE HYDROCHLORIDE 200 MCG: 10 INJECTION INTRAVENOUS at 09:51

## 2021-01-07 RX ADMIN — GLYCOPYRROLATE 0.4 MG: 0.2 INJECTION INTRAMUSCULAR; INTRAVENOUS at 10:12

## 2021-01-07 RX ADMIN — ROCURONIUM BROMIDE 10 MG: 10 INJECTION INTRAVENOUS at 08:19

## 2021-01-07 RX ADMIN — FOLIC ACID 1000 MCG: 1 TABLET ORAL at 15:25

## 2021-01-07 RX ADMIN — CEFAZOLIN SODIUM 2 G: 2 INJECTION, SOLUTION INTRAVENOUS at 23:32

## 2021-01-07 RX ADMIN — CEFAZOLIN SODIUM 2 MG: 2 INJECTION, SOLUTION INTRAVENOUS at 08:24

## 2021-01-07 NOTE — PLAN OF CARE
Goal Outcome Evaluation:  Plan of Care Reviewed With: patient  Progress: improving  Outcome Summary: vss. dsg cdi pain tolerable with po prn meds ambulates well with x1 assist with brp. plan to dc tommorrow. educated on fall prevention.

## 2021-01-07 NOTE — DISCHARGE PLACEMENT REQUEST
"Stella Cason (65 y.o. Male)     Date of Birth Social Security Number Address Home Phone MRN    1955  [id number] [facility] [address] [phone] [id number]    J[employer] Marital Status          None        Admission Date Admission Type Admitting Provider Attending Provider Department, Room/Bed    1/7/21 Elective Eliu Ochoa MD Price, Shawn L, MD [address], P899/1    Discharge Date Discharge Disposition Discharge Destination                       Attending Provider: Eliu Ochoa MD    Allergies: No Known Allergies    Isolation: None   Infection: None   Code Status: CPR    Ht: 180.3 cm (71\")   Wt: 77.2 kg (170 lb 3.1 oz)    Admission Cmt: None   Principal Problem: Lung cancer metastatic to bone (CMS/HCC) [C34.90,C79.51]                 Active Insurance as of 1/7/2021     Primary Coverage     Payor Plan Insurance Group Employer/Plan Group    MEDICARE MEDICARE A & B      Payor Plan Address Payor Plan Phone Number Payor Plan Fax Number Effective Dates    [address] [phone]  8/1/2019 - None Entered    [facility] [id number]       Subscriber Name Subscriber Birth Date Member ID       STELLA CASON 1955 [id number]           Secondary Coverage     Payor Plan Insurance Group Employer/Plan Group    AETNA COMMERCIAL AETNA   SUPP PLAN G     Payor Plan Address Payor Plan Phone Number Payor Plan Fax Number Effective Dates    [address] [phone]  8/1/2019 - None Entered    [facility] [id number]       Subscriber Name Subscriber Birth Date Member ID       STELLA CASON 1955 [id number]                 Emergency Contacts      (Rel.) Home Phone Work Phone Mobile Phone    Nikky Todd (Spouse) [phone] -- --    FortinoKrunal (Brother) [phone] -- --              "

## 2021-01-07 NOTE — OP NOTE
Orthopedic Operative Note    Patient Name:  Dav Freitas    MRN:  [id number]    YOB: 1955    Date of Surgery:1/7/2021     Pre-op Diagnosis:   1.  Metastatic Lung CA   2.  Impending pathologic fracture left distal femur     Post-op Diagnosis:  Same       Procedure(s):  1.  Intralesional curettage left femur bone lesion  2.  PROPHYLACTIC STABILIZATION LEFT IMPENDING PATHOLOGIC FRACTURE     Surgeon(s):  Eliu Ochoa MD     Anesthesia:  GETA with local     Staff:   Circulator: Regan Aragon RN  Radiology Technologist: Fernanda Otto  Scrub Person: Timoteo Moore  Vendor Representative: Glenn Lobo  Orientee: Dejah Starr RN    Estimated Blood Loss: 500 mL    Specimens:                Order Name Source Comment Collection Info Order Time   TISSUE PATHOLOGY EXAM Leg, Left  Collected By: Eliu Ochoa MD 1/7/2021  9:30 AM       Complications:  None     Implants: Illuminoss photodynamic bone stabilization system 13 mm x 220 mm x 2     Indications: Mr. Freitas  is a 65 y.o. male with a history of metastatic lung carcinoma in addition to chronic lymphocytic leukemia.  The patient has had a history of left knee pain and on imaging found to have a lesion with cortical disruption in the posterior medial aspect of the distal femoral metaphysis.  Given the risk of fracture we discussed operative intervention which would include a cephalo-medullary nail to stabilize the femur and protected weightbearing.  Risks of the procedure were discussed these included were not limited to bleeding, infection, damage to adjacent structures, DVT, PE, fracture, need for additional procedures. The patient understood the risks and elected to proceed.     Description of procedure:     Dav Freitas was seen and evaluated in preoperative holding area found to be neurovascularly intact the operative extremity was confirmed and marked as the operative extremity.  Consent was verified.     The patient  was transferred  to the OR#[id number]  at [facility].  Satisfactory anesthesia was induced and the patient was positioned on the operative table.  The operative extremity was preprepped with alcohol then prepped and draped normal sterile fashion with ChloraPrep.  Timeout was performed on agreement patient identity laterality procedure to be performed he received 2 g of Ancef prior to incision.     Utilizing fluoroscopic assistance and palpation longitudinal incisions were made along the medial and lateral epicondyles blunt dissection was carried down onto the bone first laterally.  Utilizing the Avitus bone graft harvester system an 11 millimeter opening reamer implant was placed avoiding the LCL origin, in the region of the lateral epicondyle.  After access to the bone was obtained the Avitus device was placed within the lateral cortex angled towards the medial defect and medial lesion and harvesting of the lesion as well as marrow contents was performed utilizing back-and-forth curetting type technique.  After this was performed I moved medially.    Utilizing fluoroscopic assistance, the opening reamer was utilized to obtain access to the bone avoiding the MCL origin and the MPFL origin.  After I obtained access to the intramedullary contents the bone graft harvester was introduced and again moving medially and laterally in a back-and-forth anterior/posterior, proximal/distal orientation graft and tumor were harvested.    After this was performed attention was focused to the placement of intramedullary implants both medially and laterally.  Guidewire was placed laterally and advanced into the left femoral diaphysis.  Reaming was performed up to a size 10.  Sheath was advanced over the guidewire.  The inner sleeve was removed and the Iluminoss photodynamic bone stabilization system implant was placed into the sheath having been prepared according to technique on the back table previously.  I then moved medially, guidewire  was placed.  Sheath was placed over the guidewire.  The inner sheath was removed and the same length and diameter implant was then placed into the medial bone defect.  After both implants were placed the outer sheath sheaths were removed according to technique.  The implant position was confirmed utilizing fluoroscopic assistance.  The light source was connected to the implant at that time.    Simultaneously the monomer was injected into both implants until resistance to injection of further monomer was obtained.  The valve was closed and fluoroscopic imaging was obtained to check the final position both on AP and lateral radiographs.  I was satisfied with the position and therefore the light source for both implants was activated.    After the predetermined time of light activation was complete, the catheters were cut at the hub of monomer injection medially and laterally.  Scoring device was then applied to the base of the implant and confirmed on fluoroscopic views and remaining catheter tail and light source catheter were removed both on the lateral and medial sides.    The wounds were copiously irrigated with normal saline solution. Deep layers medially were closed with 2-0 Vicryl, deep dermal layers were reapproximated with 2-0 Vicryl and skin reapproximated with 3-0 Monocryl.  Laterally the IT band was reapproximated with 2-0 Vicryl deep dermal layers with 2-0 Vicryl and skin with 3-0 Monocryl in a running subcuticular manner.  The wounds were injected with a mixture of lidocaine half percent Marcaine with epi.    The wounds were dressed with Dermabond 2 x 2's Tegaderm and an Ace.    At end of the procedure all sponge needle counts were correct    I was present conducted the entirety of the procedure.    Eliu Ochoa MD  1/7/2021

## 2021-01-07 NOTE — TELEPHONE ENCOUNTER
Called pt to schedule the appt and pt's wife answered and said that he was currently in the hospital and said try to call back tomorrow to schedule the appt.

## 2021-01-07 NOTE — ANESTHESIA PROCEDURE NOTES
Airway  Urgency: elective    Date/Time: 1/7/2021 8:21 AM  Airway not difficult    General Information and Staff    Patient location during procedure: OR  CRNA: Shania Pagan CRNA    Indications and Patient Condition  Indications for airway management: airway protection    Preoxygenated: yes  Mask difficulty assessment: 1 - vent by mask    Final Airway Details  Final airway type: endotracheal airway      Successful airway: ETT  Cuffed: yes   Successful intubation technique: direct laryngoscopy  Endotracheal tube insertion site: oral  Blade: Howie  Blade size: 4  ETT size (mm): 7.5  Cormack-Lehane Classification: grade I - full view of glottis  Placement verified by: chest auscultation and capnometry   Cuff volume (mL): 6  Measured from: lips  ETT/EBT  to lips (cm): 21  Number of attempts at approach: 1  Assessment: lips, teeth, and gum same as pre-op and atraumatic intubation    Additional Comments  Smooth IV induction. Trachea intubated. Cuff up. Dentition intact without injury.

## 2021-01-07 NOTE — ANESTHESIA PREPROCEDURE EVALUATION
" Anesthesia Evaluation     Patient summary reviewed and Nursing notes reviewed   NPO Solid Status: > 8 hours  NPO Liquid Status: > 2 hours           Airway   Mallampati: II  TM distance: >3 FB  Neck ROM: full  No difficulty expected  Dental    (+) poor dentition        Pulmonary    (+) a smoker, lung cancer, COPD, decreased breath sounds,     ROS comment: 3L home O2 at night    Lung cancer  Cardiovascular - normal exam  Exercise tolerance: good (4-7 METS)    (+) hypertension, hyperlipidemia,       Neuro/Psych  (+) psychiatric history,     GI/Hepatic/Renal/Endo    (+)   renal disease,     Musculoskeletal     (+) chronic pain,   Abdominal    Substance History      OB/GYN          Other   blood dyscrasia,   history of cancer active        Phys Exam Other: Pt states \"nothing loose\"                Anesthesia Plan    ASA 3     general     intravenous induction     Anesthetic plan, all risks, benefits, and alternatives have been provided, discussed and informed consent has been obtained with: patient.    Plan discussed with CRNA.      "

## 2021-01-07 NOTE — PROGRESS NOTES
Discharge Planning Assessment  [facility]     Patient Name: Dav Freitas  MRN: [id number]  Today's Date: 1/7/2021    Admit Date: 1/7/2021    Discharge Needs Assessment     Row Name 01/07/21 1658       Living Environment    Lives With  spouse    Current Living Arrangements  home/apartment/condo    Primary Care Provided by  self    Provides Primary Care For  no one    Family Caregiver if Needed  spouse;child(braulio), adult    Quality of Family Relationships  helpful;involved;supportive    Able to Return to Prior Arrangements  yes       Resource/Environmental Concerns    Transportation Concerns  car, none       Transition Planning    Patient/Family Anticipates Transition to  home with family;home with help/services    Patient/Family Anticipated Services at Transition      Transportation Anticipated  family or friend will provide       Discharge Needs Assessment    Readmission Within the Last 30 Days  no previous admission in last 30 days    Equipment Currently Used at Home  cane, straight    Discharge Facility/Level of Care Needs  home with home health    Provided Post Acute Provider List?  Yes    Post Acute Provider List  Home Health    Patient's Choice of Community Agency(s)  [facility].        Discharge Plan     Row Name 01/07/21 1658       Plan    Plan  HH.    Patient/Family in Agreement with Plan  yes    Plan Comments  Spoke with the patient, verified current information and CCP role explained. Patient states he has family support. He's IADL and has no history with SNF/HH. Brent uses a cane as needed. He plans to d/c home with family support. CCP discussed the benefits of HH after d/c, and the patient is agreeable. He requests a referral to [facility]. Referral sent in [facility]. Patient also states his wife/Nikky will transport him home by car at d/c. No other needs identified.        Continued Care and Services - Admitted Since 1/7/2021     Home Medical Care     Service Provider Request Status Selected  Services Address Phone Fax Patient Preferred    [facility]  Pending - Request Sent N/A [id number] [place] [id number] [phone] --                Demographic Summary     Row Name 01/07/21 1657       General Information    Admission Type  observation    Reason for Consult  discharge planning    Preferred Language  English     Used During This Interaction  no       Contact Information    Permission Granted to Share Info With  ;family/designee        Functional Status     Row Name 01/07/21 1658       Functional Status    Usual Activity Tolerance  good    Current Activity Tolerance  good       Functional Status, IADL    Medications  independent    Meal Preparation  assistive equipment    Housekeeping  assistive equipment    Laundry  assistive equipment    Shopping  assistive equipment       Mental Status Summary    Recent Changes in Mental Status/Cognitive Functioning  no changes        Psychosocial     Row Name 01/07/21 1658       Intellectual Performance WDL    Level of Consciousness  Alert       Coping/Stress    Patient Personal Strengths  able to adapt    Sources of Support  adult child(braulio);spouse    Reaction to Health Status  accepting    Understanding of Condition and Treatment  adequate understanding of medical condition       Developmental Stage (Eriksson's)    Developmental Stage  Stage 8 (64 years-death/Late Adulthood) Integrity vs. Despair        Abuse/Neglect    No documentation.       Legal    No documentation.       Substance Abuse    No documentation.       Patient Forms    No documentation.           Meghna Diaz RN

## 2021-01-07 NOTE — ANESTHESIA POSTPROCEDURE EVALUATION
"Patient: Dav Freitas    Procedure Summary     Date: 01/07/21 Room / Location: [facility] OR [address] [place] [address] MAIN OR    Anesthesia Start: 0814 Anesthesia Stop: 1026    Procedure: LEFT FEMUR INTRALESIONAL CURRETTAGE AND PROPHYLACTIC STABLILIZATION (Left Thigh) Diagnosis:       Lung cancer metastatic to bone (CMS/HCC)      (Lung cancer metastatic to bone (CMS/HCC) [C34.90, C79.51])    Surgeon: Eliu Ochoa MD Provider: Gustavo Ellis MD    Anesthesia Type: general ASA Status: 3          Anesthesia Type: general    Vitals  Vitals Value Taken Time   /49 01/07/21 1120   Temp 36.5 °C (97.7 °F) 01/07/21 1120   Pulse 90 01/07/21 1134   Resp 16 01/07/21 1120   SpO2 94 % 01/07/21 1135   Vitals shown include unvalidated device data.        Post Anesthesia Care and Evaluation    Patient location during evaluation: bedside  Patient participation: complete - patient participated  Level of consciousness: awake and alert  Pain management: adequate  Airway patency: patent  Anesthetic complications: No anesthetic complications  PONV Status: none  Cardiovascular status: acceptable and hemodynamically stable  Respiratory status: acceptable and spontaneous ventilation  Hydration status: acceptable    Comments: /57   Pulse 61   Temp 36.9 °C (98.4 °F) (Skin)   Resp 16   Ht 180.3 cm (71\")   Wt 77.2 kg (170 lb 3.1 oz)   SpO2 98%   BMI 23.74 kg/m²         "

## 2021-01-08 ENCOUNTER — READMISSION MANAGEMENT (OUTPATIENT)
Dept: CALL CENTER | Facility: HOSPITAL | Age: 66
End: 2021-01-08

## 2021-01-08 VITALS
BODY MASS INDEX: 23.83 KG/M2 | DIASTOLIC BLOOD PRESSURE: 58 MMHG | TEMPERATURE: 98.9 F | WEIGHT: 170.19 LBS | HEIGHT: 71 IN | RESPIRATION RATE: 18 BRPM | SYSTOLIC BLOOD PRESSURE: 118 MMHG | OXYGEN SATURATION: 92 % | HEART RATE: 111 BPM

## 2021-01-08 PROCEDURE — 94799 UNLISTED PULMONARY SVC/PX: CPT

## 2021-01-08 PROCEDURE — 25010000003 CEFAZOLIN IN DEXTROSE 2-4 GM/100ML-% SOLUTION: Performed by: ORTHOPAEDIC SURGERY

## 2021-01-08 PROCEDURE — G0378 HOSPITAL OBSERVATION PER HR: HCPCS

## 2021-01-08 PROCEDURE — 97162 PT EVAL MOD COMPLEX 30 MIN: CPT

## 2021-01-08 PROCEDURE — 97530 THERAPEUTIC ACTIVITIES: CPT

## 2021-01-08 RX ORDER — ASPIRIN 325 MG
325 TABLET, DELAYED RELEASE (ENTERIC COATED) ORAL DAILY
Qty: 30 TABLET | Refills: 0
Start: 2021-01-09 | End: 2021-03-18

## 2021-01-08 RX ADMIN — MORPHINE SULFATE 30 MG: 30 TABLET, FILM COATED, EXTENDED RELEASE ORAL at 13:37

## 2021-01-08 RX ADMIN — FOLIC ACID 1000 MCG: 1 TABLET ORAL at 08:30

## 2021-01-08 RX ADMIN — MORPHINE SULFATE 30 MG: 30 TABLET, FILM COATED, EXTENDED RELEASE ORAL at 00:55

## 2021-01-08 RX ADMIN — HYDROCODONE BITARTRATE AND ACETAMINOPHEN 1 TABLET: 10; 325 TABLET ORAL at 08:30

## 2021-01-08 RX ADMIN — DOCUSATE SODIUM 50MG AND SENNOSIDES 8.6MG 2 TABLET: 8.6; 5 TABLET, FILM COATED ORAL at 08:30

## 2021-01-08 RX ADMIN — GUAIFENESIN 400 MG: 200 TABLET ORAL at 08:30

## 2021-01-08 RX ADMIN — ASPIRIN 325 MG: 325 TABLET, COATED ORAL at 08:30

## 2021-01-08 RX ADMIN — CEFAZOLIN SODIUM 2 G: 2 INJECTION, SOLUTION INTRAVENOUS at 08:29

## 2021-01-08 RX ADMIN — HYDROCODONE BITARTRATE AND ACETAMINOPHEN 1 TABLET: 10; 325 TABLET ORAL at 13:05

## 2021-01-08 RX ADMIN — BUDESONIDE AND FORMOTEROL FUMARATE DIHYDRATE 2 PUFF: 160; 4.5 AEROSOL RESPIRATORY (INHALATION) at 09:33

## 2021-01-08 RX ADMIN — SODIUM CHLORIDE, POTASSIUM CHLORIDE, SODIUM LACTATE AND CALCIUM CHLORIDE 75 ML/HR: 600; 310; 30; 20 INJECTION, SOLUTION INTRAVENOUS at 05:37

## 2021-01-08 RX ADMIN — POLYETHYLENE GLYCOL 3350 17 G: 17 POWDER, FOR SOLUTION ORAL at 08:30

## 2021-01-08 NOTE — DISCHARGE SUMMARY
Date of Discharge:  1/8/2021    Discharge Diagnosis:   1 cancer metastatic to bone  2.  Impending pathologic fracture left distal femur    Problem List:  Active Hospital Problems    Diagnosis  POA   • **Lung cancer metastatic to bone (CMS/HCC) [C34.90, C79.51]  Unknown   • Metastasis to bone (CMS/HCC) [C79.51]  Yes      Resolved Hospital Problems   No resolved problems to display.       Presenting Problem/History of Present Illness  Lung cancer metastatic to bone (CMS/HCC) [C34.90, C79.51]  Metastasis to bone (CMS/HCC) [C79.51]    Hospital Course  Patient is a 65 y.o. male presented with an impending pathologic fracture of the left distal femur.  Patient underwent curettage prophylactic stabilization left femur on date of admission which was January 7, 2021.  Patient tolerated the procedure well.  Postoperatively his course was uneventful.  Patient was ambulatory to the bathroom independently.  Pain was well controlled on oral pain medications.  On postoperative day 1 he was seen and evaluated by physical therapy.  He was also seen and evaluated by me.  His dressings were clean dry and intact.  Vitals were stable.  He had full motor strength across the ankle and palpable pedal pulses..      Procedures Performed    Procedure(s):  LEFT FEMUR INTRALESIONAL CURRETTAGE AND PROPHYLACTIC STABLILIZATION    Consults:   Consults     No orders found for last 30 day(s).        Vital Signs  Temp:  [97.6 °F (36.4 °C)-98.7 °F (37.1 °C)] 97.6 °F (36.4 °C)  Heart Rate:  [] 88  Resp:  [12-18] 16  BP: ()/(44-70) 108/58    Discharge Disposition  Home or Self Care    Discharge Medications     Discharge Medications      New Medications      Instructions Start Date   aspirin  MG tablet   325 mg, Oral, Daily   Start Date: January 9, 2021        Continue These Medications      Instructions Start Date   albuterol 108 (90 Base) MCG/ACT inhaler  Commonly known as: ProAir RespiClick   2 puffs, Inhalation, Every 4 Hours PRN       ALPRAZolam 0.25 MG tablet  Commonly known as: XANAX   0.25 mg, Oral, 3 Times Daily PRN      budesonide-formoterol 160-4.5 MCG/ACT inhaler  Commonly known as: SYMBICORT   2 puffs, Inhalation, 2 Times Daily      folic acid 1 MG tablet  Commonly known as: Folate   1,000 mcg, Oral, Daily      guaiFENesin 400 MG tablet  Commonly known as: HUMIBID 3   400 mg, Oral, 2 times daily      HYDROcodone-acetaminophen  MG per tablet  Commonly known as: NORCO   1 tablet, Oral, Every 4 Hours PRN      Imbruvica 420 MG tablet tablet  Generic drug: ibrutinib   420 mg, Oral, Daily      mirtazapine 15 MG tablet  Commonly known as: REMERON   15 mg, Oral, Nightly      Morphine 30 MG 12 hr tablet  Commonly known as: MS CONTIN   30 mg, Oral, Every 12 Hours      O2  Commonly known as: OXYGEN   3 L/min, Inhalation, Nightly, Wears at night only      polyethylene glycol 17 g packet  Commonly known as: MIRALAX   17 g, Oral, Every 12 Hours      sennosides-docusate 8.6-50 MG per tablet  Commonly known as: Senexon-S   2 tablets, Oral, 2 Times Daily             Discharge Diet   Diet Instructions     Advance Diet as Tolerated            Activity at Discharge  Activity Instructions     Discharge Activity      1) No driving while taking narcotic pain medications  2) Return to school / work when off narcotic pain medications  3) May shower with surgical dressings in place  4) no squatted lifting or bending at the waist.    Other Restrictions (Specify)      Weightbearing as tolerated left lower extremity, no left foot planted and twisting or pivoting          Follow-up Appointments  Future Appointments   Date Time Provider Department Center   1/11/2021 10:00 AM Dav Boyd MD [facility] E[facility] None   1/13/2021  8:15 AM [facility] PORT CHAIR  [facility]   1/13/2021  8:40 AM Jostin Parekh MD [facility]   1/13/2021  9:30 AM CHAIR [facility]  [facility]   2/8/2021 10:00 AM Lexi Andrade APRN [facility]      Additional Instructions for the Follow-ups that You Need to Schedule     Discharge Follow-up with Specified Provider: Dr. Eliu Ochoa in 6 weeks.  Call [phone] to confirm appointment date and time.  You will need radiographs prior to that appointment of the left femur   As directed      To: Dr. Eliu Ochoa in 6 weeks.  Call [phone] to confirm appointment date and time.  You will need radiographs prior to that appointment of the left femur         Notify Physician or Go To The ED For the Following Conditions   As directed      Call with any questions or concerns.  Go to local emergency department with  1.  Fever greater than 101, chest pain, shortness of breath, difficulty catching breath  2.  Swelling in the lower extremities that does not improve with elevation or calf pain    Call the office with persistent or increasing drainage about the surgical sites, purulence coming from the incision, uncontrolled pain, redness warmth or tenderness about the incisions.    Order Comments: Call with any questions or concerns. Go to local emergency department with 1.  Fever greater than 101, chest pain, shortness of breath, difficulty catching breath 2.  Swelling in the lower extremities that does not improve with elevation or calf pain  Call the office with persistent or increasing drainage about the surgical sites, purulence coming from the incision, uncontrolled pain, redness warmth or tenderness about the incisions.                Test Results Pending at Discharge  Pending Labs     Order Current Status    Tissue Pathology Exam In process          Time: Discharge 30 min     Eliu Ochoa M.D.

## 2021-01-08 NOTE — PROGRESS NOTES
Continued Stay Note  [facility]     Patient Name: Dav Freitas  MRN: [id number]  Today's Date: 1/8/2021    Admit Date: 1/7/2021    Discharge Plan     Row Name 01/08/21 1232       Plan    Plan  [facility].    Patient/Family in Agreement with Plan  yes    Plan Comments  Physical Therapy Eval/Rec today noted. Pt plans to d/c home with family support & [facility]. Wife/Nikky to transport. No other needs identified.    Final Discharge Disposition Code  06 - home with home health care    Final Note  Pt to d/c home with family support & [facility]. Wife/Nikky to transport.        Discharge Codes    No documentation.       Expected Discharge Date and Time     Expected Discharge Date Expected Discharge Time    Jan 8, 2021             Meghna Diaz RN

## 2021-01-08 NOTE — THERAPY DISCHARGE NOTE
Patient Name: Dav Freitas  : 1955    MRN: [id number]                              Today's Date: 2021       Admit Date: 2021    Visit Dx:     ICD-10-CM ICD-9-CM   1. Lung cancer metastatic to bone (CMS/HCC)  C34.90 162.9    C79.51 198.5     Patient Active Problem List   Diagnosis   • Varicose vein of leg   • Leg pain, right   • Sepsis (CMS/HCC)   • CLL (chronic lymphocytic leukemia) (CMS/HCC)   • COPD exacerbation (CMS/HCC)   • Smoker   • Renal failure   • Hospital discharge follow-up   • Chemotherapy induced neutropenia (CMS/HCC)   • Hypogammaglobulinemia (CMS/HCC)   • Iron deficiency anemia   • Other insomnia   • Malignant neoplasm of upper lobe of left lung (CMS/HCC)   • Lung cancer metastatic to bone (CMS/HCC)   • High risk medication use   • Rectal lesion   • Left knee pain   • Ileus (CMS/HCC)   • COPD (chronic obstructive pulmonary disease) (CMS/HCC)   • On home O2   • Incarcerated inguinal hernia, unilateral   • Constipation due to opioid therapy   • Right inguinal hernia   • Slow transit constipation   • Metastasis to bone (CMS/HCC)     Past Medical History:   Diagnosis Date   • Anxiety    • Bruises easily     SIDE EFFECT D/T CHEMO TABLET    • CLL (chronic lymphocytic leukemia) (CMS/HCC) 2019    LAST CHEMO 2019   • Constipation    • COPD (chronic obstructive pulmonary disease) (CMS/HCC)    • Dyspnea    • ED (erectile dysfunction)    • H/O splenomegaly    • Ileus (CMS/HCC) 2020   • Lung cancer (CMS/HCC) 2020    WITH BONE METS  - LAST RADIATION TREATMENT 10/13/20   • On home O2     3L NC AT NIGHT    • Sleep related hypoxia     USES O2 AT NIGHT   • Tumor     LEFT LEG NEAR KNEE   • Varicose vein of leg      Past Surgical History:   Procedure Laterality Date   • BRONCHOSCOPY Bilateral 2019    Procedure: BRONCHOSCOPY WITH WASHING ENDOBRONCHIAL ULTRASOUND WITH FNA'S;  Surgeon: Selina Lloyd MD;  Location: [facility] ENDOSCOPY;  Service: Pulmonary   • INGUINAL HERNIA REPAIR Bilateral  11/23/2020    Procedure: Davinci assisted laparoscopic bilateral  inguinal hernia repair,  ;  Surgeon: Lloyd Magaña Jr., MD;  Location: [facility] MAIN OR;  Service: DaVinci;  Laterality: Bilateral;   • VENOUS ACCESS DEVICE (PORT) INSERTION N/A 10/15/2020    Procedure: MEDIPORT PLACEMENT;  Surgeon: Herlinda Magaña Jr., MD;  Location: [facility] MAIN OR;  Service: Vascular;  Laterality: N/A;     General Information     Row Name 01/08/21 1136          Physical Therapy Time and Intention    Document Type  evaluation;discharge evaluation/summary  -     Mode of Treatment  individual therapy;physical therapy  -EE     Row Name 01/08/21 1136          General Information    Prior Level of Function  independent:;community mobility;all household mobility no AD use at baseline, has cane and walker if needed  -EE     Existing Precautions/Restrictions  fall  -EE     Barriers to Rehab  medically complex  -EE     Row Name 01/08/21 1136          Living Environment    Lives With  spouse  -EE     Row Name 01/08/21 1136          Home Main Entrance    Number of Stairs, Main Entrance  two  -EE     Stair Railings, Main Entrance  none  -EE     Row Name 01/08/21 1136          Cognition    Orientation Status (Cognition)  oriented x 4  -EE     Row Name 01/08/21 1136          Safety Issues, Functional Mobility    Impairments Affecting Function (Mobility)  endurance/activity tolerance;pain;strength;range of motion (ROM)  -EE       User Key  (r) = Recorded By, (t) = Taken By, (c) = Cosigned By    Initials Name Provider Type    EE Usha Stevens PT Physical Therapist        Mobility     Row Name 01/08/21 1136          Bed Mobility    Bed Mobility  supine-sit  -EE     Supine-Sit [place] (Bed Mobility)  independent  -EE     Row Name 01/08/21 1136          Sit-Stand Transfer    Sit-Stand [place] (Transfers)  standby assist;verbal cues  -EE     Row Name 01/08/21 1136          Gait/Stairs (Locomotion)    [place] Level (Gait)   supervision;verbal cues  -EE     Assistive Device (Gait)  walker, front-wheeled  -EE     Distance in Feet (Gait)  120'  -EE     Deviations/Abnormal Patterns (Gait)  paulette decreased;left sided deviations;antalgic;stride length decreased  -EE     Bilateral Gait Deviations  forward flexed posture  -EE     Left Sided Gait Deviations  weight shift ability decreased  -EE     Comment (Gait/Stairs)  Vc's for gait sequencing with walker. Recommend walker use at home to allow reciprocal gait pattern. Pt declined to attempt stairs, however, reviewed stair technique for ascending with R LE, descending with L LE. Discussed cane use on stairs for support with cane in R hand and pt verbalized understanding.  -EE     Row Name 01/08/21 1136          Mobility    Extremity Weight-bearing Status  left lower extremity  -EE     Left Lower Extremity (Weight-bearing Status)  weight-bearing as tolerated (WBAT)  -EE       User Key  (r) = Recorded By, (t) = Taken By, (c) = Cosigned By    Initials Name Provider Type    EE Usha Stevens, PT Physical Therapist        Obj/Interventions     Row Name 01/08/21 1138          Range of Motion Comprehensive    General Range of Motion  other (see comments)  -EE     Comment, General Range of Motion  L knee flexion ROM limited somewhat by pain; all other LE ROM WFL  -EE     Row Name 01/08/21 1138          Strength Comprehensive (MMT)    General Manual Muscle Testing (MMT) Assessment  lower extremity strength deficits identified  -EE     Comment, General Manual Muscle Testing (MMT) Assessment  L LE weakness secondary to surgery; R LE WFL  -EE     Row Name 01/08/21 1138          Motor Skills    Therapeutic Exercise  -- seated B ankle pumps, LAQ x 5 reps  -EE     Row Name 01/08/21 1138          Balance    Balance Assessment  sitting static balance;standing static balance;sitting dynamic balance;standing dynamic balance  -EE     Static Sitting Balance  WNL;sitting, edge of bed  -EE     Dynamic Sitting Balance   WNL;sitting, edge of bed  -EE     Static Standing Balance  WFL with rwx  -EE     Dynamic Standing Balance  WFL with rwx  -EE     Row Name 01/08/21 1138          Sensory Assessment (Somatosensory)    Sensory Assessment (Somatosensory)  sensation intact  -EE       User Key  (r) = Recorded By, (t) = Taken By, (c) = Cosigned By    Initials Name Provider Type    EE Usha Stevens, PT Physical Therapist        Goals/Plan    No documentation.       Clinical Impression     Row Name 01/08/21 1139          Pain    Additional Documentation  Pain Scale: Numbers Pre/Post-Treatment (Group)  -EE     Row Name 01/08/21 1139          Pain Scale: Numbers Pre/Post-Treatment    Pretreatment Pain Rating  4/10  -EE     Posttreatment Pain Rating  5/10  -EE     Pain Location - Side  Left  -EE     Pain Location  knee  -EE     Pain Intervention(s)  Repositioned;Rest;Medication (See MAR)  -EE     Row Name 01/08/21 1139          Plan of Care Review    Outcome Summary  Pt is a 66 yo male who presented with impending pathological fracture of L distal femur, s/p excision of bone lesion with IM fixation of L femur. Pt with hx of metastatic lung cancer, COPD, and CLL. Upon exam, pt demonstrates post op pain, limited L knee ROM, L LE weakness, impaired gait mechanics, and decreased endurance limiting mobility. Pt was independent without assistive device prior to admission. Currently requiring use of walker due to above impairments. Pt ambulating in hallway with supervision, no safety concerns noted. Reviewed technique for stair negotiation with cane and pt verbalizes understanding. Pt planning to DC home today, has walker and cane at home. No further acute PT concerns. Pt may benefit from HH PT or OP PT for continued strengthening and rehabilitation in order to assist with return to PLOF.  -EE     Row Name 01/08/21 1139          Therapy Assessment/Plan (PT)    Criteria for Skilled Interventions Met (PT)  no problems identified which require skilled  intervention  -EE     Row Name 01/08/21 1139          Positioning and Restraints    Pre-Treatment Position  in bed  -EE     Post Treatment Position  bathroom  -EE     Bathroom  sitting;call light within reach;encouraged to call for assist;notified lyudmila YE, rihcard Bermudez  -EE       User Key  (r) = Recorded By, (t) = Taken By, (c) = Cosigned By    Initials Name Provider Type    Usha Obando, JOSEPH Physical Therapist        Outcome Measures     Row Name 01/08/21 1143          How much help from another person do you currently need...    Turning from your back to your side while in flat bed without using bedrails?  4  -EE     Moving from lying on back to sitting on the side of a flat bed without bedrails?  4  -EE     Moving to and from a bed to a chair (including a wheelchair)?  4  -EE     Standing up from a chair using your arms (e.g., wheelchair, bedside chair)?  4  -EE     Climbing 3-5 steps with a railing?  3  -EE     To walk in hospital room?  4  -EE     AM-PAC 6 Clicks Score (PT)  23  -EE     Row Name 01/08/21 1143          Functional Assessment    Outcome Measure Options  AM-PAC 6 Clicks Basic Mobility (PT)  -EE       User Key  (r) = Recorded By, (t) = Taken By, (c) = Cosigned By    Initials Name Provider Type    Usha Obando PT Physical Therapist        Physical Therapy Education                 Title: PT OT SLP Therapies (Resolved)     Topic: Physical Therapy (Resolved)     Point: Mobility training (Resolved)     Learning Progress Summary           Patient Acceptance, E,TB,D, VU,DU by EE at 1/8/2021 1143                   Point: Home exercise program (Resolved)     Learner Progress:  Not documented in this visit.          Point: Body mechanics (Resolved)     Learning Progress Summary           Patient Acceptance, E,TB,D, VU,DU by EE at 1/8/2021 1143                   Point: Precautions (Resolved)     Learner Progress:  Not documented in this visit.                      User Key     Initials Effective Dates  Name Provider Type Discipline     04/03/18 -  Usha Stevens PT Physical Therapist PT              PT Recommendation and Plan     Outcome Summary: Pt is a 66 yo male who presented with impending pathological fracture of L distal femur, s/p excision of bone lesion with IM fixation of L femur. Pt with hx of metastatic lung cancer, COPD, and CLL. Upon exam, pt demonstrates post op pain, limited L knee ROM, L LE weakness, impaired gait mechanics, and decreased endurance limiting mobility. Pt was independent without assistive device prior to admission. Currently requiring use of walker due to above impairments. Pt ambulating in hallway with supervision, no safety concerns noted. Reviewed technique for stair negotiation with cane and pt verbalizes understanding. Pt planning to DC home today, has walker and cane at home. No further acute PT concerns. Pt may benefit from HH PT or OP PT for continued strengthening and rehabilitation in order to assist with return to PLOF.     Time Calculation:   PT Charges     Row Name 01/08/21 1022             Time Calculation    Start Time  1009  -EE      Stop Time  1019  -EE      Time Calculation (min)  10 min  -EE      PT Received On  01/08/21  -EE         Time Calculation- PT    Total Timed Code Minutes- PT  8 minute(s)  -EE        User Key  (r) = Recorded By, (t) = Taken By, (c) = Cosigned By    Initials Name Provider Type    EE Usha Stevens PT Physical Therapist        Therapy Charges for Today     Code Description Service Date Service Provider Modifiers Qty    37235973189  PT EVAL MOD COMPLEXITY 1 1/8/2021 Usha Stevens, PT GP 1    [id number]  PT THERAPEUTIC ACT EA 15 MIN 1/8/2021 Usha Stevens, PT GP 1          PT G-Codes  Outcome Measure Options: AM-PAC 6 Clicks Basic Mobility (PT)  AM-PAC 6 Clicks Score (PT): 23    PT Discharge Summary  Anticipated Discharge Disposition (PT): home with assist, home with home health, home with outpatient therapy services  Reason for Discharge:  Discharge from facility     Patient was wearing a face mask during this therapy encounter. Therapist used appropriate personal protective equipment including eye protection, mask, and gloves.  Mask used was standard procedure mask. Appropriate PPE was worn during the entire therapy session. Hand hygiene was completed before and after therapy session. Patient is not in enhanced droplet precautions.       Usha Stevens, PT  1/8/2021

## 2021-01-08 NOTE — TELEPHONE ENCOUNTER
Called pt's wife today and she wanted me to call back Monday after he gets home from the hospital.

## 2021-01-08 NOTE — DISCHARGE INSTRUCTIONS
Date of Discharge:  1/8/2021    Discharge Diagnosis:   Metastatic lung carcinoma to bone  Impending pathologic fracture left distal femur    Problem List:  Active Hospital Problems    Diagnosis  POA   • **Lung cancer metastatic to bone (CMS/HCC) [C34.90, C79.51]  Unknown   • Metastasis to bone (CMS/HCC) [C79.51]  Yes      Resolved Hospital Problems   No resolved problems to display.       Presenting Problem/History of Present Illness  Lung cancer metastatic to bone (CMS/HCC) [C34.90, C79.51];Metastasis to bone (CMS/HCC) [C79.51]    Hospital Course  Patient is a 65 y.o. male presented with lung cancer metastatic to bone.  Patient has a history of an impending pathologic fracture left distal femur presenting for biopsy and prophylactic stabilization.  Patient underwent intralesional curettage and prophylactic stabilization on the day of admission which was January 7, 2021.  Patient did well postoperatively.  Pain was adequately controlled seen and evaluated by physical therapy and deemed appropriate for discharge home.  At the time of discharge she was afebrile with stable vitals.  His surgical dressing was clean dry and intact.  Sensation was intact distally patient had palpable distal pulses and 5/5 strength across the ankle..      Procedures Performed    Procedure(s):  LEFT FEMUR INTRALESIONAL CURRETTAGE AND PROPHYLACTIC STABLILIZATION  -------------------       Consults:   Consults     No orders found for last 30 day(s).          Vital Signs  Temp:  [97.6 °F (36.4 °C)-98.7 °F (37.1 °C)] 97.6 °F (36.4 °C)  Heart Rate:  [] 88  Resp:  [12-18] 16  BP: ()/(44-70) 108/58    Discharge Disposition  Home or Self Care    Discharge Medications     Discharge Medications      New Medications      Instructions Start Date   aspirin  MG tablet   325 mg, Oral, Daily   Start Date: January 9, 2021        Continue These Medications      Instructions Start Date   albuterol 108 (90 Base) MCG/ACT inhaler  Commonly  known as: ProAir RespiClick   2 puffs, Inhalation, Every 4 Hours PRN      ALPRAZolam 0.25 MG tablet  Commonly known as: XANAX   0.25 mg, Oral, 3 Times Daily PRN      budesonide-formoterol 160-4.5 MCG/ACT inhaler  Commonly known as: SYMBICORT   2 puffs, Inhalation, 2 Times Daily      folic acid 1 MG tablet  Commonly known as: Folate   1,000 mcg, Oral, Daily      guaiFENesin 400 MG tablet  Commonly known as: HUMIBID 3   400 mg, Oral, 2 times daily      HYDROcodone-acetaminophen  MG per tablet  Commonly known as: NORCO   1 tablet, Oral, Every 4 Hours PRN      Imbruvica 420 MG tablet tablet  Generic drug: ibrutinib   420 mg, Oral, Daily      mirtazapine 15 MG tablet  Commonly known as: REMERON   15 mg, Oral, Nightly      Morphine 30 MG 12 hr tablet  Commonly known as: MS CONTIN   30 mg, Oral, Every 12 Hours      O2  Commonly known as: OXYGEN   3 L/min, Inhalation, Nightly, Wears at night only      polyethylene glycol 17 g packet  Commonly known as: MIRALAX   17 g, Oral, Every 12 Hours      sennosides-docusate 8.6-50 MG per tablet  Commonly known as: Senexon-S   2 tablets, Oral, 2 Times Daily             Discharge Diet       Activity at Discharge      Follow-up Appointments  Future Appointments   Date Time Provider Department Center   1/11/2021 10:00 AM Dav Boyd MD [facility] RO EASPT None   1/13/2021  8:15 AM [facility] PORT CHAIR  [facility]   1/13/2021  8:40 AM Jostin Parekh MD [facility] [place]   1/13/2021  9:30 AM CHAIR [facility] - LG  [facility]   2/8/2021 10:00 AM [facility]         Test Results Pending at Discharge  Pending Labs     Order Current Status    Tissue Pathology Exam In process          Time: Discharge 30 min

## 2021-01-08 NOTE — PLAN OF CARE
Goal Outcome Evaluation:  Plan of Care Reviewed With: patient  Progress: improving  Outcome Summary: patient ambulating with assistance, voiding without difficulty, no complaints of pain at this time, educated on O2 use at night

## 2021-01-08 NOTE — PLAN OF CARE
Goal Outcome Evaluation:  Plan of Care Reviewed With: patient  Progress: improving  Outcome Summary: Pt is a 66 yo male who presented with impending pathological fracture of L distal femur, s/p excision of bone lesion with IM fixation of L femur. Pt with hx of metastatic lung cancer, COPD, and CLL. Upon exam, pt demonstrates post op pain, limited L knee ROM, L LE weakness, impaired gait mechanics, and decreased endurance limiting mobility. Pt was independent without assistive device prior to admission. Currently requiring use of walker due to above impairments. Pt ambulating in hallway with supervision, no safety concerns noted. Reviewed technique for stair negotiation with cane and pt verbalizes understanding. Pt planning to DC home today, has walker and cane at home. No further acute PT concerns. Pt may benefit from HH PT or OP PT for continued strengthening and rehabilitation in order to assist with return to PLOF.

## 2021-01-08 NOTE — PLAN OF CARE
Goal Outcome Evaluation:  Plan of Care Reviewed With: patient  Progress: improving  Outcome Summary: vss. dsg cdi. ambulates well with stand by assist and walker. pain tolerated with po meds. plan to dc this afternnon. educated on fall prevention.

## 2021-01-09 NOTE — OUTREACH NOTE
Prep Survey      Responses   [facility] patient discharged from?  [place]   Is LACE score < 7 ?  No   Emergency Room discharge w/ pulse ox?  No   Eligibility  [facility]   Date of Admission  01/07/21   Date of Discharge  01/08/21   Discharge Disposition  Home-Health Care Svc   Discharge diagnosis  lung CA, mets to bone, left femur intralesional currettage & stabilization   Does the patient have one of the following disease processes/diagnoses(primary or secondary)?  General Surgery   Does the patient have Home health ordered?  Yes   What is the Home health agency?   [facility]   Is there a DME ordered?  No   Prep survey completed?  Yes          Margot Damon RN

## 2021-01-11 ENCOUNTER — CONSULT (OUTPATIENT)
Dept: RADIATION ONCOLOGY | Facility: CLINIC | Age: 66
End: 2021-01-11

## 2021-01-11 ENCOUNTER — TRANSITIONAL CARE MANAGEMENT TELEPHONE ENCOUNTER (OUTPATIENT)
Dept: CALL CENTER | Facility: HOSPITAL | Age: 66
End: 2021-01-11

## 2021-01-11 ENCOUNTER — APPOINTMENT (OUTPATIENT)
Dept: RADIATION ONCOLOGY | Facility: HOSPITAL | Age: 66
End: 2021-01-11

## 2021-01-11 VITALS
TEMPERATURE: 97.5 F | SYSTOLIC BLOOD PRESSURE: 143 MMHG | DIASTOLIC BLOOD PRESSURE: 78 MMHG | HEART RATE: 92 BPM | OXYGEN SATURATION: 93 %

## 2021-01-11 DIAGNOSIS — C34.12 MALIGNANT NEOPLASM OF UPPER LOBE OF LEFT LUNG (HCC): ICD-10-CM

## 2021-01-11 DIAGNOSIS — C79.51 METASTASIS TO BONE (HCC): Primary | ICD-10-CM

## 2021-01-11 PROCEDURE — 77290 THER RAD SIMULAJ FIELD CPLX: CPT | Performed by: RADIOLOGY

## 2021-01-11 PROCEDURE — 99215 OFFICE O/P EST HI 40 MIN: CPT | Performed by: RADIOLOGY

## 2021-01-11 PROCEDURE — 77334 RADIATION TREATMENT AID(S): CPT | Performed by: RADIOLOGY

## 2021-01-11 PROCEDURE — 77263 THER RADIOLOGY TX PLNG CPLX: CPT | Performed by: RADIOLOGY

## 2021-01-11 NOTE — OUTREACH NOTE
Call Center TCM Note      Responses   [facility] patient discharged from?  [place]   Does the patient have one of the following disease processes/diagnoses(primary or secondary)?  General Surgery   TCM attempt successful?  No   Unsuccessful attempts  Attempt 1          Anat Byrd RN    1/11/2021, 09:11 EST

## 2021-01-11 NOTE — PROGRESS NOTES
Subjective     Jostin Parekh MD     Diagnosis Plan   1. Metastasis to bone (CMS/HCC)     2. Malignant neoplasm of upper lobe of left lung (CMS/HCC)       Chief complaint: Metastatic adenocarcinoma of the lung to distal left femur  I am seeing the patient at the request of Dr. Jostin Parekh of the [facility] group to evaluate him for postoperative radiation therapy to the distal left femur.         Mr. Das is a very pleasant 65-year-old white male who was found on CT scan of the chest in August 2020 to have a new 1.6 cm left upper lobe mass and a soft tissue mass measuring 3.7 x 2.6 involving the right eighth rib and a new 1.2 cm left adrenal nodule.  Diagnosis was established at right rib biopsy as a grade 2 adenocarcinoma.                              PET scan was obtained on 9/23/2020 which described 4 areas of hypermetabolic uptake.  1.  Lingula, 1.9 x 1.5 cm with an SUV of 12.4, likely primary.  2.  Right eighth rib, 7.0 x 6.3 cm mass with an SUV of 27.6.  3.  Left adrenal, 1.9 x 1.5 cm with SUV of 21.2  4.  Right chest wall, anterior to right first rib, 1.8 x 1.6 cm with SUV of 4.9     The right chest wall mass which was causing significant  pain was treated palliatively with radiation therapy to 35 Gy in 10 fractions between the dates 10/1/2020 and 10/14/2020.  He had good improvement in his pain although he still currently is using MS Contin 30 every 12 and Norco 10 twice daily.  He tells me he thinks he might be able to drop the Norco.    He completed 4 cycles of Keytruda with under direction of Dr. Daily at the [facility] group and switched to Keytruda, carbo Alimta in late December.      He was also at that time developing progressive pain in the left knee region.  An MRI on 12/18/2020 described a 19 x 22 x 35 mm mass in the posterior distal femoral metaphysis associated with cortical destruction.  No pathological fracture was noted.    On 1/6/2021 a CT scan of the chest abdomen pelvis was obtained which  demonstrated good response to therapy.  The large expansile soft tissue mass in the right eighth rib:      Now measures 3.4 x 5.3, previously 8.2 x 9.4.  The irregular spiculated mass in the left upper lobe previously 2.7 x 2.7, now 0.4 x 0.9 cm  Left adrenal: Previously 2.9 x 3.3 cm, now completely resolved  1.5 cm anterior to right first rib, significantly decreased in size.      On 1/7/2021 he underwent intralesional curettage and prophylactic stabilization with Dr. Eros Ochoa using the Illuminoss photodynamic bone stabilization system.  He is currently doing well postoperatively.  His pain he rates his pain is a 2/10, increased slightly when he transitions from sitting to standing.    We are asked to evaluate him for postoperative radiation therapy in the setting.        Review of Systems   Constitutional: Positive for fatigue.   Respiratory: Positive for chest tightness and shortness of breath.         Home O2   Gastrointestinal: Positive for constipation, diarrhea and nausea.   Endocrine: Negative.    Genitourinary: Negative.    Musculoskeletal: Negative.    Neurological: Negative.          Past Medical History:   Diagnosis Date   • Anxiety    • Bruises easily     SIDE EFFECT D/T CHEMO TABLET    • CLL (chronic lymphocytic leukemia) (CMS/HCC) 04/2019    LAST CHEMO 7/2019   • Constipation    • COPD (chronic obstructive pulmonary disease) (CMS/HCC)    • Dyspnea    • ED (erectile dysfunction)    • H/O splenomegaly    • Ileus (CMS/HCC) 11/2020   • Lung cancer (CMS/HCC) 08/2020    WITH BONE METS  - LAST RADIATION TREATMENT 10/13/20   • On home O2     3L NC AT NIGHT    • Sleep related hypoxia     USES O2 AT NIGHT   • Tumor     LEFT LEG NEAR KNEE   • Varicose vein of leg          Past Surgical History:   Procedure Laterality Date   • BRONCHOSCOPY Bilateral 4/29/2019    Procedure: BRONCHOSCOPY WITH WASHING ENDOBRONCHIAL ULTRASOUND WITH FNA'S;  Surgeon: Selina Lloyd MD;  Location: [facility] ENDOSCOPY;  Service:  Pulmonary   • INGUINAL HERNIA REPAIR Bilateral 2020    Procedure: Davinci assisted laparoscopic bilateral  inguinal hernia repair,  ;  Surgeon: Lloyd Magaña Jr., MD;  Location: [facility] MAIN OR;  Service: DaVinci;  Laterality: Bilateral;   • VENOUS ACCESS DEVICE (PORT) INSERTION N/A 10/15/2020    Procedure: MEDIPORT PLACEMENT;  Surgeon: Herlinda Magaña Jr., MD;  Location: [facility] MAIN OR;  Service: Vascular;  Laterality: N/A;         Social History     Socioeconomic History   • Marital status:      Spouse name: Nikky   • Number of children: Not on file   • Years of education: Not on file   • Highest education level: Not on file   Occupational History     Employer: [employer]   Tobacco Use   • Smoking status: Former Smoker     Packs/day: 1.00     Years: 40.00     Pack years: 40.00     Types: Cigarettes     Quit date: 2019     Years since quittin.7   • Smokeless tobacco: Never Used   Substance and Sexual Activity   • Alcohol use: Not Currently     Comment: RARE   • Drug use: Not Currently   • Sexual activity: Defer         Family History   Problem Relation Age of Onset   • Arthritis Mother    • Lung cancer Father    • Malig Hyperthermia Neg Hx           Objective    Physical Exam Awake, alert, spontaneous, no apparent distress.  Slight antalgic gait.    Current Outpatient Medications on File Prior to Visit   Medication Sig Dispense Refill   • albuterol (PROAIR RESPICLICK) 108 (90 Base) MCG/ACT inhaler Inhale 2 puffs Every 4 (Four) Hours As Needed for Wheezing. 1 inhaler 0   • ALPRAZolam (XANAX) 0.25 MG tablet TAKE 1 TABLET BY MOUTH 3 (THREE) TIMES A DAY AS NEEDED FOR ANXIETY. 60 tablet 0   • aspirin  MG tablet Take 1 tablet by mouth Daily. 30 tablet 0   • budesonide-formoterol (SYMBICORT) 160-4.5 MCG/ACT inhaler Inhale 2 puffs 2 (Two) Times a Day. 1 inhaler 11   • folic acid (FOLVITE) 1 MG tablet Take 1 tablet by mouth Daily. 30 tablet 1   • guaiFENesin (HUMIBID 3) 400 MG tablet  Take 400 mg by mouth 2 (two) times a day.     • HYDROcodone-acetaminophen (NORCO)  MG per tablet Take 1 tablet by mouth Every 4 (Four) Hours As Needed for Moderate Pain . 100 tablet 0   • ibrutinib (Imbruvica) 420 MG tablet tablet Take 420 mg by mouth Daily.     • mirtazapine (REMERON) 15 MG tablet Take 1 tablet by mouth Every Night. 30 tablet 2   • Morphine (MS CONTIN) 30 MG 12 hr tablet Take 1 tablet by mouth Every 12 (Twelve) Hours. 60 tablet 0   • O2 (OXYGEN) Inhale 3 L/min Every Night. Wears at night only     • polyethylene glycol (polyethylene glycol) 17 g packet Take 17 g by mouth Every 12 (Twelve) Hours.     • sennosides-docusate (Senexon-S) 8.6-50 MG per tablet Take 2 tablets by mouth 2 (Two) Times a Day. 60 tablet 1     No current facility-administered medications on file prior to visit.        ALLERGIES:  No Known Allergies    /78   Pulse 92   Temp 97.5 °F (36.4 °C)   SpO2 93%      Current Status 12/23/2020   ECOG score 1         Assessment/Plan   We started the planning process with immobilization and CT simulation in our department at [place] this morning, and I would plan to treat him approximately 3 weeks postop to start on Thursday 1/28.  Dose aim will be 32.5 Gray in 10 fractions.  I went over the details of the course of therapy with him he had many good questions which I believe were answered to his satisfaction and informed consent was obtained.      [facility] ECOG Status: (1) Restricted in physically strenuous activity, ambulatory and able to do work of light nature       I spent greater than 40 minutes in face-to-face time with the patient, to include simulation and 25 minutes that time was spent in counseling and coordination of care to include review of imaging as well as discussion of indications, goals, logistics, alternatives, and risks both common and rare.      Dav Boyd MD

## 2021-01-11 NOTE — OUTREACH NOTE
Call Center TCM Note      Responses   [facility] patient discharged from?  [place]   Does the patient have one of the following disease processes/diagnoses(primary or secondary)?  General Surgery   TCM attempt successful?  No   Unsuccessful attempts  Attempt 2          Anat Byrd RN    1/11/2021, 13:41 EST

## 2021-01-12 ENCOUNTER — TELEPHONE (OUTPATIENT)
Dept: ONCOLOGY | Facility: CLINIC | Age: 66
End: 2021-01-12

## 2021-01-12 ENCOUNTER — APPOINTMENT (OUTPATIENT)
Dept: PREADMISSION TESTING | Facility: HOSPITAL | Age: 66
End: 2021-01-12

## 2021-01-12 ENCOUNTER — TRANSITIONAL CARE MANAGEMENT TELEPHONE ENCOUNTER (OUTPATIENT)
Dept: CALL CENTER | Facility: HOSPITAL | Age: 66
End: 2021-01-12

## 2021-01-12 DIAGNOSIS — C34.90 LUNG CANCER METASTATIC TO BONE (HCC): ICD-10-CM

## 2021-01-12 DIAGNOSIS — C34.12 MALIGNANT NEOPLASM OF UPPER LOBE OF LEFT LUNG (HCC): ICD-10-CM

## 2021-01-12 DIAGNOSIS — C79.51 LUNG CANCER METASTATIC TO BONE (HCC): ICD-10-CM

## 2021-01-12 DIAGNOSIS — Z79.899 HIGH RISK MEDICATION USE: Primary | ICD-10-CM

## 2021-01-12 DIAGNOSIS — C91.10 CLL (CHRONIC LYMPHOCYTIC LEUKEMIA) (HCC): ICD-10-CM

## 2021-01-12 LAB
CYTO UR: NORMAL
LAB AP CASE REPORT: NORMAL
LAB AP SPECIAL STAINS: NORMAL
PATH REPORT.FINAL DX SPEC: NORMAL
PATH REPORT.GROSS SPEC: NORMAL

## 2021-01-12 NOTE — H&P (VIEW-ONLY)
Subjective Discussed his multiple issues including recent hospitalizations      REASON FOR FOLLOW UP:  1.  Chronic lymphocytic leukemia with extensive lymphadenopathy and possible pleural involvement.  2.  Initiation of Treanda, Rituxan May 1, 2019 IVIG also utilized                                    3.  Significant response on scans June 27, 2019, alternative therapy with Imbruvica planned, initiated July 25, 2019  4.  Patient seen February 12, 2020, continued control of CLL, discussed Rituxan, Imbruvica, sleep study and potential surgical assessment for hernia  5.  CT of chest per pulmonary August 26 with 1.6 cm spiculated nodule in the left upper lobe, 1.2 cm left adrenal nodule not present on comparison study, bone windows with soft tissue mass involving the right eighth rib measuring 3.7 x 2.6, biopsy positive for adenocarcinoma  6.  Patient assessed September 30, plans for palliative radiation therapy, port placement, Keytruda considering PDL 1 positivity and high TMB status  7.  Patient reviewed October 21, 2020, Keytruda initiated, pain much improved status post radiation therapy  8.  Status post bilateral inguinal hernia repairs November 23, 2020.  Evidence of progression of disease versus pseudoprogression, continued knee pain   9.  Patient seen in office December 2, 2020, continued Keytruda for total of 4 cycles, institution of Xgeva, reassessment per left knee pain  10.  Patient assessed by orthopedic oncology status post prophylactic stabilization of left femur January 7, 2021.  11 patient reviewed January 12, 2021 with clear evidence of improvement on Keytruda.  Plan to continue same at present.  Increasing shortness of breath thought secondary pleural effusion, plan thoracentesis-cytology, flow cytometry and chemistries,?  Pleurx catheter        HISTORY OF PRESENT ILLNESS:   The patient is a  65 y.o. Male  who was admitted on 4/28/2019 with worsening complaints of cough wheezing and shortness of  breath.  He had been to an urgent care center and was started on antibiotics and received a shot of Solu-Medrol.  His symptoms did not improve and on his admission he was noted to have profound lymphocytosis with a total white count of 70,074% lymphocytes on his white cell differential.      He also underwent CT scan of the chest that showed infiltrates and nodules in the lungs as well as significant lymphadenopathy within the chest and in the axillary regions.  He had peripheral blood sent for flow cytometry leukemia lymphoma panel but this is still pending at time of this dictation.           He underwent bronchoscopy on 4/29/2019.  Results from this procedure are pendingl.  His respiratory viral panel was negative and markers of sepsis were unremarkable.      His peripheral blood flow cytometry and flow cytometry from the EBUS needle biopsy at bronchoscopy are both consistent with chronic lymphocytic leukemia/small lymphocytic lymphoma.     The patient underwent a CT of abdomen and pelvis April 30 demonstrating extensive splenomegaly and bulky lymphadenopathy throughout the abdomen and pelvis.  There is a tiny lesion at the superior aspect lateral hepatic segment and 2 hyperenhancing foci within the liver thought to be hemangiomas, moderate size right inguinal hernia containing a long segment of small bowel without obstruction or incarceration.  CT of soft tissue again reveals extensive cervical adenopathy as well as evidence of pansinusitis.  The patient's case was discussed with Dr. Church and the patient has stage III-IV disease should be treated during this hospitalization.  I met with the patient and discussed in detail the use of Treanda/ Rituxan which we initiated Treanda Rituxan beginning May 1, 2019.     When he is seen May 2 he is already beginning to respond with reducing adenopathy and improved symptoms.  Plan to proceed with day #2.  We have also reviewed his findings including IgA 14, IgG of 263, IgM  of 5 and a kappa/lambda ratio of 26.38.  He is hypogammaglobulinemic and replacement therapy will be given this hospitalization.  ENT assessment will also be requested.     The patient is again seen May 3, 2019, continuing to improve overall.  We did proceed with IVIG 400 mg/kg and had the patient seen by ENT after which the patient was discharged.  He indicates that Dr. Hamlin is going to see him back within the week as he is now seen back in our office May 16 and that surgery may be necessary.  The patient is also still having sinus symptoms.  Further he has developed a more extensive rash involving his abdomen chest and back and previously seen in hospital?.  Otherwise he feels well except for fatigue without fever, chills, nausea, vomiting, weight loss, stable appetite.     The patient went on to take 2 cycles of Treanda, Rituxan with a second cycle given June 6 and 7.  Additional assessments included his dermatologist who felt he had a gradually improving dermatitis and would not require an therapy and ENT indicating that the patient was slowly improving per sinus symptoms the surgery may be necessary at a later point.  When he is reviewed May 30 he was neutropenic and we proceeded with IVIG second treatment on Elenita 10.  He underwent repeat scans June 27 demonstrating a significant reduction in adenopathy in the neck chest abdomen pelvis with index nodes in the neck previously 2 cm x 1.1 cm, chest with subcarinal lymph node conglomerate measuring 2.1 x 0.9 previously 5.8 x 2.7 and previously seen irregular pulmonary consolidation throughout bilateral lungs having nearly completely resolved.  Spleen is also reduced in size at 4.5 cm previously 18.6 cm and mesenteric nodes had similar reduced in size.  The patient's immunoglobulins were assessed June 27 with an IgG of 667, IgA of 20, IgM of 9 and IgE of 3.Additional information includes FISH analysis for CLL which is positive for deletion of 13 q. 14.3 It should  be noted that such finding on FISH analysis generally suggest a favorable prognostic finding.  The patient is next seen July 3, 2019 feeling improved overall but still having a dermatologic issue with pruritus, erythema worsening particularly in the lower abdomen and upper groin.  Again his scans are considerably improved and we discussed, on the basis of the above information, changing his therapy now to Imbruvica.  The patient will return for teaching per Imbruvica July 11 the patient initiated treatment by July 25th 2019.  He was seen July 31 tolerating this well.  He was able to discontinue allopurinol and ferrous sulfate thereafter presents back August 28 having taken the medication over the last month.     He indicates that he is having no issues tolerating the medication and generally feels well except for sinus drainage.  He has had a cough and has a chest x-ray scheduled to primary care August 29, 2019.  He has had no fever, chills, sweats, rash and has gained weight.  The patient is next seen November 20, 2019 continuing to generally improve.  His performance status remains excellent and has had no additional complications thus far with the use of Imbruvica or an additional infectious complications.  He has been seen by pulmonary medicine with reticular infiltrates noted on previous CAT scan on a follow-up study scheduled in the next several days.  This may be evolution towards recovery but a follow-up will be necessary.  Finally we have been able to obtain Imbruvica reasonably cost through foundation support.    The patient is next seen February 12, 2020 doing well without additional infectious complications and with stable findings hematologically.  We have discussed the fact that Rituxan could be added potentially to reduce the amount of time he remains on the medication but, additionally, further reduce his immunoglobulins.  Though this remains a concern he is also having difficulty with sleep-?  Sleep  apnea, and worsening right direct inguinal hernia.    Plans for the patient to continue Imbruvica at dosing rechecking his immunoglobulins April 29 now with IVIG down to 371, IgM of 11, IgA level 32, kappa/lambda ratio of 1.51.  Fortunately continues to feel well without additional symptoms though is concerned about easy bruisability and periodic muscle tenderness after activity.  We have discussed his sleep assessment and he very likely has sleep apnea but does not wish to start CPAP at this point and is using oral medications-trazodone-to modest effect.    The patient is followed by pulmonary medicine.He was seen September 2 having had right-sided rib pain for 1 month, shortness of breath on going up stairs or uphill.  Follow-up chest CT revealed left upper lobe nodule 1.6 cm that was noted spiculated, additionally lesion per right rib with a soft tissue mass (3.7 x 2.6 cm) was recognized in the tissue biopsy was obtained.  This is now returned as positive for moderately differentiated adenocarcinoma.  This is CK7 positive, CK20 negative with a lung primary suspected clinically.  A molecular panel has not yet been obtained.  We have now, however, sent for foundation CDX analysis.    The patient is seen September 2020 with considerable right chest wall pain only modestly controlled with Toradol and ibuprofen.  He also has been taking additional Xanax to reduce the muscle spasm that he is experiencing.  I have advised him of the findings and their significance as being consistent with metastatic non-small cell lung cancer.  He is dismayed by the conversation but wishes to have pain control as quickly as possible as well as a cady discussion of treatment options.    The patient was provided additional anti-pain and antianxiety medications.  A PET/CT was obtained September 23 demonstrating asymmetric uptake within the right parotid gland which is unremarkable appearance on noncontrasted CT, SUV of 6 compared to 2.7.   There is no hilar, mediastinal or axillary adenopathy, findings of asymmetric moderate to intense FDG uptake within superior aspect the right chest wall anterior to the right first rib with an irregular hypodense lesion measuring 1.8 x 1.6 and SUV 4.9.  This had not been seen June 27, 2019.  There is an intensely FDG avid nodule within the lingula measuring 1.9 x 1.5 history of 12.4, FDG avid left adrenal nodule 1.9 x 1.5 with an issue 21.2.  The patient was seen by radiation therapy September 29 and started on radiation therapy to the right chest wall-35 Gy in 10 fractions.  Plans were also then proceed with SBRT to the solitary left upper lobe lesion pending insurance approval.  Further testing including TPS of 20% and foundation CDX with a TMB of greater than 10 mutations per megabase (28 mutations per megabase) and the indication that several immunotherapies could be useful in this patient's care.  Additional genomic findings include BRAF G469R/that trametinib could be useful and STK11/that everolimus could be useful.      The patient is seen back September 30, 2020 indicating that his pain has improved substantially with his current pain medications.  He also continues laxatives on a regular basis.  Radiation therapy will begin October 1, 2020 as described above and we have discussed on the basis of the findings per his genomic testing that he is a candidate for a number of additional treatment approaches.  Considering he does not have significant organ involvement and that he has a positive PDL 1 at 20% and a TMB 28 mutations per megabase it would seem reasonable (particularly with his history of CLL) to try to use immunotherapy as monotherapy initially.  This might provide control and allow us not to affect his hematologic illness in any significant way.  We discussed that he will need a PowerPort placement in the near future but that we could start Keytruda shortly thereafter.  Patient was able to proceed  into palliative therapy undergoing radiation therapy to the right eighth rib 3 and 50 cGy per fraction x10 between October 1 of October 14.  A PowerPort was placed October 15, 2020.        Mr. Freitas returns to the office October 21, 2020 indicating, wonderfully, that his pain has nearly resolved and he does not need to take short acting pain medications at this point.  Unfortunately he has had severe constipation we discussed his laxatives in depth as to the use, schedule and expected results as he reduces his narcotic use and moves into immunotherapy.  We have also discussed his PET/CT that does refer to an abnormality seen in the rectum that will need to be assessed by colonoscopy.  He states further that he is having lower extremity swelling beginning over the last several weeks right greater than left.  His breathing remains generally improved though he does have cough periodically and mild degree of hemoptysis.    The patient proceeded with his first Keytruda October 21, 2020 and, fortunately, had little side effects from its use thereafter.  He had noted mild hemoptysis as well as left lower extremity weakness and discomfort requiring periodic pain medication.  As he is next seen November 11, 2020 and a second cycle as planned of Keytruda his knee pain has worsened and he is currently using a knee brace, alternating heat and cold with variable results.  He has not had previous injury to the knee.  The patient is also clearly suffering in terms of his concern about his multiple ongoing issues noting that his symptoms of depression are worsening.       We made plans to continue Imbruvica, and have the patient have follow-up scans.  Unfortunately he presented with incarcerated right inguinal hernia required admission November 16, 2020 ultimately reducing.  He had subsequent mild ileus that slowly resolved been seen by GI.  Repeat scans November 17 that demonstrate rather rapid increase in the right eighth rib  lesion and projecting in thoracic cavity, moderate to large right-sided pleural effusion, enlargement of spiculated left upper lobe mass now measuring 2.4 x 1.6 and a new left adrenal mass as well as enlarged retrocrural lymph node.     On November 23, 2020 he underwent da Kavin robot assisted laparoscopic bilateral inguinal hernia repairs.  His Imbruvica has been held in around the surgery but restarted November 24.     The patient is next seen back in December 2, 2020 and his multiple issues including CLL/SSL on Imbruvica, metastatic non-small cell lung cancer with lack of response using immunotherapy as primary treatment, targeted treatments such as trametinib that given with Imbruvica can accelerate hypertension, recent requirement of bilateral inguinal hernia repair, follow-up with GI with opioid-induced constipation and abnormality seen on PET/CT requiring eventual colonoscopy and worsening depression and anxiety addressed by counseling, and institution of Remeron as well as as needed Xanax.     During his hospitalization we had seen the patient with the possibility of his development of pseudoprogression having had only 2 treatments with Keytruda.  Though he has evidence of progression he request that we continue with Keytruda by itself at this point to determine if it has truly failed to control his disease.  He counters indicating that he is feeling better overall with stable appetite though has lost approximately 4 pounds.  In further discussion we have agreed that he will take 2 additional treatments with Keytruda and then undergo repeat scans to determine his true status and, at that point, if he has progressed we have moved to carboplatin alimta chemotherapy along with Keytruda.  There are targeted agents available though they do interact likely with his Imbruvica accelerated hypertension.  Finally we need to better understand his left knee pain since this is truly affecting his performance status.   "Please note that Xgeva was initiated December 2, 2020  The patient was given Keytruda and Xgeva December 2.  A follow-up MRI of the knee revealed a bone lesion along the posterior aspect of the distal femoral diametaphysis just medial of midline representing metastatic lesion measuring 1.9 x 2.2 initiation of cortical destruction along the posterior aspect of the femur.  Tumor extends cephalad partially superficial cortex/the lesion overall measures 3.5 cm.    The patient seen in office December 23, 2020 indicating that his knee is manageable at this point as long as he \"starts walking\" his MRI, however, is concerning enough to request orthopedic oncology assessment and radiation therapy consultation as we also try to determine whether his disease is controlled with Keytruda as a single agent or whether we need to move to systemic chemotherapy along with immunotherapy.  Notably he states when seen December 23, 2020 that the swelling per his right upper chest is now resolved.  As result of above the patient was asked to be seen by orthopedic oncology-Dr. Eliu Ochoa-who after assessment patient felt that he was at risk for pathologic fracture.  The patient was admitted January 7 undergoing curettage of the left femoral bone lesion, prophylactic stabilization with intramedullary implants.  The patient did well with this approach.  It was suggested not to use Xgeva or Zometa for a period of time as this healed.    Additionally the patient underwent repeat imaging January 6 that showed the previously large expansile destructive lesion involving the right eighth rib markedly reduced in size measuring 3.4 x 5.3 cm previously 8.2 x 9.4, no change in left iliac bone, irregular spiculated mass left upper lobe nearly completely resolved previously 2.7 x 2.7 now only 0.4 x 0.9 cm, a residual large right pleural effusion unchanged, a previous left adrenal mass measuring 2.9 x 3.3 cm has resolved, evidence of right inguinal " "canal surgery also noted.  We had planned, initially, to change the patient to Carboplatin, Alimta and Keytruda but now find that is not necessary with the patient responding, clearly, to Keytruda as a single agent.  Patient with increasing shortness of breath recognized January 13, follow-up thoracentesis with flow cytometry, cytology,?  Pleurx catheter.        Past Medical History, Past Surgical History, Social History, Family History have been reviewed and are without significant changes except as mentioned.    Review of Systems   Constitutional: Positive for fatigue. Negative for unexpected weight change.   HENT: Negative.    Eyes: Negative.    Respiratory: Positive for shortness of breath. Negative for cough, chest tightness and wheezing.         Infrequent mild hemoptysis   Cardiovascular: Positive for leg swelling.   Gastrointestinal: Positive for abdominal pain, constipation, diarrhea and nausea. Negative for vomiting.   Endocrine: Negative.    Genitourinary: Negative.  Negative for testicular pain.   Musculoskeletal: Positive for arthralgias.   Skin: Negative.  Negative for rash.   Allergic/Immunologic: Negative.    Neurological: Positive for weakness.   Psychiatric/Behavioral: Positive for sleep disturbance.   All other systems reviewed and are negative.         Medications:  The current medication list was reviewed in the EMR    ALLERGIES:  No Known Allergies    Objective      Vitals:    01/13/21 0826   BP: 166/74   Pulse: 105   Resp: 20   Temp: 96.7 °F (35.9 °C)   TempSrc: Temporal   SpO2: 91%   Weight: 77.2 kg (170 lb 4.8 oz)   Height: 180.3 cm (70.98\")   PainSc:   2   PainLoc: Comment: Right sided abdomen pain     Current Status 1/13/2021   ECOG score 0       Physical Exam    Constitutional: He is oriented to person, place, and time. He appears well-developed and well-nourished.  He is in moderate distress per pain involving his left knee  HENT:   Head: Normocephalic.   Eyes: Conjunctivae and EOM are " normal. Pupils are equal, round, and reactive to light. No scleral icterus.   Neck: Normal range of motion. Neck supple. No JVD present. No thyromegaly present.   Cardiovascular:  Normal rate, regular rhythm.  present. Exam reveals no gallop and no friction rub.   No murmur heard.  Pulmonary/Chest:He has no rales.  The patient no longer has a palpable mass approximately right seventh eighth rib laterally.  Abdominal: Soft. He exhibits no distension and no mass. There is no tenderness.  He has bilateral hernias, apparently direct, right greater than left  Musculoskeletal: Normal range of motion. He exhibits trace edema bilateral lower extremities, status post surgery per left knee associated swelling limitation of motion.  Lymphadenopathy: No current lymphadenopathy      Skin: Rash resolved                                              Neurological: He is alert and oriented to person, place, and time. He has normal reflexes. No cranial nerve deficit.   Skin: Skin is warm and dry.                         Psychiatric: He has a normal mood and affect. His behavior is normal. Judgment normal    RECENT LABS:  Hematology     WBC   Date Value Ref Range Status   01/13/2021 9.72 3.40 - 10.80 10*3/mm3 Final     RBC   Date Value Ref Range Status   01/13/2021 3.94 (L) 4.14 - 5.80 10*6/mm3 Final     Hemoglobin   Date Value Ref Range Status   01/13/2021 10.3 (L) 13.0 - 17.7 g/dL Final     Hematocrit   Date Value Ref Range Status   01/13/2021 34.6 (L) 37.5 - 51.0 % Final     Platelets   Date Value Ref Range Status   01/13/2021 210 140 - 450 10*3/mm3 Final            Flow cytometry report on the peripheral blood as well as the lymph node biopsied at bronchoscopy are consistent with chronic lymphocytic leukemia/small lymphocytic lymphoma.    FISH prognostic panel-Positive for deletion of 13 q. 14.3    DUPLEX VENOUS LOWER EXTREMITIES 10/22/2020    Study Impression •     Right Common Femoral:  No deep vein thrombosis noted.   •      Right Saphenofemoral Junction:  No superficial thrombophlebitis noted.   •     Right Proximal Femoral: No deep vein thrombosis noted.   •     Right Mid Femoral: No deep vein thrombosis noted. There was evidence of valvular incompetence noted.   •     Right Distal Femoral: No deep vein thrombosis noted.   •     Right Popliteal: No deep vein thrombosis noted.   •     Right Posterior Tibial: No deep vein thrombosis noted.   •     Right Peroneal: No deep vein thrombosis noted.   •     Right Gastrocnemius: No deep vein thrombosis noted.   •     Right Greater Saphenous Above Knee: No superficial thrombophlebitis noted.   •     Right Greater Saphenous Below Knee: No superficial thrombophlebitis noted.   •     Left Common Femoral: No deep vein thrombosis noted.   •     Left Saphenofemoral Junction: No superficial thrombophlebitis noted.   •     Left Proximal Femoral: No deep vein thrombosis noted.   •     Left Mid Femoral: No deep vein thrombosis noted.   •     Left Distal Femoral: No deep vein thrombosis noted.   •     Left Popliteal: No deep vein thrombosis noted.   •     Left Posterior Tibial: No deep vein thrombosis noted.    •     Left Peroneal: No deep vein thrombosis noted.   •     Left Gastrocnemius: No deep vein thrombosis noted.    •     Left Greater Saphenous Above Knee: No superficial thrombophlebitis noted.   •     Left Greater Saphenous Below Knee: No superficial thrombophlebitis noted.     LEFT KNEE MRI WITH AND WITHOUT CONTRAST 12/18/2020    FINDINGS: There is a bone lesion along the posterior aspect of the  distal femoral diametaphysis just medial to the midline, representing a  metastatic lesion. The lesion measures 1.9 x 2.2 cm axial and is  associated with cortical destruction along the posterior aspect of the  femur. A component of tumor extends cephalad, partially superficial to  the cortex such that the lesion overall measures 3.5 cm in length. There  is mild abnormal bone marrow and edema and  enhancement around the  lesion. There is periosteal edema and enhancement along the medial and  posterior aspect of the femur with some periosteal new bone. The cortex  around most of the distal femur remains normal. There is overlying soft  tissue edema and enhancement.     Marrow signal in the femoral condyles, the patella, the proximal tibia,  and the proximal fibula is normal.     There is a small joint effusion without evidence of synovitis. The  ligaments and tendons of the knee are intact. There is a chronic  appearing, shallow tear of the medial meniscal posterior horn along its  undersurface with mild irregularity at the free edge. The lateral  meniscus appears normal. Cartilage throughout the knee is preserved.     IMPRESSION:  Osseous metastasis along the posterior aspect of the distal  left femoral diametaphysis with adjacent bone marrow and soft tissue  edema. Dimensions of the lesion are described above. There is no  pathologic fracture nor is there any other metastatic lesion elsewhere  around the knee.     A small tear of the medial meniscal posterior horn is incidentally noted  and probably chronic.     CT SCANS OF THE CHEST AND ABDOMEN AND PELVIS WITH ORAL AND INTRAVENOUS  CONTRAST January 6, 2021  FINDINGS: The following findings are present:  1. The previous large expansile and destructive lesion with soft tissue  mass involving the right 8th rib shows very marked reduction in size. It  currently measured 8.2 x 9.4 cm and now measures 3.4 x 5.3 cm. The lytic  and slightly expansile lesion in the left iliac bone shows no  significant change and measures up to 1.8 cm in diameter. No new bone  lesions are seen.  2. The irregular spiculated mass in the left upper lobe has nearly  completely resolved. It previously measured up to 2.7 x 2.7 cm and only  some minimal residual parenchymal density is present measuring 0.4 x 0.9  cm.  3. There remains a very large right pleural effusion  layering  posteriorly with compressive atelectasis that is unchanged. The lungs  are otherwise clear. There is no mediastinal or hilar or axillary  adenopathy which is unchanged.  4. The liver, spleen and right adrenal gland and pancreas are  unremarkable and unchanged. A previous left adrenal mass measuring 2.9 x  3.3 cm and consistent with a left adrenal metastasis has resolved. The  kidneys are unremarkable.  5. There is no aortic aneurysm or retroperitoneal lymphadenopathy. The  previously described enlarged retrocrural nodes appear to represent  enlarged perivertebral venous plexus. No adenopathy is seen. The large  and small bowel loops are normal in caliber.  6. In the pelvis, there is a predominantly cystic thick-walled structure  within the right inguinal canal that appeared to represent simple fluid  on the earlier exam. It now has a thickened wall but does not  communicate with intra-abdominal structures such as bowel. This may  represent a loculated fluid collection but continued surveillance on  follow-up CT scan is recommended. It is unlikely to represent metastatic  disease given the marked improvement in other areas as described above.      TWO-VIEW LEFT FEMUR January 7, 2021     HISTORY: Metastatic bone disease. Intralesional resection and implant.     FINDINGS: The patient has had recent surgery with placement of an  intramedullary implant device in the lower half of the femur. There is  some localized periosteal reaction involving the medial cortex of the  distal femur.      Assessment/Plan   1.  CLL presenting with significant lymphocytosis, lymphadenopathy and splenomegaly.   Positive for deletion of 13 q. 14.3.  Patient status post 2 cycles of Treanda Rituxan with excellent response.  Reassessment July 3, 2019 with plans to switch Treanda to Imbruvica which began July 25, 2019, tolerated well. This continues to be the case.  We have discussed the possibility of adding Rituxan to shorten the  time he is on  Imbruvica and we will continue to review this though the patient has hypogammaglobulinemia at this point and we do not wish to worsen this until we are more aware of how to manage COVID-19.  As he is assessed September 9, 2020 his CLL is under good control and he will continue Imbruvica at this point at unchanged dosing.  There were no clinical changes when the patient is reassessed September 30 and October 21 November 11 and now December with the patient stable concerning CLL.            2.  Pulmonary nodules/infiltrates.  He is  status post bronchoscopy.  Is unclear at this time whether these findings are infectious or possibly related to his lymphoproliferative disorder.  His subsequent studies in late November are much improved, followed by pulmonary.       3.  Non-small cell lung carcinoma                                       He had follow-up scans performed August 26 2020 revealing a new 1.6 cm spiculated nodule within the left upper lobe, 1.2 cm left adrenal nodule, bone windows with a soft tissue mass involving the right eighth rib measuring 3.7 x 2.6 cm.  Biopsy was consistent with adenocarcinoma CK 7+, CK 20-, lung primary suspected clinically, molecular panel not yet obtained.     A PET/CT was obtained September 23, 2020 demonstrating asymmetric uptake within the right parotid gland which is unremarkable appearance on noncontrasted CT, SUV of 6 compared to 2.7.  There is no hilar, mediastinal or axillary adenopathy, findings of asymmetric moderate to intense FDG uptake within superior aspect the right chest wall anterior to the right first rib with an irregular hypodense lesion measuring 1.8 x 1.6 and SUV 4.9.  This had not been seen June 27, 2019.  There is an intensely FDG avid nodule within the lingula measuring 1.9 x 1.5 history of 12.4, FDG avid left adrenal nodule 1.9 x 1.5 with an issue 21.2.  The patient was seen by radiation therapy September 29 and started on radiation therapy to  the right chest wall-35 Gy in 10 fractions.  Plans were also then proceed with SBRT to the solitary left upper lobe lesion pending insurance approval.  Further testing including TPS of 20% and foundation CDX with a TMB of greater than 10 mutations per megabase (28 mutations per megabase) and the indication that several immunotherapies could be useful in this patient's care.  Additional genomic findings include BRAF G469R/that trametinib could be useful and STK11/that everolimus could be useful.  *Discussion held as to how to proceed in particular using immunotherapy with Keytruda as monotherapy initially in this patient.  This would reduce his degree of myelosuppression.  Patient completed radiotherapy October 14, 2020 October 21 status post port placement the patient began Keytruda which she tolerated well and as he is seen November 11 we plan a second cycle.  At present he is scheduled for follow-up CT of the chest November 17, radiation therapy follow-up November 24.        The patient is reassessed after 2 cycles of Keytruda with enlargement of right eighth rib lesion, enlargement of spiculated left upper lobe lung mass, moderate to large right-sided pleural effusion, new left adrenal mass and enlarged retrocrural lymph node.  This is addressed with him November 2, 2020 with at least the possibility of pseudoprogression present.  Symptomatically he is improved in his right chest wall mass is slightly reduced clinically.  We have discussed proceeding with his third cycle of Keytruda, initiating Xgeva, having his left knee assessed and rescanning him after 4 cycles of Keytruda make a decision about extending his systemic therapy to include carboplatin, Alimta and Keytruda.      The patient is seen December 23, 2020 and clinically feels better except for his left knee pain.  We have discussed proceeding with his Keytruda and then reevaluating by follow-up scan.  The patient continued Xgeva December 2 and December  30.     In the interval he was seen for his left knee and was felt to be an impending fracture.  He was reviewed by orthopedic oncology and proceeded to curettage of the left femoral bone lesion and prophylactic stabilization January 7, 2021.      Additionally and wonderfully follow-up scans obtained January 6, 2021 demonstrated a marked improvement per his right eighth rib lesion, resolution of left upper lobe spiculated mass, residual large right pleural effusion, resolution of left adrenal metastasis.  This indicates that Keytruda is working well as a single agent we do plan to continue this January 13, 2021.  He has additional issues, however, with a right-sided pleural effusion that has increased and is producing symptoms.  He will require thoracentesis and will obtain both flow cytometry, cytology, LDH, total protein, glucose.  Pending his response to this the patient could be a candidate for Pleurx catheter placement.        3.  Xgeva initiated monthly beginning December 2, 2020.  This is next due December 30, 2020.  As result of the patient's recent knee surgery will be holding this for the next several months.      4.  Bilateral hernia repair status post right incarcerated inguinal hernia November 23, 2020                                                                                                                                                                          5.  Iron deficiency-stable though patient has anemia secondary to recent surgery.    6.  Hypogammaglobulinemia-status post IVIG with resolution of sinusitis, current levels again reviewed    7.  Pain control now addressed with MS Contin 30 mg every 12 hours, Norco 10/325 1 p.o. every 4 hours, continue laxative therapy with Senokot-S, MiraLAX and now lactulose.  He has been seen by GI with other medications offered though believes he will proceed with these laxatives presently adjusted them as needed    8.  GI follow-up with Dr. Torres  planned.    9.  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 Eliu Ochoa-who after assessment patient felt that he was at risk for pathologic fracture.  The patient was admitted January 7 undergoing curettage of the left femoral bone lesion, prophylactic stabilization with intramedullary implants.  The patient did well with this approach.  It was suggested not to use Xgeva or Zometa for a period of time as this healed.      10.  Supportive oncology-ongoing therapy for anxiety and depression.  He currently is being treated with Remeron and as needed Xanax and states he is finally sleeping more effectively.        *Proceed with Keytruda today, hold Xgeva, return 3 weeks for NP assessment, continued Keytruda

## 2021-01-12 NOTE — TELEPHONE ENCOUNTER
Per Dr. Parekh, pt is actually responding to single agent Keytruda and does not need Alimta and Carbo. He will continue on Keytruda alone. Called pt and informed him. We will cancel his chemo ed and he does not need to take folic acid anymore. Pt v/u.

## 2021-01-12 NOTE — OUTREACH NOTE
Call Center TCM Note      Responses   [facility], [place], operated by [facility] patient discharged from?  [facility]   Does the patient have one of the following disease processes/diagnoses(primary or secondary)?  General Surgery   TCM attempt successful?  Yes   Call start time  1430   Call end time  1432   Meds reviewed with patient/caregiver?  Yes   Is the patient having any side effects they believe may be caused by any medication additions or changes?  No   Does the patient have all medications related to this admission filled (includes all antibiotics, pain medications, etc.)  Yes   Is the patient taking all medications as directed (includes completed medication regime)?  Yes   Does the patient have a follow up appointment scheduled with their surgeon?  Yes   Has the patient kept scheduled appointments due by today?  Yes   What is the Home health agency?   [facility]   Has home health visited the patient within 72 hours of discharge?  Yes   Psychosocial issues?  No   Did the patient receive a copy of their discharge instructions?  Yes   Nursing interventions  Reviewed instructions with patient   What is the patient's perception of their health status since discharge?  Improving   Nursing interventions  Nurse provided patient education   Is the patient /caregiver able to teach back basic post-op care?  Continue use of incentive spirometry at least 1 week post discharge, Practice 'cough and deep breath', Drive as instructed by MD in discharge instructions, Take showers only when approved by MD-sponge bathe until then, No tub bath, swimming, or hot tub until instructed by MD, Keep incision areas clean,dry and protected, Do not remove steri-strips, Lifting as instructed by MD in discharge instructions   Is the patient/caregiver able to teach back signs and symptoms of incisional infection?  Increased redness, swelling or pain at the incisonal site, Increased drainage or bleeding, Incisional warmth, Pus or odor from incision, Fever   Is the  patient/caregiver able to teach back steps to recovery at home?  Rest and rebuild strength, gradually increase activity, Set small, achievable goals for return to baseline health, Practice good oral hygiene, Eat a well-balance diet, Make a list of questions for surgeon's appointment   If the patient is a current smoker, are they able to teach back resources for cessation?  Not a smoker   Is the patient/caregiver able to teach back the hierarchy of who to call/visit for symptoms/problems? PCP, Specialist, Home health nurse, Urgent Care, ED, 911  Yes   TCM call completed?  Yes          Alix Smallwood LPN    1/12/2021, 14:37 EST

## 2021-01-12 NOTE — TELEPHONE ENCOUNTER
----- Message from Serenity Dick RN sent at 1/12/2021  9:24 AM EST -----  Pt is starting Eduardo Messina tomorrow and was not scheduled for a chemo education. Miryam said she had some time this afternoon to do a chemo education over the phone. Can we please set pt up for this today?

## 2021-01-13 ENCOUNTER — LAB (OUTPATIENT)
Dept: LAB | Facility: HOSPITAL | Age: 66
End: 2021-01-13

## 2021-01-13 ENCOUNTER — OFFICE VISIT (OUTPATIENT)
Dept: ONCOLOGY | Facility: CLINIC | Age: 66
End: 2021-01-13

## 2021-01-13 ENCOUNTER — INFUSION (OUTPATIENT)
Dept: ONCOLOGY | Facility: HOSPITAL | Age: 66
End: 2021-01-13

## 2021-01-13 VITALS
BODY MASS INDEX: 23.84 KG/M2 | SYSTOLIC BLOOD PRESSURE: 166 MMHG | DIASTOLIC BLOOD PRESSURE: 74 MMHG | OXYGEN SATURATION: 91 % | RESPIRATION RATE: 20 BRPM | HEART RATE: 105 BPM | WEIGHT: 170.3 LBS | HEIGHT: 71 IN | TEMPERATURE: 96.7 F

## 2021-01-13 DIAGNOSIS — C79.51 LUNG CANCER METASTATIC TO BONE (HCC): ICD-10-CM

## 2021-01-13 DIAGNOSIS — C34.90 LUNG CANCER METASTATIC TO BONE (HCC): ICD-10-CM

## 2021-01-13 DIAGNOSIS — C34.12 MALIGNANT NEOPLASM OF UPPER LOBE OF LEFT LUNG (HCC): ICD-10-CM

## 2021-01-13 DIAGNOSIS — Z79.899 HIGH RISK MEDICATION USE: Primary | ICD-10-CM

## 2021-01-13 DIAGNOSIS — C91.10 CLL (CHRONIC LYMPHOCYTIC LEUKEMIA) (HCC): ICD-10-CM

## 2021-01-13 DIAGNOSIS — C79.51 METASTASIS TO BONE (HCC): ICD-10-CM

## 2021-01-13 DIAGNOSIS — Z79.899 HIGH RISK MEDICATION USE: ICD-10-CM

## 2021-01-13 DIAGNOSIS — D50.9 IRON DEFICIENCY ANEMIA, UNSPECIFIED IRON DEFICIENCY ANEMIA TYPE: ICD-10-CM

## 2021-01-13 DIAGNOSIS — D80.1 HYPOGAMMAGLOBULINEMIA (HCC): ICD-10-CM

## 2021-01-13 LAB
ALBUMIN SERPL-MCNC: 3.5 G/DL (ref 3.5–5.2)
ALBUMIN SERPL-MCNC: 3.7 G/DL (ref 3.5–5.2)
ALBUMIN/GLOB SERPL: 1.5 G/DL (ref 1.1–2.4)
ALBUMIN/GLOB SERPL: 1.9 G/DL
ALP SERPL-CCNC: 103 U/L (ref 39–117)
ALP SERPL-CCNC: 107 U/L (ref 38–116)
ALT SERPL W P-5'-P-CCNC: 5 U/L (ref 1–41)
ALT SERPL W P-5'-P-CCNC: 6 U/L (ref 0–41)
ANION GAP SERPL CALCULATED.3IONS-SCNC: 10.9 MMOL/L (ref 5–15)
ANION GAP SERPL CALCULATED.3IONS-SCNC: 11.8 MMOL/L (ref 5–15)
AST SERPL-CCNC: 11 U/L (ref 1–40)
AST SERPL-CCNC: 7 U/L (ref 0–40)
BASOPHILS # BLD AUTO: 0.05 10*3/MM3 (ref 0–0.2)
BASOPHILS # BLD AUTO: 0.06 10*3/MM3 (ref 0–0.2)
BASOPHILS NFR BLD AUTO: 0.5 % (ref 0–1.5)
BASOPHILS NFR BLD AUTO: 0.6 % (ref 0–1.5)
BILIRUB SERPL-MCNC: 0.4 MG/DL (ref 0–1.2)
BILIRUB SERPL-MCNC: 0.6 MG/DL (ref 0.2–1.2)
BUN SERPL-MCNC: 12 MG/DL (ref 6–20)
BUN SERPL-MCNC: 12 MG/DL (ref 8–23)
BUN/CREAT SERPL: 10.5 (ref 7.3–30)
BUN/CREAT SERPL: 12.2 (ref 7–25)
CALCIUM SPEC-SCNC: 8.4 MG/DL (ref 8.6–10.5)
CALCIUM SPEC-SCNC: 8.6 MG/DL (ref 8.5–10.2)
CHLORIDE SERPL-SCNC: 102 MMOL/L (ref 98–107)
CHLORIDE SERPL-SCNC: 103 MMOL/L (ref 98–107)
CO2 SERPL-SCNC: 23.2 MMOL/L (ref 22–29)
CO2 SERPL-SCNC: 25.1 MMOL/L (ref 22–29)
CREAT SERPL-MCNC: 0.98 MG/DL (ref 0.76–1.27)
CREAT SERPL-MCNC: 1.14 MG/DL (ref 0.7–1.3)
DEPRECATED RDW RBC AUTO: 46.3 FL (ref 37–54)
DEPRECATED RDW RBC AUTO: 52 FL (ref 37–54)
EOSINOPHIL # BLD AUTO: 0.06 10*3/MM3 (ref 0–0.4)
EOSINOPHIL # BLD AUTO: 0.11 10*3/MM3 (ref 0–0.4)
EOSINOPHIL NFR BLD AUTO: 0.7 % (ref 0.3–6.2)
EOSINOPHIL NFR BLD AUTO: 1.1 % (ref 0.3–6.2)
ERYTHROCYTE [DISTWIDTH] IN BLOOD BY AUTOMATED COUNT: 15.4 % (ref 12.3–15.4)
ERYTHROCYTE [DISTWIDTH] IN BLOOD BY AUTOMATED COUNT: 16.7 % (ref 12.3–15.4)
GFR SERPL CREATININE-BSD FRML MDRD: 64 ML/MIN/1.73
GFR SERPL CREATININE-BSD FRML MDRD: 77 ML/MIN/1.73
GLOBULIN UR ELPH-MCNC: 2 GM/DL
GLOBULIN UR ELPH-MCNC: 2.4 GM/DL (ref 1.8–3.5)
GLUCOSE SERPL-MCNC: 114 MG/DL (ref 65–99)
GLUCOSE SERPL-MCNC: 135 MG/DL (ref 74–124)
HCT VFR BLD AUTO: 32.3 % (ref 37.5–51)
HCT VFR BLD AUTO: 34.6 % (ref 37.5–51)
HGB BLD-MCNC: 10.1 G/DL (ref 13–17.7)
HGB BLD-MCNC: 10.3 G/DL (ref 13–17.7)
IMM GRANULOCYTES # BLD AUTO: 0.05 10*3/MM3 (ref 0–0.05)
IMM GRANULOCYTES # BLD AUTO: 0.05 10*3/MM3 (ref 0–0.05)
IMM GRANULOCYTES NFR BLD AUTO: 0.5 % (ref 0–0.5)
IMM GRANULOCYTES NFR BLD AUTO: 0.5 % (ref 0–0.5)
LYMPHOCYTES # BLD AUTO: 0.77 10*3/MM3 (ref 0.7–3.1)
LYMPHOCYTES # BLD AUTO: 1.11 10*3/MM3 (ref 0.7–3.1)
LYMPHOCYTES NFR BLD AUTO: 12 % (ref 19.6–45.3)
LYMPHOCYTES NFR BLD AUTO: 7.9 % (ref 19.6–45.3)
MCH RBC QN AUTO: 26.1 PG (ref 26.6–33)
MCH RBC QN AUTO: 26.4 PG (ref 26.6–33)
MCHC RBC AUTO-ENTMCNC: 29.8 G/DL (ref 31.5–35.7)
MCHC RBC AUTO-ENTMCNC: 31.3 G/DL (ref 31.5–35.7)
MCV RBC AUTO: 84.6 FL (ref 79–97)
MCV RBC AUTO: 87.8 FL (ref 79–97)
MONOCYTES # BLD AUTO: 0.79 10*3/MM3 (ref 0.1–0.9)
MONOCYTES # BLD AUTO: 0.86 10*3/MM3 (ref 0.1–0.9)
MONOCYTES NFR BLD AUTO: 8.6 % (ref 5–12)
MONOCYTES NFR BLD AUTO: 8.8 % (ref 5–12)
NEUTROPHILS NFR BLD AUTO: 7.16 10*3/MM3 (ref 1.7–7)
NEUTROPHILS NFR BLD AUTO: 7.87 10*3/MM3 (ref 1.7–7)
NEUTROPHILS NFR BLD AUTO: 77.7 % (ref 42.7–76)
NEUTROPHILS NFR BLD AUTO: 81.1 % (ref 42.7–76)
NRBC BLD AUTO-RTO: 0 /100 WBC (ref 0–0.2)
NRBC BLD AUTO-RTO: 0 /100 WBC (ref 0–0.2)
PLATELET # BLD AUTO: 210 10*3/MM3 (ref 140–450)
PLATELET # BLD AUTO: 224 10*3/MM3 (ref 140–450)
PMV BLD AUTO: 10.8 FL (ref 6–12)
PMV BLD AUTO: 11.4 FL (ref 6–12)
POTASSIUM SERPL-SCNC: 4.3 MMOL/L (ref 3.5–4.7)
POTASSIUM SERPL-SCNC: 4.6 MMOL/L (ref 3.5–5.2)
PROT SERPL-MCNC: 5.7 G/DL (ref 6–8.5)
PROT SERPL-MCNC: 5.9 G/DL (ref 6.3–8)
RBC # BLD AUTO: 3.82 10*6/MM3 (ref 4.14–5.8)
RBC # BLD AUTO: 3.94 10*6/MM3 (ref 4.14–5.8)
SARS-COV-2 ORF1AB RESP QL NAA+PROBE: NOT DETECTED
SODIUM SERPL-SCNC: 138 MMOL/L (ref 134–145)
SODIUM SERPL-SCNC: 138 MMOL/L (ref 136–145)
T4 FREE SERPL-MCNC: 1.43 NG/DL (ref 0.93–1.7)
TSH SERPL DL<=0.05 MIU/L-ACNC: 4.78 UIU/ML (ref 0.27–4.2)
WBC # BLD AUTO: 9.22 10*3/MM3 (ref 3.4–10.8)
WBC # BLD AUTO: 9.72 10*3/MM3 (ref 3.4–10.8)

## 2021-01-13 PROCEDURE — U0004 COV-19 TEST NON-CDC HGH THRU: HCPCS

## 2021-01-13 PROCEDURE — 25010000002 PEMBROLIZUMAB 100 MG/4ML SOLUTION 4 ML VIAL: Performed by: INTERNAL MEDICINE

## 2021-01-13 PROCEDURE — 80053 COMPREHEN METABOLIC PANEL: CPT

## 2021-01-13 PROCEDURE — 25010000003 HEPARIN LOCK FLUSH PER 10 UNITS: Performed by: INTERNAL MEDICINE

## 2021-01-13 PROCEDURE — 96413 CHEMO IV INFUSION 1 HR: CPT

## 2021-01-13 PROCEDURE — 36415 COLL VENOUS BLD VENIPUNCTURE: CPT

## 2021-01-13 PROCEDURE — 99215 OFFICE O/P EST HI 40 MIN: CPT | Performed by: INTERNAL MEDICINE

## 2021-01-13 PROCEDURE — C9803 HOPD COVID-19 SPEC COLLECT: HCPCS

## 2021-01-13 PROCEDURE — 84439 ASSAY OF FREE THYROXINE: CPT

## 2021-01-13 PROCEDURE — 85025 COMPLETE CBC W/AUTO DIFF WBC: CPT

## 2021-01-13 PROCEDURE — 84443 ASSAY THYROID STIM HORMONE: CPT

## 2021-01-13 RX ORDER — SODIUM CHLORIDE 9 MG/ML
250 INJECTION, SOLUTION INTRAVENOUS ONCE
Status: COMPLETED | OUTPATIENT
Start: 2021-01-13 | End: 2021-01-13

## 2021-01-13 RX ORDER — SODIUM CHLORIDE 0.9 % (FLUSH) 0.9 %
10 SYRINGE (ML) INJECTION AS NEEDED
Status: CANCELLED | OUTPATIENT
Start: 2021-01-13

## 2021-01-13 RX ORDER — SODIUM CHLORIDE 9 MG/ML
250 INJECTION, SOLUTION INTRAVENOUS ONCE
Status: CANCELLED | OUTPATIENT
Start: 2021-01-13

## 2021-01-13 RX ORDER — HEPARIN SODIUM (PORCINE) LOCK FLUSH IV SOLN 100 UNIT/ML 100 UNIT/ML
500 SOLUTION INTRAVENOUS AS NEEDED
Status: DISCONTINUED | OUTPATIENT
Start: 2021-01-13 | End: 2021-01-13 | Stop reason: HOSPADM

## 2021-01-13 RX ORDER — HEPARIN SODIUM (PORCINE) LOCK FLUSH IV SOLN 100 UNIT/ML 100 UNIT/ML
500 SOLUTION INTRAVENOUS AS NEEDED
Status: CANCELLED | OUTPATIENT
Start: 2021-01-13

## 2021-01-13 RX ADMIN — SODIUM CHLORIDE 200 MG: 9 INJECTION, SOLUTION INTRAVENOUS at 09:41

## 2021-01-13 RX ADMIN — SODIUM CHLORIDE 250 ML: 9 INJECTION, SOLUTION INTRAVENOUS at 09:41

## 2021-01-13 RX ADMIN — Medication 500 UNITS: at 10:15

## 2021-01-14 ENCOUNTER — HOSPITAL ENCOUNTER (OUTPATIENT)
Dept: ULTRASOUND IMAGING | Facility: HOSPITAL | Age: 66
Discharge: HOME | End: 2021-01-14
Admitting: INTERNAL MEDICINE

## 2021-01-14 VITALS
RESPIRATION RATE: 18 BRPM | TEMPERATURE: 99.1 F | HEART RATE: 96 BPM | DIASTOLIC BLOOD PRESSURE: 68 MMHG | OXYGEN SATURATION: 95 % | HEIGHT: 71 IN | SYSTOLIC BLOOD PRESSURE: 114 MMHG | WEIGHT: 170 LBS | BODY MASS INDEX: 23.8 KG/M2

## 2021-01-14 DIAGNOSIS — C34.12 MALIGNANT NEOPLASM OF UPPER LOBE OF LEFT LUNG (HCC): ICD-10-CM

## 2021-01-14 DIAGNOSIS — C79.51 LUNG CANCER METASTATIC TO BONE (HCC): ICD-10-CM

## 2021-01-14 DIAGNOSIS — C34.90 LUNG CANCER METASTATIC TO BONE (HCC): ICD-10-CM

## 2021-01-14 LAB
GLUCOSE FLD-MCNC: 94 MG/DL
LDH FLD-CCNC: 159 U/L
PROT FLD-MCNC: 3.3 G/DL

## 2021-01-14 PROCEDURE — 88112 CYTOPATH CELL ENHANCE TECH: CPT | Performed by: INTERNAL MEDICINE

## 2021-01-14 PROCEDURE — 88184 FLOWCYTOMETRY/ TC 1 MARKER: CPT

## 2021-01-14 PROCEDURE — 88305 TISSUE EXAM BY PATHOLOGIST: CPT | Performed by: INTERNAL MEDICINE

## 2021-01-14 PROCEDURE — 88300 SURGICAL PATH GROSS: CPT | Performed by: INTERNAL MEDICINE

## 2021-01-14 PROCEDURE — 82945 GLUCOSE OTHER FLUID: CPT | Performed by: INTERNAL MEDICINE

## 2021-01-14 PROCEDURE — 25010000003 LIDOCAINE 1 % SOLUTION: Performed by: INTERNAL MEDICINE

## 2021-01-14 PROCEDURE — 76942 ECHO GUIDE FOR BIOPSY: CPT

## 2021-01-14 PROCEDURE — 83615 LACTATE (LD) (LDH) ENZYME: CPT | Performed by: INTERNAL MEDICINE

## 2021-01-14 PROCEDURE — 88185 FLOWCYTOMETRY/TC ADD-ON: CPT

## 2021-01-14 PROCEDURE — 84157 ASSAY OF PROTEIN OTHER: CPT | Performed by: INTERNAL MEDICINE

## 2021-01-14 RX ORDER — SODIUM CHLORIDE 0.9 % (FLUSH) 0.9 %
3 SYRINGE (ML) INJECTION EVERY 12 HOURS SCHEDULED
Status: DISCONTINUED | OUTPATIENT
Start: 2021-01-14 | End: 2021-01-15 | Stop reason: HOSPADM

## 2021-01-14 RX ORDER — ALPRAZOLAM 0.5 MG/1
0.5 TABLET ORAL ONCE
Status: COMPLETED | OUTPATIENT
Start: 2021-01-14 | End: 2021-01-14

## 2021-01-14 RX ORDER — LIDOCAINE HYDROCHLORIDE 10 MG/ML
10 INJECTION, SOLUTION INFILTRATION; PERINEURAL ONCE
Status: COMPLETED | OUTPATIENT
Start: 2021-01-14 | End: 2021-01-14

## 2021-01-14 RX ORDER — SODIUM CHLORIDE 0.9 % (FLUSH) 0.9 %
10 SYRINGE (ML) INJECTION AS NEEDED
Status: DISCONTINUED | OUTPATIENT
Start: 2021-01-14 | End: 2021-01-15 | Stop reason: HOSPADM

## 2021-01-14 RX ADMIN — ALPRAZOLAM 0.5 MG: 0.5 TABLET ORAL at 11:45

## 2021-01-14 RX ADMIN — LIDOCAINE HYDROCHLORIDE 10 ML: 10 INJECTION, SOLUTION INFILTRATION; PERINEURAL at 12:51

## 2021-01-14 NOTE — DISCHARGE INSTRUCTIONS
EDUCATION /DISCHARGE INSTRUCTIONS Thoracentesis:  A thoracentesis is a procedure to remove fluid or air from the space between the lung and it's lining.  It is done to relieve lung compression and respiratory distress.  A sample can also be obtained to aid diagnosis.   Medications can be administered into the space.      During the procedure:  You will be seated comfortable leaning forward onto a pillow supported by a table.  The area will be cleaned with antiseptic soap and draped with a sterile towel.  The physician will administer a local antiseptic to numb the area.  Next, the physician will insert a needle with tubing attached into the space between the lung and lining.  Fluid is removed and a sample sent to the laboratory.   When completed a pressure dressing is applied to the site.    Risks of the procedure include but are not limited to:   *  Bleeding    *  Low blood pressure *  Infection     *  Lung collapse possibly requiring insertion of a tube to re-inflate the lung.    Benefits of the procedure:  Relief of respiratory distress and facilitation of a diagnosis.  Alternatives to the procedure:  Drug therapy with diuretics to remove fluid.  Risks of diuretic drug therapy include possible dehydration and renal failure.  The benefit of drug therapy is that it can be done at home under physician supervision.  THIS EDUCATION INFORMATION WAS REVIEWED PRIOR TO THE PROCEDURE AND CONSENT. Patient initials___________________Time__________________    Post Procedure:    *  Rest today (no pushing pulling, straining or heavy lifting).   *  Slowly increase activity tomorow.    *  If you received sedation do not drive for 24 hours.   *  Keep dressing clean and dry.   *  Leave dressing on puncture site for 24 hours.    *  You may shower when dressing removed.   *  Expect some coughing as the lung re-expands.    Call your doctor if experiencing:   *  If experiencing sudden / severe shortness of breath, chest pain or if  coughing       up blood go to the nearest emergency room.   *  Signs of infection such as redness, swelling, excessive pain and / or foul       smelling drainage from the puncture site.   *  Chills or fever over 101 degrees (by mouth).   *  Change in sputum color (yellow, green, red).   *  Unrelieved pain.   *  Any new or severe symptoms.  Following the procedure:     Follow-up with the ordering physician as directed.    Continue to take other medications as directed by your physician unless    otherwise instructed.   If applicable, resume taking your blood thinners or Aspirin on ___________.    If you have any concerns please call the Radiology Nurses Desk at [phone].  You are the most important factor in your recovery.  Follow the above instructions carefully.

## 2021-01-15 ENCOUNTER — READMISSION MANAGEMENT (OUTPATIENT)
Dept: CALL CENTER | Facility: HOSPITAL | Age: 66
End: 2021-01-15

## 2021-01-15 ENCOUNTER — TELEPHONE (OUTPATIENT)
Dept: INTERVENTIONAL RADIOLOGY/VASCULAR | Facility: HOSPITAL | Age: 66
End: 2021-01-15

## 2021-01-15 NOTE — OUTREACH NOTE
"General Surgery Week 2 Survey      Responses   [facility] patient discharged from?  [facility]   Does the patient have one of the following disease processes/diagnoses(primary or secondary)?  General Surgery   Week 2 attempt successful?  Yes   Call start time  1558   Call end time  1600   Discharge diagnosis  lung CA, mets to bone, left femur intralesional currettage & stabilization   Meds reviewed with patient/caregiver?  Yes   Is the patient taking all medications as directed (includes completed medication regime)?  Yes   Has the patient kept scheduled appointments due by today?  Yes   Comments  Appt with Dr. Boyd on 1/11/21,  Appt with Dr. Elias on 1/13/21   What is the Home health agency?   No more home health    What is the patient's perception of their health status since discharge?  Improving   Nursing interventions  Nurse provided patient education   Is the patient/caregiver able to teach back signs and symptoms of incisional infection?  Fever   Is the patient/caregiver able to teach back steps to recovery at home?  Rest and rebuild strength, gradually increase activity, Eat a well-balance diet   Week 2 call completed?  Yes   Revoked  No further contact(revokes)-requires comment   Is the patient interested in additional calls from an ambulatory ?  NOTE:  applies to high risk patients requiring additional follow-up.  No   Graduated/Revoked comments  Pt reports, \"I think everything is under control. If things head south I'll call.\" He's doing well and reports, \"I just received a call from you guys about 30 minutes ago.\" He did receive a call however it was not from nurse call center. He's followed up with 2 providers.           Brenda Loja RN  "

## 2021-01-16 ENCOUNTER — DOCUMENTATION (OUTPATIENT)
Dept: ONCOLOGY | Facility: CLINIC | Age: 66
End: 2021-01-16

## 2021-01-16 LAB
CYTO UR: NORMAL
LAB AP CASE REPORT: NORMAL
PATH REPORT.ADDENDUM SPEC: NORMAL
PATH REPORT.FINAL DX SPEC: NORMAL
PATH REPORT.GROSS SPEC: NORMAL

## 2021-01-16 NOTE — PROGRESS NOTES
Patient contacted about cytology done on pleural fluid recently removed.  Fortunately feels considerably better per her breathing is not short of breath currently.  Cytologically the fluid was negative though there is a 2% monoclonal B-cell population that would be consistent with his CLL.  This is not unexpected.  Of asked him to notify me if his shortness of breath once again develops and will see him as scheduled for ongoing Keytruda therapy.

## 2021-01-21 PROCEDURE — 77307 TELETHX ISODOSE PLAN CPLX: CPT | Performed by: RADIOLOGY

## 2021-01-21 PROCEDURE — 77334 RADIATION TREATMENT AID(S): CPT | Performed by: RADIOLOGY

## 2021-01-22 RX ORDER — FOLIC ACID 1 MG/1
TABLET ORAL
Qty: 30 TABLET | Refills: 1 | OUTPATIENT
Start: 2021-01-22

## 2021-01-27 RX ORDER — IBRUTINIB 420 MG/1
TABLET, FILM COATED ORAL
Qty: 28 TABLET | Refills: 3 | Status: SHIPPED | OUTPATIENT
Start: 2021-01-27 | End: 2021-04-02

## 2021-01-27 NOTE — TELEPHONE ENCOUNTER
Imbruvica refill request rec electronically from [facility]. Per Dr Parekh-pt is to continue.     I have routed the rx to Dr Parekh for signature. Once signed it will be escribed to [facility]

## 2021-01-28 PROCEDURE — 77412 RADIATION TX DELIVERY LVL 3: CPT | Performed by: RADIOLOGY

## 2021-01-28 PROCEDURE — 77427 RADIATION TX MANAGEMENT X5: CPT | Performed by: RADIOLOGY

## 2021-01-29 PROCEDURE — 77412 RADIATION TX DELIVERY LVL 3: CPT | Performed by: RADIOLOGY

## 2021-02-01 ENCOUNTER — RADIATION ONCOLOGY WEEKLY ASSESSMENT (OUTPATIENT)
Dept: RADIATION ONCOLOGY | Facility: CLINIC | Age: 66
End: 2021-02-01

## 2021-02-01 ENCOUNTER — APPOINTMENT (OUTPATIENT)
Dept: RADIATION ONCOLOGY | Facility: HOSPITAL | Age: 66
End: 2021-02-01

## 2021-02-01 DIAGNOSIS — C34.90 LUNG CANCER METASTATIC TO BONE (HCC): ICD-10-CM

## 2021-02-01 DIAGNOSIS — C79.51 METASTASIS TO BONE (HCC): Primary | ICD-10-CM

## 2021-02-01 DIAGNOSIS — C79.51 LUNG CANCER METASTATIC TO BONE (HCC): ICD-10-CM

## 2021-02-01 PROCEDURE — 77412 RADIATION TX DELIVERY LVL 3: CPT | Performed by: RADIOLOGY

## 2021-02-01 NOTE — PROGRESS NOTES
Physician Weekly Management Note    Diagnosis:     Diagnosis Plan   1. Metastasis to bone (CMS/HCC)         RT Details:  Treatment #3/10    Notes on Treatment course, Films, Medical progress:  Doing well, no questions, continue as planned.      [facility] Onc ECOG Status: (0) Fully active, able to carry on all predisease performance without restriction      Weekly Management:  Medication reviewed?   Yes  New medications given?   No  Problemlist reviewed?   Yes  Problem added?   No      Technical aspects reviewed:  Weekly OBI approved?   Yes  Weekly port films approved?   Yes  Change requests noted on port film?   No  Patient setup and plan reviewed?   Yes    Chart Reviewed:  Continue current treatment plan?   Yes  Treatment plan change requested?   No  CBC reviewed?   No  Concurrent Chemo?   No    Objective     Toxicities:   As above     Review of Systems   As above    There were no vitals filed for this visit.    Current Status 1/13/2021   ECOG score 0       Physical Exam  As above      Problem Summary List    Diagnosis:     Diagnosis Plan   1. Metastasis to bone (CMS/HCC)       Pathology:       Past Medical History:   Diagnosis Date   • Anxiety    • Bruises easily     SIDE EFFECT D/T CHEMO TABLET    • CLL (chronic lymphocytic leukemia) (CMS/HCC) 04/2019    LAST CHEMO 7/2019   • Constipation    • COPD (chronic obstructive pulmonary disease) (CMS/HCC)    • Dyspnea    • ED (erectile dysfunction)    • H/O splenomegaly    • Ileus (CMS/HCC) 11/2020   • Lung cancer (CMS/HCC) 08/2020    WITH BONE METS  - LAST RADIATION TREATMENT 10/13/20   • On home O2     3L NC AT NIGHT    • Pleural effusion 01/14/2021    right side   • Sleep related hypoxia     USES O2 AT NIGHT   • Tumor     LEFT LEG NEAR KNEE   • Varicose vein of leg          Past Surgical History:   Procedure Laterality Date   • BRONCHOSCOPY Bilateral 4/29/2019    Procedure: BRONCHOSCOPY WITH WASHING ENDOBRONCHIAL ULTRASOUND WITH FNA'S;  Surgeon: Selina Lloyd  MD;  Location: [facility] ENDOSCOPY;  Service: Pulmonary   • INGUINAL HERNIA REPAIR Bilateral 11/23/2020    Procedure: Davinci assisted laparoscopic bilateral  inguinal hernia repair,  ;  Surgeon: Lloyd Magaña Jr., MD;  Location: [facility] MAIN OR;  Service: DaVinci;  Laterality: Bilateral;   • KNEE SURGERY Left 01/2021   • VENOUS ACCESS DEVICE (PORT) INSERTION N/A 10/15/2020    Procedure: MEDIPORT PLACEMENT;  Surgeon: Herlinda Magaña Jr., MD;  Location: [facility] MAIN OR;  Service: Vascular;  Laterality: N/A;         Current Outpatient Medications on File Prior to Visit   Medication Sig Dispense Refill   • albuterol (PROAIR RESPICLICK) 108 (90 Base) MCG/ACT inhaler Inhale 2 puffs Every 4 (Four) Hours As Needed for Wheezing. 1 inhaler 0   • ALPRAZolam (XANAX) 0.25 MG tablet TAKE 1 TABLET BY MOUTH 3 (THREE) TIMES A DAY AS NEEDED FOR ANXIETY. 60 tablet 0   • aspirin  MG tablet Take 1 tablet by mouth Daily. 30 tablet 0   • budesonide-formoterol (SYMBICORT) 160-4.5 MCG/ACT inhaler Inhale 2 puffs 2 (Two) Times a Day. 1 inhaler 11   • guaiFENesin (HUMIBID 3) 400 MG tablet Take 400 mg by mouth 2 (two) times a day.     • HYDROcodone-acetaminophen (NORCO)  MG per tablet Take 1 tablet by mouth Every 4 (Four) Hours As Needed for Moderate Pain . 100 tablet 0   • Imbruvica 420 MG tablet tablet TAKE 1 TABLET BY MOUTH ONCE DAILY 28 tablet 3   • mirtazapine (REMERON) 15 MG tablet Take 1 tablet by mouth Every Night. 30 tablet 2   • Morphine (MS CONTIN) 30 MG 12 hr tablet Take 1 tablet by mouth Every 12 (Twelve) Hours. 60 tablet 0   • O2 (OXYGEN) Inhale 3 L/min Every Night. Wears at night only     • polyethylene glycol (polyethylene glycol) 17 g packet Take 17 g by mouth Every 12 (Twelve) Hours.     • sennosides-docusate (Senexon-S) 8.6-50 MG per tablet Take 2 tablets by mouth 2 (Two) Times a Day. 60 tablet 1     No current facility-administered medications on file prior to visit.        No Known Allergies      Primary  care MD:    Juan Iyer MD    Oncologist:  Patient Care Team:  Connor Dove MD as Referring Physician (Pulmonary Disease)  Jostin Parekh MD as Consulting Physician (Hematology and Oncology)  Dav Boyd MD as Consulting Physician (Radiation Oncology)       Seen and approved by:  Dav Boyd MD  02/01/2021

## 2021-02-02 PROCEDURE — 77412 RADIATION TX DELIVERY LVL 3: CPT | Performed by: RADIOLOGY

## 2021-02-02 PROCEDURE — 77336 RADIATION PHYSICS CONSULT: CPT | Performed by: RADIOLOGY

## 2021-02-02 RX ORDER — MORPHINE SULFATE 30 MG/1
30 TABLET, FILM COATED, EXTENDED RELEASE ORAL EVERY 12 HOURS
Qty: 60 TABLET | Refills: 0 | Status: SHIPPED | OUTPATIENT
Start: 2021-02-02 | End: 2021-03-01 | Stop reason: SDUPTHER

## 2021-02-02 NOTE — PROGRESS NOTES
Subjective       REASON FOR FOLLOW UP:  1.  Chronic lymphocytic leukemia with extensive lymphadenopathy and possible pleural involvement.  2.  Initiation of Treanda, Rituxan May 1, 2019 IVIG also utilized                 3.  Significant response on scans June 27, 2019, alternative therapy with Imbruvica planned, initiated July 25, 2019  4.  Patient seen February 12, 2020, continued control of CLL, discussed Rituxan, Imbruvica, sleep study and potential surgical assessment for hernia  5.  CT of chest per pulmonary August 26 with 1.6 cm spiculated nodule in the left upper lobe, 1.2 cm left adrenal nodule not present on comparison study, bone windows with soft tissue mass involving the right eighth rib measuring 3.7 x 2.6, biopsy positive for adenocarcinoma  6.  Patient assessed September 30, plans for palliative radiation therapy, port placement, Keytruda considering PDL 1 positivity and high TMB status  7.  Patient reviewed October 21, 2020, Keytruda initiated, pain much improved status post radiation therapy  8.  Status post bilateral inguinal hernia repairs November 23, 2020.  Evidence of progression of disease versus pseudoprogression, continued knee pain   9.  Patient seen in office December 2, 2020, continued Keytruda for total of 4 cycles, institution of Xgeva, reassessment per left knee pain  10.  Patient assessed by orthopedic oncology status post prophylactic stabilization of left femur January 7, 2021.  11 patient reviewed January 12, 2021 with clear evidence of improvement on Keytruda.  Plan to continue same at present.  Increasing shortness of breath thought secondary pleural effusion, plan thoracentesis-cytology, flow cytometry and chemistries,?  Pleurx catheter        HISTORY OF PRESENT ILLNESS:   The patient is a  65 y.o. Male  who was admitted on 4/28/2019 with worsening complaints of cough wheezing and shortness of breath.  He had been to an urgent care center and was started on antibiotics and  received a shot of Solu-Medrol.  His symptoms did not improve and on his admission he was noted to have profound lymphocytosis with a total white count of 70,074% lymphocytes on his white cell differential.      He also underwent CT scan of the chest that showed infiltrates and nodules in the lungs as well as significant lymphadenopathy within the chest and in the axillary regions.  He had peripheral blood sent for flow cytometry leukemia lymphoma panel but this is still pending at time of this dictation.           He underwent bronchoscopy on 4/29/2019.  Results from this procedure are pendingl.  His respiratory viral panel was negative and markers of sepsis were unremarkable.      His peripheral blood flow cytometry and flow cytometry from the EBUS needle biopsy at bronchoscopy are both consistent with chronic lymphocytic leukemia/small lymphocytic lymphoma.     The patient underwent a CT of abdomen and pelvis April 30 demonstrating extensive splenomegaly and bulky lymphadenopathy throughout the abdomen and pelvis.  There is a tiny lesion at the superior aspect lateral hepatic segment and 2 hyperenhancing foci within the liver thought to be hemangiomas, moderate size right inguinal hernia containing a long segment of small bowel without obstruction or incarceration.  CT of soft tissue again reveals extensive cervical adenopathy as well as evidence of pansinusitis.  The patient's case was discussed with Dr. Church and the patient has stage III-IV disease should be treated during this hospitalization.  I met with the patient and discussed in detail the use of Treanda/ Rituxan which we initiated Treanda Rituxan beginning May 1, 2019.     When he is seen May 2 he is already beginning to respond with reducing adenopathy and improved symptoms.  Plan to proceed with day #2.  We have also reviewed his findings including IgA 14, IgG of 263, IgM of 5 and a kappa/lambda ratio of 26.38.  He is hypogammaglobulinemic and  replacement therapy will be given this hospitalization.  ENT assessment will also be requested.     The patient is again seen May 3, 2019, continuing to improve overall.  We did proceed with IVIG 400 mg/kg and had the patient seen by ENT after which the patient was discharged.  He indicates that Dr. Hamlin is going to see him back within the week as he is now seen back in our office May 16 and that surgery may be necessary.  The patient is also still having sinus symptoms.  Further he has developed a more extensive rash involving his abdomen chest and back and previously seen in hospital?.  Otherwise he feels well except for fatigue without fever, chills, nausea, vomiting, weight loss, stable appetite.     The patient went on to take 2 cycles of Treanda, Rituxan with a second cycle given June 6 and 7.  Additional assessments included his dermatologist who felt he had a gradually improving dermatitis and would not require an therapy and ENT indicating that the patient was slowly improving per sinus symptoms the surgery may be necessary at a later point.  When he is reviewed May 30 he was neutropenic and we proceeded with IVIG second treatment on Elenita 10.  He underwent repeat scans June 27 demonstrating a significant reduction in adenopathy in the neck chest abdomen pelvis with index nodes in the neck previously 2 cm x 1.1 cm, chest with subcarinal lymph node conglomerate measuring 2.1 x 0.9 previously 5.8 x 2.7 and previously seen irregular pulmonary consolidation throughout bilateral lungs having nearly completely resolved.  Spleen is also reduced in size at 4.5 cm previously 18.6 cm and mesenteric nodes had similar reduced in size.  The patient's immunoglobulins were assessed June 27 with an IgG of 667, IgA of 20, IgM of 9 and IgE of 3.Additional information includes FISH analysis for CLL which is positive for deletion of 13 q. 14.3 It should be noted that such finding on FISH analysis generally suggest a favorable  prognostic finding.  The patient is next seen July 3, 2019 feeling improved overall but still having a dermatologic issue with pruritus, erythema worsening particularly in the lower abdomen and upper groin.  Again his scans are considerably improved and we discussed, on the basis of the above information, changing his therapy now to Imbruvica.  The patient will return for teaching per Imbruvica July 11 the patient initiated treatment by July 25th 2019.  He was seen July 31 tolerating this well.  He was able to discontinue allopurinol and ferrous sulfate thereafter presents back August 28 having taken the medication over the last month.     He indicates that he is having no issues tolerating the medication and generally feels well except for sinus drainage.  He has had a cough and has a chest x-ray scheduled to primary care August 29, 2019.  He has had no fever, chills, sweats, rash and has gained weight.  The patient is next seen November 20, 2019 continuing to generally improve.  His performance status remains excellent and has had no additional complications thus far with the use of Imbruvica or an additional infectious complications.  He has been seen by pulmonary medicine with reticular infiltrates noted on previous CAT scan on a follow-up study scheduled in the next several days.  This may be evolution towards recovery but a follow-up will be necessary.  Finally we have been able to obtain Imbruvica reasonably cost through foundation support.    The patient is next seen February 12, 2020 doing well without additional infectious complications and with stable findings hematologically.  We have discussed the fact that Rituxan could be added potentially to reduce the amount of time he remains on the medication but, additionally, further reduce his immunoglobulins.  Though this remains a concern he is also having difficulty with sleep-?  Sleep apnea, and worsening right direct inguinal hernia.    Plans for the  patient to continue Imbruvica at dosing rechecking his immunoglobulins April 29 now with IVIG down to 371, IgM of 11, IgA level 32, kappa/lambda ratio of 1.51.  Fortunately continues to feel well without additional symptoms though is concerned about easy bruisability and periodic muscle tenderness after activity.  We have discussed his sleep assessment and he very likely has sleep apnea but does not wish to start CPAP at this point and is using oral medications-trazodone-to modest effect.    The patient is followed by pulmonary medicine.He was seen September 2 having had right-sided rib pain for 1 month, shortness of breath on going up stairs or uphill.  Follow-up chest CT revealed left upper lobe nodule 1.6 cm that was noted spiculated, additionally lesion per right rib with a soft tissue mass (3.7 x 2.6 cm) was recognized in the tissue biopsy was obtained.  This is now returned as positive for moderately differentiated adenocarcinoma.  This is CK7 positive, CK20 negative with a lung primary suspected clinically.  A molecular panel has not yet been obtained.  We have now, however, sent for foundation CDX analysis.    The patient is seen September 2020 with considerable right chest wall pain only modestly controlled with Toradol and ibuprofen.  He also has been taking additional Xanax to reduce the muscle spasm that he is experiencing.  I have advised him of the findings and their significance as being consistent with metastatic non-small cell lung cancer.  He is dismayed by the conversation but wishes to have pain control as quickly as possible as well as a cady discussion of treatment options.    The patient was provided additional anti-pain and antianxiety medications.  A PET/CT was obtained September 23 demonstrating asymmetric uptake within the right parotid gland which is unremarkable appearance on noncontrasted CT, SUV of 6 compared to 2.7.  There is no hilar, mediastinal or axillary adenopathy, findings of  asymmetric moderate to intense FDG uptake within superior aspect the right chest wall anterior to the right first rib with an irregular hypodense lesion measuring 1.8 x 1.6 and SUV 4.9.  This had not been seen June 27, 2019.  There is an intensely FDG avid nodule within the lingula measuring 1.9 x 1.5 history of 12.4, FDG avid left adrenal nodule 1.9 x 1.5 with an issue 21.2.  The patient was seen by radiation therapy September 29 and started on radiation therapy to the right chest wall-35 Gy in 10 fractions.  Plans were also then proceed with SBRT to the solitary left upper lobe lesion pending insurance approval.  Further testing including TPS of 20% and foundation CDX with a TMB of greater than 10 mutations per megabase (28 mutations per megabase) and the indication that several immunotherapies could be useful in this patient's care.  Additional genomic findings include BRAF G469R/that trametinib could be useful and STK11/that everolimus could be useful.      The patient is seen back September 30, 2020 indicating that his pain has improved substantially with his current pain medications.  He also continues laxatives on a regular basis.  Radiation therapy will begin October 1, 2020 as described above and we have discussed on the basis of the findings per his genomic testing that he is a candidate for a number of additional treatment approaches.  Considering he does not have significant organ involvement and that he has a positive PDL 1 at 20% and a TMB 28 mutations per megabase it would seem reasonable (particularly with his history of CLL) to try to use immunotherapy as monotherapy initially.  This might provide control and allow us not to affect his hematologic illness in any significant way.  We discussed that he will need a PowerPort placement in the near future but that we could start Keytruda shortly thereafter.  Patient was able to proceed into palliative therapy undergoing radiation therapy to the right  eighth rib 3 and 50 cGy per fraction x10 between October 1 of October 14.  A PowerPort was placed October 15, 2020.        Mr. Freitas returns to the office October 21, 2020 indicating, wonderfully, that his pain has nearly resolved and he does not need to take short acting pain medications at this point.  Unfortunately he has had severe constipation we discussed his laxatives in depth as to the use, schedule and expected results as he reduces his narcotic use and moves into immunotherapy.  We have also discussed his PET/CT that does refer to an abnormality seen in the rectum that will need to be assessed by colonoscopy.  He states further that he is having lower extremity swelling beginning over the last several weeks right greater than left.  His breathing remains generally improved though he does have cough periodically and mild degree of hemoptysis.    The patient proceeded with his first Keytruda October 21, 2020 and, fortunately, had little side effects from its use thereafter.  He had noted mild hemoptysis as well as left lower extremity weakness and discomfort requiring periodic pain medication.  As he is next seen November 11, 2020 and a second cycle as planned of Keytruda his knee pain has worsened and he is currently using a knee brace, alternating heat and cold with variable results.  He has not had previous injury to the knee.  The patient is also clearly suffering in terms of his concern about his multiple ongoing issues noting that his symptoms of depression are worsening.       We made plans to continue Imbruvica, and have the patient have follow-up scans.  Unfortunately he presented with incarcerated right inguinal hernia required admission November 16, 2020 ultimately reducing.  He had subsequent mild ileus that slowly resolved been seen by GI.  Repeat scans November 17 that demonstrate rather rapid increase in the right eighth rib lesion and projecting in thoracic cavity, moderate to large  right-sided pleural effusion, enlargement of spiculated left upper lobe mass now measuring 2.4 x 1.6 and a new left adrenal mass as well as enlarged retrocrural lymph node.     On November 23, 2020 he underwent da Kavin robot assisted laparoscopic bilateral inguinal hernia repairs.  His Imbruvica has been held in around the surgery but restarted November 24.     The patient is next seen back in December 2, 2020 and his multiple issues including CLL/SSL on Imbruvica, metastatic non-small cell lung cancer with lack of response using immunotherapy as primary treatment, targeted treatments such as trametinib that given with Imbruvica can accelerate hypertension, recent requirement of bilateral inguinal hernia repair, follow-up with GI with opioid-induced constipation and abnormality seen on PET/CT requiring eventual colonoscopy and worsening depression and anxiety addressed by counseling, and institution of Remeron as well as as needed Xanax.     During his hospitalization we had seen the patient with the possibility of his development of pseudoprogression having had only 2 treatments with Keytruda.  Though he has evidence of progression he request that we continue with Keytruda by itself at this point to determine if it has truly failed to control his disease.  He counters indicating that he is feeling better overall with stable appetite though has lost approximately 4 pounds.  In further discussion we have agreed that he will take 2 additional treatments with Keytruda and then undergo repeat scans to determine his true status and, at that point, if he has progressed we have moved to carboplatin alimta chemotherapy along with Keytruda.  There are targeted agents available though they do interact likely with his Imbruvica accelerated hypertension.  Finally we need to better understand his left knee pain since this is truly affecting his performance status.  Please note that Xgeva was initiated December 2, 2020  The  "patient was given Keytruda and Xgeva December 2.  A follow-up MRI of the knee revealed a bone lesion along the posterior aspect of the distal femoral diametaphysis just medial of midline representing metastatic lesion measuring 1.9 x 2.2 initiation of cortical destruction along the posterior aspect of the femur.  Tumor extends cephalad partially superficial cortex/the lesion overall measures 3.5 cm.    The patient seen in office December 23, 2020 indicating that his knee is manageable at this point as long as he \"starts walking\" his MRI, however, is concerning enough to request orthopedic oncology assessment and radiation therapy consultation as we also try to determine whether his disease is controlled with Keytruda as a single agent or whether we need to move to systemic chemotherapy along with immunotherapy.  Notably he states when seen December 23, 2020 that the swelling per his right upper chest is now resolved.  As result of above the patient was asked to be seen by orthopedic oncology-Dr. Eliu Ochoa-who after assessment patient felt that he was at risk for pathologic fracture.  The patient was admitted January 7 undergoing curettage of the left femoral bone lesion, prophylactic stabilization with intramedullary implants.  The patient did well with this approach.  It was suggested not to use Xgeva or Zometa for a period of time as this healed.    Additionally the patient underwent repeat imaging January 6 that showed the previously large expansile destructive lesion involving the right eighth rib markedly reduced in size measuring 3.4 x 5.3 cm previously 8.2 x 9.4, no change in left iliac bone, irregular spiculated mass left upper lobe nearly completely resolved previously 2.7 x 2.7 now only 0.4 x 0.9 cm, a residual large right pleural effusion unchanged, a previous left adrenal mass measuring 2.9 x 3.3 cm has resolved, evidence of right inguinal canal surgery also noted.  We had planned, initially, to change " the patient to Carboplatin, Alimta and Keytruda but now find that is not necessary with the patient responding, clearly, to Keytruda as a single agent.  Patient with increasing shortness of breath recognized January 13, follow-up thoracentesis with flow cytometry, cytology,?  Pleurx catheter.   The patient is here today for his sixth cycle of Keytruda.  He continues to tolerate treatment reasonably well.  He has noted an increased frequency in his bowel movements though they are formed.  He is having up to 6 bowel movements a day.  He has not taken any Imodium due to fear of constipation as he  has previously struggled with this.  Additionally, he does note a decreased appetite.  His weight is reflective of this with a 6 pound loss in the last 2 weeks.      He did recently undergo right thoracentesis with 1500 mL of fluid withdrawn.  Cytology was negative with the exception of 2% monoclonal B-cell population that would be consistent with CLL.  He had actually noted improvement in his breathing following the thoracentesis.      He continues with palliative radiation per Dr. Boyd for metastatic bone disease.  He is currently on MS Contin and Norco though has been out of his MS Contin for the last 2 days.  He has noted an increase in his right rib pain to the degree that it results in shortness of breath with certain positions.  The MS Contin does help minimally with this though he feels overall that the pain persists in spite of the MS Contin.  He is questioning whether additional radiation to this area might be beneficial.    In regards to his history of depression and anxiety, he has not noted a substantial difference in his mood or ability to sleep with the Remeron.  He will be meeting with our supportive oncology team next week to discuss this.    His CBC is adequate.  He has no other issues and is willing to proceed with treatment.           Past Medical History, Past Surgical History, Social History, Family  History have been reviewed and are without significant changes except as mentioned.    Review of Systems   Constitutional: Positive for fatigue. Negative for unexpected weight change.   HENT: Negative.    Eyes: Negative.    Respiratory: Positive for shortness of breath (see HPI ). Negative for cough, chest tightness and wheezing.         Infrequent mild hemoptysis   Cardiovascular: Positive for leg swelling (stable ).   Gastrointestinal: Positive for constipation, diarrhea and nausea. Negative for abdominal pain and vomiting.        See HPI    Endocrine: Negative.    Genitourinary: Negative.  Negative for testicular pain.   Musculoskeletal: Positive for arthralgias.   Skin: Negative.  Negative for rash.   Allergic/Immunologic: Negative.    Neurological: Positive for weakness.   Psychiatric/Behavioral: Positive for sleep disturbance.   All other systems reviewed and are negative.         Medications:  The current medication list was reviewed in the EMR    ALLERGIES:  No Known Allergies    Objective      There were no vitals filed for this visit.  Current Status 1/13/2021   ECOG score 0       Physical Exam    Constitutional: He is oriented to person, place, and time. He appears well-developed and well-nourished.  In no acute distress  HENT:   Head: Normocephalic.   Eyes: Conjunctivae and EOM are normal. Pupils are equal, round, and reactive to light. No scleral icterus.   Neck: Normal range of motion. Neck supple. No JVD present. No thyromegaly present.   Cardiovascular:  Normal rate, regular rhythm.  present. Exam reveals no gallop and no friction rub.   No murmur heard.  Pulmonary/Chest:He has no rales.  The patient no longer has a palpable mass approximately right seventh eighth rib laterally.  Abdominal: Soft. He exhibits no distension and no mass. There is no tenderness.  He has bilateral hernias, apparently direct, right greater than left  Musculoskeletal: Normal range of motion. He exhibits trace edema  bilateral lower extremities, status post surgery per left knee associated swelling limitation of motion.  This is persistent today  Lymphadenopathy: No current lymphadenopathy        Skin: Rash resolved                                                Neurological: He is alert and oriented to person, place, and time. He has normal reflexes. No cranial nerve deficit.   Skin: Skin is warm and dry.                           Psychiatric: He has a normal mood and affect. His behavior is normal. Judgment normal    I have reexamined the patient and the results are consistent with the previously documented exam. RORY Jaimes     RECENT LABS:  Hematology     No results found for: WBC, RBC, HGB, HCT, PLT         Flow cytometry report on the peripheral blood as well as the lymph node biopsied at bronchoscopy are consistent with chronic lymphocytic leukemia/small lymphocytic lymphoma.    FISH prognostic panel-Positive for deletion of 13 q. 14.3    DUPLEX VENOUS LOWER EXTREMITIES 10/22/2020    Study Impression •     Right Common Femoral:  No deep vein thrombosis noted.   •     Right Saphenofemoral Junction:  No superficial thrombophlebitis noted.   •     Right Proximal Femoral: No deep vein thrombosis noted.   •     Right Mid Femoral: No deep vein thrombosis noted. There was evidence of valvular incompetence noted.   •     Right Distal Femoral: No deep vein thrombosis noted.   •     Right Popliteal: No deep vein thrombosis noted.   •     Right Posterior Tibial: No deep vein thrombosis noted.   •     Right Peroneal: No deep vein thrombosis noted.   •     Right Gastrocnemius: No deep vein thrombosis noted.   •     Right Greater Saphenous Above Knee: No superficial thrombophlebitis noted.   •     Right Greater Saphenous Below Knee: No superficial thrombophlebitis noted.   •     Left Common Femoral: No deep vein thrombosis noted.   •     Left Saphenofemoral Junction: No superficial thrombophlebitis noted.   •     Left Proximal  Femoral: No deep vein thrombosis noted.   •     Left Mid Femoral: No deep vein thrombosis noted.   •     Left Distal Femoral: No deep vein thrombosis noted.   •     Left Popliteal: No deep vein thrombosis noted.   •     Left Posterior Tibial: No deep vein thrombosis noted.    •     Left Peroneal: No deep vein thrombosis noted.   •     Left Gastrocnemius: No deep vein thrombosis noted.    •     Left Greater Saphenous Above Knee: No superficial thrombophlebitis noted.   •     Left Greater Saphenous Below Knee: No superficial thrombophlebitis noted.     LEFT KNEE MRI WITH AND WITHOUT CONTRAST 12/18/2020    FINDINGS: There is a bone lesion along the posterior aspect of the  distal femoral diametaphysis just medial to the midline, representing a  metastatic lesion. The lesion measures 1.9 x 2.2 cm axial and is  associated with cortical destruction along the posterior aspect of the  femur. A component of tumor extends cephalad, partially superficial to  the cortex such that the lesion overall measures 3.5 cm in length. There  is mild abnormal bone marrow and edema and enhancement around the  lesion. There is periosteal edema and enhancement along the medial and  posterior aspect of the femur with some periosteal new bone. The cortex  around most of the distal femur remains normal. There is overlying soft  tissue edema and enhancement.     Marrow signal in the femoral condyles, the patella, the proximal tibia,  and the proximal fibula is normal.     There is a small joint effusion without evidence of synovitis. The  ligaments and tendons of the knee are intact. There is a chronic  appearing, shallow tear of the medial meniscal posterior horn along its  undersurface with mild irregularity at the free edge. The lateral  meniscus appears normal. Cartilage throughout the knee is preserved.     IMPRESSION:  Osseous metastasis along the posterior aspect of the distal  left femoral diametaphysis with adjacent bone marrow and soft  tissue  edema. Dimensions of the lesion are described above. There is no  pathologic fracture nor is there any other metastatic lesion elsewhere  around the knee.     A small tear of the medial meniscal posterior horn is incidentally noted  and probably chronic.     CT SCANS OF THE CHEST AND ABDOMEN AND PELVIS WITH ORAL AND INTRAVENOUS  CONTRAST January 6, 2021  FINDINGS: The following findings are present:  1. The previous large expansile and destructive lesion with soft tissue  mass involving the right 8th rib shows very marked reduction in size. It  currently measured 8.2 x 9.4 cm and now measures 3.4 x 5.3 cm. The lytic  and slightly expansile lesion in the left iliac bone shows no  significant change and measures up to 1.8 cm in diameter. No new bone  lesions are seen.  2. The irregular spiculated mass in the left upper lobe has nearly  completely resolved. It previously measured up to 2.7 x 2.7 cm and only  some minimal residual parenchymal density is present measuring 0.4 x 0.9  cm.  3. There remains a very large right pleural effusion layering  posteriorly with compressive atelectasis that is unchanged. The lungs  are otherwise clear. There is no mediastinal or hilar or axillary  adenopathy which is unchanged.  4. The liver, spleen and right adrenal gland and pancreas are  unremarkable and unchanged. A previous left adrenal mass measuring 2.9 x  3.3 cm and consistent with a left adrenal metastasis has resolved. The  kidneys are unremarkable.  5. There is no aortic aneurysm or retroperitoneal lymphadenopathy. The  previously described enlarged retrocrural nodes appear to represent  enlarged perivertebral venous plexus. No adenopathy is seen. The large  and small bowel loops are normal in caliber.  6. In the pelvis, there is a predominantly cystic thick-walled structure  within the right inguinal canal that appeared to represent simple fluid  on the earlier exam. It now has a thickened wall but does  not  communicate with intra-abdominal structures such as bowel. This may  represent a loculated fluid collection but continued surveillance on  follow-up CT scan is recommended. It is unlikely to represent metastatic  disease given the marked improvement in other areas as described above.      TWO-VIEW LEFT FEMUR January 7, 2021     HISTORY: Metastatic bone disease. Intralesional resection and implant.     FINDINGS: The patient has had recent surgery with placement of an  intramedullary implant device in the lower half of the femur. There is  some localized periosteal reaction involving the medial cortex of the  distal femur.      Assessment/Plan   1.  CLL presenting with significant lymphocytosis, lymphadenopathy and splenomegaly.   Positive for deletion of 13 q. 14.3.  Patient status post 2 cycles of Treanda Rituxan with excellent response.  Reassessment July 3, 2019 with plans to switch Treanda to Imbruvica which began July 25, 2019, tolerated well. This continues to be the case.  We have discussed the possibility of adding Rituxan to shorten the time he is on  Imbruvica and we will continue to review this though the patient has hypogammaglobulinemia at this point and we do not wish to worsen this until we are more aware of how to manage COVID-19.  As he is assessed September 9, 2020 his CLL is under good control and he will continue Imbruvica at this point at unchanged dosing.  There were no clinical changes when the patient is reassessed September 30 and October 21 November 11 and now December with the patient stable concerning CLL.    -No clinical evidence of disease progression today        2.  Pulmonary nodules/infiltrates.  He is  status post bronchoscopy.  Is unclear at this time whether these findings are infectious or possibly related to his lymphoproliferative disorder.  His subsequent studies in late November are much improved, followed by pulmonary.       3.  Non-small cell lung carcinoma                                          He had follow-up scans performed August 26 2020 revealing a new 1.6 cm spiculated nodule within the left upper lobe, 1.2 cm left adrenal nodule, bone windows with a soft tissue mass involving the right eighth rib measuring 3.7 x 2.6 cm.  Biopsy was consistent with adenocarcinoma CK 7+, CK 20-, lung primary suspected clinically, molecular panel not yet obtained.     A PET/CT was obtained September 23, 2020 demonstrating asymmetric uptake within the right parotid gland which is unremarkable appearance on noncontrasted CT, SUV of 6 compared to 2.7.  There is no hilar, mediastinal or axillary adenopathy, findings of asymmetric moderate to intense FDG uptake within superior aspect the right chest wall anterior to the right first rib with an irregular hypodense lesion measuring 1.8 x 1.6 and SUV 4.9.  This had not been seen June 27, 2019.  There is an intensely FDG avid nodule within the lingula measuring 1.9 x 1.5 history of 12.4, FDG avid left adrenal nodule 1.9 x 1.5 with an issue 21.2.  The patient was seen by radiation therapy September 29 and started on radiation therapy to the right chest wall-35 Gy in 10 fractions.  Plans were also then proceed with SBRT to the solitary left upper lobe lesion pending insurance approval.  Further testing including TPS of 20% and foundation CDX with a TMB of greater than 10 mutations per megabase (28 mutations per megabase) and the indication that several immunotherapies could be useful in this patient's care.  Additional genomic findings include BRAF G469R/that trametinib could be useful and STK11/that everolimus could be useful.  *Discussion held as to how to proceed in particular using immunotherapy with Keytruda as monotherapy initially in this patient.  This would reduce his degree of myelosuppression.  Patient completed radiotherapy October 14, 2020 October 21 status post port placement the patient began Keytruda which she tolerated well and as he is  seen November 11 we plan a second cycle.  At present he is scheduled for follow-up CT of the chest November 17, radiation therapy follow-up November 24.        The patient is reassessed after 2 cycles of Keytruda with enlargement of right eighth rib lesion, enlargement of spiculated left upper lobe lung mass, moderate to large right-sided pleural effusion, new left adrenal mass and enlarged retrocrural lymph node.  This is addressed with him November 2, 2020 with at least the possibility of pseudoprogression present.  Symptomatically he is improved in his right chest wall mass is slightly reduced clinically.  We have discussed proceeding with his third cycle of Keytruda, initiating Xgeva, having his left knee assessed and rescanning him after 4 cycles of Keytruda make a decision about extending his systemic therapy to include carboplatin, Alimta and Keytruda.      The patient is seen December 23, 2020 and clinically feels better except for his left knee pain.  We have discussed proceeding with his Keytruda and then reevaluating by follow-up scan.  The patient continued Xgeva December 2 and December 30.     In the interval he was seen for his left knee and was felt to be an impending fracture.  He was reviewed by orthopedic oncology and proceeded to curettage of the left femoral bone lesion and prophylactic stabilization January 7, 2021.      Additionally and wonderfully follow-up scans obtained January 6, 2021 demonstrated a marked improvement per his right eighth rib lesion, resolution of left upper lobe spiculated mass, residual large right pleural effusion, resolution of left adrenal metastasis.  This indicates that Keytruda is working well as a single agent we do plan to continue this January 13, 2021.  He has additional issues, however, with a right-sided pleural effusion that has increased and is producing symptoms.  He will require thoracentesis and will obtain both flow cytometry, cytology, LDH, total  protein, glucose.  Pending his response to this the patient could be a candidate for Pleurx catheter placement.   As the patient returns today, he is due for his sixth cycle of Keytruda.  As outlined above, cytology only indicated if the cell monoclonal population consistent with CLL.  Shortness of breath is greatly improved with recent thoracentesis.  He does note recurrent shortness of breath positionally with right intercostal pain.  He has been out of his MS Contin over the last 2 days and notes that this has made it slightly worse.  Additionally, he has noted increased frequency of his bowel movements.  He has been reluctant to take Imodium due to constipation though I have encouraged him to take 1 to 2 tablets of this daily.  We will proceed as scheduled today.  He will be scheduled to return in 3 weeks for his next Keytruda with MD reevaluation the same day.        3.  Xgeva initiated monthly beginning December 2, 2020.  This was next due December 30, 2020.  As result of the patient's recent knee surgery will be holding this for the next several months.      4.  Bilateral hernia repair status post right incarcerated inguinal hernia November 23, 2020                                                                                                                                                           5.  Iron deficiency-stable though patient has anemia secondary to recent surgery.  HGB today improved to 12.9    6.  Hypogammaglobulinemia-status post IVIG with resolution of sinusitis, current levels again reviewed    7.  Pain control now addressed with MS Contin 30 mg every 12 hours, Norco 10/325 1 p.o. every 4 hours, continue laxative therapy with Senokot-S, MiraLAX and now lactulose.  He has been seen by GI with other medications offered though believes he will proceed with these laxatives presently adjusted them as needed  The patient had run out of his MS Contin over the last 2 days and will  a new  refill today.  He has noted progressive right costal pain and is questioning if additional radiation to this area might be beneficial.  I have sent a message to Dr. Parekh to address this.     8.  GI follow-up with Dr. Torres planned. He will undergo colonoscopy on 3/19/21    9.  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 Eliu Ochoa-who after assessment patient felt that he was at risk for pathologic fracture.  The patient was admitted January 7 undergoing curettage of the left femoral bone lesion, prophylactic stabilization with intramedullary implants.  The patient did well with this approach.  It was suggested not to use Xgeva or Zometa for a period of time as this healed.    As noted above, Xgeva will remain on hold.  He does note ongoing pain though this is most pronounced in his right costal region.      10.  Supportive oncology-ongoing therapy for anxiety and depression.  He currently is being treated with Remeron and as needed Xanax.    He has not used the Xanax in some time.  He feels that the Remeron has only provided minimal benefit to him in regards to his sleep and mood stabilization.  He will be discussing this with the supportive oncology team next week.    11.  Increased frequency of bowel movements.  The patient notes that his bowels are formed but he has had increased frequency with as many as 6 bowel movements in 1 day.  This has been going on for the last 2 weeks.  I have encouraged him to try 1 to 2 tablets of Imodium per day.  He is to call within the next 3 days if this does not reduce number of bowel movements.  I did explain that one possible side effect of Keytruda can be immune mediated colitis and we want to ensure that this is not the case        PLAN:  1. Proceed with next dose of Keytruda today  2. Xgeva to remain on hold  3. Continue current pain regimen with laxative protocol.  I have encouraged the patient to maintain consistency with his MS Contin and Norco as he has  been out of the MS Contin for the last 2 days.  He will  a new prescription for this today.  4. F/U with Dr. Torres. Colonoscopy scheduled in March 4. Proceed with 5/10 scheduled fractions of radiation per Dr. Boyd   5.  MD reevaluation with Dr. Parekh in 3 weeks with his next dose of Keytruda.  I have sent a message to Dr. Parekh regarding the patient's question of additional benefit of radiation to his right costal region.  6.  I have encouraged the patient to try Imodium, 1 to 2 tablets/day to reduce the frequency of his bowel movements.  He is to call within the next 3 days if this does not reduce the number  7. I have asked the patient to call our office with any new issue or concerns prior to his next visit. He has v/u     A total of 30 minutes spent in face-to-face interaction with the patient with greater than half this time in counseling and education regarding bowel movements and pain

## 2021-02-03 ENCOUNTER — SPECIALTY PHARMACY (OUTPATIENT)
Dept: ONCOLOGY | Facility: CLINIC | Age: 66
End: 2021-02-03

## 2021-02-03 ENCOUNTER — INFUSION (OUTPATIENT)
Dept: ONCOLOGY | Facility: HOSPITAL | Age: 66
End: 2021-02-03

## 2021-02-03 ENCOUNTER — OFFICE VISIT (OUTPATIENT)
Dept: ONCOLOGY | Facility: CLINIC | Age: 66
End: 2021-02-03

## 2021-02-03 VITALS
SYSTOLIC BLOOD PRESSURE: 156 MMHG | HEART RATE: 87 BPM | WEIGHT: 164.9 LBS | TEMPERATURE: 98 F | OXYGEN SATURATION: 97 % | DIASTOLIC BLOOD PRESSURE: 87 MMHG | BODY MASS INDEX: 23.09 KG/M2 | RESPIRATION RATE: 17 BRPM | HEIGHT: 71 IN

## 2021-02-03 DIAGNOSIS — C34.90 LUNG CANCER METASTATIC TO BONE (HCC): ICD-10-CM

## 2021-02-03 DIAGNOSIS — C34.12 MALIGNANT NEOPLASM OF UPPER LOBE OF LEFT LUNG (HCC): ICD-10-CM

## 2021-02-03 DIAGNOSIS — C91.10 CLL (CHRONIC LYMPHOCYTIC LEUKEMIA) (HCC): ICD-10-CM

## 2021-02-03 DIAGNOSIS — Z79.899 HIGH RISK MEDICATION USE: Primary | ICD-10-CM

## 2021-02-03 DIAGNOSIS — C34.12 MALIGNANT NEOPLASM OF UPPER LOBE OF LEFT LUNG (HCC): Primary | ICD-10-CM

## 2021-02-03 DIAGNOSIS — C79.51 LUNG CANCER METASTATIC TO BONE (HCC): ICD-10-CM

## 2021-02-03 DIAGNOSIS — C79.51 METASTASIS TO BONE (HCC): ICD-10-CM

## 2021-02-03 LAB
ALBUMIN SERPL-MCNC: 4 G/DL (ref 3.5–5.2)
ALBUMIN/GLOB SERPL: 1.8 G/DL (ref 1.1–2.4)
ALP SERPL-CCNC: 105 U/L (ref 38–116)
ALT SERPL W P-5'-P-CCNC: 5 U/L (ref 0–41)
ANION GAP SERPL CALCULATED.3IONS-SCNC: 10.1 MMOL/L (ref 5–15)
AST SERPL-CCNC: 10 U/L (ref 0–40)
BASOPHILS # BLD AUTO: 0.04 10*3/MM3 (ref 0–0.2)
BASOPHILS NFR BLD AUTO: 0.5 % (ref 0–1.5)
BILIRUB SERPL-MCNC: 0.4 MG/DL (ref 0.2–1.2)
BUN SERPL-MCNC: 13 MG/DL (ref 6–20)
BUN/CREAT SERPL: 12.4 (ref 7.3–30)
CALCIUM SPEC-SCNC: 8.5 MG/DL (ref 8.5–10.2)
CHLORIDE SERPL-SCNC: 104 MMOL/L (ref 98–107)
CO2 SERPL-SCNC: 24.9 MMOL/L (ref 22–29)
CREAT SERPL-MCNC: 1.05 MG/DL (ref 0.7–1.3)
DEPRECATED RDW RBC AUTO: 53 FL (ref 37–54)
EOSINOPHIL # BLD AUTO: 0.05 10*3/MM3 (ref 0–0.4)
EOSINOPHIL NFR BLD AUTO: 0.6 % (ref 0.3–6.2)
ERYTHROCYTE [DISTWIDTH] IN BLOOD BY AUTOMATED COUNT: 16.9 % (ref 12.3–15.4)
GFR SERPL CREATININE-BSD FRML MDRD: 71 ML/MIN/1.73
GLOBULIN UR ELPH-MCNC: 2.2 GM/DL (ref 1.8–3.5)
GLUCOSE SERPL-MCNC: 88 MG/DL (ref 74–124)
HCT VFR BLD AUTO: 41.3 % (ref 37.5–51)
HGB BLD-MCNC: 12.9 G/DL (ref 13–17.7)
IMM GRANULOCYTES # BLD AUTO: 0.04 10*3/MM3 (ref 0–0.05)
IMM GRANULOCYTES NFR BLD AUTO: 0.5 % (ref 0–0.5)
LYMPHOCYTES # BLD AUTO: 0.63 10*3/MM3 (ref 0.7–3.1)
LYMPHOCYTES NFR BLD AUTO: 8.1 % (ref 19.6–45.3)
MCH RBC QN AUTO: 27 PG (ref 26.6–33)
MCHC RBC AUTO-ENTMCNC: 31.2 G/DL (ref 31.5–35.7)
MCV RBC AUTO: 86.6 FL (ref 79–97)
MONOCYTES # BLD AUTO: 0.71 10*3/MM3 (ref 0.1–0.9)
MONOCYTES NFR BLD AUTO: 9.1 % (ref 5–12)
NEUTROPHILS NFR BLD AUTO: 6.29 10*3/MM3 (ref 1.7–7)
NEUTROPHILS NFR BLD AUTO: 81.2 % (ref 42.7–76)
NRBC BLD AUTO-RTO: 0 /100 WBC (ref 0–0.2)
PLATELET # BLD AUTO: 242 10*3/MM3 (ref 140–450)
PMV BLD AUTO: 10.7 FL (ref 6–12)
POTASSIUM SERPL-SCNC: 4.6 MMOL/L (ref 3.5–4.7)
PROT SERPL-MCNC: 6.2 G/DL (ref 6.3–8)
RBC # BLD AUTO: 4.77 10*6/MM3 (ref 4.14–5.8)
SODIUM SERPL-SCNC: 139 MMOL/L (ref 134–145)
WBC # BLD AUTO: 7.76 10*3/MM3 (ref 3.4–10.8)

## 2021-02-03 PROCEDURE — 80053 COMPREHEN METABOLIC PANEL: CPT

## 2021-02-03 PROCEDURE — 99214 OFFICE O/P EST MOD 30 MIN: CPT | Performed by: NURSE PRACTITIONER

## 2021-02-03 PROCEDURE — 77412 RADIATION TX DELIVERY LVL 3: CPT | Performed by: RADIOLOGY

## 2021-02-03 PROCEDURE — 85025 COMPLETE CBC W/AUTO DIFF WBC: CPT

## 2021-02-03 PROCEDURE — 96413 CHEMO IV INFUSION 1 HR: CPT

## 2021-02-03 PROCEDURE — 25010000003 HEPARIN LOCK FLUSH PER 10 UNITS: Performed by: INTERNAL MEDICINE

## 2021-02-03 PROCEDURE — 25010000002 PEMBROLIZUMAB 100 MG/4ML SOLUTION 4 ML VIAL: Performed by: NURSE PRACTITIONER

## 2021-02-03 RX ORDER — SODIUM CHLORIDE 9 MG/ML
250 INJECTION, SOLUTION INTRAVENOUS ONCE
Status: COMPLETED | OUTPATIENT
Start: 2021-02-03 | End: 2021-02-03

## 2021-02-03 RX ORDER — SODIUM CHLORIDE 0.9 % (FLUSH) 0.9 %
10 SYRINGE (ML) INJECTION AS NEEDED
Status: CANCELLED | OUTPATIENT
Start: 2021-02-03

## 2021-02-03 RX ORDER — SODIUM CHLORIDE 0.9 % (FLUSH) 0.9 %
10 SYRINGE (ML) INJECTION AS NEEDED
Status: DISCONTINUED | OUTPATIENT
Start: 2021-02-03 | End: 2021-02-03 | Stop reason: HOSPADM

## 2021-02-03 RX ORDER — SODIUM CHLORIDE 9 MG/ML
250 INJECTION, SOLUTION INTRAVENOUS ONCE
Status: CANCELLED | OUTPATIENT
Start: 2021-02-03

## 2021-02-03 RX ORDER — HEPARIN SODIUM (PORCINE) LOCK FLUSH IV SOLN 100 UNIT/ML 100 UNIT/ML
500 SOLUTION INTRAVENOUS AS NEEDED
Status: CANCELLED | OUTPATIENT
Start: 2021-02-03

## 2021-02-03 RX ORDER — HEPARIN SODIUM (PORCINE) LOCK FLUSH IV SOLN 100 UNIT/ML 100 UNIT/ML
500 SOLUTION INTRAVENOUS AS NEEDED
Status: DISCONTINUED | OUTPATIENT
Start: 2021-02-03 | End: 2021-02-03 | Stop reason: HOSPADM

## 2021-02-03 RX ADMIN — SODIUM CHLORIDE 200 MG: 9 INJECTION, SOLUTION INTRAVENOUS at 11:59

## 2021-02-03 RX ADMIN — Medication 500 UNITS: at 12:26

## 2021-02-03 RX ADMIN — SODIUM CHLORIDE, PRESERVATIVE FREE 10 ML: 5 INJECTION INTRAVENOUS at 12:26

## 2021-02-03 RX ADMIN — SODIUM CHLORIDE 250 ML: 9 INJECTION, SOLUTION INTRAVENOUS at 11:48

## 2021-02-03 NOTE — PROGRESS NOTES
[facility] in person encounter- Imbruvica    Mr. Freitas is doing well today. He has no new issues or side effects with his Imbruvica. Medication administration and adherence seem appropriate; he reports the only time he misses any doses is if he is holding his medication for srugery. Mr. Freitas denies any changes to his current medication list. Pharmacy will continue to follow.     Thanks,   Jessenia Galvez, PharmD

## 2021-02-04 ENCOUNTER — MEDICATION THERAPY MANAGEMENT (OUTPATIENT)
Dept: PHARMACY | Facility: HOSPITAL | Age: 66
End: 2021-02-04

## 2021-02-04 PROCEDURE — 77412 RADIATION TX DELIVERY LVL 3: CPT | Performed by: RADIOLOGY

## 2021-02-04 PROCEDURE — 77417 THER RADIOLOGY PORT IMAGE(S): CPT | Performed by: RADIOLOGY

## 2021-02-04 PROCEDURE — 77427 RADIATION TX MANAGEMENT X5: CPT | Performed by: RADIOLOGY

## 2021-02-04 NOTE — PROGRESS NOTES
[facility] Lab Review- Imbruvica      2/3/2021  Day 1   WBC 3.40 - 10.80 10*3/mm3 7.76   Neutrophils Absolute 1.70 - 7.00 10*3/mm3 6.29   Hemoglobin 13.0 - 17.7 g/dL 12.9 (A)   Hematocrit 37.5 - 51.0 % 41.3   Platelets 140 - 450 10*3/mm3 242   Creatinine 0.70 - 1.30 mg/dL 1.05   eGFR Non African Am >60 mL/min/1.73 71   BUN 6 - 20 mg/dL 13   Sodium 134 - 145 mmol/L 139   Potassium 3.5 - 4.7 mmol/L 4.6   Glucose 74 - 124 mg/dL 88   Calcium 8.5 - 10.2 mg/dL 8.5   Albumin 3.50 - 5.20 g/dL 4.00   Total Protein 6.3 - 8.0 g/dL 6.2 (A)   AST (SGOT) 0 - 40 U/L 10   ALT (SGPT) 0 - 41 U/L 5   Alkaline Phosphatase 38 - 116 U/L 105   Total Bilirubin 0.2 - 1.2 mg/dL 0.4     APRN dictation noted. Pharmacy will continue to follow.     Thanks,   Jessenia Galvez, PharmD

## 2021-02-05 PROCEDURE — 77412 RADIATION TX DELIVERY LVL 3: CPT | Performed by: RADIOLOGY

## 2021-02-08 DIAGNOSIS — C34.90 LUNG CANCER METASTATIC TO BONE (HCC): ICD-10-CM

## 2021-02-08 DIAGNOSIS — C79.51 LUNG CANCER METASTATIC TO BONE (HCC): ICD-10-CM

## 2021-02-08 PROCEDURE — 77412 RADIATION TX DELIVERY LVL 3: CPT | Performed by: RADIOLOGY

## 2021-02-08 RX ORDER — HYDROCODONE BITARTRATE AND ACETAMINOPHEN 10; 325 MG/1; MG/1
1 TABLET ORAL EVERY 4 HOURS PRN
Qty: 100 TABLET | Refills: 0 | Status: SHIPPED | OUTPATIENT
Start: 2021-02-08 | End: 2021-03-12 | Stop reason: SDUPTHER

## 2021-02-09 ENCOUNTER — RADIATION ONCOLOGY WEEKLY ASSESSMENT (OUTPATIENT)
Dept: RADIATION ONCOLOGY | Facility: CLINIC | Age: 66
End: 2021-02-09

## 2021-02-09 ENCOUNTER — DOCUMENTATION (OUTPATIENT)
Dept: OTHER | Facility: HOSPITAL | Age: 66
End: 2021-02-09

## 2021-02-09 VITALS — HEIGHT: 71 IN | WEIGHT: 164 LBS | BODY MASS INDEX: 22.96 KG/M2

## 2021-02-09 DIAGNOSIS — C79.51 LUNG CANCER METASTATIC TO BONE (HCC): Primary | ICD-10-CM

## 2021-02-09 DIAGNOSIS — C34.90 LUNG CANCER METASTATIC TO BONE (HCC): Primary | ICD-10-CM

## 2021-02-09 PROCEDURE — 77336 RADIATION PHYSICS CONSULT: CPT | Performed by: RADIOLOGY

## 2021-02-09 PROCEDURE — 77412 RADIATION TX DELIVERY LVL 3: CPT | Performed by: RADIOLOGY

## 2021-02-09 NOTE — PROGRESS NOTES
Physician Weekly Management Note    Diagnosis:     Diagnosis Plan   1. Lung cancer metastatic to bone (CMS/HCC)         RT Details:  Treatment #9/10  -   left femur      Notes on Treatment course, Films, Medical progress:  No treatment related issues.  Skin without significant erythema no new leg pain, or weakness.  Range of motion intact.  He does mention new pain in the right chest separate from and above his previous treatment portal involving the large mass at the right eighth rib.  The new pain is much higher in the superior anterior axillary area.  Has been present for approximately 1 week and responds to Norco 10.  There was previously noted a possible metastasis anteriorly at the first rib on the right and on my review it is still present may be slightly bigger on his new his films -  I will review it with radiology.     Addendum: Reviewed with Dr. Gaby Hummel, the previously noted small mass has resolved, nothing in this area on most recent scanning.    [facility] ECOG Status: (1) Restricted in physically strenuous activity, ambulatory and able to do work of light nature      Weekly Management:  Medication reviewed?   Yes  New medications given?   No  Problemlist reviewed?   Yes  Problem added?   No      Technical aspects reviewed:  Weekly OBI approved?   Yes  Weekly port films approved?   Yes  Change requests noted on port film?   No  Patient setup and plan reviewed?   Yes    Chart Reviewed:  Continue current treatment plan?   Yes  Treatment plan change requested?   No  CBC reviewed?   No  Concurrent Chemo?   No    Objective     Toxicities:   As above     Review of Systems   As above    There were no vitals filed for this visit.    Current Status 1/13/2021   ECOG score 0       Physical Exam  As above      Problem Summary List    Diagnosis:     Diagnosis Plan   1. Lung cancer metastatic to bone (CMS/HCC)       Pathology:       Past Medical History:   Diagnosis Date   • Anxiety    • Bruises easily      SIDE EFFECT D/T CHEMO TABLET    • CLL (chronic lymphocytic leukemia) (CMS/[facility]) 04/2019    LAST CHEMO 7/2019   • Constipation    • COPD (chronic obstructive pulmonary disease) (CMS/[facility])    • Dyspnea    • ED (erectile dysfunction)    • H/O splenomegaly    • Ileus (CMS/[facility]) 11/2020   • Lung cancer (CMS/HCC) 08/2020    WITH BONE METS  - LAST RADIATION TREATMENT 10/13/20   • On home O2     3L NC AT NIGHT    • Pleural effusion 01/14/2021    right side   • Sleep related hypoxia     USES O2 AT NIGHT   • Tumor     LEFT LEG NEAR KNEE   • Varicose vein of leg          Past Surgical History:   Procedure Laterality Date   • BRONCHOSCOPY Bilateral 4/29/2019    Procedure: BRONCHOSCOPY WITH WASHING ENDOBRONCHIAL ULTRASOUND WITH FNA'S;  Surgeon: Selina Lloyd MD;  Location: [facility] ENDOSCOPY;  Service: Pulmonary   • FEMUR IM NAILING RETROGRADE Left 1/7/2021    Procedure: LEFT FEMUR INTRALESIONAL CURRETTAGE AND PROPHYLACTIC STABLILIZATION;  Surgeon: Eliu Ochoa MD;  Location: [facility] OR;  Service: Orthopedics;  Laterality: Left;   • INGUINAL HERNIA REPAIR Bilateral 11/23/2020    Procedure: Davinci assisted laparoscopic bilateral  inguinal hernia repair,  ;  Surgeon: Lloyd Magaña Jr., MD;  Location: [facility] OR;  Service: DaVinci;  Laterality: Bilateral;   • KNEE SURGERY Left 01/2021   • VENOUS ACCESS DEVICE (PORT) INSERTION N/A 10/15/2020    Procedure: MEDIPORT PLACEMENT;  Surgeon: Herlinda Magaña Jr., MD;  Location: [facility] OR;  Service: Vascular;  Laterality: N/A;         Current Outpatient Medications on File Prior to Visit   Medication Sig Dispense Refill   • albuterol (PROAIR RESPICLICK) 108 (90 Base) MCG/ACT inhaler Inhale 2 puffs Every 4 (Four) Hours As Needed for Wheezing. 1 inhaler 0   • ALPRAZolam (XANAX) 0.25 MG tablet TAKE 1 TABLET BY MOUTH 3 (THREE) TIMES A DAY AS NEEDED FOR ANXIETY. 60 tablet 0   • aspirin  MG tablet Take 1 tablet by mouth Daily. 30 tablet 0   • budesonide-formoterol  (SYMBICORT) 160-4.5 MCG/ACT inhaler Inhale 2 puffs 2 (Two) Times a Day. 1 inhaler 11   • guaiFENesin (HUMIBID 3) 400 MG tablet Take 400 mg by mouth 2 (two) times a day.     • HYDROcodone-acetaminophen (NORCO)  MG per tablet Take 1 tablet by mouth Every 4 (Four) Hours As Needed for Moderate Pain . 100 tablet 0   • Imbruvica 420 MG tablet tablet TAKE 1 TABLET BY MOUTH ONCE DAILY 28 tablet 3   • mirtazapine (REMERON) 15 MG tablet Take 1 tablet by mouth Every Night. 30 tablet 2   • Morphine (MS CONTIN) 30 MG 12 hr tablet Take 1 tablet by mouth Every 12 (Twelve) Hours. 60 tablet 0   • O2 (OXYGEN) Inhale 3 L/min Every Night. Wears at night only     • polyethylene glycol (polyethylene glycol) 17 g packet Take 17 g by mouth Every 12 (Twelve) Hours.     • sennosides-docusate (Senexon-S) 8.6-50 MG per tablet Take 2 tablets by mouth 2 (Two) Times a Day. 60 tablet 1     No current facility-administered medications on file prior to visit.        No Known Allergies      Primary care MD:    Juan Iyer MD    Oncologist:  Patient Care Team:  Connor Dove MD as Referring Physician (Pulmonary Disease)  Jostin Parekh MD as Consulting Physician (Hematology and Oncology)  Dav Boyd MD as Consulting Physician (Radiation Oncology)       Seen and approved by:  Dav Boyd MD  02/09/2021

## 2021-02-09 NOTE — PROGRESS NOTES
"Outpatient Oncology Nutrition Assessment      Patient Name:  Dav Freitas  YOB: 1955  MRN: [id number]      Assessment Date:  2/9/2021    Comments:  Spoke to patient on the phone today. He has lost about 5 lb since previous visit last month. He tells me he eats three meals a day, good size meals. Sometimes he has a carnation instant breakfast for breakfast. I suggested that he add some snacks in between his meals, three times a day if possible, and gave examples. He was a little distracted during our discussion. Will send him info in the mail along with my card if he has questions.     Reason for Assessment     Row Name 02/09/21 1500          Reason for Assessment    Reason For Assessment  diagnosis/disease state;nurse/nurse practitioner consult     Diagnosis  cancer diagnosis/related complications CLL, lung ca with mets to bone         Nutrition/Diet History     Row Name 02/09/21 1500          Nutrition/Diet History    Typical Food/Fluid Intake  eats three meals per day         Anthropometrics     Row Name 02/09/21 1500          Anthropometrics    Height  180.3 cm (70.98\")     Weight  74.4 kg (164 lb)        Ideal Body Weight (IBW)    Ideal Body Weight (IBW) (kg)  79.23     % Ideal Body Weight  93.89        Usual Body Weight (UBW)    Usual Body Weight  81.6 kg (180 lb)     % Usual Body Weight  91.11        Body Mass Index (BMI)    BMI (kg/m2)  22.93         Labs/Tests/Procedures/Meds     Row Name 02/09/21 1500          Diagnostic Tests/Procedures    Diagnostic Test/Procedures Comments  radiation to femur (9/10)        Medications    Pertinent Medications Comments  norco, remeron, Imbruvica, laxatives           Estimated/Assessed Needs     Row Name 02/09/21 1500          Calculation Measurements    Weight Used For Calculations  74.4 kg (164 lb)     Height  180.3 cm (70.98\")        Estimated/Assessed Needs    Additional Documentation  KCAL/KG (Group);Protein Requirements (Group)        KCAL/KG    " KCAL/KG  30 Kcal/Kg (kcal);35 Kcal/Kg (kcal)     30 Kcal/Kg (kcal)  2231.7     35 Kcal/Kg (kcal)  2603.65        Protein Requirements    Weight Used For Protein Calculations  74.4 kg (164 lb)     Est Protein Requirement Amount (gms/kg)  1.2 gm protein     Estimated Protein Requirements (gms/day)  89.27                   Problem/Interventions:  Unintentional weight loss related to lung cancer with mets to bone, as evidenced by weight loss 5 lb in 3 weeks.          Intervention Goal     Row Name 02/09/21 1600          Intervention Goal    General  Provide information regarding MNT for treatment/condition;Meet nutritional needs for age/condition;Disease management/therapy     PO  Increase intake;Meet estimated needs     Weight  No significant weight loss         Nutrition Intervention     Row Name 02/09/21 1600          Nutrition Intervention    RD/Tech Action  Encourage intake;Follow Tx progress           Education/Evaluation     Row Name 02/09/21 1600          Education    Education  Provided education regarding;Education topics;Advised regarding habits/behavior     Education Topics  Weight management - maintain     Advised Regarding Habits/Behavior  Appropriate portions;Eating pattern;Food choices;Increased nutrient density;Snacks;Use supplement;Weight gain strategy        Monitor/Evaluation    Monitor  Weight     Education Follow-up  -- sending patient info in the mail on high preet high pro diet           Electronically signed by:  Juliana Engel RD, BJORN  02/09/21 16:05 EST

## 2021-02-10 ENCOUNTER — DOCUMENTATION (OUTPATIENT)
Dept: RADIATION ONCOLOGY | Facility: HOSPITAL | Age: 66
End: 2021-02-10

## 2021-02-10 PROCEDURE — 77412 RADIATION TX DELIVERY LVL 3: CPT | Performed by: RADIOLOGY

## 2021-02-11 ENCOUNTER — DOCUMENTATION (OUTPATIENT)
Dept: ONCOLOGY | Facility: CLINIC | Age: 66
End: 2021-02-11

## 2021-02-18 NOTE — PROGRESS NOTES
Subjective Patient feels he is generally doing better but still with right chest wall pain.      REASON FOR FOLLOW UP:  1.  Chronic lymphocytic leukemia with extensive lymphadenopathy and possible pleural involvement.  2.  Initiation of Treanda, Rituxan May 1, 2019 IVIG also utilized                                    3.  Significant response on scans June 27, 2019, alternative therapy with Imbruvica planned, initiated July 25, 2019  4.  Patient seen February 12, 2020, continued control of CLL, discussed Rituxan, Imbruvica, sleep study and potential surgical assessment for hernia  5.  CT of chest per pulmonary August 26 with 1.6 cm spiculated nodule in the left upper lobe, 1.2 cm left adrenal nodule not present on comparison study, bone windows with soft tissue mass involving the right eighth rib measuring 3.7 x 2.6, biopsy positive for adenocarcinoma  6.  Patient assessed September 30, plans for palliative radiation therapy, port placement, Keytruda considering PDL 1 positivity and high TMB status  7.  Patient reviewed October 21, 2020, Keytruda initiated, pain much improved status post radiation therapy  8.  Status post bilateral inguinal hernia repairs November 23, 2020.  Evidence of progression of disease versus pseudoprogression, continued knee pain   9.  Patient seen in office December 2, 2020, continued Keytruda for total of 4 cycles, institution of Xgeva, reassessment per left knee pain  10.  Patient assessed by orthopedic oncology status post prophylactic stabilization of left femur January 7, 2021.  11 patient reviewed January 12, 2021 with clear evidence of improvement on Keytruda.  Plan to continue same at present.  Increasing shortness of breath thought secondary pleural effusion, plan thoracentesis-cytology, flow cytometry and chemistries,?  Pleurx catheter  12.  Patient status post thoracentesis with improvement per shortness of breath, exudative, negative cytology, continued chest pain February 25,  2021, follow-up chest x-ray with rib details.        HISTORY OF PRESENT ILLNESS:   The patient is a  65 y.o. Male  who was admitted on 4/28/2019 with worsening complaints of cough wheezing and shortness of breath.  He had been to an urgent care center and was started on antibiotics and received a shot of Solu-Medrol.  His symptoms did not improve and on his admission he was noted to have profound lymphocytosis with a total white count of 70,074% lymphocytes on his white cell differential.      He also underwent CT scan of the chest that showed infiltrates and nodules in the lungs as well as significant lymphadenopathy within the chest and in the axillary regions.  He had peripheral blood sent for flow cytometry leukemia lymphoma panel but this is still pending at time of this dictation.           He underwent bronchoscopy on 4/29/2019.  Results from this procedure are pendingl.  His respiratory viral panel was negative and markers of sepsis were unremarkable.      His peripheral blood flow cytometry and flow cytometry from the EBUS needle biopsy at bronchoscopy are both consistent with chronic lymphocytic leukemia/small lymphocytic lymphoma.     The patient underwent a CT of abdomen and pelvis April 30 demonstrating extensive splenomegaly and bulky lymphadenopathy throughout the abdomen and pelvis.  There is a tiny lesion at the superior aspect lateral hepatic segment and 2 hyperenhancing foci within the liver thought to be hemangiomas, moderate size right inguinal hernia containing a long segment of small bowel without obstruction or incarceration.  CT of soft tissue again reveals extensive cervical adenopathy as well as evidence of pansinusitis.  The patient's case was discussed with Dr. Church and the patient has stage III-IV disease should be treated during this hospitalization.  I met with the patient and discussed in detail the use of Treanda/ Rituxan which we initiated Treanda Rituxan beginning May 1, 2019.      When he is seen May 2 he is already beginning to respond with reducing adenopathy and improved symptoms.  Plan to proceed with day #2.  We have also reviewed his findings including IgA 14, IgG of 263, IgM of 5 and a kappa/lambda ratio of 26.38.  He is hypogammaglobulinemic and replacement therapy will be given this hospitalization.  ENT assessment will also be requested.     The patient is again seen May 3, 2019, continuing to improve overall.  We did proceed with IVIG 400 mg/kg and had the patient seen by ENT after which the patient was discharged.  He indicates that Dr. Hamlin is going to see him back within the week as he is now seen back in our office May 16 and that surgery may be necessary.  The patient is also still having sinus symptoms.  Further he has developed a more extensive rash involving his abdomen chest and back and previously seen in hospital?.  Otherwise he feels well except for fatigue without fever, chills, nausea, vomiting, weight loss, stable appetite.     The patient went on to take 2 cycles of Treanda, Rituxan with a second cycle given June 6 and 7.  Additional assessments included his dermatologist who felt he had a gradually improving dermatitis and would not require an therapy and ENT indicating that the patient was slowly improving per sinus symptoms the surgery may be necessary at a later point.  When he is reviewed May 30 he was neutropenic and we proceeded with IVIG second treatment on Elenita 10.  He underwent repeat scans June 27 demonstrating a significant reduction in adenopathy in the neck chest abdomen pelvis with index nodes in the neck previously 2 cm x 1.1 cm, chest with subcarinal lymph node conglomerate measuring 2.1 x 0.9 previously 5.8 x 2.7 and previously seen irregular pulmonary consolidation throughout bilateral lungs having nearly completely resolved.  Spleen is also reduced in size at 4.5 cm previously 18.6 cm and mesenteric nodes had similar reduced in size.  The  patient's immunoglobulins were assessed June 27 with an IgG of 667, IgA of 20, IgM of 9 and IgE of 3.Additional information includes FISH analysis for CLL which is positive for deletion of 13 q. 14.3 It should be noted that such finding on FISH analysis generally suggest a favorable prognostic finding.  The patient is next seen July 3, 2019 feeling improved overall but still having a dermatologic issue with pruritus, erythema worsening particularly in the lower abdomen and upper groin.  Again his scans are considerably improved and we discussed, on the basis of the above information, changing his therapy now to Imbruvica.  The patient will return for teaching per Imbruvica July 11 the patient initiated treatment by July 25th 2019.  He was seen July 31 tolerating this well.  He was able to discontinue allopurinol and ferrous sulfate thereafter presents back August 28 having taken the medication over the last month.     He indicates that he is having no issues tolerating the medication and generally feels well except for sinus drainage.  He has had a cough and has a chest x-ray scheduled to primary care August 29, 2019.  He has had no fever, chills, sweats, rash and has gained weight.  The patient is next seen November 20, 2019 continuing to generally improve.  His performance status remains excellent and has had no additional complications thus far with the use of Imbruvica or an additional infectious complications.  He has been seen by pulmonary medicine with reticular infiltrates noted on previous CAT scan on a follow-up study scheduled in the next several days.  This may be evolution towards recovery but a follow-up will be necessary.  Finally we have been able to obtain Imbruvica reasonably cost through foundation support.    The patient is next seen February 12, 2020 doing well without additional infectious complications and with stable findings hematologically.  We have discussed the fact that Rituxan could be  added potentially to reduce the amount of time he remains on the medication but, additionally, further reduce his immunoglobulins.  Though this remains a concern he is also having difficulty with sleep-?  Sleep apnea, and worsening right direct inguinal hernia.    Plans for the patient to continue Imbruvica at dosing rechecking his immunoglobulins April 29 now with IVIG down to 371, IgM of 11, IgA level 32, kappa/lambda ratio of 1.51.  Fortunately continues to feel well without additional symptoms though is concerned about easy bruisability and periodic muscle tenderness after activity.  We have discussed his sleep assessment and he very likely has sleep apnea but does not wish to start CPAP at this point and is using oral medications-trazodone-to modest effect.    The patient is followed by pulmonary medicine.He was seen September 2 having had right-sided rib pain for 1 month, shortness of breath on going up stairs or uphill.  Follow-up chest CT revealed left upper lobe nodule 1.6 cm that was noted spiculated, additionally lesion per right rib with a soft tissue mass (3.7 x 2.6 cm) was recognized in the tissue biopsy was obtained.  This is now returned as positive for moderately differentiated adenocarcinoma.  This is CK7 positive, CK20 negative with a lung primary suspected clinically.  A molecular panel has not yet been obtained.  We have now, however, sent for foundation CDX analysis.    The patient is seen September 2020 with considerable right chest wall pain only modestly controlled with Toradol and ibuprofen.  He also has been taking additional Xanax to reduce the muscle spasm that he is experiencing.  I have advised him of the findings and their significance as being consistent with metastatic non-small cell lung cancer.  He is dismayed by the conversation but wishes to have pain control as quickly as possible as well as a cady discussion of treatment options.    The patient was provided additional  anti-pain and antianxiety medications.  A PET/CT was obtained September 23 demonstrating asymmetric uptake within the right parotid gland which is unremarkable appearance on noncontrasted CT, SUV of 6 compared to 2.7.  There is no hilar, mediastinal or axillary adenopathy, findings of asymmetric moderate to intense FDG uptake within superior aspect the right chest wall anterior to the right first rib with an irregular hypodense lesion measuring 1.8 x 1.6 and SUV 4.9.  This had not been seen June 27, 2019.  There is an intensely FDG avid nodule within the lingula measuring 1.9 x 1.5 history of 12.4, FDG avid left adrenal nodule 1.9 x 1.5 with an issue 21.2.  The patient was seen by radiation therapy September 29 and started on radiation therapy to the right chest wall-35 Gy in 10 fractions.  Plans were also then proceed with SBRT to the solitary left upper lobe lesion pending insurance approval.  Further testing including TPS of 20% and foundation CDX with a TMB of greater than 10 mutations per megabase (28 mutations per megabase) and the indication that several immunotherapies could be useful in this patient's care.  Additional genomic findings include BRAF G469R/that trametinib could be useful and STK11/that everolimus could be useful.      The patient is seen back September 30, 2020 indicating that his pain has improved substantially with his current pain medications.  He also continues laxatives on a regular basis.  Radiation therapy will begin October 1, 2020 as described above and we have discussed on the basis of the findings per his genomic testing that he is a candidate for a number of additional treatment approaches.  Considering he does not have significant organ involvement and that he has a positive PDL 1 at 20% and a TMB 28 mutations per megabase it would seem reasonable (particularly with his history of CLL) to try to use immunotherapy as monotherapy initially.  This might provide control and allow us  not to affect his hematologic illness in any significant way.  We discussed that he will need a PowerPort placement in the near future but that we could start Keytruda shortly thereafter.  Patient was able to proceed into palliative therapy undergoing radiation therapy to the right eighth rib 3 and 50 cGy per fraction x10 between October 1 of October 14.  A PowerPort was placed October 15, 2020.        Mr. Freitas returns to the office October 21, 2020 indicating, wonderfully, that his pain has nearly resolved and he does not need to take short acting pain medications at this point.  Unfortunately he has had severe constipation we discussed his laxatives in depth as to the use, schedule and expected results as he reduces his narcotic use and moves into immunotherapy.  We have also discussed his PET/CT that does refer to an abnormality seen in the rectum that will need to be assessed by colonoscopy.  He states further that he is having lower extremity swelling beginning over the last several weeks right greater than left.  His breathing remains generally improved though he does have cough periodically and mild degree of hemoptysis.    The patient proceeded with his first Keytruda October 21, 2020 and, fortunately, had little side effects from its use thereafter.  He had noted mild hemoptysis as well as left lower extremity weakness and discomfort requiring periodic pain medication.  As he is next seen November 11, 2020 and a second cycle as planned of Keytruda his knee pain has worsened and he is currently using a knee brace, alternating heat and cold with variable results.  He has not had previous injury to the knee.  The patient is also clearly suffering in terms of his concern about his multiple ongoing issues noting that his symptoms of depression are worsening.       We made plans to continue Imbruvica, and have the patient have follow-up scans.  Unfortunately he presented with incarcerated right inguinal hernia  required admission November 16, 2020 ultimately reducing.  He had subsequent mild ileus that slowly resolved been seen by GI.  Repeat scans November 17 that demonstrate rather rapid increase in the right eighth rib lesion and projecting in thoracic cavity, moderate to large right-sided pleural effusion, enlargement of spiculated left upper lobe mass now measuring 2.4 x 1.6 and a new left adrenal mass as well as enlarged retrocrural lymph node.     On November 23, 2020 he underwent da Kavin robot assisted laparoscopic bilateral inguinal hernia repairs.  His Imbruvica has been held in around the surgery but restarted November 24.     The patient is next seen back in December 2, 2020 and his multiple issues including CLL/SSL on Imbruvica, metastatic non-small cell lung cancer with lack of response using immunotherapy as primary treatment, targeted treatments such as trametinib that given with Imbruvica can accelerate hypertension, recent requirement of bilateral inguinal hernia repair, follow-up with GI with opioid-induced constipation and abnormality seen on PET/CT requiring eventual colonoscopy and worsening depression and anxiety addressed by counseling, and institution of Remeron as well as as needed Xanax.     During his hospitalization we had seen the patient with the possibility of his development of pseudoprogression having had only 2 treatments with Keytruda.  Though he has evidence of progression he request that we continue with Keytruda by itself at this point to determine if it has truly failed to control his disease.  He counters indicating that he is feeling better overall with stable appetite though has lost approximately 4 pounds.  In further discussion we have agreed that he will take 2 additional treatments with Keytruda and then undergo repeat scans to determine his true status and, at that point, if he has progressed we have moved to carboplatin alimta chemotherapy along with Keytruda.  There are  "targeted agents available though they do interact likely with his Imbruvica accelerated hypertension.  Finally we need to better understand his left knee pain since this is truly affecting his performance status.  Please note that Xgeva was initiated December 2, 2020  The patient was given Keytruda and Xgeva December 2.  A follow-up MRI of the knee revealed a bone lesion along the posterior aspect of the distal femoral diametaphysis just medial of midline representing metastatic lesion measuring 1.9 x 2.2 initiation of cortical destruction along the posterior aspect of the femur.  Tumor extends cephalad partially superficial cortex/the lesion overall measures 3.5 cm.    The patient seen in office December 23, 2020 indicating that his knee is manageable at this point as long as he \"starts walking\" his MRI, however, is concerning enough to request orthopedic oncology assessment and radiation therapy consultation as we also try to determine whether his disease is controlled with Keytruda as a single agent or whether we need to move to systemic chemotherapy along with immunotherapy.  Notably he states when seen December 23, 2020 that the swelling per his right upper chest is now resolved.  As result of above the patient was asked to be seen by orthopedic oncology-Dr. Eliu Ochoa-who after assessment patient felt that he was at risk for pathologic fracture.  The patient was admitted January 7 undergoing curettage of the left femoral bone lesion, prophylactic stabilization with intramedullary implants.  The patient did well with this approach.  It was suggested not to use Xgeva or Zometa for a period of time as this healed.    Additionally the patient underwent repeat imaging January 6 that showed the previously large expansile destructive lesion involving the right eighth rib markedly reduced in size measuring 3.4 x 5.3 cm previously 8.2 x 9.4, no change in left iliac bone, irregular spiculated mass left upper lobe " nearly completely resolved previously 2.7 x 2.7 now only 0.4 x 0.9 cm, a residual large right pleural effusion unchanged, a previous left adrenal mass measuring 2.9 x 3.3 cm has resolved, evidence of right inguinal canal surgery also noted.  We had planned, initially, to change the patient to Carboplatin, Alimta and Keytruda but now find that is not necessary with the patient responding, clearly, to Keytruda as a single agent.  Patient with increasing shortness of breath recognized January 13, follow-up thoracentesis with flow cytometry, cytology,?  Pleurx catheter.     The patient underwent ultrasound-guided right thoracentesis January 14 revealing 1500 cc removal of livier-colored fluid.  This was negative for malignant cells.Flow cytometry did reveal a CD5 positive CD10 negative monoclonal B-cell population quantitating 2% of total events.     The patient was referred to radiation therapy after his previous knee procedure and returned February 3 for 6 cycle of Keytruda.  He completed radiation therapy approximately February 10, 2021.     His is next seen February 25 for ongoing Keytruda.  He has been doing generally overall better but states he still has right-sided chest pain without that shortness of breath that was so significant previously.  He believes the thoracentesis was quite successful and we have discussed that it was an exudative effusion negative cytologically and with the population consistent with his lymphoproliferative disorder is contaminant.               Past Medical History, Past Surgical History, Social History, Family History have been reviewed and are without significant changes except as mentioned.    Review of Systems   Constitutional: Positive for fatigue. Negative for unexpected weight change.   HENT: Negative.    Eyes: Negative.    Respiratory: Negative for cough, chest tightness, shortness of breath and wheezing.         Infrequent mild hemoptysis   Cardiovascular: Negative for leg  "swelling.   Gastrointestinal: Positive for constipation and diarrhea. Negative for abdominal pain, nausea and vomiting.   Endocrine: Negative.    Genitourinary: Negative.  Negative for testicular pain.   Musculoskeletal: Positive for arthralgias.   Skin: Negative.  Negative for rash.   Allergic/Immunologic: Negative.    Neurological: Positive for weakness.   Psychiatric/Behavioral: Positive for sleep disturbance.   All other systems reviewed and are negative.         Medications:  The current medication list was reviewed in the EMR    ALLERGIES:  No Known Allergies    Objective      Vitals:    02/25/21 1038   BP: 135/76   Pulse: 93   Resp: 18   Temp: 98.2 °F (36.8 °C)   TempSrc: Temporal   SpO2: 92%   Weight: 77.7 kg (171 lb 4.8 oz)   Height: 180.3 cm (70.98\")   PainSc:   3   PainLoc: Comment: Right Sided Pain     Current Status 2/25/2021   ECOG score 0       Physical Exam    Constitutional: He is oriented to person, place, and time. He appears well-developed and well-nourished.  He is in moderate distress per pain involving his left knee  HENT:   Head: Normocephalic.   Eyes: Conjunctivae and EOM are normal. Pupils are equal, round, and reactive to light. No scleral icterus.   Neck: Normal range of motion. Neck supple. No JVD present. No thyromegaly present.   Cardiovascular:  Normal rate, regular rhythm.  present. Exam reveals no gallop and no friction rub.   No murmur heard.  Pulmonary/Chest:He has no rales.  The patient no longer has a palpable mass approximately right seventh eighth rib laterally.  Tenderness elicited along the right sixth and seventh ribs laterally.  Abdominal: Soft. He exhibits no distension and no mass. There is no tenderness.  He has bilateral hernias, apparently direct, right greater than left  Musculoskeletal: Normal range of motion. He exhibits trace edema bilateral lower extremities, status post surgery per left knee associated swelling limitation of motion.  Lymphadenopathy: No current " lymphadenopathy      Skin: Rash resolved                                              Neurological: He is alert and oriented to person, place, and time. He has normal reflexes. No cranial nerve deficit.   Skin: Skin is warm and dry.                         Psychiatric: He has a normal mood and affect. His behavior is normal. Judgment normal    RECENT LABS:  Hematology     WBC   Date Value Ref Range Status   02/25/2021 8.38 3.40 - 10.80 10*3/mm3 Final     RBC   Date Value Ref Range Status   02/25/2021 4.57 4.14 - 5.80 10*6/mm3 Final     Hemoglobin   Date Value Ref Range Status   02/25/2021 12.0 (L) 13.0 - 17.7 g/dL Final     Hematocrit   Date Value Ref Range Status   02/25/2021 40.5 37.5 - 51.0 % Final     Platelets   Date Value Ref Range Status   02/25/2021 209 140 - 450 10*3/mm3 Final                LEFT KNEE MRI WITH AND WITHOUT CONTRAST 12/18/2020    FINDINGS: There is a bone lesion along the posterior aspect of the  distal femoral diametaphysis just medial to the midline, representing a  metastatic lesion. The lesion measures 1.9 x 2.2 cm axial and is  associated with cortical destruction along the posterior aspect of the  femur. A component of tumor extends cephalad, partially superficial to  the cortex such that the lesion overall measures 3.5 cm in length. There  is mild abnormal bone marrow and edema and enhancement around the  lesion. There is periosteal edema and enhancement along the medial and  posterior aspect of the femur with some periosteal new bone. The cortex  around most of the distal femur remains normal. There is overlying soft  tissue edema and enhancement.     Marrow signal in the femoral condyles, the patella, the proximal tibia,  and the proximal fibula is normal.     There is a small joint effusion without evidence of synovitis. The  ligaments and tendons of the knee are intact. There is a chronic  appearing, shallow tear of the medial meniscal posterior horn along its  undersurface with  mild irregularity at the free edge. The lateral  meniscus appears normal. Cartilage throughout the knee is preserved.     IMPRESSION:  Osseous metastasis along the posterior aspect of the distal  left femoral diametaphysis with adjacent bone marrow and soft tissue  edema. Dimensions of the lesion are described above. There is no  pathologic fracture nor is there any other metastatic lesion elsewhere  around the knee.     A small tear of the medial meniscal posterior horn is incidentally noted  and probably chronic.     CT SCANS OF THE CHEST AND ABDOMEN AND PELVIS WITH ORAL AND INTRAVENOUS  CONTRAST January 6, 2021  FINDINGS: The following findings are present:  1. The previous large expansile and destructive lesion with soft tissue  mass involving the right 8th rib shows very marked reduction in size. It  currently measured 8.2 x 9.4 cm and now measures 3.4 x 5.3 cm. The lytic  and slightly expansile lesion in the left iliac bone shows no  significant change and measures up to 1.8 cm in diameter. No new bone  lesions are seen.  2. The irregular spiculated mass in the left upper lobe has nearly  completely resolved. It previously measured up to 2.7 x 2.7 cm and only  some minimal residual parenchymal density is present measuring 0.4 x 0.9  cm.  3. There remains a very large right pleural effusion layering  posteriorly with compressive atelectasis that is unchanged. The lungs  are otherwise clear. There is no mediastinal or hilar or axillary  adenopathy which is unchanged.  4. The liver, spleen and right adrenal gland and pancreas are  unremarkable and unchanged. A previous left adrenal mass measuring 2.9 x  3.3 cm and consistent with a left adrenal metastasis has resolved. The  kidneys are unremarkable.  5. There is no aortic aneurysm or retroperitoneal lymphadenopathy. The  previously described enlarged retrocrural nodes appear to represent  enlarged perivertebral venous plexus. No adenopathy is seen. The large  and  small bowel loops are normal in caliber.  6. In the pelvis, there is a predominantly cystic thick-walled structure  within the right inguinal canal that appeared to represent simple fluid  on the earlier exam. It now has a thickened wall but does not  communicate with intra-abdominal structures such as bowel. This may  represent a loculated fluid collection but continued surveillance on  follow-up CT scan is recommended. It is unlikely to represent metastatic  disease given the marked improvement in other areas as described above.      TWO-VIEW LEFT FEMUR January 7, 2021     HISTORY: Metastatic bone disease. Intralesional resection and implant.     FINDINGS: The patient has had recent surgery with placement of an  intramedullary implant device in the lower half of the femur. There is  some localized periosteal reaction involving the medial cortex of the  distal femur.      Assessment/Plan   1.  CLL presenting with significant lymphocytosis, lymphadenopathy and splenomegaly.   Positive for deletion of 13 q. 14.3.  Patient status post 2 cycles of Treanda Rituxan with excellent response.  Reassessment July 3, 2019 with plans to switch Treanda to Imbruvica which began July 25, 2019, tolerated well. This continues to be the case.  We have discussed the possibility of adding Rituxan to shorten the time he is on  Imbruvica and we will continue to review this though the patient has hypogammaglobulinemia at this point and we do not wish to worsen this until we are more aware of how to manage COVID-19.  As he is assessed September 9, 2020 his CLL is under good control and he will continue Imbruvica at this point at unchanged dosing.  There were no clinical changes when the patient is reassessed and this is felt to be stable and his ibrutinib.        2.  Pulmonary nodules/infiltrates.  He is  status post bronchoscopy.  Is unclear at this time whether these findings are infectious or possibly related to his lymphoproliferative  disorder.  His subsequent studies in late November are much improved, followed by pulmonary.       3.  Non-small cell lung carcinoma                                       He had follow-up scans performed August 26 2020 revealing a new 1.6 cm spiculated nodule within the left upper lobe, 1.2 cm left adrenal nodule, bone windows with a soft tissue mass involving the right eighth rib measuring 3.7 x 2.6 cm.  Biopsy was consistent with adenocarcinoma CK 7+, CK 20-, lung primary suspected clinically, molecular panel not yet obtained.     A PET/CT was obtained September 23, 2020 demonstrating asymmetric uptake within the right parotid gland which is unremarkable appearance on noncontrasted CT, SUV of 6 compared to 2.7.  There is no hilar, mediastinal or axillary adenopathy, findings of asymmetric moderate to intense FDG uptake within superior aspect the right chest wall anterior to the right first rib with an irregular hypodense lesion measuring 1.8 x 1.6 and SUV 4.9.  This had not been seen June 27, 2019.  There is an intensely FDG avid nodule within the lingula measuring 1.9 x 1.5 history of 12.4, FDG avid left adrenal nodule 1.9 x 1.5 with an issue 21.2.  The patient was seen by radiation therapy September 29 and started on radiation therapy to the right chest wall-35 Gy in 10 fractions.  Plans were also then proceed with SBRT to the solitary left upper lobe lesion pending insurance approval.  Further testing including TPS of 20% and foundation CDX with a TMB of greater than 10 mutations per megabase (28 mutations per megabase) and the indication that several immunotherapies could be useful in this patient's care.  Additional genomic findings include BRAF G469R/that trametinib could be useful and STK11/that everolimus could be useful.  *Discussion held as to how to proceed in particular using immunotherapy with Keytruda as monotherapy initially in this patient.  This would reduce his degree of  myelosuppression.  Patient completed radiotherapy October 14, 2020 October 21 status post port placement the patient began Keytruda which she tolerated well and as he is seen November 11 we plan a second cycle.  At present he is scheduled for follow-up CT of the chest November 17, radiation therapy follow-up November 24.        The patient is reassessed after 2 cycles of Keytruda with enlargement of right eighth rib lesion, enlargement of spiculated left upper lobe lung mass, moderate to large right-sided pleural effusion, new left adrenal mass and enlarged retrocrural lymph node.  This is addressed with him November 2, 2020 with at least the possibility of pseudoprogression present.  Symptomatically he is improved in his right chest wall mass is slightly reduced clinically.  We have discussed proceeding with his third cycle of Keytruda, initiating Xgeva, having his left knee assessed and rescanning him after 4 cycles of Keytruda make a decision about extending his systemic therapy to include carboplatin, Alimta and Keytruda.      The patient is seen December 23, 2020 and clinically feels better except for his left knee pain.  We have discussed proceeding with his Keytruda and then reevaluating by follow-up scan.  The patient continued Xgeva December 2 and December 30.     In the interval he was seen for his left knee and was felt to be an impending fracture.  He was reviewed by orthopedic oncology and proceeded to curettage of the left femoral bone lesion and prophylactic stabilization January 7, 2021.      Additionally and wonderfully follow-up scans obtained January 6, 2021 demonstrated a marked improvement per his right eighth rib lesion, resolution of left upper lobe spiculated mass, residual large right pleural effusion, resolution of left adrenal metastasis.  This indicates that Keytruda is working well as a single agent we do plan to continue this January 13, 2021.  He has additional issues, however, with a  right-sided pleural effusion that has increased and is producing symptoms.  He will require thoracentesis and will obtain both flow cytometry, cytology, LDH, total protein, glucose.  Pending his response to this the patient could be a candidate for Pleurx catheter placement.      The patient did improve and is next seen in office February 25, 2021.  Clinically his effusion is improved.  We will plan chest x-ray and rib detail films today considering symptoms and continue with his next Keytruda.        3.  Xgeva initiated monthly beginning December 2, 2020.  This is next due December 30, 2020.  As result of the patient's recent knee surgery will be holding this for the next several months.      4.  Bilateral hernia repair status post right incarcerated inguinal hernia November 23, 2020                                                                                                                                                                          5.  Iron deficiency-stable though patient has anemia secondary to recent surgery.    6.  Hypogammaglobulinemia-status post IVIG with resolution of sinusitis, current levels again reviewed    7.  Pain control now addressed with MS Contin 30 mg every 12 hours which will be increased to every 8 hours, Norco 10/325 1 p.o. every 4 hours, continue laxative therapy with Senokot-S, MiraLAX and now lactulose.  He has been seen by GI with other medications offered though believes he will proceed with these laxatives presently adjusted them as needed    8.  GI follow-up with Dr. Torres planned.    9.  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 Eliu Ochoa-who after assessment patient felt that he was at risk for pathologic fracture.  The patient was admitted January 7 undergoing curettage of the left femoral bone lesion, prophylactic stabilization with intramedullary implants.  The patient did well with this approach.  It was suggested not to use Xgeva or Zometa for a  period of time as this healed.  He has now completed radiation therapy with 10 treatments left femur approximately February 10, 2021 completion.      10.  Supportive oncology-ongoing therapy for anxiety and depression.  He currently is being treated with Remeron and as needed Xanax and states he is finally sleeping more effectively.        *Proceed with Keytruda today, hold Xgeva, return 3 weeks for NP assessment, continued Keytruda.  Consider repeat scanning after his next Keytruda dosing.

## 2021-02-25 ENCOUNTER — LAB (OUTPATIENT)
Dept: ONCOLOGY | Facility: HOSPITAL | Age: 66
End: 2021-02-25

## 2021-02-25 ENCOUNTER — INFUSION (OUTPATIENT)
Dept: ONCOLOGY | Facility: HOSPITAL | Age: 66
End: 2021-02-25

## 2021-02-25 ENCOUNTER — OFFICE VISIT (OUTPATIENT)
Dept: ONCOLOGY | Facility: CLINIC | Age: 66
End: 2021-02-25

## 2021-02-25 ENCOUNTER — HOSPITAL ENCOUNTER (OUTPATIENT)
Dept: GENERAL RADIOLOGY | Facility: HOSPITAL | Age: 66
Discharge: HOME | End: 2021-02-25

## 2021-02-25 VITALS
OXYGEN SATURATION: 92 % | HEART RATE: 93 BPM | RESPIRATION RATE: 18 BRPM | BODY MASS INDEX: 23.98 KG/M2 | TEMPERATURE: 98.2 F | DIASTOLIC BLOOD PRESSURE: 76 MMHG | WEIGHT: 171.3 LBS | SYSTOLIC BLOOD PRESSURE: 135 MMHG | HEIGHT: 71 IN

## 2021-02-25 DIAGNOSIS — C34.12 MALIGNANT NEOPLASM OF UPPER LOBE OF LEFT LUNG (HCC): ICD-10-CM

## 2021-02-25 DIAGNOSIS — C79.51 LUNG CANCER METASTATIC TO BONE (HCC): ICD-10-CM

## 2021-02-25 DIAGNOSIS — C34.12 MALIGNANT NEOPLASM OF UPPER LOBE OF LEFT LUNG (HCC): Primary | ICD-10-CM

## 2021-02-25 DIAGNOSIS — Z79.899 HIGH RISK MEDICATION USE: ICD-10-CM

## 2021-02-25 DIAGNOSIS — C91.10 CLL (CHRONIC LYMPHOCYTIC LEUKEMIA) (HCC): ICD-10-CM

## 2021-02-25 DIAGNOSIS — C34.90 LUNG CANCER METASTATIC TO BONE (HCC): ICD-10-CM

## 2021-02-25 DIAGNOSIS — Z79.899 HIGH RISK MEDICATION USE: Primary | ICD-10-CM

## 2021-02-25 LAB
ALBUMIN SERPL-MCNC: 3.8 G/DL (ref 3.5–5.2)
ALBUMIN/GLOB SERPL: 1.8 G/DL (ref 1.1–2.4)
ALP SERPL-CCNC: 82 U/L (ref 38–116)
ALT SERPL W P-5'-P-CCNC: <5 U/L (ref 0–41)
ANION GAP SERPL CALCULATED.3IONS-SCNC: 9.5 MMOL/L (ref 5–15)
AST SERPL-CCNC: 8 U/L (ref 0–40)
BASOPHILS # BLD AUTO: 0.05 10*3/MM3 (ref 0–0.2)
BASOPHILS NFR BLD AUTO: 0.6 % (ref 0–1.5)
BILIRUB SERPL-MCNC: 0.4 MG/DL (ref 0.2–1.2)
BUN SERPL-MCNC: 19 MG/DL (ref 6–20)
BUN/CREAT SERPL: 16.7 (ref 7.3–30)
CALCIUM SPEC-SCNC: 8.5 MG/DL (ref 8.5–10.2)
CHLORIDE SERPL-SCNC: 103 MMOL/L (ref 98–107)
CO2 SERPL-SCNC: 26.5 MMOL/L (ref 22–29)
CREAT SERPL-MCNC: 1.14 MG/DL (ref 0.7–1.3)
DEPRECATED RDW RBC AUTO: 54.3 FL (ref 37–54)
EOSINOPHIL # BLD AUTO: 0.11 10*3/MM3 (ref 0–0.4)
EOSINOPHIL NFR BLD AUTO: 1.3 % (ref 0.3–6.2)
ERYTHROCYTE [DISTWIDTH] IN BLOOD BY AUTOMATED COUNT: 16.7 % (ref 12.3–15.4)
GFR SERPL CREATININE-BSD FRML MDRD: 64 ML/MIN/1.73
GLOBULIN UR ELPH-MCNC: 2.1 GM/DL (ref 1.8–3.5)
GLUCOSE SERPL-MCNC: 111 MG/DL (ref 74–124)
HCT VFR BLD AUTO: 40.5 % (ref 37.5–51)
HGB BLD-MCNC: 12 G/DL (ref 13–17.7)
IMM GRANULOCYTES # BLD AUTO: 0.02 10*3/MM3 (ref 0–0.05)
IMM GRANULOCYTES NFR BLD AUTO: 0.2 % (ref 0–0.5)
LYMPHOCYTES # BLD AUTO: 0.54 10*3/MM3 (ref 0.7–3.1)
LYMPHOCYTES NFR BLD AUTO: 6.4 % (ref 19.6–45.3)
MCH RBC QN AUTO: 26.3 PG (ref 26.6–33)
MCHC RBC AUTO-ENTMCNC: 29.6 G/DL (ref 31.5–35.7)
MCV RBC AUTO: 88.6 FL (ref 79–97)
MONOCYTES # BLD AUTO: 0.8 10*3/MM3 (ref 0.1–0.9)
MONOCYTES NFR BLD AUTO: 9.5 % (ref 5–12)
NEUTROPHILS NFR BLD AUTO: 6.86 10*3/MM3 (ref 1.7–7)
NEUTROPHILS NFR BLD AUTO: 82 % (ref 42.7–76)
NRBC BLD AUTO-RTO: 0 /100 WBC (ref 0–0.2)
PHOSPHATE SERPL-MCNC: 2.7 MG/DL (ref 2.5–4.5)
PLATELET # BLD AUTO: 209 10*3/MM3 (ref 140–450)
PMV BLD AUTO: 11.7 FL (ref 6–12)
POTASSIUM SERPL-SCNC: 4.6 MMOL/L (ref 3.5–4.7)
PROT SERPL-MCNC: 5.9 G/DL (ref 6.3–8)
RBC # BLD AUTO: 4.57 10*6/MM3 (ref 4.14–5.8)
SODIUM SERPL-SCNC: 139 MMOL/L (ref 134–145)
T4 FREE SERPL-MCNC: 1.62 NG/DL (ref 0.93–1.7)
TSH SERPL DL<=0.05 MIU/L-ACNC: 2.09 UIU/ML (ref 0.27–4.2)
WBC # BLD AUTO: 8.38 10*3/MM3 (ref 3.4–10.8)

## 2021-02-25 PROCEDURE — 25010000002 PEMBROLIZUMAB 100 MG/4ML SOLUTION 4 ML VIAL: Performed by: INTERNAL MEDICINE

## 2021-02-25 PROCEDURE — 80053 COMPREHEN METABOLIC PANEL: CPT

## 2021-02-25 PROCEDURE — 71100 X-RAY EXAM RIBS UNI 2 VIEWS: CPT

## 2021-02-25 PROCEDURE — 84100 ASSAY OF PHOSPHORUS: CPT

## 2021-02-25 PROCEDURE — 36415 COLL VENOUS BLD VENIPUNCTURE: CPT

## 2021-02-25 PROCEDURE — 84439 ASSAY OF FREE THYROXINE: CPT | Performed by: INTERNAL MEDICINE

## 2021-02-25 PROCEDURE — 84443 ASSAY THYROID STIM HORMONE: CPT | Performed by: INTERNAL MEDICINE

## 2021-02-25 PROCEDURE — 71046 X-RAY EXAM CHEST 2 VIEWS: CPT

## 2021-02-25 PROCEDURE — 99215 OFFICE O/P EST HI 40 MIN: CPT | Performed by: INTERNAL MEDICINE

## 2021-02-25 PROCEDURE — 85025 COMPLETE CBC W/AUTO DIFF WBC: CPT

## 2021-02-25 PROCEDURE — 96413 CHEMO IV INFUSION 1 HR: CPT

## 2021-02-25 PROCEDURE — 25010000003 HEPARIN LOCK FLUSH PER 10 UNITS: Performed by: INTERNAL MEDICINE

## 2021-02-25 RX ORDER — SODIUM CHLORIDE 0.9 % (FLUSH) 0.9 %
10 SYRINGE (ML) INJECTION AS NEEDED
Status: CANCELLED | OUTPATIENT
Start: 2021-02-25

## 2021-02-25 RX ORDER — SODIUM CHLORIDE 0.9 % (FLUSH) 0.9 %
10 SYRINGE (ML) INJECTION AS NEEDED
Status: DISCONTINUED | OUTPATIENT
Start: 2021-02-25 | End: 2021-02-25 | Stop reason: HOSPADM

## 2021-02-25 RX ORDER — SODIUM CHLORIDE 9 MG/ML
250 INJECTION, SOLUTION INTRAVENOUS ONCE
Status: CANCELLED | OUTPATIENT
Start: 2021-02-25

## 2021-02-25 RX ORDER — HEPARIN SODIUM (PORCINE) LOCK FLUSH IV SOLN 100 UNIT/ML 100 UNIT/ML
500 SOLUTION INTRAVENOUS AS NEEDED
Status: DISCONTINUED | OUTPATIENT
Start: 2021-02-25 | End: 2021-02-25 | Stop reason: HOSPADM

## 2021-02-25 RX ORDER — HEPARIN SODIUM (PORCINE) LOCK FLUSH IV SOLN 100 UNIT/ML 100 UNIT/ML
500 SOLUTION INTRAVENOUS AS NEEDED
Status: CANCELLED | OUTPATIENT
Start: 2021-02-25

## 2021-02-25 RX ORDER — SODIUM CHLORIDE 9 MG/ML
250 INJECTION, SOLUTION INTRAVENOUS ONCE
Status: COMPLETED | OUTPATIENT
Start: 2021-02-25 | End: 2021-02-25

## 2021-02-25 RX ADMIN — SODIUM CHLORIDE 250 ML: 9 INJECTION, SOLUTION INTRAVENOUS at 11:51

## 2021-02-25 RX ADMIN — SODIUM CHLORIDE 200 MG: 9 INJECTION, SOLUTION INTRAVENOUS at 11:51

## 2021-02-25 RX ADMIN — Medication 500 UNITS: at 12:24

## 2021-02-25 RX ADMIN — SODIUM CHLORIDE, PRESERVATIVE FREE 10 ML: 5 INJECTION INTRAVENOUS at 12:23

## 2021-03-01 DIAGNOSIS — C79.51 LUNG CANCER METASTATIC TO BONE (HCC): ICD-10-CM

## 2021-03-01 DIAGNOSIS — C34.90 LUNG CANCER METASTATIC TO BONE (HCC): ICD-10-CM

## 2021-03-01 RX ORDER — MORPHINE SULFATE 30 MG/1
30 TABLET, FILM COATED, EXTENDED RELEASE ORAL EVERY 12 HOURS
Qty: 60 TABLET | Refills: 0 | Status: CANCELLED | OUTPATIENT
Start: 2021-03-01

## 2021-03-01 RX ORDER — MORPHINE SULFATE 30 MG/1
30 TABLET, FILM COATED, EXTENDED RELEASE ORAL 3 TIMES DAILY
Qty: 90 TABLET | Refills: 0 | Status: SHIPPED | OUTPATIENT
Start: 2021-03-01 | End: 2021-04-01 | Stop reason: SDUPTHER

## 2021-03-01 NOTE — TELEPHONE ENCOUNTER
Refill request received for MS Contin and per Dr Parekh's note, it has been increased from every 12 hours to TID. New script for 90 tabs (one month supply) routed to Dr Parekh for his signature.

## 2021-03-03 NOTE — PROGRESS NOTES
RADIATION THERAPY TREATMENT SUMMARY  Patient: Dav Freitas  YOB: 1955  Diagnosis:  Lung cancer metastatic to bone (CMS/HCC)     Dav Freitas has recently completed the course of radiation therapy prescribed for the above-mentioned diagnosis.  Below, please find the specifics of the course of treatment delivered:    Radiation Details:    Tx Start Date  1/28/2021   Tx End date  2/10/2021  Intent   Palliative   Total Treatments 10    Prescription Name LEFT FEMUR  Site   Femur Left  Primary/Boost  PRIMARY  Dose Per Fraction 325 cGy/Frac  Number of Fractions 10  Prescribe To  IsodoseLine 100 % 3250 cGy  325 cGy/Frac  Mode    Photon  Technique  AP/PA  Frequency  5 Times a week  Energy   6X  Prescription Note Prescribed to 100% isodose line at [place]       Tolerance and Toxicities: he tolerated the treatments well, without significant erythema, no new leg pain, or weakness. Requiring no treatment breaks. he completed the treatments in 13 elapsed days.    Follow-up Plans:    Referral was made to our Survivorship Clinic.    The patient has continued treatment and follow up established with the [facility] physicians. Therefore, I have not given the patient an appointment to return here but encouraged them to call if we could be of further help.

## 2021-03-12 ENCOUNTER — TRANSCRIBE ORDERS (OUTPATIENT)
Dept: SLEEP MEDICINE | Facility: HOSPITAL | Age: 66
End: 2021-03-12

## 2021-03-12 DIAGNOSIS — Z01.818 OTHER SPECIFIED PRE-OPERATIVE EXAMINATION: Primary | ICD-10-CM

## 2021-03-12 DIAGNOSIS — C79.51 LUNG CANCER METASTATIC TO BONE (HCC): ICD-10-CM

## 2021-03-12 DIAGNOSIS — C34.90 LUNG CANCER METASTATIC TO BONE (HCC): ICD-10-CM

## 2021-03-12 RX ORDER — HYDROCODONE BITARTRATE AND ACETAMINOPHEN 10; 325 MG/1; MG/1
1 TABLET ORAL EVERY 4 HOURS PRN
Qty: 100 TABLET | Refills: 0 | Status: SHIPPED | OUTPATIENT
Start: 2021-03-12 | End: 2021-04-06 | Stop reason: SDUPTHER

## 2021-03-17 ENCOUNTER — LAB (OUTPATIENT)
Dept: LAB | Facility: HOSPITAL | Age: 66
End: 2021-03-17

## 2021-03-17 DIAGNOSIS — Z01.818 OTHER SPECIFIED PRE-OPERATIVE EXAMINATION: ICD-10-CM

## 2021-03-17 PROCEDURE — U0004 COV-19 TEST NON-CDC HGH THRU: HCPCS

## 2021-03-17 PROCEDURE — U0005 INFEC AGEN DETEC AMPLI PROBE: HCPCS

## 2021-03-17 PROCEDURE — C9803 HOPD COVID-19 SPEC COLLECT: HCPCS

## 2021-03-18 ENCOUNTER — OFFICE VISIT (OUTPATIENT)
Dept: ONCOLOGY | Facility: CLINIC | Age: 66
End: 2021-03-18

## 2021-03-18 ENCOUNTER — TRANSCRIBE ORDERS (OUTPATIENT)
Dept: ADMINISTRATIVE | Facility: HOSPITAL | Age: 66
End: 2021-03-18

## 2021-03-18 ENCOUNTER — INFUSION (OUTPATIENT)
Dept: ONCOLOGY | Facility: HOSPITAL | Age: 66
End: 2021-03-18

## 2021-03-18 VITALS
HEIGHT: 71 IN | WEIGHT: 170 LBS | SYSTOLIC BLOOD PRESSURE: 143 MMHG | RESPIRATION RATE: 20 BRPM | TEMPERATURE: 98.4 F | DIASTOLIC BLOOD PRESSURE: 76 MMHG | OXYGEN SATURATION: 90 % | HEART RATE: 93 BPM | BODY MASS INDEX: 23.8 KG/M2

## 2021-03-18 DIAGNOSIS — C91.10 CLL (CHRONIC LYMPHOCYTIC LEUKEMIA) (HCC): ICD-10-CM

## 2021-03-18 DIAGNOSIS — Z79.899 HIGH RISK MEDICATION USE: ICD-10-CM

## 2021-03-18 DIAGNOSIS — C79.51 LUNG CANCER METASTATIC TO BONE (HCC): ICD-10-CM

## 2021-03-18 DIAGNOSIS — J90 RECURRENT RIGHT PLEURAL EFFUSION: ICD-10-CM

## 2021-03-18 DIAGNOSIS — C34.12 MALIGNANT NEOPLASM OF UPPER LOBE OF LEFT LUNG (HCC): ICD-10-CM

## 2021-03-18 DIAGNOSIS — C34.90 LUNG CANCER METASTATIC TO BONE (HCC): ICD-10-CM

## 2021-03-18 DIAGNOSIS — Z01.812 ENCOUNTER FOR PREPROCEDURE SCREENING LABORATORY TESTING FOR COVID-19: Primary | ICD-10-CM

## 2021-03-18 DIAGNOSIS — Z79.899 HIGH RISK MEDICATION USE: Primary | ICD-10-CM

## 2021-03-18 DIAGNOSIS — Z20.822 ENCOUNTER FOR PREPROCEDURE SCREENING LABORATORY TESTING FOR COVID-19: Primary | ICD-10-CM

## 2021-03-18 DIAGNOSIS — C91.10 CLL (CHRONIC LYMPHOCYTIC LEUKEMIA) (HCC): Primary | ICD-10-CM

## 2021-03-18 DIAGNOSIS — G89.3 CANCER ASSOCIATED PAIN: ICD-10-CM

## 2021-03-18 LAB
ALBUMIN SERPL-MCNC: 3.9 G/DL (ref 3.5–5.2)
ALBUMIN/GLOB SERPL: 1.8 G/DL (ref 1.1–2.4)
ALP SERPL-CCNC: 88 U/L (ref 38–116)
ALT SERPL W P-5'-P-CCNC: <5 U/L (ref 0–41)
ANION GAP SERPL CALCULATED.3IONS-SCNC: 10.8 MMOL/L (ref 5–15)
AST SERPL-CCNC: 7 U/L (ref 0–40)
BASOPHILS # BLD AUTO: 0.08 10*3/MM3 (ref 0–0.2)
BASOPHILS NFR BLD AUTO: 1 % (ref 0–1.5)
BILIRUB SERPL-MCNC: 0.4 MG/DL (ref 0.2–1.2)
BUN SERPL-MCNC: 16 MG/DL (ref 6–20)
BUN/CREAT SERPL: 14.4 (ref 7.3–30)
CALCIUM SPEC-SCNC: 8.4 MG/DL (ref 8.5–10.2)
CHLORIDE SERPL-SCNC: 103 MMOL/L (ref 98–107)
CO2 SERPL-SCNC: 24.2 MMOL/L (ref 22–29)
CREAT SERPL-MCNC: 1.11 MG/DL (ref 0.7–1.3)
DEPRECATED RDW RBC AUTO: 50.5 FL (ref 37–54)
EOSINOPHIL # BLD AUTO: 0.07 10*3/MM3 (ref 0–0.4)
EOSINOPHIL NFR BLD AUTO: 0.8 % (ref 0.3–6.2)
ERYTHROCYTE [DISTWIDTH] IN BLOOD BY AUTOMATED COUNT: 15.7 % (ref 12.3–15.4)
GFR SERPL CREATININE-BSD FRML MDRD: 66 ML/MIN/1.73
GLOBULIN UR ELPH-MCNC: 2.2 GM/DL (ref 1.8–3.5)
GLUCOSE SERPL-MCNC: 91 MG/DL (ref 74–124)
HCT VFR BLD AUTO: 38.9 % (ref 37.5–51)
HGB BLD-MCNC: 11.8 G/DL (ref 13–17.7)
IMM GRANULOCYTES # BLD AUTO: 0.03 10*3/MM3 (ref 0–0.05)
IMM GRANULOCYTES NFR BLD AUTO: 0.4 % (ref 0–0.5)
LYMPHOCYTES # BLD AUTO: 0.85 10*3/MM3 (ref 0.7–3.1)
LYMPHOCYTES NFR BLD AUTO: 10.2 % (ref 19.6–45.3)
MCH RBC QN AUTO: 26.7 PG (ref 26.6–33)
MCHC RBC AUTO-ENTMCNC: 30.3 G/DL (ref 31.5–35.7)
MCV RBC AUTO: 88 FL (ref 79–97)
MONOCYTES # BLD AUTO: 0.78 10*3/MM3 (ref 0.1–0.9)
MONOCYTES NFR BLD AUTO: 9.4 % (ref 5–12)
NEUTROPHILS NFR BLD AUTO: 6.5 10*3/MM3 (ref 1.7–7)
NEUTROPHILS NFR BLD AUTO: 78.2 % (ref 42.7–76)
NRBC BLD AUTO-RTO: 0 /100 WBC (ref 0–0.2)
PLATELET # BLD AUTO: 249 10*3/MM3 (ref 140–450)
PMV BLD AUTO: 11.6 FL (ref 6–12)
POTASSIUM SERPL-SCNC: 4.7 MMOL/L (ref 3.5–4.7)
PROT SERPL-MCNC: 6.1 G/DL (ref 6.3–8)
RBC # BLD AUTO: 4.42 10*6/MM3 (ref 4.14–5.8)
SARS-COV-2 RNA RESP QL NAA+PROBE: NOT DETECTED
SODIUM SERPL-SCNC: 138 MMOL/L (ref 134–145)
WBC # BLD AUTO: 8.31 10*3/MM3 (ref 3.4–10.8)

## 2021-03-18 PROCEDURE — 85025 COMPLETE CBC W/AUTO DIFF WBC: CPT

## 2021-03-18 PROCEDURE — 96413 CHEMO IV INFUSION 1 HR: CPT

## 2021-03-18 PROCEDURE — 99215 OFFICE O/P EST HI 40 MIN: CPT | Performed by: NURSE PRACTITIONER

## 2021-03-18 PROCEDURE — 25010000002 PEMBROLIZUMAB 100 MG/4ML SOLUTION 4 ML VIAL: Performed by: NURSE PRACTITIONER

## 2021-03-18 PROCEDURE — 80053 COMPREHEN METABOLIC PANEL: CPT

## 2021-03-18 RX ORDER — SODIUM CHLORIDE 9 MG/ML
250 INJECTION, SOLUTION INTRAVENOUS ONCE
Status: CANCELLED | OUTPATIENT
Start: 2021-03-18

## 2021-03-18 RX ORDER — SODIUM CHLORIDE 9 MG/ML
250 INJECTION, SOLUTION INTRAVENOUS ONCE
Status: COMPLETED | OUTPATIENT
Start: 2021-03-18 | End: 2021-03-18

## 2021-03-18 RX ADMIN — SODIUM CHLORIDE 250 ML: 9 INJECTION, SOLUTION INTRAVENOUS at 14:38

## 2021-03-18 RX ADMIN — SODIUM CHLORIDE 200 MG: 9 INJECTION, SOLUTION INTRAVENOUS at 14:39

## 2021-03-18 NOTE — PROGRESS NOTES
03/22/21 0000   Pre-Procedure Phone Call   Procedure Time Verified Yes   Arrival Time 0700   Procedure Location Verified Yes   NPO Status Reinforced Yes   Ride and Caregiver Arranged Yes   Patient Knows to Bring Current Medications   (reviewed with patient.)   Bring Outside Films Requested   (CXR, CT Chest in epic and impax)

## 2021-03-18 NOTE — PROGRESS NOTES
"Subjective   REASON FOR FOLLOW UP:  1.  Chronic lymphocytic leukemia with extensive lymphadenopathy and possible pleural involvement. Initiation of Treanda, Rituxan May 1, 2019.  2.  Hypogammaglobulinemia.  Status is post IVIG  3.  Significant response on scans June 27, 2019.  Treatment changed to Imbruvica 420 mg daily.     HISTORY OF PRESENT ILLNESS:    The patient is a  65 y.o. male     Past Medical History, Past Surgical History, Social History, Family History have been reviewed and are without significant changes except as mentioned.    Objective      Vitals:    03/18/21 1328   BP: 143/76   Pulse: 93   Resp: 20   Temp: 98.4 °F (36.9 °C)   TempSrc: Temporal   SpO2: 90%   Weight: 77.1 kg (170 lb)   Height: 180.3 cm (70.98\")   PainSc:   3   PainLoc: Rib Cage  Comment: Rt side 8th rib     Current Status 3/18/2021   ECOG score 0       Physical Exam    GENERAL:  Well-developed, well-nourished in no acute distress.   SKIN:  Warm, dry without purpura or petechiae.  Only faint erythema remaining on anterior chest, no macular papular rash.  HEAD:  Normocephalic.  EYES:  Pupils equal, round.  EOMs intact.  Conjunctivae normal.  EARS:  Hearing intact.  NOSE:  Septum midline.  No excoriations or nasal discharge.  MOUTH:  Tongue is well-papillated; no stomatitis or ulcers.  Lips normal.  THROAT:  Oropharynx without lesions or exudates.  LYMPHATICS: Only shotty cervical and submandibular adenopathy, no significant axillary adenopathy.  No supraclavicular adenopathy.    CHEST:  Lungs clear to auscultation. Good airflow.  CARDIAC:  Regular rate and rhythm without murmurs. Normal S1,S2.  ABDOMEN:  Soft, nontender with no organomegaly or masses. Bowel sounds present  EXTREMITIES:  No clubbing, cyanosis or edema.  NEUROLOGICAL: No focal neurological deficits.  PSYCHIATRIC:  Normal affect and mood.        RECENT LABS:  Results from last 7 days   Lab Units 03/18/21  1317   WBC 10*3/mm3 8.31   NEUTROS ABS 10*3/mm3 6.50   HEMOGLOBIN " g/dL 11.8*   HEMATOCRIT % 38.9   PLATELETS 10*3/mm3 249     Results from last 7 days   Lab Units 03/18/21  1317   SODIUM mmol/L 138   POTASSIUM mmol/L 4.7   CHLORIDE mmol/L 103   CO2 mmol/L 24.2   BUN mg/dL 16   CREATININE mg/dL 1.11   CALCIUM mg/dL 8.4*   ALBUMIN g/dL 3.90   BILIRUBIN mg/dL 0.4   ALK PHOS U/L 88   ALT (SGPT) U/L <5   AST (SGOT) U/L 7   GLUCOSE mg/dL 91         FISH prognostic panel-Positive for deletion of 13 q. 14.3    Assessment/Plan   1.  CLL presenting with significant lymphocytosis, lymphadenopathy and splenomegaly.     · Positive for deletion of 13 q. 14.3.    · Patient status post 2 cycles of Treanda Rituxan with excellent response.    · Imaging 6/27/2019 with significant decrease in adenopathy.  Treanda Rituxan discontinued   · Imbruvica 420 mg daily initiated 7/25/2019.  · He continues on Imbruvica, without indication of progression of his CLL, without progressive lymphadenopathy on CT scans.    2.  Pulmonary nodules/infiltrates.  He is  status post bronchoscopy.  Is unclear at this time whether these findings are infectious or possibly related to his lymphoproliferative disorder.  This is seen to be improving on recent scans.    3.  Hypogammaglobulinemia-status post IVIG with resolution of sinusitis.      4.  Non-small cell lung carcinoma  · August 26 2020 revealing a new 1.6 cm spiculated nodule within the left upper lobe, 1.2 cm left adrenal nodule, bone windows with a soft tissue mass involving the right eighth rib measuring 3.7 x 2.6 cm.    · Biopsy was consistent with adenocarcinoma CK 7+, CK 20-, lung primary suspected clinically, molecular panel not yet obtained.  · PET/CT 9/23/2020 demonstrating asymmetric uptake within the right parotid gland which is unremarkable appearance on noncontrasted CT, SUV of 6 compared to 2.7.  There is no hilar, mediastinal or axillary adenopathy, findings of asymmetric moderate to intense FDG uptake within superior aspect the right chest wall  anterior to the right first rib with an irregular hypodense lesion measuring 1.8 x 1.6 and SUV 4.9.  This had not been seen June 27, 2019.  There is an intensely FDG avid nodule within the lingula measuring 1.9 x 1.5 history of 12.4, FDG avid left adrenal nodule 1.9 x 1.5 with an issue 21.2.    · Evaluated by radiation oncology therapy September 29 and started on radiation therapy to the right chest wall-35 Gy in 10 fractions.   ·  Plans were also then proceed with SBRT to the solitary left upper lobe lesion pending insurance approval.  · Further testing including TPS of 20% and foundation CDX with a TMB of greater than 10 mutations per megabase (28 mutations per megabase) and the indication that several immunotherapies could be useful in this patient's care.  Additional genomic findings include BRAF G469R/that trametinib could be useful and STK11/that everolimus could be useful.  · Keytruda initiated 10/21/2021   · Reassessment after 2 cycles of Keytruda with enlargement of the right eighth rib lesion,enlargement of spiculated left upper lobe lung mass, moderate to large right-sided pleural effusion, new left adrenal mass and enlarged retrocrural lymph node.?Pseudoprogression  · Xgeva last given 12/30/2020  · Follow-up scans 1/6/2021 demonstrated marked improvement in his right eighth rib lesion, resolution of left upper lobe spiculated mass, residual large right pleural effusion, resolution of left adrenal metastasis.   · Right pleural effusion, with thoracentesis 1/14/2021 with 1500 mils removed.  Pathology consistent with malignant effusion    5.  Left knee metastasis  · Evaluated by orthopedic oncology, Dr. Eliu Ochoa  · Admission 1/7/2021 undergoing stabilization of left femoral bone lesion, prophylactic stabilization with intramedullary implants.  · It was suggested we delay Xgeva or Zometa to allow bone healing  · Completed radiation with 10 fractions 2/10/2021     6.  Malignant right pleural  effusion  · Ultrasound thoracentesis 1/14/2021 1500 mL removed  · Chest x-ray 2/25/2021 with moderate right pleural effusion  · Progressive symptoms with worsening pain.  We will proceed with ultrasound-guided thoracentesis  · If the patient continues to have effusion noted on upcoming imaging, we may consider Pleurx drain placement with thoracic surgery    7.  Cancer related pain  · Pain is most prominent in his right rib, utilizing MS Contin 30 mg 3 times a day  · Hydrocodone/acetaminophen 10/325 as needed for breakthrough pain.  Currently taking 3 to 4 tablets daily    Plan:  1. Proceed today with cycle 8 Keytruda  2. We will schedule the patient for therapeutic right ultrasound-guided thoracentesis  3. Continue Imbruvica 420 mg daily  4. Continue MS Contin 30 mg 3 times a day and hydrocodone/acetaminophen 10/325 as needed for breakthrough pain  5. Xgeva will remain on hold following receiving orthopedic left knee repair  6. In 2 weeks, the patient will undergo CT of the chest, abdomen and pelvis to evaluate his disease state  7. If the patient has recurrent pleural effusion, will refer to thoracic surgery for Pleurx catheter placement  8. MD follow-up with Dr. Parekh in 3 weeks for CT scan review and determination of further plan of care    The patient is on high risk medication requiring close monitoring for toxicity.  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  He continues on medical management of both malignancies.    Noa Zarate, APRN   3/18/2021

## 2021-03-19 ENCOUNTER — ANESTHESIA EVENT (OUTPATIENT)
Dept: GASTROENTEROLOGY | Facility: HOSPITAL | Age: 66
End: 2021-03-19

## 2021-03-19 ENCOUNTER — MEDICATION THERAPY MANAGEMENT (OUTPATIENT)
Dept: PHARMACY | Facility: HOSPITAL | Age: 66
End: 2021-03-19

## 2021-03-19 ENCOUNTER — LAB (OUTPATIENT)
Dept: LAB | Facility: HOSPITAL | Age: 66
End: 2021-03-19

## 2021-03-19 ENCOUNTER — ANESTHESIA (OUTPATIENT)
Dept: GASTROENTEROLOGY | Facility: HOSPITAL | Age: 66
End: 2021-03-19

## 2021-03-19 ENCOUNTER — HOSPITAL ENCOUNTER (OUTPATIENT)
Facility: HOSPITAL | Age: 66
Setting detail: HOSPITAL OUTPATIENT SURGERY
Discharge: HOME | End: 2021-03-19
Attending: INTERNAL MEDICINE | Admitting: INTERNAL MEDICINE

## 2021-03-19 VITALS
RESPIRATION RATE: 12 BRPM | BODY MASS INDEX: 23.84 KG/M2 | TEMPERATURE: 97.3 F | HEART RATE: 64 BPM | OXYGEN SATURATION: 99 % | HEIGHT: 71 IN | DIASTOLIC BLOOD PRESSURE: 70 MMHG | SYSTOLIC BLOOD PRESSURE: 124 MMHG | WEIGHT: 170.3 LBS

## 2021-03-19 DIAGNOSIS — Z20.822 ENCOUNTER FOR PREPROCEDURE SCREENING LABORATORY TESTING FOR COVID-19: ICD-10-CM

## 2021-03-19 DIAGNOSIS — Z01.812 ENCOUNTER FOR PREPROCEDURE SCREENING LABORATORY TESTING FOR COVID-19: ICD-10-CM

## 2021-03-19 DIAGNOSIS — K59.01 SLOW TRANSIT CONSTIPATION: ICD-10-CM

## 2021-03-19 PROCEDURE — C9803 HOPD COVID-19 SPEC COLLECT: HCPCS

## 2021-03-19 PROCEDURE — 25010000003 HEPARIN LOCK FLUSH PER 10 UNITS: Performed by: INTERNAL MEDICINE

## 2021-03-19 PROCEDURE — U0004 COV-19 TEST NON-CDC HGH THRU: HCPCS

## 2021-03-19 PROCEDURE — 45385 COLONOSCOPY W/LESION REMOVAL: CPT | Performed by: INTERNAL MEDICINE

## 2021-03-19 PROCEDURE — U0005 INFEC AGEN DETEC AMPLI PROBE: HCPCS

## 2021-03-19 PROCEDURE — 88305 TISSUE EXAM BY PATHOLOGIST: CPT | Performed by: INTERNAL MEDICINE

## 2021-03-19 PROCEDURE — 25010000002 PROPOFOL 10 MG/ML EMULSION: Performed by: ANESTHESIOLOGY

## 2021-03-19 PROCEDURE — S0260 H&P FOR SURGERY: HCPCS | Performed by: INTERNAL MEDICINE

## 2021-03-19 RX ORDER — LIDOCAINE HYDROCHLORIDE 20 MG/ML
INJECTION, SOLUTION INFILTRATION; PERINEURAL AS NEEDED
Status: DISCONTINUED | OUTPATIENT
Start: 2021-03-19 | End: 2021-03-19 | Stop reason: SURG

## 2021-03-19 RX ORDER — PROPOFOL 10 MG/ML
VIAL (ML) INTRAVENOUS CONTINUOUS PRN
Status: DISCONTINUED | OUTPATIENT
Start: 2021-03-19 | End: 2021-03-19 | Stop reason: SURG

## 2021-03-19 RX ORDER — SODIUM CHLORIDE, SODIUM LACTATE, POTASSIUM CHLORIDE, CALCIUM CHLORIDE 600; 310; 30; 20 MG/100ML; MG/100ML; MG/100ML; MG/100ML
30 INJECTION, SOLUTION INTRAVENOUS CONTINUOUS
Status: DISCONTINUED | OUTPATIENT
Start: 2021-03-19 | End: 2021-03-19 | Stop reason: HOSPADM

## 2021-03-19 RX ORDER — HEPARIN SODIUM (PORCINE) LOCK FLUSH IV SOLN 100 UNIT/ML 100 UNIT/ML
500 SOLUTION INTRAVENOUS ONCE
Status: COMPLETED | OUTPATIENT
Start: 2021-03-19 | End: 2021-03-19

## 2021-03-19 RX ADMIN — LIDOCAINE HYDROCHLORIDE 40 MG: 20 INJECTION, SOLUTION INFILTRATION; PERINEURAL at 08:16

## 2021-03-19 RX ADMIN — SODIUM CHLORIDE, POTASSIUM CHLORIDE, SODIUM LACTATE AND CALCIUM CHLORIDE 30 ML/HR: 600; 310; 30; 20 INJECTION, SOLUTION INTRAVENOUS at 07:49

## 2021-03-19 RX ADMIN — PROPOFOL 140 MCG/KG/MIN: 10 INJECTION, EMULSION INTRAVENOUS at 08:16

## 2021-03-19 RX ADMIN — PROPOFOL 50 MG: 10 INJECTION, EMULSION INTRAVENOUS at 08:15

## 2021-03-19 RX ADMIN — HEPARIN 500 UNITS: 100 SYRINGE at 08:47

## 2021-03-19 NOTE — ANESTHESIA PREPROCEDURE EVALUATION
Anesthesia Evaluation     Patient summary reviewed and Nursing notes reviewed   NPO Solid Status: > 8 hours  NPO Liquid Status: > 2 hours           Airway   Mallampati: II  TM distance: >3 FB  Neck ROM: full  Dental - normal exam     Pulmonary - normal exam   (+) pleural effusion, lung cancer, COPD moderate, home oxygen, shortness of breath,   Cardiovascular - negative cardio ROS and normal exam        Neuro/Psych  (+) psychiatric history Anxiety,     GI/Hepatic/Renal/Endo    (+)   renal disease,     Musculoskeletal (-) negative ROS    Abdominal    Substance History - negative use     OB/GYN negative ob/gyn ROS         Other   blood dyscrasia,   history of cancer                  Anesthesia Plan    ASA 3     MAC       Anesthetic plan, all risks, benefits, and alternatives have been provided, discussed and informed consent has been obtained with: patient.

## 2021-03-19 NOTE — PROGRESS NOTES
[facility] Lab Review- Imbruvica      3/18/2021  Day 1   WBC 3.40 - 10.80 10*3/mm3 8.31   Neutrophils Absolute 1.70 - 7.00 10*3/mm3 6.50   Hemoglobin 13.0 - 17.7 g/dL 11.8 (A)   Hematocrit 37.5 - 51.0 % 38.9   Platelets 140 - 450 10*3/mm3 249   Creatinine 0.70 - 1.30 mg/dL 1.11   eGFR Non African Am >60 mL/min/1.73 66   BUN 6 - 20 mg/dL 16   Sodium 134 - 145 mmol/L 138   Potassium 3.5 - 4.7 mmol/L 4.7   Glucose 74 - 124 mg/dL 91   Calcium 8.5 - 10.2 mg/dL 8.4 (A)   Albumin 3.50 - 5.20 g/dL 3.90   Total Protein 6.3 - 8.0 g/dL 6.1 (A)   AST (SGOT) 0 - 40 U/L 7   ALT (SGPT) 0 - 41 U/L <5   Alkaline Phosphatase 38 - 116 U/L 88   Total Bilirubin 0.2 - 1.2 mg/dL 0.4     APRN dictation noted. Pharmacy will continue to follow.     Thanks,   Jessenia Galvez, PharmD

## 2021-03-19 NOTE — ANESTHESIA POSTPROCEDURE EVALUATION
"Patient: Dav Freitas    Procedure Summary     Date: 03/19/21 Room / Location:  [facility] /  [facility]    Anesthesia Start: 0804 Anesthesia Stop: 0835    Procedure: COLONOSCOPY INTO CECUM WITH POLYPECTOMY (N/A ) Diagnosis:       Slow transit constipation      (Slow transit constipation [K59.01])    Surgeons: Miguel Torres MD Provider: Elpidio Magaña MD    Anesthesia Type: MAC ASA Status: 3          Anesthesia Type: MAC    Vitals  No vitals data found for the desired time range.          Post Anesthesia Care and Evaluation    Patient location during evaluation: bedside  Patient participation: complete - patient participated  Level of consciousness: awake  Pain score: 2  Pain management: adequate  Airway patency: patent  Anesthetic complications: No anesthetic complications  PONV Status: none  Cardiovascular status: acceptable  Respiratory status: acceptable  Hydration status: acceptable    Comments: /68 (BP Location: Left arm, Patient Position: Lying)   Pulse 87   Temp 36.3 °C (97.3 °F) (Oral)   Resp 16   Ht 180.3 cm (71\")   Wt 77.2 kg (170 lb 4.8 oz)   SpO2 94%   BMI 23.75 kg/m²         "

## 2021-03-19 NOTE — H&P
"[facility]  Pre Procedure History & Physical    Chief Complaint:   Time for my colonoscopy    Subjective     HPI:   65 y.o. male     Past Medical History:   Past Medical History:   Diagnosis Date   • Anxiety    • Bruises easily     SIDE EFFECT D/T CHEMO TABLET    • CLL (chronic lymphocytic leukemia) (CMS/HCC) 04/2019    LAST CHEMO 7/2019   • Constipation    • COPD (chronic obstructive pulmonary disease) (CMS/[facility])    • Dyspnea    • ED (erectile dysfunction)    • H/O splenomegaly    • Ileus (CMS/[facility]) 11/2020   • Lung cancer (CMS/[facility]) 08/2020    WITH BONE METS  - LAST RADIATION TREATMENT 10/13/20   • On home O2     3L NC AT NIGHT    • Pleural effusion 01/14/2021    right side   • Sleep related hypoxia     USES O2 AT NIGHT   • Tumor     LEFT LEG NEAR KNEE   • Varicose vein of leg        Family History:  Family History   Problem Relation Age of Onset   • Arthritis Mother    • Lung cancer Father    • Malig Hyperthermia Neg Hx        Social History:   reports that he quit smoking about 22 months ago. His smoking use included cigarettes. He has a 40.00 pack-year smoking history. He has never used smokeless tobacco. He reports previous alcohol use. He reports previous drug use.    Medications:   No medications prior to admission.       Allergies:  Patient has no known allergies.    ROS:    Pertinent items are noted in HPI     Objective     Blood pressure 124/70, pulse 64, temperature 97.3 °F (36.3 °C), temperature source Oral, resp. rate 12, height 180.3 cm (71\"), weight 77.2 kg (170 lb 4.8 oz), SpO2 99 %.    Physical Exam   Constitutional: Pt is oriented to person, place, and time and well-developed, well-nourished, and in no distress.   HENT:   Mouth/Throat: Oropharynx is clear and moist.   Neck: Normal range of motion. Neck supple.   Cardiovascular: Normal rate, regular rhythm and normal heart sounds.    Pulmonary/Chest: Effort normal and breath sounds normal. No respiratory distress. No  wheezes. "   Abdominal: Soft. Bowel sounds are normal.   Skin: Skin is warm and dry.   Psychiatric: Mood, memory, affect and judgment normal.     Assessment/Plan     Diagnosis:  Encounter for screening for colon cancer    Anticipated Surgical Procedure:  Colonoscopy    The risks, benefits, and alternatives of this procedure have been discussed with the patient or the responsible party- the patient understands and agrees to proceed.

## 2021-03-20 LAB — SARS-COV-2 ORF1AB RESP QL NAA+PROBE: NOT DETECTED

## 2021-03-22 ENCOUNTER — HOSPITAL ENCOUNTER (OUTPATIENT)
Dept: ULTRASOUND IMAGING | Facility: HOSPITAL | Age: 66
Discharge: HOME | End: 2021-03-22
Admitting: INTERNAL MEDICINE

## 2021-03-22 VITALS
SYSTOLIC BLOOD PRESSURE: 138 MMHG | TEMPERATURE: 97.5 F | HEIGHT: 71 IN | HEART RATE: 87 BPM | WEIGHT: 170 LBS | BODY MASS INDEX: 23.8 KG/M2 | RESPIRATION RATE: 20 BRPM | DIASTOLIC BLOOD PRESSURE: 75 MMHG | OXYGEN SATURATION: 98 %

## 2021-03-22 DIAGNOSIS — C91.10 CLL (CHRONIC LYMPHOCYTIC LEUKEMIA) (HCC): ICD-10-CM

## 2021-03-22 DIAGNOSIS — C79.51 LUNG CANCER METASTATIC TO BONE (HCC): ICD-10-CM

## 2021-03-22 DIAGNOSIS — C34.90 LUNG CANCER METASTATIC TO BONE (HCC): ICD-10-CM

## 2021-03-22 PROCEDURE — 25010000003 LIDOCAINE 1 % SOLUTION: Performed by: RADIOLOGY

## 2021-03-22 PROCEDURE — 76942 ECHO GUIDE FOR BIOPSY: CPT

## 2021-03-22 RX ORDER — SODIUM CHLORIDE 0.9 % (FLUSH) 0.9 %
10 SYRINGE (ML) INJECTION AS NEEDED
Status: DISCONTINUED | OUTPATIENT
Start: 2021-03-22 | End: 2021-03-23 | Stop reason: HOSPADM

## 2021-03-22 RX ORDER — LIDOCAINE HYDROCHLORIDE 10 MG/ML
10 INJECTION, SOLUTION INFILTRATION; PERINEURAL ONCE
Status: COMPLETED | OUTPATIENT
Start: 2021-03-22 | End: 2021-03-22

## 2021-03-22 RX ORDER — SODIUM CHLORIDE 0.9 % (FLUSH) 0.9 %
3 SYRINGE (ML) INJECTION EVERY 12 HOURS SCHEDULED
Status: DISCONTINUED | OUTPATIENT
Start: 2021-03-22 | End: 2021-03-23 | Stop reason: HOSPADM

## 2021-03-22 RX ADMIN — LIDOCAINE HYDROCHLORIDE 10 ML: 10 INJECTION, SOLUTION INFILTRATION; PERINEURAL at 07:39

## 2021-03-22 NOTE — NURSING NOTE
Patient dressed, to wheelchair, taken to patient discharge by REINA Hodge, brother driving him home.

## 2021-03-22 NOTE — DISCHARGE INSTRUCTIONS
EDUCATION /DISCHARGE INSTRUCTIONS Thoracentesis:  A thoracentesis is a procedure to remove fluid or air from the space between the lung and it's lining.  It is done to relieve lung compression and respiratory distress.  A sample can also be obtained to aid diagnosis.   Medications can be administered into the space.      During the procedure:  You will be seated comfortable leaning forward onto a pillow supported by a table.  The area will be cleaned with antiseptic soap and draped with a sterile towel.  The physician will administer a local antiseptic to numb the area.  Next, the physician will insert a needle with tubing attached into the space between the lung and lining.  Fluid is removed and a sample sent to the laboratory.   When completed a pressure dressing is applied to the site.    Risks of the procedure include but are not limited to:   *  Bleeding    *  Low blood pressure *  Infection     *  Lung collapse possibly requiring insertion of a tube to re-inflate the lung.    Benefits of the procedure:  Relief of respiratory distress and facilitation of a diagnosis.  Alternatives to the procedure:  Drug therapy with diuretics to remove fluid.  Risks of diuretic drug therapy include possible dehydration and renal failure.  The benefit of drug therapy is that it can be done at home under physician supervision.  THIS EDUCATION INFORMATION WAS REVIEWED PRIOR TO THE PROCEDURE AND CONSENT. Patient initials___________________Time___0702_______________    Post Procedure:    *  Rest today (no pushing pulling, straining or heavy lifting).   *  Slowly increase activity tomorow.    *  If you received sedation do not drive for 24 hours.   *  Keep dressing clean and dry.   *  Leave dressing on puncture site for 24 hours.    *  You may shower when dressing removed.   *  Expect some coughing as the lung re-expands.    Call your doctor if experiencing:   *  If experiencing sudden / severe shortness of breath, chest pain or if  coughing       up blood go to the nearest emergency room.   *  Signs of infection such as redness, swelling, excessive pain and / or foul       smelling drainage from the puncture site.   *  Chills or fever over 101 degrees (by mouth).   *  Change in sputum color (yellow, green, red).   *  Unrelieved pain.   *  Any new or severe symptoms.  Following the procedure:     Follow-up with the ordering physician as directed.    Continue to take other medications as directed by your physician unless    otherwise instructed.   If applicable, resume taking your blood thinners or Aspirin on ___________.    If you have any concerns please call the Radiology Nurses Desk at [phone].  You are the most important factor in your recovery.  Follow the above instructions carefully.

## 2021-03-22 NOTE — NURSING NOTE
Patient arrived to radiology triage bay 1.  Patient is mask compliant.  I am wearing a mask and eye protection to care for patient.

## 2021-03-23 ENCOUNTER — TELEPHONE (OUTPATIENT)
Dept: INTERVENTIONAL RADIOLOGY/VASCULAR | Facility: HOSPITAL | Age: 66
End: 2021-03-23

## 2021-03-23 LAB
LAB AP CASE REPORT: NORMAL
PATH REPORT.FINAL DX SPEC: NORMAL
PATH REPORT.GROSS SPEC: NORMAL

## 2021-03-26 ENCOUNTER — TELEPHONE (OUTPATIENT)
Dept: PSYCHIATRY | Facility: HOSPITAL | Age: 66
End: 2021-03-26

## 2021-03-26 NOTE — TELEPHONE ENCOUNTER
[facility]    Call placed to pt regarding refill request, lost to follow up. Spoke with pt. Continues to have plenty medication as is only taking 1/2 tab q hs. Apt rescheduled in one month to reassess prior to refill needs.

## 2021-03-31 ENCOUNTER — TELEPHONE (OUTPATIENT)
Dept: GASTROENTEROLOGY | Facility: CLINIC | Age: 66
End: 2021-03-31

## 2021-04-01 DIAGNOSIS — C79.51 LUNG CANCER METASTATIC TO BONE (HCC): ICD-10-CM

## 2021-04-01 DIAGNOSIS — C34.90 LUNG CANCER METASTATIC TO BONE (HCC): ICD-10-CM

## 2021-04-02 RX ORDER — MORPHINE SULFATE 30 MG/1
30 TABLET, FILM COATED, EXTENDED RELEASE ORAL 3 TIMES DAILY
Qty: 90 TABLET | Refills: 0 | Status: SHIPPED | OUTPATIENT
Start: 2021-04-02 | End: 2021-05-17 | Stop reason: SDUPTHER

## 2021-04-02 RX ORDER — IBRUTINIB 420 MG/1
TABLET, FILM COATED ORAL
Qty: 28 TABLET | Refills: 3 | Status: SHIPPED | OUTPATIENT
Start: 2021-04-02 | End: 2021-07-21

## 2021-04-02 NOTE — TELEPHONE ENCOUNTER
Imbru[facility]ica refill request rec electronically from [facility]. Per last office note-pt is to continue 420 mg daily. His last delivery was on 3/9/2021.    I have routed the rx to Dr Parekh for signature. Once signed it will be escribed to [facility]

## 2021-04-05 ENCOUNTER — HOSPITAL ENCOUNTER (OUTPATIENT)
Dept: PET IMAGING | Facility: HOSPITAL | Age: 66
Discharge: HOME | End: 2021-04-05
Admitting: NURSE PRACTITIONER

## 2021-04-05 ENCOUNTER — INFUSION (OUTPATIENT)
Dept: ONCOLOGY | Facility: HOSPITAL | Age: 66
End: 2021-04-05

## 2021-04-05 DIAGNOSIS — C91.10 CLL (CHRONIC LYMPHOCYTIC LEUKEMIA) (HCC): Primary | ICD-10-CM

## 2021-04-05 DIAGNOSIS — C79.51 LUNG CANCER METASTATIC TO BONE (HCC): ICD-10-CM

## 2021-04-05 DIAGNOSIS — C91.10 CLL (CHRONIC LYMPHOCYTIC LEUKEMIA) (HCC): ICD-10-CM

## 2021-04-05 DIAGNOSIS — C34.90 LUNG CANCER METASTATIC TO BONE (HCC): ICD-10-CM

## 2021-04-05 LAB — CREAT BLDA-MCNC: 1.1 MG/DL (ref 0.6–1.3)

## 2021-04-05 PROCEDURE — 82565 ASSAY OF CREATININE: CPT

## 2021-04-05 PROCEDURE — 25010000002 IOPAMIDOL 61 % SOLUTION: Performed by: NURSE PRACTITIONER

## 2021-04-05 PROCEDURE — 74177 CT ABD & PELVIS W/CONTRAST: CPT

## 2021-04-05 PROCEDURE — 0 DIATRIZOATE MEGLUMINE & SODIUM PER 1 ML: Performed by: NURSE PRACTITIONER

## 2021-04-05 PROCEDURE — 71260 CT THORAX DX C+: CPT

## 2021-04-05 RX ORDER — HEPARIN SODIUM (PORCINE) LOCK FLUSH IV SOLN 100 UNIT/ML 100 UNIT/ML
500 SOLUTION INTRAVENOUS AS NEEDED
Status: CANCELLED | OUTPATIENT
Start: 2021-04-05

## 2021-04-05 RX ORDER — SODIUM CHLORIDE 0.9 % (FLUSH) 0.9 %
10 SYRINGE (ML) INJECTION AS NEEDED
Status: CANCELLED | OUTPATIENT
Start: 2021-04-05

## 2021-04-05 RX ORDER — HEPARIN SODIUM (PORCINE) LOCK FLUSH IV SOLN 100 UNIT/ML 100 UNIT/ML
500 SOLUTION INTRAVENOUS AS NEEDED
Status: DISCONTINUED | OUTPATIENT
Start: 2021-04-05 | End: 2021-04-05 | Stop reason: HOSPADM

## 2021-04-05 RX ORDER — SODIUM CHLORIDE 0.9 % (FLUSH) 0.9 %
10 SYRINGE (ML) INJECTION AS NEEDED
Status: DISCONTINUED | OUTPATIENT
Start: 2021-04-05 | End: 2021-04-05 | Stop reason: HOSPADM

## 2021-04-05 RX ORDER — IBRUTINIB 420 MG/1
TABLET, FILM COATED ORAL
Refills: 0 | OUTPATIENT
Start: 2021-04-05

## 2021-04-05 RX ADMIN — IOPAMIDOL 85 ML: 612 INJECTION, SOLUTION INTRAVENOUS at 09:27

## 2021-04-05 RX ADMIN — DIATRIZOATE MEGLUMINE AND DIATRIZOATE SODIUM 30 ML: 660; 100 LIQUID ORAL; RECTAL at 08:22

## 2021-04-05 NOTE — TELEPHONE ENCOUNTER
Imbru[facility]ica refill request rec electronically from [facility]. Request denied-responded to by other means. There was a refill sent on 4/2/2021

## 2021-04-06 DIAGNOSIS — C79.51 LUNG CANCER METASTATIC TO BONE (HCC): ICD-10-CM

## 2021-04-06 DIAGNOSIS — C34.90 LUNG CANCER METASTATIC TO BONE (HCC): ICD-10-CM

## 2021-04-07 ENCOUNTER — TELEPHONE (OUTPATIENT)
Dept: ONCOLOGY | Facility: CLINIC | Age: 66
End: 2021-04-07

## 2021-04-07 RX ORDER — HYDROCODONE BITARTRATE AND ACETAMINOPHEN 10; 325 MG/1; MG/1
1 TABLET ORAL EVERY 4 HOURS PRN
Qty: 100 TABLET | Refills: 0 | Status: SHIPPED | OUTPATIENT
Start: 2021-04-07 | End: 2021-04-08 | Stop reason: SDUPTHER

## 2021-04-07 NOTE — TELEPHONE ENCOUNTER
----- Message from Roseline Da Silva, PharmМАРИЯ sent at 4/7/2021  9:13 AM EDT -----  Regarding: please put on SPECIALTY pharmacy schedule  Hi,   Can you please add this patient to the Specialty Pharmacy schedule for a 20 minute appointment on 4/8 (after his [facility] infusion apt)?     In notes section please add: To qualify Imbruvica RX    Thank you!  -erin

## 2021-04-07 NOTE — H&P (VIEW-ONLY)
Subjective Patient feels he is generally doing better but still with right chest wall pain.      REASON FOR FOLLOW UP:  1.  Chronic lymphocytic leukemia with extensive lymphadenopathy and possible pleural involvement.  2.  Initiation of Treanda, Rituxan May 1, 2019 IVIG also utilized                                    3.  Significant response on scans June 27, 2019, alternative therapy with Imbruvica planned, initiated July 25, 2019  4.  Patient seen February 12, 2020, continued control of CLL, discussed Rituxan, Imbruvica, sleep study and potential surgical assessment for hernia  5.  CT of chest per pulmonary August 26 with 1.6 cm spiculated nodule in the left upper lobe, 1.2 cm left adrenal nodule not present on comparison study, bone windows with soft tissue mass involving the right eighth rib measuring 3.7 x 2.6, biopsy positive for adenocarcinoma  6.  Patient assessed September 30, plans for palliative radiation therapy, port placement, Keytruda considering PDL 1 positivity and high TMB status  7.  Patient reviewed October 21, 2020, Keytruda initiated, pain much improved status post radiation therapy  8.  Status post bilateral inguinal hernia repairs November 23, 2020.  Evidence of progression of disease versus pseudoprogression, continued knee pain   9.  Patient seen in office December 2, 2020, continued Keytruda for total of 4 cycles, institution of Xgeva, reassessment per left knee pain  10.  Patient assessed by orthopedic oncology status post prophylactic stabilization of left femur January 7, 2021.  11 patient reviewed January 12, 2021 with clear evidence of improvement on Keytruda.  Plan to continue same at present.  Increasing shortness of breath thought secondary pleural effusion, plan thoracentesis-cytology, flow cytometry and chemistries,?  Pleurx catheter  12.  Patient status post thoracentesis with improvement per shortness of breath, exudative, negative cytology, continued chest pain February 25,  2021, follow-up chest x-ray with rib details.  13.  Patient reviewed April 8, 2021, consideration of follow-up thoracentesis despite general improvement on scans, potential pleurodesis versus Pleurx catheter.        HISTORY OF PRESENT ILLNESS:   The patient is a  65 y.o. Male  who was admitted on 4/28/2019 with worsening complaints of cough wheezing and shortness of breath.  He had been to an urgent care center and was started on antibiotics and received a shot of Solu-Medrol.  His symptoms did not improve and on his admission he was noted to have profound lymphocytosis with a total white count of 70,074% lymphocytes on his white cell differential.      He also underwent CT scan of the chest that showed infiltrates and nodules in the lungs as well as significant lymphadenopathy within the chest and in the axillary regions.  He had peripheral blood sent for flow cytometry leukemia lymphoma panel but this is still pending at time of this dictation.           He underwent bronchoscopy on 4/29/2019.  Results from this procedure are pendingl.  His respiratory viral panel was negative and markers of sepsis were unremarkable.      His peripheral blood flow cytometry and flow cytometry from the EBUS needle biopsy at bronchoscopy are both consistent with chronic lymphocytic leukemia/small lymphocytic lymphoma.     The patient underwent a CT of abdomen and pelvis April 30 demonstrating extensive splenomegaly and bulky lymphadenopathy throughout the abdomen and pelvis.  There is a tiny lesion at the superior aspect lateral hepatic segment and 2 hyperenhancing foci within the liver thought to be hemangiomas, moderate size right inguinal hernia containing a long segment of small bowel without obstruction or incarceration.  CT of soft tissue again reveals extensive cervical adenopathy as well as evidence of pansinusitis.  The patient's case was discussed with Dr. Church and the patient has stage III-IV disease should be treated  during this hospitalization.  I met with the patient and discussed in detail the use of Treanda/ Rituxan which we initiated Treanda Rituxan beginning May 1, 2019.     When he is seen May 2 he is already beginning to respond with reducing adenopathy and improved symptoms.  Plan to proceed with day #2.  We have also reviewed his findings including IgA 14, IgG of 263, IgM of 5 and a kappa/lambda ratio of 26.38.  He is hypogammaglobulinemic and replacement therapy will be given this hospitalization.  ENT assessment will also be requested.     The patient is again seen May 3, 2019, continuing to improve overall.  We did proceed with IVIG 400 mg/kg and had the patient seen by ENT after which the patient was discharged.  He indicates that Dr. Hamlin is going to see him back within the week as he is now seen back in our office May 16 and that surgery may be necessary.  The patient is also still having sinus symptoms.  Further he has developed a more extensive rash involving his abdomen chest and back and previously seen in hospital?.  Otherwise he feels well except for fatigue without fever, chills, nausea, vomiting, weight loss, stable appetite.     The patient went on to take 2 cycles of Treanda, Rituxan with a second cycle given June 6 and 7.  Additional assessments included his dermatologist who felt he had a gradually improving dermatitis and would not require an therapy and ENT indicating that the patient was slowly improving per sinus symptoms the surgery may be necessary at a later point.  When he is reviewed May 30 he was neutropenic and we proceeded with IVIG second treatment on Elenita 10.  He underwent repeat scans June 27 demonstrating a significant reduction in adenopathy in the neck chest abdomen pelvis with index nodes in the neck previously 2 cm x 1.1 cm, chest with subcarinal lymph node conglomerate measuring 2.1 x 0.9 previously 5.8 x 2.7 and previously seen irregular pulmonary consolidation throughout  bilateral lungs having nearly completely resolved.  Spleen is also reduced in size at 4.5 cm previously 18.6 cm and mesenteric nodes had similar reduced in size.  The patient's immunoglobulins were assessed June 27 with an IgG of 667, IgA of 20, IgM of 9 and IgE of 3.Additional information includes FISH analysis for CLL which is positive for deletion of 13 q. 14.3 It should be noted that such finding on FISH analysis generally suggest a favorable prognostic finding.  The patient is next seen July 3, 2019 feeling improved overall but still having a dermatologic issue with pruritus, erythema worsening particularly in the lower abdomen and upper groin.  Again his scans are considerably improved and we discussed, on the basis of the above information, changing his therapy now to Imbruvica.  The patient will return for teaching per Imbruvica July 11 the patient initiated treatment by July 25th 2019.  He was seen July 31 tolerating this well.  He was able to discontinue allopurinol and ferrous sulfate thereafter presents back August 28 having taken the medication over the last month.     He indicates that he is having no issues tolerating the medication and generally feels well except for sinus drainage.  He has had a cough and has a chest x-ray scheduled to primary care August 29, 2019.  He has had no fever, chills, sweats, rash and has gained weight.  The patient is next seen November 20, 2019 continuing to generally improve.  His performance status remains excellent and has had no additional complications thus far with the use of Imbruvica or an additional infectious complications.  He has been seen by pulmonary medicine with reticular infiltrates noted on previous CAT scan on a follow-up study scheduled in the next several days.  This may be evolution towards recovery but a follow-up will be necessary.  Finally we have been able to obtain Imbruvica reasonably cost through foundation support.    The patient is next  seen February 12, 2020 doing well without additional infectious complications and with stable findings hematologically.  We have discussed the fact that Rituxan could be added potentially to reduce the amount of time he remains on the medication but, additionally, further reduce his immunoglobulins.  Though this remains a concern he is also having difficulty with sleep-?  Sleep apnea, and worsening right direct inguinal hernia.    Plans for the patient to continue Imbruvica at dosing rechecking his immunoglobulins April 29 now with IVIG down to 371, IgM of 11, IgA level 32, kappa/lambda ratio of 1.51.  Fortunately continues to feel well without additional symptoms though is concerned about easy bruisability and periodic muscle tenderness after activity.  We have discussed his sleep assessment and he very likely has sleep apnea but does not wish to start CPAP at this point and is using oral medications-trazodone-to modest effect.    The patient is followed by pulmonary medicine.He was seen September 2 having had right-sided rib pain for 1 month, shortness of breath on going up stairs or uphill.  Follow-up chest CT revealed left upper lobe nodule 1.6 cm that was noted spiculated, additionally lesion per right rib with a soft tissue mass (3.7 x 2.6 cm) was recognized in the tissue biopsy was obtained.  This is now returned as positive for moderately differentiated adenocarcinoma.  This is CK7 positive, CK20 negative with a lung primary suspected clinically.  A molecular panel has not yet been obtained.  We have now, however, sent for foundation CDX analysis.    The patient is seen September 2020 with considerable right chest wall pain only modestly controlled with Toradol and ibuprofen.  He also has been taking additional Xanax to reduce the muscle spasm that he is experiencing.  I have advised him of the findings and their significance as being consistent with metastatic non-small cell lung cancer.  He is dismayed by  the conversation but wishes to have pain control as quickly as possible as well as a cady discussion of treatment options.    The patient was provided additional anti-pain and antianxiety medications.  A PET/CT was obtained September 23 demonstrating asymmetric uptake within the right parotid gland which is unremarkable appearance on noncontrasted CT, SUV of 6 compared to 2.7.  There is no hilar, mediastinal or axillary adenopathy, findings of asymmetric moderate to intense FDG uptake within superior aspect the right chest wall anterior to the right first rib with an irregular hypodense lesion measuring 1.8 x 1.6 and SUV 4.9.  This had not been seen June 27, 2019.  There is an intensely FDG avid nodule within the lingula measuring 1.9 x 1.5 history of 12.4, FDG avid left adrenal nodule 1.9 x 1.5 with an issue 21.2.  The patient was seen by radiation therapy September 29 and started on radiation therapy to the right chest wall-35 Gy in 10 fractions.  Plans were also then proceed with SBRT to the solitary left upper lobe lesion pending insurance approval.  Further testing including TPS of 20% and foundation CDX with a TMB of greater than 10 mutations per megabase (28 mutations per megabase) and the indication that several immunotherapies could be useful in this patient's care.  Additional genomic findings include BRAF G469R/that trametinib could be useful and STK11/that everolimus could be useful.      The patient is seen back September 30, 2020 indicating that his pain has improved substantially with his current pain medications.  He also continues laxatives on a regular basis.  Radiation therapy will begin October 1, 2020 as described above and we have discussed on the basis of the findings per his genomic testing that he is a candidate for a number of additional treatment approaches.  Considering he does not have significant organ involvement and that he has a positive PDL 1 at 20% and a TMB 28 mutations per  megabase it would seem reasonable (particularly with his history of CLL) to try to use immunotherapy as monotherapy initially.  This might provide control and allow us not to affect his hematologic illness in any significant way.  We discussed that he will need a PowerPort placement in the near future but that we could start Keytruda shortly thereafter.  Patient was able to proceed into palliative therapy undergoing radiation therapy to the right eighth rib 3 and 50 cGy per fraction x10 between October 1 of October 14.  A PowerPort was placed October 15, 2020.        Mr. Freitas returns to the office October 21, 2020 indicating, wonderfully, that his pain has nearly resolved and he does not need to take short acting pain medications at this point.  Unfortunately he has had severe constipation we discussed his laxatives in depth as to the use, schedule and expected results as he reduces his narcotic use and moves into immunotherapy.  We have also discussed his PET/CT that does refer to an abnormality seen in the rectum that will need to be assessed by colonoscopy.  He states further that he is having lower extremity swelling beginning over the last several weeks right greater than left.  His breathing remains generally improved though he does have cough periodically and mild degree of hemoptysis.    The patient proceeded with his first Keytruda October 21, 2020 and, fortunately, had little side effects from its use thereafter.  He had noted mild hemoptysis as well as left lower extremity weakness and discomfort requiring periodic pain medication.  As he is next seen November 11, 2020 and a second cycle as planned of Keytruda his knee pain has worsened and he is currently using a knee brace, alternating heat and cold with variable results.  He has not had previous injury to the knee.  The patient is also clearly suffering in terms of his concern about his multiple ongoing issues noting that his symptoms of depression  are worsening.       We made plans to continue Imbruvica, and have the patient have follow-up scans.  Unfortunately he presented with incarcerated right inguinal hernia required admission November 16, 2020 ultimately reducing.  He had subsequent mild ileus that slowly resolved been seen by GI.  Repeat scans November 17 that demonstrate rather rapid increase in the right eighth rib lesion and projecting in thoracic cavity, moderate to large right-sided pleural effusion, enlargement of spiculated left upper lobe mass now measuring 2.4 x 1.6 and a new left adrenal mass as well as enlarged retrocrural lymph node.     On November 23, 2020 he underwent da Kavin robot assisted laparoscopic bilateral inguinal hernia repairs.  His Imbruvica has been held in around the surgery but restarted November 24.     The patient is next seen back in December 2, 2020 and his multiple issues including CLL/SSL on Imbruvica, metastatic non-small cell lung cancer with lack of response using immunotherapy as primary treatment, targeted treatments such as trametinib that given with Imbruvica can accelerate hypertension, recent requirement of bilateral inguinal hernia repair, follow-up with GI with opioid-induced constipation and abnormality seen on PET/CT requiring eventual colonoscopy and worsening depression and anxiety addressed by counseling, and institution of Remeron as well as as needed Xanax.     During his hospitalization we had seen the patient with the possibility of his development of pseudoprogression having had only 2 treatments with Keytruda.  Though he has evidence of progression he request that we continue with Keytruda by itself at this point to determine if it has truly failed to control his disease.  He counters indicating that he is feeling better overall with stable appetite though has lost approximately 4 pounds.  In further discussion we have agreed that he will take 2 additional treatments with Keytruda and then  "undergo repeat scans to determine his true status and, at that point, if he has progressed we have moved to carboplatin alimta chemotherapy along with Keytruda.  There are targeted agents available though they do interact likely with his Imbruvica accelerated hypertension.  Finally we need to better understand his left knee pain since this is truly affecting his performance status.  Please note that Xgeva was initiated December 2, 2020  The patient was given Keytruda and Xgeva December 2.  A follow-up MRI of the knee revealed a bone lesion along the posterior aspect of the distal femoral diametaphysis just medial of midline representing metastatic lesion measuring 1.9 x 2.2 initiation of cortical destruction along the posterior aspect of the femur.  Tumor extends cephalad partially superficial cortex/the lesion overall measures 3.5 cm.    The patient seen in office December 23, 2020 indicating that his knee is manageable at this point as long as he \"starts walking\" his MRI, however, is concerning enough to request orthopedic oncology assessment and radiation therapy consultation as we also try to determine whether his disease is controlled with Keytruda as a single agent or whether we need to move to systemic chemotherapy along with immunotherapy.  Notably he states when seen December 23, 2020 that the swelling per his right upper chest is now resolved.  As result of above the patient was asked to be seen by orthopedic oncology-Dr. Eliu Ochoa-who after assessment patient felt that he was at risk for pathologic fracture.  The patient was admitted January 7 undergoing curettage of the left femoral bone lesion, prophylactic stabilization with intramedullary implants.  The patient did well with this approach.  It was suggested not to use Xgeva or Zometa for a period of time as this healed.    Additionally the patient underwent repeat imaging January 6 that showed the previously large expansile destructive lesion " involving the right eighth rib markedly reduced in size measuring 3.4 x 5.3 cm previously 8.2 x 9.4, no change in left iliac bone, irregular spiculated mass left upper lobe nearly completely resolved previously 2.7 x 2.7 now only 0.4 x 0.9 cm, a residual large right pleural effusion unchanged, a previous left adrenal mass measuring 2.9 x 3.3 cm has resolved, evidence of right inguinal canal surgery also noted.  We had planned, initially, to change the patient to Carboplatin, Alimta and Keytruda but now find that is not necessary with the patient responding, clearly, to Keytruda as a single agent.  Patient with increasing shortness of breath recognized January 13, follow-up thoracentesis with flow cytometry, cytology,?  Pleurx catheter.     The patient underwent ultrasound-guided right thoracentesis January 14 revealing 1500 cc removal of livier-colored fluid.  This was negative for malignant cells.Flow cytometry did reveal a CD5 positive CD10 negative monoclonal B-cell population quantitating 2% of total events.     The patient was referred to radiation therapy after his previous knee procedure and returned February 3 for 6 cycle of Keytruda.  He completed radiation therapy approximately February 10, 2021.     His is next seen February 25 for ongoing Keytruda.  He has been doing generally overall better but states he still has right-sided chest pain without that shortness of breath that was so significant previously.  He believes the thoracentesis was quite successful and we have discussed that it was an exudative effusion negative cytologically and with the population consistent with his lymphoproliferative disorder is contaminant.  The patient continued Keytruda therapy, continued radiation therapy January 28 through 10 February to the left femur and underwent repeat thoracentesis March 22, 2021 with 2200 cc removed.  Colonoscopy was performed March 19 with hyperplastic polyp removed.    His follow-up CT chest  abdomen pelvis April 5 demonstrates a further reduction of the left upper lobe lung carcinoma though moderately large malignant right pleural effusion has increased slightly producing compressive atelectasis of the entire right lower lobe, there are tiny tumor nodules along the inferior aspect of the effusion that are more prominent in the expansile right eighth rib lesion is more sclerotic as is a left iliac bone lesion.  There is no evidence of new metastatic disease to chest, abdomen or pelvis otherwise present.  Fortunately the patient when seen April 8 is feeling reasonably good except for slowly progressive pain in the right chest that is now requiring more frequent narcotics as well as anti-inflammatories for control.  He has further pleural fluid recurrence that also needs to be addressed and we have discussed a thoracic surgery consultation, presentation at thoracic conference, to determine the best treatment plan-pleurodesis versus Pleurx catheter placement.             Past Medical History, Past Surgical History, Social History, Family History have been reviewed and are without significant changes except as mentioned.    Review of Systems   Constitutional: Positive for fatigue. Negative for unexpected weight change.   HENT: Negative.    Eyes: Negative.    Respiratory: Negative for cough, chest tightness, shortness of breath (Very slowly progressive, not acute) and wheezing.         No hemoptysis   Cardiovascular: Negative for leg swelling.   Gastrointestinal: Positive for constipation and diarrhea. Negative for abdominal pain, nausea and vomiting.   Endocrine: Negative.    Genitourinary: Negative.  Negative for testicular pain.   Musculoskeletal: Positive for arthralgias (Primarily right chest, requiring increased pain medication).   Skin: Negative.  Negative for rash.   Allergic/Immunologic: Negative.    Neurological: Positive for weakness.   Psychiatric/Behavioral: Positive for sleep disturbance.   All  "other systems reviewed and are negative.         Medications:  The current medication list was reviewed in the EMR    ALLERGIES:  No Known Allergies    Objective      Vitals:    04/08/21 0754   BP: 151/72   Pulse: 87   Resp: 16   Temp: 98 °F (36.7 °C)   TempSrc: Temporal   SpO2: 91%   Weight: 75.8 kg (167 lb)   Height: 180.3 cm (70.98\")   PainSc:   1   PainLoc: Rib Cage  Comment: right     Current Status 4/8/2021   ECOG score 0       Physical Exam    Constitutional: He is oriented to person, place, and time. He appears well-developed and well-nourished.   HENT:   Head: Normocephalic.   Eyes: Conjunctivae and EOM are normal. Pupils are equal, round, and reactive to light. No scleral icterus.   Neck: Normal range of motion. Neck supple. No JVD present. No thyromegaly present.   Cardiovascular:  Normal rate, regular rhythm.  present. Exam reveals no gallop and no friction rub.   No murmur heard.  Pulmonary/Chest:He has no rales.  Decreased breath sounds to upper lung fields on the right, dullness to percussion.  The patient, again, no longer has a palpable mass approximately right seventh eighth rib laterally.  Tenderness elicited along the right sixth and seventh ribs laterally.  Abdominal: Soft. He exhibits no distension and no mass. There is no tenderness.  He has bilateral hernias, apparently direct, right greater than left  Musculoskeletal: Normal range of motion. He exhibits trace edema bilateral lower extremities, status post surgery per left knee associated swelling limitation of motion.  Lymphadenopathy: No current lymphadenopathy      Skin: Rash resolved                                              Neurological: He is alert and oriented to person, place, and time. He has normal reflexes. No cranial nerve deficit.   Skin: Skin is warm and dry.                         Psychiatric: He has a normal mood and affect. His behavior is normal. Judgment normal    RECENT LABS:  Hematology     WBC   Date Value Ref Range " Status   04/08/2021 7.74 3.40 - 10.80 10*3/mm3 Final     RBC   Date Value Ref Range Status   04/08/2021 4.59 4.14 - 5.80 10*6/mm3 Final     Hemoglobin   Date Value Ref Range Status   04/08/2021 12.0 (L) 13.0 - 17.7 g/dL Final     Hematocrit   Date Value Ref Range Status   04/08/2021 40.1 37.5 - 51.0 % Final     Platelets   Date Value Ref Range Status   04/08/2021 231 140 - 450 10*3/mm3 Final             CT SCANS OF THE CHEST AND ABDOMEN AND PELVIS WITH ORAL AND INTRAVENOUS  CONTRAST April 5, 2021  FINDINGS: The patient's primary lung neoplasm presented as a small  approximately 1.5 x 1.9 cm diameter mass in the posterior aspect of the  left upper lobe. This has essentially resolved. Only a thin linear band  of minimal increased density persists in the previous location of the  mass. This has diminished further in size since the most recent CT on  01/06/2021. Currently, no discrete pulmonary nodules are evident. There  is a moderately large right pleural effusion that appears to have  increased in size slightly. Multiple small tumor nodules are identified  within the effusion studding the right hemidiaphragm that have increased  in overall number slightly in the interval. The effusion now has a  component extending into the superior aspect of the major fissure. The  effusion produces compressive atelectasis of most of the right lower  lobe. This appears more prominent on the previous CT, as well. A new  focal area of atelectasis is also present in the posterior aspect of the  right middle lobe in the infrahilar region. There is no mediastinal or  hilar or axillary lymphadenopathy. An expansile a metastatic lesion  producing bony destruction of the lateral aspect of the right eighth rib  is more sclerotic it appears overall unchanged in size. It measures  approximately 5.8 x 3.5 cm. A small metastatic lesion in the anterior  aspect of the T10 vertebral body appears more sclerotic.     The liver, spleen, pancreas,  kidneys, and appear within normal limits.  Previous studies have shown the left adrenal metastasis. Only minimal  residual thickening of the left adrenal gland persists without change.  There is a single borderline enlarged left common iliac chain lymph node  that is unchanged. The stomach and small and large bowel are  unremarkable. A small low metastatic lesion within the left iliac bone  appears much more sclerotic than on the previous CT scan consistent with  tumor response to chemotherapy. The previous CT scan showed a fairly  thick-walled cystic lesion in the right internal canal. This has  diminished in size significantly. Currently, only a small spherical  nodule measuring   12 mm in diameter is present where previously the mass measured up to 5  cm in diameter.     IMPRESSION:  Thin linear focus of increased density in the left upper  lobe at the site of the patient's previous small lung carcinoma that  appears even smaller and thinner than on the most recent chest CT dated  01/06/2021. No lung masses are currently identified. There is moderately  large malignant right pleural effusion that has increased in size  slightly. This is producing compressive atelectasis of nearly the entire  right lower lobe. Numerous tiny tumor nodules within the inferior aspect  of the effusion along the right hemidiaphragm appear more prominent. An  expansile metastatic lesion within the lateral aspect of the right  eighth rib appears more sclerotic consistent with tumor response to  chemotherapy. A much smaller metastatic lesion in the left iliac bone  also appears more sclerotic, as does a metastatic lesion in the anterior  aspect of the T10 vertebral body.. Mild thickening of the left adrenal  gland at the site of a previous metastatic lesion is unchanged. There is  no new evidence of metastatic disease within the chest, abdomen or  pelvis.      Assessment/Plan   1.  CLL presenting with significant lymphocytosis,  lymphadenopathy and splenomegaly.   Positive for deletion of 13 q. 14.3.  Patient status post 2 cycles of Treanda Rituxan with excellent response.  Reassessment July 3, 2019 with plans to switch Treanda to Imbruvica which began July 25, 2019, tolerated well. This continues to be the case.  We have discussed the possibility of adding Rituxan to shorten the time he is on  Imbruvica and we will continue to review this though the patient has hypogammaglobulinemia at this point and we do not wish to worsen this until we are more aware of how to manage COVID-19.  As he is assessed September 9, 2020 his CLL is under good control and he will continue Imbruvica at this point at unchanged dosing.  There were no clinical changes when the patient is reassessed and this is felt to be stable and his ibrutinib.  He is asked to continue this upon review April 8, 2021-?  Involvement per pleural fluid.        2.  Pulmonary nodules/infiltrates.  He is  status post bronchoscopy.  Is unclear at this time whether these findings are infectious or possibly related to his lymphoproliferative disorder.  His subsequent studies in late November are much improved, followed by pulmonary.  As the patient's right pleural effusion, however, is increasing assessed April 8 we will proceed with additional thoracentesis, cytology, LDH, flow cytometry, total protein and glucose.      3.  Non-small cell lung carcinoma                                       He had follow-up scans performed August 26 2020 revealing a new 1.6 cm spiculated nodule within the left upper lobe, 1.2 cm left adrenal nodule, bone windows with a soft tissue mass involving the right eighth rib measuring 3.7 x 2.6 cm.  Biopsy was consistent with adenocarcinoma CK 7+, CK 20-, lung primary suspected clinically, molecular panel not yet obtained.     A PET/CT was obtained September 23, 2020 demonstrating asymmetric uptake within the right parotid gland which is unremarkable appearance on  noncontrasted CT, SUV of 6 compared to 2.7.  There is no hilar, mediastinal or axillary adenopathy, findings of asymmetric moderate to intense FDG uptake within superior aspect the right chest wall anterior to the right first rib with an irregular hypodense lesion measuring 1.8 x 1.6 and SUV 4.9.  This had not been seen June 27, 2019.  There is an intensely FDG avid nodule within the lingula measuring 1.9 x 1.5 history of 12.4, FDG avid left adrenal nodule 1.9 x 1.5 with an issue 21.2.  The patient was seen by radiation therapy September 29 and started on radiation therapy to the right chest wall-35 Gy in 10 fractions.  Plans were also then proceed with SBRT to the solitary left upper lobe lesion pending insurance approval.  Further testing including TPS of 20% and foundation CDX with a TMB of greater than 10 mutations per megabase (28 mutations per megabase) and the indication that several immunotherapies could be useful in this patient's care.  Additional genomic findings include BRAF G469R/that trametinib could be useful and STK11/that everolimus could be useful.  *Discussion held as to how to proceed in particular using immunotherapy with Keytruda as monotherapy initially in this patient.  This would reduce his degree of myelosuppression.  Patient completed radiotherapy October 14, 2020 October 21 status post port placement the patient began Keytruda which she tolerated well and as he is seen November 11 we plan a second cycle.  At present he is scheduled for follow-up CT of the chest November 17, radiation therapy follow-up November 24.        The patient is reassessed after 2 cycles of Keytruda with enlargement of right eighth rib lesion, enlargement of spiculated left upper lobe lung mass, moderate to large right-sided pleural effusion, new left adrenal mass and enlarged retrocrural lymph node.  This is addressed with him November 2, 2020 with at least the possibility of pseudoprogression present.   Symptomatically he is improved in his right chest wall mass is slightly reduced clinically.  We have discussed proceeding with his third cycle of Keytruda, initiating Xgeva, having his left knee assessed and rescanning him after 4 cycles of Keytruda make a decision about extending his systemic therapy to include carboplatin, Alimta and Keytruda.      The patient is seen December 23, 2020 and clinically feels better except for his left knee pain.  We have discussed proceeding with his Keytruda and then reevaluating by follow-up scan.  The patient continued Xgeva December 2 and December 30.     In the interval he was seen for his left knee and was felt to be an impending fracture.  He was reviewed by orthopedic oncology and proceeded to curettage of the left femoral bone lesion and prophylactic stabilization January 7, 2021.      Additionally and wonderfully follow-up scans obtained January 6, 2021 demonstrated a marked improvement per his right eighth rib lesion, resolution of left upper lobe spiculated mass, residual large right pleural effusion, resolution of left adrenal metastasis.  This indicates that Keytruda is working well as a single agent we do plan to continue this January 13, 2021.  He has additional issues, however, with a right-sided pleural effusion that has increased and is producing symptoms.  He will require thoracentesis and will obtain both flow cytometry, cytology, LDH, total protein, glucose.  Pending his response to this the patient could be a candidate for Pleurx catheter placement.      The patient did improve and is next seen in office February 25, 2021.  Clinically his effusion is improved.  We will plan chest x-ray and rib detail films today considering symptoms and continue with his next Keytruda.  The patient required follow-up thoracentesis March 22, 2021.  As he is reassessed April 8 we do plan repeat thoracentesis which would be his third and also presentation per thoracic conference  April 15.        3.  Xgeva initiated monthly beginning December 2, 2020.  This is next due December 30, 2020.  As result of the patient's recent knee surgery will be holding this for the next several months.  This will be readded when the patient is seen back in 3 weeks after evaluation April 8, 2021.      4.  Bilateral hernia repair status post right incarcerated inguinal hernia November 23, 2020                                                                                                                                                                          5.  Iron deficiency-stable though patient has anemia secondary to recent surgery.    6.  Hypogammaglobulinemia-status post IVIG with resolution of sinusitis, current levels again reviewed    7.  Pain control now addressed with MS Contin 30 mg every 12 hours increased to every 8 hours, Norco 10/325 1 p.o. every 4 hours, continue laxative therapy with Senokot-S, MiraLAX and now lactulose.  When patient seen on April 8, 2021 we plan to add Aleve at 2 tablets alternating with his Norco every 12 hours.    8.  GI follow-up with Dr. Torres planned.    9.  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 Eliu Ochoa-who after assessment patient felt that he was at risk for pathologic fracture.  The patient was admitted January 7 undergoing curettage of the left femoral bone lesion, prophylactic stabilization with intramedullary implants.  The patient did well with this approach.  It was suggested not to use Xgeva or Zometa for a period of time as this healed.  He has now completed radiation therapy with 10 treatments left femur approximately February 10, 2021 completion.      10.  Supportive oncology-ongoing therapy for anxiety and depression.  He currently is being treated with Remeron and as needed Xanax and states he is finally sleeping more effectively.    Plan:  *Patient to continue with Keytruda today    *Thoracic conference next week    *Right-sided  thoracentesis with additional laboratory studies as described above next available    *3 weeks MD, Charlotte Butterfield

## 2021-04-07 NOTE — TELEPHONE ENCOUNTER
PT HAS BEEN SCHEDULED AFTER THE [facility] APPT, PT WILL BE IN THE BACK GETTING HIS INFUSION

## 2021-04-08 ENCOUNTER — OFFICE VISIT (OUTPATIENT)
Dept: ONCOLOGY | Facility: CLINIC | Age: 66
End: 2021-04-08

## 2021-04-08 ENCOUNTER — INFUSION (OUTPATIENT)
Dept: ONCOLOGY | Facility: HOSPITAL | Age: 66
End: 2021-04-08

## 2021-04-08 ENCOUNTER — PREP FOR SURGERY (OUTPATIENT)
Dept: ONCOLOGY | Facility: CLINIC | Age: 66
End: 2021-04-08

## 2021-04-08 ENCOUNTER — SPECIALTY PHARMACY (OUTPATIENT)
Dept: ONCOLOGY | Facility: HOSPITAL | Age: 66
End: 2021-04-08

## 2021-04-08 ENCOUNTER — TRANSCRIBE ORDERS (OUTPATIENT)
Dept: ADMINISTRATIVE | Facility: HOSPITAL | Age: 66
End: 2021-04-08

## 2021-04-08 VITALS
HEART RATE: 87 BPM | OXYGEN SATURATION: 91 % | WEIGHT: 167 LBS | DIASTOLIC BLOOD PRESSURE: 72 MMHG | HEIGHT: 71 IN | SYSTOLIC BLOOD PRESSURE: 151 MMHG | RESPIRATION RATE: 16 BRPM | TEMPERATURE: 98 F | BODY MASS INDEX: 23.38 KG/M2

## 2021-04-08 DIAGNOSIS — C34.90 LUNG CANCER METASTATIC TO BONE (HCC): ICD-10-CM

## 2021-04-08 DIAGNOSIS — C34.12 MALIGNANT NEOPLASM OF UPPER LOBE OF LEFT LUNG (HCC): ICD-10-CM

## 2021-04-08 DIAGNOSIS — C91.10 CLL (CHRONIC LYMPHOCYTIC LEUKEMIA) (HCC): ICD-10-CM

## 2021-04-08 DIAGNOSIS — C79.51 LUNG CANCER METASTATIC TO BONE (HCC): ICD-10-CM

## 2021-04-08 DIAGNOSIS — C34.90 LUNG CANCER METASTATIC TO BONE (HCC): Primary | ICD-10-CM

## 2021-04-08 DIAGNOSIS — C79.51 LUNG CANCER METASTATIC TO BONE (HCC): Primary | ICD-10-CM

## 2021-04-08 DIAGNOSIS — Z79.899 HIGH RISK MEDICATION USE: ICD-10-CM

## 2021-04-08 DIAGNOSIS — Z79.899 HIGH RISK MEDICATION USE: Primary | ICD-10-CM

## 2021-04-08 DIAGNOSIS — Z01.818 OTHER SPECIFIED PRE-OPERATIVE EXAMINATION: Primary | ICD-10-CM

## 2021-04-08 LAB
ALBUMIN SERPL-MCNC: 3.6 G/DL (ref 3.5–5.2)
ALBUMIN/GLOB SERPL: 1.6 G/DL (ref 1.1–2.4)
ALP SERPL-CCNC: 83 U/L (ref 38–116)
ALT SERPL W P-5'-P-CCNC: 7 U/L (ref 0–41)
ANION GAP SERPL CALCULATED.3IONS-SCNC: 8.8 MMOL/L (ref 5–15)
AST SERPL-CCNC: 7 U/L (ref 0–40)
BASOPHILS # BLD AUTO: 0.05 10*3/MM3 (ref 0–0.2)
BASOPHILS NFR BLD AUTO: 0.6 % (ref 0–1.5)
BILIRUB SERPL-MCNC: 0.3 MG/DL (ref 0.2–1.2)
BUN SERPL-MCNC: 16 MG/DL (ref 6–20)
BUN/CREAT SERPL: 14 (ref 7.3–30)
CALCIUM SPEC-SCNC: 8.1 MG/DL (ref 8.5–10.2)
CHLORIDE SERPL-SCNC: 106 MMOL/L (ref 98–107)
CO2 SERPL-SCNC: 26.2 MMOL/L (ref 22–29)
CREAT SERPL-MCNC: 1.14 MG/DL (ref 0.7–1.3)
DEPRECATED RDW RBC AUTO: 49.2 FL (ref 37–54)
EOSINOPHIL # BLD AUTO: 0.14 10*3/MM3 (ref 0–0.4)
EOSINOPHIL NFR BLD AUTO: 1.8 % (ref 0.3–6.2)
ERYTHROCYTE [DISTWIDTH] IN BLOOD BY AUTOMATED COUNT: 15.5 % (ref 12.3–15.4)
GFR SERPL CREATININE-BSD FRML MDRD: 64 ML/MIN/1.73
GLOBULIN UR ELPH-MCNC: 2.2 GM/DL (ref 1.8–3.5)
GLUCOSE SERPL-MCNC: 116 MG/DL (ref 74–124)
HCT VFR BLD AUTO: 40.1 % (ref 37.5–51)
HGB BLD-MCNC: 12 G/DL (ref 13–17.7)
IMM GRANULOCYTES # BLD AUTO: 0.02 10*3/MM3 (ref 0–0.05)
IMM GRANULOCYTES NFR BLD AUTO: 0.3 % (ref 0–0.5)
LYMPHOCYTES # BLD AUTO: 0.69 10*3/MM3 (ref 0.7–3.1)
LYMPHOCYTES NFR BLD AUTO: 8.9 % (ref 19.6–45.3)
MCH RBC QN AUTO: 26.1 PG (ref 26.6–33)
MCHC RBC AUTO-ENTMCNC: 29.9 G/DL (ref 31.5–35.7)
MCV RBC AUTO: 87.4 FL (ref 79–97)
MONOCYTES # BLD AUTO: 0.89 10*3/MM3 (ref 0.1–0.9)
MONOCYTES NFR BLD AUTO: 11.5 % (ref 5–12)
NEUTROPHILS NFR BLD AUTO: 5.95 10*3/MM3 (ref 1.7–7)
NEUTROPHILS NFR BLD AUTO: 76.9 % (ref 42.7–76)
NRBC BLD AUTO-RTO: 0 /100 WBC (ref 0–0.2)
PLATELET # BLD AUTO: 231 10*3/MM3 (ref 140–450)
PMV BLD AUTO: 11 FL (ref 6–12)
POTASSIUM SERPL-SCNC: 4.7 MMOL/L (ref 3.5–4.7)
PROT SERPL-MCNC: 5.8 G/DL (ref 6.3–8)
RBC # BLD AUTO: 4.59 10*6/MM3 (ref 4.14–5.8)
SODIUM SERPL-SCNC: 141 MMOL/L (ref 134–145)
T4 FREE SERPL-MCNC: 1.5 NG/DL (ref 0.93–1.7)
TSH SERPL DL<=0.05 MIU/L-ACNC: 5.63 UIU/ML (ref 0.27–4.2)
WBC # BLD AUTO: 7.74 10*3/MM3 (ref 3.4–10.8)

## 2021-04-08 PROCEDURE — 80053 COMPREHEN METABOLIC PANEL: CPT

## 2021-04-08 PROCEDURE — 96413 CHEMO IV INFUSION 1 HR: CPT

## 2021-04-08 PROCEDURE — 85025 COMPLETE CBC W/AUTO DIFF WBC: CPT

## 2021-04-08 PROCEDURE — 99215 OFFICE O/P EST HI 40 MIN: CPT | Performed by: INTERNAL MEDICINE

## 2021-04-08 PROCEDURE — 84443 ASSAY THYROID STIM HORMONE: CPT | Performed by: INTERNAL MEDICINE

## 2021-04-08 PROCEDURE — 25010000002 PEMBROLIZUMAB 100 MG/4ML SOLUTION 4 ML VIAL: Performed by: INTERNAL MEDICINE

## 2021-04-08 PROCEDURE — 84439 ASSAY OF FREE THYROXINE: CPT | Performed by: INTERNAL MEDICINE

## 2021-04-08 RX ORDER — SODIUM CHLORIDE 9 MG/ML
250 INJECTION, SOLUTION INTRAVENOUS ONCE
Status: CANCELLED | OUTPATIENT
Start: 2021-04-08

## 2021-04-08 RX ORDER — SODIUM CHLORIDE 9 MG/ML
250 INJECTION, SOLUTION INTRAVENOUS ONCE
Status: COMPLETED | OUTPATIENT
Start: 2021-04-08 | End: 2021-04-08

## 2021-04-08 RX ORDER — HYDROCODONE BITARTRATE AND ACETAMINOPHEN 10; 325 MG/1; MG/1
1 TABLET ORAL EVERY 4 HOURS PRN
Qty: 100 TABLET | Refills: 0 | Status: SHIPPED | OUTPATIENT
Start: 2021-04-08 | End: 2021-07-20 | Stop reason: SDUPTHER

## 2021-04-08 RX ADMIN — SODIUM CHLORIDE 200 MG: 9 INJECTION, SOLUTION INTRAVENOUS at 08:44

## 2021-04-08 RX ADMIN — SODIUM CHLORIDE 250 ML: 9 INJECTION, SOLUTION INTRAVENOUS at 08:44

## 2021-04-08 NOTE — PROGRESS NOTES
MTM face-to-face encounter re:adherence and side effects (Imbruvica)     Dav reports 100% adherence to Imbruciva 420 mg po daily. Patient denies side effects.  Patient denies starting any new medications. Assessed for drug interactions: No significant drug interactions notes.     Specialty Pharmacy Assessment    Ibrutinib (Imbruvica)  Dose: 420 mg  Frequency: Once a day  Indication: Chronic lymphoid leukemia (CLL)  Goals of Therapy: Palliative  Follow-Up: Yes  Dispensing pharmacy: [facility]  Refill status: Current  Dispense status: Mail  Prior Authorization status: Approved  Financial Assistance status: Approved  Side effect assessment: No/Minimal side affects  Compliance: Patient reports taking capsules or tablets whole once daily, Patient reports taking around the same time every day       Medication Adherence    Demonstrates understanding of importance of adherence: yes  Informant: patient  Reliability of informant: reliable  Estimated medication adherence level: %  Reasons for non-adherence: no problems identified       Dav had no questions or concerns for the MTM office.     Dana Da Silva, PharmD

## 2021-04-08 NOTE — NURSING NOTE
Pt given printed copy of labs.  Pt Ca+ slightly low at 8.1, encouraged to try to take a Tums 1-2 daily to give supplement of calcium.  Pt states he will try this.  Pt also had questions about xgeva today, per MD notes pt still having injection held due to recent knee surgery.  Pt told he will see Dr. Parekh at next appt and looks that it may be re-added.  But patient reminded that he needs to increase dietary calcium when able to and to add Tums daily because it will be important to during Xgeva support treatment. Pt v/u.

## 2021-04-09 NOTE — PROGRESS NOTES
04/13/21 0000   Pre-Procedure Phone Call   Procedure Time Verified Yes   Arrival Time 1115  (4/13/2021)   Procedure Location Verified Yes   Medical History Reviewed No   NPO Status Reinforced Yes   Ride and Caregiver Arranged Yes   Phone Number for Ride/Caregiver Pt will drive himself but has a back up  if needed that day.   Patient Knows to Bring Current Medications No   Bring Outside Films Requested No  (EPIC: Thoracentesis 3/22/2021 - 2200 removed.)

## 2021-04-10 ENCOUNTER — LAB (OUTPATIENT)
Dept: LAB | Facility: HOSPITAL | Age: 66
End: 2021-04-10

## 2021-04-10 DIAGNOSIS — Z01.818 OTHER SPECIFIED PRE-OPERATIVE EXAMINATION: ICD-10-CM

## 2021-04-10 LAB — SARS-COV-2 ORF1AB RESP QL NAA+PROBE: NOT DETECTED

## 2021-04-10 PROCEDURE — U0004 COV-19 TEST NON-CDC HGH THRU: HCPCS

## 2021-04-10 PROCEDURE — C9803 HOPD COVID-19 SPEC COLLECT: HCPCS

## 2021-04-10 PROCEDURE — U0005 INFEC AGEN DETEC AMPLI PROBE: HCPCS

## 2021-04-13 ENCOUNTER — HOSPITAL ENCOUNTER (OUTPATIENT)
Dept: ULTRASOUND IMAGING | Facility: HOSPITAL | Age: 66
Discharge: HOME | End: 2021-04-13
Admitting: INTERNAL MEDICINE

## 2021-04-13 VITALS
RESPIRATION RATE: 16 BRPM | OXYGEN SATURATION: 98 % | BODY MASS INDEX: 23.8 KG/M2 | SYSTOLIC BLOOD PRESSURE: 157 MMHG | WEIGHT: 170 LBS | HEIGHT: 71 IN | DIASTOLIC BLOOD PRESSURE: 81 MMHG | TEMPERATURE: 97.8 F | HEART RATE: 80 BPM

## 2021-04-13 DIAGNOSIS — C79.51 LUNG CANCER METASTATIC TO BONE (HCC): ICD-10-CM

## 2021-04-13 DIAGNOSIS — C34.90 LUNG CANCER METASTATIC TO BONE (HCC): ICD-10-CM

## 2021-04-13 LAB
GLUCOSE FLD-MCNC: 104 MG/DL
LDH FLD-CCNC: 153 U/L
PROT FLD-MCNC: 3.5 G/DL

## 2021-04-13 PROCEDURE — 83615 LACTATE (LD) (LDH) ENZYME: CPT | Performed by: INTERNAL MEDICINE

## 2021-04-13 PROCEDURE — 88342 IMHCHEM/IMCYTCHM 1ST ANTB: CPT | Performed by: INTERNAL MEDICINE

## 2021-04-13 PROCEDURE — 88341 IMHCHEM/IMCYTCHM EA ADD ANTB: CPT | Performed by: INTERNAL MEDICINE

## 2021-04-13 PROCEDURE — 88112 CYTOPATH CELL ENHANCE TECH: CPT | Performed by: INTERNAL MEDICINE

## 2021-04-13 PROCEDURE — 25010000003 LIDOCAINE 1 % SOLUTION: Performed by: RADIOLOGY

## 2021-04-13 PROCEDURE — 76942 ECHO GUIDE FOR BIOPSY: CPT

## 2021-04-13 PROCEDURE — 88305 TISSUE EXAM BY PATHOLOGIST: CPT | Performed by: INTERNAL MEDICINE

## 2021-04-13 PROCEDURE — 88184 FLOWCYTOMETRY/ TC 1 MARKER: CPT

## 2021-04-13 PROCEDURE — 82945 GLUCOSE OTHER FLUID: CPT | Performed by: INTERNAL MEDICINE

## 2021-04-13 PROCEDURE — 88300 SURGICAL PATH GROSS: CPT | Performed by: INTERNAL MEDICINE

## 2021-04-13 PROCEDURE — 88185 FLOWCYTOMETRY/TC ADD-ON: CPT

## 2021-04-13 PROCEDURE — 84157 ASSAY OF PROTEIN OTHER: CPT | Performed by: INTERNAL MEDICINE

## 2021-04-13 RX ORDER — SODIUM CHLORIDE 0.9 % (FLUSH) 0.9 %
3 SYRINGE (ML) INJECTION EVERY 12 HOURS SCHEDULED
Status: CANCELLED | OUTPATIENT
Start: 2021-04-13

## 2021-04-13 RX ORDER — LIDOCAINE HYDROCHLORIDE 10 MG/ML
10 INJECTION, SOLUTION INFILTRATION; PERINEURAL ONCE
Status: DISCONTINUED | OUTPATIENT
Start: 2021-04-13 | End: 2021-04-13

## 2021-04-13 RX ORDER — SODIUM CHLORIDE 0.9 % (FLUSH) 0.9 %
10 SYRINGE (ML) INJECTION AS NEEDED
Status: CANCELLED | OUTPATIENT
Start: 2021-04-13

## 2021-04-13 RX ORDER — LIDOCAINE HYDROCHLORIDE 10 MG/ML
10 INJECTION, SOLUTION INFILTRATION; PERINEURAL ONCE
Status: COMPLETED | OUTPATIENT
Start: 2021-04-13 | End: 2021-04-13

## 2021-04-13 RX ADMIN — LIDOCAINE HYDROCHLORIDE 10 ML: 10 INJECTION, SOLUTION INFILTRATION; PERINEURAL at 12:38

## 2021-04-13 NOTE — DISCHARGE INSTRUCTIONS
EDUCATION /DISCHARGE INSTRUCTIONS Thoracentesis:  A thoracentesis is a procedure to remove fluid or air from the space between the lung and it's lining.  It is done to relieve lung compression and respiratory distress.  A sample can also be obtained to aid diagnosis.   Medications can be administered into the space.      During the procedure:  You will be seated comfortable leaning forward onto a pillow supported by a table.  The area will be cleaned with antiseptic soap and draped with a sterile towel.  The physician will administer a local antiseptic to numb the area.  Next, the physician will insert a needle with tubing attached into the space between the lung and lining.  Fluid is removed and a sample sent to the laboratory.   When completed a pressure dressing is applied to the site.    Risks of the procedure include but are not limited to:   *  Bleeding    *  Low blood pressure *  Infection     *  Lung collapse possibly requiring insertion of a tube to re-inflate the lung.    Benefits of the procedure:  Relief of respiratory distress and facilitation of a diagnosis.  Alternatives to the procedure:  Drug therapy with diuretics to remove fluid.  Risks of diuretic drug therapy include possible dehydration and renal failure.  The benefit of drug therapy is that it can be done at home under physician supervision.  THIS EDUCATION INFORMATION WAS REVIEWED PRIOR TO THE PROCEDURE AND CONSENT. Patient initials___________________Time__________________    Post Procedure:    *  Rest today (no pushing pulling, straining or heavy lifting).   *  Slowly increase activity tomorow.    *  If you received sedation do not drive for 24 hours.   *  Keep dressing clean and dry.   *  Leave dressing on puncture site for 24 hours.    *  You may shower when dressing removed.   *  Expect some coughing as the lung re-expands.    Call your doctor if experiencing:   *  If experiencing sudden / severe shortness of breath, chest pain or if  coughing       up blood go to the nearest emergency room.   *  Signs of infection such as redness, swelling, excessive pain and / or foul       smelling drainage from the puncture site.   *  Chills or fever over 101 degrees (by mouth).   *  Change in sputum color (yellow, green, red).   *  Unrelieved pain.   *  Any new or severe symptoms.  Following the procedure:     Follow-up with the ordering physician as directed.    Continue to take other medications as directed by your physician unless    otherwise instructed.   If applicable, resume taking your blood thinners or Aspirin on __4/14/2021@1230pm___.    If you have any concerns please call the Radiology Nurses Desk at [phone].  You are the most important factor in your recovery.  Follow the above instructions carefully.

## 2021-04-13 NOTE — NURSING NOTE
Pt back from his procedure. Denies need for food or drink. Pt prefers to leave.  Dr. Beauchamp called by this nurse and he gave permission for patient to leave. VSS, denies pain, bandaid looks CDI with a small amount of blood. Pt received no medication and drove self.  Pt chose to ambulate self out to main entrance. Discharge instructions reviewed, pt verbalized understanding.

## 2021-04-13 NOTE — NURSING NOTE
Pt arrived for his rt thora.Protective goggles and mask in place with all patient interactions today

## 2021-04-14 ENCOUNTER — DOCUMENTATION (OUTPATIENT)
Dept: ONCOLOGY | Facility: CLINIC | Age: 66
End: 2021-04-14

## 2021-04-14 DIAGNOSIS — C34.12 MALIGNANT NEOPLASM OF UPPER LOBE OF LEFT LUNG (HCC): Primary | ICD-10-CM

## 2021-04-14 NOTE — PROGRESS NOTES
Patient's case discussed in thoracic conference a.m. of April 14, 2021.  He is not a candidate for decortication or pleurodesis with the concern that this would not work effectively in that the lung may not expand as needed needed.  A Pleurx catheter thought to be more effective and the patient will be scheduled to be seen by Dr. Fay Tran in follow-up consultation.  I have contacted the patient and described this process.  He states that he will see Dr. Tran is offered. MELLO

## 2021-04-22 DIAGNOSIS — J91.0 MALIGNANT PLEURAL EFFUSION: Primary | ICD-10-CM

## 2021-04-26 ENCOUNTER — OFFICE VISIT (OUTPATIENT)
Dept: SURGERY | Facility: CLINIC | Age: 66
End: 2021-04-26

## 2021-04-26 ENCOUNTER — HOSPITAL ENCOUNTER (OUTPATIENT)
Dept: GENERAL RADIOLOGY | Facility: HOSPITAL | Age: 66
Discharge: HOME | End: 2021-04-26
Admitting: THORACIC SURGERY (CARDIOTHORACIC VASCULAR SURGERY)

## 2021-04-26 VITALS
BODY MASS INDEX: 23.38 KG/M2 | WEIGHT: 167 LBS | SYSTOLIC BLOOD PRESSURE: 124 MMHG | HEIGHT: 71 IN | HEART RATE: 78 BPM | DIASTOLIC BLOOD PRESSURE: 78 MMHG | OXYGEN SATURATION: 96 %

## 2021-04-26 DIAGNOSIS — J91.0 MALIGNANT PLEURAL EFFUSION: ICD-10-CM

## 2021-04-26 DIAGNOSIS — J91.0 MALIGNANT PLEURAL EFFUSION: Primary | ICD-10-CM

## 2021-04-26 PROCEDURE — 99203 OFFICE O/P NEW LOW 30 MIN: CPT | Performed by: THORACIC SURGERY (CARDIOTHORACIC VASCULAR SURGERY)

## 2021-04-26 PROCEDURE — 71046 X-RAY EXAM CHEST 2 VIEWS: CPT

## 2021-04-26 NOTE — PROGRESS NOTES
"Chief Complaint  Right malignant pleural effusion  Subjective          Dav Freitas presents to [facility] THORAC[facility] in consultation for a pleural effusion.  History of Present Illness  Mr. Freitas is a very pleasant 65-year-old gentleman who was initially diagnosed with CLL in May 2019.  He received treatment for this and in August 2020 was found to have a lung nodule and bony lesion which was biopsied and was consistent with adenocarcinoma of the lung.  He has been receiving treatment since that time and has developed a recurrent right pleural effusion.  He states that initially in January when he underwent his first thoracentesis, he had significant relief from his shortness of breath.  On the last 2 thoracenteses he has not had much improvement in his symptoms.    He has some complaints of shortness of breath today but states his breathing is better since he underwent thoracentesis a few weeks ago.  Objective   Vital Signs:   /78 (BP Location: Right arm, Patient Position: Sitting, Cuff Size: Adult)   Pulse 78   Ht 180.3 cm (71\")   Wt 75.8 kg (167 lb)   SpO2 96%   BMI 23.29 kg/m²     Physical Exam  Vitals and nursing note reviewed.   Constitutional:       Appearance: He is well-developed.   HENT:      Head: Normocephalic and atraumatic.      Nose: Nose normal.   Eyes:      Conjunctiva/sclera: Conjunctivae normal.      Pupils: Pupils are equal, round, and reactive to light.   Pulmonary:      Effort: Pulmonary effort is normal.   Musculoskeletal:         General: Normal range of motion.      Cervical back: Normal range of motion and neck supple.   Skin:     General: Skin is warm and dry.      Capillary Refill: Capillary refill takes less than 2 seconds.   Neurological:      Mental Status: He is alert and oriented to person, place, and time.   Psychiatric:         Behavior: Behavior normal.         Thought Content: Thought content normal.         Judgment: Judgment normal.      "   Result Review :       Data reviewed: Radiologic studies :     I have independently reviewed the chest x-ray performed today which demonstrates a small right-sided pleural effusion.     Assessment and Plan      Mr. Freitas is a pleasant 65-year-old gentleman with a right malignant effusion secondary to adenocarcinoma of the lung.  Today we discussed possible treatment options for his effusion including thoracentesis, pleurodesis and Pleurx catheter placement.  I would recommend Pleurx catheter placement and have discussed this with him at length.  He does not have enough fluid at this time and we will plan to repeat a chest x-ray in 2 weeks and follow-up with a telephone visit with him to schedule his Pleurx catheter.  If he becomes symptomatic between now and his next chest x-ray, he will plan to call the office.  Diagnoses and all orders for this visit:    1. Malignant pleural effusion (Primary)      I spent 35 minutes caring for Dav on this date of service. This time includes time spent by me in the following activities:preparing for the visit, reviewing tests, obtaining and/or reviewing a separately obtained history, performing a medically appropriate examination and/or evaluation , counseling and educating the patient/family/caregiver, referring and communicating with other health care professionals  and documenting information in the medical record  Follow Up   No follow-ups on file.  Patient was given instructions and counseling regarding his condition or for health maintenance advice. Please see specific information pulled into the AVS if appropriate.

## 2021-04-29 ENCOUNTER — INFUSION (OUTPATIENT)
Dept: ONCOLOGY | Facility: HOSPITAL | Age: 66
End: 2021-04-29

## 2021-04-29 ENCOUNTER — OFFICE VISIT (OUTPATIENT)
Dept: ONCOLOGY | Facility: CLINIC | Age: 66
End: 2021-04-29

## 2021-04-29 VITALS
RESPIRATION RATE: 18 BRPM | TEMPERATURE: 97.9 F | DIASTOLIC BLOOD PRESSURE: 76 MMHG | WEIGHT: 170.2 LBS | SYSTOLIC BLOOD PRESSURE: 135 MMHG | HEART RATE: 88 BPM | OXYGEN SATURATION: 94 % | BODY MASS INDEX: 23.83 KG/M2 | HEIGHT: 71 IN

## 2021-04-29 DIAGNOSIS — C34.90 LUNG CANCER METASTATIC TO BONE (HCC): ICD-10-CM

## 2021-04-29 DIAGNOSIS — C79.51 LUNG CANCER METASTATIC TO BONE (HCC): ICD-10-CM

## 2021-04-29 DIAGNOSIS — C34.12 MALIGNANT NEOPLASM OF UPPER LOBE OF LEFT LUNG (HCC): ICD-10-CM

## 2021-04-29 DIAGNOSIS — C91.10 CLL (CHRONIC LYMPHOCYTIC LEUKEMIA) (HCC): ICD-10-CM

## 2021-04-29 DIAGNOSIS — C34.12 MALIGNANT NEOPLASM OF UPPER LOBE OF LEFT LUNG (HCC): Primary | ICD-10-CM

## 2021-04-29 DIAGNOSIS — Z79.899 HIGH RISK MEDICATION USE: Primary | ICD-10-CM

## 2021-04-29 DIAGNOSIS — Z79.899 HIGH RISK MEDICATION USE: ICD-10-CM

## 2021-04-29 LAB
ALBUMIN SERPL-MCNC: 3.6 G/DL (ref 3.5–5.2)
ALBUMIN/GLOB SERPL: 1.7 G/DL (ref 1.1–2.4)
ALP SERPL-CCNC: 91 U/L (ref 38–116)
ALT SERPL W P-5'-P-CCNC: 6 U/L (ref 0–41)
ANION GAP SERPL CALCULATED.3IONS-SCNC: 8.6 MMOL/L (ref 5–15)
AST SERPL-CCNC: 8 U/L (ref 0–40)
BASOPHILS # BLD AUTO: 0.07 10*3/MM3 (ref 0–0.2)
BASOPHILS NFR BLD AUTO: 0.9 % (ref 0–1.5)
BILIRUB SERPL-MCNC: 0.4 MG/DL (ref 0.2–1.2)
BUN SERPL-MCNC: 15 MG/DL (ref 6–20)
BUN/CREAT SERPL: 13.4 (ref 7.3–30)
CALCIUM SPEC-SCNC: 8 MG/DL (ref 8.5–10.2)
CHLORIDE SERPL-SCNC: 107 MMOL/L (ref 98–107)
CO2 SERPL-SCNC: 26.4 MMOL/L (ref 22–29)
CREAT SERPL-MCNC: 1.12 MG/DL (ref 0.7–1.3)
DEPRECATED RDW RBC AUTO: 49.7 FL (ref 37–54)
EOSINOPHIL # BLD AUTO: 0.21 10*3/MM3 (ref 0–0.4)
EOSINOPHIL NFR BLD AUTO: 2.7 % (ref 0.3–6.2)
ERYTHROCYTE [DISTWIDTH] IN BLOOD BY AUTOMATED COUNT: 15.9 % (ref 12.3–15.4)
GFR SERPL CREATININE-BSD FRML MDRD: 66 ML/MIN/1.73
GLOBULIN UR ELPH-MCNC: 2.1 GM/DL (ref 1.8–3.5)
GLUCOSE SERPL-MCNC: 137 MG/DL (ref 74–124)
HCT VFR BLD AUTO: 41.1 % (ref 37.5–51)
HGB BLD-MCNC: 12.6 G/DL (ref 13–17.7)
IMM GRANULOCYTES # BLD AUTO: 0.04 10*3/MM3 (ref 0–0.05)
IMM GRANULOCYTES NFR BLD AUTO: 0.5 % (ref 0–0.5)
LYMPHOCYTES # BLD AUTO: 0.88 10*3/MM3 (ref 0.7–3.1)
LYMPHOCYTES NFR BLD AUTO: 11.3 % (ref 19.6–45.3)
MAGNESIUM SERPL-MCNC: 2 MG/DL (ref 1.8–2.5)
MCH RBC QN AUTO: 26.8 PG (ref 26.6–33)
MCHC RBC AUTO-ENTMCNC: 30.7 G/DL (ref 31.5–35.7)
MCV RBC AUTO: 87.3 FL (ref 79–97)
MONOCYTES # BLD AUTO: 0.78 10*3/MM3 (ref 0.1–0.9)
MONOCYTES NFR BLD AUTO: 10 % (ref 5–12)
NEUTROPHILS NFR BLD AUTO: 5.79 10*3/MM3 (ref 1.7–7)
NEUTROPHILS NFR BLD AUTO: 74.6 % (ref 42.7–76)
NRBC BLD AUTO-RTO: 0 /100 WBC (ref 0–0.2)
PHOSPHATE SERPL-MCNC: 2.3 MG/DL (ref 2.5–4.5)
PLATELET # BLD AUTO: 207 10*3/MM3 (ref 140–450)
PMV BLD AUTO: 11.6 FL (ref 6–12)
POTASSIUM SERPL-SCNC: 4.3 MMOL/L (ref 3.5–4.7)
PROT SERPL-MCNC: 5.7 G/DL (ref 6.3–8)
RBC # BLD AUTO: 4.71 10*6/MM3 (ref 4.14–5.8)
SODIUM SERPL-SCNC: 142 MMOL/L (ref 134–145)
WBC # BLD AUTO: 7.77 10*3/MM3 (ref 3.4–10.8)

## 2021-04-29 PROCEDURE — 25010000002 PEMBROLIZUMAB 100 MG/4ML SOLUTION 4 ML VIAL: Performed by: INTERNAL MEDICINE

## 2021-04-29 PROCEDURE — 84100 ASSAY OF PHOSPHORUS: CPT

## 2021-04-29 PROCEDURE — 25010000002 DENOSUMAB 120 MG/1.7ML SOLUTION: Performed by: INTERNAL MEDICINE

## 2021-04-29 PROCEDURE — 80053 COMPREHEN METABOLIC PANEL: CPT

## 2021-04-29 PROCEDURE — 25010000002 HEPARIN LOCK FLUSH PER 10 UNITS: Performed by: INTERNAL MEDICINE

## 2021-04-29 PROCEDURE — 96372 THER/PROPH/DIAG INJ SC/IM: CPT

## 2021-04-29 PROCEDURE — 99214 OFFICE O/P EST MOD 30 MIN: CPT | Performed by: INTERNAL MEDICINE

## 2021-04-29 PROCEDURE — 96413 CHEMO IV INFUSION 1 HR: CPT

## 2021-04-29 PROCEDURE — 85025 COMPLETE CBC W/AUTO DIFF WBC: CPT

## 2021-04-29 PROCEDURE — 83735 ASSAY OF MAGNESIUM: CPT

## 2021-04-29 RX ORDER — SODIUM CHLORIDE 9 MG/ML
250 INJECTION, SOLUTION INTRAVENOUS ONCE
Status: CANCELLED | OUTPATIENT
Start: 2021-04-29

## 2021-04-29 RX ORDER — HEPARIN SODIUM (PORCINE) LOCK FLUSH IV SOLN 100 UNIT/ML 100 UNIT/ML
500 SOLUTION INTRAVENOUS AS NEEDED
Status: CANCELLED | OUTPATIENT
Start: 2021-04-29

## 2021-04-29 RX ORDER — SODIUM CHLORIDE 9 MG/ML
250 INJECTION, SOLUTION INTRAVENOUS ONCE
Status: COMPLETED | OUTPATIENT
Start: 2021-04-29 | End: 2021-04-29

## 2021-04-29 RX ORDER — HEPARIN SODIUM (PORCINE) LOCK FLUSH IV SOLN 100 UNIT/ML 100 UNIT/ML
500 SOLUTION INTRAVENOUS AS NEEDED
Status: DISCONTINUED | OUTPATIENT
Start: 2021-04-29 | End: 2021-04-29 | Stop reason: HOSPADM

## 2021-04-29 RX ORDER — SODIUM CHLORIDE 0.9 % (FLUSH) 0.9 %
10 SYRINGE (ML) INJECTION AS NEEDED
Status: CANCELLED | OUTPATIENT
Start: 2021-04-29

## 2021-04-29 RX ADMIN — SODIUM CHLORIDE 200 MG: 9 INJECTION, SOLUTION INTRAVENOUS at 09:26

## 2021-04-29 RX ADMIN — DENOSUMAB 120 MG: 120 INJECTION SUBCUTANEOUS at 09:23

## 2021-04-29 RX ADMIN — SODIUM CHLORIDE 250 ML: 9 INJECTION, SOLUTION INTRAVENOUS at 09:23

## 2021-04-29 RX ADMIN — Medication 500 UNITS: at 09:58

## 2021-04-29 NOTE — NURSING NOTE
Pt is due for Xgeva today and his calcium is 8.0. Pt admits he hasn't been taking his Tums supplements as he was directed to do. Reviewed with Dr Parekh who said pt may receive Xgeva but must resume his Tums BID starting now. Pt made aware and v/u.

## 2021-04-29 NOTE — PROGRESS NOTES
Subjective  Patient feels that he is doing relatively well including lack of progressive respiratory symptoms      REASON FOR FOLLOW UP:  1.  Chronic lymphocytic leukemia with extensive lymphadenopathy and possible pleural involvement.  2.  Initiation of Treanda, Rituxan May 1, 2019 IVIG also utilized                                                              3.  Significant response on scans June 27, 2019, alternative therapy with Imbruvica planned, initiated July 25, 2019  4.  Patient seen February 12, 2020, continued control of CLL, discussed Rituxan, Imbruvica, sleep study and potential surgical assessment for hernia  5.  CT of chest per pulmonary August 26 with 1.6 cm spiculated nodule in the left upper lobe, 1.2 cm left adrenal nodule not present on comparison study, bone windows with soft tissue mass involving the right eighth rib measuring 3.7 x 2.6, biopsy positive for adenocarcinoma  6.  Patient assessed September 30, plans for palliative radiation therapy, port placement, Keytruda considering PDL 1 positivity and high TMB status  7.  Patient reviewed October 21, 2020, Keytruda initiated, pain much improved status post radiation therapy  8.  Status post bilateral inguinal hernia repairs November 23, 2020.  Evidence of progression of disease versus pseudoprogression, continued knee pain   9.  Patient seen in office December 2, 2020, continued Keytruda for total of 4 cycles, institution of Xgeva, reassessment per left knee pain  10.  Patient assessed by orthopedic oncology status post prophylactic stabilization of left femur January 7, 2021.  11 patient reviewed January 12, 2021 with clear evidence of improvement on Keytruda.  Plan to continue same at present.  Increasing shortness of breath thought secondary pleural effusion, plan thoracentesis-cytology, flow cytometry and chemistries,?  Pleurx catheter  12.  Patient status post thoracentesis with improvement per shortness of breath, exudative, negative  cytology, continued chest pain February 25, 2021, follow-up chest x-ray with rib details.  13.  Patient reviewed April 8, 2021, consideration of follow-up thoracentesis despite general improvement on scans, potential pleurodesis versus Pleurx catheter.  14.  Patient seen by thoracic surgery 4/26 for Pleurx catheter to be scheduled as fluid recurs.        HISTORY OF PRESENT ILLNESS:   The patient is a  65 y.o. Male  who was admitted on 4/28/2019 with worsening complaints of cough wheezing and shortness of breath.  He had been to an urgent care center and was started on antibiotics and received a shot of Solu-Medrol.  His symptoms did not improve and on his admission he was noted to have profound lymphocytosis with a total white count of 70,074% lymphocytes on his white cell differential.      He also underwent CT scan of the chest that showed infiltrates and nodules in the lungs as well as significant lymphadenopathy within the chest and in the axillary regions.  He had peripheral blood sent for flow cytometry leukemia lymphoma panel but this is still pending at time of this dictation.           He underwent bronchoscopy on 4/29/2019.  Results from this procedure are pendingl.  His respiratory viral panel was negative and markers of sepsis were unremarkable.      His peripheral blood flow cytometry and flow cytometry from the EBUS needle biopsy at bronchoscopy are both consistent with chronic lymphocytic leukemia/small lymphocytic lymphoma.     The patient underwent a CT of abdomen and pelvis April 30 demonstrating extensive splenomegaly and bulky lymphadenopathy throughout the abdomen and pelvis.  There is a tiny lesion at the superior aspect lateral hepatic segment and 2 hyperenhancing foci within the liver thought to be hemangiomas, moderate size right inguinal hernia containing a long segment of small bowel without obstruction or incarceration.  CT of soft tissue again reveals extensive cervical adenopathy as  well as evidence of pansinusitis.  The patient's case was discussed with Dr. Church and the patient has stage III-IV disease should be treated during this hospitalization.  I met with the patient and discussed in detail the use of Treanda/ Rituxan which we initiated Treanda Rituxan beginning May 1, 2019.     When he is seen May 2 he is already beginning to respond with reducing adenopathy and improved symptoms.  Plan to proceed with day #2.  We have also reviewed his findings including IgA 14, IgG of 263, IgM of 5 and a kappa/lambda ratio of 26.38.  He is hypogammaglobulinemic and replacement therapy will be given this hospitalization.  ENT assessment will also be requested.     The patient is again seen May 3, 2019, continuing to improve overall.  We did proceed with IVIG 400 mg/kg and had the patient seen by ENT after which the patient was discharged.  He indicates that Dr. Hamlin is going to see him back within the week as he is now seen back in our office May 16 and that surgery may be necessary.  The patient is also still having sinus symptoms.  Further he has developed a more extensive rash involving his abdomen chest and back and previously seen in hospital?.  Otherwise he feels well except for fatigue without fever, chills, nausea, vomiting, weight loss, stable appetite.     The patient went on to take 2 cycles of Treanda, Rituxan with a second cycle given June 6 and 7.  Additional assessments included his dermatologist who felt he had a gradually improving dermatitis and would not require an therapy and ENT indicating that the patient was slowly improving per sinus symptoms the surgery may be necessary at a later point.  When he is reviewed May 30 he was neutropenic and we proceeded with IVIG second treatment on Elenita 10.  He underwent repeat scans June 27 demonstrating a significant reduction in adenopathy in the neck chest abdomen pelvis with index nodes in the neck previously 2 cm x 1.1 cm, chest with  subcarinal lymph node conglomerate measuring 2.1 x 0.9 previously 5.8 x 2.7 and previously seen irregular pulmonary consolidation throughout bilateral lungs having nearly completely resolved.  Spleen is also reduced in size at 4.5 cm previously 18.6 cm and mesenteric nodes had similar reduced in size.  The patient's immunoglobulins were assessed June 27 with an IgG of 667, IgA of 20, IgM of 9 and IgE of 3.Additional information includes FISH analysis for CLL which is positive for deletion of 13 q. 14.3 It should be noted that such finding on FISH analysis generally suggest a favorable prognostic finding.  The patient is next seen July 3, 2019 feeling improved overall but still having a dermatologic issue with pruritus, erythema worsening particularly in the lower abdomen and upper groin.  Again his scans are considerably improved and we discussed, on the basis of the above information, changing his therapy now to Imbruvica.  The patient will return for teaching per Imbruvica July 11 the patient initiated treatment by July 25th 2019.  He was seen July 31 tolerating this well.  He was able to discontinue allopurinol and ferrous sulfate thereafter presents back August 28 having taken the medication over the last month.     He indicates that he is having no issues tolerating the medication and generally feels well except for sinus drainage.  He has had a cough and has a chest x-ray scheduled to primary care August 29, 2019.  He has had no fever, chills, sweats, rash and has gained weight.  The patient is next seen November 20, 2019 continuing to generally improve.  His performance status remains excellent and has had no additional complications thus far with the use of Imbruvica or an additional infectious complications.  He has been seen by pulmonary medicine with reticular infiltrates noted on previous CAT scan on a follow-up study scheduled in the next several days.  This may be evolution towards recovery but a  follow-up will be necessary.  Finally we have been able to obtain Imbruvica reasonably cost through foundation support.    The patient is next seen February 12, 2020 doing well without additional infectious complications and with stable findings hematologically.  We have discussed the fact that Rituxan could be added potentially to reduce the amount of time he remains on the medication but, additionally, further reduce his immunoglobulins.  Though this remains a concern he is also having difficulty with sleep-?  Sleep apnea, and worsening right direct inguinal hernia.    Plans for the patient to continue Imbruvica at dosing rechecking his immunoglobulins April 29 now with IVIG down to 371, IgM of 11, IgA level 32, kappa/lambda ratio of 1.51.  Fortunately continues to feel well without additional symptoms though is concerned about easy bruisability and periodic muscle tenderness after activity.  We have discussed his sleep assessment and he very likely has sleep apnea but does not wish to start CPAP at this point and is using oral medications-trazodone-to modest effect.    The patient is followed by pulmonary medicine.He was seen September 2 having had right-sided rib pain for 1 month, shortness of breath on going up stairs or uphill.  Follow-up chest CT revealed left upper lobe nodule 1.6 cm that was noted spiculated, additionally lesion per right rib with a soft tissue mass (3.7 x 2.6 cm) was recognized in the tissue biopsy was obtained.  This is now returned as positive for moderately differentiated adenocarcinoma.  This is CK7 positive, CK20 negative with a lung primary suspected clinically.  A molecular panel has not yet been obtained.  We have now, however, sent for foundation CDX analysis.    The patient is seen September 2020 with considerable right chest wall pain only modestly controlled with Toradol and ibuprofen.  He also has been taking additional Xanax to reduce the muscle spasm that he is experiencing.   I have advised him of the findings and their significance as being consistent with metastatic non-small cell lung cancer.  He is dismayed by the conversation but wishes to have pain control as quickly as possible as well as a cady discussion of treatment options.    The patient was provided additional anti-pain and antianxiety medications.  A PET/CT was obtained September 23 demonstrating asymmetric uptake within the right parotid gland which is unremarkable appearance on noncontrasted CT, SUV of 6 compared to 2.7.  There is no hilar, mediastinal or axillary adenopathy, findings of asymmetric moderate to intense FDG uptake within superior aspect the right chest wall anterior to the right first rib with an irregular hypodense lesion measuring 1.8 x 1.6 and SUV 4.9.  This had not been seen June 27, 2019.  There is an intensely FDG avid nodule within the lingula measuring 1.9 x 1.5 history of 12.4, FDG avid left adrenal nodule 1.9 x 1.5 with an issue 21.2.  The patient was seen by radiation therapy September 29 and started on radiation therapy to the right chest wall-35 Gy in 10 fractions.  Plans were also then proceed with SBRT to the solitary left upper lobe lesion pending insurance approval.  Further testing including TPS of 20% and foundation CDX with a TMB of greater than 10 mutations per megabase (28 mutations per megabase) and the indication that several immunotherapies could be useful in this patient's care.  Additional genomic findings include BRAF G469R/that trametinib could be useful and STK11/that everolimus could be useful.      The patient is seen back September 30, 2020 indicating that his pain has improved substantially with his current pain medications.  He also continues laxatives on a regular basis.  Radiation therapy will begin October 1, 2020 as described above and we have discussed on the basis of the findings per his genomic testing that he is a candidate for a number of additional treatment  approaches.  Considering he does not have significant organ involvement and that he has a positive PDL 1 at 20% and a TMB 28 mutations per megabase it would seem reasonable (particularly with his history of CLL) to try to use immunotherapy as monotherapy initially.  This might provide control and allow us not to affect his hematologic illness in any significant way.  We discussed that he will need a PowerPort placement in the near future but that we could start Keytruda shortly thereafter.  Patient was able to proceed into palliative therapy undergoing radiation therapy to the right eighth rib 3 and 50 cGy per fraction x10 between October 1 of October 14.  A PowerPort was placed October 15, 2020.        Mr. Freitas returns to the office October 21, 2020 indicating, wonderfully, that his pain has nearly resolved and he does not need to take short acting pain medications at this point.  Unfortunately he has had severe constipation we discussed his laxatives in depth as to the use, schedule and expected results as he reduces his narcotic use and moves into immunotherapy.  We have also discussed his PET/CT that does refer to an abnormality seen in the rectum that will need to be assessed by colonoscopy.  He states further that he is having lower extremity swelling beginning over the last several weeks right greater than left.  His breathing remains generally improved though he does have cough periodically and mild degree of hemoptysis.    The patient proceeded with his first Keytruda October 21, 2020 and, fortunately, had little side effects from its use thereafter.  He had noted mild hemoptysis as well as left lower extremity weakness and discomfort requiring periodic pain medication.  As he is next seen November 11, 2020 and a second cycle as planned of Keytruda his knee pain has worsened and he is currently using a knee brace, alternating heat and cold with variable results.  He has not had previous injury to the knee.   The patient is also clearly suffering in terms of his concern about his multiple ongoing issues noting that his symptoms of depression are worsening.       We made plans to continue Imbruvica, and have the patient have follow-up scans.  Unfortunately he presented with incarcerated right inguinal hernia required admission November 16, 2020 ultimately reducing.  He had subsequent mild ileus that slowly resolved been seen by GI.  Repeat scans November 17 that demonstrate rather rapid increase in the right eighth rib lesion and projecting in thoracic cavity, moderate to large right-sided pleural effusion, enlargement of spiculated left upper lobe mass now measuring 2.4 x 1.6 and a new left adrenal mass as well as enlarged retrocrural lymph node.     On November 23, 2020 he underwent da Kavin robot assisted laparoscopic bilateral inguinal hernia repairs.  His Imbruvica has been held in around the surgery but restarted November 24.     The patient is next seen back in December 2, 2020 and his multiple issues including CLL/SSL on Imbruvica, metastatic non-small cell lung cancer with lack of response using immunotherapy as primary treatment, targeted treatments such as trametinib that given with Imbruvica can accelerate hypertension, recent requirement of bilateral inguinal hernia repair, follow-up with GI with opioid-induced constipation and abnormality seen on PET/CT requiring eventual colonoscopy and worsening depression and anxiety addressed by counseling, and institution of Remeron as well as as needed Xanax.     During his hospitalization we had seen the patient with the possibility of his development of pseudoprogression having had only 2 treatments with Keytruda.  Though he has evidence of progression he request that we continue with Keytruda by itself at this point to determine if it has truly failed to control his disease.  He counters indicating that he is feeling better overall with stable appetite though has  "lost approximately 4 pounds.  In further discussion we have agreed that he will take 2 additional treatments with Keytruda and then undergo repeat scans to determine his true status and, at that point, if he has progressed we have moved to carboplatin alimta chemotherapy along with Keytruda.  There are targeted agents available though they do interact likely with his Imbruvica accelerated hypertension.  Finally we need to better understand his left knee pain since this is truly affecting his performance status.  Please note that Xgeva was initiated December 2, 2020  The patient was given Keytruda and Xgeva December 2.  A follow-up MRI of the knee revealed a bone lesion along the posterior aspect of the distal femoral diametaphysis just medial of midline representing metastatic lesion measuring 1.9 x 2.2 initiation of cortical destruction along the posterior aspect of the femur.  Tumor extends cephalad partially superficial cortex/the lesion overall measures 3.5 cm.    The patient seen in office December 23, 2020 indicating that his knee is manageable at this point as long as he \"starts walking\" his MRI, however, is concerning enough to request orthopedic oncology assessment and radiation therapy consultation as we also try to determine whether his disease is controlled with Keytruda as a single agent or whether we need to move to systemic chemotherapy along with immunotherapy.  Notably he states when seen December 23, 2020 that the swelling per his right upper chest is now resolved.  As result of above the patient was asked to be seen by orthopedic oncology-Dr. Eliu Ochoa-who after assessment patient felt that he was at risk for pathologic fracture.  The patient was admitted January 7 undergoing curettage of the left femoral bone lesion, prophylactic stabilization with intramedullary implants.  The patient did well with this approach.  It was suggested not to use Xgeva or Zometa for a period of time as this " healed.    Additionally the patient underwent repeat imaging January 6 that showed the previously large expansile destructive lesion involving the right eighth rib markedly reduced in size measuring 3.4 x 5.3 cm previously 8.2 x 9.4, no change in left iliac bone, irregular spiculated mass left upper lobe nearly completely resolved previously 2.7 x 2.7 now only 0.4 x 0.9 cm, a residual large right pleural effusion unchanged, a previous left adrenal mass measuring 2.9 x 3.3 cm has resolved, evidence of right inguinal canal surgery also noted.  We had planned, initially, to change the patient to Carboplatin, Alimta and Keytruda but now find that is not necessary with the patient responding, clearly, to Keytruda as a single agent.  Patient with increasing shortness of breath recognized January 13, follow-up thoracentesis with flow cytometry, cytology,?  Pleurx catheter.     The patient underwent ultrasound-guided right thoracentesis January 14 revealing 1500 cc removal of livier-colored fluid.  This was negative for malignant cells.Flow cytometry did reveal a CD5 positive CD10 negative monoclonal B-cell population quantitating 2% of total events.     The patient was referred to radiation therapy after his previous knee procedure and returned February 3 for 6 cycle of Keytruda.  He completed radiation therapy approximately February 10, 2021.     His is next seen February 25 for ongoing Keytruda.  He has been doing generally overall better but states he still has right-sided chest pain without that shortness of breath that was so significant previously.  He believes the thoracentesis was quite successful and we have discussed that it was an exudative effusion negative cytologically and with the population consistent with his lymphoproliferative disorder is contaminant.  The patient continued Keytruda therapy, continued radiation therapy January 28 through 10 February to the left femur and underwent repeat thoracentesis  March 22, 2021 with 2200 cc removed.  Colonoscopy was performed March 19 with hyperplastic polyp removed.    His follow-up CT chest abdomen pelvis April 5 demonstrates a further reduction of the left upper lobe lung carcinoma though moderately large malignant right pleural effusion has increased slightly producing compressive atelectasis of the entire right lower lobe, there are tiny tumor nodules along the inferior aspect of the effusion that are more prominent in the expansile right eighth rib lesion is more sclerotic as is a left iliac bone lesion.  There is no evidence of new metastatic disease to chest, abdomen or pelvis otherwise present.  Fortunately the patient when seen April 8 is feeling reasonably good except for slowly progressive pain in the right chest that is now requiring more frequent narcotics as well as anti-inflammatories for control.  He has further pleural fluid recurrence that also needs to be addressed and we have discussed a thoracic surgery consultation, presentation at thoracic conference, to determine the best treatment plan-pleurodesis versus Pleurx catheter placement.  The patient was seen by thoracic surgery 4/26/2021 after undergoing thoracentesis 4/13 with 2050 cc removed.  Follow-up chest x-ray did not reveal additional fluid and plans are pending for Pleurx catheter placement once the patient's pleural effusion recurs.     The patient is next seen back 4/29 for Keytruda and Xgeva.  He, fortunately, is not having further respiratory symptoms and is feeling as he is doing relatively well.  We have discussed his course to date.             Past Medical History, Past Surgical History, Social History, Family History have been reviewed and are without significant changes except as mentioned.    Review of Systems   Constitutional: Positive for fatigue. Negative for unexpected weight change.   HENT: Negative.    Eyes: Negative.    Respiratory: Positive for shortness of breath (Currently  "not progressive). Negative for cough, chest tightness and wheezing.         No hemoptysis   Cardiovascular: Negative for leg swelling.   Gastrointestinal: Positive for constipation and diarrhea. Negative for abdominal pain, nausea and vomiting.   Endocrine: Negative.    Genitourinary: Negative.  Negative for testicular pain.   Musculoskeletal: Positive for arthralgias (Primarily right chest, requiring increased pain medication).   Skin: Negative.  Negative for rash.   Allergic/Immunologic: Negative.    Neurological: Negative for weakness.   Psychiatric/Behavioral: Positive for sleep disturbance.   All other systems reviewed and are negative.         Medications:  The current medication list was reviewed in the EMR    ALLERGIES:  No Known Allergies    Objective      Vitals:    04/29/21 0818   BP: 135/76   Pulse: 88   Resp: 18   Temp: 97.9 °F (36.6 °C)   TempSrc: Temporal   SpO2: 94%   Weight: 77.2 kg (170 lb 3.2 oz)   Height: 180.3 cm (70.98\")   PainSc: 0-No pain     Current Status 4/29/2021   ECOG score 0       Physical Exam    Constitutional: He is oriented to person, place, and time. He appears well-developed and well-nourished.   HENT:   Head: Normocephalic.   Eyes: Conjunctivae and EOM are normal. Pupils are equal, round, and reactive to light. No scleral icterus.   Neck: Normal range of motion. Neck supple. No JVD present. No thyromegaly present.   Cardiovascular:  Normal rate, regular rhythm.  present. Exam reveals no gallop and no friction rub.   No murmur heard.  Pulmonary/Chest:He has no rales.  Decreased breath sounds to upper lung fields on the right, dullness to percussion.  The patient, again, no longer has a palpable mass approximately right seventh eighth rib laterally.  Tenderness elicited along the right sixth and seventh ribs laterally.  Abdominal: Soft. He exhibits no distension and no mass. There is no tenderness.  He has bilateral hernias, apparently direct, right greater than " left  Musculoskeletal: Normal range of motion. He exhibits trace edema bilateral lower extremities, status post surgery per left knee associated swelling limitation of motion.  Lymphadenopathy: No current lymphadenopathy      Skin: Rash resolved                                              Neurological: He is alert and oriented to person, place, and time. He has normal reflexes. No cranial nerve deficit.   Skin: Skin is warm and dry.                         Psychiatric: He has a normal mood and affect. His behavior is normal. Judgment normal    RECENT LABS:  Hematology     WBC   Date Value Ref Range Status   04/29/2021 7.77 3.40 - 10.80 10*3/mm3 Final     RBC   Date Value Ref Range Status   04/29/2021 4.71 4.14 - 5.80 10*6/mm3 Final     Hemoglobin   Date Value Ref Range Status   04/29/2021 12.6 (L) 13.0 - 17.7 g/dL Final     Hematocrit   Date Value Ref Range Status   04/29/2021 41.1 37.5 - 51.0 % Final     Platelets   Date Value Ref Range Status   04/29/2021 207 140 - 450 10*3/mm3 Final             CT SCANS OF THE CHEST AND ABDOMEN AND PELVIS WITH ORAL AND INTRAVENOUS  CONTRAST April 5, 2021  FINDINGS: The patient's primary lung neoplasm presented as a small  approximately 1.5 x 1.9 cm diameter mass in the posterior aspect of the  left upper lobe. This has essentially resolved. Only a thin linear band  of minimal increased density persists in the previous location of the  mass. This has diminished further in size since the most recent CT on  01/06/2021. Currently, no discrete pulmonary nodules are evident. There  is a moderately large right pleural effusion that appears to have  increased in size slightly. Multiple small tumor nodules are identified  within the effusion studding the right hemidiaphragm that have increased  in overall number slightly in the interval. The effusion now has a  component extending into the superior aspect of the major fissure. The  effusion produces compressive atelectasis of most of the  right lower  lobe. This appears more prominent on the previous CT, as well. A new  focal area of atelectasis is also present in the posterior aspect of the  right middle lobe in the infrahilar region. There is no mediastinal or  hilar or axillary lymphadenopathy. An expansile a metastatic lesion  producing bony destruction of the lateral aspect of the right eighth rib  is more sclerotic it appears overall unchanged in size. It measures  approximately 5.8 x 3.5 cm. A small metastatic lesion in the anterior  aspect of the T10 vertebral body appears more sclerotic.     The liver, spleen, pancreas, kidneys, and appear within normal limits.  Previous studies have shown the left adrenal metastasis. Only minimal  residual thickening of the left adrenal gland persists without change.  There is a single borderline enlarged left common iliac chain lymph node  that is unchanged. The stomach and small and large bowel are  unremarkable. A small low metastatic lesion within the left iliac bone  appears much more sclerotic than on the previous CT scan consistent with  tumor response to chemotherapy. The previous CT scan showed a fairly  thick-walled cystic lesion in the right internal canal. This has  diminished in size significantly. Currently, only a small spherical  nodule measuring   12 mm in diameter is present where previously the mass measured up to 5  cm in diameter.     IMPRESSION:  Thin linear focus of increased density in the left upper  lobe at the site of the patient's previous small lung carcinoma that  appears even smaller and thinner than on the most recent chest CT dated  01/06/2021. No lung masses are currently identified. There is moderately  large malignant right pleural effusion that has increased in size  slightly. This is producing compressive atelectasis of nearly the entire  right lower lobe. Numerous tiny tumor nodules within the inferior aspect  of the effusion along the right hemidiaphragm appear more  prominent. An  expansile metastatic lesion within the lateral aspect of the right  eighth rib appears more sclerotic consistent with tumor response to  chemotherapy. A much smaller metastatic lesion in the left iliac bone  also appears more sclerotic, as does a metastatic lesion in the anterior  aspect of the T10 vertebral body.. Mild thickening of the left adrenal  gland at the site of a previous metastatic lesion is unchanged. There is  no new evidence of metastatic disease within the chest, abdomen or  pelvis.      Assessment/Plan   1.  CLL presenting with significant lymphocytosis, lymphadenopathy and splenomegaly.   Positive for deletion of 13 q. 14.3.  Patient status post 2 cycles of Treanda Rituxan with excellent response.  Reassessment July 3, 2019 with plans to switch Treanda to Imbruvica which began July 25, 2019, tolerated well. This continues to be the case.  We have discussed the possibility of adding Rituxan to shorten the time he is on  Imbruvica and we will continue to review this though the patient has hypogammaglobulinemia at this point and we do not wish to worsen this until we are more aware of how to manage COVID-19.  As he is assessed September 9, 2020 his CLL is under good control and he will continue Imbruvica at this point at unchanged dosing.  There were no clinical changes when the patient is reassessed and this is felt to be stable and his ibrutinib.  He is asked to continue this upon review April 8, 2021-?  Involvement per pleural fluid.  CBC is reassessed 4/29/2021 remains normal.        2.  Pulmonary nodules/infiltrates.  He is  status post bronchoscopy.  Is unclear at this time whether these findings are infectious or possibly related to his lymphoproliferative disorder.  His subsequent studies in late November are much improved, followed by pulmonary.  As the patient's right pleural effusion, however, is increasing assessed April 8 we will proceed with additional thoracentesis,  cytology, LDH, flow cytometry, total protein and glucose.  The patient is required further ultrasound-guided thoracentesis 4/13/2021, he is now been reviewed by thoracic surgery 4/26/2021 for Pleurx catheter to be placed as fluid recurs.      3.  Non-small cell lung carcinoma                                       He had follow-up scans performed August 26 2020 revealing a new 1.6 cm spiculated nodule within the left upper lobe, 1.2 cm left adrenal nodule, bone windows with a soft tissue mass involving the right eighth rib measuring 3.7 x 2.6 cm.  Biopsy was consistent with adenocarcinoma CK 7+, CK 20-, lung primary suspected clinically, molecular panel not yet obtained.     A PET/CT was obtained September 23, 2020 demonstrating asymmetric uptake within the right parotid gland which is unremarkable appearance on noncontrasted CT, SUV of 6 compared to 2.7.  There is no hilar, mediastinal or axillary adenopathy, findings of asymmetric moderate to intense FDG uptake within superior aspect the right chest wall anterior to the right first rib with an irregular hypodense lesion measuring 1.8 x 1.6 and SUV 4.9.  This had not been seen June 27, 2019.  There is an intensely FDG avid nodule within the lingula measuring 1.9 x 1.5 history of 12.4, FDG avid left adrenal nodule 1.9 x 1.5 with an issue 21.2.  The patient was seen by radiation therapy September 29 and started on radiation therapy to the right chest wall-35 Gy in 10 fractions.  Plans were also then proceed with SBRT to the solitary left upper lobe lesion pending insurance approval.  Further testing including TPS of 20% and foundation CDX with a TMB of greater than 10 mutations per megabase (28 mutations per megabase) and the indication that several immunotherapies could be useful in this patient's care.  Additional genomic findings include BRAF G469R/that trametinib could be useful and STK11/that everolimus could be useful.  *Discussion held as to how to proceed in  particular using immunotherapy with Keytruda as monotherapy initially in this patient.  This would reduce his degree of myelosuppression.  Patient completed radiotherapy October 14, 2020 October 21 status post port placement the patient began Keytruda which she tolerated well and as he is seen November 11 we plan a second cycle.  At present he is scheduled for follow-up CT of the chest November 17, radiation therapy follow-up November 24.        The patient is reassessed after 2 cycles of Keytruda with enlargement of right eighth rib lesion, enlargement of spiculated left upper lobe lung mass, moderate to large right-sided pleural effusion, new left adrenal mass and enlarged retrocrural lymph node.  This is addressed with him November 2, 2020 with at least the possibility of pseudoprogression present.  Symptomatically he is improved in his right chest wall mass is slightly reduced clinically.  We have discussed proceeding with his third cycle of Keytruda, initiating Xgeva, having his left knee assessed and rescanning him after 4 cycles of Keytruda make a decision about extending his systemic therapy to include carboplatin, Alimta and Keytruda.      The patient is seen December 23, 2020 and clinically feels better except for his left knee pain.  We have discussed proceeding with his Keytruda and then reevaluating by follow-up scan.  The patient continued Xgeva December 2 and December 30.     In the interval he was seen for his left knee and was felt to be an impending fracture.  He was reviewed by orthopedic oncology and proceeded to curettage of the left femoral bone lesion and prophylactic stabilization January 7, 2021.      Additionally and wonderfully follow-up scans obtained January 6, 2021 demonstrated a marked improvement per his right eighth rib lesion, resolution of left upper lobe spiculated mass, residual large right pleural effusion, resolution of left adrenal metastasis.  This indicates that Keytruda is  working well as a single agent we do plan to continue this January 13, 2021.  He has additional issues, however, with a right-sided pleural effusion that has increased and is producing symptoms.  He will require thoracentesis and will obtain both flow cytometry, cytology, LDH, total protein, glucose.  Pending his response to this the patient could be a candidate for Pleurx catheter placement.      The patient did improve and is next seen in office February 25, 2021.  Clinically his effusion is improved.  We will plan chest x-ray and rib detail films today considering symptoms and continue with his next Keytruda.  The patient required follow-up thoracentesis March 22, 2021.  As he is reassessed April 8 we do plan repeat thoracentesis which would be his third and also presentation per thoracic conference April 15.     Thoracic conference was significant for offering of Pleurx catheter placement and the patient was seen 4/26/2021 without substantial right pleural effusion but with plans to proceed with Pleurx catheter when this effusion relapses.        3.  Xgeva initiated monthly beginning December 2, 2020.  This is next due December 30, 2020.  As result of the patient's recent knee surgery will be holding this for the next several months.  This will be readded when the patient is seen back in 3 weeks after evaluation April 8, 2021.  Patient proceeded with Xgeva 4/29/2021 to be given on a 6-week schedule-every other week Keytruda.      4.  Bilateral hernia repair status post right incarcerated inguinal hernia November 23, 2020                                                                                                                                                                          5.  Iron deficiency-stable     6.  Hypogammaglobulinemia-status post IVIG with resolution of sinusitis, current levels again reviewed    7.  Pain control now addressed with MS Contin 30 mg every 12 hours increased to every 8  hours, Norco 10/325 1 p.o. every 4 hours, continue laxative therapy with Senokot-S, MiraLAX and now lactulose.  He also frequently adds additional Aleve-2 tablets alternating with his narcotics.    8.  GI follow-up with Dr. Torres planned.    9.  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 Eliu Ochoa-who after assessment patient felt that he was at risk for pathologic fracture.  The patient was admitted January 7 undergoing curettage of the left femoral bone lesion, prophylactic stabilization with intramedullary implants.  The patient did well with this approach.  It was suggested not to use Xgeva or Zometa for a period of time as this healed.  He has now completed radiation therapy with 10 treatments left femur approximately February 10, 2021 completion.      10.  Supportive oncology-ongoing therapy for anxiety and depression.  He currently is being treated with Remeron and as needed Xanax and states he is finally sleeping more effectively.    Plan:  *Xgeva and Keytruda today  *Medications again reviewed-again current pain medication requires his MS Contin to be every 8 hours  *Return in 3 weeks for NP assessment Greer, 6 weeks MD, Keytruda and Xgeva  *Follow-up with thoracic surgery as planned for Pleurx catheter placement  *Repeat scans would not be due until July 2021

## 2021-04-30 ENCOUNTER — MEDICATION THERAPY MANAGEMENT (OUTPATIENT)
Dept: PHARMACY | Facility: HOSPITAL | Age: 66
End: 2021-04-30

## 2021-04-30 NOTE — PROGRESS NOTES
[facility] Lab Review- Imbruvica         4/29/2021  Day 1   WBC 3.40 - 10.80 10*3/mm3 7.77   Neutrophils Absolute 1.70 - 7.00 10*3/mm3 5.79   Hemoglobin 13.0 - 17.7 g/dL 12.6 (A)   Hematocrit 37.5 - 51.0 % 41.1   Platelets 140 - 450 10*3/mm3 207   Creatinine 0.70 - 1.30 mg/dL 1.12   eGFR Non African Am >60 mL/min/1.73 66   BUN 6 - 20 mg/dL 15   Sodium 134 - 145 mmol/L 142   Potassium 3.5 - 4.7 mmol/L 4.3   Glucose 74 - 124 mg/dL 137 (A)   Magnesium 1.8 - 2.5 mg/dL 2.0   Calcium 8.5 - 10.2 mg/dL 8.0 (A)   Albumin 3.50 - 5.20 g/dL 3.60   Total Protein 6.3 - 8.0 g/dL 5.7 (A)   AST (SGOT) 0 - 40 U/L 8   ALT (SGPT) 0 - 41 U/L 6   Alkaline Phosphatase 38 - 116 U/L 91   Total Bilirubin 0.2 - 1.2 mg/dL 0.4       MD dictation noted. Pharmacy will continue to follow.      Thanks,   Dana Da Silva, PharmD  4/30/2021  08:28 EDT

## 2021-05-04 RX ORDER — MIRTAZAPINE 15 MG/1
7.5 TABLET, FILM COATED ORAL NIGHTLY
Qty: 45 TABLET | Refills: 0 | Status: SHIPPED | OUTPATIENT
Start: 2021-05-04 | End: 2021-06-15 | Stop reason: SDUPTHER

## 2021-05-04 RX ORDER — MIRTAZAPINE 15 MG/1
15 TABLET, FILM COATED ORAL NIGHTLY
Qty: 30 TABLET | Refills: 2 | Status: CANCELLED | OUTPATIENT
Start: 2021-05-04

## 2021-05-04 NOTE — TELEPHONE ENCOUNTER
[facility]    Call placed to pt regarding medication request and current refill needs. Pt describes to be doing well.  Denies symptoms of depression or anxiety.  Continues to remain adherent to mirtazapine 7.5 mg nightly.  Feels this has greatly assisted with sleep.  Patient denies any additional needs at this time.  We will continue medications as written; follow-up arranged in clinic in 6 weeks.

## 2021-05-06 DIAGNOSIS — J91.0 MALIGNANT PLEURAL EFFUSION: Primary | ICD-10-CM

## 2021-05-07 ENCOUNTER — HOSPITAL ENCOUNTER (OUTPATIENT)
Dept: GENERAL RADIOLOGY | Facility: HOSPITAL | Age: 66
Discharge: HOME | End: 2021-05-07
Admitting: THORACIC SURGERY (CARDIOTHORACIC VASCULAR SURGERY)

## 2021-05-07 DIAGNOSIS — J91.0 MALIGNANT PLEURAL EFFUSION: ICD-10-CM

## 2021-05-07 PROCEDURE — 71046 X-RAY EXAM CHEST 2 VIEWS: CPT

## 2021-05-10 ENCOUNTER — OFFICE VISIT (OUTPATIENT)
Dept: SURGERY | Facility: CLINIC | Age: 66
End: 2021-05-10

## 2021-05-10 ENCOUNTER — APPOINTMENT (OUTPATIENT)
Dept: GENERAL RADIOLOGY | Facility: HOSPITAL | Age: 66
End: 2021-05-10

## 2021-05-10 DIAGNOSIS — J91.0 MALIGNANT PLEURAL EFFUSION: Primary | ICD-10-CM

## 2021-05-10 PROCEDURE — 99442 PR PHYS/QHP TELEPHONE EVALUATION 11-20 MIN: CPT | Performed by: THORACIC SURGERY (CARDIOTHORACIC VASCULAR SURGERY)

## 2021-05-12 ENCOUNTER — TELEPHONE (OUTPATIENT)
Dept: ONCOLOGY | Facility: CLINIC | Age: 66
End: 2021-05-12

## 2021-05-12 NOTE — TELEPHONE ENCOUNTER
Recd notice.  Patient's tobias has a balance of $649.68.  This tobias is to help pay for copay for Imbruvica.  We will have to wait for the balance to be zero before re-applying or securing another tobias.

## 2021-05-15 DIAGNOSIS — C34.90 LUNG CANCER METASTATIC TO BONE (HCC): ICD-10-CM

## 2021-05-15 DIAGNOSIS — C79.51 LUNG CANCER METASTATIC TO BONE (HCC): ICD-10-CM

## 2021-05-15 RX ORDER — MORPHINE SULFATE 30 MG/1
30 TABLET, FILM COATED, EXTENDED RELEASE ORAL 3 TIMES DAILY
Qty: 90 TABLET | Refills: 0 | Status: CANCELLED | OUTPATIENT
Start: 2021-05-15

## 2021-05-17 DIAGNOSIS — C34.90 LUNG CANCER METASTATIC TO BONE (HCC): ICD-10-CM

## 2021-05-17 DIAGNOSIS — C79.51 LUNG CANCER METASTATIC TO BONE (HCC): ICD-10-CM

## 2021-05-17 RX ORDER — MORPHINE SULFATE 30 MG/1
30 TABLET, FILM COATED, EXTENDED RELEASE ORAL 3 TIMES DAILY
Qty: 90 TABLET | Refills: 0 | Status: SHIPPED | OUTPATIENT
Start: 2021-05-17 | End: 2021-07-01 | Stop reason: SDUPTHER

## 2021-05-20 ENCOUNTER — OFFICE VISIT (OUTPATIENT)
Dept: ONCOLOGY | Facility: CLINIC | Age: 66
End: 2021-05-20

## 2021-05-20 ENCOUNTER — INFUSION (OUTPATIENT)
Dept: ONCOLOGY | Facility: HOSPITAL | Age: 66
End: 2021-05-20

## 2021-05-20 VITALS
HEIGHT: 71 IN | BODY MASS INDEX: 24.11 KG/M2 | SYSTOLIC BLOOD PRESSURE: 173 MMHG | WEIGHT: 172.2 LBS | TEMPERATURE: 98 F | HEART RATE: 86 BPM | OXYGEN SATURATION: 94 % | DIASTOLIC BLOOD PRESSURE: 79 MMHG | RESPIRATION RATE: 16 BRPM

## 2021-05-20 DIAGNOSIS — C79.51 LUNG CANCER METASTATIC TO BONE (HCC): ICD-10-CM

## 2021-05-20 DIAGNOSIS — Z79.899 HIGH RISK MEDICATION USE: ICD-10-CM

## 2021-05-20 DIAGNOSIS — C91.10 CLL (CHRONIC LYMPHOCYTIC LEUKEMIA) (HCC): ICD-10-CM

## 2021-05-20 DIAGNOSIS — C34.12 MALIGNANT NEOPLASM OF UPPER LOBE OF LEFT LUNG (HCC): ICD-10-CM

## 2021-05-20 DIAGNOSIS — C34.12 MALIGNANT NEOPLASM OF UPPER LOBE OF LEFT LUNG (HCC): Primary | ICD-10-CM

## 2021-05-20 DIAGNOSIS — C34.90 LUNG CANCER METASTATIC TO BONE (HCC): ICD-10-CM

## 2021-05-20 DIAGNOSIS — Z79.899 HIGH RISK MEDICATION USE: Primary | ICD-10-CM

## 2021-05-20 LAB
ALBUMIN SERPL-MCNC: 3.6 G/DL (ref 3.5–5.2)
ALBUMIN/GLOB SERPL: 1.7 G/DL (ref 1.1–2.4)
ALP SERPL-CCNC: 83 U/L (ref 38–116)
ALT SERPL W P-5'-P-CCNC: 9 U/L (ref 0–41)
ANION GAP SERPL CALCULATED.3IONS-SCNC: 7.3 MMOL/L (ref 5–15)
AST SERPL-CCNC: 11 U/L (ref 0–40)
BASOPHILS # BLD AUTO: 0.06 10*3/MM3 (ref 0–0.2)
BASOPHILS NFR BLD AUTO: 0.8 % (ref 0–1.5)
BILIRUB SERPL-MCNC: 0.3 MG/DL (ref 0.2–1.2)
BUN SERPL-MCNC: 21 MG/DL (ref 6–20)
BUN/CREAT SERPL: 17.5 (ref 7.3–30)
CALCIUM SPEC-SCNC: 8.5 MG/DL (ref 8.5–10.2)
CHLORIDE SERPL-SCNC: 106 MMOL/L (ref 98–107)
CO2 SERPL-SCNC: 26.7 MMOL/L (ref 22–29)
CREAT SERPL-MCNC: 1.2 MG/DL (ref 0.7–1.3)
DEPRECATED RDW RBC AUTO: 54.4 FL (ref 37–54)
EOSINOPHIL # BLD AUTO: 0.24 10*3/MM3 (ref 0–0.4)
EOSINOPHIL NFR BLD AUTO: 3.2 % (ref 0.3–6.2)
ERYTHROCYTE [DISTWIDTH] IN BLOOD BY AUTOMATED COUNT: 16.7 % (ref 12.3–15.4)
GFR SERPL CREATININE-BSD FRML MDRD: 61 ML/MIN/1.73
GLOBULIN UR ELPH-MCNC: 2.1 GM/DL (ref 1.8–3.5)
GLUCOSE SERPL-MCNC: 117 MG/DL (ref 74–124)
HCT VFR BLD AUTO: 39.5 % (ref 37.5–51)
HGB BLD-MCNC: 11.9 G/DL (ref 13–17.7)
IMM GRANULOCYTES # BLD AUTO: 0.03 10*3/MM3 (ref 0–0.05)
IMM GRANULOCYTES NFR BLD AUTO: 0.4 % (ref 0–0.5)
LYMPHOCYTES # BLD AUTO: 0.97 10*3/MM3 (ref 0.7–3.1)
LYMPHOCYTES NFR BLD AUTO: 12.8 % (ref 19.6–45.3)
MCH RBC QN AUTO: 26.8 PG (ref 26.6–33)
MCHC RBC AUTO-ENTMCNC: 30.1 G/DL (ref 31.5–35.7)
MCV RBC AUTO: 89 FL (ref 79–97)
MONOCYTES # BLD AUTO: 0.97 10*3/MM3 (ref 0.1–0.9)
MONOCYTES NFR BLD AUTO: 12.8 % (ref 5–12)
NEUTROPHILS NFR BLD AUTO: 5.29 10*3/MM3 (ref 1.7–7)
NEUTROPHILS NFR BLD AUTO: 70 % (ref 42.7–76)
NRBC BLD AUTO-RTO: 0 /100 WBC (ref 0–0.2)
PLATELET # BLD AUTO: 182 10*3/MM3 (ref 140–450)
PMV BLD AUTO: 12.2 FL (ref 6–12)
POTASSIUM SERPL-SCNC: 4.6 MMOL/L (ref 3.5–4.7)
PROT SERPL-MCNC: 5.7 G/DL (ref 6.3–8)
RBC # BLD AUTO: 4.44 10*6/MM3 (ref 4.14–5.8)
SODIUM SERPL-SCNC: 140 MMOL/L (ref 134–145)
T4 FREE SERPL-MCNC: 1.61 NG/DL (ref 0.93–1.7)
TSH SERPL DL<=0.05 MIU/L-ACNC: 4.56 UIU/ML (ref 0.27–4.2)
WBC # BLD AUTO: 7.56 10*3/MM3 (ref 3.4–10.8)

## 2021-05-20 PROCEDURE — 99214 OFFICE O/P EST MOD 30 MIN: CPT | Performed by: NURSE PRACTITIONER

## 2021-05-20 PROCEDURE — 25010000002 PEMBROLIZUMAB 100 MG/4ML SOLUTION 4 ML VIAL: Performed by: NURSE PRACTITIONER

## 2021-05-20 PROCEDURE — 96413 CHEMO IV INFUSION 1 HR: CPT

## 2021-05-20 PROCEDURE — 84439 ASSAY OF FREE THYROXINE: CPT | Performed by: INTERNAL MEDICINE

## 2021-05-20 PROCEDURE — 85025 COMPLETE CBC W/AUTO DIFF WBC: CPT

## 2021-05-20 PROCEDURE — 84443 ASSAY THYROID STIM HORMONE: CPT | Performed by: INTERNAL MEDICINE

## 2021-05-20 PROCEDURE — 80053 COMPREHEN METABOLIC PANEL: CPT

## 2021-05-20 RX ORDER — SODIUM CHLORIDE 9 MG/ML
250 INJECTION, SOLUTION INTRAVENOUS ONCE
Status: CANCELLED | OUTPATIENT
Start: 2021-05-20

## 2021-05-20 RX ORDER — SODIUM CHLORIDE 9 MG/ML
250 INJECTION, SOLUTION INTRAVENOUS ONCE
Status: COMPLETED | OUTPATIENT
Start: 2021-05-20 | End: 2021-05-20

## 2021-05-20 RX ADMIN — SODIUM CHLORIDE 250 ML: 9 INJECTION, SOLUTION INTRAVENOUS at 09:27

## 2021-05-20 RX ADMIN — SODIUM CHLORIDE 200 MG: 9 INJECTION, SOLUTION INTRAVENOUS at 09:27

## 2021-05-20 NOTE — PROGRESS NOTES
Subjective        REASON FOR FOLLOW UP:  1.  Chronic lymphocytic leukemia with extensive lymphadenopathy and possible pleural involvement.  2.  Initiation of Treanda, Rituxan May 1, 2019 IVIG also utilized       3.  Significant response on scans June 27, 2019, alternative therapy with Imbruvica planned, initiated July 25, 2019  4.  Patient seen February 12, 2020, continued control of CLL, discussed Rituxan, Imbruvica, sleep study and potential surgical assessment for hernia  5.  CT of chest per pulmonary August 26 with 1.6 cm spiculated nodule in the left upper lobe, 1.2 cm left adrenal nodule not present on comparison study, bone windows with soft tissue mass involving the right eighth rib measuring 3.7 x 2.6, biopsy positive for adenocarcinoma  6.  Patient assessed September 30, plans for palliative radiation therapy, port placement, Keytruda considering PDL 1 positivity and high TMB status  7.  Patient reviewed October 21, 2020, Keytruda initiated, pain much improved status post radiation therapy  8.  Status post bilateral inguinal hernia repairs November 23, 2020.  Evidence of progression of disease versus pseudoprogression, continued knee pain   9.  Patient seen in office December 2, 2020, continued Keytruda for total of 4 cycles, institution of Xgeva, reassessment per left knee pain  10.  Patient assessed by orthopedic oncology status post prophylactic stabilization of left femur January 7, 2021.  11 patient reviewed January 12, 2021 with clear evidence of improvement on Keytruda.  Plan to continue same at present.  Increasing shortness of breath thought secondary pleural effusion, plan thoracentesis-cytology, flow cytometry and chemistries,?  Pleurx catheter  12.  Patient status post thoracentesis with improvement per shortness of breath, exudative, negative cytology, continued chest pain February 25, 2021, follow-up chest x-ray with rib details.  13.  Patient reviewed April 8, 2021, consideration of  follow-up thoracentesis despite general improvement on scans, potential pleurodesis versus Pleurx catheter.  14.  Patient seen by thoracic surgery 4/26 for Pleurx catheter to be scheduled as fluid recurs.        HISTORY OF PRESENT ILLNESS:   The patient is a  65 y.o. with the above mentioned history here today for lab review and evaluation due for cycle 11 Keytruda today. He reports he is doing well.  He denies any issues today.  Reports he is breathing well.  He denies any issues with skin rash, no diarrhea.  He reports that his pain is well controlled.  He has a good appetite.    Patient also continues on Imbruvica 420 mg daily for history of CLL.  Tolerating well.    Hematologic/oncologic history:  Male  who was admitted on 4/28/2019 with worsening complaints of cough wheezing and shortness of breath.  He had been to an urgent care center and was started on antibiotics and received a shot of Solu-Medrol.  His symptoms did not improve and on his admission he was noted to have profound lymphocytosis with a total white count of 70,074% lymphocytes on his white cell differential.      He also underwent CT scan of the chest that showed infiltrates and nodules in the lungs as well as significant lymphadenopathy within the chest and in the axillary regions.  He had peripheral blood sent for flow cytometry leukemia lymphoma panel but this is still pending at time of this dictation.           He underwent bronchoscopy on 4/29/2019.  Results from this procedure are pendingl.  His respiratory viral panel was negative and markers of sepsis were unremarkable.      His peripheral blood flow cytometry and flow cytometry from the EBUS needle biopsy at bronchoscopy are both consistent with chronic lymphocytic leukemia/small lymphocytic lymphoma.     The patient underwent a CT of abdomen and pelvis April 30 demonstrating extensive splenomegaly and bulky lymphadenopathy throughout the abdomen and pelvis.  There is a tiny lesion at  the superior aspect lateral hepatic segment and 2 hyperenhancing foci within the liver thought to be hemangiomas, moderate size right inguinal hernia containing a long segment of small bowel without obstruction or incarceration.  CT of soft tissue again reveals extensive cervical adenopathy as well as evidence of pansinusitis.  The patient's case was discussed with Dr. Church and the patient has stage III-IV disease should be treated during this hospitalization.  I met with the patient and discussed in detail the use of Treanda/ Rituxan which we initiated Treanda Rituxan beginning May 1, 2019.     When he is seen May 2 he is already beginning to respond with reducing adenopathy and improved symptoms.  Plan to proceed with day #2.  We have also reviewed his findings including IgA 14, IgG of 263, IgM of 5 and a kappa/lambda ratio of 26.38.  He is hypogammaglobulinemic and replacement therapy will be given this hospitalization.  ENT assessment will also be requested.     The patient is again seen May 3, 2019, continuing to improve overall.  We did proceed with IVIG 400 mg/kg and had the patient seen by ENT after which the patient was discharged.  He indicates that Dr. Hamlin is going to see him back within the week as he is now seen back in our office May 16 and that surgery may be necessary.  The patient is also still having sinus symptoms.  Further he has developed a more extensive rash involving his abdomen chest and back and previously seen in hospital?.  Otherwise he feels well except for fatigue without fever, chills, nausea, vomiting, weight loss, stable appetite.     The patient went on to take 2 cycles of Treanda, Rituxan with a second cycle given June 6 and 7.  Additional assessments included his dermatologist who felt he had a gradually improving dermatitis and would not require an therapy and ENT indicating that the patient was slowly improving per sinus symptoms the surgery may be necessary at a later point.   When he is reviewed May 30 he was neutropenic and we proceeded with IVIG second treatment on Elenita 10.  He underwent repeat scans June 27 demonstrating a significant reduction in adenopathy in the neck chest abdomen pelvis with index nodes in the neck previously 2 cm x 1.1 cm, chest with subcarinal lymph node conglomerate measuring 2.1 x 0.9 previously 5.8 x 2.7 and previously seen irregular pulmonary consolidation throughout bilateral lungs having nearly completely resolved.  Spleen is also reduced in size at 4.5 cm previously 18.6 cm and mesenteric nodes had similar reduced in size.  The patient's immunoglobulins were assessed June 27 with an IgG of 667, IgA of 20, IgM of 9 and IgE of 3.Additional information includes FISH analysis for CLL which is positive for deletion of 13 q. 14.3 It should be noted that such finding on FISH analysis generally suggest a favorable prognostic finding.  The patient is next seen July 3, 2019 feeling improved overall but still having a dermatologic issue with pruritus, erythema worsening particularly in the lower abdomen and upper groin.  Again his scans are considerably improved and we discussed, on the basis of the above information, changing his therapy now to Imbruvica.  The patient will return for teaching per Imbruvica July 11 the patient initiated treatment by July 25th 2019.  He was seen July 31 tolerating this well.  He was able to discontinue allopurinol and ferrous sulfate thereafter presents back August 28 having taken the medication over the last month.     He indicates that he is having no issues tolerating the medication and generally feels well except for sinus drainage.  He has had a cough and has a chest x-ray scheduled to primary care August 29, 2019.  He has had no fever, chills, sweats, rash and has gained weight.  The patient is next seen November 20, 2019 continuing to generally improve.  His performance status remains excellent and has had no additional  complications thus far with the use of Imbruvica or an additional infectious complications.  He has been seen by pulmonary medicine with reticular infiltrates noted on previous CAT scan on a follow-up study scheduled in the next several days.  This may be evolution towards recovery but a follow-up will be necessary.  Finally we have been able to obtain Imbruvica reasonably cost through foundation support.    The patient is next seen February 12, 2020 doing well without additional infectious complications and with stable findings hematologically.  We have discussed the fact that Rituxan could be added potentially to reduce the amount of time he remains on the medication but, additionally, further reduce his immunoglobulins.  Though this remains a concern he is also having difficulty with sleep-?  Sleep apnea, and worsening right direct inguinal hernia.    Plans for the patient to continue Imbruvica at dosing rechecking his immunoglobulins April 29 now with IVIG down to 371, IgM of 11, IgA level 32, kappa/lambda ratio of 1.51.  Fortunately continues to feel well without additional symptoms though is concerned about easy bruisability and periodic muscle tenderness after activity.  We have discussed his sleep assessment and he very likely has sleep apnea but does not wish to start CPAP at this point and is using oral medications-trazodone-to modest effect.    The patient is followed by pulmonary medicine.He was seen September 2 having had right-sided rib pain for 1 month, shortness of breath on going up stairs or uphill.  Follow-up chest CT revealed left upper lobe nodule 1.6 cm that was noted spiculated, additionally lesion per right rib with a soft tissue mass (3.7 x 2.6 cm) was recognized in the tissue biopsy was obtained.  This is now returned as positive for moderately differentiated adenocarcinoma.  This is CK7 positive, CK20 negative with a lung primary suspected clinically.  A molecular panel has not yet been  obtained.  We have now, however, sent for foundation CDX analysis.    The patient is seen September 2020 with considerable right chest wall pain only modestly controlled with Toradol and ibuprofen.  He also has been taking additional Xanax to reduce the muscle spasm that he is experiencing.  I have advised him of the findings and their significance as being consistent with metastatic non-small cell lung cancer.  He is dismayed by the conversation but wishes to have pain control as quickly as possible as well as a cady discussion of treatment options.    The patient was provided additional anti-pain and antianxiety medications.  A PET/CT was obtained September 23 demonstrating asymmetric uptake within the right parotid gland which is unremarkable appearance on noncontrasted CT, SUV of 6 compared to 2.7.  There is no hilar, mediastinal or axillary adenopathy, findings of asymmetric moderate to intense FDG uptake within superior aspect the right chest wall anterior to the right first rib with an irregular hypodense lesion measuring 1.8 x 1.6 and SUV 4.9.  This had not been seen June 27, 2019.  There is an intensely FDG avid nodule within the lingula measuring 1.9 x 1.5 history of 12.4, FDG avid left adrenal nodule 1.9 x 1.5 with an issue 21.2.  The patient was seen by radiation therapy September 29 and started on radiation therapy to the right chest wall-35 Gy in 10 fractions.  Plans were also then proceed with SBRT to the solitary left upper lobe lesion pending insurance approval.  Further testing including TPS of 20% and foundation CDX with a TMB of greater than 10 mutations per megabase (28 mutations per megabase) and the indication that several immunotherapies could be useful in this patient's care.  Additional genomic findings include BRAF G469R/that trametinib could be useful and STK11/that everolimus could be useful.      The patient is seen back September 30, 2020 indicating that his pain has improved  substantially with his current pain medications.  He also continues laxatives on a regular basis.  Radiation therapy will begin October 1, 2020 as described above and we have discussed on the basis of the findings per his genomic testing that he is a candidate for a number of additional treatment approaches.  Considering he does not have significant organ involvement and that he has a positive PDL 1 at 20% and a TMB 28 mutations per megabase it would seem reasonable (particularly with his history of CLL) to try to use immunotherapy as monotherapy initially.  This might provide control and allow us not to affect his hematologic illness in any significant way.  We discussed that he will need a PowerPort placement in the near future but that we could start Keytruda shortly thereafter.  Patient was able to proceed into palliative therapy undergoing radiation therapy to the right eighth rib 3 and 50 cGy per fraction x10 between October 1 of October 14.  A PowerPort was placed October 15, 2020.        Mr. Freitas returns to the office October 21, 2020 indicating, wonderfully, that his pain has nearly resolved and he does not need to take short acting pain medications at this point.  Unfortunately he has had severe constipation we discussed his laxatives in depth as to the use, schedule and expected results as he reduces his narcotic use and moves into immunotherapy.  We have also discussed his PET/CT that does refer to an abnormality seen in the rectum that will need to be assessed by colonoscopy.  He states further that he is having lower extremity swelling beginning over the last several weeks right greater than left.  His breathing remains generally improved though he does have cough periodically and mild degree of hemoptysis.    The patient proceeded with his first Keytruda October 21, 2020 and, fortunately, had little side effects from its use thereafter.  He had noted mild hemoptysis as well as left lower extremity  weakness and discomfort requiring periodic pain medication.  As he is next seen November 11, 2020 and a second cycle as planned of Keytruda his knee pain has worsened and he is currently using a knee brace, alternating heat and cold with variable results.  He has not had previous injury to the knee.  The patient is also clearly suffering in terms of his concern about his multiple ongoing issues noting that his symptoms of depression are worsening.       We made plans to continue Imbruvica, and have the patient have follow-up scans.  Unfortunately he presented with incarcerated right inguinal hernia required admission November 16, 2020 ultimately reducing.  He had subsequent mild ileus that slowly resolved been seen by GI.  Repeat scans November 17 that demonstrate rather rapid increase in the right eighth rib lesion and projecting in thoracic cavity, moderate to large right-sided pleural effusion, enlargement of spiculated left upper lobe mass now measuring 2.4 x 1.6 and a new left adrenal mass as well as enlarged retrocrural lymph node.     On November 23, 2020 he underwent da Kavin robot assisted laparoscopic bilateral inguinal hernia repairs.  His Imbruvica has been held in around the surgery but restarted November 24.     The patient is next seen back in December 2, 2020 and his multiple issues including CLL/SSL on Imbruvica, metastatic non-small cell lung cancer with lack of response using immunotherapy as primary treatment, targeted treatments such as trametinib that given with Imbruvica can accelerate hypertension, recent requirement of bilateral inguinal hernia repair, follow-up with GI with opioid-induced constipation and abnormality seen on PET/CT requiring eventual colonoscopy and worsening depression and anxiety addressed by counseling, and institution of Remeron as well as as needed Xanax.     During his hospitalization we had seen the patient with the possibility of his development of pseudoprogression  "having had only 2 treatments with Keytruda.  Though he has evidence of progression he request that we continue with Keytruda by itself at this point to determine if it has truly failed to control his disease.  He counters indicating that he is feeling better overall with stable appetite though has lost approximately 4 pounds.  In further discussion we have agreed that he will take 2 additional treatments with Keytruda and then undergo repeat scans to determine his true status and, at that point, if he has progressed we have moved to carboplatin alimta chemotherapy along with Keytruda.  There are targeted agents available though they do interact likely with his Imbruvica accelerated hypertension.  Finally we need to better understand his left knee pain since this is truly affecting his performance status.  Please note that Xgeva was initiated December 2, 2020  The patient was given Keytruda and Xgeva December 2.  A follow-up MRI of the knee revealed a bone lesion along the posterior aspect of the distal femoral diametaphysis just medial of midline representing metastatic lesion measuring 1.9 x 2.2 initiation of cortical destruction along the posterior aspect of the femur.  Tumor extends cephalad partially superficial cortex/the lesion overall measures 3.5 cm.    The patient seen in office December 23, 2020 indicating that his knee is manageable at this point as long as he \"starts walking\" his MRI, however, is concerning enough to request orthopedic oncology assessment and radiation therapy consultation as we also try to determine whether his disease is controlled with Keytruda as a single agent or whether we need to move to systemic chemotherapy along with immunotherapy.  Notably he states when seen December 23, 2020 that the swelling per his right upper chest is now resolved.  As result of above the patient was asked to be seen by orthopedic oncology-Dr. Eliu Ochoa-who after assessment patient felt that he was at " risk for pathologic fracture.  The patient was admitted January 7 undergoing curettage of the left femoral bone lesion, prophylactic stabilization with intramedullary implants.  The patient did well with this approach.  It was suggested not to use Xgeva or Zometa for a period of time as this healed.    Additionally the patient underwent repeat imaging January 6 that showed the previously large expansile destructive lesion involving the right eighth rib markedly reduced in size measuring 3.4 x 5.3 cm previously 8.2 x 9.4, no change in left iliac bone, irregular spiculated mass left upper lobe nearly completely resolved previously 2.7 x 2.7 now only 0.4 x 0.9 cm, a residual large right pleural effusion unchanged, a previous left adrenal mass measuring 2.9 x 3.3 cm has resolved, evidence of right inguinal canal surgery also noted.  We had planned, initially, to change the patient to Carboplatin, Alimta and Keytruda but now find that is not necessary with the patient responding, clearly, to Keytruda as a single agent.  Patient with increasing shortness of breath recognized January 13, follow-up thoracentesis with flow cytometry, cytology,?  Pleurx catheter.     The patient underwent ultrasound-guided right thoracentesis January 14 revealing 1500 cc removal of livier-colored fluid.  This was negative for malignant cells.Flow cytometry did reveal a CD5 positive CD10 negative monoclonal B-cell population quantitating 2% of total events.     The patient was referred to radiation therapy after his previous knee procedure and returned February 3 for 6 cycle of Keytruda.  He completed radiation therapy approximately February 10, 2021.     His is next seen February 25 for ongoing Keytruda.  He has been doing generally overall better but states he still has right-sided chest pain without that shortness of breath that was so significant previously.  He believes the thoracentesis was quite successful and we have discussed that it  was an exudative effusion negative cytologically and with the population consistent with his lymphoproliferative disorder is contaminant.  The patient continued Keytruda therapy, continued radiation therapy January 28 through 10 February to the left femur and underwent repeat thoracentesis March 22, 2021 with 2200 cc removed.  Colonoscopy was performed March 19 with hyperplastic polyp removed.    His follow-up CT chest abdomen pelvis April 5 demonstrates a further reduction of the left upper lobe lung carcinoma though moderately large malignant right pleural effusion has increased slightly producing compressive atelectasis of the entire right lower lobe, there are tiny tumor nodules along the inferior aspect of the effusion that are more prominent in the expansile right eighth rib lesion is more sclerotic as is a left iliac bone lesion.  There is no evidence of new metastatic disease to chest, abdomen or pelvis otherwise present.  Fortunately the patient when seen April 8 is feeling reasonably good except for slowly progressive pain in the right chest that is now requiring more frequent narcotics as well as anti-inflammatories for control.  He has further pleural fluid recurrence that also needs to be addressed and we have discussed a thoracic surgery consultation, presentation at thoracic conference, to determine the best treatment plan-pleurodesis versus Pleurx catheter placement.  The patient was seen by thoracic surgery 4/26/2021 after undergoing thoracentesis 4/13 with 2050 cc removed.  Follow-up chest x-ray did not reveal additional fluid and plans are pending for Pleurx catheter placement once the patient's pleural effusion recurs.     The patient is next seen back 4/29 for Keytruda and Xgeva.  He, fortunately, is not having further respiratory symptoms and is feeling as he is doing relatively well.  We have discussed his course to date.      Past Medical History, Past Surgical History, Social History, Family  "History have been reviewed and are without significant changes except as mentioned.    Review of Systems   Constitutional: Positive for fatigue. Negative for activity change, appetite change, chills and fever.   HENT: Negative for mouth sores, nosebleeds and trouble swallowing.    Respiratory: Positive for shortness of breath (stable). Negative for cough.    Cardiovascular: Negative for chest pain and leg swelling.   Gastrointestinal: Negative for abdominal pain, constipation, diarrhea, nausea and vomiting.   Genitourinary: Negative for difficulty urinating.   Skin: Negative for rash.   Neurological: Negative for dizziness, weakness and numbness.   Hematological: Negative for adenopathy. Does not bruise/bleed easily.   Psychiatric/Behavioral: Negative for sleep disturbance.        Medications:  The current medication list was reviewed in the EMR    ALLERGIES:  No Known Allergies    Objective      Vitals:    05/20/21 0808   BP: 173/79   Pulse: 86   Resp: 16   Temp: 98 °F (36.7 °C)   TempSrc: Temporal   SpO2: 94%   Weight: 78.1 kg (172 lb 3.2 oz)   Height: 180.3 cm (70.98\")   PainSc: 0-No pain     Current Status 5/20/2021   ECOG score 1       Physical Exam   Constitutional: He is oriented to person, place, and time. He appears well-developed. No distress.   HENT:   Head: Normocephalic and atraumatic.   Mouth/Throat: No oropharyngeal exudate.   Eyes: Pupils are equal, round, and reactive to light.   Cardiovascular: Normal rate, regular rhythm and normal heart sounds.   No murmur heard.  Pulmonary/Chest: Effort normal. No respiratory distress. He has decreased breath sounds in the right upper field. He has no wheezes. He has no rhonchi. He has no rales.   Abdominal: Soft. Normal appearance and bowel sounds are normal. He exhibits no distension.   Musculoskeletal: Normal range of motion.   Neurological: He is alert and oriented to person, place, and time.   Skin: Skin is warm and dry. No rash noted.   Vitals reviewed.    "     RECENT LABS:  Hematology     WBC   Date Value Ref Range Status   05/20/2021 7.56 3.40 - 10.80 10*3/mm3 Final     RBC   Date Value Ref Range Status   05/20/2021 4.44 4.14 - 5.80 10*6/mm3 Final     Hemoglobin   Date Value Ref Range Status   05/20/2021 11.9 (L) 13.0 - 17.7 g/dL Final     Hematocrit   Date Value Ref Range Status   05/20/2021 39.5 37.5 - 51.0 % Final     Platelets   Date Value Ref Range Status   05/20/2021 182 140 - 450 10*3/mm3 Final             CT SCANS OF THE CHEST AND ABDOMEN AND PELVIS WITH ORAL AND INTRAVENOUS  CONTRAST April 5, 2021  FINDINGS: The patient's primary lung neoplasm presented as a small  approximately 1.5 x 1.9 cm diameter mass in the posterior aspect of the  left upper lobe. This has essentially resolved. Only a thin linear band  of minimal increased density persists in the previous location of the  mass. This has diminished further in size since the most recent CT on  01/06/2021. Currently, no discrete pulmonary nodules are evident. There  is a moderately large right pleural effusion that appears to have  increased in size slightly. Multiple small tumor nodules are identified  within the effusion studding the right hemidiaphragm that have increased  in overall number slightly in the interval. The effusion now has a  component extending into the superior aspect of the major fissure. The  effusion produces compressive atelectasis of most of the right lower  lobe. This appears more prominent on the previous CT, as well. A new  focal area of atelectasis is also present in the posterior aspect of the  right middle lobe in the infrahilar region. There is no mediastinal or  hilar or axillary lymphadenopathy. An expansile a metastatic lesion  producing bony destruction of the lateral aspect of the right eighth rib  is more sclerotic it appears overall unchanged in size. It measures  approximately 5.8 x 3.5 cm. A small metastatic lesion in the anterior  aspect of the T10 vertebral body  appears more sclerotic.     The liver, spleen, pancreas, kidneys, and appear within normal limits.  Previous studies have shown the left adrenal metastasis. Only minimal  residual thickening of the left adrenal gland persists without change.  There is a single borderline enlarged left common iliac chain lymph node  that is unchanged. The stomach and small and large bowel are  unremarkable. A small low metastatic lesion within the left iliac bone  appears much more sclerotic than on the previous CT scan consistent with  tumor response to chemotherapy. The previous CT scan showed a fairly  thick-walled cystic lesion in the right internal canal. This has  diminished in size significantly. Currently, only a small spherical  nodule measuring   12 mm in diameter is present where previously the mass measured up to 5  cm in diameter.     IMPRESSION:  Thin linear focus of increased density in the left upper  lobe at the site of the patient's previous small lung carcinoma that  appears even smaller and thinner than on the most recent chest CT dated  01/06/2021. No lung masses are currently identified. There is moderately  large malignant right pleural effusion that has increased in size  slightly. This is producing compressive atelectasis of nearly the entire  right lower lobe. Numerous tiny tumor nodules within the inferior aspect  of the effusion along the right hemidiaphragm appear more prominent. An  expansile metastatic lesion within the lateral aspect of the right  eighth rib appears more sclerotic consistent with tumor response to  chemotherapy. A much smaller metastatic lesion in the left iliac bone  also appears more sclerotic, as does a metastatic lesion in the anterior  aspect of the T10 vertebral body.. Mild thickening of the left adrenal  gland at the site of a previous metastatic lesion is unchanged. There is  no new evidence of metastatic disease within the chest, abdomen or  pelvis.      Assessment/Plan   1.   CLL presenting with significant lymphocytosis, lymphadenopathy and splenomegaly.   Positive for deletion of 13 q. 14.3.  Patient status post 2 cycles of Treanda Rituxan with excellent response.  Reassessment July 3, 2019 with plans to switch Treanda to Imbruvica which began July 25, 2019, tolerated well. This continues to be the case.  We have discussed the possibility of adding Rituxan to shorten the time he is on  Imbruvica and we will continue to review this though the patient has hypogammaglobulinemia at this point and we do not wish to worsen this until we are more aware of how to manage COVID-19.  As he is assessed September 9, 2020 his CLL is under good control and he will continue Imbruvica at this point at unchanged dosing.  There were no clinical changes when the patient is reassessed and this is felt to be stable and his ibrutinib.  He is asked to continue this upon review April 8, 2021-?  Involvement per pleural fluid.  CBC is reassessed 4/29/2021 remains normal.        2.  Pulmonary nodules/infiltrates.  He is  status post bronchoscopy.  Is unclear at this time whether these findings are infectious or possibly related to his lymphoproliferative disorder.  His subsequent studies in late November are much improved, followed by pulmonary.  As the patient's right pleural effusion, however, is increasing assessed April 8 we will proceed with additional thoracentesis, cytology, LDH, flow cytometry, total protein and glucose.  The patient is required further ultrasound-guided thoracentesis 4/13/2021, he is now been reviewed by thoracic surgery 4/26/2021 for Pleurx catheter to be placed as fluid recurs.      3.  Non-small cell lung carcinoma                                       He had follow-up scans performed August 26 2020 revealing a new 1.6 cm spiculated nodule within the left upper lobe, 1.2 cm left adrenal nodule, bone windows with a soft tissue mass involving the right eighth rib measuring 3.7 x 2.6 cm.   Biopsy was consistent with adenocarcinoma CK 7+, CK 20-, lung primary suspected clinically, molecular panel not yet obtained.     A PET/CT was obtained September 23, 2020 demonstrating asymmetric uptake within the right parotid gland which is unremarkable appearance on noncontrasted CT, SUV of 6 compared to 2.7.  There is no hilar, mediastinal or axillary adenopathy, findings of asymmetric moderate to intense FDG uptake within superior aspect the right chest wall anterior to the right first rib with an irregular hypodense lesion measuring 1.8 x 1.6 and SUV 4.9.  This had not been seen June 27, 2019.  There is an intensely FDG avid nodule within the lingula measuring 1.9 x 1.5 history of 12.4, FDG avid left adrenal nodule 1.9 x 1.5 with an issue 21.2.  The patient was seen by radiation therapy September 29 and started on radiation therapy to the right chest wall-35 Gy in 10 fractions.  Plans were also then proceed with SBRT to the solitary left upper lobe lesion pending insurance approval.  Further testing including TPS of 20% and foundation CDX with a TMB of greater than 10 mutations per megabase (28 mutations per megabase) and the indication that several immunotherapies could be useful in this patient's care.  Additional genomic findings include BRAF G469R/that trametinib could be useful and STK11/that everolimus could be useful.  *Discussion held as to how to proceed in particular using immunotherapy with Keytruda as monotherapy initially in this patient.  This would reduce his degree of myelosuppression.  Patient completed radiotherapy October 14, 2020 October 21 status post port placement the patient began Keytruda which she tolerated well and as he is seen November 11 we plan a second cycle.  At present he is scheduled for follow-up CT of the chest November 17, radiation therapy follow-up November 24.        The patient is reassessed after 2 cycles of Keytruda with enlargement of right eighth rib lesion,  enlargement of spiculated left upper lobe lung mass, moderate to large right-sided pleural effusion, new left adrenal mass and enlarged retrocrural lymph node.  This is addressed with him November 2, 2020 with at least the possibility of pseudoprogression present.  Symptomatically he is improved in his right chest wall mass is slightly reduced clinically.  We have discussed proceeding with his third cycle of Keytruda, initiating Xgeva, having his left knee assessed and rescanning him after 4 cycles of Keytruda make a decision about extending his systemic therapy to include carboplatin, Alimta and Keytruda.      The patient is seen December 23, 2020 and clinically feels better except for his left knee pain.  We have discussed proceeding with his Keytruda and then reevaluating by follow-up scan.  The patient continued Xgeva December 2 and December 30.     In the interval he was seen for his left knee and was felt to be an impending fracture.  He was reviewed by orthopedic oncology and proceeded to curettage of the left femoral bone lesion and prophylactic stabilization January 7, 2021.      Additionally and wonderfully follow-up scans obtained January 6, 2021 demonstrated a marked improvement per his right eighth rib lesion, resolution of left upper lobe spiculated mass, residual large right pleural effusion, resolution of left adrenal metastasis.  This indicates that Keytruda is working well as a single agent we do plan to continue this January 13, 2021.  He has additional issues, however, with a right-sided pleural effusion that has increased and is producing symptoms.  He will require thoracentesis and will obtain both flow cytometry, cytology, LDH, total protein, glucose.  Pending his response to this the patient could be a candidate for Pleurx catheter placement.      The patient did improve and is next seen in office February 25, 2021.  Clinically his effusion is improved.  We will plan chest x-ray and rib  detail films today considering symptoms and continue with his next Keytruda.  The patient required follow-up thoracentesis March 22, 2021.  As he is reassessed April 8 we do plan repeat thoracentesis which would be his third and also presentation per thoracic conference April 15.     Thoracic conference was significant for offering of Pleurx catheter placement and the patient was seen 4/26/2021 without substantial right pleural effusion but with plans to proceed with Pleurx catheter when this effusion relapses.  · Patient seen by Dr. Tran 5/10/21.  Chest x-ray at that time only showed a small pleural effusion.  Not enough fluid to necessitate a Pleurx catheter at this time.  Plans to reassess in about 6 weeks.        3.  Xgeva initiated monthly beginning December 2, 2020.  This is next due December 30, 2020.  As result of the patient's recent knee surgery will be holding this for the next several months.  This will be readded when the patient is seen back in 3 weeks after evaluation April 8, 2021.  Patient proceeded with Xgeva 4/29/2021 to be given on a 6-week schedule-every other week Keytruda.      4.  Bilateral hernia repair status post right incarcerated inguinal hernia November 23, 2020                                                                                                            5.  Iron deficiency-stable     6.  Hypogammaglobulinemia-status post IVIG with resolution of sinusitis, current levels again reviewed    7.  Pain control now addressed with MS Contin 30 mg every 12 hours increased to every 8 hours, Norco 10/325 1 p.o. every 4 hours, continue laxative therapy with Senokot-S, MiraLAX and now lactulose.  He also frequently adds additional Aleve-2 tablets alternating with his narcotics.    8.  GI follow-up with Dr. Torres planned.    9.  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 Eliu Ochoa-who after assessment patient felt that he was at risk for pathologic fracture.  The patient  was admitted January 7 undergoing curettage of the left femoral bone lesion, prophylactic stabilization with intramedullary implants.  The patient did well with this approach.  It was suggested not to use Xgeva or Zometa for a period of time as this healed.  He has now completed radiation therapy with 10 treatments left femur approximately February 10, 2021 completion.      10.  Supportive oncology-ongoing therapy for anxiety and depression.  He currently is being treated with Remeron and as needed Xanax and states he is finally sleeping more effectively.    PLAN:  1. Proceed with Keytruda today.  2. Continue MS Contin 30 mg every 8 hours.  3. Patient has Norco 10/325 to take if needed for breakthrough pain.  4. Continues on Imbruvica.  5. Return in 3 weeks for follow-up visit with Dr. Parekh for reevaluation prior to his next cycle of Keytruda.  He will also be due for Xgeva as well.  6. Repeat scans will be due in July 2021.        Naomi Martinez, APRN  05/20/2021

## 2021-06-02 ENCOUNTER — DOCUMENTATION (OUTPATIENT)
Dept: PHARMACY | Facility: HOSPITAL | Age: 66
End: 2021-06-02

## 2021-06-02 NOTE — PROGRESS NOTES
[facility] APPROVED  CLL DISEASE FUND  ELIGIBILITY START: 08/28/2020   ELIGIBILITY END: 08/27/2021  COVERED PRODUCT: [id number]   ASSISTANCE AMOUNT: $8,700  BIN:[id number]  PCN:[id number]  GRP:[id number]  ID:[id number]

## 2021-06-03 NOTE — PROGRESS NOTES
Subjective  Patient feels that he is doing relatively well including lack of progressive respiratory symptoms, ? Periodic muscle pain      REASON FOR FOLLOW UP:  1.  Chronic lymphocytic leukemia with extensive lymphadenopathy and possible pleural involvement.  2.  Initiation of Treanda, Rituxan May 1, 2019 IVIG also utilized                                                              3.  Significant response on scans June 27, 2019, alternative therapy with Imbruvica planned, initiated July 25, 2019  4.  Patient seen February 12, 2020, continued control of CLL, discussed Rituxan, Imbruvica, sleep study and potential surgical assessment for hernia  5.  CT of chest per pulmonary August 26 with 1.6 cm spiculated nodule in the left upper lobe, 1.2 cm left adrenal nodule not present on comparison study, bone windows with soft tissue mass involving the right eighth rib measuring 3.7 x 2.6, biopsy positive for adenocarcinoma  6.  Patient assessed September 30, plans for palliative radiation therapy, port placement, Keytruda considering PDL 1 positivity and high TMB status  7.  Patient reviewed October 21, 2020, Keytruda initiated, pain much improved status post radiation therapy  8.  Status post bilateral inguinal hernia repairs November 23, 2020.  Evidence of progression of disease versus pseudoprogression, continued knee pain   9.  Patient seen in office December 2, 2020, continued Keytruda for total of 4 cycles, institution of Xgeva, reassessment per left knee pain  10.  Patient assessed by orthopedic oncology status post prophylactic stabilization of left femur January 7, 2021.  11 patient reviewed January 12, 2021 with clear evidence of improvement on Keytruda.  Plan to continue same at present.  Increasing shortness of breath thought secondary pleural effusion, plan thoracentesis-cytology, flow cytometry and chemistries,?  Pleurx catheter  12.  Patient status post thoracentesis with improvement per shortness of  breath, exudative, negative cytology, continued chest pain February 25, 2021, follow-up chest x-ray with rib details.  13.  Patient reviewed April 8, 2021, consideration of follow-up thoracentesis despite general improvement on scans, potential pleurodesis versus Pleurx catheter.  14.  Patient seen by thoracic surgery 4/26 for Pleurx catheter to be scheduled as fluid recurs.  15.  Catheter not required, patient seen 6/10/2021 stable on current approach, Keytruda, Xgeva, Imbruvica continued with scans likely later July 2021.        HISTORY OF PRESENT ILLNESS:   The patient is a  65 y.o. Male  who was admitted on 4/28/2019 with worsening complaints of cough wheezing and shortness of breath.  He had been to an urgent care center and was started on antibiotics and received a shot of Solu-Medrol.  His symptoms did not improve and on his admission he was noted to have profound lymphocytosis with a total white count of 70,074% lymphocytes on his white cell differential.      He also underwent CT scan of the chest that showed infiltrates and nodules in the lungs as well as significant lymphadenopathy within the chest and in the axillary regions.  He had peripheral blood sent for flow cytometry leukemia lymphoma panel but this is still pending at time of this dictation.           He underwent bronchoscopy on 4/29/2019.  Results from this procedure are pendingl.  His respiratory viral panel was negative and markers of sepsis were unremarkable.      His peripheral blood flow cytometry and flow cytometry from the EBUS needle biopsy at bronchoscopy are both consistent with chronic lymphocytic leukemia/small lymphocytic lymphoma.     The patient underwent a CT of abdomen and pelvis April 30 demonstrating extensive splenomegaly and bulky lymphadenopathy throughout the abdomen and pelvis.  There is a tiny lesion at the superior aspect lateral hepatic segment and 2 hyperenhancing foci within the liver thought to be hemangiomas,  moderate size right inguinal hernia containing a long segment of small bowel without obstruction or incarceration.  CT of soft tissue again reveals extensive cervical adenopathy as well as evidence of pansinusitis.  The patient's case was discussed with Dr. Church and the patient has stage III-IV disease should be treated during this hospitalization.  I met with the patient and discussed in detail the use of Treanda/ Rituxan which we initiated Treanda Rituxan beginning May 1, 2019.     When he is seen May 2 he is already beginning to respond with reducing adenopathy and improved symptoms.  Plan to proceed with day #2.  We have also reviewed his findings including IgA 14, IgG of 263, IgM of 5 and a kappa/lambda ratio of 26.38.  He is hypogammaglobulinemic and replacement therapy will be given this hospitalization.  ENT assessment will also be requested.     The patient is again seen May 3, 2019, continuing to improve overall.  We did proceed with IVIG 400 mg/kg and had the patient seen by ENT after which the patient was discharged.  He indicates that Dr. Hamlin is going to see him back within the week as he is now seen back in our office May 16 and that surgery may be necessary.  The patient is also still having sinus symptoms.  Further he has developed a more extensive rash involving his abdomen chest and back and previously seen in hospital?.  Otherwise he feels well except for fatigue without fever, chills, nausea, vomiting, weight loss, stable appetite.     The patient went on to take 2 cycles of Treanda, Rituxan with a second cycle given June 6 and 7.  Additional assessments included his dermatologist who felt he had a gradually improving dermatitis and would not require an therapy and ENT indicating that the patient was slowly improving per sinus symptoms the surgery may be necessary at a later point.  When he is reviewed May 30 he was neutropenic and we proceeded with IVIG second treatment on Elenita 10.  He  underwent repeat scans June 27 demonstrating a significant reduction in adenopathy in the neck chest abdomen pelvis with index nodes in the neck previously 2 cm x 1.1 cm, chest with subcarinal lymph node conglomerate measuring 2.1 x 0.9 previously 5.8 x 2.7 and previously seen irregular pulmonary consolidation throughout bilateral lungs having nearly completely resolved.  Spleen is also reduced in size at 4.5 cm previously 18.6 cm and mesenteric nodes had similar reduced in size.  The patient's immunoglobulins were assessed June 27 with an IgG of 667, IgA of 20, IgM of 9 and IgE of 3.Additional information includes FISH analysis for CLL which is positive for deletion of 13 q. 14.3 It should be noted that such finding on FISH analysis generally suggest a favorable prognostic finding.  The patient is next seen July 3, 2019 feeling improved overall but still having a dermatologic issue with pruritus, erythema worsening particularly in the lower abdomen and upper groin.  Again his scans are considerably improved and we discussed, on the basis of the above information, changing his therapy now to Imbruvica.  The patient will return for teaching per Imbruvica July 11 the patient initiated treatment by July 25th 2019.  He was seen July 31 tolerating this well.  He was able to discontinue allopurinol and ferrous sulfate thereafter presents back August 28 having taken the medication over the last month.     He indicates that he is having no issues tolerating the medication and generally feels well except for sinus drainage.  He has had a cough and has a chest x-ray scheduled to primary care August 29, 2019.  He has had no fever, chills, sweats, rash and has gained weight.  The patient is next seen November 20, 2019 continuing to generally improve.  His performance status remains excellent and has had no additional complications thus far with the use of Imbruvica or an additional infectious complications.  He has been seen by  pulmonary medicine with reticular infiltrates noted on previous CAT scan on a follow-up study scheduled in the next several days.  This may be evolution towards recovery but a follow-up will be necessary.  Finally we have been able to obtain Imbruvica reasonably cost through foundation support.    The patient is next seen February 12, 2020 doing well without additional infectious complications and with stable findings hematologically.  We have discussed the fact that Rituxan could be added potentially to reduce the amount of time he remains on the medication but, additionally, further reduce his immunoglobulins.  Though this remains a concern he is also having difficulty with sleep-?  Sleep apnea, and worsening right direct inguinal hernia.    Plans for the patient to continue Imbruvica at dosing rechecking his immunoglobulins April 29 now with IVIG down to 371, IgM of 11, IgA level 32, kappa/lambda ratio of 1.51.  Fortunately continues to feel well without additional symptoms though is concerned about easy bruisability and periodic muscle tenderness after activity.  We have discussed his sleep assessment and he very likely has sleep apnea but does not wish to start CPAP at this point and is using oral medications-trazodone-to modest effect.    The patient is followed by pulmonary medicine.He was seen September 2 having had right-sided rib pain for 1 month, shortness of breath on going up stairs or uphill.  Follow-up chest CT revealed left upper lobe nodule 1.6 cm that was noted spiculated, additionally lesion per right rib with a soft tissue mass (3.7 x 2.6 cm) was recognized in the tissue biopsy was obtained.  This is now returned as positive for moderately differentiated adenocarcinoma.  This is CK7 positive, CK20 negative with a lung primary suspected clinically.  A molecular panel has not yet been obtained.  We have now, however, sent for foundation CDX analysis.    The patient is seen September 2020 with  considerable right chest wall pain only modestly controlled with Toradol and ibuprofen.  He also has been taking additional Xanax to reduce the muscle spasm that he is experiencing.  I have advised him of the findings and their significance as being consistent with metastatic non-small cell lung cancer.  He is dismayed by the conversation but wishes to have pain control as quickly as possible as well as a cady discussion of treatment options.    The patient was provided additional anti-pain and antianxiety medications.  A PET/CT was obtained September 23 demonstrating asymmetric uptake within the right parotid gland which is unremarkable appearance on noncontrasted CT, SUV of 6 compared to 2.7.  There is no hilar, mediastinal or axillary adenopathy, findings of asymmetric moderate to intense FDG uptake within superior aspect the right chest wall anterior to the right first rib with an irregular hypodense lesion measuring 1.8 x 1.6 and SUV 4.9.  This had not been seen June 27, 2019.  There is an intensely FDG avid nodule within the lingula measuring 1.9 x 1.5 history of 12.4, FDG avid left adrenal nodule 1.9 x 1.5 with an issue 21.2.  The patient was seen by radiation therapy September 29 and started on radiation therapy to the right chest wall-35 Gy in 10 fractions.  Plans were also then proceed with SBRT to the solitary left upper lobe lesion pending insurance approval.  Further testing including TPS of 20% and foundation CDX with a TMB of greater than 10 mutations per megabase (28 mutations per megabase) and the indication that several immunotherapies could be useful in this patient's care.  Additional genomic findings include BRAF G469R/that trametinib could be useful and STK11/that everolimus could be useful.      The patient is seen back September 30, 2020 indicating that his pain has improved substantially with his current pain medications.  He also continues laxatives on a regular basis.  Radiation therapy  will begin October 1, 2020 as described above and we have discussed on the basis of the findings per his genomic testing that he is a candidate for a number of additional treatment approaches.  Considering he does not have significant organ involvement and that he has a positive PDL 1 at 20% and a TMB 28 mutations per megabase it would seem reasonable (particularly with his history of CLL) to try to use immunotherapy as monotherapy initially.  This might provide control and allow us not to affect his hematologic illness in any significant way.  We discussed that he will need a PowerPort placement in the near future but that we could start Keytruda shortly thereafter.  Patient was able to proceed into palliative therapy undergoing radiation therapy to the right eighth rib 3 and 50 cGy per fraction x10 between October 1 of October 14.  A PowerPort was placed October 15, 2020.        Mr. Freitas returns to the office October 21, 2020 indicating, wonderfully, that his pain has nearly resolved and he does not need to take short acting pain medications at this point.  Unfortunately he has had severe constipation we discussed his laxatives in depth as to the use, schedule and expected results as he reduces his narcotic use and moves into immunotherapy.  We have also discussed his PET/CT that does refer to an abnormality seen in the rectum that will need to be assessed by colonoscopy.  He states further that he is having lower extremity swelling beginning over the last several weeks right greater than left.  His breathing remains generally improved though he does have cough periodically and mild degree of hemoptysis.    The patient proceeded with his first Keytruda October 21, 2020 and, fortunately, had little side effects from its use thereafter.  He had noted mild hemoptysis as well as left lower extremity weakness and discomfort requiring periodic pain medication.  As he is next seen November 11, 2020 and a second cycle as  planned of Keytruda his knee pain has worsened and he is currently using a knee brace, alternating heat and cold with variable results.  He has not had previous injury to the knee.  The patient is also clearly suffering in terms of his concern about his multiple ongoing issues noting that his symptoms of depression are worsening.       We made plans to continue Imbruvica, and have the patient have follow-up scans.  Unfortunately he presented with incarcerated right inguinal hernia required admission November 16, 2020 ultimately reducing.  He had subsequent mild ileus that slowly resolved been seen by GI.  Repeat scans November 17 that demonstrate rather rapid increase in the right eighth rib lesion and projecting in thoracic cavity, moderate to large right-sided pleural effusion, enlargement of spiculated left upper lobe mass now measuring 2.4 x 1.6 and a new left adrenal mass as well as enlarged retrocrural lymph node.     On November 23, 2020 he underwent da Kavin robot assisted laparoscopic bilateral inguinal hernia repairs.  His Imbruvica has been held in around the surgery but restarted November 24.     The patient is next seen back in December 2, 2020 and his multiple issues including CLL/SSL on Imbruvica, metastatic non-small cell lung cancer with lack of response using immunotherapy as primary treatment, targeted treatments such as trametinib that given with Imbruvica can accelerate hypertension, recent requirement of bilateral inguinal hernia repair, follow-up with GI with opioid-induced constipation and abnormality seen on PET/CT requiring eventual colonoscopy and worsening depression and anxiety addressed by counseling, and institution of Remeron as well as as needed Xanax.     During his hospitalization we had seen the patient with the possibility of his development of pseudoprogression having had only 2 treatments with Keytruda.  Though he has evidence of progression he request that we continue with  "Keytruda by itself at this point to determine if it has truly failed to control his disease.  He counters indicating that he is feeling better overall with stable appetite though has lost approximately 4 pounds.  In further discussion we have agreed that he will take 2 additional treatments with Keytruda and then undergo repeat scans to determine his true status and, at that point, if he has progressed we have moved to carboplatin alimta chemotherapy along with Keytruda.  There are targeted agents available though they do interact likely with his Imbruvica accelerated hypertension.  Finally we need to better understand his left knee pain since this is truly affecting his performance status.  Please note that Xgeva was initiated December 2, 2020  The patient was given Keytruda and Xgeva December 2.  A follow-up MRI of the knee revealed a bone lesion along the posterior aspect of the distal femoral diametaphysis just medial of midline representing metastatic lesion measuring 1.9 x 2.2 initiation of cortical destruction along the posterior aspect of the femur.  Tumor extends cephalad partially superficial cortex/the lesion overall measures 3.5 cm.    The patient seen in office December 23, 2020 indicating that his knee is manageable at this point as long as he \"starts walking\" his MRI, however, is concerning enough to request orthopedic oncology assessment and radiation therapy consultation as we also try to determine whether his disease is controlled with Keytruda as a single agent or whether we need to move to systemic chemotherapy along with immunotherapy.  Notably he states when seen December 23, 2020 that the swelling per his right upper chest is now resolved.  As result of above the patient was asked to be seen by orthopedic oncology-Dr. Eliu Ochoa-who after assessment patient felt that he was at risk for pathologic fracture.  The patient was admitted January 7 undergoing curettage of the left femoral bone " lesion, prophylactic stabilization with intramedullary implants.  The patient did well with this approach.  It was suggested not to use Xgeva or Zometa for a period of time as this healed.    Additionally the patient underwent repeat imaging January 6 that showed the previously large expansile destructive lesion involving the right eighth rib markedly reduced in size measuring 3.4 x 5.3 cm previously 8.2 x 9.4, no change in left iliac bone, irregular spiculated mass left upper lobe nearly completely resolved previously 2.7 x 2.7 now only 0.4 x 0.9 cm, a residual large right pleural effusion unchanged, a previous left adrenal mass measuring 2.9 x 3.3 cm has resolved, evidence of right inguinal canal surgery also noted.  We had planned, initially, to change the patient to Carboplatin, Alimta and Keytruda but now find that is not necessary with the patient responding, clearly, to Keytruda as a single agent.  Patient with increasing shortness of breath recognized January 13, follow-up thoracentesis with flow cytometry, cytology,?  Pleurx catheter.     The patient underwent ultrasound-guided right thoracentesis January 14 revealing 1500 cc removal of livier-colored fluid.  This was negative for malignant cells.Flow cytometry did reveal a CD5 positive CD10 negative monoclonal B-cell population quantitating 2% of total events.     The patient was referred to radiation therapy after his previous knee procedure and returned February 3 for 6 cycle of Keytruda.  He completed radiation therapy approximately February 10, 2021.     His is next seen February 25 for ongoing Keytruda.  He has been doing generally overall better but states he still has right-sided chest pain without that shortness of breath that was so significant previously.  He believes the thoracentesis was quite successful and we have discussed that it was an exudative effusion negative cytologically and with the population consistent with his lymphoproliferative  disorder is contaminant.  The patient continued Keytruda therapy, continued radiation therapy January 28 through 10 February to the left femur and underwent repeat thoracentesis March 22, 2021 with 2200 cc removed.  Colonoscopy was performed March 19 with hyperplastic polyp removed.    His follow-up CT chest abdomen pelvis April 5 demonstrates a further reduction of the left upper lobe lung carcinoma though moderately large malignant right pleural effusion has increased slightly producing compressive atelectasis of the entire right lower lobe, there are tiny tumor nodules along the inferior aspect of the effusion that are more prominent in the expansile right eighth rib lesion is more sclerotic as is a left iliac bone lesion.  There is no evidence of new metastatic disease to chest, abdomen or pelvis otherwise present.  Fortunately the patient when seen April 8 is feeling reasonably good except for slowly progressive pain in the right chest that is now requiring more frequent narcotics as well as anti-inflammatories for control.  He has further pleural fluid recurrence that also needs to be addressed and we have discussed a thoracic surgery consultation, presentation at thoracic conference, to determine the best treatment plan-pleurodesis versus Pleurx catheter placement.  The patient was seen by thoracic surgery 4/26/2021 after undergoing thoracentesis 4/13 with 2050 cc removed.  Follow-up chest x-ray did not reveal additional fluid and plans are pending for Pleurx catheter placement once the patient's pleural effusion recurs.     The patient is next seen back 4/29 for Keytruda and Xgeva.  He, fortunately, is not having further respiratory symptoms and is feeling as he is doing relatively well.  We have discussed his course to date.     The patient continued therapy undergoing Keytruda 5/20/2021 and was seen back 6/6/2021 by thoracic surgery with chest x-ray not demonstrating a significant reaccumulation of fluid.   "As result a Pleurx catheter was held.      He is next seen back 6/10/2021 for continued Xgeva and Keytruda.  He is doing very well with absence of additional symptoms including shortness of breath though he does note some degree of muscle stiffness in the a.m.  His pain is at a point where he could discontinue MS Contin though he has been hesitant to do so.  We discussed going to just any p.m. dose at this point.  He is also brought up the fact that he is considering pneumococcal and shingles vaccinations.           Past Medical History, Past Surgical History, Social History, Family History have been reviewed and are without significant changes except as mentioned.    Review of Systems   Constitutional: Positive for fatigue. Negative for unexpected weight change.   HENT: Negative.    Eyes: Negative.    Respiratory: Negative for cough, chest tightness, shortness of breath and wheezing.         No hemoptysis   Cardiovascular: Negative for leg swelling.   Gastrointestinal: Positive for constipation and diarrhea. Negative for abdominal pain, nausea and vomiting.   Endocrine: Negative.    Genitourinary: Negative.  Negative for testicular pain.   Musculoskeletal: Positive for arthralgias (Improved).   Skin: Negative.  Negative for rash.   Allergic/Immunologic: Negative.    Neurological: Negative for weakness.   Psychiatric/Behavioral: Positive for sleep disturbance.   All other systems reviewed and are negative.         Medications:  The current medication list was reviewed in the EMR    ALLERGIES:  No Known Allergies    Objective      Vitals:    06/10/21 0754   BP: 162/81   Pulse: 70   Resp: 18   Temp: 98.4 °F (36.9 °C)   TempSrc: Temporal   SpO2: 92%   Weight: 76.9 kg (169 lb 8 oz)   Height: 180.3 cm (70.98\")   PainSc: 0-No pain     Current Status 6/10/2021   ECOG score 0       Physical Exam    Constitutional: He is oriented to person, place, and time. He appears well-developed and well-nourished.   HENT:   Head: " Normocephalic.   Eyes: Conjunctivae and EOM are normal. Pupils are equal, round, and reactive to light. No scleral icterus.   Neck: Normal range of motion. Neck supple. No JVD present. No thyromegaly present.   Cardiovascular:  Normal rate, regular rhythm.  present. Exam reveals no gallop and no friction rub.   No murmur heard.  Pulmonary/Chest:He has no rales.  Decreased breath sounds to upper lung fields on the right, dullness to percussion.  The patient, again, no longer has a palpable mass approximately right seventh eighth rib laterally.  Tenderness elicited along the right sixth and seventh ribs laterally.  Abdominal: Soft. He exhibits no distension and no mass. There is no tenderness.  He has bilateral hernias, apparently direct, right greater than left  Musculoskeletal: Normal range of motion. He exhibits trace edema bilateral lower extremities, status post surgery per left knee associated swelling limitation of motion.  Lymphadenopathy: No current lymphadenopathy      Skin: Rash resolved                                              Neurological: He is alert and oriented to person, place, and time. He has normal reflexes. No cranial nerve deficit.   Skin: Skin is warm and dry.                         Psychiatric: He has a normal mood and affect. His behavior is normal. Judgment normal    RECENT LABS:  Hematology     WBC   Date Value Ref Range Status   06/10/2021 7.52 3.40 - 10.80 10*3/mm3 Final     RBC   Date Value Ref Range Status   06/10/2021 4.64 4.14 - 5.80 10*6/mm3 Final     Hemoglobin   Date Value Ref Range Status   06/10/2021 12.8 (L) 13.0 - 17.7 g/dL Final     Hematocrit   Date Value Ref Range Status   06/10/2021 42.0 37.5 - 51.0 % Final     Platelets   Date Value Ref Range Status   06/10/2021 179 140 - 450 10*3/mm3 Final             CT SCANS OF THE CHEST AND ABDOMEN AND PELVIS WITH ORAL AND INTRAVENOUS  CONTRAST April 5, 2021  FINDINGS: The patient's primary lung neoplasm presented as a  small  approximately 1.5 x 1.9 cm diameter mass in the posterior aspect of the  left upper lobe. This has essentially resolved. Only a thin linear band  of minimal increased density persists in the previous location of the  mass. This has diminished further in size since the most recent CT on  01/06/2021. Currently, no discrete pulmonary nodules are evident. There  is a moderately large right pleural effusion that appears to have  increased in size slightly. Multiple small tumor nodules are identified  within the effusion studding the right hemidiaphragm that have increased  in overall number slightly in the interval. The effusion now has a  component extending into the superior aspect of the major fissure. The  effusion produces compressive atelectasis of most of the right lower  lobe. This appears more prominent on the previous CT, as well. A new  focal area of atelectasis is also present in the posterior aspect of the  right middle lobe in the infrahilar region. There is no mediastinal or  hilar or axillary lymphadenopathy. An expansile a metastatic lesion  producing bony destruction of the lateral aspect of the right eighth rib  is more sclerotic it appears overall unchanged in size. It measures  approximately 5.8 x 3.5 cm. A small metastatic lesion in the anterior  aspect of the T10 vertebral body appears more sclerotic.     The liver, spleen, pancreas, kidneys, and appear within normal limits.  Previous studies have shown the left adrenal metastasis. Only minimal  residual thickening of the left adrenal gland persists without change.  There is a single borderline enlarged left common iliac chain lymph node  that is unchanged. The stomach and small and large bowel are  unremarkable. A small low metastatic lesion within the left iliac bone  appears much more sclerotic than on the previous CT scan consistent with  tumor response to chemotherapy. The previous CT scan showed a fairly  thick-walled cystic lesion in  the right internal canal. This has  diminished in size significantly. Currently, only a small spherical  nodule measuring   12 mm in diameter is present where previously the mass measured up to 5  cm in diameter.     IMPRESSION:  Thin linear focus of increased density in the left upper  lobe at the site of the patient's previous small lung carcinoma that  appears even smaller and thinner than on the most recent chest CT dated  01/06/2021. No lung masses are currently identified. There is moderately  large malignant right pleural effusion that has increased in size  slightly. This is producing compressive atelectasis of nearly the entire  right lower lobe. Numerous tiny tumor nodules within the inferior aspect  of the effusion along the right hemidiaphragm appear more prominent. An  expansile metastatic lesion within the lateral aspect of the right  eighth rib appears more sclerotic consistent with tumor response to  chemotherapy. A much smaller metastatic lesion in the left iliac bone  also appears more sclerotic, as does a metastatic lesion in the anterior  aspect of the T10 vertebral body.. Mild thickening of the left adrenal  gland at the site of a previous metastatic lesion is unchanged. There is  no new evidence of metastatic disease within the chest, abdomen or  pelvis.      Assessment/Plan   1.  CLL presenting with significant lymphocytosis, lymphadenopathy and splenomegaly.   Positive for deletion of 13 q. 14.3.  Patient status post 2 cycles of Treanda Rituxan with excellent response.  Reassessment July 3, 2019 with plans to switch Treanda to Imbruvica which began July 25, 2019, tolerated well. This continues to be the case.  We have discussed the possibility of adding Rituxan to shorten the time he is on  Imbruvica and we will continue to review this though the patient has hypogammaglobulinemia at this point and we do not wish to worsen this until we are more aware of how to manage COVID-19.  As he is  assessed September 9, 2020 his CLL is under good control and he will continue Imbruvica at this point at unchanged dosing.  There were no clinical changes when the patient is reassessed and this is felt to be stable and his ibrutinib.  He is asked to continue this upon review April 8, 2021-?  Involvement per pleural fluid.  CBC is reassessed 6/7/2021 normalized.        2.  Pulmonary nodules/infiltrates.  He is  status post bronchoscopy.  Is unclear at this time whether these findings are infectious or possibly related to his lymphoproliferative disorder.  His subsequent studies in late November are much improved, followed by pulmonary.  As the patient's right pleural effusion, however, is increasing assessed April 8 we will proceed with additional thoracentesis, cytology, LDH, flow cytometry, total protein and glucose.  The patient is required further ultrasound-guided thoracentesis 4/13/2021, he is now been reviewed by thoracic surgery 4/26/2021 for Pleurx catheter to be placed as fluid recurs.  The patient was seen in consultation by Dr. Tran 6/6/2021 without reaccumulation of fluid and thus at this point the patient is not going to have a Pleurx catheter placed.      3.  Non-small cell lung carcinoma                                       He had follow-up scans performed August 26 2020 revealing a new 1.6 cm spiculated nodule within the left upper lobe, 1.2 cm left adrenal nodule, bone windows with a soft tissue mass involving the right eighth rib measuring 3.7 x 2.6 cm.  Biopsy was consistent with adenocarcinoma CK 7+, CK 20-, lung primary suspected clinically, molecular panel not yet obtained.     A PET/CT was obtained September 23, 2020 demonstrating asymmetric uptake within the right parotid gland which is unremarkable appearance on noncontrasted CT, SUV of 6 compared to 2.7.  There is no hilar, mediastinal or axillary adenopathy, findings of asymmetric moderate to intense FDG uptake within superior aspect  the right chest wall anterior to the right first rib with an irregular hypodense lesion measuring 1.8 x 1.6 and SUV 4.9.  This had not been seen June 27, 2019.  There is an intensely FDG avid nodule within the lingula measuring 1.9 x 1.5 history of 12.4, FDG avid left adrenal nodule 1.9 x 1.5 with an issue 21.2.  The patient was seen by radiation therapy September 29 and started on radiation therapy to the right chest wall-35 Gy in 10 fractions.  Plans were also then proceed with SBRT to the solitary left upper lobe lesion pending insurance approval.  Further testing including TPS of 20% and foundation CDX with a TMB of greater than 10 mutations per megabase (28 mutations per megabase) and the indication that several immunotherapies could be useful in this patient's care.  Additional genomic findings include BRAF [id number]/that trametinib could be useful and STK11/that everolimus could be useful.  *Discussion held as to how to proceed in particular using immunotherapy with Keytruda as monotherapy initially in this patient.  This would reduce his degree of myelosuppression.  Patient completed radiotherapy October 14, 2020 October 21 status post port placement the patient began Keytruda which she tolerated well and as he is seen November 11 we plan a second cycle.  At present he is scheduled for follow-up CT of the chest November 17, radiation therapy follow-up November 24.        The patient is reassessed after 2 cycles of Keytruda with enlargement of right eighth rib lesion, enlargement of spiculated left upper lobe lung mass, moderate to large right-sided pleural effusion, new left adrenal mass and enlarged retrocrural lymph node.  This is addressed with him November 2, 2020 with at least the possibility of pseudoprogression present.  Symptomatically he is improved in his right chest wall mass is slightly reduced clinically.  We have discussed proceeding with his third cycle of Keytruda, initiating Xgeva, having his  left knee assessed and rescanning him after 4 cycles of Keytruda make a decision about extending his systemic therapy to include carboplatin, Alimta and Keytruda.      The patient is seen December 23, 2020 and clinically feels better except for his left knee pain.  We have discussed proceeding with his Keytruda and then reevaluating by follow-up scan.  The patient continued Xgeva December 2 and December 30.     In the interval he was seen for his left knee and was felt to be an impending fracture.  He was reviewed by orthopedic oncology and proceeded to curettage of the left femoral bone lesion and prophylactic stabilization January 7, 2021.      Additionally and wonderfully follow-up scans obtained January 6, 2021 demonstrated a marked improvement per his right eighth rib lesion, resolution of left upper lobe spiculated mass, residual large right pleural effusion, resolution of left adrenal metastasis.  This indicates that Keytruda is working well as a single agent we do plan to continue this January 13, 2021.  He has additional issues, however, with a right-sided pleural effusion that has increased and is producing symptoms.  He will require thoracentesis and will obtain both flow cytometry, cytology, LDH, total protein, glucose.  Pending his response to this the patient could be a candidate for Pleurx catheter placement.      The patient did improve and is next seen in office February 25, 2021.  Clinically his effusion is improved.  We will plan chest x-ray and rib detail films today considering symptoms and continue with his next Keytruda.  The patient required follow-up thoracentesis March 22, 2021.  As he is reassessed April 8 we do plan repeat thoracentesis which would be his third and also presentation per thoracic conference April 15.     Thoracic conference was significant for offering of Pleurx catheter placement and the patient was seen 4/26/2021 without substantial right pleural effusion but with plans  to proceed with Pleurx catheter when this effusion relapses.  The patient is seen in office 6/10/2021 doing extremely well without the need for Pleurx catheter.  We discussed proceeding with Keytruda and Xgeva and having him undergo repeat scans after his next Keytruda which would be in July 2021.        3.  Xgeva initiated monthly beginning December 2, 2020.  This is next due December 30, 2020.  As result of the patient's recent knee surgery will be holding this for the next several months.  This will be readded when the patient is seen back in 3 weeks after evaluation April 8, 2021.  Patient proceeded with Xgeva 4/29/2021 to be given on a 6-week schedule-every other  Keytruda dosing.      4.  Bilateral hernia repair status post right incarcerated inguinal hernia November 23, 2020                                                                                                                                                                          5.  Iron deficiency-stable     6.  Hypogammaglobulinemia-status post IVIG with resolution of sinusitis, current levels again reviewed    7.  Pain control now addressed with MS Contin 30 mg every 12 hours increased to every 8 hours, Norco 10/325 1 p.o. every 4 hours, continue laxative therapy with Senokot-S, MiraLAX and now lactulose.  He also frequently adds additional Aleve-2 tablets alternating with his narcotics.  This is rediscussed with the patient 6/10/2021 and he may go to just a p.m. dosing for MS Contin and as needed Norco, anti-inflammatories as needed    8.  GI follow-up with Dr. Torres planned.    9.  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 Eliu Ochoa-who after assessment patient felt that he was at risk for pathologic fracture.  The patient was admitted January 7 undergoing curettage of the left femoral bone lesion, prophylactic stabilization with intramedullary implants.  The patient did well with this approach.  It was suggested not to use  Xgeva or Zometa for a period of time as this healed.  He has now completed radiation therapy.      10.  Supportive oncology-ongoing therapy for anxiety and depression.  He currently is being treated with Remeron and as needed Xanax and states he is finally sleeping more effectively.    Plan:  *Xgeva and Keytruda today  *Medications again reviewed-again current pain medication requires his MS Contin to to move to nightly dosing, continue Imbruvica at current dosing, Remeron at current dosing and as needed Xanax.  *Return in 3 weeks for NP assessment, schedule and subsequent scans  *Follow-up with thoracic surgery as needed  *Upon return consider scheduling for Prevnar 13 and Shingrix dosing      I spent 50 minutes caring for Dav on this date of service. This time includes time spent by me in the following activities: preparing for the visit, reviewing tests, obtaining and/or reviewing a separately obtained history, performing a medically appropriate examination and/or evaluation, counseling and educating the patient/family/caregiver, documenting information in the medical record, independently interpreting results and communicating that information with the patient/family/caregiver and care coordination.

## 2021-06-10 ENCOUNTER — INFUSION (OUTPATIENT)
Dept: ONCOLOGY | Facility: HOSPITAL | Age: 66
End: 2021-06-10

## 2021-06-10 ENCOUNTER — OFFICE VISIT (OUTPATIENT)
Dept: ONCOLOGY | Facility: CLINIC | Age: 66
End: 2021-06-10

## 2021-06-10 VITALS
RESPIRATION RATE: 18 BRPM | SYSTOLIC BLOOD PRESSURE: 162 MMHG | DIASTOLIC BLOOD PRESSURE: 81 MMHG | HEART RATE: 70 BPM | OXYGEN SATURATION: 92 % | WEIGHT: 169.5 LBS | BODY MASS INDEX: 23.73 KG/M2 | HEIGHT: 71 IN | TEMPERATURE: 98.4 F

## 2021-06-10 DIAGNOSIS — Z79.899 HIGH RISK MEDICATION USE: Primary | ICD-10-CM

## 2021-06-10 DIAGNOSIS — C34.90 LUNG CANCER METASTATIC TO BONE (HCC): ICD-10-CM

## 2021-06-10 DIAGNOSIS — Z79.899 HIGH RISK MEDICATION USE: ICD-10-CM

## 2021-06-10 DIAGNOSIS — C79.51 LUNG CANCER METASTATIC TO BONE (HCC): ICD-10-CM

## 2021-06-10 DIAGNOSIS — C34.12 MALIGNANT NEOPLASM OF UPPER LOBE OF LEFT LUNG (HCC): ICD-10-CM

## 2021-06-10 DIAGNOSIS — C91.10 CLL (CHRONIC LYMPHOCYTIC LEUKEMIA) (HCC): ICD-10-CM

## 2021-06-10 LAB
ALBUMIN SERPL-MCNC: 4 G/DL (ref 3.5–5.2)
ALBUMIN/GLOB SERPL: 2.2 G/DL (ref 1.1–2.4)
ALP SERPL-CCNC: 79 U/L (ref 38–116)
ALT SERPL W P-5'-P-CCNC: 10 U/L (ref 0–41)
ANION GAP SERPL CALCULATED.3IONS-SCNC: 7.9 MMOL/L (ref 5–15)
AST SERPL-CCNC: 10 U/L (ref 0–40)
BASOPHILS # BLD AUTO: 0.05 10*3/MM3 (ref 0–0.2)
BASOPHILS NFR BLD AUTO: 0.7 % (ref 0–1.5)
BILIRUB SERPL-MCNC: 0.4 MG/DL (ref 0.2–1.2)
BUN SERPL-MCNC: 20 MG/DL (ref 6–20)
BUN/CREAT SERPL: 17.4 (ref 7.3–30)
CALCIUM SPEC-SCNC: 8.6 MG/DL (ref 8.5–10.2)
CHLORIDE SERPL-SCNC: 104 MMOL/L (ref 98–107)
CO2 SERPL-SCNC: 27.1 MMOL/L (ref 22–29)
CREAT SERPL-MCNC: 1.15 MG/DL (ref 0.7–1.3)
DEPRECATED RDW RBC AUTO: 56.2 FL (ref 37–54)
EOSINOPHIL # BLD AUTO: 0.15 10*3/MM3 (ref 0–0.4)
EOSINOPHIL NFR BLD AUTO: 2 % (ref 0.3–6.2)
ERYTHROCYTE [DISTWIDTH] IN BLOOD BY AUTOMATED COUNT: 17 % (ref 12.3–15.4)
GFR SERPL CREATININE-BSD FRML MDRD: 64 ML/MIN/1.73
GLOBULIN UR ELPH-MCNC: 1.8 GM/DL (ref 1.8–3.5)
GLUCOSE SERPL-MCNC: 109 MG/DL (ref 74–124)
HCT VFR BLD AUTO: 42 % (ref 37.5–51)
HGB BLD-MCNC: 12.8 G/DL (ref 13–17.7)
IMM GRANULOCYTES # BLD AUTO: 0.03 10*3/MM3 (ref 0–0.05)
IMM GRANULOCYTES NFR BLD AUTO: 0.4 % (ref 0–0.5)
LYMPHOCYTES # BLD AUTO: 0.81 10*3/MM3 (ref 0.7–3.1)
LYMPHOCYTES NFR BLD AUTO: 10.8 % (ref 19.6–45.3)
MAGNESIUM SERPL-MCNC: 1.8 MG/DL (ref 1.8–2.5)
MCH RBC QN AUTO: 27.6 PG (ref 26.6–33)
MCHC RBC AUTO-ENTMCNC: 30.5 G/DL (ref 31.5–35.7)
MCV RBC AUTO: 90.5 FL (ref 79–97)
MONOCYTES # BLD AUTO: 0.74 10*3/MM3 (ref 0.1–0.9)
MONOCYTES NFR BLD AUTO: 9.8 % (ref 5–12)
NEUTROPHILS NFR BLD AUTO: 5.74 10*3/MM3 (ref 1.7–7)
NEUTROPHILS NFR BLD AUTO: 76.3 % (ref 42.7–76)
NRBC BLD AUTO-RTO: 0 /100 WBC (ref 0–0.2)
PHOSPHATE SERPL-MCNC: 3.5 MG/DL (ref 2.5–4.5)
PLATELET # BLD AUTO: 179 10*3/MM3 (ref 140–450)
PMV BLD AUTO: 11.5 FL (ref 6–12)
POTASSIUM SERPL-SCNC: 4.5 MMOL/L (ref 3.5–4.7)
PROT SERPL-MCNC: 5.8 G/DL (ref 6.3–8)
RBC # BLD AUTO: 4.64 10*6/MM3 (ref 4.14–5.8)
SODIUM SERPL-SCNC: 139 MMOL/L (ref 134–145)
WBC # BLD AUTO: 7.52 10*3/MM3 (ref 3.4–10.8)

## 2021-06-10 PROCEDURE — 99215 OFFICE O/P EST HI 40 MIN: CPT | Performed by: INTERNAL MEDICINE

## 2021-06-10 PROCEDURE — 96372 THER/PROPH/DIAG INJ SC/IM: CPT

## 2021-06-10 PROCEDURE — 80053 COMPREHEN METABOLIC PANEL: CPT

## 2021-06-10 PROCEDURE — 84100 ASSAY OF PHOSPHORUS: CPT

## 2021-06-10 PROCEDURE — 25010000002 PEMBROLIZUMAB 100 MG/4ML SOLUTION 4 ML VIAL: Performed by: INTERNAL MEDICINE

## 2021-06-10 PROCEDURE — 83735 ASSAY OF MAGNESIUM: CPT

## 2021-06-10 PROCEDURE — 85025 COMPLETE CBC W/AUTO DIFF WBC: CPT

## 2021-06-10 PROCEDURE — 96413 CHEMO IV INFUSION 1 HR: CPT

## 2021-06-10 PROCEDURE — 25010000002 DENOSUMAB 120 MG/1.7ML SOLUTION: Performed by: INTERNAL MEDICINE

## 2021-06-10 RX ORDER — SODIUM CHLORIDE 9 MG/ML
250 INJECTION, SOLUTION INTRAVENOUS ONCE
Status: CANCELLED | OUTPATIENT
Start: 2021-06-10

## 2021-06-10 RX ORDER — SODIUM CHLORIDE 9 MG/ML
250 INJECTION, SOLUTION INTRAVENOUS ONCE
Status: COMPLETED | OUTPATIENT
Start: 2021-06-10 | End: 2021-06-10

## 2021-06-10 RX ADMIN — DENOSUMAB 120 MG: 120 INJECTION SUBCUTANEOUS at 09:34

## 2021-06-10 RX ADMIN — SODIUM CHLORIDE 250 ML: 9 INJECTION, SOLUTION INTRAVENOUS at 10:01

## 2021-06-10 RX ADMIN — SODIUM CHLORIDE 200 MG: 9 INJECTION, SOLUTION INTRAVENOUS at 10:00

## 2021-06-15 ENCOUNTER — OFFICE VISIT (OUTPATIENT)
Dept: PSYCHIATRY | Facility: HOSPITAL | Age: 66
End: 2021-06-15

## 2021-06-15 DIAGNOSIS — R53.0 NEOPLASTIC MALIGNANT RELATED FATIGUE: ICD-10-CM

## 2021-06-15 DIAGNOSIS — F33.42 RECURRENT MAJOR DEPRESSIVE DISORDER, IN FULL REMISSION (HCC): Primary | ICD-10-CM

## 2021-06-15 PROCEDURE — 99214 OFFICE O/P EST MOD 30 MIN: CPT | Performed by: NURSE PRACTITIONER

## 2021-06-15 RX ORDER — MIRTAZAPINE 15 MG/1
7.5 TABLET, FILM COATED ORAL NIGHTLY
Qty: 90 TABLET | Refills: 0 | Status: SHIPPED | OUTPATIENT
Start: 2021-06-15 | End: 2022-01-24 | Stop reason: SDUPTHER

## 2021-06-15 NOTE — PROGRESS NOTES
[facility]  In Person Session    Subjective  Patient ID: Dav Freitas is a 65 y.o. male is seen face to face in the [facility].    CC: Fatigue    HPI:  Pt noted to be alert, oriented, and engaged in conversation. Affect and mood euthymic.  Pt describes mood as being even, with reduced irritability. Feels mood was never especially disrupted, although wife felt he was more irritable.  States since he has been taking mirtazapine, she feels this has improved. Pt remains adherent to mirtazapine, finding this has been especially helpful for sleep. Pt does report experience of affective flatness associated with 15 mg, and has reduced this to 7.5 mg   Pt does endorse daytime fatigue, finding this is related to immunotherapy and pain medication. Fortauntely, has been able to reduce use of opioids with lesser impact on fatigue. Pain control remains well managed on this regimen. Finds he has a degree of radical acceptance of energy demands and limitations.  Acknowledges some degree of procrastination, although again finds this to be radically acceptable.  Pt reports to be feeling well; appreciates response of disease to current treatments, and anticipates scans in approximately 1 month.    Review of Systems   Constitutional: Positive for fatigue.   Psychiatric/Behavioral: Negative for dysphoric mood and sleep disturbance. The patient is not nervous/anxious.      Medications Reviewed:  Mirtazapine 7.5 mg nightly    Diagnoses and all orders for this visit:    1. Recurrent major depressive disorder, in full remission (CMS/HCC) (Primary)    2. Neoplastic malignant related fatigue    Other orders  -     mirtazapine (REMERON) 15 MG tablet; Take 0.5 tablets by mouth Every Night.  Dispense: 90 tablet; Refill: 0    Plan of Care  Patient with improved irritability and sleep on current dosing of mirtazapine 7.5 mg nightly.  Will continue as written.  Fatigue management strategies  explored; discussed with patient nonpharmacological interventions, many of which he is already doing. Explored potential benefit to modafinil for wakefulness, although patient denies interest in this at this time. May consider in future visits.     I spent 33 minutes caring for Dav on this date of service. This time includes time spent by me in the following activities: preparing for the visit, obtaining and/or reviewing a separately obtained history, performing a medically appropriate examination and/or evaluation, counseling and educating the patient/family/caregiver, ordering medications, tests, or procedures and documenting information in the medical record.

## 2021-07-01 ENCOUNTER — APPOINTMENT (OUTPATIENT)
Dept: ONCOLOGY | Facility: HOSPITAL | Age: 66
End: 2021-07-01

## 2021-07-01 ENCOUNTER — OFFICE VISIT (OUTPATIENT)
Dept: ONCOLOGY | Facility: CLINIC | Age: 66
End: 2021-07-01

## 2021-07-01 ENCOUNTER — APPOINTMENT (OUTPATIENT)
Dept: LAB | Facility: HOSPITAL | Age: 66
End: 2021-07-01

## 2021-07-01 ENCOUNTER — INFUSION (OUTPATIENT)
Dept: ONCOLOGY | Facility: HOSPITAL | Age: 66
End: 2021-07-01

## 2021-07-01 ENCOUNTER — LAB (OUTPATIENT)
Dept: ONCOLOGY | Facility: HOSPITAL | Age: 66
End: 2021-07-01

## 2021-07-01 VITALS
RESPIRATION RATE: 16 BRPM | SYSTOLIC BLOOD PRESSURE: 159 MMHG | DIASTOLIC BLOOD PRESSURE: 82 MMHG | HEART RATE: 80 BPM | OXYGEN SATURATION: 94 % | TEMPERATURE: 96.4 F | WEIGHT: 174.1 LBS | HEIGHT: 71 IN | BODY MASS INDEX: 24.37 KG/M2

## 2021-07-01 DIAGNOSIS — Z79.899 HIGH RISK MEDICATION USE: ICD-10-CM

## 2021-07-01 DIAGNOSIS — C34.12 MALIGNANT NEOPLASM OF UPPER LOBE OF LEFT LUNG (HCC): ICD-10-CM

## 2021-07-01 DIAGNOSIS — C79.51 LUNG CANCER METASTATIC TO BONE (HCC): ICD-10-CM

## 2021-07-01 DIAGNOSIS — C79.51 LUNG CANCER METASTATIC TO BONE (HCC): Primary | ICD-10-CM

## 2021-07-01 DIAGNOSIS — C34.90 LUNG CANCER METASTATIC TO BONE (HCC): ICD-10-CM

## 2021-07-01 DIAGNOSIS — Z79.899 HIGH RISK MEDICATION USE: Primary | ICD-10-CM

## 2021-07-01 DIAGNOSIS — C34.90 LUNG CANCER METASTATIC TO BONE (HCC): Primary | ICD-10-CM

## 2021-07-01 DIAGNOSIS — C91.10 CLL (CHRONIC LYMPHOCYTIC LEUKEMIA) (HCC): ICD-10-CM

## 2021-07-01 LAB
ALBUMIN SERPL-MCNC: 3.9 G/DL (ref 3.5–5.2)
ALBUMIN/GLOB SERPL: 2 G/DL (ref 1.1–2.4)
ALP SERPL-CCNC: 81 U/L (ref 38–116)
ALT SERPL W P-5'-P-CCNC: 12 U/L (ref 0–41)
ANION GAP SERPL CALCULATED.3IONS-SCNC: 8.2 MMOL/L (ref 5–15)
AST SERPL-CCNC: 11 U/L (ref 0–40)
BASOPHILS # BLD AUTO: 0.05 10*3/MM3 (ref 0–0.2)
BASOPHILS NFR BLD AUTO: 0.7 % (ref 0–1.5)
BILIRUB SERPL-MCNC: 0.3 MG/DL (ref 0.2–1.2)
BUN SERPL-MCNC: 14 MG/DL (ref 6–20)
BUN/CREAT SERPL: 12.8 (ref 7.3–30)
CALCIUM SPEC-SCNC: 8.7 MG/DL (ref 8.5–10.2)
CHLORIDE SERPL-SCNC: 103 MMOL/L (ref 98–107)
CO2 SERPL-SCNC: 26.8 MMOL/L (ref 22–29)
CREAT SERPL-MCNC: 1.09 MG/DL (ref 0.7–1.3)
DEPRECATED RDW RBC AUTO: 54.9 FL (ref 37–54)
EOSINOPHIL # BLD AUTO: 0.17 10*3/MM3 (ref 0–0.4)
EOSINOPHIL NFR BLD AUTO: 2.3 % (ref 0.3–6.2)
ERYTHROCYTE [DISTWIDTH] IN BLOOD BY AUTOMATED COUNT: 16.1 % (ref 12.3–15.4)
GFR SERPL CREATININE-BSD FRML MDRD: 68 ML/MIN/1.73
GLOBULIN UR ELPH-MCNC: 2 GM/DL (ref 1.8–3.5)
GLUCOSE SERPL-MCNC: 113 MG/DL (ref 74–124)
HCT VFR BLD AUTO: 39.5 % (ref 37.5–51)
HGB BLD-MCNC: 12.4 G/DL (ref 13–17.7)
IMM GRANULOCYTES # BLD AUTO: 0.06 10*3/MM3 (ref 0–0.05)
IMM GRANULOCYTES NFR BLD AUTO: 0.8 % (ref 0–0.5)
LYMPHOCYTES # BLD AUTO: 0.97 10*3/MM3 (ref 0.7–3.1)
LYMPHOCYTES NFR BLD AUTO: 12.9 % (ref 19.6–45.3)
MCH RBC QN AUTO: 29.1 PG (ref 26.6–33)
MCHC RBC AUTO-ENTMCNC: 31.4 G/DL (ref 31.5–35.7)
MCV RBC AUTO: 92.7 FL (ref 79–97)
MONOCYTES # BLD AUTO: 0.91 10*3/MM3 (ref 0.1–0.9)
MONOCYTES NFR BLD AUTO: 12.1 % (ref 5–12)
NEUTROPHILS NFR BLD AUTO: 5.38 10*3/MM3 (ref 1.7–7)
NEUTROPHILS NFR BLD AUTO: 71.2 % (ref 42.7–76)
NRBC BLD AUTO-RTO: 0 /100 WBC (ref 0–0.2)
PLATELET # BLD AUTO: 173 10*3/MM3 (ref 140–450)
PMV BLD AUTO: 11.2 FL (ref 6–12)
POTASSIUM SERPL-SCNC: 4.4 MMOL/L (ref 3.5–4.7)
PROT SERPL-MCNC: 5.9 G/DL (ref 6.3–8)
RBC # BLD AUTO: 4.26 10*6/MM3 (ref 4.14–5.8)
SODIUM SERPL-SCNC: 138 MMOL/L (ref 134–145)
T4 FREE SERPL-MCNC: 1.42 NG/DL (ref 0.93–1.7)
TSH SERPL DL<=0.05 MIU/L-ACNC: 2.45 UIU/ML (ref 0.27–4.2)
WBC # BLD AUTO: 7.54 10*3/MM3 (ref 3.4–10.8)

## 2021-07-01 PROCEDURE — 99214 OFFICE O/P EST MOD 30 MIN: CPT | Performed by: NURSE PRACTITIONER

## 2021-07-01 PROCEDURE — 25010000002 PEMBROLIZUMAB 100 MG/4ML SOLUTION 4 ML VIAL: Performed by: NURSE PRACTITIONER

## 2021-07-01 PROCEDURE — 85025 COMPLETE CBC W/AUTO DIFF WBC: CPT

## 2021-07-01 PROCEDURE — 96413 CHEMO IV INFUSION 1 HR: CPT

## 2021-07-01 PROCEDURE — 84443 ASSAY THYROID STIM HORMONE: CPT | Performed by: INTERNAL MEDICINE

## 2021-07-01 PROCEDURE — 84439 ASSAY OF FREE THYROXINE: CPT | Performed by: INTERNAL MEDICINE

## 2021-07-01 PROCEDURE — 36415 COLL VENOUS BLD VENIPUNCTURE: CPT

## 2021-07-01 PROCEDURE — 80053 COMPREHEN METABOLIC PANEL: CPT

## 2021-07-01 RX ORDER — SODIUM CHLORIDE 9 MG/ML
250 INJECTION, SOLUTION INTRAVENOUS ONCE
Status: CANCELLED | OUTPATIENT
Start: 2021-07-01

## 2021-07-01 RX ORDER — SODIUM CHLORIDE 9 MG/ML
250 INJECTION, SOLUTION INTRAVENOUS ONCE
Status: COMPLETED | OUTPATIENT
Start: 2021-07-01 | End: 2021-07-01

## 2021-07-01 RX ORDER — MORPHINE SULFATE 30 MG/1
30 TABLET, FILM COATED, EXTENDED RELEASE ORAL 3 TIMES DAILY
Qty: 90 TABLET | Refills: 0 | Status: SHIPPED | OUTPATIENT
Start: 2021-07-01 | End: 2021-08-12 | Stop reason: SDUPTHER

## 2021-07-01 RX ADMIN — SODIUM CHLORIDE 250 ML: 9 INJECTION, SOLUTION INTRAVENOUS at 14:13

## 2021-07-01 RX ADMIN — SODIUM CHLORIDE 200 MG: 9 INJECTION, SOLUTION INTRAVENOUS at 14:13

## 2021-07-01 NOTE — PROGRESS NOTES
"Subjective   REASON FOR FOLLOW UP:  1.  Chronic lymphocytic leukemia with extensive lymphadenopathy and possible pleural involvement. Initiation of Treanda, Rituxan May 1, 2019.  2.  Hypogammaglobulinemia.  Status is post IVIG  3.  Significant response on scans June 27, 2019.  Treatment changed to Imbruvica 420 mg daily.     HISTORY OF PRESENT ILLNESS:    The patient is a  65 y.o. male with the above-mentioned street, returns the office today in anticipation of his 13th dose of Keytruda which he continues to receive every 3 weeks.  He also receives Xgeva every 6 weeks.  He reports he continues to tolerate Keytruda very well.  He denies worsening shortness of breath or chest pain.  He does have an occasional cough which is productive with clear sputum.  He denies fevers or chills, signs or symptoms of infection.  He does continue on ibrutinib 420 mg daily which he tolerates very well.  He is without skin rash or worsening adenopathy.  His pain remains well controlled on current regimen of MS Contin 30 mg every 12 hours and Norco 10/325 as needed for breakthrough pain.  He did request refills today.  His bowels are moving normally despite narcotics.    Past Medical History, Past Surgical History, Social History, Family History have been reviewed and are without significant changes except as mentioned.    Objective      Vitals:    07/01/21 1251   BP: 159/82   Pulse: 80   Resp: 16   Temp: 96.4 °F (35.8 °C)   TempSrc: Temporal   SpO2: 94%   Weight: 79 kg (174 lb 1.6 oz)   Height: 180.3 cm (70.98\")   PainSc: 0-No pain     Current Status 7/1/2021   ECOG score 0       Physical Exam    GENERAL:  Well-developed, well-nourished in no acute distress.   SKIN:  Warm, dry without purpura or petechiae.  Only faint erythema remaining on anterior chest, no macular papular rash.  HEAD:  Normocephalic.  EYES:  Pupils equal, round.  EOMs intact.  Conjunctivae normal.  EARS:  Hearing intact.  LYMPHATICS: Without cervical, submandibular, " axillary, supraclavicular adenopathy.    CHEST:  Lungs clear to auscultation. Good airflow.  CARDIAC:  Regular rate and rhythm without murmurs. Normal S1,S2.  ABDOMEN:  Soft, nontender with no organomegaly or masses. Bowel sounds present  EXTREMITIES:  No clubbing, cyanosis or edema.  NEUROLOGICAL: No focal neurological deficits.  PSYCHIATRIC:  Normal affect and mood.    I have reexamined the patient and the results are consistent with the previously documented exam. RORY Zhao       RECENT LABS:  Results from last 7 days   Lab Units 07/01/21  1252   WBC 10*3/mm3 7.54   NEUTROS ABS 10*3/mm3 5.38   HEMOGLOBIN g/dL 12.4*   HEMATOCRIT % 39.5   PLATELETS 10*3/mm3 173     Results from last 7 days   Lab Units 07/01/21  1252   SODIUM mmol/L 138   POTASSIUM mmol/L 4.4   CHLORIDE mmol/L 103   CO2 mmol/L 26.8   BUN mg/dL 14   CREATININE mg/dL 1.09   CALCIUM mg/dL 8.7   ALBUMIN g/dL 3.90   BILIRUBIN mg/dL 0.3   ALK PHOS U/L 81   ALT (SGPT) U/L 12   AST (SGOT) U/L 11   GLUCOSE mg/dL 113         FISH prognostic panel-Positive for deletion of 13 q. 14.3    Assessment/Plan   1.  CLL presenting with significant lymphocytosis, lymphadenopathy and splenomegaly.     · Positive for deletion of 13 q. 14.3.    · Patient status post 2 cycles of Treanda Rituxan with excellent response.    · Imaging 6/27/2019 with significant decrease in adenopathy.  Treanda Rituxan discontinued   · Imbruvica 420 mg daily initiated 7/25/2019.  · He continues on Imbruvica, without indication of progression of his CLL, without progressive lymphadenopathy on CT scans.  · He is without progressive adenopathy on exam today, 7/1/2021    2.  Pulmonary nodules/infiltrates.  He is  status post bronchoscopy.  Is unclear at this time whether these findings are infectious or possibly related to his lymphoproliferative disorder.  This is seen to be improving on recent scans.    3.  Hypogammaglobulinemia-status post IVIG with resolution of sinusitis.       4.  Non-small cell lung carcinoma  · August 26 2020 revealing a new 1.6 cm spiculated nodule within the left upper lobe, 1.2 cm left adrenal nodule, bone windows with a soft tissue mass involving the right eighth rib measuring 3.7 x 2.6 cm.    · Biopsy was consistent with adenocarcinoma CK 7+, CK 20-, lung primary suspected clinically, molecular panel not yet obtained.  · PET/CT 9/23/2020 demonstrating asymmetric uptake within the right parotid gland which is unremarkable appearance on noncontrasted CT, SUV of 6 compared to 2.7.  There is no hilar, mediastinal or axillary adenopathy, findings of asymmetric moderate to intense FDG uptake within superior aspect the right chest wall anterior to the right first rib with an irregular hypodense lesion measuring 1.8 x 1.6 and SUV 4.9.  This had not been seen June 27, 2019.  There is an intensely FDG avid nodule within the lingula measuring 1.9 x 1.5 history of 12.4, FDG avid left adrenal nodule 1.9 x 1.5 with an issue 21.2.    · Evaluated by radiation oncology therapy September 29 and started on radiation therapy to the right chest wall-35 Gy in 10 fractions.   ·  Plans were also then proceed with SBRT to the solitary left upper lobe lesion pending insurance approval.  · Further testing including TPS of 20% and foundation CDX with a TMB of greater than 10 mutations per megabase (28 mutations per megabase) and the indication that several immunotherapies could be useful in this patient's care.  Additional genomic findings include BRAF [id number]/that trametinib could be useful and STK11/that everolimus could be useful.  · Keytruda initiated 10/21/2021   · Reassessment after 2 cycles of Keytruda with enlargement of the right eighth rib lesion,enlargement of spiculated left upper lobe lung mass, moderate to large right-sided pleural effusion, new left adrenal mass and enlarged retrocrural lymph node.?Pseudoprogression  · Xgeva last given 12/30/2020  · Follow-up scans 1/6/2021  demonstrated marked improvement in his right eighth rib lesion, resolution of left upper lobe spiculated mass, residual large right pleural effusion, resolution of left adrenal metastasis.   · Right pleural effusion, with thoracentesis 1/14/2021 with 1500 mils removed.  Pathology consistent with malignant effusion   · CT scans 4/5/2021 with response noted in the left upper lobe density.  · He continues to receive Keytruda every 3 weeks along with Xgeva every 6 weeks.  · Due today with cycle 13 Keytruda.  Repeat imaging in 2 weeks    5.  Left knee metastasis  · Evaluated by orthopedic oncology, Dr. Eliu Ochoa  · Admission 1/7/2021 undergoing stabilization of left femoral bone lesion, prophylactic stabilization with intramedullary implants.  · It was suggested we delay Xgeva or Zometa to allow bone healing  · Completed radiation with 10 fractions 2/10/2021  · Xgeva resumed 4/29/2021, he is due again in 3 weeks     6.  Malignant right pleural effusion  · Ultrasound thoracentesis 1/14/2021 1500 mL removed  · Chest x-ray 2/25/2021 with moderate right pleural effusion  · Progressive symptoms with worsening pain 4/5/2021  · Ultrasound-guided thoracentesis 4/13/2021 with 2050 ml removed.  · Plans for Pleurx catheter with thoracic surgery, however, pleural effusion has not recurred    7.  Cancer related pain  · Pain is most prominent in his right rib, utilizing MS Contin 30 mg 3 times a day  · Hydrocodone/acetaminophen 10/325 as needed for breakthrough pain.  Currently taking 3 to 4 tablets daily  · Pain medications refilled today, 7/1/2021    8.  Insomnia  · Continuing to follow with behavioral oncology  · Continuing Remeron 7.5 mg nightly with good control    Plan:  1. Proceed today with cycle 13 Keytruda  2. Continue Imbruvica 420 mg daily  3. Continue MS Contin 30 mg 3 times a day and hydrocodone/acetaminophen 10/325 as needed for breakthrough pain  4. Continue Remeron 7.5 mg nightly  5. In 2 weeks, the patient will  undergo CT of the chest, abdomen and pelvis with contrast to evaluate the disease state of his known CLL and lung cancer.  6. MD follow-up in 3 weeks for CT scan review, CBC, CMP, cycle 14 Keytruda pending CT scans and Xgeva    The patient is on high risk medication requiring close monitoring for toxicity.  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  He continues on medical management of both malignancies.    Noa Zarate, APRN   7/1/2021

## 2021-07-15 ENCOUNTER — INFUSION (OUTPATIENT)
Dept: ONCOLOGY | Facility: HOSPITAL | Age: 66
End: 2021-07-15

## 2021-07-15 ENCOUNTER — HOSPITAL ENCOUNTER (OUTPATIENT)
Dept: CT IMAGING | Facility: HOSPITAL | Age: 66
Discharge: HOME | End: 2021-07-15
Admitting: NURSE PRACTITIONER

## 2021-07-15 DIAGNOSIS — C34.90 LUNG CANCER METASTATIC TO BONE (HCC): ICD-10-CM

## 2021-07-15 DIAGNOSIS — C91.10 CLL (CHRONIC LYMPHOCYTIC LEUKEMIA) (HCC): ICD-10-CM

## 2021-07-15 DIAGNOSIS — C79.51 LUNG CANCER METASTATIC TO BONE (HCC): ICD-10-CM

## 2021-07-15 PROCEDURE — 25010000002 IOPAMIDOL 61 % SOLUTION: Performed by: NURSE PRACTITIONER

## 2021-07-15 PROCEDURE — 74177 CT ABD & PELVIS W/CONTRAST: CPT

## 2021-07-15 PROCEDURE — 0 DIATRIZOATE MEGLUMINE & SODIUM PER 1 ML: Performed by: NURSE PRACTITIONER

## 2021-07-15 PROCEDURE — 71260 CT THORAX DX C+: CPT

## 2021-07-15 RX ADMIN — IOPAMIDOL 85 ML: 612 INJECTION, SOLUTION INTRAVENOUS at 10:01

## 2021-07-15 RX ADMIN — DIATRIZOATE MEGLUMINE AND DIATRIZOATE SODIUM 30 ML: 660; 100 LIQUID ORAL; RECTAL at 09:00

## 2021-07-15 NOTE — PROGRESS NOTES
Subjective  Patient, again, feels that he is doing relatively well including lack of progressive respiratory symptoms,    REASON FOR FOLLOW UP:  1.  Chronic lymphocytic leukemia with extensive lymphadenopathy and possible pleural involvement.  2.  Initiation of Treanda, Rituxan May 1, 2019 IVIG also utilized                                                              3.  Significant response on scans June 27, 2019, alternative therapy with Imbruvica planned, initiated July 25, 2019  4.  Patient seen February 12, 2020, continued control of CLL, discussed Rituxan, Imbruvica, sleep study and potential surgical assessment for hernia  5.  CT of chest per pulmonary August 26 with 1.6 cm spiculated nodule in the left upper lobe, 1.2 cm left adrenal nodule not present on comparison study, bone windows with soft tissue mass involving the right eighth rib measuring 3.7 x 2.6, biopsy positive for adenocarcinoma  6.  Patient assessed September 30, plans for palliative radiation therapy, port placement, Keytruda considering PDL 1 positivity and high TMB status  7.  Patient reviewed October 21, 2020, Keytruda initiated, pain much improved status post radiation therapy  8.  Status post bilateral inguinal hernia repairs November 23, 2020.  Evidence of progression of disease versus pseudoprogression, continued knee pain   9.  Patient seen in office December 2, 2020, continued Keytruda for total of 4 cycles, institution of Xgeva, reassessment per left knee pain  10.  Patient assessed by orthopedic oncology status post prophylactic stabilization of left femur January 7, 2021.  11 patient reviewed January 12, 2021 with clear evidence of improvement on Keytruda.  Plan to continue same at present.  Increasing shortness of breath thought secondary pleural effusion, plan thoracentesis-cytology, flow cytometry and chemistries,?  Pleurx catheter  12.  Patient status post thoracentesis with improvement per shortness of breath, exudative,  negative cytology, continued chest pain February 25, 2021, follow-up chest x-ray with rib details.  13.  Patient reviewed April 8, 2021, consideration of follow-up thoracentesis despite general improvement on scans, potential pleurodesis versus Pleurx catheter.  14.  Patient seen by thoracic surgery 4/26 for Pleurx catheter to be scheduled as fluid recurs.  15.  Catheter not required, patient seen 6/10/2021 stable on current approach, Keytruda, Xgeva, Imbruvica continued with scans likely later July 2021  16.  Repeat scans with general stability-assessment 7/15/2021        HISTORY OF PRESENT ILLNESS:   The patient is a  65 y.o. Male  who was admitted on 4/28/2019 with worsening complaints of cough wheezing and shortness of breath.  He had been to an urgent care center and was started on antibiotics and received a shot of Solu-Medrol.  His symptoms did not improve and on his admission he was noted to have profound lymphocytosis with a total white count of 70,074% lymphocytes on his white cell differential.      He also underwent CT scan of the chest that showed infiltrates and nodules in the lungs as well as significant lymphadenopathy within the chest and in the axillary regions.  He had peripheral blood sent for flow cytometry leukemia lymphoma panel but this is still pending at time of this dictation.           He underwent bronchoscopy on 4/29/2019.  Results from this procedure are pendingl.  His respiratory viral panel was negative and markers of sepsis were unremarkable.      His peripheral blood flow cytometry and flow cytometry from the EBUS needle biopsy at bronchoscopy are both consistent with chronic lymphocytic leukemia/small lymphocytic lymphoma.     The patient underwent a CT of abdomen and pelvis April 30 demonstrating extensive splenomegaly and bulky lymphadenopathy throughout the abdomen and pelvis.  There is a tiny lesion at the superior aspect lateral hepatic segment and 2 hyperenhancing foci  within the liver thought to be hemangiomas, moderate size right inguinal hernia containing a long segment of small bowel without obstruction or incarceration.  CT of soft tissue again reveals extensive cervical adenopathy as well as evidence of pansinusitis.  The patient's case was discussed with Dr. Church and the patient has stage III-IV disease should be treated during this hospitalization.  I met with the patient and discussed in detail the use of Treanda/ Rituxan which we initiated Treanda Rituxan beginning May 1, 2019.     When he is seen May 2 he is already beginning to respond with reducing adenopathy and improved symptoms.  Plan to proceed with day #2.  We have also reviewed his findings including IgA 14, IgG of 263, IgM of 5 and a kappa/lambda ratio of 26.38.  He is hypogammaglobulinemic and replacement therapy will be given this hospitalization.  ENT assessment will also be requested.     The patient is again seen May 3, 2019, continuing to improve overall.  We did proceed with IVIG 400 mg/kg and had the patient seen by ENT after which the patient was discharged.  He indicates that Dr. Hamlin is going to see him back within the week as he is now seen back in our office May 16 and that surgery may be necessary.  The patient is also still having sinus symptoms.  Further he has developed a more extensive rash involving his abdomen chest and back and previously seen in hospital?.  Otherwise he feels well except for fatigue without fever, chills, nausea, vomiting, weight loss, stable appetite.     The patient went on to take 2 cycles of Treanda, Rituxan with a second cycle given June 6 and 7.  Additional assessments included his dermatologist who felt he had a gradually improving dermatitis and would not require an therapy and ENT indicating that the patient was slowly improving per sinus symptoms the surgery may be necessary at a later point.  When he is reviewed May 30 he was neutropenic and we proceeded with  IVIG second treatment on Elenita 10.  He underwent repeat scans June 27 demonstrating a significant reduction in adenopathy in the neck chest abdomen pelvis with index nodes in the neck previously 2 cm x 1.1 cm, chest with subcarinal lymph node conglomerate measuring 2.1 x 0.9 previously 5.8 x 2.7 and previously seen irregular pulmonary consolidation throughout bilateral lungs having nearly completely resolved.  Spleen is also reduced in size at 4.5 cm previously 18.6 cm and mesenteric nodes had similar reduced in size.  The patient's immunoglobulins were assessed June 27 with an IgG of 667, IgA of 20, IgM of 9 and IgE of 3.Additional information includes FISH analysis for CLL which is positive for deletion of 13 q. 14.3 It should be noted that such finding on FISH analysis generally suggest a favorable prognostic finding.  The patient is next seen July 3, 2019 feeling improved overall but still having a dermatologic issue with pruritus, erythema worsening particularly in the lower abdomen and upper groin.  Again his scans are considerably improved and we discussed, on the basis of the above information, changing his therapy now to Imbruvica.  The patient will return for teaching per Imbruvica July 11 the patient initiated treatment by July 25th 2019.  He was seen July 31 tolerating this well.  He was able to discontinue allopurinol and ferrous sulfate thereafter presents back August 28 having taken the medication over the last month.     He indicates that he is having no issues tolerating the medication and generally feels well except for sinus drainage.  He has had a cough and has a chest x-ray scheduled to primary care August 29, 2019.  He has had no fever, chills, sweats, rash and has gained weight.  The patient is next seen November 20, 2019 continuing to generally improve.  His performance status remains excellent and has had no additional complications thus far with the use of Imbruvica or an additional  infectious complications.  He has been seen by pulmonary medicine with reticular infiltrates noted on previous CAT scan on a follow-up study scheduled in the next several days.  This may be evolution towards recovery but a follow-up will be necessary.  Finally we have been able to obtain Imbruvica reasonably cost through [facility] support.    The patient is next seen February 12, 2020 doing well without additional infectious complications and with stable findings hematologically.  We have discussed the fact that Rituxan could be added potentially to reduce the amount of time he remains on the medication but, additionally, further reduce his immunoglobulins.  Though this remains a concern he is also having difficulty with sleep-?  Sleep apnea, and worsening right direct inguinal hernia.    Plans for the patient to continue Imbruvica at dosing rechecking his immunoglobulins April 29 now with IVIG down to 371, IgM of 11, IgA level 32, kappa/lambda ratio of 1.51.  Fortunately continues to feel well without additional symptoms though is concerned about easy bruisability and periodic muscle tenderness after activity.  We have discussed his sleep assessment and he very likely has sleep apnea but does not wish to start CPAP at this point and is using oral medications-trazodone-to modest effect.    The patient is followed by pulmonary medicine.He was seen September 2 having had right-sided rib pain for 1 month, shortness of breath on going up stairs or uphill.  Follow-up chest CT revealed left upper lobe nodule 1.6 cm that was noted spiculated, additionally lesion per right rib with a soft tissue mass (3.7 x 2.6 cm) was recognized in the tissue biopsy was obtained.  This is now returned as positive for moderately differentiated adenocarcinoma.  This is CK7 positive, CK20 negative with a lung primary suspected clinically.  A molecular panel has not yet been obtained.  We have now, however, sent for foundation CDX  analysis.    The patient is seen September 2020 with considerable right chest wall pain only modestly controlled with Toradol and ibuprofen.  He also has been taking additional Xanax to reduce the muscle spasm that he is experiencing.  I have advised him of the findings and their significance as being consistent with metastatic non-small cell lung cancer.  He is dismayed by the conversation but wishes to have pain control as quickly as possible as well as a cady discussion of treatment options.    The patient was provided additional anti-pain and antianxiety medications.  A PET/CT was obtained September 23 demonstrating asymmetric uptake within the right parotid gland which is unremarkable appearance on noncontrasted CT, SUV of 6 compared to 2.7.  There is no hilar, mediastinal or axillary adenopathy, findings of asymmetric moderate to intense FDG uptake within superior aspect the right chest wall anterior to the right first rib with an irregular hypodense lesion measuring 1.8 x 1.6 and SUV 4.9.  This had not been seen June 27, 2019.  There is an intensely FDG avid nodule within the lingula measuring 1.9 x 1.5 history of 12.4, FDG avid left adrenal nodule 1.9 x 1.5 with an issue 21.2.  The patient was seen by radiation therapy September 29 and started on radiation therapy to the right chest wall-35 Gy in 10 fractions.  Plans were also then proceed with SBRT to the solitary left upper lobe lesion pending insurance approval.  Further testing including TPS of 20% and foundation CDX with a TMB of greater than 10 mutations per megabase (28 mutations per megabase) and the indication that several immunotherapies could be useful in this patient's care.  Additional genomic findings include BRAF G469R/that trametinib could be useful and STK11/that everolimus could be useful.      The patient is seen back September 30, 2020 indicating that his pain has improved substantially with his current pain medications.  He also  continues laxatives on a regular basis.  Radiation therapy will begin October 1, 2020 as described above and we have discussed on the basis of the findings per his genomic testing that he is a candidate for a number of additional treatment approaches.  Considering he does not have significant organ involvement and that he has a positive PDL 1 at 20% and a TMB 28 mutations per megabase it would seem reasonable (particularly with his history of CLL) to try to use immunotherapy as monotherapy initially.  This might provide control and allow us not to affect his hematologic illness in any significant way.  We discussed that he will need a PowerPort placement in the near future but that we could start Keytruda shortly thereafter.  Patient was able to proceed into palliative therapy undergoing radiation therapy to the right eighth rib 3 and 50 cGy per fraction x10 between October 1 of October 14.  A PowerPort was placed October 15, 2020.        Mr. Freitas returns to the office October 21, 2020 indicating, wonderfully, that his pain has nearly resolved and he does not need to take short acting pain medications at this point.  Unfortunately he has had severe constipation we discussed his laxatives in depth as to the use, schedule and expected results as he reduces his narcotic use and moves into immunotherapy.  We have also discussed his PET/CT that does refer to an abnormality seen in the rectum that will need to be assessed by colonoscopy.  He states further that he is having lower extremity swelling beginning over the last several weeks right greater than left.  His breathing remains generally improved though he does have cough periodically and mild degree of hemoptysis.    The patient proceeded with his first Keytruda October 21, 2020 and, fortunately, had little side effects from its use thereafter.  He had noted mild hemoptysis as well as left lower extremity weakness and discomfort requiring periodic pain  medication.  As he is next seen November 11, 2020 and a second cycle as planned of Keytruda his knee pain has worsened and he is currently using a knee brace, alternating heat and cold with variable results.  He has not had previous injury to the knee.  The patient is also clearly suffering in terms of his concern about his multiple ongoing issues noting that his symptoms of depression are worsening.       We made plans to continue Imbruvica, and have the patient have follow-up scans.  Unfortunately he presented with incarcerated right inguinal hernia required admission November 16, 2020 ultimately reducing.  He had subsequent mild ileus that slowly resolved been seen by GI.  Repeat scans November 17 that demonstrate rather rapid increase in the right eighth rib lesion and projecting in thoracic cavity, moderate to large right-sided pleural effusion, enlargement of spiculated left upper lobe mass now measuring 2.4 x 1.6 and a new left adrenal mass as well as enlarged retrocrural lymph node.     On November 23, 2020 he underwent da Kavin robot assisted laparoscopic bilateral inguinal hernia repairs.  His Imbruvica has been held in around the surgery but restarted November 24.     The patient is next seen back in December 2, 2020 and his multiple issues including CLL/SSL on Imbruvica, metastatic non-small cell lung cancer with lack of response using immunotherapy as primary treatment, targeted treatments such as trametinib that given with Imbruvica can accelerate hypertension, recent requirement of bilateral inguinal hernia repair, follow-up with GI with opioid-induced constipation and abnormality seen on PET/CT requiring eventual colonoscopy and worsening depression and anxiety addressed by counseling, and institution of Remeron as well as as needed Xanax.     During his hospitalization we had seen the patient with the possibility of his development of pseudoprogression having had only 2 treatments with Keytruda.   "Though he has evidence of progression he request that we continue with Keytruda by itself at this point to determine if it has truly failed to control his disease.  He counters indicating that he is feeling better overall with stable appetite though has lost approximately 4 pounds.  In further discussion we have agreed that he will take 2 additional treatments with Keytruda and then undergo repeat scans to determine his true status and, at that point, if he has progressed we have moved to carboplatin alimta chemotherapy along with Keytruda.  There are targeted agents available though they do interact likely with his Imbruvica accelerated hypertension.  Finally we need to better understand his left knee pain since this is truly affecting his performance status.  Please note that Xgeva was initiated December 2, 2020  The patient was given Keytruda and Xgeva December 2.  A follow-up MRI of the knee revealed a bone lesion along the posterior aspect of the distal femoral diametaphysis just medial of midline representing metastatic lesion measuring 1.9 x 2.2 initiation of cortical destruction along the posterior aspect of the femur.  Tumor extends cephalad partially superficial cortex/the lesion overall measures 3.5 cm.    The patient seen in office December 23, 2020 indicating that his knee is manageable at this point as long as he \"starts walking\" his MRI, however, is concerning enough to request orthopedic oncology assessment and radiation therapy consultation as we also try to determine whether his disease is controlled with Keytruda as a single agent or whether we need to move to systemic chemotherapy along with immunotherapy.  Notably he states when seen December 23, 2020 that the swelling per his right upper chest is now resolved.  As result of above the patient was asked to be seen by orthopedic oncology-Dr. Elui Ochoa-who after assessment patient felt that he was at risk for pathologic fracture.  The patient was " admitted January 7 undergoing curettage of the left femoral bone lesion, prophylactic stabilization with intramedullary implants.  The patient did well with this approach.  It was suggested not to use Xgeva or Zometa for a period of time as this healed.    Additionally the patient underwent repeat imaging January 6 that showed the previously large expansile destructive lesion involving the right eighth rib markedly reduced in size measuring 3.4 x 5.3 cm previously 8.2 x 9.4, no change in left iliac bone, irregular spiculated mass left upper lobe nearly completely resolved previously 2.7 x 2.7 now only 0.4 x 0.9 cm, a residual large right pleural effusion unchanged, a previous left adrenal mass measuring 2.9 x 3.3 cm has resolved, evidence of right inguinal canal surgery also noted.  We had planned, initially, to change the patient to Carboplatin, Alimta and Keytruda but now find that is not necessary with the patient responding, clearly, to Keytruda as a single agent.  Patient with increasing shortness of breath recognized January 13, follow-up thoracentesis with flow cytometry, cytology,?  Pleurx catheter.     The patient underwent ultrasound-guided right thoracentesis January 14 revealing 1500 cc removal of livier-colored fluid.  This was negative for malignant cells.Flow cytometry did reveal a CD5 positive CD10 negative monoclonal B-cell population quantitating 2% of total events.     The patient was referred to radiation therapy after his previous knee procedure and returned February 3 for 6 cycle of Keytruda.  He completed radiation therapy approximately February 10, 2021.     His is next seen February 25 for ongoing Keytruda.  He has been doing generally overall better but states he still has right-sided chest pain without that shortness of breath that was so significant previously.  He believes the thoracentesis was quite successful and we have discussed that it was an exudative effusion negative cytologically  and with the population consistent with his lymphoproliferative disorder is contaminant.  The patient continued Keytruda therapy, continued radiation therapy January 28 through 10 February to the left femur and underwent repeat thoracentesis March 22, 2021 with 2200 cc removed.  Colonoscopy was performed March 19 with hyperplastic polyp removed.    His follow-up CT chest abdomen pelvis April 5 demonstrates a further reduction of the left upper lobe lung carcinoma though moderately large malignant right pleural effusion has increased slightly producing compressive atelectasis of the entire right lower lobe, there are tiny tumor nodules along the inferior aspect of the effusion that are more prominent in the expansile right eighth rib lesion is more sclerotic as is a left iliac bone lesion.  There is no evidence of new metastatic disease to chest, abdomen or pelvis otherwise present.  Fortunately the patient when seen April 8 is feeling reasonably good except for slowly progressive pain in the right chest that is now requiring more frequent narcotics as well as anti-inflammatories for control.  He has further pleural fluid recurrence that also needs to be addressed and we have discussed a thoracic surgery consultation, presentation at thoracic conference, to determine the best treatment plan-pleurodesis versus Pleurx catheter placement.  The patient was seen by thoracic surgery 4/26/2021 after undergoing thoracentesis 4/13 with 2050 cc removed.  Follow-up chest x-ray did not reveal additional fluid and plans are pending for Pleurx catheter placement once the patient's pleural effusion recurs.     The patient is next seen back 4/29 for Keytruda and Xgeva.  He, fortunately, is not having further respiratory symptoms and is feeling as he is doing relatively well.  We have discussed his course to date.     The patient continued therapy undergoing Keytruda 5/20/2021 and was seen back 6/6/2021 by thoracic surgery with chest  x-ray not demonstrating a significant reaccumulation of fluid.  As result a Pleurx catheter was held.      He is next seen back 6/10/2021 for continued Xgeva and Keytruda.  He is doing very well with absence of additional symptoms including shortness of breath though he does note some degree of muscle stiffness in the a.m.  His pain is at a point where he could discontinue MS Contin though he has been hesitant to do so.  We discussed going to just any p.m. dose at this point.  He is also brought up the fact that he is considering pneumococcal and shingles vaccinations.  The patient continued therapy with Keytruda 7/1/2021 and underwent repeat CT scans of chest, abdomen, and pelvis 7/15/2021.  These demonstrate minimal focal increased density left upper lobe that is unchanged, no discrete other masses in the lung, moderate-sized posterior layering right pleural effusion has diminished from previous, partial reexpansion right lower lobe since preceding CT.  In the abdomen and pelvis there is slight thickening left adrenal gland that is unchanged, single borderline enlarged left common iliac chain node that is unchanged, expansile sclerotic osseous metastatic lesion involving the lateral aspect of right eighth rib even more osteosclerotic and osteosclerosis in the left iliac bone that is unchanged as well as right acetabulum.  His sclerotic lesion in the anterior aspect of T10 vertebral body is also unchanged.  Patient seen back in office 7/22/2021 fortunately with relatively little pain to the point that he is not using his Percocet routinely though does remain on his MS Contin.  His performance status remains very good to excellent with only occasional fatigue and no additional pain is described.  Further he is not having increasing shortness of breath, rash or joint discomfort.              Past Medical History, Past Surgical History, Social History, Family History have been reviewed and are without significant  "changes except as mentioned.    Review of Systems   Constitutional: Positive for fatigue. Negative for unexpected weight change.   HENT: Negative.    Eyes: Negative.    Respiratory: Negative for cough, chest tightness, shortness of breath and wheezing.         No hemoptysis   Cardiovascular: Negative for leg swelling.   Gastrointestinal: Positive for constipation and diarrhea. Negative for abdominal pain, nausea and vomiting.   Endocrine: Negative.    Genitourinary: Negative.  Negative for testicular pain.   Musculoskeletal: Positive for arthralgias (Improved).   Skin: Negative.  Negative for rash.   Allergic/Immunologic: Negative.    Neurological: Negative for weakness.   Psychiatric/Behavioral: Positive for sleep disturbance.   All other systems reviewed and are negative.         Medications:  The current medication list was reviewed in the EMR    ALLERGIES:  No Known Allergies    Objective      Vitals:    07/22/21 0802   BP: 156/86   Pulse: 82   Resp: 18   Temp: 98.4 °F (36.9 °C)   TempSrc: Temporal   SpO2: 95%   Weight: 77.9 kg (171 lb 12.8 oz)   Height: 180.3 cm (70.98\")   PainSc: 0-No pain     Current Status 7/22/2021   ECOG score 0     Repeat full exam 7/22/2021  Physical Exam    Constitutional: He is oriented to person, place, and time. He appears well-developed and well-nourished.   HENT:   Head: Normocephalic.   Eyes: Conjunctivae and EOM are normal. Pupils are equal, round, and reactive to light. No scleral icterus.   Neck: Normal range of motion. Neck supple. No JVD present. No thyromegaly present.   Cardiovascular:  Normal rate, regular rhythm.  present. Exam reveals no gallop and no friction rub.   No murmur heard.  Pulmonary/Chest:He has no rales.  Decreased breath sounds to upper lung fields on the right, dullness to percussion.  The patient, again, no longer has a palpable mass approximately right seventh eighth rib laterally.  Tenderness elicited along the right sixth and seventh ribs " laterally.  Abdominal: Soft. He exhibits no distension and no mass. There is no tenderness.  He has bilateral hernias, apparently direct, right greater than left  Musculoskeletal: Normal range of motion. He exhibits trace edema bilateral lower extremities, status post surgery per left knee associated swelling limitation of motion.  Lymphadenopathy: No current lymphadenopathy      Skin: Rash resolved                                              Neurological: He is alert and oriented to person, place, and time. He has normal reflexes. No cranial nerve deficit.   Skin: Skin is warm and dry.                         Psychiatric: He has a normal mood and affect. His behavior is normal. Judgment normal    RECENT LABS:  Hematology     WBC   Date Value Ref Range Status   07/22/2021 7.95 3.40 - 10.80 10*3/mm3 Final     RBC   Date Value Ref Range Status   07/22/2021 4.69 4.14 - 5.80 10*6/mm3 Final     Hemoglobin   Date Value Ref Range Status   07/22/2021 13.4 13.0 - 17.7 g/dL Final     Hematocrit   Date Value Ref Range Status   07/22/2021 44.2 37.5 - 51.0 % Final     Platelets   Date Value Ref Range Status   07/22/2021 198 140 - 450 10*3/mm3 Final             CT SCANS OF THE CHEST AND ABDOMEN AND PELVIS WITH ORAL AND INTRAVENOUS  CONTRAST  7/15/2021  FINDINGS: Again seen is only very minimal focal linear increased density  in the left upper lobe at the site of the patient's treated lung  neoplasm that appears unchanged. There is no evidence of recurrent  primary lung neoplasm within the left upper lobe. No discrete masses or  focal infiltrates are identified within the visualized lung parenchyma.  There is a moderate-sized posteriorly layering right pleural effusion  that has diminished overall size somewhat in the interval. There has  been partial reexpansion of the right lower lobe since the preceding CT  scan. Moderate focal compressive atelectasis persists adjacent to the  effusion. There is no mediastinal or hilar or  axillary lymphadenopathy.     The liver and spleen and pancreas and kidneys  appear within normal  limits. Slight thickening of the left adrenal gland at the site of a  previous metastatic lesion is unchanged. No lymphadenopathy is  identified in the abdomen. Again noted is a single borderline enlarged  left common iliac chain lymph node that is unchanged and likely benign  based on size criteria. The stomach and small and large bowel are  unremarkable. An expansile sclerotic osseous metastatic lesion involving  the lateral aspect of the right eighth rib appears even more  osteosclerotic than on the previous CT scan. There are osteosclerotic  metastatic lesion in the left iliac bone is unchanged. There is also  small sclerotic lesion in the right acetabulum that is unchanged. This  may be a metastatic lesion or a benign bone island. A sclerotic  metastatic lesion in the anterior aspect of the T10 vertebral body is  unchanged.     IMPRESSION:  Very minimal focal abnormal increased density in the left  upper lobe at the site of the patient's treated lung neoplasm shows no  significant change since the preceding CT dated 04/05/2021. There is no  evidence of enlarging residual primary lung neoplasm. A moderate-sized  right pleural effusion has decreased in size and there has been partial  reexpansion of the right lower lobe. Slight thickening of the left  adrenal gland is stable. An expansile sclerotic metastatic lesion within  the right eighth rib appears even more sclerotic than on the previous CT  consistent with tumor response to therapy. Osteosclerotic metastatic  lesions in the T10 vertebral body and left iliac bone are unchanged.             Assessment/Plan   1.  CLL presenting with significant lymphocytosis, lymphadenopathy and splenomegaly.   Positive for deletion of 13 q. 14.3.  Patient status post 2 cycles of Treanda Rituxan with excellent response.  Reassessment July 3, 2019 with plans to switch Treanda to  Imbruvica which began July 25, 2019, tolerated well. This continues to be the case.  We have discussed the possibility of adding Rituxan to shorten the time he is on  Imbruvica and we will continue to review this though the patient has hypogammaglobulinemia at this point and we do not wish to worsen this until we are more aware of how to manage COVID-19.  As he is assessed September 9, 2020 his CLL is under good control and he will continue Imbruvica at this point at unchanged dosing.  There were no clinical changes when the patient is reassessed and this is felt to be stable and his ibrutinib.  He is asked to continue this upon review April 8, 2021-?  Involvement per pleural fluid.  Subsequent assessment for physical exam and CBC continue to show excellent control including 7/22/2021        2.  Pulmonary nodules/infiltrates.  He is  status post bronchoscopy.  Is unclear at this time whether these findings are infectious or possibly related to his lymphoproliferative disorder.  His subsequent studies in late November are much improved, followed by pulmonary.  As the patient's right pleural effusion, however, is increasing assessed April 8 we will proceed with additional thoracentesis, cytology, LDH, flow cytometry, total protein and glucose.  The patient is required further ultrasound-guided thoracentesis 4/13/2021, he is now been reviewed by thoracic surgery 4/26/2021 for Pleurx catheter to be placed as fluid recurs.  The patient was seen in consultation by Dr. Tran 6/6/2021 without reaccumulation of fluid and thus at this point the patient is not going to have a Pleurx catheter placed.   Patient status post scan 7/15/2021, no indication for need of Pleurx catheter placement.      3.  Non-small cell lung carcinoma                                       He had follow-up scans performed August 26 2020 revealing a new 1.6 cm spiculated nodule within the left upper lobe, 1.2 cm left adrenal nodule, bone windows with a  soft tissue mass involving the right eighth rib measuring 3.7 x 2.6 cm.  Biopsy was consistent with adenocarcinoma CK 7+, CK 20-, lung primary suspected clinically, molecular panel not yet obtained.     A PET/CT was obtained September 23, 2020 demonstrating asymmetric uptake within the right parotid gland which is unremarkable appearance on noncontrasted CT, SUV of 6 compared to 2.7.  There is no hilar, mediastinal or axillary adenopathy, findings of asymmetric moderate to intense FDG uptake within superior aspect the right chest wall anterior to the right first rib with an irregular hypodense lesion measuring 1.8 x 1.6 and SUV 4.9.  This had not been seen June 27, 2019.  There is an intensely FDG avid nodule within the lingula measuring 1.9 x 1.5 history of 12.4, FDG avid left adrenal nodule 1.9 x 1.5 with an issue 21.2.  The patient was seen by radiation therapy September 29 and started on radiation therapy to the right chest wall-35 Gy in 10 fractions.  Plans were also then proceed with SBRT to the solitary left upper lobe lesion pending insurance approval.  Further testing including TPS of 20% and foundation CDX with a TMB of greater than 10 mutations per megabase (28 mutations per megabase) and the indication that several immunotherapies could be useful in this patient's care.  Additional genomic findings include BRAF G469R/that trametinib could be useful and STK11/that everolimus could be useful.  *Discussion held as to how to proceed in particular using immunotherapy with Keytruda as monotherapy initially in this patient.  This would reduce his degree of myelosuppression.  Patient completed radiotherapy October 14, 2020 October 21 status post port placement the patient began Keytruda which she tolerated well and as he is seen November 11 we plan a second cycle.  At present he is scheduled for follow-up CT of the chest November 17, radiation therapy follow-up November 24.        The patient is reassessed after  2 cycles of Keytruda with enlargement of right eighth rib lesion, enlargement of spiculated left upper lobe lung mass, moderate to large right-sided pleural effusion, new left adrenal mass and enlarged retrocrural lymph node.  This is addressed with him November 2, 2020 with at least the possibility of pseudoprogression present.  Symptomatically he is improved in his right chest wall mass is slightly reduced clinically.  We have discussed proceeding with his third cycle of Keytruda, initiating Xgeva, having his left knee assessed and rescanning him after 4 cycles of Keytruda make a decision about extending his systemic therapy to include carboplatin, Alimta and Keytruda.      The patient is seen December 23, 2020 and clinically feels better except for his left knee pain.  We have discussed proceeding with his Keytruda and then reevaluating by follow-up scan.  The patient continued Xgeva December 2 and December 30.     In the interval he was seen for his left knee and was felt to be an impending fracture.  He was reviewed by orthopedic oncology and proceeded to curettage of the left femoral bone lesion and prophylactic stabilization January 7, 2021.      Additionally and wonderfully follow-up scans obtained January 6, 2021 demonstrated a marked improvement per his right eighth rib lesion, resolution of left upper lobe spiculated mass, residual large right pleural effusion, resolution of left adrenal metastasis.  This indicates that Keytruda is working well as a single agent we do plan to continue this January 13, 2021.  He has additional issues, however, with a right-sided pleural effusion that has increased and is producing symptoms.  He will require thoracentesis and will obtain both flow cytometry, cytology, LDH, total protein, glucose.  Pending his response to this the patient could be a candidate for Pleurx catheter placement.      The patient did improve and is next seen in office February 25, 2021.  Clinically  his effusion is improved.  We will plan chest x-ray and rib detail films today considering symptoms and continue with his next Keytruda.  The patient required follow-up thoracentesis March 22, 2021.  As he is reassessed April 8 we do plan repeat thoracentesis which would be his third and also presentation per thoracic conference April 15.     Thoracic conference was significant for offering of Pleurx catheter placement and the patient was seen 4/26/2021 without substantial right pleural effusion but with plans to proceed with Pleurx catheter when this effusion relapses.  The patient is seen in office 6/10/2021 doing extremely well without the need for Pleurx catheter.  We discussed proceeding with Keytruda and Xgeva and having him undergo repeat scans after his next Keytruda which would be in July 2021.     Repeat scans 7/15/2021 without evidence recurrence or progression of disease, Keytruda continued        3.  Xgeva initiated monthly beginning December 2, 2020.  This is next due December 30, 2020.  As result of the patient's recent knee surgery will be holding this for the next several months.  This will be readded when the patient is seen back in 3 weeks after evaluation April 8, 2021.  Patient proceeded with Xgeva 4/29/2021 to be given on a 6-week schedule-every other  Keytruda dosing.  This would next be due with Keytruda dosing 8/12/2021.      4.  Bilateral hernia repair status post right incarcerated inguinal hernia November 23, 2020                                                                                                                                                                          5.  Iron deficiency-stable     6.  Hypogammaglobulinemia-status post IVIG with resolution of sinusitis, current levels again reviewed and stable    7.  Pain control now addressed with MS Contin 30 mg every 12 hours increased to every 8 hours, Norco 10/325 1 p.o. every 4 hours, continue laxative therapy with  Senokot-S, MiraLAX and now lactulose.  He also frequently adds additional Aleve-2 tablets as well.  Reassessment 7/22/2021 with plans to move to MS Contin every 12 hours, Norco not currently being utilized, MS Contin to be transitioned to nightly dosing only.    8.  GI follow-up with Dr. Torres planned.    9.  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 Eliu Ochoa-who after assessment patient felt that he was at risk for pathologic fracture.  The patient was admitted January 7 undergoing curettage of the left femoral bone lesion, prophylactic stabilization with intramedullary implants.  The patient did well with this approach.  It was suggested not to use Xgeva or Zometa for a period of time as this healed.  He has now completed radiation therapy.  He has stable symptoms concerning is 7/22/2021.      10.  Supportive oncology-ongoing therapy for anxiety and depression.  He currently is being treated with Remeron and as needed Xanax and states he is finally sleeping more effectively.    Plan:  * Keytruda today  *Medications again reviewed-again current pain medication suggest, again, that his MS Contin move to nightly dosing, continue Imbruvica at current dosing, Remeron at current dosing and as needed Xanax.  *Return in 3 weeks for NP assessment, Keytruda and Xgeva.                                                          *Upon return consider scheduling for Prevnar 13 and Shingrix dosing    I spent 45 minutes caring for Dav on this date of service. This time includes time spent by me in the following activities: preparing for the visit, reviewing tests, obtaining and/or reviewing a separately obtained history, performing a medically appropriate examination and/or evaluation, counseling and educating the patient/family/caregiver, ordering medications, tests, or procedures, documenting information in the medical record, independently interpreting results and communicating that information with the  patient/family/caregiver and care coordination.

## 2021-07-20 DIAGNOSIS — C34.90 LUNG CANCER METASTATIC TO BONE (HCC): ICD-10-CM

## 2021-07-20 DIAGNOSIS — C79.51 LUNG CANCER METASTATIC TO BONE (HCC): ICD-10-CM

## 2021-07-20 RX ORDER — HYDROCODONE BITARTRATE AND ACETAMINOPHEN 10; 325 MG/1; MG/1
1 TABLET ORAL EVERY 4 HOURS PRN
Qty: 100 TABLET | Refills: 0 | Status: SHIPPED | OUTPATIENT
Start: 2021-07-20 | End: 2021-07-22 | Stop reason: SDUPTHER

## 2021-07-21 RX ORDER — IBRUTINIB 420 MG/1
TABLET, FILM COATED ORAL
Qty: 28 TABLET | Refills: 6 | Status: SHIPPED | OUTPATIENT
Start: 2021-07-21 | End: 2021-07-22 | Stop reason: SDUPTHER

## 2021-07-21 NOTE — TELEPHONE ENCOUNTER
Refill requested from pharmacy. Per last chart note, patient to continue Imbruvica 420 mg po daily. Will route to MD for cosignature.

## 2021-07-22 ENCOUNTER — OFFICE VISIT (OUTPATIENT)
Dept: ONCOLOGY | Facility: CLINIC | Age: 66
End: 2021-07-22

## 2021-07-22 ENCOUNTER — INFUSION (OUTPATIENT)
Dept: ONCOLOGY | Facility: HOSPITAL | Age: 66
End: 2021-07-22

## 2021-07-22 VITALS
RESPIRATION RATE: 18 BRPM | BODY MASS INDEX: 24.05 KG/M2 | WEIGHT: 171.8 LBS | TEMPERATURE: 98.4 F | SYSTOLIC BLOOD PRESSURE: 156 MMHG | DIASTOLIC BLOOD PRESSURE: 86 MMHG | HEIGHT: 71 IN | OXYGEN SATURATION: 95 % | HEART RATE: 82 BPM

## 2021-07-22 DIAGNOSIS — C34.90 LUNG CANCER METASTATIC TO BONE (HCC): ICD-10-CM

## 2021-07-22 DIAGNOSIS — C79.51 LUNG CANCER METASTATIC TO BONE (HCC): ICD-10-CM

## 2021-07-22 DIAGNOSIS — C91.10 CLL (CHRONIC LYMPHOCYTIC LEUKEMIA) (HCC): ICD-10-CM

## 2021-07-22 DIAGNOSIS — Z79.899 HIGH RISK MEDICATION USE: Primary | ICD-10-CM

## 2021-07-22 DIAGNOSIS — Z79.899 HIGH RISK MEDICATION USE: ICD-10-CM

## 2021-07-22 DIAGNOSIS — C34.12 MALIGNANT NEOPLASM OF UPPER LOBE OF LEFT LUNG (HCC): ICD-10-CM

## 2021-07-22 LAB
ALBUMIN SERPL-MCNC: 3.9 G/DL (ref 3.5–5.2)
ALBUMIN/GLOB SERPL: 2.1 G/DL (ref 1.1–2.4)
ALP SERPL-CCNC: 76 U/L (ref 38–116)
ALT SERPL W P-5'-P-CCNC: 11 U/L (ref 0–41)
ANION GAP SERPL CALCULATED.3IONS-SCNC: 9.7 MMOL/L (ref 5–15)
AST SERPL-CCNC: 11 U/L (ref 0–40)
BASOPHILS # BLD AUTO: 0.06 10*3/MM3 (ref 0–0.2)
BASOPHILS NFR BLD AUTO: 0.8 % (ref 0–1.5)
BILIRUB SERPL-MCNC: 0.4 MG/DL (ref 0.2–1.2)
BUN SERPL-MCNC: 15 MG/DL (ref 6–20)
BUN/CREAT SERPL: 13.2 (ref 7.3–30)
CALCIUM SPEC-SCNC: 8.7 MG/DL (ref 8.5–10.2)
CHLORIDE SERPL-SCNC: 104 MMOL/L (ref 98–107)
CO2 SERPL-SCNC: 27.3 MMOL/L (ref 22–29)
CREAT SERPL-MCNC: 1.14 MG/DL (ref 0.7–1.3)
DEPRECATED RDW RBC AUTO: 51.9 FL (ref 37–54)
EOSINOPHIL # BLD AUTO: 0.27 10*3/MM3 (ref 0–0.4)
EOSINOPHIL NFR BLD AUTO: 3.4 % (ref 0.3–6.2)
ERYTHROCYTE [DISTWIDTH] IN BLOOD BY AUTOMATED COUNT: 15 % (ref 12.3–15.4)
GFR SERPL CREATININE-BSD FRML MDRD: 64 ML/MIN/1.73
GLOBULIN UR ELPH-MCNC: 1.9 GM/DL (ref 1.8–3.5)
GLUCOSE SERPL-MCNC: 97 MG/DL (ref 74–124)
HCT VFR BLD AUTO: 44.2 % (ref 37.5–51)
HGB BLD-MCNC: 13.4 G/DL (ref 13–17.7)
IMM GRANULOCYTES # BLD AUTO: 0.03 10*3/MM3 (ref 0–0.05)
IMM GRANULOCYTES NFR BLD AUTO: 0.4 % (ref 0–0.5)
LYMPHOCYTES # BLD AUTO: 1 10*3/MM3 (ref 0.7–3.1)
LYMPHOCYTES NFR BLD AUTO: 12.6 % (ref 19.6–45.3)
MAGNESIUM SERPL-MCNC: 1.9 MG/DL (ref 1.8–2.5)
MCH RBC QN AUTO: 28.6 PG (ref 26.6–33)
MCHC RBC AUTO-ENTMCNC: 30.3 G/DL (ref 31.5–35.7)
MCV RBC AUTO: 94.2 FL (ref 79–97)
MONOCYTES # BLD AUTO: 1.01 10*3/MM3 (ref 0.1–0.9)
MONOCYTES NFR BLD AUTO: 12.7 % (ref 5–12)
NEUTROPHILS NFR BLD AUTO: 5.58 10*3/MM3 (ref 1.7–7)
NEUTROPHILS NFR BLD AUTO: 70.1 % (ref 42.7–76)
NRBC BLD AUTO-RTO: 0 /100 WBC (ref 0–0.2)
PHOSPHATE SERPL-MCNC: 3.5 MG/DL (ref 2.5–4.5)
PLATELET # BLD AUTO: 198 10*3/MM3 (ref 140–450)
PMV BLD AUTO: 11.8 FL (ref 6–12)
POTASSIUM SERPL-SCNC: 4.5 MMOL/L (ref 3.5–4.7)
PROT SERPL-MCNC: 5.8 G/DL (ref 6.3–8)
RBC # BLD AUTO: 4.69 10*6/MM3 (ref 4.14–5.8)
SODIUM SERPL-SCNC: 141 MMOL/L (ref 134–145)
WBC # BLD AUTO: 7.95 10*3/MM3 (ref 3.4–10.8)

## 2021-07-22 PROCEDURE — 80053 COMPREHEN METABOLIC PANEL: CPT

## 2021-07-22 PROCEDURE — 83735 ASSAY OF MAGNESIUM: CPT

## 2021-07-22 PROCEDURE — 96372 THER/PROPH/DIAG INJ SC/IM: CPT

## 2021-07-22 PROCEDURE — 25010000002 DENOSUMAB 120 MG/1.7ML SOLUTION: Performed by: INTERNAL MEDICINE

## 2021-07-22 PROCEDURE — 25010000002 PEMBROLIZUMAB 100 MG/4ML SOLUTION 4 ML VIAL: Performed by: INTERNAL MEDICINE

## 2021-07-22 PROCEDURE — 96413 CHEMO IV INFUSION 1 HR: CPT

## 2021-07-22 PROCEDURE — 84100 ASSAY OF PHOSPHORUS: CPT

## 2021-07-22 PROCEDURE — 85025 COMPLETE CBC W/AUTO DIFF WBC: CPT

## 2021-07-22 PROCEDURE — 99215 OFFICE O/P EST HI 40 MIN: CPT | Performed by: INTERNAL MEDICINE

## 2021-07-22 RX ORDER — HYDROCODONE BITARTRATE AND ACETAMINOPHEN 10; 325 MG/1; MG/1
1 TABLET ORAL EVERY 4 HOURS PRN
Qty: 100 TABLET | Refills: 0 | Status: SHIPPED | OUTPATIENT
Start: 2021-07-22 | End: 2022-01-27 | Stop reason: SDUPTHER

## 2021-07-22 RX ORDER — SODIUM CHLORIDE 9 MG/ML
250 INJECTION, SOLUTION INTRAVENOUS ONCE
Status: CANCELLED | OUTPATIENT
Start: 2021-07-22

## 2021-07-22 RX ORDER — SODIUM CHLORIDE 9 MG/ML
250 INJECTION, SOLUTION INTRAVENOUS ONCE
Status: COMPLETED | OUTPATIENT
Start: 2021-07-22 | End: 2021-07-22

## 2021-07-22 RX ORDER — HYDROCODONE BITARTRATE AND ACETAMINOPHEN 10; 325 MG/1; MG/1
1 TABLET ORAL EVERY 4 HOURS PRN
Qty: 100 TABLET | Refills: 0 | Status: SHIPPED | OUTPATIENT
Start: 2021-07-22 | End: 2021-07-22 | Stop reason: SDUPTHER

## 2021-07-22 RX ADMIN — SODIUM CHLORIDE 200 MG: 9 INJECTION, SOLUTION INTRAVENOUS at 09:05

## 2021-07-22 RX ADMIN — DENOSUMAB 120 MG: 120 INJECTION SUBCUTANEOUS at 09:41

## 2021-07-22 RX ADMIN — SODIUM CHLORIDE 250 ML: 9 INJECTION, SOLUTION INTRAVENOUS at 08:47

## 2021-07-28 ENCOUNTER — DOCUMENTATION (OUTPATIENT)
Dept: PHARMACY | Facility: HOSPITAL | Age: 66
End: 2021-07-28

## 2021-07-28 NOTE — PROGRESS NOTES
I have renewed the patient's PANf Yuniel that covers his Imbruvica prescription:    Member ID: [id number]  Group ID: [id number]  RxBin ID: [id number]  PCN: [id number]  Eligibility Start Date: 08/28/2021  Eligibility End Date: 08/27/2022  Assistance Amount: $8,700.00    I have emailed this information to Belia Schwartz @ [employer].    Sherley Salazar - Care Coordinator   7/28/2021  09:17 EDT

## 2021-08-12 ENCOUNTER — INFUSION (OUTPATIENT)
Dept: ONCOLOGY | Facility: HOSPITAL | Age: 66
End: 2021-08-12

## 2021-08-12 ENCOUNTER — TELEMEDICINE (OUTPATIENT)
Dept: ONCOLOGY | Facility: CLINIC | Age: 66
End: 2021-08-12

## 2021-08-12 VITALS
WEIGHT: 178.9 LBS | BODY MASS INDEX: 25.05 KG/M2 | TEMPERATURE: 97.5 F | HEIGHT: 71 IN | OXYGEN SATURATION: 93 % | DIASTOLIC BLOOD PRESSURE: 78 MMHG | RESPIRATION RATE: 16 BRPM | HEART RATE: 83 BPM | SYSTOLIC BLOOD PRESSURE: 148 MMHG

## 2021-08-12 DIAGNOSIS — Z79.899 HIGH RISK MEDICATION USE: ICD-10-CM

## 2021-08-12 DIAGNOSIS — C79.51 LUNG CANCER METASTATIC TO BONE (HCC): ICD-10-CM

## 2021-08-12 DIAGNOSIS — C34.90 LUNG CANCER METASTATIC TO BONE (HCC): ICD-10-CM

## 2021-08-12 DIAGNOSIS — Z79.899 HIGH RISK MEDICATION USE: Primary | ICD-10-CM

## 2021-08-12 DIAGNOSIS — C91.10 CLL (CHRONIC LYMPHOCYTIC LEUKEMIA) (HCC): ICD-10-CM

## 2021-08-12 DIAGNOSIS — C34.12 MALIGNANT NEOPLASM OF UPPER LOBE OF LEFT LUNG (HCC): ICD-10-CM

## 2021-08-12 DIAGNOSIS — G89.3 CANCER RELATED PAIN: ICD-10-CM

## 2021-08-12 DIAGNOSIS — C91.10 CLL (CHRONIC LYMPHOCYTIC LEUKEMIA) (HCC): Primary | ICD-10-CM

## 2021-08-12 LAB
ALBUMIN SERPL-MCNC: 4 G/DL (ref 3.5–5.2)
ALBUMIN/GLOB SERPL: 2.4 G/DL (ref 1.1–2.4)
ALP SERPL-CCNC: 79 U/L (ref 38–116)
ALT SERPL W P-5'-P-CCNC: 9 U/L (ref 0–41)
ANION GAP SERPL CALCULATED.3IONS-SCNC: 9.2 MMOL/L (ref 5–15)
AST SERPL-CCNC: 12 U/L (ref 0–40)
BASOPHILS # BLD AUTO: 0.04 10*3/MM3 (ref 0–0.2)
BASOPHILS NFR BLD AUTO: 0.6 % (ref 0–1.5)
BILIRUB SERPL-MCNC: 0.4 MG/DL (ref 0.2–1.2)
BUN SERPL-MCNC: 13 MG/DL (ref 6–20)
BUN/CREAT SERPL: 11.4 (ref 7.3–30)
CALCIUM SPEC-SCNC: 8.9 MG/DL (ref 8.5–10.2)
CHLORIDE SERPL-SCNC: 104 MMOL/L (ref 98–107)
CO2 SERPL-SCNC: 28.8 MMOL/L (ref 22–29)
CREAT SERPL-MCNC: 1.14 MG/DL (ref 0.7–1.3)
DEPRECATED RDW RBC AUTO: 49.8 FL (ref 37–54)
EOSINOPHIL # BLD AUTO: 0.21 10*3/MM3 (ref 0–0.4)
EOSINOPHIL NFR BLD AUTO: 3.1 % (ref 0.3–6.2)
ERYTHROCYTE [DISTWIDTH] IN BLOOD BY AUTOMATED COUNT: 14.4 % (ref 12.3–15.4)
GFR SERPL CREATININE-BSD FRML MDRD: 64 ML/MIN/1.73
GLOBULIN UR ELPH-MCNC: 1.7 GM/DL (ref 1.8–3.5)
GLUCOSE SERPL-MCNC: 116 MG/DL (ref 74–124)
HCT VFR BLD AUTO: 44.8 % (ref 37.5–51)
HGB BLD-MCNC: 14.1 G/DL (ref 13–17.7)
IMM GRANULOCYTES # BLD AUTO: 0.03 10*3/MM3 (ref 0–0.05)
IMM GRANULOCYTES NFR BLD AUTO: 0.4 % (ref 0–0.5)
LYMPHOCYTES # BLD AUTO: 0.92 10*3/MM3 (ref 0.7–3.1)
LYMPHOCYTES NFR BLD AUTO: 13.6 % (ref 19.6–45.3)
MCH RBC QN AUTO: 29.4 PG (ref 26.6–33)
MCHC RBC AUTO-ENTMCNC: 31.5 G/DL (ref 31.5–35.7)
MCV RBC AUTO: 93.5 FL (ref 79–97)
MONOCYTES # BLD AUTO: 0.7 10*3/MM3 (ref 0.1–0.9)
MONOCYTES NFR BLD AUTO: 10.3 % (ref 5–12)
NEUTROPHILS NFR BLD AUTO: 4.88 10*3/MM3 (ref 1.7–7)
NEUTROPHILS NFR BLD AUTO: 72 % (ref 42.7–76)
NRBC BLD AUTO-RTO: 0 /100 WBC (ref 0–0.2)
PLATELET # BLD AUTO: 161 10*3/MM3 (ref 140–450)
PMV BLD AUTO: 11.5 FL (ref 6–12)
POTASSIUM SERPL-SCNC: 4.2 MMOL/L (ref 3.5–4.7)
PROT SERPL-MCNC: 5.7 G/DL (ref 6.3–8)
RBC # BLD AUTO: 4.79 10*6/MM3 (ref 4.14–5.8)
SODIUM SERPL-SCNC: 142 MMOL/L (ref 134–145)
T4 FREE SERPL-MCNC: 1.83 NG/DL (ref 0.93–1.7)
TSH SERPL DL<=0.05 MIU/L-ACNC: 4.2 UIU/ML (ref 0.27–4.2)
WBC # BLD AUTO: 6.78 10*3/MM3 (ref 3.4–10.8)

## 2021-08-12 PROCEDURE — 85025 COMPLETE CBC W/AUTO DIFF WBC: CPT

## 2021-08-12 PROCEDURE — 80053 COMPREHEN METABOLIC PANEL: CPT

## 2021-08-12 PROCEDURE — 99214 OFFICE O/P EST MOD 30 MIN: CPT | Performed by: NURSE PRACTITIONER

## 2021-08-12 PROCEDURE — 84443 ASSAY THYROID STIM HORMONE: CPT | Performed by: INTERNAL MEDICINE

## 2021-08-12 PROCEDURE — 96413 CHEMO IV INFUSION 1 HR: CPT

## 2021-08-12 PROCEDURE — 84439 ASSAY OF FREE THYROXINE: CPT | Performed by: INTERNAL MEDICINE

## 2021-08-12 PROCEDURE — 25010000002 PEMBROLIZUMAB 100 MG/4ML SOLUTION 4 ML VIAL: Performed by: NURSE PRACTITIONER

## 2021-08-12 RX ORDER — SODIUM CHLORIDE 9 MG/ML
250 INJECTION, SOLUTION INTRAVENOUS ONCE
Status: CANCELLED | OUTPATIENT
Start: 2021-08-12

## 2021-08-12 RX ORDER — SODIUM CHLORIDE 9 MG/ML
250 INJECTION, SOLUTION INTRAVENOUS ONCE
Status: COMPLETED | OUTPATIENT
Start: 2021-08-12 | End: 2021-08-12

## 2021-08-12 RX ORDER — MORPHINE SULFATE 30 MG/1
30 TABLET, FILM COATED, EXTENDED RELEASE ORAL 2 TIMES DAILY
Qty: 60 TABLET | Refills: 0 | Status: SHIPPED | OUTPATIENT
Start: 2021-08-12 | End: 2021-10-01 | Stop reason: SDUPTHER

## 2021-08-12 RX ADMIN — SODIUM CHLORIDE 200 MG: 9 INJECTION, SOLUTION INTRAVENOUS at 09:54

## 2021-08-12 RX ADMIN — SODIUM CHLORIDE 250 ML: 9 INJECTION, SOLUTION INTRAVENOUS at 09:54

## 2021-08-12 NOTE — PROGRESS NOTES
"Subjective     REASON FOR FOLLOW UP:  1.  Chronic lymphocytic leukemia  2.  Hypogammaglobulinemia  3.  Non-small cell lung cancer    HISTORY OF PRESENT ILLNESS:    The patient is a  65 y.o. male with the above medical problems who continues at this time on combination of therapies including Imbruvica for CLL as well as Keytruda and Xgeva for his metastatic NSCLC.  Recent scans done in July showed stable disease.    Patient continues to tolerate treatment well.  His pain has continued to improve also, now just utilizing MS Contin twice daily rather than 3 times a day.  He is hopeful to soon drop the morning dose in the near future because of pain being so much better.  Patient denies any other new concerns today.    Past Medical History, Past Surgical History, Social History, Family History have been reviewed and are without significant changes except as mentioned.    Objective      Vitals:    08/12/21 0836   BP: 148/78   Pulse: 83   Resp: 16   Temp: 97.5 °F (36.4 °C)   TempSrc: Temporal   SpO2: 93%   Weight: 81.1 kg (178 lb 14.4 oz)   Height: 180.3 cm (70.98\")   PainSc: 0-No pain     Current Status 8/12/2021   ECOG score 0       Physical Exam    In no acute distress.  Awake, alert, appropriately verbal.  Breathing unlabored, no apparent use of accessory muscles of respiration.  No focal deficits.  Appropriate affect.  Asking appropriate questions.      RECENT LABS:  Results from last 7 days   Lab Units 08/12/21  0822   WBC 10*3/mm3 6.78   NEUTROS ABS 10*3/mm3 4.88   HEMOGLOBIN g/dL 14.1   HEMATOCRIT % 44.8   PLATELETS 10*3/mm3 161                       Assessment/Plan   1.  CLL presenting with significant lymphocytosis, lymphadenopathy and splenomegaly.     · Positive for deletion of 13 q. 14.3.    · Patient status post 2 cycles of Treanda Rituxan with excellent response.    · Imaging 6/27/2019 with significant decrease in adenopathy.  Treanda Rituxan discontinued   · Imbruvica 420 mg daily initiated " 7/25/2019.  · Imaging 7/15/2021 with no new/progressive adenopathy.   · He continues on Imbruvica, without indication of progression of his CLL.    2.  Hypogammaglobulinemia-status post IVIG with resolution of sinusitis.      3.  Non-small cell lung carcinoma  · August 26 2020 revealing a new 1.6 cm spiculated nodule within the left upper lobe, 1.2 cm left adrenal nodule, bone windows with a soft tissue mass involving the right eighth rib measuring 3.7 x 2.6 cm.    · Biopsy was consistent with adenocarcinoma CK 7+, CK 20-, lung primary suspected clinically, molecular panel not yet obtained.  · PET/CT 9/23/2020 demonstrating asymmetric uptake within the right parotid gland which is unremarkable appearance on noncontrasted CT, SUV of 6 compared to 2.7.  There is no hilar, mediastinal or axillary adenopathy, findings of asymmetric moderate to intense FDG uptake within superior aspect the right chest wall anterior to the right first rib with an irregular hypodense lesion measuring 1.8 x 1.6 and SUV 4.9.  This had not been seen June 27, 2019.  There is an intensely FDG avid nodule within the lingula measuring 1.9 x 1.5 history of 12.4, FDG avid left adrenal nodule 1.9 x 1.5 with an issue 21.2.    · Evaluated by radiation oncology therapy September 29 and started on radiation therapy to the right chest wall-35 Gy in 10 fractions.   ·  Plans were also then proceed with SBRT to the solitary left upper lobe lesion pending insurance approval.  · Further testing including TPS of 20% and foundation CDX with a TMB of greater than 10 mutations per megabase (28 mutations per megabase) and the indication that several immunotherapies could be useful in this patient's care.  Additional genomic findings include BRAF G469R/that trametinib could be useful and STK11/that everolimus could be useful.  · Keytruda initiated 10/21/2021   · Reassessment after 2 cycles of Keytruda with enlargement of the right eighth rib lesion,enlargement of  spiculated left upper lobe lung mass, moderate to large right-sided pleural effusion, new left adrenal mass and enlarged retrocrural lymph node.?Pseudoprogression  · Xgeva last given 12/30/2020  · Follow-up scans 1/6/2021 demonstrated marked improvement in his right eighth rib lesion, resolution of left upper lobe spiculated mass, residual large right pleural effusion, resolution of left adrenal metastasis.   · Right pleural effusion requiring repeated thoracentesis procedures. Pathology consistent with malignant effusion  · Effusion improving without need for Pleurx catheter.  · 7/15/2021 CT imaging stable findings. Continue Keytruda plus Xgeva.    4.  Left knee metastasis  · Evaluated by orthopedic oncology, Dr. Eliu Ochoa  · Admission 1/7/2021 undergoing stabilization of left femoral bone lesion, prophylactic stabilization with intramedullary implants.  · It was suggested we delay Xgeva or Zometa to allow bone healing  · Completed radiation with 10 fractions 2/10/2021    5.  Cancer related pain  · Pain is most prominent in his right rib, but overall improved/controlled utilizing MS Contin 30 mg now just twice daily (previously taking TID).  · Requesting refill today and hoping to soon drop morning dose. Rarely using Norco at this point.      Plan:  1. Proceed today with cycle 15 Keytruda  2. Continue Imbruvica 420 mg daily  3. Continue MS Contin 30 mg Q12 hours. Refill requested today. Patient hopes to soon drop the morning dose as his pain has continued to improve over time.  4. In 2 weeks MD follow-up with Dr. Parekh and next Keytruda along with Xgeva which we are now giving every 6 weeks to coincide with Keytruda delivery.    The patient is on high risk medication requiring close monitoring for toxicity.  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  He continues on medical management of both malignancies.    You have agreed to receive care through a video visit today. Do you consent to use an  electronic device for your medical care today? Yes         Lexi Bangura, APRN   8/12/2021

## 2021-09-01 NOTE — PROGRESS NOTES
Subjective  Patient doing well, pain improving, performance status excellent      REASON FOR FOLLOW UP:  1.  Chronic lymphocytic leukemia with extensive lymphadenopathy and possible pleural involvement.  2.  Initiation of Treanda, Rituxan May 1, 2019 IVIG also utilized                                                              3.  Significant response on scans June 27, 2019, alternative therapy with Imbruvica planned, initiated July 25, 2019  4.  Patient seen February 12, 2020, continued control of CLL, discussed Rituxan, Imbruvica, sleep study and potential surgical assessment for hernia  5.  CT of chest per pulmonary August 26 with 1.6 cm spiculated nodule in the left upper lobe, 1.2 cm left adrenal nodule not present on comparison study, bone windows with soft tissue mass involving the right eighth rib measuring 3.7 x 2.6, biopsy positive for adenocarcinoma  6.  Patient assessed September 30, plans for palliative radiation therapy, port placement, Keytruda considering PDL 1 positivity and high TMB status  7.  Patient reviewed October 21, 2020, Keytruda initiated, pain much improved status post radiation therapy  8.  Status post bilateral inguinal hernia repairs November 23, 2020.  Evidence of progression of disease versus pseudoprogression, continued knee pain   9.  Patient seen in office December 2, 2020, continued Keytruda for total of 4 cycles, institution of Xgeva, reassessment per left knee pain  10.  Patient assessed by orthopedic oncology status post prophylactic stabilization of left femur January 7, 2021.  11 patient reviewed January 12, 2021 with clear evidence of improvement on Keytruda.  Plan to continue same at present.  Increasing shortness of breath thought secondary pleural effusion, plan thoracentesis-cytology, flow cytometry and chemistries,?  Pleurx catheter  12.  Patient status post thoracentesis with improvement per shortness of breath, exudative, negative cytology, continued chest pain  February 25, 2021, follow-up chest x-ray with rib details.  13.  Patient reviewed April 8, 2021, consideration of follow-up thoracentesis despite general improvement on scans, potential pleurodesis versus Pleurx catheter.  14.  Patient seen by thoracic surgery 4/26 for Pleurx catheter to be scheduled as fluid recurs.  15.  Catheter not required, patient seen 6/10/2021 stable on current approach, Keytruda, Xgeva, Imbruvica continued with scans likely later July 2021  16.  Repeat scans with general stability-assessment 7/15/2021  17. Patient seen 9/2/2021 with normalized performance status, encouraged        HISTORY OF PRESENT ILLNESS:   The patient is a  66 y.o. Male  who was admitted on 4/28/2019 with worsening complaints of cough wheezing and shortness of breath.  He had been to an urgent care center and was started on antibiotics and received a shot of Solu-Medrol.  His symptoms did not improve and on his admission he was noted to have profound lymphocytosis with a total white count of 70,074% lymphocytes on his white cell differential.      He also underwent CT scan of the chest that showed infiltrates and nodules in the lungs as well as significant lymphadenopathy within the chest and in the axillary regions.  He had peripheral blood sent for flow cytometry leukemia lymphoma panel but this is still pending at time of this dictation.           He underwent bronchoscopy on 4/29/2019.  Results from this procedure are pendingl.  His respiratory viral panel was negative and markers of sepsis were unremarkable.      His peripheral blood flow cytometry and flow cytometry from the EBUS needle biopsy at bronchoscopy are both consistent with chronic lymphocytic leukemia/small lymphocytic lymphoma.     The patient underwent a CT of abdomen and pelvis April 30 demonstrating extensive splenomegaly and bulky lymphadenopathy throughout the abdomen and pelvis.  There is a tiny lesion at the superior aspect lateral hepatic  segment and 2 hyperenhancing foci within the liver thought to be hemangiomas, moderate size right inguinal hernia containing a long segment of small bowel without obstruction or incarceration.  CT of soft tissue again reveals extensive cervical adenopathy as well as evidence of pansinusitis.  The patient's case was discussed with Dr. Church and the patient has stage III-IV disease should be treated during this hospitalization.  I met with the patient and discussed in detail the use of Treanda/ Rituxan which we initiated Treanda Rituxan beginning May 1, 2019.     When he is seen May 2 he is already beginning to respond with reducing adenopathy and improved symptoms.  Plan to proceed with day #2.  We have also reviewed his findings including IgA 14, IgG of 263, IgM of 5 and a kappa/lambda ratio of 26.38.  He is hypogammaglobulinemic and replacement therapy will be given this hospitalization.  ENT assessment will also be requested.     The patient is again seen May 3, 2019, continuing to improve overall.  We did proceed with IVIG 400 mg/kg and had the patient seen by ENT after which the patient was discharged.  He indicates that Dr. Hamlin is going to see him back within the week as he is now seen back in our office May 16 and that surgery may be necessary.  The patient is also still having sinus symptoms.  Further he has developed a more extensive rash involving his abdomen chest and back and previously seen in hospital?.  Otherwise he feels well except for fatigue without fever, chills, nausea, vomiting, weight loss, stable appetite.     The patient went on to take 2 cycles of Treanda, Rituxan with a second cycle given June 6 and 7.  Additional assessments included his dermatologist who felt he had a gradually improving dermatitis and would not require an therapy and ENT indicating that the patient was slowly improving per sinus symptoms the surgery may be necessary at a later point.  When he is reviewed May 30 he was  neutropenic and we proceeded with IVIG second treatment on Elenita 10.  He underwent repeat scans June 27 demonstrating a significant reduction in adenopathy in the neck chest abdomen pelvis with index nodes in the neck previously 2 cm x 1.1 cm, chest with subcarinal lymph node conglomerate measuring 2.1 x 0.9 previously 5.8 x 2.7 and previously seen irregular pulmonary consolidation throughout bilateral lungs having nearly completely resolved.  Spleen is also reduced in size at 4.5 cm previously 18.6 cm and mesenteric nodes had similar reduced in size.  The patient's immunoglobulins were assessed June 27 with an IgG of 667, IgA of 20, IgM of 9 and IgE of 3.Additional information includes FISH analysis for CLL which is positive for deletion of 13 q. 14.3 It should be noted that such finding on FISH analysis generally suggest a favorable prognostic finding.  The patient is next seen July 3, 2019 feeling improved overall but still having a dermatologic issue with pruritus, erythema worsening particularly in the lower abdomen and upper groin.  Again his scans are considerably improved and we discussed, on the basis of the above information, changing his therapy now to Imbruvica.  The patient will return for teaching per Imbruvica July 11 the patient initiated treatment by July 25th 2019.  He was seen July 31 tolerating this well.  He was able to discontinue allopurinol and ferrous sulfate thereafter presents back August 28 having taken the medication over the last month.     He indicates that he is having no issues tolerating the medication and generally feels well except for sinus drainage.  He has had a cough and has a chest x-ray scheduled to primary care August 29, 2019.  He has had no fever, chills, sweats, rash and has gained weight.  The patient is next seen November 20, 2019 continuing to generally improve.  His performance status remains excellent and has had no additional complications thus far with the use of  Imbruvica or an additional infectious complications.  He has been seen by pulmonary medicine with reticular infiltrates noted on previous CAT scan on a follow-up study scheduled in the next several days.  This may be evolution towards recovery but a follow-up will be necessary.  Finally we have been able to obtain Imbruvica reasonably cost through foundation support.    The patient is next seen February 12, 2020 doing well without additional infectious complications and with stable findings hematologically.  We have discussed the fact that Rituxan could be added potentially to reduce the amount of time he remains on the medication but, additionally, further reduce his immunoglobulins.  Though this remains a concern he is also having difficulty with sleep-?  Sleep apnea, and worsening right direct inguinal hernia.    Plans for the patient to continue Imbruvica at dosing rechecking his immunoglobulins April 29 now with IVIG down to 371, IgM of 11, IgA level 32, kappa/lambda ratio of 1.51.  Fortunately continues to feel well without additional symptoms though is concerned about easy bruisability and periodic muscle tenderness after activity.  We have discussed his sleep assessment and he very likely has sleep apnea but does not wish to start CPAP at this point and is using oral medications-trazodone-to modest effect.    The patient is followed by pulmonary medicine.He was seen September 2 having had right-sided rib pain for 1 month, shortness of breath on going up stairs or uphill.  Follow-up chest CT revealed left upper lobe nodule 1.6 cm that was noted spiculated, additionally lesion per right rib with a soft tissue mass (3.7 x 2.6 cm) was recognized in the tissue biopsy was obtained.  This is now returned as positive for moderately differentiated adenocarcinoma.  This is CK7 positive, CK20 negative with a lung primary suspected clinically.  A molecular panel has not yet been obtained.  We have now, however, sent for  foundation CDX analysis.    The patient is seen September 2020 with considerable right chest wall pain only modestly controlled with Toradol and ibuprofen.  He also has been taking additional Xanax to reduce the muscle spasm that he is experiencing.  I have advised him of the findings and their significance as being consistent with metastatic non-small cell lung cancer.  He is dismayed by the conversation but wishes to have pain control as quickly as possible as well as a cady discussion of treatment options.    The patient was provided additional anti-pain and antianxiety medications.  A PET/CT was obtained September 23 demonstrating asymmetric uptake within the right parotid gland which is unremarkable appearance on noncontrasted CT, SUV of 6 compared to 2.7.  There is no hilar, mediastinal or axillary adenopathy, findings of asymmetric moderate to intense FDG uptake within superior aspect the right chest wall anterior to the right first rib with an irregular hypodense lesion measuring 1.8 x 1.6 and SUV 4.9.  This had not been seen June 27, 2019.  There is an intensely FDG avid nodule within the lingula measuring 1.9 x 1.5 history of 12.4, FDG avid left adrenal nodule 1.9 x 1.5 with an issue 21.2.  The patient was seen by radiation therapy September 29 and started on radiation therapy to the right chest wall-35 Gy in 10 fractions.  Plans were also then proceed with SBRT to the solitary left upper lobe lesion pending insurance approval.  Further testing including TPS of 20% and foundation CDX with a TMB of greater than 10 mutations per megabase (28 mutations per megabase) and the indication that several immunotherapies could be useful in this patient's care.  Additional genomic findings include BRAF G469R/that trametinib could be useful and STK11/that everolimus could be useful.      The patient is seen back September 30, 2020 indicating that his pain has improved substantially with his current pain medications.   He also continues laxatives on a regular basis.  Radiation therapy will begin October 1, 2020 as described above and we have discussed on the basis of the findings per his genomic testing that he is a candidate for a number of additional treatment approaches.  Considering he does not have significant organ involvement and that he has a positive PDL 1 at 20% and a TMB 28 mutations per megabase it would seem reasonable (particularly with his history of CLL) to try to use immunotherapy as monotherapy initially.  This might provide control and allow us not to affect his hematologic illness in any significant way.  We discussed that he will need a PowerPort placement in the near future but that we could start Keytruda shortly thereafter.  Patient was able to proceed into palliative therapy undergoing radiation therapy to the right eighth rib 3 and 50 cGy per fraction x10 between October 1 of October 14.  A PowerPort was placed October 15, 2020.        Mr. Freitas returns to the office October 21, 2020 indicating, wonderfully, that his pain has nearly resolved and he does not need to take short acting pain medications at this point.  Unfortunately he has had severe constipation we discussed his laxatives in depth as to the use, schedule and expected results as he reduces his narcotic use and moves into immunotherapy.  We have also discussed his PET/CT that does refer to an abnormality seen in the rectum that will need to be assessed by colonoscopy.  He states further that he is having lower extremity swelling beginning over the last several weeks right greater than left.  His breathing remains generally improved though he does have cough periodically and mild degree of hemoptysis.    The patient proceeded with his first Keytruda October 21, 2020 and, fortunately, had little side effects from its use thereafter.  He had noted mild hemoptysis as well as left lower extremity weakness and discomfort requiring periodic pain  medication.  As he is next seen November 11, 2020 and a second cycle as planned of Keytruda his knee pain has worsened and he is currently using a knee brace, alternating heat and cold with variable results.  He has not had previous injury to the knee.  The patient is also clearly suffering in terms of his concern about his multiple ongoing issues noting that his symptoms of depression are worsening.       We made plans to continue Imbruvica, and have the patient have follow-up scans.  Unfortunately he presented with incarcerated right inguinal hernia required admission November 16, 2020 ultimately reducing.  He had subsequent mild ileus that slowly resolved been seen by GI.  Repeat scans November 17 that demonstrate rather rapid increase in the right eighth rib lesion and projecting in thoracic cavity, moderate to large right-sided pleural effusion, enlargement of spiculated left upper lobe mass now measuring 2.4 x 1.6 and a new left adrenal mass as well as enlarged retrocrural lymph node.     On November 23, 2020 he underwent da Kavin robot assisted laparoscopic bilateral inguinal hernia repairs.  His Imbruvica has been held in around the surgery but restarted November 24.     The patient is next seen back in December 2, 2020 and his multiple issues including CLL/SSL on Imbruvica, metastatic non-small cell lung cancer with lack of response using immunotherapy as primary treatment, targeted treatments such as trametinib that given with Imbruvica can accelerate hypertension, recent requirement of bilateral inguinal hernia repair, follow-up with GI with opioid-induced constipation and abnormality seen on PET/CT requiring eventual colonoscopy and worsening depression and anxiety addressed by counseling, and institution of Remeron as well as as needed Xanax.     During his hospitalization we had seen the patient with the possibility of his development of pseudoprogression having had only 2 treatments with Keytruda.   "Though he has evidence of progression he request that we continue with Keytruda by itself at this point to determine if it has truly failed to control his disease.  He counters indicating that he is feeling better overall with stable appetite though has lost approximately 4 pounds.  In further discussion we have agreed that he will take 2 additional treatments with Keytruda and then undergo repeat scans to determine his true status and, at that point, if he has progressed we have moved to carboplatin alimta chemotherapy along with Keytruda.  There are targeted agents available though they do interact likely with his Imbruvica accelerated hypertension.  Finally we need to better understand his left knee pain since this is truly affecting his performance status.  Please note that Xgeva was initiated December 2, 2020  The patient was given Keytruda and Xgeva December 2.  A follow-up MRI of the knee revealed a bone lesion along the posterior aspect of the distal femoral diametaphysis just medial of midline representing metastatic lesion measuring 1.9 x 2.2 initiation of cortical destruction along the posterior aspect of the femur.  Tumor extends cephalad partially superficial cortex/the lesion overall measures 3.5 cm.    The patient seen in office December 23, 2020 indicating that his knee is manageable at this point as long as he \"starts walking\" his MRI, however, is concerning enough to request orthopedic oncology assessment and radiation therapy consultation as we also try to determine whether his disease is controlled with Keytruda as a single agent or whether we need to move to systemic chemotherapy along with immunotherapy.  Notably he states when seen December 23, 2020 that the swelling per his right upper chest is now resolved.  As result of above the patient was asked to be seen by orthopedic oncology-Dr. Eliu Ochoa-who after assessment patient felt that he was at risk for pathologic fracture.  The patient was " admitted January 7 undergoing curettage of the left femoral bone lesion, prophylactic stabilization with intramedullary implants.  The patient did well with this approach.  It was suggested not to use Xgeva or Zometa for a period of time as this healed.    Additionally the patient underwent repeat imaging January 6 that showed the previously large expansile destructive lesion involving the right eighth rib markedly reduced in size measuring 3.4 x 5.3 cm previously 8.2 x 9.4, no change in left iliac bone, irregular spiculated mass left upper lobe nearly completely resolved previously 2.7 x 2.7 now only 0.4 x 0.9 cm, a residual large right pleural effusion unchanged, a previous left adrenal mass measuring 2.9 x 3.3 cm has resolved, evidence of right inguinal canal surgery also noted.  We had planned, initially, to change the patient to Carboplatin, Alimta and Keytruda but now find that is not necessary with the patient responding, clearly, to Keytruda as a single agent.  Patient with increasing shortness of breath recognized January 13, follow-up thoracentesis with flow cytometry, cytology,?  Pleurx catheter.     The patient underwent ultrasound-guided right thoracentesis January 14 revealing 1500 cc removal of livier-colored fluid.  This was negative for malignant cells.Flow cytometry did reveal a CD5 positive CD10 negative monoclonal B-cell population quantitating 2% of total events.     The patient was referred to radiation therapy after his previous knee procedure and returned February 3 for 6 cycle of Keytruda.  He completed radiation therapy approximately February 10, 2021.     His is next seen February 25 for ongoing Keytruda.  He has been doing generally overall better but states he still has right-sided chest pain without that shortness of breath that was so significant previously.  He believes the thoracentesis was quite successful and we have discussed that it was an exudative effusion negative cytologically  and with the population consistent with his lymphoproliferative disorder is contaminant.  The patient continued Keytruda therapy, continued radiation therapy January 28 through 10 February to the left femur and underwent repeat thoracentesis March 22, 2021 with 2200 cc removed.  Colonoscopy was performed March 19 with hyperplastic polyp removed.    His follow-up CT chest abdomen pelvis April 5 demonstrates a further reduction of the left upper lobe lung carcinoma though moderately large malignant right pleural effusion has increased slightly producing compressive atelectasis of the entire right lower lobe, there are tiny tumor nodules along the inferior aspect of the effusion that are more prominent in the expansile right eighth rib lesion is more sclerotic as is a left iliac bone lesion.  There is no evidence of new metastatic disease to chest, abdomen or pelvis otherwise present.  Fortunately the patient when seen April 8 is feeling reasonably good except for slowly progressive pain in the right chest that is now requiring more frequent narcotics as well as anti-inflammatories for control.  He has further pleural fluid recurrence that also needs to be addressed and we have discussed a thoracic surgery consultation, presentation at thoracic conference, to determine the best treatment plan-pleurodesis versus Pleurx catheter placement.  The patient was seen by thoracic surgery 4/26/2021 after undergoing thoracentesis 4/13 with 2050 cc removed.  Follow-up chest x-ray did not reveal additional fluid and plans are pending for Pleurx catheter placement once the patient's pleural effusion recurs.     The patient is next seen back 4/29 for Keytruda and Xgeva.  He, fortunately, is not having further respiratory symptoms and is feeling as he is doing relatively well.  We have discussed his course to date.     The patient continued therapy undergoing Keytruda 5/20/2021 and was seen back 6/6/2021 by thoracic surgery with chest  x-ray not demonstrating a significant reaccumulation of fluid.  As result a Pleurx catheter was held.      He is next seen back 6/10/2021 for continued Xgeva and Keytruda.  He is doing very well with absence of additional symptoms including shortness of breath though he does note some degree of muscle stiffness in the a.m.  His pain is at a point where he could discontinue MS Contin though he has been hesitant to do so.  We discussed going to just any p.m. dose at this point.  He is also brought up the fact that he is considering pneumococcal and shingles vaccinations.  The patient continued therapy with Keytruda 7/1/2021 and underwent repeat CT scans of chest, abdomen, and pelvis 7/15/2021.  These demonstrate minimal focal increased density left upper lobe that is unchanged, no discrete other masses in the lung, moderate-sized posterior layering right pleural effusion has diminished from previous, partial reexpansion right lower lobe since preceding CT.  In the abdomen and pelvis there is slight thickening left adrenal gland that is unchanged, single borderline enlarged left common iliac chain node that is unchanged, expansile sclerotic osseous metastatic lesion involving the lateral aspect of right eighth rib even more osteosclerotic and osteosclerosis in the left iliac bone that is unchanged as well as right acetabulum.  His sclerotic lesion in the anterior aspect of T10 vertebral body is also unchanged.  Patient seen back in office 7/22/2021 fortunately with relatively little pain to the point that he is not using his Percocet routinely though does remain on his MS Contin.  His performance status remains very good to excellent with only occasional fatigue and no additional pain is described.  Further he is not having increasing shortness of breath, rash or joint discomfort.       The patient continued therapy with Keytruda 8/12/2021.  He was reviewed by video visit and he was able to decrease his MS Contin to 2  "times daily and we, again, proceed with cycle #15 Keytruda, Imbruvica with plans for him to be seen on 9/2/2021 for his next Keytruda which would coincide also with his Xgeva therapy.    The patient is next seen 9/2/2021 fortunately with an excellent performance status. His pain medication has now been reduced to once nightly only and he is able \"to do much anything he wants\".      Past Medical History, Past Surgical History, Social History, Family History have been reviewed and are without significant changes except as mentioned.    Review of Systems   Constitutional: Positive for fatigue. Negative for unexpected weight change.   HENT: Negative.    Eyes: Negative.    Respiratory: Negative for cough, chest tightness, shortness of breath and wheezing.         No hemoptysis   Cardiovascular: Negative for leg swelling.   Gastrointestinal: Negative for abdominal pain, constipation, diarrhea, nausea and vomiting.   Endocrine: Negative.    Genitourinary: Negative.  Negative for testicular pain.   Musculoskeletal: Positive for arthralgias (Improved).   Skin: Negative.  Negative for rash.   Allergic/Immunologic: Negative.    Neurological: Negative for weakness.   Psychiatric/Behavioral: Negative for sleep disturbance.   All other systems reviewed and are negative.         Medications:  The current medication list was reviewed in the EMR    ALLERGIES:  No Known Allergies    Objective      Vitals:    09/02/21 1054   BP: 137/79   Pulse: 84   Resp: 14   Temp: 97.8 °F (36.6 °C)   TempSrc: Infrared   SpO2: 97%   Weight: 77.6 kg (171 lb 1.6 oz)   Height: 180.3 cm (70.98\")   PainSc: 0-No pain     Current Status 9/2/2021   ECOG score 0     Repeat full exam 7/22/2021  Physical Exam    Constitutional: He is oriented to person, place, and time. He appears well-developed and well-nourished.   HENT:   Head: Normocephalic.   Eyes: Conjunctivae and EOM are normal. Pupils are equal, round, and reactive to light. No scleral icterus.   Neck: " Normal range of motion. Neck supple. No JVD present. No thyromegaly present.   Cardiovascular:  Normal rate, regular rhythm.  present. Exam reveals no gallop and no friction rub.   No murmur heard.  Pulmonary/Chest:He has no rales.  Decreased breath sounds to upper lung fields on the right, dullness to percussion.  The patient, again, no longer has a palpable mass approximately right seventh eighth rib laterally.  Tenderness elicited along the right sixth and seventh ribs laterally.  Abdominal: Soft. He exhibits no distension and no mass. There is no tenderness.  He has bilateral hernias, apparently direct, right greater than left  Musculoskeletal: Normal range of motion. He exhibits trace edema bilateral lower extremities, status post surgery per left knee associated swelling limitation of motion.  Lymphadenopathy: No current lymphadenopathy      Skin: Rash resolved                                              Neurological: He is alert and oriented to person, place, and time. He has normal reflexes. No cranial nerve deficit.   Skin: Skin is warm and dry.                         Psychiatric: He has a normal mood and affect. His behavior is normal. Judgment normal    RECENT LABS:  Hematology     WBC   Date Value Ref Range Status   09/02/2021 8.50 3.40 - 10.80 10*3/mm3 Final     RBC   Date Value Ref Range Status   09/02/2021 4.95 4.14 - 5.80 10*6/mm3 Final     Hemoglobin   Date Value Ref Range Status   09/02/2021 14.7 13.0 - 17.7 g/dL Final     Hematocrit   Date Value Ref Range Status   09/02/2021 47.0 37.5 - 51.0 % Final     Platelets   Date Value Ref Range Status   09/02/2021 195 140 - 450 10*3/mm3 Final             CT SCANS OF THE CHEST AND ABDOMEN AND PELVIS WITH ORAL AND INTRAVENOUS  CONTRAST  7/15/2021  FINDINGS: Again seen is only very minimal focal linear increased density  in the left upper lobe at the site of the patient's treated lung  neoplasm that appears unchanged. There is no evidence of  recurrent  primary lung neoplasm within the left upper lobe. No discrete masses or  focal infiltrates are identified within the visualized lung parenchyma.  There is a moderate-sized posteriorly layering right pleural effusion  that has diminished overall size somewhat in the interval. There has  been partial reexpansion of the right lower lobe since the preceding CT  scan. Moderate focal compressive atelectasis persists adjacent to the  effusion. There is no mediastinal or hilar or axillary lymphadenopathy.     The liver and spleen and pancreas and kidneys  appear within normal  limits. Slight thickening of the left adrenal gland at the site of a  previous metastatic lesion is unchanged. No lymphadenopathy is  identified in the abdomen. Again noted is a single borderline enlarged  left common iliac chain lymph node that is unchanged and likely benign  based on size criteria. The stomach and small and large bowel are  unremarkable. An expansile sclerotic osseous metastatic lesion involving  the lateral aspect of the right eighth rib appears even more  osteosclerotic than on the previous CT scan. There are osteosclerotic  metastatic lesion in the left iliac bone is unchanged. There is also  small sclerotic lesion in the right acetabulum that is unchanged. This  may be a metastatic lesion or a benign bone island. A sclerotic  metastatic lesion in the anterior aspect of the T10 vertebral body is  unchanged.     IMPRESSION:  Very minimal focal abnormal increased density in the left  upper lobe at the site of the patient's treated lung neoplasm shows no  significant change since the preceding CT dated 04/05/2021. There is no  evidence of enlarging residual primary lung neoplasm. A moderate-sized  right pleural effusion has decreased in size and there has been partial  reexpansion of the right lower lobe. Slight thickening of the left  adrenal gland is stable. An expansile sclerotic metastatic lesion within  the right  eighth rib appears even more sclerotic than on the previous CT  consistent with tumor response to therapy. Osteosclerotic metastatic  lesions in the T10 vertebral body and left iliac bone are unchanged.             Assessment/Plan   1.  CLL presenting with significant lymphocytosis, lymphadenopathy and splenomegaly.   Positive for deletion of 13 q. 14.3.  Patient status post 2 cycles of Treanda Rituxan with excellent response.  Reassessment July 3, 2019 with plans to switch Treanda to Imbruvica which began July 25, 2019, tolerated well. This continues to be the case.  We have discussed the possibility of adding Rituxan to shorten the time he is on  Imbruvica and we will continue to review this though the patient has hypogammaglobulinemia at this point and we do not wish to worsen this until we are more aware of how to manage COVID-19.  As he is assessed September 9, 2020 his CLL is under good control and he will continue Imbruvica at this point at unchanged dosing.  There were no clinical changes when the patient is reassessed and this is felt to be stable and his ibrutinib.  He is asked to continue this upon review April 8, 2021-?  Involvement per pleural fluid.  Subsequent assessment for physical exam and CBC continue to show excellent control including 9/2/2021        2.  Pulmonary nodules/infiltrates.  He is  status post bronchoscopy.  Is unclear at this time whether these findings are infectious or possibly related to his lymphoproliferative disorder.  His subsequent studies in late November are much improved, followed by pulmonary.  As the patient's right pleural effusion, however, is increasing assessed April 8 we will proceed with additional thoracentesis, cytology, LDH, flow cytometry, total protein and glucose.  The patient is required further ultrasound-guided thoracentesis 4/13/2021, he is now been reviewed by thoracic surgery 4/26/2021 for Pleurx catheter to be placed as fluid recurs.  The patient was  seen in consultation by Dr. Tran 6/6/2021 without reaccumulation of fluid and thus at this point the patient is not going to have a Pleurx catheter placed.   Patient status post scan 7/15/2021, no indication for need of Pleurx catheter placement. Continue physical examinations do not demonstrate recurrent effusion including 9/2/2021      3.  Non-small cell lung carcinoma                                       He had follow-up scans performed August 26 2020 revealing a new 1.6 cm spiculated nodule within the left upper lobe, 1.2 cm left adrenal nodule, bone windows with a soft tissue mass involving the right eighth rib measuring 3.7 x 2.6 cm.  Biopsy was consistent with adenocarcinoma CK 7+, CK 20-, lung primary suspected clinically, molecular panel not yet obtained.     A PET/CT was obtained September 23, 2020 demonstrating asymmetric uptake within the right parotid gland which is unremarkable appearance on noncontrasted CT, SUV of 6 compared to 2.7.  There is no hilar, mediastinal or axillary adenopathy, findings of asymmetric moderate to intense FDG uptake within superior aspect the right chest wall anterior to the right first rib with an irregular hypodense lesion measuring 1.8 x 1.6 and SUV 4.9.  This had not been seen June 27, 2019.  There is an intensely FDG avid nodule within the lingula measuring 1.9 x 1.5 history of 12.4, FDG avid left adrenal nodule 1.9 x 1.5 with an issue 21.2.  The patient was seen by radiation therapy September 29 and started on radiation therapy to the right chest wall-35 Gy in 10 fractions.  Plans were also then proceed with SBRT to the solitary left upper lobe lesion pending insurance approval.  Further testing including TPS of 20% and foundation CDX with a TMB of greater than 10 mutations per megabase (28 mutations per megabase) and the indication that several immunotherapies could be useful in this patient's care.  Additional genomic findings include BRAF G469R/that trametinib  could be useful and STK11/that everolimus could be useful.  *Discussion held as to how to proceed in particular using immunotherapy with Keytruda as monotherapy initially in this patient.  This would reduce his degree of myelosuppression.  Patient completed radiotherapy October 14, 2020 October 21 status post port placement the patient began Keytruda which she tolerated well and as he is seen November 11 we plan a second cycle.  At present he is scheduled for follow-up CT of the chest November 17, radiation therapy follow-up November 24.        The patient is reassessed after 2 cycles of Keytruda with enlargement of right eighth rib lesion, enlargement of spiculated left upper lobe lung mass, moderate to large right-sided pleural effusion, new left adrenal mass and enlarged retrocrural lymph node.  This is addressed with him November 2, 2020 with at least the possibility of pseudoprogression present.  Symptomatically he is improved in his right chest wall mass is slightly reduced clinically.  We have discussed proceeding with his third cycle of Keytruda, initiating Xgeva, having his left knee assessed and rescanning him after 4 cycles of Keytruda make a decision about extending his systemic therapy to include carboplatin, Alimta and Keytruda.      The patient is seen December 23, 2020 and clinically feels better except for his left knee pain.  We have discussed proceeding with his Keytruda and then reevaluating by follow-up scan.  The patient continued Xgeva December 2 and December 30.     In the interval he was seen for his left knee and was felt to be an impending fracture.  He was reviewed by orthopedic oncology and proceeded to curettage of the left femoral bone lesion and prophylactic stabilization January 7, 2021.      Additionally and wonderfully follow-up scans obtained January 6, 2021 demonstrated a marked improvement per his right eighth rib lesion, resolution of left upper lobe spiculated mass, residual  large right pleural effusion, resolution of left adrenal metastasis.  This indicates that Keytruda is working well as a single agent we do plan to continue this January 13, 2021.  He has additional issues, however, with a right-sided pleural effusion that has increased and is producing symptoms.  He will require thoracentesis and will obtain both flow cytometry, cytology, LDH, total protein, glucose.  Pending his response to this the patient could be a candidate for Pleurx catheter placement.      The patient did improve and is next seen in office February 25, 2021.  Clinically his effusion is improved.  We will plan chest x-ray and rib detail films today considering symptoms and continue with his next Keytruda.  The patient required follow-up thoracentesis March 22, 2021.  As he is reassessed April 8 we do plan repeat thoracentesis which would be his third and also presentation per thoracic conference April 15.     Thoracic conference was significant for offering of Pleurx catheter placement and the patient was seen 4/26/2021 without substantial right pleural effusion but with plans to proceed with Pleurx catheter when this effusion relapses.  The patient is seen in office 6/10/2021 doing extremely well without the need for Pleurx catheter.  We discussed proceeding with Keytruda and Xgeva and having him undergo repeat scans after his next Keytruda which would be in July 2021.     Repeat scans 7/15/2021 without evidence recurrence or progression of disease, Keytruda continued. As the patient is seen 9/2/2021 his performance status has continued to improve and do plan to continue same with scans not anticipated for 4 to 6 months from previous.        3.  Xgeva initiated monthly beginning December 2, 2020.  This is next due December 30, 2020.  As result of the patient's recent knee surgery will be holding this for the next several months.  This will be readded when the patient is seen back in 3 weeks after evaluation  April 8, 2021.  Patient proceeded with Xgeva 4/29/2021 to be given on a 6-week schedule-every other  Keytruda dosing.  This will now be due when patient is seen 9/2/2021.      4.  Bilateral hernia repair status post right incarcerated inguinal hernia November 23, 2020                                                                                                                                                                          5.  Iron deficiency-stable     6.  Hypogammaglobulinemia-status post IVIG with resolution of sinusitis, current levels again reviewed and stable    7.  Pain control now addressed with MS Contin 30 mg every 12 hours increased to every 8 hours, Norco 10/325 1 p.o. every 4 hours, continue laxative therapy with Senokot-S, MiraLAX and now lactulose.  He also frequently adds additional Aleve-2 tablets as well.  Reassessment 7/22/2021 with plans to move to MS Contin every 12 hours, Norco not currently being utilized, MS Contin now transitioned to nightly dosing only.    8.  GI follow-up with Dr. Torres planned.    9.  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 Eliu Ochoa-who after assessment patient felt that he was at risk for pathologic fracture.  The patient was admitted January 7 undergoing curettage of the left femoral bone lesion, prophylactic stabilization with intramedullary implants.  The patient did well with this approach.  It was suggested not to use Xgeva or Zometa for a period of time as this healed.  He has now completed radiation therapy.  He has stable symptoms concerning this 9/2/2021.      10.  Supportive oncology-ongoing therapy for anxiety and depression.  He currently is being treated with Remeron and as needed Xanax and states he is finally sleeping more effectively.    Plan:  * Keytruda and Xgeva will be given today today  *Medications again reviewed with his MS Contin moved to nightly dosing, continue Imbruvica at current dosing, Remeron at current dosing  and as needed Xanax.  *Return in 3 weeks for NP assessment, Keytruda     *Patient urged to obtain booster for COVID-19    now                                                                              *Upon return consider scheduling for Prevnar 13 and Shingrix dosing

## 2021-09-02 ENCOUNTER — INFUSION (OUTPATIENT)
Dept: ONCOLOGY | Facility: HOSPITAL | Age: 66
End: 2021-09-02

## 2021-09-02 ENCOUNTER — OFFICE VISIT (OUTPATIENT)
Dept: ONCOLOGY | Facility: CLINIC | Age: 66
End: 2021-09-02

## 2021-09-02 VITALS
DIASTOLIC BLOOD PRESSURE: 79 MMHG | OXYGEN SATURATION: 97 % | BODY MASS INDEX: 23.95 KG/M2 | WEIGHT: 171.1 LBS | SYSTOLIC BLOOD PRESSURE: 137 MMHG | RESPIRATION RATE: 14 BRPM | HEART RATE: 84 BPM | TEMPERATURE: 97.8 F | HEIGHT: 71 IN

## 2021-09-02 DIAGNOSIS — C34.90 LUNG CANCER METASTATIC TO BONE (HCC): ICD-10-CM

## 2021-09-02 DIAGNOSIS — C34.12 MALIGNANT NEOPLASM OF UPPER LOBE OF LEFT LUNG (HCC): ICD-10-CM

## 2021-09-02 DIAGNOSIS — Z79.899 HIGH RISK MEDICATION USE: Primary | ICD-10-CM

## 2021-09-02 DIAGNOSIS — C79.51 LUNG CANCER METASTATIC TO BONE (HCC): ICD-10-CM

## 2021-09-02 DIAGNOSIS — C91.10 CLL (CHRONIC LYMPHOCYTIC LEUKEMIA) (HCC): Primary | ICD-10-CM

## 2021-09-02 DIAGNOSIS — C91.10 CLL (CHRONIC LYMPHOCYTIC LEUKEMIA) (HCC): ICD-10-CM

## 2021-09-02 LAB
ALBUMIN SERPL-MCNC: 4 G/DL (ref 3.5–5.2)
ALBUMIN/GLOB SERPL: 2.4 G/DL (ref 1.1–2.4)
ALP SERPL-CCNC: 87 U/L (ref 38–116)
ALT SERPL W P-5'-P-CCNC: 16 U/L (ref 0–41)
ANION GAP SERPL CALCULATED.3IONS-SCNC: 10 MMOL/L (ref 5–15)
AST SERPL-CCNC: 12 U/L (ref 0–40)
BASOPHILS # BLD AUTO: 0.06 10*3/MM3 (ref 0–0.2)
BASOPHILS NFR BLD AUTO: 0.7 % (ref 0–1.5)
BILIRUB SERPL-MCNC: 0.4 MG/DL (ref 0.2–1.2)
BUN SERPL-MCNC: 16 MG/DL (ref 6–20)
BUN/CREAT SERPL: 15 (ref 7.3–30)
CALCIUM SPEC-SCNC: 9 MG/DL (ref 8.5–10.2)
CHLORIDE SERPL-SCNC: 103 MMOL/L (ref 98–107)
CO2 SERPL-SCNC: 26 MMOL/L (ref 22–29)
CREAT SERPL-MCNC: 1.07 MG/DL (ref 0.7–1.3)
DEPRECATED RDW RBC AUTO: 47.7 FL (ref 37–54)
EOSINOPHIL # BLD AUTO: 0.19 10*3/MM3 (ref 0–0.4)
EOSINOPHIL NFR BLD AUTO: 2.2 % (ref 0.3–6.2)
ERYTHROCYTE [DISTWIDTH] IN BLOOD BY AUTOMATED COUNT: 13.7 % (ref 12.3–15.4)
GFR SERPL CREATININE-BSD FRML MDRD: 69 ML/MIN/1.73
GLOBULIN UR ELPH-MCNC: 1.7 GM/DL (ref 1.8–3.5)
GLUCOSE SERPL-MCNC: 134 MG/DL (ref 74–124)
HCT VFR BLD AUTO: 47 % (ref 37.5–51)
HGB BLD-MCNC: 14.7 G/DL (ref 13–17.7)
IMM GRANULOCYTES # BLD AUTO: 0.06 10*3/MM3 (ref 0–0.05)
IMM GRANULOCYTES NFR BLD AUTO: 0.7 % (ref 0–0.5)
LYMPHOCYTES # BLD AUTO: 0.9 10*3/MM3 (ref 0.7–3.1)
LYMPHOCYTES NFR BLD AUTO: 10.6 % (ref 19.6–45.3)
MAGNESIUM SERPL-MCNC: 1.8 MG/DL (ref 1.8–2.5)
MCH RBC QN AUTO: 29.7 PG (ref 26.6–33)
MCHC RBC AUTO-ENTMCNC: 31.3 G/DL (ref 31.5–35.7)
MCV RBC AUTO: 94.9 FL (ref 79–97)
MONOCYTES # BLD AUTO: 0.82 10*3/MM3 (ref 0.1–0.9)
MONOCYTES NFR BLD AUTO: 9.6 % (ref 5–12)
NEUTROPHILS NFR BLD AUTO: 6.47 10*3/MM3 (ref 1.7–7)
NEUTROPHILS NFR BLD AUTO: 76.2 % (ref 42.7–76)
NRBC BLD AUTO-RTO: 0 /100 WBC (ref 0–0.2)
PHOSPHATE SERPL-MCNC: 2.8 MG/DL (ref 2.5–4.5)
PLATELET # BLD AUTO: 195 10*3/MM3 (ref 140–450)
PMV BLD AUTO: 12 FL (ref 6–12)
POTASSIUM SERPL-SCNC: 4.6 MMOL/L (ref 3.5–4.7)
PROT SERPL-MCNC: 5.7 G/DL (ref 6.3–8)
RBC # BLD AUTO: 4.95 10*6/MM3 (ref 4.14–5.8)
SODIUM SERPL-SCNC: 139 MMOL/L (ref 134–145)
T4 FREE SERPL-MCNC: 1.74 NG/DL (ref 0.93–1.7)
TSH SERPL DL<=0.05 MIU/L-ACNC: 2.46 UIU/ML (ref 0.27–4.2)
WBC # BLD AUTO: 8.5 10*3/MM3 (ref 3.4–10.8)

## 2021-09-02 PROCEDURE — 99214 OFFICE O/P EST MOD 30 MIN: CPT | Performed by: INTERNAL MEDICINE

## 2021-09-02 PROCEDURE — 84100 ASSAY OF PHOSPHORUS: CPT

## 2021-09-02 PROCEDURE — 85025 COMPLETE CBC W/AUTO DIFF WBC: CPT

## 2021-09-02 PROCEDURE — 96372 THER/PROPH/DIAG INJ SC/IM: CPT

## 2021-09-02 PROCEDURE — 84443 ASSAY THYROID STIM HORMONE: CPT | Performed by: INTERNAL MEDICINE

## 2021-09-02 PROCEDURE — 83735 ASSAY OF MAGNESIUM: CPT

## 2021-09-02 PROCEDURE — 80053 COMPREHEN METABOLIC PANEL: CPT

## 2021-09-02 PROCEDURE — 25010000002 HEPARIN LOCK FLUSH PER 10 UNITS: Performed by: INTERNAL MEDICINE

## 2021-09-02 PROCEDURE — 25010000002 PEMBROLIZUMAB 100 MG/4ML SOLUTION 4 ML VIAL: Performed by: INTERNAL MEDICINE

## 2021-09-02 PROCEDURE — 25010000002 DENOSUMAB 120 MG/1.7ML SOLUTION: Performed by: INTERNAL MEDICINE

## 2021-09-02 PROCEDURE — 84439 ASSAY OF FREE THYROXINE: CPT | Performed by: INTERNAL MEDICINE

## 2021-09-02 PROCEDURE — 96413 CHEMO IV INFUSION 1 HR: CPT

## 2021-09-02 RX ORDER — HEPARIN SODIUM (PORCINE) LOCK FLUSH IV SOLN 100 UNIT/ML 100 UNIT/ML
500 SOLUTION INTRAVENOUS AS NEEDED
Status: CANCELLED | OUTPATIENT
Start: 2021-09-02

## 2021-09-02 RX ORDER — HEPARIN SODIUM (PORCINE) LOCK FLUSH IV SOLN 100 UNIT/ML 100 UNIT/ML
500 SOLUTION INTRAVENOUS AS NEEDED
Status: DISCONTINUED | OUTPATIENT
Start: 2021-09-02 | End: 2021-09-02 | Stop reason: HOSPADM

## 2021-09-02 RX ORDER — SODIUM CHLORIDE 0.9 % (FLUSH) 0.9 %
10 SYRINGE (ML) INJECTION AS NEEDED
Status: CANCELLED | OUTPATIENT
Start: 2021-09-02

## 2021-09-02 RX ORDER — SODIUM CHLORIDE 9 MG/ML
250 INJECTION, SOLUTION INTRAVENOUS ONCE
Status: COMPLETED | OUTPATIENT
Start: 2021-09-02 | End: 2021-09-02

## 2021-09-02 RX ORDER — SODIUM CHLORIDE 9 MG/ML
250 INJECTION, SOLUTION INTRAVENOUS ONCE
Status: CANCELLED | OUTPATIENT
Start: 2021-09-02

## 2021-09-02 RX ADMIN — SODIUM CHLORIDE 250 ML: 9 INJECTION, SOLUTION INTRAVENOUS at 11:28

## 2021-09-02 RX ADMIN — DENOSUMAB 120 MG: 120 INJECTION SUBCUTANEOUS at 12:16

## 2021-09-02 RX ADMIN — Medication 500 UNITS: at 12:47

## 2021-09-02 RX ADMIN — SODIUM CHLORIDE 200 MG: 9 INJECTION, SOLUTION INTRAVENOUS at 12:15

## 2021-09-03 NOTE — PROGRESS NOTES
Per Dr. Parekh, OK to give Prevnar 13 vaccination on day of next treatment. Order placed in treatment plan. We do not have the Shingles vaccination in our office so pt would have to get elsewhere.

## 2021-09-14 ENCOUNTER — IMMUNIZATION (OUTPATIENT)
Dept: VACCINE CLINIC | Facility: HOSPITAL | Age: 66
End: 2021-09-14

## 2021-09-14 PROCEDURE — 91300 HC SARSCOV02 VAC 30MCG/0.3ML IM: CPT | Performed by: INTERNAL MEDICINE

## 2021-09-14 PROCEDURE — 0003A: CPT | Performed by: INTERNAL MEDICINE

## 2021-09-14 PROCEDURE — 0001A: CPT | Performed by: INTERNAL MEDICINE

## 2021-09-20 NOTE — PROGRESS NOTES
Per Lizbeth, pharmacist, we do have the Shingrix vaccination in our pharmacy now. It is not a live vaccine and pt can receive with his pneumonia vaccine while he is here on Wed. He will have to repeat the vaccination in 2-6 months. Order placed and message sent to Noa Zarate NP who will see pt on 9/23.

## 2021-09-23 ENCOUNTER — INFUSION (OUTPATIENT)
Dept: ONCOLOGY | Facility: HOSPITAL | Age: 66
End: 2021-09-23

## 2021-09-23 ENCOUNTER — OFFICE VISIT (OUTPATIENT)
Dept: ONCOLOGY | Facility: CLINIC | Age: 66
End: 2021-09-23

## 2021-09-23 VITALS
SYSTOLIC BLOOD PRESSURE: 124 MMHG | HEIGHT: 71 IN | WEIGHT: 174.1 LBS | DIASTOLIC BLOOD PRESSURE: 83 MMHG | OXYGEN SATURATION: 96 % | BODY MASS INDEX: 24.37 KG/M2 | RESPIRATION RATE: 18 BRPM | TEMPERATURE: 98.4 F | HEART RATE: 90 BPM

## 2021-09-23 DIAGNOSIS — C79.51 LUNG CANCER METASTATIC TO BONE (HCC): ICD-10-CM

## 2021-09-23 DIAGNOSIS — Z79.899 HIGH RISK MEDICATION USE: ICD-10-CM

## 2021-09-23 DIAGNOSIS — C34.90 LUNG CANCER METASTATIC TO BONE (HCC): ICD-10-CM

## 2021-09-23 DIAGNOSIS — C91.10 CLL (CHRONIC LYMPHOCYTIC LEUKEMIA) (HCC): ICD-10-CM

## 2021-09-23 DIAGNOSIS — C34.12 MALIGNANT NEOPLASM OF UPPER LOBE OF LEFT LUNG (HCC): ICD-10-CM

## 2021-09-23 DIAGNOSIS — Z79.899 HIGH RISK MEDICATION USE: Primary | ICD-10-CM

## 2021-09-23 DIAGNOSIS — C34.12 MALIGNANT NEOPLASM OF UPPER LOBE OF LEFT LUNG (HCC): Primary | ICD-10-CM

## 2021-09-23 LAB
ALBUMIN SERPL-MCNC: 4.1 G/DL (ref 3.5–5.2)
ALBUMIN/GLOB SERPL: 2.1 G/DL (ref 1.1–2.4)
ALP SERPL-CCNC: 86 U/L (ref 38–116)
ALT SERPL W P-5'-P-CCNC: 11 U/L (ref 0–41)
ANION GAP SERPL CALCULATED.3IONS-SCNC: 9.2 MMOL/L (ref 5–15)
AST SERPL-CCNC: 11 U/L (ref 0–40)
BASOPHILS # BLD AUTO: 0.06 10*3/MM3 (ref 0–0.2)
BASOPHILS NFR BLD AUTO: 0.8 % (ref 0–1.5)
BILIRUB SERPL-MCNC: 0.5 MG/DL (ref 0.2–1.2)
BUN SERPL-MCNC: 19 MG/DL (ref 6–20)
BUN/CREAT SERPL: 15.4 (ref 7.3–30)
CALCIUM SPEC-SCNC: 9.1 MG/DL (ref 8.5–10.2)
CHLORIDE SERPL-SCNC: 103 MMOL/L (ref 98–107)
CO2 SERPL-SCNC: 26.8 MMOL/L (ref 22–29)
CREAT SERPL-MCNC: 1.23 MG/DL (ref 0.7–1.3)
DEPRECATED RDW RBC AUTO: 48.6 FL (ref 37–54)
EOSINOPHIL # BLD AUTO: 0.17 10*3/MM3 (ref 0–0.4)
EOSINOPHIL NFR BLD AUTO: 2.2 % (ref 0.3–6.2)
ERYTHROCYTE [DISTWIDTH] IN BLOOD BY AUTOMATED COUNT: 13.9 % (ref 12.3–15.4)
GFR SERPL CREATININE-BSD FRML MDRD: 59 ML/MIN/1.73
GLOBULIN UR ELPH-MCNC: 2 GM/DL (ref 1.8–3.5)
GLUCOSE SERPL-MCNC: 107 MG/DL (ref 74–124)
HCT VFR BLD AUTO: 48.1 % (ref 37.5–51)
HGB BLD-MCNC: 15.1 G/DL (ref 13–17.7)
IMM GRANULOCYTES # BLD AUTO: 0.06 10*3/MM3 (ref 0–0.05)
IMM GRANULOCYTES NFR BLD AUTO: 0.8 % (ref 0–0.5)
LYMPHOCYTES # BLD AUTO: 0.89 10*3/MM3 (ref 0.7–3.1)
LYMPHOCYTES NFR BLD AUTO: 11.4 % (ref 19.6–45.3)
MCH RBC QN AUTO: 30 PG (ref 26.6–33)
MCHC RBC AUTO-ENTMCNC: 31.4 G/DL (ref 31.5–35.7)
MCV RBC AUTO: 95.6 FL (ref 79–97)
MONOCYTES # BLD AUTO: 0.83 10*3/MM3 (ref 0.1–0.9)
MONOCYTES NFR BLD AUTO: 10.6 % (ref 5–12)
NEUTROPHILS NFR BLD AUTO: 5.79 10*3/MM3 (ref 1.7–7)
NEUTROPHILS NFR BLD AUTO: 74.2 % (ref 42.7–76)
NRBC BLD AUTO-RTO: 0 /100 WBC (ref 0–0.2)
PLATELET # BLD AUTO: 184 10*3/MM3 (ref 140–450)
PMV BLD AUTO: 12.2 FL (ref 6–12)
POTASSIUM SERPL-SCNC: 4.6 MMOL/L (ref 3.5–4.7)
PROT SERPL-MCNC: 6.1 G/DL (ref 6.3–8)
RBC # BLD AUTO: 5.03 10*6/MM3 (ref 4.14–5.8)
SODIUM SERPL-SCNC: 139 MMOL/L (ref 134–145)
T4 FREE SERPL-MCNC: 1.76 NG/DL (ref 0.93–1.7)
TSH SERPL DL<=0.05 MIU/L-ACNC: 3.17 UIU/ML (ref 0.27–4.2)
WBC # BLD AUTO: 7.8 10*3/MM3 (ref 3.4–10.8)

## 2021-09-23 PROCEDURE — 85025 COMPLETE CBC W/AUTO DIFF WBC: CPT

## 2021-09-23 PROCEDURE — 90670 PCV13 VACCINE IM: CPT | Performed by: NURSE PRACTITIONER

## 2021-09-23 PROCEDURE — 25010000002 ZOSTER VAC RECOMB ADJUVANTED 50 MCG/0.5ML RECONSTITUTED SUSPENSION: Performed by: NURSE PRACTITIONER

## 2021-09-23 PROCEDURE — 90472 IMMUNIZATION ADMIN EACH ADD: CPT

## 2021-09-23 PROCEDURE — 25010000002 PEMBROLIZUMAB 100 MG/4ML SOLUTION 4 ML VIAL: Performed by: NURSE PRACTITIONER

## 2021-09-23 PROCEDURE — 84443 ASSAY THYROID STIM HORMONE: CPT | Performed by: INTERNAL MEDICINE

## 2021-09-23 PROCEDURE — G0009 ADMIN PNEUMOCOCCAL VACCINE: HCPCS | Performed by: NURSE PRACTITIONER

## 2021-09-23 PROCEDURE — 25010000002 PNEUMOCOCCAL CONJ. 13-VALENT SUSPENSION: Performed by: NURSE PRACTITIONER

## 2021-09-23 PROCEDURE — 96413 CHEMO IV INFUSION 1 HR: CPT

## 2021-09-23 PROCEDURE — 99214 OFFICE O/P EST MOD 30 MIN: CPT | Performed by: NURSE PRACTITIONER

## 2021-09-23 PROCEDURE — 90750 HZV VACC RECOMBINANT IM: CPT | Performed by: NURSE PRACTITIONER

## 2021-09-23 PROCEDURE — 84439 ASSAY OF FREE THYROXINE: CPT | Performed by: INTERNAL MEDICINE

## 2021-09-23 PROCEDURE — 96372 THER/PROPH/DIAG INJ SC/IM: CPT

## 2021-09-23 PROCEDURE — 80053 COMPREHEN METABOLIC PANEL: CPT

## 2021-09-23 RX ORDER — SODIUM CHLORIDE 9 MG/ML
250 INJECTION, SOLUTION INTRAVENOUS ONCE
Status: COMPLETED | OUTPATIENT
Start: 2021-09-23 | End: 2021-09-23

## 2021-09-23 RX ORDER — SODIUM CHLORIDE 9 MG/ML
250 INJECTION, SOLUTION INTRAVENOUS ONCE
Status: CANCELLED | OUTPATIENT
Start: 2021-09-23

## 2021-09-23 RX ADMIN — SODIUM CHLORIDE 250 ML: 9 INJECTION, SOLUTION INTRAVENOUS at 11:40

## 2021-09-23 RX ADMIN — PNEUMOCOCCAL 13-VALENT CONJUGATE VACCINE 0.5 ML: 2.2; 2.2; 2.2; 2.2; 2.2; 4.4; 2.2; 2.2; 2.2; 2.2; 2.2; 2.2; 2.2 INJECTION, SUSPENSION INTRAMUSCULAR at 12:08

## 2021-09-23 RX ADMIN — Medication 0.5 ML: at 12:08

## 2021-09-23 RX ADMIN — SODIUM CHLORIDE 200 MG: 900 INJECTION INTRAVENOUS at 11:41

## 2021-09-23 NOTE — PROGRESS NOTES
"Subjective   REASON FOR FOLLOW UP:  1.  Chronic lymphocytic leukemia with extensive lymphadenopathy and possible pleural involvement. Initiation of Treanda, Rituxan May 1, 2019.  2.  Hypogammaglobulinemia.  Status is post IVIG  3.  Significant response on scans June 27, 2019.  Treatment changed to Imbruvica 420 mg daily.     HISTORY OF PRESENT ILLNESS:    The patient is a  66 y.o. male with the above-mentioned history, returns the office today in anticipation of his 17th dose of Keytruda which he continues to receive every 3 weeks.  He also receives Xgeva every 6 weeks. He continues to tolerate Keytruda very well. He also continues on Imbruvica 420 mg daily with excellent tolerance. He denies new or worsening shortness of breath or chest pain. He denies night sweats or B symptoms. He is eating and drinking adequately. His bowels are moving normally. He did have a single episode of diaphoresis and feeling lightheaded which I suspect is related to volume depletion and dehydration.    Past Medical History, Past Surgical History, Social History, Family History have been reviewed and are without significant changes except as mentioned.    Objective      Vitals:    09/23/21 1031   BP: 124/83   Pulse: 90   Resp: 18   Temp: 98.4 °F (36.9 °C)   TempSrc: Temporal   SpO2: 96%   Weight: 79 kg (174 lb 1.6 oz)   Height: 180.3 cm (70.98\")   PainSc: 0-No pain     Current Status 9/23/2021   ECOG score 0       Physical Exam    GENERAL:  Well-developed, well-nourished in no acute distress.   SKIN:  Warm, dry without purpura or petechiae.  Only faint erythema remaining on anterior chest, no macular papular rash.  HEAD:  Normocephalic.  EYES:  Pupils equal, round.  EOMs intact.  Conjunctivae normal.  EARS:  Hearing intact.  LYMPHATICS: Without cervical, submandibular, axillary, supraclavicular adenopathy.    CHEST:  Lungs clear to auscultation. Good airflow.  CARDIAC:  Regular rate and rhythm without murmurs. Normal S1,S2.  ABDOMEN:  " Soft, nontender with no organomegaly or masses. Bowel sounds present  EXTREMITIES:  No clubbing, cyanosis or edema.  NEUROLOGICAL: No focal neurological deficits.  PSYCHIATRIC:  Normal affect and mood.    I have reexamined the patient and the results are consistent with the previously documented exam. RORY Zhao       RECENT LABS:  Results from last 7 days   Lab Units 09/23/21  1011   WBC 10*3/mm3 7.80   NEUTROS ABS 10*3/mm3 5.79   HEMOGLOBIN g/dL 15.1   HEMATOCRIT % 48.1   PLATELETS 10*3/mm3 184     Results from last 7 days   Lab Units 09/23/21  1011   SODIUM mmol/L 139   POTASSIUM mmol/L 4.6   CHLORIDE mmol/L 103   CO2 mmol/L 26.8   BUN mg/dL 19   CREATININE mg/dL 1.23   CALCIUM mg/dL 9.1   ALBUMIN g/dL 4.10   BILIRUBIN mg/dL 0.5   ALK PHOS U/L 86   ALT (SGPT) U/L 11   AST (SGOT) U/L 11   GLUCOSE mg/dL 107         FISH prognostic panel-Positive for deletion of 13 q. 14.3    Assessment/Plan   1.  CLL presenting with significant lymphocytosis, lymphadenopathy and splenomegaly.     · Positive for deletion of 13 q. 14.3.    · Patient status post 2 cycles of Treanda Rituxan with excellent response.    · Imaging 6/27/2019 with significant decrease in adenopathy.  Treanda Rituxan discontinued   · Imbruvica 420 mg daily initiated 7/25/2019.  · He continues on Imbruvica, without indication of progression of his CLL, without progressive lymphadenopathy on CT scans.  · He is without progressive adenopathy on exam today, 9/23/2021    2.  Pulmonary nodules/infiltrates.  He is  status post bronchoscopy.  Is unclear at this time whether these findings are infectious or possibly related to his lymphoproliferative disorder.  This is seen to be improving on recent scans.    3.  Hypogammaglobulinemia-status post IVIG with resolution of sinusitis.      4.  Non-small cell lung carcinoma  · August 26 2020 revealing a new 1.6 cm spiculated nodule within the left upper lobe, 1.2 cm left adrenal nodule, bone windows with  a soft tissue mass involving the right eighth rib measuring 3.7 x 2.6 cm.    · Biopsy was consistent with adenocarcinoma CK 7+, CK 20-, lung primary suspected clinically, molecular panel not yet obtained.  · PET/CT 9/23/2020 demonstrating asymmetric uptake within the right parotid gland which is unremarkable appearance on noncontrasted CT, SUV of 6 compared to 2.7.  There is no hilar, mediastinal or axillary adenopathy, findings of asymmetric moderate to intense FDG uptake within superior aspect the right chest wall anterior to the right first rib with an irregular hypodense lesion measuring 1.8 x 1.6 and SUV 4.9.  This had not been seen June 27, 2019.  There is an intensely FDG avid nodule within the lingula measuring 1.9 x 1.5 history of 12.4, FDG avid left adrenal nodule 1.9 x 1.5 with an issue 21.2.    · Evaluated by radiation oncology therapy September 29 and started on radiation therapy to the right chest wall-35 Gy in 10 fractions.   ·  Plans were also then proceed with SBRT to the solitary left upper lobe lesion pending insurance approval.  · Further testing including TPS of 20% and foundation CDX with a TMB of greater than 10 mutations per megabase (28 mutations per megabase) and the indication that several immunotherapies could be useful in this patient's care.  Additional genomic findings include BRAF G469R/that trametinib could be useful and STK11/that everolimus could be useful.  · Keytruda initiated 10/21/2021   · Reassessment after 2 cycles of Keytruda with enlargement of the right eighth rib lesion,enlargement of spiculated left upper lobe lung mass, moderate to large right-sided pleural effusion, new left adrenal mass and enlarged retrocrural lymph node.?Pseudoprogression  · Xgeva last given 12/30/2020  · Follow-up scans 1/6/2021 demonstrated marked improvement in his right eighth rib lesion, resolution of left upper lobe spiculated mass, residual large right pleural effusion, resolution of left  adrenal metastasis.   · Right pleural effusion, with thoracentesis 1/14/2021 with 1500 mils removed.  Pathology consistent with malignant effusion   · CT scans 4/5/2021 with response noted in the left upper lobe density.  · He continues to receive Keytruda every 3 weeks along with Xgeva every 6 weeks.  · Repeat scan 7/15/2021 without evidence of recurrence or progressive disease.  Plans to continue Keytruda every 3 weeks with repeat scans in 4 to 6 months  · Proceed today with cycle 17 Keytruda    5.  Left knee metastasis  · Evaluated by orthopedic oncology, Dr. Eliu Ochoa  · Admission 1/7/2021 undergoing stabilization of left femoral bone lesion, prophylactic stabilization with intramedullary implants.  · It was suggested we delay Xgeva or Zometa to allow bone healing  · Completed radiation with 10 fractions 2/10/2021  · Xgeva resumed 4/29/2021, he continues to receive this every 6 months, last given 9/2/2021     6.  Malignant right pleural effusion  · Ultrasound thoracentesis 1/14/2021 1500 mL removed  · Chest x-ray 2/25/2021 with moderate right pleural effusion  · Progressive symptoms with worsening pain 4/5/2021  · Ultrasound-guided thoracentesis 4/13/2021 with 2050 ml removed.  · Plans for Pleurx catheter with thoracic surgery, however, pleural effusion has not recurred    7.  Cancer related pain  · Pain is most prominent in his right rib, utilizing MS Contin 30 mg 3 times a day  · Hydrocodone/acetaminophen 10/325 as needed for breakthrough pain.  Currently taking 3 to 4 tablets daily  · He is not currently in need of a refill of pain medications    8.  Insomnia  · Continuing to follow with behavioral oncology  · Continuing Remeron 7.5 mg nightly with good control    9. Health maintenance  · Status post COVID-19 booster, SARS antibody pending today  · The patient is due for Shingrix and Prevnar which will both be administered in the clinic today    Plan:  1. Proceed today with cycle 17 Keytruda  2. Continue  Imbruvica 420 mg daily  3. Proceed with vaccinations including Shingrix and Prevnar  4. Continue MS Contin 30 mg 3 times a day and hydrocodone/acetaminophen 10/325 as needed for breakthrough pain  5. Continue Remeron 7.5 mg nightly  6. Return in 3 weeks in 6 weeks for CBC, CMP, nurse practitioner MD follow-up and continued Keytruda  7. Last imaging 7/15/2021 with plans to repeat in 4 to 6 months    The patient is on high risk medication requiring close monitoring for toxicity.      Noa Zarate, APRN   9/23/2021

## 2021-10-01 DIAGNOSIS — C79.51 LUNG CANCER METASTATIC TO BONE (HCC): ICD-10-CM

## 2021-10-01 DIAGNOSIS — C34.90 LUNG CANCER METASTATIC TO BONE (HCC): ICD-10-CM

## 2021-10-01 RX ORDER — MORPHINE SULFATE 30 MG/1
30 TABLET, FILM COATED, EXTENDED RELEASE ORAL 2 TIMES DAILY
Qty: 60 TABLET | Refills: 0 | Status: SHIPPED | OUTPATIENT
Start: 2021-10-01 | End: 2021-11-11 | Stop reason: SDUPTHER

## 2021-10-13 NOTE — PROGRESS NOTES
"Subjective   REASON FOR FOLLOW UP:  1.  Chronic lymphocytic leukemia with extensive lymphadenopathy and possible pleural involvement. Initiation of Treanda, Rituxan May 1, 2019.  2.  Hypogammaglobulinemia.  Status is post IVIG  3.  Significant response on scans June 27, 2019.  Treatment changed to Imbruvica 420 mg daily.     HISTORY OF PRESENT ILLNESS:    The patient is a  66 y.o. male with the above-mentioned history, returns the office today in anticipation of his 18th dose of Keytruda which he continues to receive every 3 weeks.  He also receives Xgeva every 6 weeks.  He continues to tolerate therapy remarkably well.  He denies fevers or chills, signs or symptoms of infection.  He denies any new adenopathy.  His pain is well controlled.  He is without worsening shortness of breath or chest pain.  He does not feel he is in need of thoracentesis.  He is not in need of a refill of his pain medications today.  His bowels are moving without difficulty while on narcotics.  He denies B symptoms, has only mild bruising with Imbruvica    Past Medical History, Past Surgical History, Social History, Family History have been reviewed and are without significant changes except as mentioned.    Objective      Vitals:    10/14/21 0837   BP: 160/78   Pulse: 88   Resp: 16   Temp: 97.7 °F (36.5 °C)   TempSrc: Temporal   SpO2: 95%   Weight: 80.7 kg (177 lb 14.4 oz)   Height: 180.3 cm (70.98\")   PainSc: 0-No pain     Current Status 9/23/2021   ECOG score 0       Physical Exam    GENERAL:  Well-developed, well-nourished in no acute distress.   SKIN:  Warm, dry without purpura or petechiae.  Only faint erythema remaining on anterior chest, no macular papular rash.  No mild bruising  HEAD:  Normocephalic.  EYES:  Pupils equal, round.  EOMs intact.  Conjunctivae normal.  EARS:  Hearing intact.  LYMPHATICS: Without cervical, submandibular, axillary, supraclavicular adenopathy.    CHEST:  Lungs clear to auscultation. Good " airflow.  CARDIAC:  Regular rate and rhythm without murmurs. Normal S1,S2.  ABDOMEN:  Soft, nontender with no organomegaly or masses. Bowel sounds present  EXTREMITIES:  No clubbing, cyanosis or edema.  NEUROLOGICAL: No focal neurological deficits.  PSYCHIATRIC:  Normal affect and mood.    I have reexamined the patient and the results are consistent with the previously documented exam. RORY Zhao       RECENT LABS:  Results from last 7 days   Lab Units 10/14/21  0823   WBC 10*3/mm3 7.52   NEUTROS ABS 10*3/mm3 5.26   HEMOGLOBIN g/dL 13.9   HEMATOCRIT % 45.6   PLATELETS 10*3/mm3 177     Results from last 7 days   Lab Units 10/14/21  0825 10/14/21  0823   SODIUM mmol/L  --  139   POTASSIUM mmol/L  --  4.6   CHLORIDE mmol/L  --  104   CO2 mmol/L  --  25.9   BUN mg/dL  --  18   CREATININE mg/dL  --  1.43*   CALCIUM mg/dL  --  8.5   ALBUMIN g/dL  --  4.00   BILIRUBIN mg/dL  --  0.6   ALK PHOS U/L  --  86   ALT (SGPT) U/L  --  15   AST (SGOT) U/L  --  13   GLUCOSE mg/dL  --  110   MAGNESIUM mg/dL 2.0  --          FISH prognostic panel-Positive for deletion of 13 q. 14.3    Assessment/Plan   1.  CLL presenting with significant lymphocytosis, lymphadenopathy and splenomegaly.     · Positive for deletion of 13 q. 14.3.    · Patient status post 2 cycles of Treanda Rituxan with excellent response.    · Imaging 6/27/2019 with significant decrease in adenopathy.  Treanda Rituxan discontinued   · Imbruvica 420 mg daily initiated 7/25/2019.  · He continues on Imbruvica, without indication of progression of his CLL, without progressive lymphadenopathy on CT scans.  · He is without progressive adenopathy on exam today, 10/14/2021, excellent tolerance to Imbruvica    2.  Pulmonary nodules/infiltrates.  He is  status post bronchoscopy.  Is unclear at this time whether these findings are infectious or possibly related to his lymphoproliferative disorder.  This is seen to be improving on recent scans.    3.   Hypogammaglobulinemia-status post IVIG with resolution of sinusitis.      4.  Non-small cell lung carcinoma  · August 26 2020 revealing a new 1.6 cm spiculated nodule within the left upper lobe, 1.2 cm left adrenal nodule, bone windows with a soft tissue mass involving the right eighth rib measuring 3.7 x 2.6 cm.    · Biopsy was consistent with adenocarcinoma CK 7+, CK 20-, lung primary suspected clinically, molecular panel not yet obtained.  · PET/CT 9/23/2020 demonstrating asymmetric uptake within the right parotid gland which is unremarkable appearance on noncontrasted CT, SUV of 6 compared to 2.7.  There is no hilar, mediastinal or axillary adenopathy, findings of asymmetric moderate to intense FDG uptake within superior aspect the right chest wall anterior to the right first rib with an irregular hypodense lesion measuring 1.8 x 1.6 and SUV 4.9.  This had not been seen June 27, 2019.  There is an intensely FDG avid nodule within the lingula measuring 1.9 x 1.5 history of 12.4, FDG avid left adrenal nodule 1.9 x 1.5 with an issue 21.2.    · Evaluated by radiation oncology therapy September 29 and started on radiation therapy to the right chest wall-35 Gy in 10 fractions.   ·  Plans were also then proceed with SBRT to the solitary left upper lobe lesion pending insurance approval.  · Further testing including TPS of 20% and foundation CDX with a TMB of greater than 10 mutations per megabase (28 mutations per megabase) and the indication that several immunotherapies could be useful in this patient's care.  Additional genomic findings include BRAF G469R/that trametinib could be useful and STK11/that everolimus could be useful.  · Keytruda initiated 10/21/2021   · Reassessment after 2 cycles of Keytruda with enlargement of the right eighth rib lesion,enlargement of spiculated left upper lobe lung mass, moderate to large right-sided pleural effusion, new left adrenal mass and enlarged retrocrural lymph  node.?Pseudoprogression  · Xgeva last given 12/30/2020  · Follow-up scans 1/6/2021 demonstrated marked improvement in his right eighth rib lesion, resolution of left upper lobe spiculated mass, residual large right pleural effusion, resolution of left adrenal metastasis.   · Right pleural effusion, with thoracentesis 1/14/2021 with 1500 mils removed.  Pathology consistent with malignant effusion   · CT scans 4/5/2021 with response noted in the left upper lobe density.  · He continues to receive Keytruda every 3 weeks along with Xgeva every 6 weeks.  · Repeat scan 7/15/2021 without evidence of recurrence or progressive disease.  Plans to continue Keytruda every 3 weeks with repeat scans in 4 to 6 months  · Proceed today with cycle 18 Keytruda    5.  Left knee metastasis  · Evaluated by orthopedic oncology, Dr. Eliu Ochoa  · Admission 1/7/2021 undergoing stabilization of left femoral bone lesion, prophylactic stabilization with intramedullary implants.  · It was suggested we delay Xgeva or Zometa to allow bone healing  · Completed radiation with 10 fractions 2/10/2021  · Xgeva resumed 4/29/2021, he continues to receive this every 6 weeks.  He is due today for Xgeva     6.  Malignant right pleural effusion  · Ultrasound thoracentesis 1/14/2021 1500 mL removed  · Chest x-ray 2/25/2021 with moderate right pleural effusion  · Progressive symptoms with worsening pain 4/5/2021  · Ultrasound-guided thoracentesis 4/13/2021 with 2050 ml removed.  · Plans for Pleurx catheter with thoracic surgery, however, pleural effusion has not recurred    7.  Cancer related pain  · Pain is most prominent in his right rib, utilizing MS Contin 30 mg 3 times a day  · Hydrocodone/acetaminophen 10/325 as needed for breakthrough pain.  Currently taking 3 to 4 tablets daily  · He is not currently in need of a refill of pain medications    8.  Insomnia  · Continuing to follow with behavioral oncology  · Continuing Remeron 7.5 mg nightly with good  control    9. Health maintenance  · Status post COVID-19 booster, SARS antibody pending today  · The patient is due for Shingrix and Prevnar which will both be administered in the clinic 9/23/2021    Plan:  1. Proceed today with cycle 18 Keytruda  2. Proceed today with Xgeva, this is continued every 6 weeks  3. Provide an extra 500 mL normal saline for slight elevation in creatinine today and repeat labs in 3 weeks.  4. Continue Imbruvica 420 mg daily  5. Continue MS Contin 30 mg 3 times a day and hydrocodone/acetaminophen 10/325 as needed for breakthrough pain  6. Continue Remeron 7.5 mg nightly  7. Return in 3 weeks for CBC, CMP MD follow-up and continued Keytruda  8. Last imaging 7/15/2021 with plans to repeat in 4 to 6 months    The patient is on high risk medication requiring close monitoring for toxicity.      Noa Zarate, APRN   10/14/2021

## 2021-10-14 ENCOUNTER — INFUSION (OUTPATIENT)
Dept: ONCOLOGY | Facility: HOSPITAL | Age: 66
End: 2021-10-14

## 2021-10-14 ENCOUNTER — OFFICE VISIT (OUTPATIENT)
Dept: ONCOLOGY | Facility: CLINIC | Age: 66
End: 2021-10-14

## 2021-10-14 VITALS
HEIGHT: 71 IN | WEIGHT: 177.9 LBS | SYSTOLIC BLOOD PRESSURE: 160 MMHG | DIASTOLIC BLOOD PRESSURE: 78 MMHG | OXYGEN SATURATION: 95 % | RESPIRATION RATE: 16 BRPM | BODY MASS INDEX: 24.9 KG/M2 | TEMPERATURE: 97.7 F | HEART RATE: 88 BPM

## 2021-10-14 DIAGNOSIS — C34.12 MALIGNANT NEOPLASM OF UPPER LOBE OF LEFT LUNG (HCC): Primary | ICD-10-CM

## 2021-10-14 DIAGNOSIS — Z79.899 HIGH RISK MEDICATION USE: ICD-10-CM

## 2021-10-14 DIAGNOSIS — C79.51 LUNG CANCER METASTATIC TO BONE (HCC): ICD-10-CM

## 2021-10-14 DIAGNOSIS — C34.90 LUNG CANCER METASTATIC TO BONE (HCC): ICD-10-CM

## 2021-10-14 DIAGNOSIS — C91.10 CLL (CHRONIC LYMPHOCYTIC LEUKEMIA) (HCC): ICD-10-CM

## 2021-10-14 DIAGNOSIS — C34.12 MALIGNANT NEOPLASM OF UPPER LOBE OF LEFT LUNG (HCC): ICD-10-CM

## 2021-10-14 DIAGNOSIS — C91.10 CLL (CHRONIC LYMPHOCYTIC LEUKEMIA) (HCC): Primary | ICD-10-CM

## 2021-10-14 LAB
ALBUMIN SERPL-MCNC: 4 G/DL (ref 3.5–5.2)
ALBUMIN/GLOB SERPL: 2.2 G/DL (ref 1.1–2.4)
ALP SERPL-CCNC: 86 U/L (ref 38–116)
ALT SERPL W P-5'-P-CCNC: 15 U/L (ref 0–41)
ANION GAP SERPL CALCULATED.3IONS-SCNC: 9.1 MMOL/L (ref 5–15)
AST SERPL-CCNC: 13 U/L (ref 0–40)
BASOPHILS # BLD AUTO: 0.08 10*3/MM3 (ref 0–0.2)
BASOPHILS NFR BLD AUTO: 1.1 % (ref 0–1.5)
BILIRUB SERPL-MCNC: 0.6 MG/DL (ref 0.2–1.2)
BUN SERPL-MCNC: 18 MG/DL (ref 6–20)
BUN/CREAT SERPL: 12.6 (ref 7.3–30)
CALCIUM SPEC-SCNC: 8.5 MG/DL (ref 8.5–10.2)
CHLORIDE SERPL-SCNC: 104 MMOL/L (ref 98–107)
CO2 SERPL-SCNC: 25.9 MMOL/L (ref 22–29)
CREAT SERPL-MCNC: 1.43 MG/DL (ref 0.7–1.3)
DEPRECATED RDW RBC AUTO: 48.1 FL (ref 37–54)
EOSINOPHIL # BLD AUTO: 0.2 10*3/MM3 (ref 0–0.4)
EOSINOPHIL NFR BLD AUTO: 2.7 % (ref 0.3–6.2)
ERYTHROCYTE [DISTWIDTH] IN BLOOD BY AUTOMATED COUNT: 13.5 % (ref 12.3–15.4)
GFR SERPL CREATININE-BSD FRML MDRD: 49 ML/MIN/1.73
GLOBULIN UR ELPH-MCNC: 1.8 GM/DL (ref 1.8–3.5)
GLUCOSE SERPL-MCNC: 110 MG/DL (ref 74–124)
HCT VFR BLD AUTO: 45.6 % (ref 37.5–51)
HGB BLD-MCNC: 13.9 G/DL (ref 13–17.7)
IMM GRANULOCYTES # BLD AUTO: 0.03 10*3/MM3 (ref 0–0.05)
IMM GRANULOCYTES NFR BLD AUTO: 0.4 % (ref 0–0.5)
LYMPHOCYTES # BLD AUTO: 0.98 10*3/MM3 (ref 0.7–3.1)
LYMPHOCYTES NFR BLD AUTO: 13 % (ref 19.6–45.3)
MAGNESIUM SERPL-MCNC: 2 MG/DL (ref 1.8–2.5)
MCH RBC QN AUTO: 29.6 PG (ref 26.6–33)
MCHC RBC AUTO-ENTMCNC: 30.5 G/DL (ref 31.5–35.7)
MCV RBC AUTO: 97.2 FL (ref 79–97)
MONOCYTES # BLD AUTO: 0.97 10*3/MM3 (ref 0.1–0.9)
MONOCYTES NFR BLD AUTO: 12.9 % (ref 5–12)
NEUTROPHILS NFR BLD AUTO: 5.26 10*3/MM3 (ref 1.7–7)
NEUTROPHILS NFR BLD AUTO: 69.9 % (ref 42.7–76)
NRBC BLD AUTO-RTO: 0 /100 WBC (ref 0–0.2)
PHOSPHATE SERPL-MCNC: 2.6 MG/DL (ref 2.5–4.5)
PLATELET # BLD AUTO: 177 10*3/MM3 (ref 140–450)
PMV BLD AUTO: 12 FL (ref 6–12)
POTASSIUM SERPL-SCNC: 4.6 MMOL/L (ref 3.5–4.7)
PROT SERPL-MCNC: 5.8 G/DL (ref 6.3–8)
RBC # BLD AUTO: 4.69 10*6/MM3 (ref 4.14–5.8)
SODIUM SERPL-SCNC: 139 MMOL/L (ref 134–145)
WBC # BLD AUTO: 7.52 10*3/MM3 (ref 3.4–10.8)

## 2021-10-14 PROCEDURE — 83735 ASSAY OF MAGNESIUM: CPT

## 2021-10-14 PROCEDURE — 96372 THER/PROPH/DIAG INJ SC/IM: CPT

## 2021-10-14 PROCEDURE — 99214 OFFICE O/P EST MOD 30 MIN: CPT | Performed by: NURSE PRACTITIONER

## 2021-10-14 PROCEDURE — 96413 CHEMO IV INFUSION 1 HR: CPT

## 2021-10-14 PROCEDURE — 96361 HYDRATE IV INFUSION ADD-ON: CPT

## 2021-10-14 PROCEDURE — 25010000002 DENOSUMAB 120 MG/1.7ML SOLUTION: Performed by: NURSE PRACTITIONER

## 2021-10-14 PROCEDURE — 80053 COMPREHEN METABOLIC PANEL: CPT

## 2021-10-14 PROCEDURE — 85025 COMPLETE CBC W/AUTO DIFF WBC: CPT

## 2021-10-14 PROCEDURE — 25010000002 HEPARIN LOCK FLUSH PER 10 UNITS: Performed by: INTERNAL MEDICINE

## 2021-10-14 PROCEDURE — 84100 ASSAY OF PHOSPHORUS: CPT

## 2021-10-14 PROCEDURE — 25010000002 PEMBROLIZUMAB 100 MG/4ML SOLUTION 4 ML VIAL: Performed by: NURSE PRACTITIONER

## 2021-10-14 RX ORDER — HEPARIN SODIUM (PORCINE) LOCK FLUSH IV SOLN 100 UNIT/ML 100 UNIT/ML
500 SOLUTION INTRAVENOUS AS NEEDED
Status: DISCONTINUED | OUTPATIENT
Start: 2021-10-14 | End: 2021-10-14 | Stop reason: HOSPADM

## 2021-10-14 RX ORDER — SODIUM CHLORIDE 0.9 % (FLUSH) 0.9 %
10 SYRINGE (ML) INJECTION AS NEEDED
Status: DISCONTINUED | OUTPATIENT
Start: 2021-10-14 | End: 2021-10-14 | Stop reason: HOSPADM

## 2021-10-14 RX ORDER — SODIUM CHLORIDE 9 MG/ML
250 INJECTION, SOLUTION INTRAVENOUS ONCE
Status: CANCELLED | OUTPATIENT
Start: 2021-10-14

## 2021-10-14 RX ORDER — SODIUM CHLORIDE 0.9 % (FLUSH) 0.9 %
10 SYRINGE (ML) INJECTION AS NEEDED
Status: CANCELLED | OUTPATIENT
Start: 2021-10-14

## 2021-10-14 RX ORDER — SODIUM CHLORIDE 9 MG/ML
500 INJECTION, SOLUTION INTRAVENOUS ONCE
Status: COMPLETED | OUTPATIENT
Start: 2021-10-14 | End: 2021-10-14

## 2021-10-14 RX ORDER — HEPARIN SODIUM (PORCINE) LOCK FLUSH IV SOLN 100 UNIT/ML 100 UNIT/ML
500 SOLUTION INTRAVENOUS AS NEEDED
Status: CANCELLED | OUTPATIENT
Start: 2021-10-14

## 2021-10-14 RX ORDER — SODIUM CHLORIDE 9 MG/ML
250 INJECTION, SOLUTION INTRAVENOUS ONCE
Status: COMPLETED | OUTPATIENT
Start: 2021-10-14 | End: 2021-10-14

## 2021-10-14 RX ADMIN — Medication 500 UNITS: at 10:52

## 2021-10-14 RX ADMIN — SODIUM CHLORIDE 250 ML: 9 INJECTION, SOLUTION INTRAVENOUS at 10:18

## 2021-10-14 RX ADMIN — SODIUM CHLORIDE, PRESERVATIVE FREE 10 ML: 5 INJECTION INTRAVENOUS at 10:52

## 2021-10-14 RX ADMIN — DENOSUMAB 120 MG: 120 INJECTION SUBCUTANEOUS at 10:52

## 2021-10-14 RX ADMIN — SODIUM CHLORIDE 500 ML: 9 INJECTION, SOLUTION INTRAVENOUS at 09:33

## 2021-10-14 RX ADMIN — SODIUM CHLORIDE 200 MG: 900 INJECTION INTRAVENOUS at 10:18

## 2021-11-03 NOTE — PROGRESS NOTES
"Subjective Patient with excellent performance status, beginning to notice increasing right knee pain.  REASON FOR FOLLOW UP:  1.  Chronic lymphocytic leukemia with extensive lymphadenopathy and possible pleural involvement. Initiation of Treanda, Rituxan May 1, 2019.  2.  Hypogammaglobulinemia.  Status is post IVIG  3.  Significant response on scans June 27, 2019.  Treatment changed to Imbruvica 420 mg daily.     HISTORY OF PRESENT ILLNESS:    The patient is a  66 y.o. male with the above-mentioned history, returns the office today in anticipation of his 19th dose of Keytruda which he continues to receive every 3 weeks.  He also receives Xgeva every 6 weeks which was given 10/14/2021.  He continues to tolerate therapy remarkably well.  He denies fevers or chills, signs or symptoms of infection.  He denies any new adenopathy.  His pain is well controlled though he has begun to have pain involving his right knee that produces  right hamstring pain.  He is without worsening shortness of breath or chest pain.  He does not feel he is in need of thoracentesis.  He is not in need of a refill of his pain medications today.  His bowels are moving without difficulty while on narcotics.  He denies B symptoms, has only mild bruising with Imbruvica    Past Medical History, Past Surgical History, Social History, Family History have been reviewed and are without significant changes except as mentioned.    Objective      Vitals:    11/04/21 1152   BP: 159/83   Pulse: 81   Resp: 18   Temp: 98 °F (36.7 °C)   TempSrc: Temporal   SpO2: 95%   Weight: 82.4 kg (181 lb 9.6 oz)   Height: 180.3 cm (70.98\")   PainSc:   2     Current Status 9/23/2021   ECOG score 0       Physical Exam    GENERAL:  Well-developed, well-nourished in no acute distress.   SKIN:  Warm, dry without purpura or petechiae.  Only faint erythema remaining on anterior chest, no macular papular rash.  No mild bruising  HEAD:  Normocephalic.  EYES:  Pupils equal, round.  " EOMs intact.  Conjunctivae normal.  EARS:  Hearing intact.  LYMPHATICS: Without cervical, submandibular, axillary, supraclavicular adenopathy.    CHEST:  Lungs clear to auscultation. Good airflow.  CARDIAC:  Regular rate and rhythm without murmurs. Normal S1,S2.  ABDOMEN:  Soft, nontender with no organomegaly or masses. Bowel sounds present  EXTREMITIES:  No clubbing, cyanosis or edema.  NEUROLOGICAL: No focal neurological deficits.  PSYCHIATRIC:  Normal affect and mood.    I have reexamined the patient and the results are consistent with the previously documented exam. Jostin Parekh MD       RECENT LABS:  Results from last 7 days   Lab Units 11/04/21  1122   WBC 10*3/mm3 8.31   NEUTROS ABS 10*3/mm3 6.43   HEMOGLOBIN g/dL 13.6   HEMATOCRIT % 41.5   PLATELETS 10*3/mm3 195     Results from last 7 days   Lab Units 11/04/21  1122   SODIUM mmol/L 137   POTASSIUM mmol/L 4.6   CHLORIDE mmol/L 102   CO2 mmol/L 27.3   BUN mg/dL 16   CREATININE mg/dL 1.18   CALCIUM mg/dL 8.6   ALBUMIN g/dL 3.70   BILIRUBIN mg/dL 0.4   ALK PHOS U/L 89   ALT (SGPT) U/L 8   AST (SGOT) U/L 9   GLUCOSE mg/dL 130*         FISH prognostic panel-Positive for deletion of 13 q. 14.3    Assessment/Plan   1.  CLL presenting with significant lymphocytosis, lymphadenopathy and splenomegaly.     · Positive for deletion of 13 q. 14.3.    · Patient status post 2 cycles of Treanda Rituxan with excellent response.    · Imaging 6/27/2019 with significant decrease in adenopathy.  Treanda Rituxan discontinued   · Imbruvica 420 mg daily initiated 7/25/2019.  · He continues on Imbruvica, without indication of progression of his CLL, without progressive lymphadenopathy on CT scans.  · He is without progressive adenopathy on exam today, 11/4/2021.    2.  Pulmonary nodules/infiltrates.  He is  status post bronchoscopy.  Is unclear at this time whether these findings are infectious or possibly related to his lymphoproliferative disorder.  This is seen to be  improving on recent scans.    3.  Hypogammaglobulinemia-status post IVIG with resolution of sinusitis.      4.  Non-small cell lung carcinoma  · August 26 2020 revealing a new 1.6 cm spiculated nodule within the left upper lobe, 1.2 cm left adrenal nodule, bone windows with a soft tissue mass involving the right eighth rib measuring 3.7 x 2.6 cm.    · Biopsy was consistent with adenocarcinoma CK 7+, CK 20-, lung primary suspected clinically, molecular panel not yet obtained.  · PET/CT 9/23/2020 demonstrating asymmetric uptake within the right parotid gland which is unremarkable appearance on noncontrasted CT, SUV of 6 compared to 2.7.  There is no hilar, mediastinal or axillary adenopathy, findings of asymmetric moderate to intense FDG uptake within superior aspect the right chest wall anterior to the right first rib with an irregular hypodense lesion measuring 1.8 x 1.6 and SUV 4.9.  This had not been seen June 27, 2019.  There is an intensely FDG avid nodule within the lingula measuring 1.9 x 1.5 history of 12.4, FDG avid left adrenal nodule 1.9 x 1.5 with an issue 21.2.    · Evaluated by radiation oncology therapy September 29 and started on radiation therapy to the right chest wall-35 Gy in 10 fractions.   ·  Plans were also then proceed with SBRT to the solitary left upper lobe lesion pending insurance approval.  · Further testing including TPS of 20% and foundation CDX with a TMB of greater than 10 mutations per megabase (28 mutations per megabase) and the indication that several immunotherapies could be useful in this patient's care.  Additional genomic findings include BRAF G469R/that trametinib could be useful and STK11/that everolimus could be useful.  · Keytruda initiated 10/21/2021   · Reassessment after 2 cycles of Keytruda with enlargement of the right eighth rib lesion,enlargement of spiculated left upper lobe lung mass, moderate to large right-sided pleural effusion, new left adrenal mass and  enlarged retrocrural lymph node.?Pseudoprogression  · Xgeva last given 12/30/2020  · Follow-up scans 1/6/2021 demonstrated marked improvement in his right eighth rib lesion, resolution of left upper lobe spiculated mass, residual large right pleural effusion, resolution of left adrenal metastasis.   · Right pleural effusion, with thoracentesis 1/14/2021 with 1500 mils removed.  Pathology consistent with malignant effusion   · CT scans 4/5/2021 with response noted in the left upper lobe density.  · He continues to receive Keytruda every 3 weeks along with Xgeva every 6 weeks.  · Repeat scan 7/15/2021 without evidence of recurrence or progressive disease.  Plans to continue Keytruda every 3 weeks with repeat scans in 4 to 6 months  · Proceed today with cycle 19 Keytruda.    5.  Left knee metastasis  · Evaluated by orthopedic oncology, Dr. Eliu Ochoa  · Admission 1/7/2021 undergoing stabilization of left femoral bone lesion, prophylactic stabilization with intramedullary implants.  · It was suggested we delay Xgeva or Zometa to allow bone healing  · Completed radiation with 10 fractions 2/10/2021  · Xgeva resumed 4/29/2021, he continues to receive this every 6 weeks.     6.  Malignant right pleural effusion  · Ultrasound thoracentesis 1/14/2021 1500 mL removed  · Chest x-ray 2/25/2021 with moderate right pleural effusion  · Progressive symptoms with worsening pain 4/5/2021  · Ultrasound-guided thoracentesis 4/13/2021 with 2050 ml removed.  · Plans for Pleurx catheter with thoracic surgery, however, pleural effusion has not recurred.  Patient seen 11/4/2021 with chest x-ray planned.    7.  Cancer related pain  · Pain is most prominent in his right rib, utilizing MS Contin 30 mg 3 times a day  · Hydrocodone/acetaminophen 10/325 as needed for breakthrough pain.  Currently taking 3 to 4 tablets daily  · He is not currently in need of a refill of pain medications    8.  Insomnia  · Continuing to follow with behavioral  oncology  · Continuing Remeron 7.5 mg nightly with good control    9. Health maintenance  · Status post COVID-19 booster, SARS antibody pending today  · The patient is due for Shingrix and Prevnar which will both be administered in the clinic 9/23/2021    Plan:  1. Proceed today with cycle 19 Keytruda  2. Continue Imbruvica 420 mg daily  3. Continue MS Contin 30 mg 3 times a day and hydrocodone/acetaminophen 10/325 as needed for breakthrough pain  4. Continue Remeron 15 mg nightly  5. Plan plain films right knee, chest x-ray today [facility], PET/CT 2 weeks-whole-body PET including knees.      Return in 3 weeks for CBC, CMP MD follow-up and continued KeytrudaThe patient is on high risk medication requiring close monitoring for toxicity.      I spent 45 minutes caring for Dav on this date of service. This time includes time spent by me in the following activities: preparing for the visit, reviewing tests, obtaining and/or reviewing a separately obtained history, performing a medically appropriate examination and/or evaluation, counseling and educating the patient/family/caregiver, ordering medications, tests, or procedures, referring and communicating with other health care professionals, documenting information in the medical record, independently interpreting results and communicating that information with the patient/family/caregiver and care coordination.       Jostin Parekh MD   11/4/2021

## 2021-11-04 ENCOUNTER — INFUSION (OUTPATIENT)
Dept: ONCOLOGY | Facility: HOSPITAL | Age: 66
End: 2021-11-04

## 2021-11-04 ENCOUNTER — OFFICE VISIT (OUTPATIENT)
Dept: ONCOLOGY | Facility: CLINIC | Age: 66
End: 2021-11-04

## 2021-11-04 ENCOUNTER — HOSPITAL ENCOUNTER (OUTPATIENT)
Dept: GENERAL RADIOLOGY | Facility: HOSPITAL | Age: 66
Discharge: HOME | End: 2021-11-04

## 2021-11-04 ENCOUNTER — SPECIALTY PHARMACY (OUTPATIENT)
Dept: ONCOLOGY | Facility: HOSPITAL | Age: 66
End: 2021-11-04

## 2021-11-04 VITALS
HEIGHT: 71 IN | TEMPERATURE: 98 F | HEART RATE: 81 BPM | RESPIRATION RATE: 18 BRPM | OXYGEN SATURATION: 95 % | BODY MASS INDEX: 25.42 KG/M2 | DIASTOLIC BLOOD PRESSURE: 83 MMHG | WEIGHT: 181.6 LBS | SYSTOLIC BLOOD PRESSURE: 159 MMHG

## 2021-11-04 DIAGNOSIS — C34.90 LUNG CANCER METASTATIC TO BONE (HCC): ICD-10-CM

## 2021-11-04 DIAGNOSIS — C79.51 LUNG CANCER METASTATIC TO BONE (HCC): ICD-10-CM

## 2021-11-04 DIAGNOSIS — C34.12 MALIGNANT NEOPLASM OF UPPER LOBE OF LEFT LUNG (HCC): ICD-10-CM

## 2021-11-04 DIAGNOSIS — C91.10 CLL (CHRONIC LYMPHOCYTIC LEUKEMIA) (HCC): ICD-10-CM

## 2021-11-04 DIAGNOSIS — Z79.899 HIGH RISK MEDICATION USE: Primary | ICD-10-CM

## 2021-11-04 DIAGNOSIS — C91.10 CLL (CHRONIC LYMPHOCYTIC LEUKEMIA) (HCC): Primary | ICD-10-CM

## 2021-11-04 DIAGNOSIS — Z79.899 HIGH RISK MEDICATION USE: ICD-10-CM

## 2021-11-04 PROBLEM — Z45.2 FITTING AND ADJUSTMENT OF VASCULAR CATHETER: Status: ACTIVE | Noted: 2021-11-04

## 2021-11-04 LAB
ALBUMIN SERPL-MCNC: 3.7 G/DL (ref 3.5–5.2)
ALBUMIN/GLOB SERPL: 1.8 G/DL (ref 1.1–2.4)
ALP SERPL-CCNC: 89 U/L (ref 38–116)
ALT SERPL W P-5'-P-CCNC: 8 U/L (ref 0–41)
ANION GAP SERPL CALCULATED.3IONS-SCNC: 7.7 MMOL/L (ref 5–15)
AST SERPL-CCNC: 9 U/L (ref 0–40)
BASOPHILS # BLD AUTO: 0.07 10*3/MM3 (ref 0–0.2)
BASOPHILS NFR BLD AUTO: 0.8 % (ref 0–1.5)
BILIRUB SERPL-MCNC: 0.4 MG/DL (ref 0.2–1.2)
BUN SERPL-MCNC: 16 MG/DL (ref 6–20)
BUN/CREAT SERPL: 13.6 (ref 7.3–30)
CALCIUM SPEC-SCNC: 8.6 MG/DL (ref 8.5–10.2)
CHLORIDE SERPL-SCNC: 102 MMOL/L (ref 98–107)
CO2 SERPL-SCNC: 27.3 MMOL/L (ref 22–29)
CREAT SERPL-MCNC: 1.18 MG/DL (ref 0.7–1.3)
DEPRECATED RDW RBC AUTO: 44.4 FL (ref 37–54)
EOSINOPHIL # BLD AUTO: 0.15 10*3/MM3 (ref 0–0.4)
EOSINOPHIL NFR BLD AUTO: 1.8 % (ref 0.3–6.2)
ERYTHROCYTE [DISTWIDTH] IN BLOOD BY AUTOMATED COUNT: 13.2 % (ref 12.3–15.4)
GFR SERPL CREATININE-BSD FRML MDRD: 62 ML/MIN/1.73
GLOBULIN UR ELPH-MCNC: 2.1 GM/DL (ref 1.8–3.5)
GLUCOSE SERPL-MCNC: 130 MG/DL (ref 74–124)
HCT VFR BLD AUTO: 41.5 % (ref 37.5–51)
HGB BLD-MCNC: 13.6 G/DL (ref 13–17.7)
IMM GRANULOCYTES # BLD AUTO: 0.06 10*3/MM3 (ref 0–0.05)
IMM GRANULOCYTES NFR BLD AUTO: 0.7 % (ref 0–0.5)
LYMPHOCYTES # BLD AUTO: 0.89 10*3/MM3 (ref 0.7–3.1)
LYMPHOCYTES NFR BLD AUTO: 10.7 % (ref 19.6–45.3)
MCH RBC QN AUTO: 30.4 PG (ref 26.6–33)
MCHC RBC AUTO-ENTMCNC: 32.8 G/DL (ref 31.5–35.7)
MCV RBC AUTO: 92.8 FL (ref 79–97)
MONOCYTES # BLD AUTO: 0.71 10*3/MM3 (ref 0.1–0.9)
MONOCYTES NFR BLD AUTO: 8.5 % (ref 5–12)
NEUTROPHILS NFR BLD AUTO: 6.43 10*3/MM3 (ref 1.7–7)
NEUTROPHILS NFR BLD AUTO: 77.5 % (ref 42.7–76)
NRBC BLD AUTO-RTO: 0 /100 WBC (ref 0–0.2)
PLATELET # BLD AUTO: 195 10*3/MM3 (ref 140–450)
PMV BLD AUTO: 11.7 FL (ref 6–12)
POTASSIUM SERPL-SCNC: 4.6 MMOL/L (ref 3.5–4.7)
PROT SERPL-MCNC: 5.8 G/DL (ref 6.3–8)
RBC # BLD AUTO: 4.47 10*6/MM3 (ref 4.14–5.8)
SODIUM SERPL-SCNC: 137 MMOL/L (ref 134–145)
T4 FREE SERPL-MCNC: 1.52 NG/DL (ref 0.93–1.7)
TSH SERPL DL<=0.05 MIU/L-ACNC: 2.54 UIU/ML (ref 0.27–4.2)
WBC # BLD AUTO: 8.31 10*3/MM3 (ref 3.4–10.8)

## 2021-11-04 PROCEDURE — 71046 X-RAY EXAM CHEST 2 VIEWS: CPT

## 2021-11-04 PROCEDURE — 84439 ASSAY OF FREE THYROXINE: CPT | Performed by: INTERNAL MEDICINE

## 2021-11-04 PROCEDURE — 84443 ASSAY THYROID STIM HORMONE: CPT | Performed by: INTERNAL MEDICINE

## 2021-11-04 PROCEDURE — 85025 COMPLETE CBC W/AUTO DIFF WBC: CPT

## 2021-11-04 PROCEDURE — 73562 X-RAY EXAM OF KNEE 3: CPT

## 2021-11-04 PROCEDURE — 99215 OFFICE O/P EST HI 40 MIN: CPT | Performed by: INTERNAL MEDICINE

## 2021-11-04 PROCEDURE — 25010000002 PEMBROLIZUMAB 100 MG/4ML SOLUTION 4 ML VIAL: Performed by: INTERNAL MEDICINE

## 2021-11-04 PROCEDURE — 96413 CHEMO IV INFUSION 1 HR: CPT

## 2021-11-04 PROCEDURE — 80053 COMPREHEN METABOLIC PANEL: CPT

## 2021-11-04 RX ORDER — SODIUM CHLORIDE 9 MG/ML
250 INJECTION, SOLUTION INTRAVENOUS ONCE
Status: COMPLETED | OUTPATIENT
Start: 2021-11-04 | End: 2021-11-04

## 2021-11-04 RX ORDER — HEPARIN SODIUM (PORCINE) LOCK FLUSH IV SOLN 100 UNIT/ML 100 UNIT/ML
500 SOLUTION INTRAVENOUS AS NEEDED
Status: CANCELLED | OUTPATIENT
Start: 2021-11-04

## 2021-11-04 RX ORDER — SODIUM CHLORIDE 9 MG/ML
250 INJECTION, SOLUTION INTRAVENOUS ONCE
Status: CANCELLED | OUTPATIENT
Start: 2021-11-04

## 2021-11-04 RX ORDER — SODIUM CHLORIDE 0.9 % (FLUSH) 0.9 %
10 SYRINGE (ML) INJECTION AS NEEDED
Status: CANCELLED | OUTPATIENT
Start: 2021-11-04

## 2021-11-04 RX ADMIN — SODIUM CHLORIDE 250 ML: 9 INJECTION, SOLUTION INTRAVENOUS at 12:45

## 2021-11-04 RX ADMIN — SODIUM CHLORIDE 200 MG: 900 INJECTION INTRAVENOUS at 13:05

## 2021-11-04 NOTE — PROGRESS NOTES
In person jose lin adherence and side effects ( Imbruvica)    Mr Freitas presents to the clinic today for labs and a toxicity check as well as his Keytruda infusion.  His reported adherence to Imbruvica 420 mg po daily continues to be appropriate, no missing doses in the last month.  He denies any new side effects of either Imbruvica or Keytruda.  He reports eating well, and has not added any new medications.  He had no questions or concerns for the [place] office.

## 2021-11-11 ENCOUNTER — HOSPITAL ENCOUNTER (OUTPATIENT)
Dept: PET IMAGING | Facility: HOSPITAL | Age: 66
Discharge: HOME | End: 2021-11-11

## 2021-11-11 DIAGNOSIS — C79.51 LUNG CANCER METASTATIC TO BONE (HCC): ICD-10-CM

## 2021-11-11 DIAGNOSIS — C34.90 LUNG CANCER METASTATIC TO BONE (HCC): ICD-10-CM

## 2021-11-11 DIAGNOSIS — C34.12 MALIGNANT NEOPLASM OF UPPER LOBE OF LEFT LUNG (HCC): ICD-10-CM

## 2021-11-11 LAB — GLUCOSE BLDC GLUCOMTR-MCNC: 94 MG/DL (ref 70–130)

## 2021-11-11 PROCEDURE — 0 FLUDEOXYGLUCOSE F18 SOLUTION: Performed by: INTERNAL MEDICINE

## 2021-11-11 PROCEDURE — 78816 PET IMAGE W/CT FULL BODY: CPT

## 2021-11-11 PROCEDURE — A9552 F18 FDG: HCPCS | Performed by: INTERNAL MEDICINE

## 2021-11-11 PROCEDURE — 82962 GLUCOSE BLOOD TEST: CPT

## 2021-11-11 RX ORDER — MORPHINE SULFATE 30 MG/1
30 TABLET, FILM COATED, EXTENDED RELEASE ORAL 2 TIMES DAILY
Qty: 60 TABLET | Refills: 0 | Status: SHIPPED | OUTPATIENT
Start: 2021-11-11 | End: 2021-12-21 | Stop reason: SDUPTHER

## 2021-11-11 RX ADMIN — FLUDEOXYGLUCOSE F18 1 DOSE: 300 INJECTION INTRAVENOUS at 10:14

## 2021-11-16 DIAGNOSIS — Z79.899 HIGH RISK MEDICATION USE: Primary | ICD-10-CM

## 2021-11-16 DIAGNOSIS — C79.51 LUNG CANCER METASTATIC TO BONE (HCC): ICD-10-CM

## 2021-11-16 DIAGNOSIS — C34.12 MALIGNANT NEOPLASM OF UPPER LOBE OF LEFT LUNG (HCC): ICD-10-CM

## 2021-11-16 DIAGNOSIS — C34.90 LUNG CANCER METASTATIC TO BONE (HCC): ICD-10-CM

## 2021-11-16 DIAGNOSIS — C91.10 CLL (CHRONIC LYMPHOCYTIC LEUKEMIA) (HCC): ICD-10-CM

## 2021-11-23 NOTE — PROGRESS NOTES
Subjective Patient with continued excellent performance status, discussed apparent hyper response to Keytruda                                                                            REASON FOR FOLLOW UP:  1.  Chronic lymphocytic leukemia with extensive lymphadenopathy and possible pleural involvement. Initiation of Treanda, Rituxan May 1, 2019.  2.  Hypogammaglobulinemia.  Status is post IVIG  3.  Significant response on scans June 27, 2019.  Treatment changed to Imbruvica 420 mg daily.  4.  Metastatic non-small cell lung carcinoma on Keytruda     HISTORY OF PRESENT ILLNESS:    The patient is a  66 y.o. male with the above-mentioned history, returns the office today in anticipation of his 19th dose of Keytruda which he continues to receive every 3 weeks.  He also receives Xgeva every 6 weeks which was given 10/14/2021.  He continues to tolerate therapy remarkably well.  He denies fevers or chills, signs or symptoms of infection.  He denies any new adenopathy.  His pain is well controlled though he has begun to have pain involving his right knee that produces  right hamstring pain.  He is without worsening shortness of breath or chest pain.  He does not feel he is in need of thoracentesis.  He is not in need of a refill of his pain medications today.  His bowels are moving without difficulty while on narcotics.  He denies B symptoms, has only mild bruising with Imbruvica.  We have the patient proceed with additional testing undergoing plain views of his right knee showing a right knee effusion with medial compartment narrowing and no suspicious bone lesion.  There is benign periosteal calcification along the distal right femur.  A PET/CT 11/11 went on to demonstrate a complete metabolic response to therapy compared to PET/CT from 9/23/2020.  This includes his primary lung neoplasm left upper lobe, large osseous metastatic lesion the right chest wall region from the eighth rib, small left adrenal metastasis, and no  "evidence of disease involving the distal left femur.      The patient is seen 11/24/2021 continue to do well though indicating that his right knee is \"something I can manage at the moment\".  He prefers not to have orthopedic assessment at this point.  He is concerned, ever, about skin lesions that are developing primarily on his calf regions and into his right thigh.    Past Medical History, Past Surgical History, Social History, Family History have been reviewed and are without significant changes except as mentioned.    Objective      Vitals:    11/24/21 0824   BP: 134/80   Pulse: 84   Resp: 18   Temp: 97.8 °F (36.6 °C)   TempSrc: Temporal   SpO2: 96%   Weight: 81 kg (178 lb 9.6 oz)   Height: 180.3 cm (70.98\")   PainSc: 0-No pain     Current Status 11/24/2021   ECOG score 0       Physical Exam    GENERAL:  Well-developed, well-nourished in no acute distress.   SKIN:  Warm, dry with erythematous target lesions noted primarily around the calf regions bilaterally, mildly pruritic, no tenderness, with a scale component, varying in size  HEAD:  Normocephalic.  EYES:  Pupils equal, round.  EOMs intact.  Conjunctivae normal.  EARS:  Hearing intact.  LYMPHATICS: Without cervical, submandibular, axillary, supraclavicular adenopathy.    CHEST:  Lungs clear to auscultation. Good airflow.  CARDIAC:  Regular rate and rhythm without murmurs. Normal S1,S2.  ABDOMEN:  Soft, nontender with no organomegaly or masses. Bowel sounds present  EXTREMITIES:  No clubbing, cyanosis or edema.  NEUROLOGICAL: No focal neurological deficits.  PSYCHIATRIC:  Normal affect and mood.    I have reexamined the patient and the results are consistent with the previously documented exam. Jostin Parekh MD       RECENT LABS:  Results from last 7 days   Lab Units 11/24/21 0805   WBC 10*3/mm3 8.92   NEUTROS ABS 10*3/mm3 6.65   HEMOGLOBIN g/dL 13.2   HEMATOCRIT % 41.5   PLATELETS 10*3/mm3 201     Results from last 7 days   Lab Units 11/24/21 0805 "   SODIUM mmol/L 139   POTASSIUM mmol/L 4.6   CHLORIDE mmol/L 103   CO2 mmol/L 28.3   BUN mg/dL 20   CREATININE mg/dL 1.35*   CALCIUM mg/dL 8.6   ALBUMIN g/dL 3.70   BILIRUBIN mg/dL 0.4   ALK PHOS U/L 85   ALT (SGPT) U/L 8   AST (SGOT) U/L 10   GLUCOSE mg/dL 113   MAGNESIUM mg/dL 1.9         FISH prognostic panel-Positive for deletion of 13 q. 14.3    Assessment/Plan   1.  CLL presenting with significant lymphocytosis, lymphadenopathy and splenomegaly.     · Positive for deletion of 13 q. 14.3.    · Patient status post 2 cycles of Treanda Rituxan with excellent response.    · Imaging 6/27/2019 with significant decrease in adenopathy.  Treanda Rituxan discontinued   · Imbruvica 420 mg daily initiated 7/25/2019.  · He continues on Imbruvica, without indication of progression of his CLL, without progressive lymphadenopathy on CT scans.  · He is without progressive adenopathy on exam today, 11/4/2021.    2.  Pulmonary nodules/infiltrates.  He is  status post bronchoscopy.  Is unclear at this time whether these findings are infectious or possibly related to his lymphoproliferative disorder.  This is seen to be improving on recent scans.    3.  Hypogammaglobulinemia-status post IVIG with resolution of sinusitis.      4.  Non-small cell lung carcinoma  · August 26 2020 revealing a new 1.6 cm spiculated nodule within the left upper lobe, 1.2 cm left adrenal nodule, bone windows with a soft tissue mass involving the right eighth rib measuring 3.7 x 2.6 cm.    · Biopsy was consistent with adenocarcinoma CK 7+, CK 20-, lung primary suspected clinically, molecular panel not yet obtained.  · PET/CT 9/23/2020 demonstrating asymmetric uptake within the right parotid gland which is unremarkable appearance on noncontrasted CT, SUV of 6 compared to 2.7.  There is no hilar, mediastinal or axillary adenopathy, findings of asymmetric moderate to intense FDG uptake within superior aspect the right chest wall anterior to the right first  rib with an irregular hypodense lesion measuring 1.8 x 1.6 and SUV 4.9.  This had not been seen June 27, 2019.  There is an intensely FDG avid nodule within the lingula measuring 1.9 x 1.5 history of 12.4, FDG avid left adrenal nodule 1.9 x 1.5 with an issue 21.2.    · Evaluated by radiation oncology therapy September 29 and started on radiation therapy to the right chest wall-35 Gy in 10 fractions.   ·  Plans were also then proceed with SBRT to the solitary left upper lobe lesion pending insurance approval.  · Further testing including TPS of 20% and foundation CDX with a TMB of greater than 10 mutations per megabase (28 mutations per megabase) and the indication that several immunotherapies could be useful in this patient's care.  Additional genomic findings include BRAF G469R/that trametinib could be useful and STK11/that everolimus could be useful.  · Keytruda initiated 10/21/2021   · Reassessment after 2 cycles of Keytruda with enlargement of the right eighth rib lesion,enlargement of spiculated left upper lobe lung mass, moderate to large right-sided pleural effusion, new left adrenal mass and enlarged retrocrural lymph node.?Pseudoprogression  · Xgeva last given 12/30/2020  · Follow-up scans 1/6/2021 demonstrated marked improvement in his right eighth rib lesion, resolution of left upper lobe spiculated mass, residual large right pleural effusion, resolution of left adrenal metastasis.   · Right pleural effusion, with thoracentesis 1/14/2021 with 1500 mils removed.  Pathology consistent with malignant effusion   · CT scans 4/5/2021 with response noted in the left upper lobe density.  · He continues to receive Keytruda every 3 weeks along with Xgeva every 6 weeks.  · Repeat scan 7/15/2021 without evidence of recurrence or progressive disease.  Plans to continue Keytruda every 3 weeks with repeat scans in 4 to 6 months  · Proceed today with cycle 19 Keytruda.  · Patient status post PET/CT 11/11/2021 with hyper  response, approximate remission status, plan to proceed with cycle #20 Keytruda    5.  Left knee metastasis  · Evaluated by orthopedic oncology, Dr. Eliu Ochoa  · Admission 1/7/2021 undergoing stabilization of left femoral bone lesion, prophylactic stabilization with intramedullary implants.  · It was suggested we delay Xgeva or Zometa to allow bone healing  · Completed radiation with 10 fractions 2/10/2021  · Xgeva resumed 4/29/2021, he continues to receive this every 6 weeks.     6.  Malignant right pleural effusion  · Ultrasound thoracentesis 1/14/2021 1500 mL removed  · Chest x-ray 2/25/2021 with moderate right pleural effusion  · Progressive symptoms with worsening pain 4/5/2021  · Ultrasound-guided thoracentesis 4/13/2021 with 2050 ml removed.  · Plans for Pleurx catheter with thoracic surgery, however, pleural effusion has not recurred.  Patient seen 11/4/2021 with chest x-ray planned.    7.  Cancer related pain  · Pain is most prominent in his right rib, utilizing MS Contin 30 mg 3 times a day  · Hydrocodone/acetaminophen 10/325 as needed for breakthrough pain.  Currently taking 3 to 4 tablets daily  · He is not currently in need of a refill of pain medications    8.  Insomnia  · Continuing to follow with behavioral oncology  · Continuing Remeron 7.5 mg nightly with good control    9. Health maintenance  · Status post COVID-19 booster, SARS antibody pending today  · The patient is due for Shingrix and Prevnar which will both be administered in the clinic 9/23/2021    10.  Skin lesions  · Uncertain of etiology, unexpected findings for usual skin lesions associated with immunotherapy  · Request dermatologic assessment-Dr. Regan Damon    11.  Right knee pain  · Osteoarthritis, orthopedic assessment upon patient's request.    Plan:  1. Proceed today with cycle 20 Keytruda  2. Continue Imbruvica 420 mg daily  3. Continue MS Contin 30 mg 3 times a day and hydrocodone/acetaminophen 10/325 as needed for  breakthrough pain  4. Continue Remeron 15 mg nightly  5.  Orthopedic assessment upon patient's request involving his right knee  6.  We discussed a follow-up's scan of the brain at his next series of studies  7.  Dermatologic consultation  8.  3-week follow-up MD, Lupis Soria  The patient is on high risk medication requiring close monitoring for toxicity.      I spent 45 minutes caring for Dav on this date of service. This time includes time spent by me in the following activities: preparing for the visit, reviewing tests, obtaining and/or reviewing a separately obtained history, performing a medically appropriate examination and/or evaluation, counseling and educating the patient/family/caregiver, ordering medications, tests, or procedures, referring and communicating with other health care professionals, documenting information in the medical record, independently interpreting results and communicating that information with the patient/family/caregiver and care coordination.       Jostin Parekh MD   11/24/2021

## 2021-11-24 ENCOUNTER — OFFICE VISIT (OUTPATIENT)
Dept: ONCOLOGY | Facility: CLINIC | Age: 66
End: 2021-11-24

## 2021-11-24 ENCOUNTER — INFUSION (OUTPATIENT)
Dept: ONCOLOGY | Facility: HOSPITAL | Age: 66
End: 2021-11-24

## 2021-11-24 VITALS
HEART RATE: 84 BPM | OXYGEN SATURATION: 96 % | RESPIRATION RATE: 18 BRPM | HEIGHT: 71 IN | SYSTOLIC BLOOD PRESSURE: 134 MMHG | DIASTOLIC BLOOD PRESSURE: 80 MMHG | BODY MASS INDEX: 25 KG/M2 | WEIGHT: 178.6 LBS | TEMPERATURE: 97.8 F

## 2021-11-24 DIAGNOSIS — C34.12 MALIGNANT NEOPLASM OF UPPER LOBE OF LEFT LUNG (HCC): ICD-10-CM

## 2021-11-24 DIAGNOSIS — C79.51 LUNG CANCER METASTATIC TO BONE (HCC): ICD-10-CM

## 2021-11-24 DIAGNOSIS — Z79.899 HIGH RISK MEDICATION USE: ICD-10-CM

## 2021-11-24 DIAGNOSIS — C91.10 CLL (CHRONIC LYMPHOCYTIC LEUKEMIA) (HCC): ICD-10-CM

## 2021-11-24 DIAGNOSIS — C79.51 LUNG CANCER METASTATIC TO BONE (HCC): Primary | ICD-10-CM

## 2021-11-24 DIAGNOSIS — C34.90 LUNG CANCER METASTATIC TO BONE (HCC): Primary | ICD-10-CM

## 2021-11-24 DIAGNOSIS — C34.12 MALIGNANT NEOPLASM OF UPPER LOBE OF LEFT LUNG (HCC): Primary | ICD-10-CM

## 2021-11-24 DIAGNOSIS — C34.90 LUNG CANCER METASTATIC TO BONE (HCC): ICD-10-CM

## 2021-11-24 LAB
ALBUMIN SERPL-MCNC: 3.7 G/DL (ref 3.5–5.2)
ALBUMIN/GLOB SERPL: 1.7 G/DL (ref 1.1–2.4)
ALP SERPL-CCNC: 85 U/L (ref 38–116)
ALT SERPL W P-5'-P-CCNC: 8 U/L (ref 0–41)
ANION GAP SERPL CALCULATED.3IONS-SCNC: 7.7 MMOL/L (ref 5–15)
AST SERPL-CCNC: 10 U/L (ref 0–40)
BASOPHILS # BLD AUTO: 0.06 10*3/MM3 (ref 0–0.2)
BASOPHILS NFR BLD AUTO: 0.7 % (ref 0–1.5)
BILIRUB SERPL-MCNC: 0.4 MG/DL (ref 0.2–1.2)
BUN SERPL-MCNC: 20 MG/DL (ref 6–20)
BUN/CREAT SERPL: 14.8 (ref 7.3–30)
CALCIUM SPEC-SCNC: 8.6 MG/DL (ref 8.5–10.2)
CHLORIDE SERPL-SCNC: 103 MMOL/L (ref 98–107)
CO2 SERPL-SCNC: 28.3 MMOL/L (ref 22–29)
CREAT SERPL-MCNC: 1.35 MG/DL (ref 0.7–1.3)
DEPRECATED RDW RBC AUTO: 44.7 FL (ref 37–54)
EOSINOPHIL # BLD AUTO: 0.27 10*3/MM3 (ref 0–0.4)
EOSINOPHIL NFR BLD AUTO: 3 % (ref 0.3–6.2)
ERYTHROCYTE [DISTWIDTH] IN BLOOD BY AUTOMATED COUNT: 12.8 % (ref 12.3–15.4)
GFR SERPL CREATININE-BSD FRML MDRD: 53 ML/MIN/1.73
GLOBULIN UR ELPH-MCNC: 2.2 GM/DL (ref 1.8–3.5)
GLUCOSE SERPL-MCNC: 113 MG/DL (ref 74–124)
HCT VFR BLD AUTO: 41.5 % (ref 37.5–51)
HGB BLD-MCNC: 13.2 G/DL (ref 13–17.7)
IMM GRANULOCYTES # BLD AUTO: 0.03 10*3/MM3 (ref 0–0.05)
IMM GRANULOCYTES NFR BLD AUTO: 0.3 % (ref 0–0.5)
LYMPHOCYTES # BLD AUTO: 0.83 10*3/MM3 (ref 0.7–3.1)
LYMPHOCYTES NFR BLD AUTO: 9.3 % (ref 19.6–45.3)
MAGNESIUM SERPL-MCNC: 1.9 MG/DL (ref 1.8–2.5)
MCH RBC QN AUTO: 30.1 PG (ref 26.6–33)
MCHC RBC AUTO-ENTMCNC: 31.8 G/DL (ref 31.5–35.7)
MCV RBC AUTO: 94.7 FL (ref 79–97)
MONOCYTES # BLD AUTO: 1.08 10*3/MM3 (ref 0.1–0.9)
MONOCYTES NFR BLD AUTO: 12.1 % (ref 5–12)
NEUTROPHILS NFR BLD AUTO: 6.65 10*3/MM3 (ref 1.7–7)
NEUTROPHILS NFR BLD AUTO: 74.6 % (ref 42.7–76)
NRBC BLD AUTO-RTO: 0 /100 WBC (ref 0–0.2)
PHOSPHATE SERPL-MCNC: 3.3 MG/DL (ref 2.5–4.5)
PLATELET # BLD AUTO: 201 10*3/MM3 (ref 140–450)
PMV BLD AUTO: 11.7 FL (ref 6–12)
POTASSIUM SERPL-SCNC: 4.6 MMOL/L (ref 3.5–4.7)
PROT SERPL-MCNC: 5.9 G/DL (ref 6.3–8)
RBC # BLD AUTO: 4.38 10*6/MM3 (ref 4.14–5.8)
SODIUM SERPL-SCNC: 139 MMOL/L (ref 134–145)
WBC NRBC COR # BLD: 8.92 10*3/MM3 (ref 3.4–10.8)

## 2021-11-24 PROCEDURE — 25010000002 DENOSUMAB 120 MG/1.7ML SOLUTION: Performed by: INTERNAL MEDICINE

## 2021-11-24 PROCEDURE — 83735 ASSAY OF MAGNESIUM: CPT

## 2021-11-24 PROCEDURE — 96372 THER/PROPH/DIAG INJ SC/IM: CPT

## 2021-11-24 PROCEDURE — 96413 CHEMO IV INFUSION 1 HR: CPT

## 2021-11-24 PROCEDURE — 85025 COMPLETE CBC W/AUTO DIFF WBC: CPT

## 2021-11-24 PROCEDURE — 99215 OFFICE O/P EST HI 40 MIN: CPT | Performed by: INTERNAL MEDICINE

## 2021-11-24 PROCEDURE — 80053 COMPREHEN METABOLIC PANEL: CPT

## 2021-11-24 PROCEDURE — 25010000002 PEMBROLIZUMAB 100 MG/4ML SOLUTION 4 ML VIAL: Performed by: INTERNAL MEDICINE

## 2021-11-24 PROCEDURE — 84100 ASSAY OF PHOSPHORUS: CPT

## 2021-11-24 RX ORDER — SODIUM CHLORIDE 9 MG/ML
250 INJECTION, SOLUTION INTRAVENOUS ONCE
Status: COMPLETED | OUTPATIENT
Start: 2021-11-24 | End: 2021-11-24

## 2021-11-24 RX ORDER — SODIUM CHLORIDE 9 MG/ML
250 INJECTION, SOLUTION INTRAVENOUS ONCE
Status: CANCELLED | OUTPATIENT
Start: 2021-11-24

## 2021-11-24 RX ADMIN — SODIUM CHLORIDE 250 ML: 9 INJECTION, SOLUTION INTRAVENOUS at 09:40

## 2021-11-24 RX ADMIN — DENOSUMAB 120 MG: 120 INJECTION SUBCUTANEOUS at 09:58

## 2021-11-24 RX ADMIN — SODIUM CHLORIDE 200 MG: 900 INJECTION INTRAVENOUS at 09:40

## 2021-12-15 ENCOUNTER — INFUSION (OUTPATIENT)
Dept: ONCOLOGY | Facility: HOSPITAL | Age: 66
End: 2021-12-15

## 2021-12-15 ENCOUNTER — OFFICE VISIT (OUTPATIENT)
Dept: ONCOLOGY | Facility: CLINIC | Age: 66
End: 2021-12-15

## 2021-12-15 VITALS
HEART RATE: 88 BPM | BODY MASS INDEX: 24.39 KG/M2 | TEMPERATURE: 98.4 F | HEIGHT: 71 IN | WEIGHT: 174.2 LBS | SYSTOLIC BLOOD PRESSURE: 160 MMHG | DIASTOLIC BLOOD PRESSURE: 82 MMHG | RESPIRATION RATE: 18 BRPM | OXYGEN SATURATION: 96 %

## 2021-12-15 DIAGNOSIS — C79.51 LUNG CANCER METASTATIC TO BONE (HCC): ICD-10-CM

## 2021-12-15 DIAGNOSIS — C34.12 MALIGNANT NEOPLASM OF UPPER LOBE OF LEFT LUNG (HCC): ICD-10-CM

## 2021-12-15 DIAGNOSIS — C34.90 LUNG CANCER METASTATIC TO BONE (HCC): ICD-10-CM

## 2021-12-15 DIAGNOSIS — C34.12 MALIGNANT NEOPLASM OF UPPER LOBE OF LEFT LUNG (HCC): Primary | ICD-10-CM

## 2021-12-15 DIAGNOSIS — Z45.2 FITTING AND ADJUSTMENT OF VASCULAR CATHETER: ICD-10-CM

## 2021-12-15 DIAGNOSIS — Z79.899 HIGH RISK MEDICATION USE: ICD-10-CM

## 2021-12-15 DIAGNOSIS — C91.10 CLL (CHRONIC LYMPHOCYTIC LEUKEMIA) (HCC): ICD-10-CM

## 2021-12-15 DIAGNOSIS — Z79.899 HIGH RISK MEDICATION USE: Primary | ICD-10-CM

## 2021-12-15 LAB
ALBUMIN SERPL-MCNC: 3.9 G/DL (ref 3.5–5.2)
ALBUMIN/GLOB SERPL: 1.9 G/DL (ref 1.1–2.4)
ALP SERPL-CCNC: 85 U/L (ref 38–116)
ALT SERPL W P-5'-P-CCNC: 11 U/L (ref 0–41)
ANION GAP SERPL CALCULATED.3IONS-SCNC: 11.7 MMOL/L (ref 5–15)
AST SERPL-CCNC: 10 U/L (ref 0–40)
BASOPHILS # BLD AUTO: 0.05 10*3/MM3 (ref 0–0.2)
BASOPHILS NFR BLD AUTO: 0.5 % (ref 0–1.5)
BILIRUB SERPL-MCNC: 0.3 MG/DL (ref 0.2–1.2)
BUN SERPL-MCNC: 17 MG/DL (ref 6–20)
BUN/CREAT SERPL: 14.7 (ref 7.3–30)
CALCIUM SPEC-SCNC: 8.6 MG/DL (ref 8.5–10.2)
CHLORIDE SERPL-SCNC: 100 MMOL/L (ref 98–107)
CO2 SERPL-SCNC: 25.3 MMOL/L (ref 22–29)
CREAT SERPL-MCNC: 1.16 MG/DL (ref 0.7–1.3)
DEPRECATED RDW RBC AUTO: 44.3 FL (ref 37–54)
EOSINOPHIL # BLD AUTO: 0.13 10*3/MM3 (ref 0–0.4)
EOSINOPHIL NFR BLD AUTO: 1.3 % (ref 0.3–6.2)
ERYTHROCYTE [DISTWIDTH] IN BLOOD BY AUTOMATED COUNT: 12.8 % (ref 12.3–15.4)
GFR SERPL CREATININE-BSD FRML MDRD: 63 ML/MIN/1.73
GLOBULIN UR ELPH-MCNC: 2.1 GM/DL (ref 1.8–3.5)
GLUCOSE SERPL-MCNC: 150 MG/DL (ref 74–124)
HCT VFR BLD AUTO: 42.6 % (ref 37.5–51)
HGB BLD-MCNC: 13.3 G/DL (ref 13–17.7)
IMM GRANULOCYTES # BLD AUTO: 0.05 10*3/MM3 (ref 0–0.05)
IMM GRANULOCYTES NFR BLD AUTO: 0.5 % (ref 0–0.5)
LYMPHOCYTES # BLD AUTO: 0.96 10*3/MM3 (ref 0.7–3.1)
LYMPHOCYTES NFR BLD AUTO: 9.5 % (ref 19.6–45.3)
MCH RBC QN AUTO: 29.6 PG (ref 26.6–33)
MCHC RBC AUTO-ENTMCNC: 31.2 G/DL (ref 31.5–35.7)
MCV RBC AUTO: 94.9 FL (ref 79–97)
MONOCYTES # BLD AUTO: 0.83 10*3/MM3 (ref 0.1–0.9)
MONOCYTES NFR BLD AUTO: 8.2 % (ref 5–12)
NEUTROPHILS NFR BLD AUTO: 8.05 10*3/MM3 (ref 1.7–7)
NEUTROPHILS NFR BLD AUTO: 80 % (ref 42.7–76)
NRBC BLD AUTO-RTO: 0 /100 WBC (ref 0–0.2)
PLATELET # BLD AUTO: 232 10*3/MM3 (ref 140–450)
PMV BLD AUTO: 11.7 FL (ref 6–12)
POTASSIUM SERPL-SCNC: 4.5 MMOL/L (ref 3.5–4.7)
PROT SERPL-MCNC: 6 G/DL (ref 6.3–8)
RBC # BLD AUTO: 4.49 10*6/MM3 (ref 4.14–5.8)
SODIUM SERPL-SCNC: 137 MMOL/L (ref 134–145)
WBC NRBC COR # BLD: 10.07 10*3/MM3 (ref 3.4–10.8)

## 2021-12-15 PROCEDURE — 80053 COMPREHEN METABOLIC PANEL: CPT

## 2021-12-15 PROCEDURE — 85025 COMPLETE CBC W/AUTO DIFF WBC: CPT

## 2021-12-15 PROCEDURE — 99214 OFFICE O/P EST MOD 30 MIN: CPT | Performed by: NURSE PRACTITIONER

## 2021-12-15 PROCEDURE — 25010000002 HEPARIN LOCK FLUSH PER 10 UNITS: Performed by: INTERNAL MEDICINE

## 2021-12-15 PROCEDURE — 25010000002 PEMBROLIZUMAB 100 MG/4ML SOLUTION 4 ML VIAL: Performed by: NURSE PRACTITIONER

## 2021-12-15 PROCEDURE — 96413 CHEMO IV INFUSION 1 HR: CPT

## 2021-12-15 RX ORDER — HEPARIN SODIUM (PORCINE) LOCK FLUSH IV SOLN 100 UNIT/ML 100 UNIT/ML
500 SOLUTION INTRAVENOUS AS NEEDED
Status: CANCELLED | OUTPATIENT
Start: 2021-12-15

## 2021-12-15 RX ORDER — SODIUM CHLORIDE 9 MG/ML
250 INJECTION, SOLUTION INTRAVENOUS ONCE
Status: CANCELLED | OUTPATIENT
Start: 2021-12-15

## 2021-12-15 RX ORDER — SODIUM CHLORIDE 9 MG/ML
250 INJECTION, SOLUTION INTRAVENOUS ONCE
Status: COMPLETED | OUTPATIENT
Start: 2021-12-15 | End: 2021-12-15

## 2021-12-15 RX ORDER — HEPARIN SODIUM (PORCINE) LOCK FLUSH IV SOLN 100 UNIT/ML 100 UNIT/ML
500 SOLUTION INTRAVENOUS AS NEEDED
Status: DISCONTINUED | OUTPATIENT
Start: 2021-12-15 | End: 2021-12-15 | Stop reason: HOSPADM

## 2021-12-15 RX ORDER — SODIUM CHLORIDE 0.9 % (FLUSH) 0.9 %
10 SYRINGE (ML) INJECTION AS NEEDED
Status: CANCELLED | OUTPATIENT
Start: 2021-12-15

## 2021-12-15 RX ADMIN — SODIUM CHLORIDE 250 ML: 900 INJECTION, SOLUTION INTRAVENOUS at 14:03

## 2021-12-15 RX ADMIN — SODIUM CHLORIDE 200 MG: 900 INJECTION INTRAVENOUS at 14:03

## 2021-12-15 RX ADMIN — Medication 500 UNITS: at 14:34

## 2021-12-15 NOTE — PROGRESS NOTES
"Subjective   REASON FOR FOLLOW UP:  1.  Chronic lymphocytic leukemia with extensive lymphadenopathy and possible pleural involvement. Initiation of Treanda, Rituxan May 1, 2019.  2.  Hypogammaglobulinemia.  Status is post IVIG  3.  Significant response on scans June 27, 2019.  Treatment changed to Imbruvica 420 mg daily.     HISTORY OF PRESENT ILLNESS:    The patient is a  66 y.o. male with the above-mentioned history, returns the office today in anticipation of his 21st dose of Keytruda which he continues to receive every 3 weeks.  He also receives Xgeva every 6 weeks.  He continues to tolerate therapy remarkably well.  He denies worsening shortness of breath.  He also continues on Imbruvica 420 mg daily with excellent tolerance.  He does have some orthopedic discomfort in his knees bilaterally.  He has not yet followed up with Dr. Ochoa and wishes to schedule this at his convenience.  He denies fevers or chills, signs or symptoms of infection.  His pain is well controlled with current regimen of MS Contin and hydrocodone/acetaminophen as needed for breakthrough pain.  He is sleeping adequately with Remeron.      Past Medical History, Past Surgical History, Social History, Family History have been reviewed and are without significant changes except as mentioned.    Objective      Vitals:    12/15/21 1311   BP: 160/82   Pulse: 88   Resp: 18   Temp: 98.4 °F (36.9 °C)   TempSrc: Temporal   SpO2: 96%   Weight: 79 kg (174 lb 3.2 oz)   Height: 180.3 cm (70.98\")   PainSc: 0-No pain     Current Status 12/15/2021   ECOG score 0       Physical Exam    GENERAL:  Well-developed, well-nourished in no acute distress.   SKIN:  Warm, dry without purpura or petechiae.  Only faint erythema remaining on anterior chest, no macular papular rash.  No mild bruising  HEAD:  Normocephalic.  EYES:  Pupils equal, round.  EOMs intact.  Conjunctivae normal.  EARS:  Hearing intact.  LYMPHATICS: Without cervical, submandibular, axillary, " supraclavicular adenopathy.    CHEST:  Lungs clear to auscultation. Good airflow.  CARDIAC:  Regular rate and rhythm without murmurs. Normal S1,S2.  ABDOMEN:  Soft, nontender with no organomegaly or masses. Bowel sounds present  EXTREMITIES:  No clubbing, cyanosis or edema.  NEUROLOGICAL: No focal neurological deficits.  PSYCHIATRIC:  Normal affect and mood.    I have reexamined the patient and the results are consistent with the previously documented exam. RORY Zhao       RECENT LABS:  Results from last 7 days   Lab Units 12/15/21  1241   WBC 10*3/mm3 10.07   NEUTROS ABS 10*3/mm3 8.05*   HEMOGLOBIN g/dL 13.3   HEMATOCRIT % 42.6   PLATELETS 10*3/mm3 232     Results from last 7 days   Lab Units 12/15/21  1241   SODIUM mmol/L 137   POTASSIUM mmol/L 4.5   CHLORIDE mmol/L 100   CO2 mmol/L 25.3   BUN mg/dL 17   CREATININE mg/dL 1.16   CALCIUM mg/dL 8.6   ALBUMIN g/dL 3.90   BILIRUBIN mg/dL 0.3   ALK PHOS U/L 85   ALT (SGPT) U/L 11   AST (SGOT) U/L 10   GLUCOSE mg/dL 150*         FISH prognostic panel-Positive for deletion of 13 q. 14.3    Assessment/Plan   1.  CLL presenting with significant lymphocytosis, lymphadenopathy and splenomegaly.     · Positive for deletion of 13 q. 14.3.    · Patient status post 2 cycles of Treanda Rituxan with excellent response.    · Imaging 6/27/2019 with significant decrease in adenopathy.  Treanda Rituxan discontinued   · Imbruvica 420 mg daily initiated 7/25/2019.  · He continues on Imbruvica, without indication of progression of his CLL, without progressive lymphadenopathy on CT scans.  · He is without progressive adenopathy on exam today, 12/15/2021, excellent tolerance to Imbruvica    2.  Pulmonary nodules/infiltrates.  He is  status post bronchoscopy.  Is unclear at this time whether these findings are infectious or possibly related to his lymphoproliferative disorder.  This is seen to be improving on recent scans.    3.  Hypogammaglobulinemia-status post IVIG with  resolution of sinusitis.      4.  Non-small cell lung carcinoma  · August 26 2020 revealing a new 1.6 cm spiculated nodule within the left upper lobe, 1.2 cm left adrenal nodule, bone windows with a soft tissue mass involving the right eighth rib measuring 3.7 x 2.6 cm.    · Biopsy was consistent with adenocarcinoma CK 7+, CK 20-, lung primary suspected clinically, molecular panel not yet obtained.  · PET/CT 9/23/2020 demonstrating asymmetric uptake within the right parotid gland which is unremarkable appearance on noncontrasted CT, SUV of 6 compared to 2.7.  There is no hilar, mediastinal or axillary adenopathy, findings of asymmetric moderate to intense FDG uptake within superior aspect the right chest wall anterior to the right first rib with an irregular hypodense lesion measuring 1.8 x 1.6 and SUV 4.9.  This had not been seen June 27, 2019.  There is an intensely FDG avid nodule within the lingula measuring 1.9 x 1.5 history of 12.4, FDG avid left adrenal nodule 1.9 x 1.5 with an issue 21.2.    · Evaluated by radiation oncology therapy September 29 and started on radiation therapy to the right chest wall-35 Gy in 10 fractions.   ·  Plans were also then proceed with SBRT to the solitary left upper lobe lesion pending insurance approval.  · Further testing including TPS of 20% and foundation CDX with a TMB of greater than 10 mutations per megabase (28 mutations per megabase) and the indication that several immunotherapies could be useful in this patient's care.  Additional genomic findings include BRAF G469R/that trametinib could be useful and STK11/that everolimus could be useful.  · Keytruda initiated 10/21/2021   · Reassessment after 2 cycles of Keytruda with enlargement of the right eighth rib lesion,enlargement of spiculated left upper lobe lung mass, moderate to large right-sided pleural effusion, new left adrenal mass and enlarged retrocrural lymph node.?Pseudoprogression  · Xgeva last given  12/30/2020  · Follow-up scans 1/6/2021 demonstrated marked improvement in his right eighth rib lesion, resolution of left upper lobe spiculated mass, residual large right pleural effusion, resolution of left adrenal metastasis.   · Right pleural effusion, with thoracentesis 1/14/2021 with 1500 mils removed.  Pathology consistent with malignant effusion   · CT scans 4/5/2021 with response noted in the left upper lobe density.  · He continues to receive Keytruda every 3 weeks along with Xgeva every 6 weeks.  · Repeat scan 7/15/2021 without evidence of recurrence or progressive disease.  Plans to continue Keytruda every 3 weeks with repeat scans in 4 to 6 months  · 11/11/2021, PET scan documenting complete metabolic response to therapy. Large osseous metastatic lesion within the right chest wall originating from the eighth rib has essentially resolved.  · Proceed today with cycle 21  Keytruda    5.  Left knee metastasis  · Evaluated by orthopedic oncology, Dr. Eliu Ochoa  · Admission 1/7/2021 undergoing stabilization of left femoral bone lesion, prophylactic stabilization with intramedullary implants.  · It was suggested we delay Xgeva or Zometa to allow bone healing  · Completed radiation with 10 fractions 2/10/2021  · Xgeva resumed 4/29/2021, he continues to receive this every 6 weeks.    · Xgeva last given 11/24/2021, he will be due again in 3 weeks  · The patient has not yet contacted Dr. Ochoa for follow-up, wishes to do this at his own convenience, his pain is currently managed.      6.  Malignant right pleural effusion  · Ultrasound thoracentesis 1/14/2021 1500 mL removed  · Chest x-ray 2/25/2021 with moderate right pleural effusion  · Progressive symptoms with worsening pain 4/5/2021  · Ultrasound-guided thoracentesis 4/13/2021 with 2050 ml removed.  · Plans for Pleurx catheter with thoracic surgery, however, pleural effusion has not recurred    7.  Cancer related pain  · Pain is most prominent in his right  rib, utilizing MS Contin 30 mg 3 times a day  · Hydrocodone/acetaminophen 10/325 as needed for breakthrough pain.  Currently taking 3 to 4 tablets daily  · He is not currently in need of a refill of pain medications    8.  Insomnia  · Continuing to follow with behavioral oncology  · Continuing Remeron 15 mg nightly with good control    9. Health maintenance  · Status post COVID-19 booster  · Shingrix and Prevnar which will both be administered in the clinic 9/23/2021    Plan:  1. Proceed today with cycle 21 Keytruda  2. Due again in 3 weeks, Xgeva, continued every 6 weeks  3. Continue Imbruvica 420 mg daily  4. Continue MS Contin 30 mg 3 times a day and hydrocodone/acetaminophen 10/325 as needed for breakthrough pain he is not currently in need of a refill  5. Continue Remeron 15 mg nightly  6. Return in 3 weeks for CBC, CMP cycle 22 Keytruda and Xgeva  7. MD follow-up in 6 weeks with CBC, CMP, cycle 23 Keytruda  8. When we repeat imaging, planning to image the brain.  9. Patient will contact Dr. Ochoa for follow-up as he desires.    The patient is on high risk medication requiring close monitoring for toxicity.      Noa Zarate, APRN   12/15/2021

## 2021-12-16 ENCOUNTER — SPECIALTY PHARMACY (OUTPATIENT)
Dept: PHARMACY | Facility: HOSPITAL | Age: 66
End: 2021-12-16

## 2021-12-21 DIAGNOSIS — C34.90 LUNG CANCER METASTATIC TO BONE (HCC): ICD-10-CM

## 2021-12-21 DIAGNOSIS — C79.51 LUNG CANCER METASTATIC TO BONE (HCC): ICD-10-CM

## 2021-12-21 RX ORDER — MORPHINE SULFATE 30 MG/1
30 TABLET, FILM COATED, EXTENDED RELEASE ORAL 2 TIMES DAILY
Qty: 60 TABLET | Refills: 0 | Status: SHIPPED | OUTPATIENT
Start: 2021-12-21 | End: 2022-02-08 | Stop reason: SDUPTHER

## 2022-01-06 DIAGNOSIS — C79.51 LUNG CANCER METASTATIC TO BONE: ICD-10-CM

## 2022-01-06 DIAGNOSIS — Z79.899 HIGH RISK MEDICATION USE: Primary | ICD-10-CM

## 2022-01-06 DIAGNOSIS — C34.90 LUNG CANCER METASTATIC TO BONE: ICD-10-CM

## 2022-01-06 DIAGNOSIS — C34.12 MALIGNANT NEOPLASM OF UPPER LOBE OF LEFT LUNG: ICD-10-CM

## 2022-01-06 DIAGNOSIS — C91.10 CLL (CHRONIC LYMPHOCYTIC LEUKEMIA): ICD-10-CM

## 2022-01-07 ENCOUNTER — INFUSION (OUTPATIENT)
Dept: ONCOLOGY | Facility: HOSPITAL | Age: 67
End: 2022-01-07

## 2022-01-07 ENCOUNTER — TELEPHONE (OUTPATIENT)
Dept: ONCOLOGY | Facility: CLINIC | Age: 67
End: 2022-01-07

## 2022-01-07 ENCOUNTER — LAB (OUTPATIENT)
Dept: ONCOLOGY | Facility: HOSPITAL | Age: 67
End: 2022-01-07

## 2022-01-07 VITALS
TEMPERATURE: 98.4 F | WEIGHT: 176.4 LBS | BODY MASS INDEX: 24.61 KG/M2 | OXYGEN SATURATION: 95 % | DIASTOLIC BLOOD PRESSURE: 90 MMHG | SYSTOLIC BLOOD PRESSURE: 159 MMHG | RESPIRATION RATE: 16 BRPM | HEART RATE: 81 BPM

## 2022-01-07 DIAGNOSIS — C91.10 CLL (CHRONIC LYMPHOCYTIC LEUKEMIA): ICD-10-CM

## 2022-01-07 DIAGNOSIS — C79.51 LUNG CANCER METASTATIC TO BONE: ICD-10-CM

## 2022-01-07 DIAGNOSIS — Z79.899 HIGH RISK MEDICATION USE: ICD-10-CM

## 2022-01-07 DIAGNOSIS — C34.12 MALIGNANT NEOPLASM OF UPPER LOBE OF LEFT LUNG: ICD-10-CM

## 2022-01-07 DIAGNOSIS — C34.90 LUNG CANCER METASTATIC TO BONE: ICD-10-CM

## 2022-01-07 DIAGNOSIS — Z79.899 HIGH RISK MEDICATION USE: Primary | ICD-10-CM

## 2022-01-07 LAB
ALBUMIN SERPL-MCNC: 4 G/DL (ref 3.5–5.2)
ALBUMIN/GLOB SERPL: 2.1 G/DL (ref 1.1–2.4)
ALP SERPL-CCNC: 91 U/L (ref 38–116)
ALT SERPL W P-5'-P-CCNC: 9 U/L (ref 0–41)
ANION GAP SERPL CALCULATED.3IONS-SCNC: 11.4 MMOL/L (ref 5–15)
AST SERPL-CCNC: 12 U/L (ref 0–40)
BASOPHILS # BLD AUTO: 0.06 10*3/MM3 (ref 0–0.2)
BASOPHILS NFR BLD AUTO: 0.6 % (ref 0–1.5)
BILIRUB SERPL-MCNC: 0.4 MG/DL (ref 0.2–1.2)
BUN SERPL-MCNC: 14 MG/DL (ref 6–20)
BUN/CREAT SERPL: 13.3 (ref 7.3–30)
CALCIUM SPEC-SCNC: 8.6 MG/DL (ref 8.5–10.2)
CHLORIDE SERPL-SCNC: 104 MMOL/L (ref 98–107)
CO2 SERPL-SCNC: 24.6 MMOL/L (ref 22–29)
CREAT SERPL-MCNC: 1.05 MG/DL (ref 0.7–1.3)
DEPRECATED RDW RBC AUTO: 47 FL (ref 37–54)
EOSINOPHIL # BLD AUTO: 0.09 10*3/MM3 (ref 0–0.4)
EOSINOPHIL NFR BLD AUTO: 1 % (ref 0.3–6.2)
ERYTHROCYTE [DISTWIDTH] IN BLOOD BY AUTOMATED COUNT: 13.4 % (ref 12.3–15.4)
GFR SERPL CREATININE-BSD FRML MDRD: 70 ML/MIN/1.73
GLOBULIN UR ELPH-MCNC: 1.9 GM/DL (ref 1.8–3.5)
GLUCOSE SERPL-MCNC: 152 MG/DL (ref 74–124)
HCT VFR BLD AUTO: 40.4 % (ref 37.5–51)
HGB BLD-MCNC: 12.6 G/DL (ref 13–17.7)
IMM GRANULOCYTES # BLD AUTO: 0.06 10*3/MM3 (ref 0–0.05)
IMM GRANULOCYTES NFR BLD AUTO: 0.6 % (ref 0–0.5)
LYMPHOCYTES # BLD AUTO: 0.75 10*3/MM3 (ref 0.7–3.1)
LYMPHOCYTES NFR BLD AUTO: 8 % (ref 19.6–45.3)
MAGNESIUM SERPL-MCNC: 1.9 MG/DL (ref 1.8–2.5)
MCH RBC QN AUTO: 29.8 PG (ref 26.6–33)
MCHC RBC AUTO-ENTMCNC: 31.2 G/DL (ref 31.5–35.7)
MCV RBC AUTO: 95.5 FL (ref 79–97)
MONOCYTES # BLD AUTO: 0.77 10*3/MM3 (ref 0.1–0.9)
MONOCYTES NFR BLD AUTO: 8.2 % (ref 5–12)
NEUTROPHILS NFR BLD AUTO: 7.66 10*3/MM3 (ref 1.7–7)
NEUTROPHILS NFR BLD AUTO: 81.6 % (ref 42.7–76)
NRBC BLD AUTO-RTO: 0 /100 WBC (ref 0–0.2)
PHOSPHATE SERPL-MCNC: 2 MG/DL (ref 2.5–4.5)
PLATELET # BLD AUTO: 204 10*3/MM3 (ref 140–450)
PMV BLD AUTO: 11.3 FL (ref 6–12)
POTASSIUM SERPL-SCNC: 4.1 MMOL/L (ref 3.5–4.7)
PROT SERPL-MCNC: 5.9 G/DL (ref 6.3–8)
RBC # BLD AUTO: 4.23 10*6/MM3 (ref 4.14–5.8)
SODIUM SERPL-SCNC: 140 MMOL/L (ref 134–145)
T4 FREE SERPL-MCNC: 1.45 NG/DL (ref 0.93–1.7)
TSH SERPL DL<=0.05 MIU/L-ACNC: 1.91 UIU/ML (ref 0.27–4.2)
WBC NRBC COR # BLD: 9.39 10*3/MM3 (ref 3.4–10.8)

## 2022-01-07 PROCEDURE — 96413 CHEMO IV INFUSION 1 HR: CPT

## 2022-01-07 PROCEDURE — 36415 COLL VENOUS BLD VENIPUNCTURE: CPT

## 2022-01-07 PROCEDURE — 85025 COMPLETE CBC W/AUTO DIFF WBC: CPT

## 2022-01-07 PROCEDURE — 83735 ASSAY OF MAGNESIUM: CPT

## 2022-01-07 PROCEDURE — 80053 COMPREHEN METABOLIC PANEL: CPT

## 2022-01-07 PROCEDURE — 96372 THER/PROPH/DIAG INJ SC/IM: CPT

## 2022-01-07 PROCEDURE — 84443 ASSAY THYROID STIM HORMONE: CPT | Performed by: INTERNAL MEDICINE

## 2022-01-07 PROCEDURE — 25010000002 PEMBROLIZUMAB 100 MG/4ML SOLUTION 4 ML VIAL: Performed by: NURSE PRACTITIONER

## 2022-01-07 PROCEDURE — 84100 ASSAY OF PHOSPHORUS: CPT

## 2022-01-07 PROCEDURE — 25010000002 DENOSUMAB 120 MG/1.7ML SOLUTION: Performed by: INTERNAL MEDICINE

## 2022-01-07 PROCEDURE — 84439 ASSAY OF FREE THYROXINE: CPT | Performed by: INTERNAL MEDICINE

## 2022-01-07 RX ORDER — SODIUM CHLORIDE 9 MG/ML
250 INJECTION, SOLUTION INTRAVENOUS ONCE
Status: COMPLETED | OUTPATIENT
Start: 2022-01-07 | End: 2022-01-07

## 2022-01-07 RX ADMIN — SODIUM CHLORIDE 250 ML: 9 INJECTION, SOLUTION INTRAVENOUS at 13:50

## 2022-01-07 RX ADMIN — DENOSUMAB 120 MG: 120 INJECTION SUBCUTANEOUS at 14:17

## 2022-01-07 RX ADMIN — SODIUM CHLORIDE 200 MG: 900 INJECTION INTRAVENOUS at 13:49

## 2022-01-07 NOTE — TELEPHONE ENCOUNTER
----- Message from Serenity Dick RN sent at 1/7/2022  1:29 PM EST -----  Regarding: pt coming up after his infusion  Pt is not scheduled correctly for his next infusion. He will be due for treatment on 1/28. I think this was moved out because Dr. Parekh is on vacation but he does need treatment that day. Can we please schedule him with an NP and treatment on 1/28 and MD visit with Izabella and treatment on 2/18. Thank you!

## 2022-01-21 RX ORDER — MIRTAZAPINE 15 MG/1
7.5 TABLET, FILM COATED ORAL NIGHTLY
Qty: 90 TABLET | Refills: 0 | OUTPATIENT
Start: 2022-01-21

## 2022-01-24 RX ORDER — MIRTAZAPINE 15 MG/1
7.5 TABLET, FILM COATED ORAL NIGHTLY
Qty: 90 TABLET | Refills: 0 | Status: SHIPPED | OUTPATIENT
Start: 2022-01-24 | End: 2022-07-25 | Stop reason: SDUPTHER

## 2022-01-27 DIAGNOSIS — C34.90 LUNG CANCER METASTATIC TO BONE: ICD-10-CM

## 2022-01-27 DIAGNOSIS — C79.51 LUNG CANCER METASTATIC TO BONE: ICD-10-CM

## 2022-01-27 RX ORDER — HYDROCODONE BITARTRATE AND ACETAMINOPHEN 10; 325 MG/1; MG/1
1 TABLET ORAL EVERY 4 HOURS PRN
Qty: 100 TABLET | Refills: 0 | Status: SHIPPED | OUTPATIENT
Start: 2022-01-27 | End: 2022-06-16 | Stop reason: SDUPTHER

## 2022-01-28 ENCOUNTER — INFUSION (OUTPATIENT)
Dept: ONCOLOGY | Facility: HOSPITAL | Age: 67
End: 2022-01-28

## 2022-01-28 ENCOUNTER — OFFICE VISIT (OUTPATIENT)
Dept: ONCOLOGY | Facility: CLINIC | Age: 67
End: 2022-01-28

## 2022-01-28 ENCOUNTER — DOCUMENTATION (OUTPATIENT)
Dept: PSYCHIATRY | Facility: HOSPITAL | Age: 67
End: 2022-01-28

## 2022-01-28 VITALS
OXYGEN SATURATION: 93 % | TEMPERATURE: 97.3 F | HEIGHT: 71 IN | RESPIRATION RATE: 18 BRPM | SYSTOLIC BLOOD PRESSURE: 173 MMHG | BODY MASS INDEX: 24.69 KG/M2 | DIASTOLIC BLOOD PRESSURE: 83 MMHG | WEIGHT: 176.4 LBS | HEART RATE: 104 BPM

## 2022-01-28 DIAGNOSIS — Z79.899 HIGH RISK MEDICATION USE: ICD-10-CM

## 2022-01-28 DIAGNOSIS — R59.9 ADENOPATHY: ICD-10-CM

## 2022-01-28 DIAGNOSIS — C79.51 LUNG CANCER METASTATIC TO BONE: ICD-10-CM

## 2022-01-28 DIAGNOSIS — C91.10 CLL (CHRONIC LYMPHOCYTIC LEUKEMIA): Primary | ICD-10-CM

## 2022-01-28 DIAGNOSIS — C34.12 MALIGNANT NEOPLASM OF UPPER LOBE OF LEFT LUNG: ICD-10-CM

## 2022-01-28 DIAGNOSIS — J02.9 SORE THROAT: ICD-10-CM

## 2022-01-28 DIAGNOSIS — C34.90 LUNG CANCER METASTATIC TO BONE: ICD-10-CM

## 2022-01-28 DIAGNOSIS — C91.10 CLL (CHRONIC LYMPHOCYTIC LEUKEMIA): ICD-10-CM

## 2022-01-28 LAB
ALBUMIN SERPL-MCNC: 3.8 G/DL (ref 3.5–5.2)
ALBUMIN/GLOB SERPL: 1.5 G/DL (ref 1.1–2.4)
ALP SERPL-CCNC: 113 U/L (ref 38–116)
ALT SERPL W P-5'-P-CCNC: 13 U/L (ref 0–41)
ANION GAP SERPL CALCULATED.3IONS-SCNC: 13.2 MMOL/L (ref 5–15)
AST SERPL-CCNC: 11 U/L (ref 0–40)
B PARAPERT DNA SPEC QL NAA+PROBE: NOT DETECTED
B PERT DNA SPEC QL NAA+PROBE: NOT DETECTED
BASOPHILS # BLD AUTO: 0.07 10*3/MM3 (ref 0–0.2)
BASOPHILS NFR BLD AUTO: 0.7 % (ref 0–1.5)
BILIRUB SERPL-MCNC: 0.5 MG/DL (ref 0.2–1.2)
BUN SERPL-MCNC: 14 MG/DL (ref 6–20)
BUN/CREAT SERPL: 11.3 (ref 7.3–30)
C PNEUM DNA NPH QL NAA+NON-PROBE: NOT DETECTED
CALCIUM SPEC-SCNC: 8.6 MG/DL (ref 8.5–10.2)
CHLORIDE SERPL-SCNC: 101 MMOL/L (ref 98–107)
CO2 SERPL-SCNC: 26.8 MMOL/L (ref 22–29)
CREAT SERPL-MCNC: 1.24 MG/DL (ref 0.7–1.3)
DEPRECATED RDW RBC AUTO: 47.1 FL (ref 37–54)
EOSINOPHIL # BLD AUTO: 0.19 10*3/MM3 (ref 0–0.4)
EOSINOPHIL NFR BLD AUTO: 1.8 % (ref 0.3–6.2)
ERYTHROCYTE [DISTWIDTH] IN BLOOD BY AUTOMATED COUNT: 13.7 % (ref 12.3–15.4)
FLUAV SUBTYP SPEC NAA+PROBE: NOT DETECTED
FLUBV RNA ISLT QL NAA+PROBE: NOT DETECTED
GFR SERPL CREATININE-BSD FRML MDRD: 58 ML/MIN/1.73
GLOBULIN UR ELPH-MCNC: 2.5 GM/DL (ref 1.8–3.5)
GLUCOSE SERPL-MCNC: 155 MG/DL (ref 74–124)
HADV DNA SPEC NAA+PROBE: NOT DETECTED
HCOV 229E RNA SPEC QL NAA+PROBE: NOT DETECTED
HCOV HKU1 RNA SPEC QL NAA+PROBE: NOT DETECTED
HCOV NL63 RNA SPEC QL NAA+PROBE: NOT DETECTED
HCOV OC43 RNA SPEC QL NAA+PROBE: NOT DETECTED
HCT VFR BLD AUTO: 41.4 % (ref 37.5–51)
HGB BLD-MCNC: 13 G/DL (ref 13–17.7)
HMPV RNA NPH QL NAA+NON-PROBE: NOT DETECTED
HPIV1 RNA ISLT QL NAA+PROBE: NOT DETECTED
HPIV2 RNA SPEC QL NAA+PROBE: NOT DETECTED
HPIV3 RNA NPH QL NAA+PROBE: NOT DETECTED
HPIV4 P GENE NPH QL NAA+PROBE: NOT DETECTED
IGA1 MFR SER: 35 MG/DL (ref 70–400)
IGG1 SER-MCNC: 310 MG/DL (ref 700–1600)
IGM SERPL-MCNC: 10 MG/DL (ref 40–230)
IMM GRANULOCYTES # BLD AUTO: 0.04 10*3/MM3 (ref 0–0.05)
IMM GRANULOCYTES NFR BLD AUTO: 0.4 % (ref 0–0.5)
LDH SERPL-CCNC: 171 U/L (ref 99–259)
LYMPHOCYTES # BLD AUTO: 0.69 10*3/MM3 (ref 0.7–3.1)
LYMPHOCYTES NFR BLD AUTO: 6.7 % (ref 19.6–45.3)
M PNEUMO IGG SER IA-ACNC: NOT DETECTED
MCH RBC QN AUTO: 29.3 PG (ref 26.6–33)
MCHC RBC AUTO-ENTMCNC: 31.4 G/DL (ref 31.5–35.7)
MCV RBC AUTO: 93.5 FL (ref 79–97)
MONOCYTES # BLD AUTO: 1.24 10*3/MM3 (ref 0.1–0.9)
MONOCYTES NFR BLD AUTO: 12 % (ref 5–12)
NEUTROPHILS NFR BLD AUTO: 78.4 % (ref 42.7–76)
NEUTROPHILS NFR BLD AUTO: 8.08 10*3/MM3 (ref 1.7–7)
NRBC BLD AUTO-RTO: 0 /100 WBC (ref 0–0.2)
PLATELET # BLD AUTO: 223 10*3/MM3 (ref 140–450)
PMV BLD AUTO: 11.3 FL (ref 6–12)
POTASSIUM SERPL-SCNC: 4 MMOL/L (ref 3.5–4.7)
PROT SERPL-MCNC: 6.3 G/DL (ref 6.3–8)
RBC # BLD AUTO: 4.43 10*6/MM3 (ref 4.14–5.8)
RHINOVIRUS RNA SPEC NAA+PROBE: NOT DETECTED
RSV RNA NPH QL NAA+NON-PROBE: NOT DETECTED
S PYO AG THROAT QL: NEGATIVE
SARS-COV-2 RNA NPH QL NAA+NON-PROBE: NOT DETECTED
SODIUM SERPL-SCNC: 141 MMOL/L (ref 134–145)
WBC NRBC COR # BLD: 10.31 10*3/MM3 (ref 3.4–10.8)

## 2022-01-28 PROCEDURE — 87081 CULTURE SCREEN ONLY: CPT | Performed by: NURSE PRACTITIONER

## 2022-01-28 PROCEDURE — 99214 OFFICE O/P EST MOD 30 MIN: CPT | Performed by: NURSE PRACTITIONER

## 2022-01-28 PROCEDURE — 87880 STREP A ASSAY W/OPTIC: CPT | Performed by: NURSE PRACTITIONER

## 2022-01-28 PROCEDURE — 85025 COMPLETE CBC W/AUTO DIFF WBC: CPT

## 2022-01-28 PROCEDURE — 0202U NFCT DS 22 TRGT SARS-COV-2: CPT | Performed by: NURSE PRACTITIONER

## 2022-01-28 PROCEDURE — 83615 LACTATE (LD) (LDH) ENZYME: CPT | Performed by: NURSE PRACTITIONER

## 2022-01-28 PROCEDURE — 36591 DRAW BLOOD OFF VENOUS DEVICE: CPT

## 2022-01-28 PROCEDURE — 82784 ASSAY IGA/IGD/IGG/IGM EACH: CPT | Performed by: NURSE PRACTITIONER

## 2022-01-28 PROCEDURE — 80053 COMPREHEN METABOLIC PANEL: CPT

## 2022-01-28 RX ORDER — LEVOFLOXACIN 500 MG/1
500 TABLET, FILM COATED ORAL DAILY
Qty: 7 TABLET | Refills: 0 | Status: SHIPPED | OUTPATIENT
Start: 2022-01-28 | End: 2022-05-06

## 2022-01-28 RX ORDER — METHYLPREDNISOLONE 4 MG/1
TABLET ORAL
Qty: 21 EACH | Refills: 0 | Status: SHIPPED | OUTPATIENT
Start: 2022-01-28 | End: 2022-05-06

## 2022-01-28 NOTE — PROGRESS NOTES
[facility]    Patient reports to clinic prior to appointment stating he has potential infection, and prefers to go home versus potentially spreading something contagious.  Brief conversation with patient including being off of Remeron for a few days with limited perception this is helping at all.  Reports fragmented sleep on or off medication.  Remeron has recently been filled, and I encouraged him to use it is used feels is beneficial.  Shared continued resources available in supportive oncology clinic, with ability to explore this further for better management.  Patient agrees to call for follow-up.

## 2022-01-28 NOTE — PROGRESS NOTES
"Subjective   REASON FOR FOLLOW UP:  1.  Chronic lymphocytic leukemia with extensive lymphadenopathy and possible pleural involvement. Initiation of Treanda, Rituxan May 1, 2019.  2.  Hypogammaglobulinemia.  Status is post IVIG  3.  Significant response on scans June 27, 2019.  Treatment changed to Imbruvica 420 mg daily.     HISTORY OF PRESENT ILLNESS:    The patient is a  66 y.o. male with the above mentioned history here today for lab review and evaluation anticipation of cycle 23 Keytruda.  He also continues on Imbruvica 420 mg daily.  He is here today with complaints of adenopathy which started a couple of weeks ago however has been worse this past week.  He states he has had some swelling of his face, particularly wakes up in the morning.  He is swallowing okay.  Patient states when he woke up this morning he felt like his face was full \"like a chipmunk\", but has improved as the morning has gone on.  He denies any issues with shortness of breath or cough.  He has had intermittent issues with a sore throat.  He denies fevers, chills, night sweats.  No other adenopathy.  No issues with persistent nausea, vomiting, diarrhea, or constipation.    He states that the lymph nodes in his neck are quite tender.  He has tried taking pain medication which did not provide much relief, however he has gotten more relief from Advil.    Past Medical History, Past Surgical History, Social History, Family History have been reviewed and are without significant changes except as mentioned.    Objective      Vitals:    01/28/22 0846   BP: 173/83   Pulse: 104   Resp: 18   Temp: 97.3 °F (36.3 °C)   TempSrc: Temporal   SpO2: 93%   Weight: 80 kg (176 lb 6.4 oz)   Height: 180.3 cm (70.98\")   PainSc:   2   PainLoc: Jaw     Current Status 1/28/2022   ECOG score 0       Physical Exam   Constitutional: He is oriented to person, place, and time. He appears well-developed. No distress.   HENT:   Head: Normocephalic and atraumatic. "   Mouth/Throat: Mucous membranes are moist. Oropharyngeal exudate and posterior oropharyngeal erythema present.   Eyes: Pupils are equal, round, and reactive to light.   Cardiovascular: Normal rate, regular rhythm and normal heart sounds.   No murmur heard.  Pulmonary/Chest: Effort normal and breath sounds normal. No respiratory distress. He has no wheezes. He has no rhonchi. He has no rales.   Abdominal: Soft. Normal appearance and bowel sounds are normal. He exhibits no distension.   Musculoskeletal: Normal range of motion.   Lymphadenopathy:        Head (right side): Submandibular adenopathy present.        Head (left side): Submandibular adenopathy present.     He has cervical adenopathy.   Neurological: He is alert and oriented to person, place, and time.   Skin: Skin is warm and dry. No rash noted.   Vitals reviewed.        RECENT LABS:  Results from last 7 days   Lab Units 01/28/22  0822   WBC 10*3/mm3 10.31   NEUTROS ABS 10*3/mm3 8.08*   HEMOGLOBIN g/dL 13.0   HEMATOCRIT % 41.4   PLATELETS 10*3/mm3 223     Results from last 7 days   Lab Units 01/28/22  0822   SODIUM mmol/L 141   POTASSIUM mmol/L 4.0   CHLORIDE mmol/L 101   CO2 mmol/L 26.8   BUN mg/dL 14   CREATININE mg/dL 1.24   CALCIUM mg/dL 8.6   ALBUMIN g/dL 3.80   BILIRUBIN mg/dL 0.5   ALK PHOS U/L 113   ALT (SGPT) U/L 13   AST (SGOT) U/L 11   GLUCOSE mg/dL 155*         FISH prognostic panel-Positive for deletion of 13 q. 14.3    Assessment/Plan   1.  CLL presenting with significant lymphocytosis, lymphadenopathy and splenomegaly.     · Positive for deletion of 13 q. 14.3.    · Patient status post 2 cycles of Treanda Rituxan with excellent response.    · Imaging 6/27/2019 with significant decrease in adenopathy.  Treanda Rituxan discontinued   · Imbruvica 420 mg daily initiated 7/25/2019.  · He continues on Imbruvica, without indication of progression of his CLL, without progressive lymphadenopathy on CT scans.  · He is without progressive adenopathy on  exam  12/15/2021, excellent tolerance to Imbruvica  · Patient returns 1/28/2022 complaining of cervical and submandibular adenopathy that started a few weeks ago, worse in the past week, this is accompanied by sore throat.  He denies fevers, chills, night sweats.  He has continued on his Imbruvica 420 mg daily.  After review with Dr. Marquez (DrShell #2 and Dr. Parekh's absence) patient will be started on a Medrol Dosepak.  We will check a respiratory viral panel and do a strep screen.  Patient's absolute lymphocyte count is slightly lower than usual at 0.69, we will therefore check quantitative immunoglobulins as patient may need IVIG.  If his respiratory viral panel is negative he will start on Levaquin 500 mg daily.  If symptoms do not improve we will proceed with CT scans for further evaluation.    2.  Pulmonary nodules/infiltrates.  He is  status post bronchoscopy.  Is unclear at this time whether these findings are infectious or possibly related to his lymphoproliferative disorder.  This is seen to be improving on recent scans.    3.  Hypogammaglobulinemia-status post IVIG with resolution of sinusitis.      4.  Non-small cell lung carcinoma  · August 26 2020 revealing a new 1.6 cm spiculated nodule within the left upper lobe, 1.2 cm left adrenal nodule, bone windows with a soft tissue mass involving the right eighth rib measuring 3.7 x 2.6 cm.    · Biopsy was consistent with adenocarcinoma CK 7+, CK 20-, lung primary suspected clinically, molecular panel not yet obtained.  · PET/CT 9/23/2020 demonstrating asymmetric uptake within the right parotid gland which is unremarkable appearance on noncontrasted CT, SUV of 6 compared to 2.7.  There is no hilar, mediastinal or axillary adenopathy, findings of asymmetric moderate to intense FDG uptake within superior aspect the right chest wall anterior to the right first rib with an irregular hypodense lesion measuring 1.8 x 1.6 and SUV 4.9.  This had not been seen June 27,  2019.  There is an intensely FDG avid nodule within the lingula measuring 1.9 x 1.5 history of 12.4, FDG avid left adrenal nodule 1.9 x 1.5 with an issue 21.2.    · Evaluated by radiation oncology therapy September 29 and started on radiation therapy to the right chest wall-35 Gy in 10 fractions.   ·  Plans were also then proceed with SBRT to the solitary left upper lobe lesion pending insurance approval.  · Further testing including TPS of 20% and foundation CDX with a TMB of greater than 10 mutations per megabase (28 mutations per megabase) and the indication that several immunotherapies could be useful in this patient's care.  Additional genomic findings include BRAF G469R/that trametinib could be useful and STK11/that everolimus could be useful.  · Keytruda initiated 10/21/2021   · Reassessment after 2 cycles of Keytruda with enlargement of the right eighth rib lesion,enlargement of spiculated left upper lobe lung mass, moderate to large right-sided pleural effusion, new left adrenal mass and enlarged retrocrural lymph node.?Pseudoprogression  · Xgeva last given 12/30/2020  · Follow-up scans 1/6/2021 demonstrated marked improvement in his right eighth rib lesion, resolution of left upper lobe spiculated mass, residual large right pleural effusion, resolution of left adrenal metastasis.   · Right pleural effusion, with thoracentesis 1/14/2021 with 1500 mils removed.  Pathology consistent with malignant effusion   · CT scans 4/5/2021 with response noted in the left upper lobe density.  · He continues to receive Keytruda every 3 weeks along with Xgeva every 6 weeks.  · Repeat scan 7/15/2021 without evidence of recurrence or progressive disease.  Plans to continue Keytruda every 3 weeks with repeat scans in 4 to 6 months  · 11/11/2021, PET scan documenting complete metabolic response to therapy. Large osseous metastatic lesion within the right chest wall originating from the eighth rib has essentially  resolved.  · 1/7/2022 cycle 22 Keytruda.  · 1/28/2022 cycle 23 held due to adenopathy and starting steroids.     5.  Left knee metastasis  · Evaluated by orthopedic oncology, Dr. Eliu Ochoa  · Admission 1/7/2021 undergoing stabilization of left femoral bone lesion, prophylactic stabilization with intramedullary implants.  · It was suggested we delay Xgeva or Zometa to allow bone healing  · Completed radiation with 10 fractions 2/10/2021  · Xgeva resumed 4/29/2021, he continues to receive this every 6 weeks.    · Xgeva last given 11/24/2021, he will be due again in 3 weeks  · The patient has not yet contacted Dr. Ochoa for follow-up, wishes to do this at his own convenience, his pain is currently managed.      6.  Malignant right pleural effusion  · Ultrasound thoracentesis 1/14/2021 1500 mL removed  · Chest x-ray 2/25/2021 with moderate right pleural effusion  · Progressive symptoms with worsening pain 4/5/2021  · Ultrasound-guided thoracentesis 4/13/2021 with 2050 ml removed.  · Plans for Pleurx catheter with thoracic surgery, however, pleural effusion has not recurred    7.  Cancer related pain  · Pain is most prominent in his right rib, utilizing MS Contin 30 mg 3 times a day  · Hydrocodone/acetaminophen 10/325 as needed for breakthrough pain.  Currently taking 3 to 4 tablets daily  · He is not currently in need of a refill of pain medications    8.  Insomnia  · Continuing to follow with behavioral oncology  · Continuing Remeron 15 mg nightly with good control    9. Health maintenance  · Status post COVID-19 booster  · Shingrix and Prevnar which will both be administered in the clinic 9/23/2021    Plan:  1. Hold Keytruda today.  2. Check RVP/ Covid/ Strep  3. Check LDH and quantitative immunoglobulins.  4. Patient prescribed Medrol Dosepak.  5. If his respiratory viral panel is negative he will start Levaquin 500 mg daily.  6. If his symptoms do not improve with steroids we will consider doing CT scans for  further evaluation.  7. For now continue Imbruvica 420 mg daily.  8. Continue MS Contin 30 mg 3 times a day and hydrocodone/acetaminophen 10/325 as needed for breakthrough pain he is not currently in need of a refill  9. Continue Remeron 15 mg nightly  10. Return in 3 weeks for CBC, CMP  Keytruda and Xgeva  11. When we repeat imaging, planning to image the brain.  12. Patient will contact Dr. Ochoa for follow-up as he desires.    The patient is on high risk medication requiring close monitoring for toxicity.      Naomi Martinez, APRN   1/28/2022

## 2022-01-30 LAB — BACTERIA SPEC AEROBE CULT: NORMAL

## 2022-02-07 ENCOUNTER — TELEPHONE (OUTPATIENT)
Dept: ONCOLOGY | Facility: CLINIC | Age: 67
End: 2022-02-07

## 2022-02-07 NOTE — TELEPHONE ENCOUNTER
Called patient to follow up to see if adenopathy has improved. No answer. Left message for patient to return my call.    RORY Archer

## 2022-02-08 DIAGNOSIS — C79.51 LUNG CANCER METASTATIC TO BONE: ICD-10-CM

## 2022-02-08 DIAGNOSIS — C34.90 LUNG CANCER METASTATIC TO BONE: ICD-10-CM

## 2022-02-08 RX ORDER — MORPHINE SULFATE 30 MG/1
30 TABLET, FILM COATED, EXTENDED RELEASE ORAL 2 TIMES DAILY
Qty: 60 TABLET | Refills: 0 | Status: SHIPPED | OUTPATIENT
Start: 2022-02-08 | End: 2022-03-18 | Stop reason: SDUPTHER

## 2022-02-08 NOTE — TELEPHONE ENCOUNTER
Caller: Dav Freitas    Relationship: Self    Best call back number: [phone]    What is the best time to reach you: ANYTIME, TODAY HE WILL BE HOME AT 1:00    Who are you requesting to speak with (clinical staff, provider,  specific staff member): ELIO COLE    Do you know the name of the person who called: ELIO COLE    What was the call regarding: RETURNING CALL    Do you require a callback: YES

## 2022-02-17 NOTE — PROGRESS NOTES
Subjective Patient with increased left jaw pain and swelling                                                                          REASON FOR FOLLOW UP:  1.  Chronic lymphocytic leukemia with extensive lymphadenopathy and possible pleural involvement. Initiation of Treanda, Rituxan May 1, 2019.  2.  Hypogammaglobulinemia.  Status is post IVIG  3.  Significant response on scans June 27, 2019.  Treatment changed to Imbruvica 420 mg daily.  4.  Metastatic non-small cell lung carcinoma on Keytruda  5.  Patient seen 2/18/2022 with left jaw/gumline swelling pain, associated hematoma?  Osteonecrosis.     HISTORY OF PRESENT ILLNESS:    The patient is a  66 y.o. male with the above-mentioned history, returns the office today in anticipation of his 19th dose of Keytruda which he continues to receive every 3 weeks.  He also receives Xgeva every 6 weeks which was given 10/14/2021.  He continues to tolerate therapy remarkably well.  He denies fevers or chills, signs or symptoms of infection.  He denies any new adenopathy.  His pain is well controlled though he has begun to have pain involving his right knee that produces  right hamstring pain.  He is without worsening shortness of breath or chest pain.  He does not feel he is in need of thoracentesis.  He is not in need of a refill of his pain medications today.  His bowels are moving without difficulty while on narcotics.  He denies B symptoms, has only mild bruising with Imbruvica.  We have the patient proceed with additional testing undergoing plain views of his right knee showing a right knee effusion with medial compartment narrowing and no suspicious bone lesion.  There is benign periosteal calcification along the distal right femur.  A PET/CT 11/11 went on to demonstrate a complete metabolic response to therapy compared to PET/CT from 9/23/2020.  This includes his primary lung neoplasm left upper lobe, large osseous metastatic lesion the right chest wall region from  "the eighth rib, small left adrenal metastasis, and no evidence of disease involving the distal left femur.      The patient is seen 11/24/2021 continue to do well though indicating that his right knee is \"something I can manage at the moment\".  He prefers not to have orthopedic assessment at this point.  He is concerned, ever, about skin lesions that are developing primarily on his calf regions and into his right thigh.    The patient was seen by Dr. Regan Damon 11/29/2021 and felt to be consistent with psoriasiform dermatitis treated with intralesional therapy.  Was also started on a moisturizer and lipid therapy.  The patient return for assessment 12/15/2021 and treated with cycle #21 Keytruda, returned 1/7/2022 for cycle #22 and 1/28/2022 for cycle #23.  At that visit he had developed adenopathy several weeks previous and was treated with a Medrol Dosepak.   The patient is next seen 2/18/2022 and indicates that though his adenopathy has improved after treatment described above that his jaw has become painful swollen and tender with additional bleeding.    Past Medical History, Past Surgical History, Social History, Family History have been reviewed and are without significant changes except as mentioned.    Objective      Vitals:    02/18/22 0754   BP: 146/82   Pulse: 100   Resp: 18   Temp: 97.7 °F (36.5 °C)   TempSrc: Temporal   SpO2: 95%   Weight: 79 kg (174 lb 1.6 oz)   Height: 180.3 cm (70.98\")   PainSc:   2   PainLoc: Face     Current Status 2/18/2022   ECOG score 0       Physical Exam    GENERAL:  Well-developed, well-nourished in no acute distress.   SKIN:  Warm, dry with erythematous target lesions noted primarily around the calf regions bilaterally, mildly pruritic, no tenderness, with a scale component, varying in size  HEAD:  Normocephalic.  EYES:  Pupils equal, round.  EOMs intact.  Conjunctivae normal.  ORAL: Findings of left lower gumline swollen, edematous with subcu hematoma.  NECK: Bilateral " cervical adenopathy shoddy  EARS:  Hearing intact.  LYMPHATICS: Without cervical, submandibular, axillary, supraclavicular adenopathy.    CHEST:  Lungs clear to auscultation. Good airflow.  CARDIAC:  Regular rate and rhythm without murmurs. Normal S1,S2.  ABDOMEN:  Soft, nontender with no organomegaly or masses. Bowel sounds present  EXTREMITIES:  No clubbing, cyanosis or edema.  NEUROLOGICAL: No focal neurological deficits.  PSYCHIATRIC:  Normal affect and mood.    I have reexamined the patient and the results are consistent with the previously documented exam. Jostin Parekh MD       RECENT LABS:  Results from last 7 days   Lab Units 02/18/22  0731   WBC 10*3/mm3 12.00*   NEUTROS ABS 10*3/mm3 9.82*   HEMOGLOBIN g/dL 12.6*   HEMATOCRIT % 40.4   PLATELETS 10*3/mm3 188             FISH prognostic panel-Positive for deletion of 13 q. 14.3    Assessment/Plan   1.  CLL presenting with significant lymphocytosis, lymphadenopathy and splenomegaly.     · Positive for deletion of 13 q. 14.3.    · Patient status post 2 cycles of Treanda Rituxan with excellent response.    · Imaging 6/27/2019 with significant decrease in adenopathy.  Treanda Rituxan discontinued   · Imbruvica 420 mg daily initiated 7/25/2019.  · He continues on Imbruvica, without indication of progression of his CLL, without progressive lymphadenopathy on CT scans.  · He is without progressive adenopathy on exam today, 11/4/2021.  · Patient has resolving adenopathy particularly cervical regions 2/18/2022    2.  Pulmonary nodules/infiltrates.  He is  status post bronchoscopy.  Is unclear at this time whether these findings are infectious or possibly related to his lymphoproliferative disorder.  This is seen to be improving on recent scans.    3.  Hypogammaglobulinemia-status post IVIG with resolution of sinusitis.      4.  Non-small cell lung carcinoma  · August 26 2020 revealing a new 1.6 cm spiculated nodule within the left upper lobe, 1.2 cm left adrenal  nodule, bone windows with a soft tissue mass involving the right eighth rib measuring 3.7 x 2.6 cm.    · Biopsy was consistent with adenocarcinoma CK 7+, CK 20-, lung primary suspected clinically, molecular panel not yet obtained.  · PET/CT 9/23/2020 demonstrating asymmetric uptake within the right parotid gland which is unremarkable appearance on noncontrasted CT, SUV of 6 compared to 2.7.  There is no hilar, mediastinal or axillary adenopathy, findings of asymmetric moderate to intense FDG uptake within superior aspect the right chest wall anterior to the right first rib with an irregular hypodense lesion measuring 1.8 x 1.6 and SUV 4.9.  This had not been seen June 27, 2019.  There is an intensely FDG avid nodule within the lingula measuring 1.9 x 1.5 history of 12.4, FDG avid left adrenal nodule 1.9 x 1.5 with an issue 21.2.    · Evaluated by radiation oncology therapy September 29 and started on radiation therapy to the right chest wall-35 Gy in 10 fractions.   ·  Plans were also then proceed with SBRT to the solitary left upper lobe lesion pending insurance approval.  · Further testing including TPS of 20% and foundation CDX with a TMB of greater than 10 mutations per megabase (28 mutations per megabase) and the indication that several immunotherapies could be useful in this patient's care.  Additional genomic findings include BRAF [id number]/that trametinib could be useful and STK11/that everolimus could be useful.  · Keytruda initiated 10/21/2021   · Reassessment after 2 cycles of Keytruda with enlargement of the right eighth rib lesion,enlargement of spiculated left upper lobe lung mass, moderate to large right-sided pleural effusion, new left adrenal mass and enlarged retrocrural lymph node.?Pseudoprogression  · Xgeva last given 12/30/2020  · Follow-up scans 1/6/2021 demonstrated marked improvement in his right eighth rib lesion, resolution of left upper lobe spiculated mass, residual large right pleural  effusion, resolution of left adrenal metastasis.   · Right pleural effusion, with thoracentesis 1/14/2021 with 1500 mils removed.  Pathology consistent with malignant effusion   · CT scans 4/5/2021 with response noted in the left upper lobe density.  · He continues to receive Keytruda every 3 weeks along with Xgeva every 6 weeks.  · Repeat scan 7/15/2021 without evidence of recurrence or progressive disease.  Plans to continue Keytruda every 3 weeks with repeat scans in 4 to 6 months  · Proceed today with cycle 19 Keytruda.  · Patient status post PET/CT 11/11/2021 with hyper response, approximate remission status, plan to proceed with cycle #20 Keytruda  · Patient seen 2/18/2022 with Keytruda held while his oral issues are addressed-concern for osteonecrosis of the jaw.    5.  Left knee metastasis  · Evaluated by orthopedic oncology, Dr. Eliu Ochoa  · Admission 1/7/2021 undergoing stabilization of left femoral bone lesion, prophylactic stabilization with intramedullary implants.  · It was suggested we delay Xgeva or Zometa to allow bone healing  · Completed radiation with 10 fractions 2/10/2021  · Xgeva resumed 4/29/2021, he continues to receive this every 6 weeks.     6.  Malignant right pleural effusion  · Ultrasound thoracentesis 1/14/2021 1500 mL removed  · Chest x-ray 2/25/2021 with moderate right pleural effusion  · Progressive symptoms with worsening pain 4/5/2021  · Ultrasound-guided thoracentesis 4/13/2021 with 2050 ml removed.  · Plans for Pleurx catheter with thoracic surgery, however, pleural effusion has not recurred.  Patient seen 11/4/2021 with chest x-ray planned.  · Additional assessment 2/18/2022 with left jaw pain, osteoporosis versus metastatic disease    7.  Cancer related pain  · Pain is most prominent in his right rib, utilizing MS Contin 30 mg 3 times a day  · Hydrocodone/acetaminophen 10/325 as needed for breakthrough pain.  Currently taking 3 to 4 tablets daily  · He is not currently in  need of a refill of pain medications  · Discussed MS Contin at 30 mg p.o. every 8 hours, Norco 10/325 2 p.o. every 6 hours    8.  Insomnia  · Continuing to follow with behavioral oncology  · Continuing Remeron 7.5 mg nightly with good control    9. Health maintenance  · Status post COVID-19 booster, SARS antibody pending today  · The patient is due for Shingrix and Prevnar which will both be administered in the clinic 9/23/2021    10.  Skin lesions  · Uncertain of etiology, unexpected findings for usual skin lesions associated with immunotherapy  · Request dermatologic assessment-psoriasiform dermatitis    11.  Right knee pain  · Osteoarthritis, orthopedic assessment upon patient's request.  Currently stable 2/18/2022 resolved    Plan:  1. Hold Keytruda today, Xgeva will be held indefinitely until the patient's jaw abnormalities are assessed which will require both a dental assessment today-discussed with office of Verito Toney this a.m., concern for metastatic disease versus osteoporosis.  Patient will likely undergo oral surgery assessment  2. Continue Imbruvica 420 mg daily  3. Continue MS Contin 30 mg 3 times a day and hydrocodone/acetaminophen 10/325-2 p.o. every 6 hours as needed for breakthrough pain  4. Continue Remeron 15 mg nightly  5.  Orthopedic assessment upon patient's request involving his right knee  6.  Pending the patient's review 2/18/2022 by both dentistry and oral surgery he would be in need of subsequent assessment for his lung cancer and metastatic disease possibly scans in the next several weeks  7.  MD follow-up in the next 1 to 2 weeks.  The patient is on high risk medication requiring close monitoring for toxicity.          Jostin Parekh MD   2/18/2022

## 2022-02-18 ENCOUNTER — APPOINTMENT (OUTPATIENT)
Dept: ONCOLOGY | Facility: HOSPITAL | Age: 67
End: 2022-02-18

## 2022-02-18 ENCOUNTER — DOCUMENTATION (OUTPATIENT)
Dept: ONCOLOGY | Facility: CLINIC | Age: 67
End: 2022-02-18

## 2022-02-18 ENCOUNTER — OFFICE VISIT (OUTPATIENT)
Dept: ONCOLOGY | Facility: CLINIC | Age: 67
End: 2022-02-18

## 2022-02-18 ENCOUNTER — INFUSION (OUTPATIENT)
Dept: ONCOLOGY | Facility: HOSPITAL | Age: 67
End: 2022-02-18

## 2022-02-18 VITALS
SYSTOLIC BLOOD PRESSURE: 146 MMHG | DIASTOLIC BLOOD PRESSURE: 82 MMHG | WEIGHT: 174.1 LBS | HEIGHT: 71 IN | RESPIRATION RATE: 18 BRPM | HEART RATE: 100 BPM | TEMPERATURE: 97.7 F | OXYGEN SATURATION: 95 % | BODY MASS INDEX: 24.37 KG/M2

## 2022-02-18 DIAGNOSIS — Z45.2 FITTING AND ADJUSTMENT OF VASCULAR CATHETER: ICD-10-CM

## 2022-02-18 DIAGNOSIS — Z79.899 HIGH RISK MEDICATION USE: ICD-10-CM

## 2022-02-18 DIAGNOSIS — C34.12 MALIGNANT NEOPLASM OF UPPER LOBE OF LEFT LUNG: ICD-10-CM

## 2022-02-18 DIAGNOSIS — C34.90 LUNG CANCER METASTATIC TO BONE: Primary | ICD-10-CM

## 2022-02-18 DIAGNOSIS — C91.10 CLL (CHRONIC LYMPHOCYTIC LEUKEMIA): ICD-10-CM

## 2022-02-18 DIAGNOSIS — C79.51 LUNG CANCER METASTATIC TO BONE: Primary | ICD-10-CM

## 2022-02-18 DIAGNOSIS — C79.51 LUNG CANCER METASTATIC TO BONE: ICD-10-CM

## 2022-02-18 DIAGNOSIS — C34.90 LUNG CANCER METASTATIC TO BONE: ICD-10-CM

## 2022-02-18 DIAGNOSIS — C91.10 CLL (CHRONIC LYMPHOCYTIC LEUKEMIA): Primary | ICD-10-CM

## 2022-02-18 LAB
ALBUMIN SERPL-MCNC: 3.6 G/DL (ref 3.5–5.2)
ALBUMIN/GLOB SERPL: 1.3 G/DL (ref 1.1–2.4)
ALP SERPL-CCNC: 143 U/L (ref 38–116)
ALT SERPL W P-5'-P-CCNC: 31 U/L (ref 0–41)
ANION GAP SERPL CALCULATED.3IONS-SCNC: 10.8 MMOL/L (ref 5–15)
AST SERPL-CCNC: 26 U/L (ref 0–40)
BASOPHILS # BLD AUTO: 0.05 10*3/MM3 (ref 0–0.2)
BASOPHILS NFR BLD AUTO: 0.4 % (ref 0–1.5)
BILIRUB SERPL-MCNC: 0.5 MG/DL (ref 0.2–1.2)
BUN SERPL-MCNC: 22 MG/DL (ref 6–20)
BUN/CREAT SERPL: 15.6 (ref 7.3–30)
CALCIUM SPEC-SCNC: 8.7 MG/DL (ref 8.5–10.2)
CHLORIDE SERPL-SCNC: 103 MMOL/L (ref 98–107)
CO2 SERPL-SCNC: 27.2 MMOL/L (ref 22–29)
CREAT SERPL-MCNC: 1.41 MG/DL (ref 0.7–1.3)
DEPRECATED RDW RBC AUTO: 51.3 FL (ref 37–54)
EOSINOPHIL # BLD AUTO: 0.17 10*3/MM3 (ref 0–0.4)
EOSINOPHIL NFR BLD AUTO: 1.4 % (ref 0.3–6.2)
ERYTHROCYTE [DISTWIDTH] IN BLOOD BY AUTOMATED COUNT: 14.6 % (ref 12.3–15.4)
GFR SERPL CREATININE-BSD FRML MDRD: 50 ML/MIN/1.73
GLOBULIN UR ELPH-MCNC: 2.8 GM/DL (ref 1.8–3.5)
GLUCOSE SERPL-MCNC: 137 MG/DL (ref 74–124)
HCT VFR BLD AUTO: 40.4 % (ref 37.5–51)
HGB BLD-MCNC: 12.6 G/DL (ref 13–17.7)
IMM GRANULOCYTES # BLD AUTO: 0.04 10*3/MM3 (ref 0–0.05)
IMM GRANULOCYTES NFR BLD AUTO: 0.3 % (ref 0–0.5)
LYMPHOCYTES # BLD AUTO: 0.8 10*3/MM3 (ref 0.7–3.1)
LYMPHOCYTES NFR BLD AUTO: 6.7 % (ref 19.6–45.3)
MAGNESIUM SERPL-MCNC: 2 MG/DL (ref 1.8–2.5)
MCH RBC QN AUTO: 29.6 PG (ref 26.6–33)
MCHC RBC AUTO-ENTMCNC: 31.2 G/DL (ref 31.5–35.7)
MCV RBC AUTO: 95.1 FL (ref 79–97)
MONOCYTES # BLD AUTO: 1.12 10*3/MM3 (ref 0.1–0.9)
MONOCYTES NFR BLD AUTO: 9.3 % (ref 5–12)
NEUTROPHILS NFR BLD AUTO: 81.9 % (ref 42.7–76)
NEUTROPHILS NFR BLD AUTO: 9.82 10*3/MM3 (ref 1.7–7)
NRBC BLD AUTO-RTO: 0 /100 WBC (ref 0–0.2)
PHOSPHATE SERPL-MCNC: 3.7 MG/DL (ref 2.5–4.5)
PLATELET # BLD AUTO: 188 10*3/MM3 (ref 140–450)
PMV BLD AUTO: 12.5 FL (ref 6–12)
POTASSIUM SERPL-SCNC: 4.4 MMOL/L (ref 3.5–4.7)
PROT SERPL-MCNC: 6.4 G/DL (ref 6.3–8)
RBC # BLD AUTO: 4.25 10*6/MM3 (ref 4.14–5.8)
SODIUM SERPL-SCNC: 141 MMOL/L (ref 134–145)
WBC NRBC COR # BLD: 12 10*3/MM3 (ref 3.4–10.8)

## 2022-02-18 PROCEDURE — 36591 DRAW BLOOD OFF VENOUS DEVICE: CPT

## 2022-02-18 PROCEDURE — 83735 ASSAY OF MAGNESIUM: CPT

## 2022-02-18 PROCEDURE — 85025 COMPLETE CBC W/AUTO DIFF WBC: CPT

## 2022-02-18 PROCEDURE — 80053 COMPREHEN METABOLIC PANEL: CPT

## 2022-02-18 PROCEDURE — 25010000002 HEPARIN LOCK FLUSH PER 10 UNITS: Performed by: INTERNAL MEDICINE

## 2022-02-18 PROCEDURE — 84100 ASSAY OF PHOSPHORUS: CPT

## 2022-02-18 PROCEDURE — 99215 OFFICE O/P EST HI 40 MIN: CPT | Performed by: INTERNAL MEDICINE

## 2022-02-18 RX ORDER — SODIUM CHLORIDE 0.9 % (FLUSH) 0.9 %
10 SYRINGE (ML) INJECTION AS NEEDED
Status: CANCELLED | OUTPATIENT
Start: 2022-02-18

## 2022-02-18 RX ORDER — HEPARIN SODIUM (PORCINE) LOCK FLUSH IV SOLN 100 UNIT/ML 100 UNIT/ML
500 SOLUTION INTRAVENOUS AS NEEDED
Status: ACTIVE | OUTPATIENT
Start: 2022-02-18

## 2022-02-18 RX ORDER — HEPARIN SODIUM (PORCINE) LOCK FLUSH IV SOLN 100 UNIT/ML 100 UNIT/ML
500 SOLUTION INTRAVENOUS AS NEEDED
Status: CANCELLED | OUTPATIENT
Start: 2022-02-18

## 2022-02-18 RX ORDER — SODIUM CHLORIDE 0.9 % (FLUSH) 0.9 %
10 SYRINGE (ML) INJECTION AS NEEDED
Status: ACTIVE | OUTPATIENT
Start: 2022-02-18

## 2022-02-18 RX ADMIN — Medication 500 UNITS: at 08:36

## 2022-02-18 RX ADMIN — SODIUM CHLORIDE, PRESERVATIVE FREE 10 ML: 5 INJECTION INTRAVENOUS at 08:36

## 2022-02-18 NOTE — PROGRESS NOTES
Today I discussed the patient's findings with both his dentist (Verito Toney) and oral surgery-Ezequiel Davila.  It is not certain the underlying issue here other than a boggy edematous lesion and evidence of exposed bone that would be consistent with osteonecrosis.  We are going to hold Xgeva and the patient is now on oral Peridex and antibiotic therapy.  We will see him back as scheduled and continue with his immunotherapy in the meantime.  MELLO

## 2022-03-03 RX ORDER — IBRUTINIB 420 MG/1
TABLET, FILM COATED ORAL
Qty: 28 TABLET | Refills: 6 | Status: SHIPPED | OUTPATIENT
Start: 2022-03-03 | End: 2022-09-14

## 2022-03-03 NOTE — PROGRESS NOTES
Subjective Patient with recent diagnosis of osteonecrosis left jaw                                                                          REASON FOR FOLLOW UP:  1.  Chronic lymphocytic leukemia with extensive lymphadenopathy and possible pleural involvement. Initiation of Treanda, Rituxan May 1, 2019.  2.  Hypogammaglobulinemia.  Status is post IVIG  3.  Significant response on scans June 27, 2019.  Treatment changed to Imbruvica 420 mg daily.  4.  Metastatic non-small cell lung carcinoma on Keytruda  5.  Patient seen 2/18/2022 with left jaw/gumline swelling pain, associated hematoma?  Osteonecrosis.  Restart of Keytruda 3/4/2022, Xgeva discontinued     HISTORY OF PRESENT ILLNESS:    The patient is a  66 y.o. male with the above-mentioned history, returns the office today in anticipation of his 19th dose of Keytruda which he continues to receive every 3 weeks.  He also receives Xgeva every 6 weeks which was given 10/14/2021.  He continues to tolerate therapy remarkably well.  He denies fevers or chills, signs or symptoms of infection.  He denies any new adenopathy.  His pain is well controlled though he has begun to have pain involving his right knee that produces  right hamstring pain.  He is without worsening shortness of breath or chest pain.  He does not feel he is in need of thoracentesis.  He is not in need of a refill of his pain medications today.  His bowels are moving without difficulty while on narcotics.  He denies B symptoms, has only mild bruising with Imbruvica.  We have the patient proceed with additional testing undergoing plain views of his right knee showing a right knee effusion with medial compartment narrowing and no suspicious bone lesion.  There is benign periosteal calcification along the distal right femur.  A PET/CT 11/11 went on to demonstrate a complete metabolic response to therapy compared to PET/CT from 9/23/2020.  This includes his primary lung neoplasm left upper lobe, large  "osseous metastatic lesion the right chest wall region from the eighth rib, small left adrenal metastasis, and no evidence of disease involving the distal left femur.      The patient is seen 11/24/2021 continue to do well though indicating that his right knee is \"something I can manage at the moment\".  He prefers not to have orthopedic assessment at this point.  He is concerned, ever, about skin lesions that are developing primarily on his calf regions and into his right thigh.    The patient was seen by Dr. Regan Damon 11/29/2021 and felt to be consistent with psoriasiform dermatitis treated with intralesional therapy.  Was also started on a moisturizer and lipid therapy.  The patient return for assessment 12/15/2021 and treated with cycle #21 Keytruda, returned 1/7/2022 for cycle #22 and 1/28/2022 for cycle #23.  At that visit he had developed adenopathy several weeks previous and was treated with a Medrol Dosepak.   The patient was next seen 2/18/2022 and indicates that though his adenopathy has improved after treatment described above that his jaw has become painful swollen and tender with additional bleeding.     The patient was referred to his primary dentist who then had him seen oral surgeon-Dr. Ezequiel Davila-reached at [phone] who felt that this was osteonecrosis and should be treated symptomatically.  As the patient reviewed 3/4/2022, wonderfully, his oral issues have nearly resolved with the gumline returning to essentially its previous state, no swelling, minimal erythema, no discharge and no additional pain.  He does have follow-up with oral surgery in the next several weeks and we discussed restarting his Keytruda scanning him likely in April 2022 in general.      Past Medical History, Past Surgical History, Social History, Family History have been reviewed and are without significant changes except as mentioned.    Objective      Vitals:    03/04/22 0801   BP: 146/80   Pulse: 82   Resp: 18 " "  Temp: 97.1 °F (36.2 °C)   TempSrc: Temporal   SpO2: 95%   Weight: 78.9 kg (174 lb)   Height: 180.3 cm (70.98\")   PainSc: 0-No pain     Current Status 2/18/2022   ECOG score 0       Physical Exam    GENERAL:  Well-developed, well-nourished in no acute distress.   SKIN:  Warm, dry with erythematous target lesions noted primarily around the calf regions bilaterally, mildly pruritic, no tenderness, with a scale component, varying in size  HEAD:  Normocephalic.  EYES:  Pupils equal, round.  EOMs intact.  Conjunctivae normal.  ORAL: Findings of left lower gumline no longer swelling, resolution of hematoma  NECK: Bilateral cervical adenopathy shoddy  EARS:  Hearing intact.  LYMPHATICS: Without cervical, submandibular, axillary, supraclavicular adenopathy.    CHEST:  Lungs clear to auscultation. Good airflow.  CARDIAC:  Regular rate and rhythm without murmurs. Normal S1,S2.  ABDOMEN:  Soft, nontender with no organomegaly or masses. Bowel sounds present  EXTREMITIES:  No clubbing, cyanosis or edema.  NEUROLOGICAL: No focal neurological deficits.  PSYCHIATRIC:  Normal affect and mood.    I have reexamined the patient and the results are consistent with the previously documented exam. Jostin Parekh MD       RECENT LABS:  Results from last 7 days   Lab Units 03/04/22  0720   WBC 10*3/mm3 10.44   NEUTROS ABS 10*3/mm3 7.84*   HEMOGLOBIN g/dL 13.7   HEMATOCRIT % 43.8   PLATELETS 10*3/mm3 284     Results from last 7 days   Lab Units 03/04/22  0719   SODIUM mmol/L 141   POTASSIUM mmol/L 4.4   CHLORIDE mmol/L 105   CO2 mmol/L 27.7   BUN mg/dL 17   CREATININE mg/dL 1.19   CALCIUM mg/dL 8.6   ALBUMIN g/dL 3.90   BILIRUBIN mg/dL 0.4   ALK PHOS U/L 108   ALT (SGPT) U/L 14   AST (SGOT) U/L 11   GLUCOSE mg/dL 127*         FISH prognostic panel-Positive for deletion of 13 q. 14.3    Assessment/Plan   1.  CLL presenting with significant lymphocytosis, lymphadenopathy and splenomegaly.     · Positive for deletion of 13 q. 14.3.  "   · Patient status post 2 cycles of Treanda Rituxan with excellent response.    · Imaging 6/27/2019 with significant decrease in adenopathy.  Treanda Rituxan discontinued   · Imbruvica 420 mg daily initiated 7/25/2019.  · He continues on Imbruvica, without indication of progression of his CLL, without progressive lymphadenopathy on CT scans.  · He is without progressive adenopathy on exam today, 11/4/2021.  · Patient has resolving adenopathy particularly cervical regions 2/18/2022  · No additional adenopathy, normalizing CBC 3/4/2022    2.  Pulmonary nodules/infiltrates.  He is  status post bronchoscopy.  Is unclear at this time whether these findings are infectious or possibly related to his lymphoproliferative disorder.  This is seen to be improving on recent scans.    3.  Hypogammaglobulinemia-status post IVIG with resolution of sinusitis.      4.  Non-small cell lung carcinoma  · August 26 2020 revealing a new 1.6 cm spiculated nodule within the left upper lobe, 1.2 cm left adrenal nodule, bone windows with a soft tissue mass involving the right eighth rib measuring 3.7 x 2.6 cm.    · Biopsy was consistent with adenocarcinoma CK 7+, CK 20-, lung primary suspected clinically, molecular panel not yet obtained.  · PET/CT 9/23/2020 demonstrating asymmetric uptake within the right parotid gland which is unremarkable appearance on noncontrasted CT, SUV of 6 compared to 2.7.  There is no hilar, mediastinal or axillary adenopathy, findings of asymmetric moderate to intense FDG uptake within superior aspect the right chest wall anterior to the right first rib with an irregular hypodense lesion measuring 1.8 x 1.6 and SUV 4.9.  This had not been seen June 27, 2019.  There is an intensely FDG avid nodule within the lingula measuring 1.9 x 1.5 history of 12.4, FDG avid left adrenal nodule 1.9 x 1.5 with an issue 21.2.    · Evaluated by radiation oncology therapy September 29 and started on radiation therapy to the right  chest wall-35 Gy in 10 fractions.   ·  Plans were also then proceed with SBRT to the solitary left upper lobe lesion pending insurance approval.  · Further testing including TPS of 20% and foundation CDX with a TMB of greater than 10 mutations per megabase (28 mutations per megabase) and the indication that several immunotherapies could be useful in this patient's care.  Additional genomic findings include BRAF G469R/that trametinib could be useful and STK11/that everolimus could be useful.  · Keytruda initiated 10/21/2021   · Reassessment after 2 cycles of Keytruda with enlargement of the right eighth rib lesion,enlargement of spiculated left upper lobe lung mass, moderate to large right-sided pleural effusion, new left adrenal mass and enlarged retrocrural lymph node.?Pseudoprogression  · Xgeva last given 12/30/2020  · Follow-up scans 1/6/2021 demonstrated marked improvement in his right eighth rib lesion, resolution of left upper lobe spiculated mass, residual large right pleural effusion, resolution of left adrenal metastasis.   · Right pleural effusion, with thoracentesis 1/14/2021 with 1500 mils removed.  Pathology consistent with malignant effusion   · CT scans 4/5/2021 with response noted in the left upper lobe density.  · He continues to receive Keytruda every 3 weeks along with Xgeva every 6 weeks.  · Repeat scan 7/15/2021 without evidence of recurrence or progressive disease.  Plans to continue Keytruda every 3 weeks with repeat scans in 4 to 6 months  · Proceed today with cycle 19 Keytruda.  · Patient status post PET/CT 11/11/2021 with hyper response, approximate remission status, plan to proceed with cycle #20 Keytruda  · Patient seen 2/18/2022 with Keytruda held while his oral issues are addressed-concern for osteonecrosis of the jaw.  · Patiently diagnosed with osteonecrosis of the jaw, seen by oral surgery, started on antibiotic therapy and Peridex with substantial improvement    5.  Left knee  metastasis  · Evaluated by orthopedic oncology, Dr. Eliu Ochoa  · Admission 1/7/2021 undergoing stabilization of left femoral bone lesion, prophylactic stabilization with intramedullary implants.  · It was suggested we delay Xgeva or Zometa to allow bone healing  · Completed radiation with 10 fractions 2/10/2021  · Xgeva resumed 4/29/2021, he continues to receive this every 6 weeks.     6.  Malignant right pleural effusion  · Ultrasound thoracentesis 1/14/2021 1500 mL removed  · Chest x-ray 2/25/2021 with moderate right pleural effusion  · Progressive symptoms with worsening pain 4/5/2021  · Ultrasound-guided thoracentesis 4/13/2021 with 2050 ml removed.  · Plans for Pleurx catheter with thoracic surgery, however, pleural effusion has not recurred.  Patient seen 11/4/2021 with chest x-ray planned.  · Additional assessment 2/18/2022 with left jaw pain, subsequent diagnosis of osteoporosis, Xgeva discontinued    7.  Cancer related pain  · Pain is most prominent in his right rib, utilizing MS Contin 30 mg 3 times a day  · Hydrocodone/acetaminophen 10/325 as needed for breakthrough pain.  Currently taking 3 to 4 tablets daily  · He is not currently in need of a refill of pain medications  · Discussed MS Contin at 30 mg p.o. every 8 hours, Norco 10/325 2 p.o. every 6 hours  · Pain medications moved to as needed use 3/4/2022    8.  Insomnia  · Continuing to follow with behavioral oncology  · Continuing Remeron 7.5 mg nightly with good control    9. Health maintenance  · Status post COVID-19 booster, SARS antibody pending today  · The patient is due for Shingrix and Prevnar which will both be administered in the clinic 9/23/2021    10.  Skin lesions  · Uncertain of etiology, unexpected findings for usual skin lesions associated with immunotherapy  · Request dermatologic assessment-psoriasiform dermatitis    11.  Right knee pain  · Osteoarthritis, orthopedic assessment upon patient's request.  Currently stable 2/18/2022  resolved    Plan:  1. Proceed with Keytruda today, Xgeva will be held indefinitely considering the findings per dental and oral surgery assessments.  2. Continue Imbruvica 420 mg daily  3. Continue MS Contin 30 mg 3 times a day and hydrocodone/acetaminophen 10/325-2 p.o. every 6 hours as needed for breakthrough pain  4. Continue Remeron 15 mg nightly  5.  Orthopedic assessment upon patient's request involving his right knee  6.  Now considering he does not have progression in symptoms and has responded quickly to treatment for osteonecrosis we will have him continue with Keytruda, return in 3 weeks and undergo repeat CT chest and pelvis in the weeks to follow that visit.  The patient is on high risk medication requiring close monitoring for toxicity.          Jostin Parekh MD   3/4/2022

## 2022-03-04 ENCOUNTER — INFUSION (OUTPATIENT)
Dept: ONCOLOGY | Facility: HOSPITAL | Age: 67
End: 2022-03-04

## 2022-03-04 ENCOUNTER — OFFICE VISIT (OUTPATIENT)
Dept: ONCOLOGY | Facility: CLINIC | Age: 67
End: 2022-03-04

## 2022-03-04 VITALS
SYSTOLIC BLOOD PRESSURE: 146 MMHG | WEIGHT: 174 LBS | HEART RATE: 82 BPM | DIASTOLIC BLOOD PRESSURE: 80 MMHG | RESPIRATION RATE: 18 BRPM | TEMPERATURE: 97.1 F | BODY MASS INDEX: 24.36 KG/M2 | OXYGEN SATURATION: 95 % | HEIGHT: 71 IN

## 2022-03-04 DIAGNOSIS — C34.12 MALIGNANT NEOPLASM OF UPPER LOBE OF LEFT LUNG: ICD-10-CM

## 2022-03-04 DIAGNOSIS — C79.51 LUNG CANCER METASTATIC TO BONE: ICD-10-CM

## 2022-03-04 DIAGNOSIS — Z79.899 HIGH RISK MEDICATION USE: Primary | ICD-10-CM

## 2022-03-04 DIAGNOSIS — C34.90 LUNG CANCER METASTATIC TO BONE: ICD-10-CM

## 2022-03-04 DIAGNOSIS — C91.10 CLL (CHRONIC LYMPHOCYTIC LEUKEMIA): ICD-10-CM

## 2022-03-04 DIAGNOSIS — Z45.2 FITTING AND ADJUSTMENT OF VASCULAR CATHETER: ICD-10-CM

## 2022-03-04 DIAGNOSIS — Z79.899 HIGH RISK MEDICATION USE: ICD-10-CM

## 2022-03-04 LAB
ALBUMIN SERPL-MCNC: 3.9 G/DL (ref 3.5–5.2)
ALBUMIN/GLOB SERPL: 1.7 G/DL (ref 1.1–2.4)
ALP SERPL-CCNC: 108 U/L (ref 38–116)
ALT SERPL W P-5'-P-CCNC: 14 U/L (ref 0–41)
ANION GAP SERPL CALCULATED.3IONS-SCNC: 8.3 MMOL/L (ref 5–15)
AST SERPL-CCNC: 11 U/L (ref 0–40)
BASOPHILS # BLD AUTO: 0.12 10*3/MM3 (ref 0–0.2)
BASOPHILS NFR BLD AUTO: 1.1 % (ref 0–1.5)
BILIRUB SERPL-MCNC: 0.4 MG/DL (ref 0.2–1.2)
BUN SERPL-MCNC: 17 MG/DL (ref 6–20)
BUN/CREAT SERPL: 14.3 (ref 7.3–30)
CALCIUM SPEC-SCNC: 8.6 MG/DL (ref 8.5–10.2)
CHLORIDE SERPL-SCNC: 105 MMOL/L (ref 98–107)
CO2 SERPL-SCNC: 27.7 MMOL/L (ref 22–29)
CREAT SERPL-MCNC: 1.19 MG/DL (ref 0.7–1.3)
DEPRECATED RDW RBC AUTO: 50.9 FL (ref 37–54)
EGFRCR SERPLBLD CKD-EPI 2021: 67 ML/MIN/1.73
EOSINOPHIL # BLD AUTO: 0.22 10*3/MM3 (ref 0–0.4)
EOSINOPHIL NFR BLD AUTO: 2.1 % (ref 0.3–6.2)
ERYTHROCYTE [DISTWIDTH] IN BLOOD BY AUTOMATED COUNT: 14.9 % (ref 12.3–15.4)
GLOBULIN UR ELPH-MCNC: 2.3 GM/DL (ref 1.8–3.5)
GLUCOSE SERPL-MCNC: 127 MG/DL (ref 74–124)
HCT VFR BLD AUTO: 43.8 % (ref 37.5–51)
HGB BLD-MCNC: 13.7 G/DL (ref 13–17.7)
IMM GRANULOCYTES # BLD AUTO: 0.05 10*3/MM3 (ref 0–0.05)
IMM GRANULOCYTES NFR BLD AUTO: 0.5 % (ref 0–0.5)
LYMPHOCYTES # BLD AUTO: 1.28 10*3/MM3 (ref 0.7–3.1)
LYMPHOCYTES NFR BLD AUTO: 12.3 % (ref 19.6–45.3)
MCH RBC QN AUTO: 29.1 PG (ref 26.6–33)
MCHC RBC AUTO-ENTMCNC: 31.3 G/DL (ref 31.5–35.7)
MCV RBC AUTO: 93.2 FL (ref 79–97)
MONOCYTES # BLD AUTO: 0.93 10*3/MM3 (ref 0.1–0.9)
MONOCYTES NFR BLD AUTO: 8.9 % (ref 5–12)
NEUTROPHILS NFR BLD AUTO: 7.84 10*3/MM3 (ref 1.7–7)
NEUTROPHILS NFR BLD AUTO: 75.1 % (ref 42.7–76)
NRBC BLD AUTO-RTO: 0 /100 WBC (ref 0–0.2)
PLATELET # BLD AUTO: 284 10*3/MM3 (ref 140–450)
PMV BLD AUTO: 11.4 FL (ref 6–12)
POTASSIUM SERPL-SCNC: 4.4 MMOL/L (ref 3.5–4.7)
PROT SERPL-MCNC: 6.2 G/DL (ref 6.3–8)
RBC # BLD AUTO: 4.7 10*6/MM3 (ref 4.14–5.8)
SODIUM SERPL-SCNC: 141 MMOL/L (ref 134–145)
WBC NRBC COR # BLD: 10.44 10*3/MM3 (ref 3.4–10.8)

## 2022-03-04 PROCEDURE — 25010000002 PEMBROLIZUMAB 100 MG/4ML SOLUTION 4 ML VIAL: Performed by: INTERNAL MEDICINE

## 2022-03-04 PROCEDURE — 99214 OFFICE O/P EST MOD 30 MIN: CPT | Performed by: INTERNAL MEDICINE

## 2022-03-04 PROCEDURE — 25010000002 HEPARIN LOCK FLUSH PER 10 UNITS: Performed by: INTERNAL MEDICINE

## 2022-03-04 PROCEDURE — 80053 COMPREHEN METABOLIC PANEL: CPT

## 2022-03-04 PROCEDURE — 96413 CHEMO IV INFUSION 1 HR: CPT

## 2022-03-04 PROCEDURE — 85025 COMPLETE CBC W/AUTO DIFF WBC: CPT

## 2022-03-04 RX ORDER — SODIUM CHLORIDE 9 MG/ML
250 INJECTION, SOLUTION INTRAVENOUS ONCE
Status: COMPLETED | OUTPATIENT
Start: 2022-03-04 | End: 2022-03-04

## 2022-03-04 RX ORDER — SODIUM CHLORIDE 0.9 % (FLUSH) 0.9 %
10 SYRINGE (ML) INJECTION AS NEEDED
Status: CANCELLED | OUTPATIENT
Start: 2022-03-04

## 2022-03-04 RX ORDER — HEPARIN SODIUM (PORCINE) LOCK FLUSH IV SOLN 100 UNIT/ML 100 UNIT/ML
500 SOLUTION INTRAVENOUS AS NEEDED
Status: CANCELLED | OUTPATIENT
Start: 2022-03-04

## 2022-03-04 RX ORDER — SODIUM CHLORIDE 9 MG/ML
250 INJECTION, SOLUTION INTRAVENOUS ONCE
Status: CANCELLED | OUTPATIENT
Start: 2022-03-04

## 2022-03-04 RX ORDER — HEPARIN SODIUM (PORCINE) LOCK FLUSH IV SOLN 100 UNIT/ML 100 UNIT/ML
500 SOLUTION INTRAVENOUS AS NEEDED
Status: DISCONTINUED | OUTPATIENT
Start: 2022-03-04 | End: 2022-03-04 | Stop reason: HOSPADM

## 2022-03-04 RX ADMIN — SODIUM CHLORIDE 250 ML: 9 INJECTION, SOLUTION INTRAVENOUS at 08:35

## 2022-03-04 RX ADMIN — Medication 500 UNITS: at 09:05

## 2022-03-04 RX ADMIN — SODIUM CHLORIDE 200 MG: 9 INJECTION, SOLUTION INTRAVENOUS at 08:37

## 2022-03-15 ENCOUNTER — DOCUMENTATION (OUTPATIENT)
Dept: PHARMACY | Facility: HOSPITAL | Age: 67
End: 2022-03-15

## 2022-03-15 NOTE — PROGRESS NOTES
Received a letter from [facility] stating that he has a balance left on his tobias ending in 8/27/22. I called the foundation to see if we needed to do anything, I was told that it was just an alert. The physicians get alerted when the tobias is 85% gone and to let us know about possible options.

## 2022-03-18 DIAGNOSIS — C34.90 LUNG CANCER METASTATIC TO BONE: ICD-10-CM

## 2022-03-18 DIAGNOSIS — C79.51 LUNG CANCER METASTATIC TO BONE: ICD-10-CM

## 2022-03-18 RX ORDER — MORPHINE SULFATE 30 MG/1
30 TABLET, FILM COATED, EXTENDED RELEASE ORAL 2 TIMES DAILY
Qty: 60 TABLET | Refills: 0 | Status: SHIPPED | OUTPATIENT
Start: 2022-03-18 | End: 2022-04-22 | Stop reason: SDUPTHER

## 2022-03-23 NOTE — PROGRESS NOTES
Subjective Patient with recent diagnosis of osteonecrosis left jaw                                                                          REASON FOR FOLLOW UP:  1.  Chronic lymphocytic leukemia with extensive lymphadenopathy and possible pleural involvement. Initiation of Treanda, Rituxan May 1, 2019.  2.  Hypogammaglobulinemia.  Status is post IVIG  3.  Significant response on scans June 27, 2019.  Treatment changed to Imbruvica 420 mg daily.  4.  Metastatic non-small cell lung carcinoma on Keytruda  5.  Patient seen 2/18/2022 with left jaw/gumline swelling pain, associated hematoma?  Osteonecrosis.  Restart of Keytruda 3/4/2022, Xgeva discontinued     HISTORY OF PRESENT ILLNESS:    The patient is a  66 y.o. male with the above-mentioned history, returns the office  in anticipation of his 19th dose of Keytruda which he continues to receive every 3 weeks.  He also receives Xgeva every 6 weeks which was given 10/14/2021.  He continues to tolerate therapy remarkably well.  He denies fevers or chills, signs or symptoms of infection.  He denies any new adenopathy.  His pain is well controlled though he has begun to have pain involving his right knee that produces  right hamstring pain.  He is without worsening shortness of breath or chest pain.  He does not feel he is in need of thoracentesis.  He is not in need of a refill of his pain medications today.  His bowels are moving without difficulty while on narcotics.  He denies B symptoms, has only mild bruising with Imbruvica.  We have the patient proceed with additional testing undergoing plain views of his right knee showing a right knee effusion with medial compartment narrowing and no suspicious bone lesion.  There is benign periosteal calcification along the distal right femur.  A PET/CT 11/11 went on to demonstrate a complete metabolic response to therapy compared to PET/CT from 9/23/2020.  This includes his primary lung neoplasm left upper lobe, large osseous  "metastatic lesion the right chest wall region from the eighth rib, small left adrenal metastasis, and no evidence of disease involving the distal left femur.      The patient is seen 11/24/2021 continue to do well though indicating that his right knee is \"something I can manage at the moment\".  He prefers not to have orthopedic assessment at this point.  He is concerned, ever, about skin lesions that are developing primarily on his calf regions and into his right thigh.    The patient was seen by Dr. Regan Damon 11/29/2021 and felt to be consistent with psoriasiform dermatitis treated with intralesional therapy.  Was also started on a moisturizer and lipid therapy.  The patient return for assessment 12/15/2021 and treated with cycle #21 Keytruda, returned 1/7/2022 for cycle #22 and 1/28/2022 for cycle #23.  At that visit he had developed adenopathy several weeks previous and was treated with a Medrol Dosepak.   The patient was next seen 2/18/2022 and indicates that though his adenopathy has improved after treatment described above that his jaw has become painful swollen and tender with additional bleeding.     The patient was referred to his primary dentist who then had him seen oral surgeon-Dr. Ezequiel Davila-reached at [phone] who felt that this was osteonecrosis and should be treated symptomatically.  As the patient reviewed 3/4/2022, wonderfully, his oral issues have nearly resolved with the gumline returning to essentially its previous state, no swelling, minimal erythema, no discharge and no additional pain.  He does have follow-up with oral surgery in the next several weeks and we discussed restarting his Keytruda scanning him likely in April 2022 in general.   He returns to the office on 3/25/2022 in follow-up in anticipation of cycle 24 Keytruda. He reports the left side of his jaw has improved. However, he is now having issues with the right lower side. He reports seeing oral surgery 4 days ago. At " "this time, imagining was obtained and sent off for evaluation. He reports he is waiting to hear back from the oral surgeon regarding these x-rays.  He also reports that the oral surgeon Dr. Ezequiel Davila, started him on amoxicillin Monday.  He states the antibiotic has helped to improve the pain in his jaw.  Denies nausea or vomiting.  Denies diarrhea or constipation.    Past Medical History, Past Surgical History, Social History, Family History have been reviewed and are without significant changes except as mentioned.    Objective      Vitals:    03/25/22 1411   BP: 168/89   Pulse: 79   Resp: 18   Temp: 99.1 °F (37.3 °C)   TempSrc: Temporal   SpO2: 98%   Weight: 80 kg (176 lb 4.8 oz)   Height: 180.3 cm (70.98\")   PainSc: 0-No pain     Current Status 2/18/2022   ECOG score 0       GENERAL:  Well-developed, well-nourished in no acute distress.   SKIN:  Warm, dry with erythematous target lesions noted primarily around the calf regions bilaterally, mildly pruritic, no tenderness, with a scale component, varying in size  HEAD:  Normocephalic.  EYES:  Pupils equal, round.  EOMs intact.  Conjunctivae normal.  ORAL: Findings of left lower gumline no longer swelling, resolution of hematoma  NECK: Bilateral cervical adenopathy shoddy  EARS:  Hearing intact.  LYMPHATICS: Without cervical, submandibular, axillary, supraclavicular adenopathy.    CHEST:  Lungs clear to auscultation. Good airflow.  CARDIAC:  Regular rate and rhythm without murmurs. Normal S1,S2.  ABDOMEN:  Soft, nontender with no organomegaly or masses. Bowel sounds present  EXTREMITIES:  No clubbing, cyanosis or edema.  NEUROLOGICAL: No focal neurological deficits.  PSYCHIATRIC:  Normal affect and mood.      I have reexamined the patient and the results are consistent with the previously documented exam. RORY Salinas     RECENT LABS:  Results from last 7 days   Lab Units 03/25/22  1312   WBC 10*3/mm3 7.14   NEUTROS ABS 10*3/mm3 5.42   HEMOGLOBIN g/dL 12.0* "   HEMATOCRIT % 38.3   PLATELETS 10*3/mm3 226     Results from last 7 days   Lab Units 03/25/22  1312   SODIUM mmol/L 140   POTASSIUM mmol/L 4.6   CHLORIDE mmol/L 104   CO2 mmol/L 28.3   BUN mg/dL 14   CREATININE mg/dL 1.15   CALCIUM mg/dL 8.1*   ALBUMIN g/dL 3.50   BILIRUBIN mg/dL 0.2   ALK PHOS U/L 92   ALT (SGPT) U/L 13   AST (SGOT) U/L 9   GLUCOSE mg/dL 131*         FISH prognostic panel-Positive for deletion of 13 q. 14.3    3/25/2022- corrected calcium 8.5    Assessment/Plan   1.  CLL presenting with significant lymphocytosis, lymphadenopathy and splenomegaly.     · Positive for deletion of 13 q. 14.3.    · Patient status post 2 cycles of Treanda Rituxan with excellent response.    · Imaging 6/27/2019 with significant decrease in adenopathy.  Treanda Rituxan discontinued   · Imbruvica 420 mg daily initiated 7/25/2019.  · He continues on Imbruvica, without indication of progression of his CLL, without progressive lymphadenopathy on CT scans.  · Patient has resolving adenopathy particularly cervical regions 2/18/2022  · 3/25/2022 patient is without worsening adenopathy on exam today.  Continue Imbruvica.    2.  Pulmonary nodules/infiltrates.  He is  status post bronchoscopy.  Is unclear at this time whether these findings are infectious or possibly related to his lymphoproliferative disorder.  This is seen to be improving on latest scans.    3.  Hypogammaglobulinemia-status post IVIG with resolution of sinusitis.      4.  Non-small cell lung carcinoma  · August 26 2020 revealing a new 1.6 cm spiculated nodule within the left upper lobe, 1.2 cm left adrenal nodule, bone windows with a soft tissue mass involving the right eighth rib measuring 3.7 x 2.6 cm.    · Biopsy was consistent with adenocarcinoma CK 7+, CK 20-, lung primary suspected clinically, molecular panel not yet obtained.  · PET/CT 9/23/2020 demonstrating asymmetric uptake within the right parotid gland which is unremarkable appearance on noncontrasted  CT, SUV of 6 compared to 2.7.  There is no hilar, mediastinal or axillary adenopathy, findings of asymmetric moderate to intense FDG uptake within superior aspect the right chest wall anterior to the right first rib with an irregular hypodense lesion measuring 1.8 x 1.6 and SUV 4.9.  This had not been seen June 27, 2019.  There is an intensely FDG avid nodule within the lingula measuring 1.9 x 1.5 history of 12.4, FDG avid left adrenal nodule 1.9 x 1.5 with an issue 21.2.    · Evaluated by radiation oncology therapy September 29 and started on radiation therapy to the right chest wall-35 Gy in 10 fractions.   ·  Plans were also then proceed with SBRT to the solitary left upper lobe lesion pending insurance approval.  · Further testing including TPS of 20% and foundation CDX with a TMB of greater than 10 mutations per megabase (28 mutations per megabase) and the indication that several immunotherapies could be useful in this patient's care.  Additional genomic findings include BRAF G469R/that trametinib could be useful and STK11/that everolimus could be useful.  · Keytruda initiated 10/21/2021   · Reassessment after 2 cycles of Keytruda with enlargement of the right eighth rib lesion,enlargement of spiculated left upper lobe lung mass, moderate to large right-sided pleural effusion, new left adrenal mass and enlarged retrocrural lymph node.?Pseudoprogression  · Xgeva last given 12/30/2020  · Follow-up scans 1/6/2021 demonstrated marked improvement in his right eighth rib lesion, resolution of left upper lobe spiculated mass, residual large right pleural effusion, resolution of left adrenal metastasis.   · Right pleural effusion, with thoracentesis 1/14/2021 with 1500 mils removed.  Pathology consistent with malignant effusion   · CT scans 4/5/2021 with response noted in the left upper lobe density.  · He continues to receive Keytruda every 3 weeks along with Xgeva every 6 weeks.  · Repeat scan 7/15/2021 without  evidence of recurrence or progressive disease.  Plans to continue Keytruda every 3 weeks with repeat scans in 4 to 6 months  · Patient status post PET/CT 11/11/2021 with hyper response, approximate remission status, plan to proceed with cycle #20 Keytruda  · Patient seen 2/18/2022 with Keytruda held while his oral issues are addressed-concern for osteonecrosis of the jaw.  · Patiently diagnosed with osteonecrosis of the jaw, seen by oral surgery, started on antibiotic therapy and Peridex with substantial improvement, Xgeva discontinued as discussed below.  · 3/25/2022 proceed with cycle #24 Keytruda today.  Plan for CT scans after this cycle, these will be ordered today.     5.  Left knee metastasis  · Evaluated by orthopedic oncology, Dr. Eliu Ochoa  · Admission 1/7/2021 undergoing stabilization of left femoral bone lesion, prophylactic stabilization with intramedullary implants.  · It was suggested we delay Xgeva or Zometa to allow bone healing  · Completed radiation with 10 fractions 2/10/2021  · Xgeva resumed 4/29/2021, he continues to receive this every 6 weeks.     6.  Malignant right pleural effusion  · Ultrasound thoracentesis 1/14/2021 1500 mL removed  · Chest x-ray 2/25/2021 with moderate right pleural effusion  · Progressive symptoms with worsening pain 4/5/2021  · Ultrasound-guided thoracentesis 4/13/2021 with 2050 ml removed.  · Plans for Pleurx catheter with thoracic surgery, however, pleural effusion has not recurred.  Patient seen 11/4/2021 with chest x-ray planned.  · Additional assessment 2/18/2022 with left jaw pain, subsequent diagnosis of osteoporosis, Xgeva discontinued    7.  Cancer related pain  · Pain is most prominent in his right rib, utilizing MS Contin 30 mg 3 times a day  · Hydrocodone/acetaminophen 10/325 as needed for breakthrough pain.  Currently taking 3 to 4 tablets daily  · He is not currently in need of a refill of pain medications  · Discussed MS Contin at 30 mg p.o. every 8  hours, Norco 10/325 2 p.o. every 6 hours  · He is currently using pain medication as needed for jaw pain.    8.  Insomnia  · Continuing to follow with behavioral oncology  · Continuing Remeron 7.5 mg nightly with good control    9. Health maintenance  · Status post COVID-19 booster, SARS antibody pending today  · The patient is due for Shingrix and Prevnar which will both be administered in the clinic 9/23/2021    10.  Skin lesions  · Uncertain of etiology, unexpected findings for usual skin lesions associated with immunotherapy  · Request dermatologic assessment-psoriasiform dermatitis  · Patient continues to report skin lesions.  Encouraged him to follow back up with his dermatologist.  He reports he is are stable at this time but will follow up as needed.     11.  Right knee pain  · Osteoarthritis, orthopedic assessment upon patient's request.   · Resolved.  Denies any knee pain today     12.  Hypocalcemia  · Calcium on 3/25/2025, 8.1. However corrected calcium normal at 8.5.  We will continue to monitor    Plan:  1. Proceed with cycle #24 Keytruda today  2. Continue Imbruvica 420 mg daily  3. Continue MS Contin 30 mg 3 times a day and hydrocodone/acetaminophen 10/325-2 p.o. every 6 hours as needed for breakthrough pain   4. Continue Remeron 15 mg nightly  5. In 2 weeks CT of the chest, abdomen, and pelvis.  Orders placed today  6. Return in 3 weeks for MD follow-up and Keytruda    Patient continues on high risk medication requiring close monitoring for toxicity    RORY Salinas   3/25/2022

## 2022-03-25 ENCOUNTER — INFUSION (OUTPATIENT)
Dept: ONCOLOGY | Facility: HOSPITAL | Age: 67
End: 2022-03-25

## 2022-03-25 ENCOUNTER — OFFICE VISIT (OUTPATIENT)
Dept: ONCOLOGY | Facility: CLINIC | Age: 67
End: 2022-03-25

## 2022-03-25 VITALS
HEART RATE: 79 BPM | SYSTOLIC BLOOD PRESSURE: 168 MMHG | HEIGHT: 71 IN | OXYGEN SATURATION: 98 % | TEMPERATURE: 99.1 F | BODY MASS INDEX: 24.68 KG/M2 | DIASTOLIC BLOOD PRESSURE: 89 MMHG | RESPIRATION RATE: 18 BRPM | WEIGHT: 176.3 LBS

## 2022-03-25 DIAGNOSIS — C34.12 MALIGNANT NEOPLASM OF UPPER LOBE OF LEFT LUNG: ICD-10-CM

## 2022-03-25 DIAGNOSIS — Z79.899 HIGH RISK MEDICATION USE: ICD-10-CM

## 2022-03-25 DIAGNOSIS — C34.90 LUNG CANCER METASTATIC TO BONE: ICD-10-CM

## 2022-03-25 DIAGNOSIS — C91.10 CLL (CHRONIC LYMPHOCYTIC LEUKEMIA): ICD-10-CM

## 2022-03-25 DIAGNOSIS — Z79.899 HIGH RISK MEDICATION USE: Primary | ICD-10-CM

## 2022-03-25 DIAGNOSIS — C79.51 LUNG CANCER METASTATIC TO BONE: ICD-10-CM

## 2022-03-25 DIAGNOSIS — C79.51 LUNG CANCER METASTATIC TO BONE: Primary | ICD-10-CM

## 2022-03-25 DIAGNOSIS — C34.90 LUNG CANCER METASTATIC TO BONE: Primary | ICD-10-CM

## 2022-03-25 LAB
ALBUMIN SERPL-MCNC: 3.5 G/DL (ref 3.5–5.2)
ALBUMIN/GLOB SERPL: 1.5 G/DL (ref 1.1–2.4)
ALP SERPL-CCNC: 92 U/L (ref 38–116)
ALT SERPL W P-5'-P-CCNC: 13 U/L (ref 0–41)
ANION GAP SERPL CALCULATED.3IONS-SCNC: 7.7 MMOL/L (ref 5–15)
AST SERPL-CCNC: 9 U/L (ref 0–40)
BASOPHILS # BLD AUTO: 0.07 10*3/MM3 (ref 0–0.2)
BASOPHILS NFR BLD AUTO: 1 % (ref 0–1.5)
BILIRUB SERPL-MCNC: 0.2 MG/DL (ref 0.2–1.2)
BUN SERPL-MCNC: 14 MG/DL (ref 6–20)
BUN/CREAT SERPL: 12.2 (ref 7.3–30)
CALCIUM SPEC-SCNC: 8.1 MG/DL (ref 8.5–10.2)
CHLORIDE SERPL-SCNC: 104 MMOL/L (ref 98–107)
CO2 SERPL-SCNC: 28.3 MMOL/L (ref 22–29)
CREAT SERPL-MCNC: 1.15 MG/DL (ref 0.7–1.3)
DEPRECATED RDW RBC AUTO: 50.6 FL (ref 37–54)
EGFRCR SERPLBLD CKD-EPI 2021: 69.8 ML/MIN/1.73
EOSINOPHIL # BLD AUTO: 0.13 10*3/MM3 (ref 0–0.4)
EOSINOPHIL NFR BLD AUTO: 1.8 % (ref 0.3–6.2)
ERYTHROCYTE [DISTWIDTH] IN BLOOD BY AUTOMATED COUNT: 14.7 % (ref 12.3–15.4)
GLOBULIN UR ELPH-MCNC: 2.3 GM/DL (ref 1.8–3.5)
GLUCOSE SERPL-MCNC: 131 MG/DL (ref 74–124)
HCT VFR BLD AUTO: 38.3 % (ref 37.5–51)
HGB BLD-MCNC: 12 G/DL (ref 13–17.7)
IMM GRANULOCYTES # BLD AUTO: 0.09 10*3/MM3 (ref 0–0.05)
IMM GRANULOCYTES NFR BLD AUTO: 1.3 % (ref 0–0.5)
LYMPHOCYTES # BLD AUTO: 0.95 10*3/MM3 (ref 0.7–3.1)
LYMPHOCYTES NFR BLD AUTO: 13.3 % (ref 19.6–45.3)
MCH RBC QN AUTO: 29.1 PG (ref 26.6–33)
MCHC RBC AUTO-ENTMCNC: 31.3 G/DL (ref 31.5–35.7)
MCV RBC AUTO: 92.7 FL (ref 79–97)
MONOCYTES # BLD AUTO: 0.48 10*3/MM3 (ref 0.1–0.9)
MONOCYTES NFR BLD AUTO: 6.7 % (ref 5–12)
NEUTROPHILS NFR BLD AUTO: 5.42 10*3/MM3 (ref 1.7–7)
NEUTROPHILS NFR BLD AUTO: 75.9 % (ref 42.7–76)
NRBC BLD AUTO-RTO: 0 /100 WBC (ref 0–0.2)
PLATELET # BLD AUTO: 226 10*3/MM3 (ref 140–450)
PMV BLD AUTO: 11.3 FL (ref 6–12)
POTASSIUM SERPL-SCNC: 4.6 MMOL/L (ref 3.5–4.7)
PROT SERPL-MCNC: 5.8 G/DL (ref 6.3–8)
RBC # BLD AUTO: 4.13 10*6/MM3 (ref 4.14–5.8)
SODIUM SERPL-SCNC: 140 MMOL/L (ref 134–145)
T4 FREE SERPL-MCNC: 1.62 NG/DL (ref 0.93–1.7)
TSH SERPL DL<=0.05 MIU/L-ACNC: 2.35 UIU/ML (ref 0.27–4.2)
WBC NRBC COR # BLD: 7.14 10*3/MM3 (ref 3.4–10.8)

## 2022-03-25 PROCEDURE — 96413 CHEMO IV INFUSION 1 HR: CPT

## 2022-03-25 PROCEDURE — 84443 ASSAY THYROID STIM HORMONE: CPT | Performed by: INTERNAL MEDICINE

## 2022-03-25 PROCEDURE — 85025 COMPLETE CBC W/AUTO DIFF WBC: CPT

## 2022-03-25 PROCEDURE — 80053 COMPREHEN METABOLIC PANEL: CPT

## 2022-03-25 PROCEDURE — 25010000002 PEMBROLIZUMAB 100 MG/4ML SOLUTION 4 ML VIAL

## 2022-03-25 PROCEDURE — 84439 ASSAY OF FREE THYROXINE: CPT | Performed by: INTERNAL MEDICINE

## 2022-03-25 PROCEDURE — 99214 OFFICE O/P EST MOD 30 MIN: CPT

## 2022-03-25 RX ORDER — SODIUM CHLORIDE 9 MG/ML
250 INJECTION, SOLUTION INTRAVENOUS ONCE
Status: CANCELLED | OUTPATIENT
Start: 2022-03-25

## 2022-03-25 RX ORDER — SODIUM CHLORIDE 9 MG/ML
250 INJECTION, SOLUTION INTRAVENOUS ONCE
Status: COMPLETED | OUTPATIENT
Start: 2022-03-25 | End: 2022-03-25

## 2022-03-25 RX ADMIN — SODIUM CHLORIDE 250 ML: 9 INJECTION, SOLUTION INTRAVENOUS at 15:01

## 2022-03-25 RX ADMIN — SODIUM CHLORIDE 200 MG: 9 INJECTION, SOLUTION INTRAVENOUS at 15:01

## 2022-04-04 ENCOUNTER — TELEPHONE (OUTPATIENT)
Dept: ONCOLOGY | Facility: CLINIC | Age: 67
End: 2022-04-04

## 2022-04-04 NOTE — TELEPHONE ENCOUNTER
D/W Dr. Parekh. Per Dr. Parekh, pt does not need CT of the head at this time. The purpose was to look at the mandible but he thought it might be too early to image this area. Informed pt and he v/u. Message sent to scheduling.     ----- Message -----  From: Nikky Odell RegSched Rep  Sent: 4/4/2022   9:02 AM EDT  To: Estephania Mahoney, Oncology Nurses  Subject: FW: CT appt                                      When I contacted pt to verify his CT he mentioned that he and Dr Parekh had chatted about doing CT Head.    He wants to know if this can be done also on his CT next Tuesday. Please advise and I can link the order?    Thank you - Cortney    ----- Message -----  From: Noa Oro  Sent: 4/4/2022   7:36 AM EDT  To: Edward Onc Nke[facility]  Subject: CT appt                                          On the scheduling instructions it says [place] on a Thursday or Friday and he is on my schedule on a Tuesday.... just letting someone know in case it needs to be moved.  Thanks

## 2022-04-04 NOTE — TELEPHONE ENCOUNTER
----- Message from Noa Oro sent at 4/4/2022  7:34 AM EDT -----  Regarding: CT appt  On the scheduling instructions it says [place] on a Thursday or Friday and he is on my schedule on a Tuesday.... just letting someone know in case it needs to be moved.  Thanks

## 2022-04-12 ENCOUNTER — HOSPITAL ENCOUNTER (OUTPATIENT)
Dept: PET IMAGING | Facility: HOSPITAL | Age: 67
Discharge: HOME | End: 2022-04-12

## 2022-04-12 ENCOUNTER — INFUSION (OUTPATIENT)
Dept: ONCOLOGY | Facility: HOSPITAL | Age: 67
End: 2022-04-12

## 2022-04-12 DIAGNOSIS — C91.10 CLL (CHRONIC LYMPHOCYTIC LEUKEMIA): ICD-10-CM

## 2022-04-12 DIAGNOSIS — C79.51 LUNG CANCER METASTATIC TO BONE: ICD-10-CM

## 2022-04-12 DIAGNOSIS — C34.90 LUNG CANCER METASTATIC TO BONE: ICD-10-CM

## 2022-04-12 DIAGNOSIS — Z45.2 FITTING AND ADJUSTMENT OF VASCULAR CATHETER: Primary | ICD-10-CM

## 2022-04-12 LAB — CREAT BLDA-MCNC: 1.2 MG/DL (ref 0.6–1.3)

## 2022-04-12 PROCEDURE — 74177 CT ABD & PELVIS W/CONTRAST: CPT

## 2022-04-12 PROCEDURE — 96523 IRRIG DRUG DELIVERY DEVICE: CPT

## 2022-04-12 PROCEDURE — 0 DIATRIZOATE MEGLUMINE & SODIUM PER 1 ML

## 2022-04-12 PROCEDURE — 71260 CT THORAX DX C+: CPT

## 2022-04-12 PROCEDURE — 82565 ASSAY OF CREATININE: CPT

## 2022-04-12 PROCEDURE — 25010000002 IOPAMIDOL 61 % SOLUTION

## 2022-04-12 RX ORDER — HEPARIN SODIUM (PORCINE) LOCK FLUSH IV SOLN 100 UNIT/ML 100 UNIT/ML
500 SOLUTION INTRAVENOUS AS NEEDED
Status: CANCELLED | OUTPATIENT
Start: 2022-04-12

## 2022-04-12 RX ORDER — SODIUM CHLORIDE 0.9 % (FLUSH) 0.9 %
10 SYRINGE (ML) INJECTION AS NEEDED
Status: DISCONTINUED | OUTPATIENT
Start: 2022-04-12 | End: 2022-04-12 | Stop reason: HOSPADM

## 2022-04-12 RX ORDER — SODIUM CHLORIDE 0.9 % (FLUSH) 0.9 %
10 SYRINGE (ML) INJECTION AS NEEDED
Status: CANCELLED | OUTPATIENT
Start: 2022-04-12

## 2022-04-12 RX ORDER — HEPARIN SODIUM (PORCINE) LOCK FLUSH IV SOLN 100 UNIT/ML 100 UNIT/ML
500 SOLUTION INTRAVENOUS AS NEEDED
Status: DISCONTINUED | OUTPATIENT
Start: 2022-04-12 | End: 2022-04-12 | Stop reason: HOSPADM

## 2022-04-12 RX ADMIN — Medication 10 ML: at 12:24

## 2022-04-12 RX ADMIN — IOPAMIDOL 85 ML: 612 INJECTION, SOLUTION INTRAVENOUS at 12:55

## 2022-04-12 RX ADMIN — DIATRIZOATE MEGLUMINE AND DIATRIZOATE SODIUM 30 ML: 660; 100 LIQUID ORAL; RECTAL at 12:10

## 2022-04-14 NOTE — PROGRESS NOTES
Subjective Patient feels he is doing well, slow recovery from osteonecrosis, worsening psoriasis however.                                                                                                                                            REASON FOR FOLLOW UP:  1.  Chronic lymphocytic leukemia with extensive lymphadenopathy and possible pleural involvement. Initiation of Treanda, Rituxan May 1, 2019.  2.  Hypogammaglobulinemia.  Status is post IVIG  3.  Significant response on scans June 27, 2019.  Treatment changed to Imbruvica 420 mg daily.  4.  Metastatic non-small cell lung carcinoma on Keytruda  5.  Patient seen 2/18/2022 with left jaw/gumline swelling pain, associated hematoma?  Osteonecrosis.  Restart of Keytruda 3/4/2022, Xgeva discontinued  6.  Restaging via CT 4/12/2022, continued response, Keytruda continued, referral to dermatology for worsening psoriasis.     HISTORY OF PRESENT ILLNESS:    The patient is a  66 y.o. male with the above-mentioned history, returns the office  in anticipation of his 19th dose of Keytruda which he continues to receive every 3 weeks.  He also receives Xgeva every 6 weeks which was given 10/14/2021.  He continues to tolerate therapy remarkably well.  He denies fevers or chills, signs or symptoms of infection.  He denies any new adenopathy.  His pain is well controlled though he has begun to have pain involving his right knee that produces  right hamstring pain.  He is without worsening shortness of breath or chest pain.  He does not feel he is in need of thoracentesis.  He is not in need of a refill of his pain medications today.  His bowels are moving without difficulty while on narcotics.  He denies B symptoms, has only mild bruising with Imbruvica.  We have the patient proceed with additional testing undergoing plain views of his right knee showing a right knee effusion with medial compartment narrowing and no suspicious bone lesion.  There is benign periosteal  "calcification along the distal right femur.  A PET/CT 11/11 went on to demonstrate a complete metabolic response to therapy compared to PET/CT from 9/23/2020.  This includes his primary lung neoplasm left upper lobe, large osseous metastatic lesion the right chest wall region from the eighth rib, small left adrenal metastasis, and no evidence of disease involving the distal left femur.      The patient is seen 11/24/2021 continue to do well though indicating that his right knee is \"something I can manage at the moment\".  He prefers not to have orthopedic assessment at this point.  He is concerned, ever, about skin lesions that are developing primarily on his calf regions and into his right thigh.    The patient was seen by Dr. Regan Damon 11/29/2021 and felt to be consistent with psoriasiform dermatitis treated with intralesional therapy.  Was also started on a moisturizer and lipid therapy.  The patient return for assessment 12/15/2021 and treated with cycle #21 Keytruda, returned 1/7/2022 for cycle #22 and 1/28/2022 for cycle #23.  At that visit he had developed adenopathy several weeks previous and was treated with a Medrol Dosepak.   The patient was next seen 2/18/2022 and indicates that though his adenopathy has improved after treatment described above that his jaw has become painful swollen and tender with additional bleeding.     The patient was referred to his primary dentist who then had him seen oral surgeon-Dr. Ezequiel Davila-reached at [phone] who felt that this was osteonecrosis and should be treated symptomatically.  As the patient reviewed 3/4/2022, wonderfully, his oral issues have nearly resolved with the gumline returning to essentially its previous state, no swelling, minimal erythema, no discharge and no additional pain.  He does have follow-up with oral surgery in the next several weeks and we discussed restarting his Keytruda scanning him likely in April 2022 in general.   He returns to the " "office on 3/25/2022 in follow-up in anticipation of cycle 24 Keytruda. He reports the left side of his jaw has improved. However, he is now having issues with the right lower side. He reports seeing oral surgery 4 days ago. At this time, imagining was obtained and sent off for evaluation. He reports he is waiting to hear back from the oral surgeon regarding these x-rays.  He also reports that the oral surgeon Dr. Ezequiel Davila, started him on amoxicillin Monday.  He states the antibiotic has helped to improve the pain in his jaw.  Denies nausea or vomiting.  Denies diarrhea or constipation.    The patient continued his immunotherapy taking Keytruda 3/25/2022 and underwent repeat CT chest abdomen and pelvis 4/12/2022 that is reviewed with him 4/15/2022.  Wonderfully there is no substantial change with a small to moderate right pleural effusion that decreased in size, no additional pulmonary nodule or new metastatic disease, multifocal osteosclerotic metastatic disease without change and no new findings in the abdomen or pelvis.    The patient is seen 4/15/2022 indicating that he is actually feeling well in terms of general symptoms.  This includes lack of progression from mouth pain, ability to eat without discomfort.  He is, however, having worsening psoriasis particular in his lower extremities and has been reviewed by dermatology previously.    Past Medical History, Past Surgical History, Social History, Family History have been reviewed and are without significant changes except as mentioned.    Objective      Vitals:    04/15/22 1130   BP: 163/81   Pulse: 76   Resp: 18   Temp: 98.2 °F (36.8 °C)   TempSrc: Temporal   SpO2: 97%   Weight: 80.6 kg (177 lb 9.6 oz)   Height: 180.3 cm (70.98\")   PainSc: 0-No pain     Current Status 4/15/2022   ECOG score 0       GENERAL:  Well-developed, well-nourished in no acute distress.   SKIN:  Warm, dry with erythematous psoriasiform lesions noted primarily around the calf regions " bilaterally, mildly pruritic, no tenderness, with a scale component, varying in size  HEAD:  Normocephalic.  EYES:  Pupils equal, round.  EOMs intact.  Conjunctivae normal.  ORAL: Findings of left lower gumline no longer swelling, resolution of hematoma  NECK: Bilateral cervical adenopathy shoddy  EARS:  Hearing intact.  LYMPHATICS: Without cervical, submandibular, axillary, supraclavicular adenopathy.    CHEST:  Lungs clear to auscultation. Good airflow.  CARDIAC:  Regular rate and rhythm without murmurs. Normal S1,S2.  ABDOMEN:  Soft, nontender with no organomegaly or masses. Bowel sounds present  EXTREMITIES:  No clubbing, cyanosis or edema.  NEUROLOGICAL: No focal neurological deficits.  PSYCHIATRIC:  Normal affect and mood.      I have reexamined the patient and the results are consistent with the previously documented exam. Jostin Parekh MD     RECENT LABS:  Results from last 7 days   Lab Units 04/15/22  1027   WBC 10*3/mm3 8.26   NEUTROS ABS 10*3/mm3 6.10   HEMOGLOBIN g/dL 12.7*   HEMATOCRIT % 41.3   PLATELETS 10*3/mm3 172     Results from last 7 days   Lab Units 04/15/22  1027 04/12/22  1246   SODIUM mmol/L 142  --    POTASSIUM mmol/L 4.8*  --    CHLORIDE mmol/L 108*  --    CO2 mmol/L 26.1  --    BUN mg/dL 19  --    CREATININE mg/dL 1.17 1.20   CALCIUM mg/dL 8.6  --    ALBUMIN g/dL 3.90  --    BILIRUBIN mg/dL 0.3  --    ALK PHOS U/L 89  --    ALT (SGPT) U/L 12  --    AST (SGOT) U/L 10  --    GLUCOSE mg/dL 106  --          FISH prognostic panel-Positive for deletion of 13 q. 14.3    3/25/2022- corrected calcium 8.5    Assessment/Plan   1.  CLL presenting with significant lymphocytosis, lymphadenopathy and splenomegaly.     · Positive for deletion of 13 q. 14.3.    · Patient status post 2 cycles of Treanda Rituxan with excellent response.    · Imaging 6/27/2019 with significant decrease in adenopathy.  Treanda Rituxan discontinued   · Imbruvica 420 mg daily initiated 7/25/2019.  · He continues on  Imbruvica, without indication of progression of his CLL, without progressive lymphadenopathy on CT scans.  · Patient has resolving adenopathy particularly cervical regions 2/18/2022  · 3/25/2022 patient is without worsening adenopathy on exam today.  Continue Imbruvica.    2.  Pulmonary nodules/infiltrates.  He is  status post bronchoscopy.  Is unclear at this time whether these findings are infectious or possibly related to his lymphoproliferative disorder.  This is seen to be improving on latest scans.    3.  Hypogammaglobulinemia-status post IVIG with resolution of sinusitis.      4.  Non-small cell lung carcinoma  · August 26 2020 revealing a new 1.6 cm spiculated nodule within the left upper lobe, 1.2 cm left adrenal nodule, bone windows with a soft tissue mass involving the right eighth rib measuring 3.7 x 2.6 cm.    · Biopsy was consistent with adenocarcinoma CK 7+, CK 20-, lung primary suspected clinically, molecular panel not yet obtained.  · PET/CT 9/23/2020 demonstrating asymmetric uptake within the right parotid gland which is unremarkable appearance on noncontrasted CT, SUV of 6 compared to 2.7.  There is no hilar, mediastinal or axillary adenopathy, findings of asymmetric moderate to intense FDG uptake within superior aspect the right chest wall anterior to the right first rib with an irregular hypodense lesion measuring 1.8 x 1.6 and SUV 4.9.  This had not been seen June 27, 2019.  There is an intensely FDG avid nodule within the lingula measuring 1.9 x 1.5 history of 12.4, FDG avid left adrenal nodule 1.9 x 1.5 with an issue 21.2.    · Evaluated by radiation oncology therapy September 29 and started on radiation therapy to the right chest wall-35 Gy in 10 fractions.   ·  Plans were also then proceed with SBRT to the solitary left upper lobe lesion pending insurance approval.  · Further testing including TPS of 20% and foundation CDX with a TMB of greater than 10 mutations per megabase (28 mutations  per megabase) and the indication that several immunotherapies could be useful in this patient's care.  Additional genomic findings include BRAF [id number]/that trametinib could be useful and STK11/that everolimus could be useful.  · Keytruda initiated 10/21/2021   · Reassessment after 2 cycles of Keytruda with enlargement of the right eighth rib lesion,enlargement of spiculated left upper lobe lung mass, moderate to large right-sided pleural effusion, new left adrenal mass and enlarged retrocrural lymph node.?Pseudoprogression  · Xgeva last given 12/30/2020  · Follow-up scans 1/6/2021 demonstrated marked improvement in his right eighth rib lesion, resolution of left upper lobe spiculated mass, residual large right pleural effusion, resolution of left adrenal metastasis.   · Right pleural effusion, with thoracentesis 1/14/2021 with 1500 mils removed.  Pathology consistent with malignant effusion   · CT scans 4/5/2021 with response noted in the left upper lobe density.  · He continues to receive Keytruda every 3 weeks along with Xgeva every 6 weeks.  · Repeat scan 7/15/2021 without evidence of recurrence or progressive disease.  Plans to continue Keytruda every 3 weeks with repeat scans in 4 to 6 months  · Patient status post PET/CT 11/11/2021 with hyper response, approximate remission status, plan to proceed with cycle #20 Keytruda  · Patient seen 2/18/2022 with Keytruda held while his oral issues are addressed-concern for osteonecrosis of the jaw.  · Patiently diagnosed with osteonecrosis of the jaw, seen by oral surgery, started on antibiotic therapy and Peridex with substantial improvement, Xgeva discontinued as discussed below.  · 3/25/2022 proceed with cycle #24 Keytruda today.  Follow-up CT scans 4/12/2022 without evidence of progressive disease, stable findings including osteosclerotic metastatic disease.  Keytruda continued    5.  Left knee metastasis  · Evaluated by orthopedic oncology, Dr. Nowak  Price  · Admission 1/7/2021 undergoing stabilization of left femoral bone lesion, prophylactic stabilization with intramedullary implants.  · It was suggested we delay Xgeva or Zometa to allow bone healing  · Completed radiation with 10 fractions 2/10/2021  · Xgeva resumed 4/29/2021, he continues to receive this every 6 weeks.     6.  Malignant right pleural effusion  · Ultrasound thoracentesis 1/14/2021 1500 mL removed  · Chest x-ray 2/25/2021 with moderate right pleural effusion  · Progressive symptoms with worsening pain 4/5/2021  · Ultrasound-guided thoracentesis 4/13/2021 with 2050 ml removed.  · Plans for Pleurx catheter with thoracic surgery, however, pleural effusion has not recurred.  Patient seen 11/4/2021 with chest x-ray planned.  · Additional assessment 2/18/2022 with left jaw pain, subsequent diagnosis of osteoporosis, Xgeva discontinued    7.  Cancer related pain  · Pain is most prominent in his right rib, utilizing MS Contin 30 mg 3 times a day  · Hydrocodone/acetaminophen 10/325 as needed for breakthrough pain.  Currently taking 3 to 4 tablets daily  · He is not currently in need of a refill of pain medications  · Discussed MS Contin at 30 mg p.o. every 8 hours, Norco 10/325 2 p.o. every 6 hours  · He is currently using pain medication as needed for jaw pain.    8.  Insomnia  · Continuing to follow with behavioral oncology  · Continuing Remeron 7.5 mg nightly with good control    9. Health maintenance  · Status post COVID-19 booster, SARS antibody pending today  · The patient is due for Shingrix and Prevnar which will both be administered in the clinic 9/23/2021    10.  Skin lesions  · Uncertain of etiology, unexpected findings for usual skin lesions associated with immunotherapy  · Request dermatologic assessment-psoriasiform dermatitis  · Patient continues to report skin lesions particularly lower extremities that are worsening.  Plan to refer back to Dr. Damon considering they are progressing and  are thought to be, in part, related to immunotherapy.    11.  Right knee pain  · Osteoarthritis, orthopedic assessment upon patient's request.   · Resolved.  Denies any knee pain today     12.  Hypocalcemia  · Currently corrected    Plan:  1. Proceed with cycle #25 Keytruda today  2. Continue Imbruvica 420 mg daily.  We will review financial availability through foundation support.  3. Continue MS Contin 30 mg 3 times a day and hydrocodone/acetaminophen 10/325-2 p.o. every 6 hours as needed for breakthrough pain   4. Continue Remeron 15 mg nightly  5. Referral to Dr. Damon for his psoriasis  6. Return in 3 weeks for NP follow-up and Keytruda    I spent 55 minutes caring for Dav on this date of service. This time includes time spent by me in the following activities: preparing for the visit, reviewing tests, obtaining and/or reviewing a separately obtained history, performing a medically appropriate examination and/or evaluation, counseling and educating the patient/family/caregiver, ordering medications, tests, or procedures, referring and communicating with other health care professionals, documenting information in the medical record, independently interpreting results and communicating that information with the patient/family/caregiver and care coordination.       Patient continues on high risk medication requiring close monitoring for toxicity    Jostin Parekh MD   4/15/2022

## 2022-04-15 ENCOUNTER — INFUSION (OUTPATIENT)
Dept: ONCOLOGY | Facility: HOSPITAL | Age: 67
End: 2022-04-15

## 2022-04-15 ENCOUNTER — OFFICE VISIT (OUTPATIENT)
Dept: ONCOLOGY | Facility: CLINIC | Age: 67
End: 2022-04-15

## 2022-04-15 ENCOUNTER — DOCUMENTATION (OUTPATIENT)
Dept: PHARMACY | Facility: HOSPITAL | Age: 67
End: 2022-04-15

## 2022-04-15 VITALS
TEMPERATURE: 98.2 F | SYSTOLIC BLOOD PRESSURE: 163 MMHG | HEIGHT: 71 IN | OXYGEN SATURATION: 97 % | HEART RATE: 76 BPM | RESPIRATION RATE: 18 BRPM | WEIGHT: 177.6 LBS | DIASTOLIC BLOOD PRESSURE: 81 MMHG | BODY MASS INDEX: 24.86 KG/M2

## 2022-04-15 DIAGNOSIS — C79.51 LUNG CANCER METASTATIC TO BONE: ICD-10-CM

## 2022-04-15 DIAGNOSIS — C91.10 CLL (CHRONIC LYMPHOCYTIC LEUKEMIA): ICD-10-CM

## 2022-04-15 DIAGNOSIS — Z79.899 HIGH RISK MEDICATION USE: Primary | ICD-10-CM

## 2022-04-15 DIAGNOSIS — L30.8 PSORIASIFORM ERUPTION: Primary | ICD-10-CM

## 2022-04-15 DIAGNOSIS — Z79.899 HIGH RISK MEDICATION USE: ICD-10-CM

## 2022-04-15 DIAGNOSIS — C34.12 MALIGNANT NEOPLASM OF UPPER LOBE OF LEFT LUNG: ICD-10-CM

## 2022-04-15 DIAGNOSIS — C34.90 LUNG CANCER METASTATIC TO BONE: ICD-10-CM

## 2022-04-15 LAB
ALBUMIN SERPL-MCNC: 3.9 G/DL (ref 3.5–5.2)
ALBUMIN/GLOB SERPL: 2 G/DL (ref 1.1–2.4)
ALP SERPL-CCNC: 89 U/L (ref 38–116)
ALT SERPL W P-5'-P-CCNC: 12 U/L (ref 0–41)
ANION GAP SERPL CALCULATED.3IONS-SCNC: 7.9 MMOL/L (ref 5–15)
AST SERPL-CCNC: 10 U/L (ref 0–40)
BASOPHILS # BLD AUTO: 0.06 10*3/MM3 (ref 0–0.2)
BASOPHILS NFR BLD AUTO: 0.7 % (ref 0–1.5)
BILIRUB SERPL-MCNC: 0.3 MG/DL (ref 0.2–1.2)
BUN SERPL-MCNC: 19 MG/DL (ref 6–20)
BUN/CREAT SERPL: 16.2 (ref 7.3–30)
CALCIUM SPEC-SCNC: 8.6 MG/DL (ref 8.5–10.2)
CHLORIDE SERPL-SCNC: 108 MMOL/L (ref 98–107)
CO2 SERPL-SCNC: 26.1 MMOL/L (ref 22–29)
CREAT SERPL-MCNC: 1.17 MG/DL (ref 0.7–1.3)
DEPRECATED RDW RBC AUTO: 54.4 FL (ref 37–54)
EGFRCR SERPLBLD CKD-EPI 2021: 68.3 ML/MIN/1.73
EOSINOPHIL # BLD AUTO: 0.24 10*3/MM3 (ref 0–0.4)
EOSINOPHIL NFR BLD AUTO: 2.9 % (ref 0.3–6.2)
ERYTHROCYTE [DISTWIDTH] IN BLOOD BY AUTOMATED COUNT: 15.5 % (ref 12.3–15.4)
GLOBULIN UR ELPH-MCNC: 2 GM/DL (ref 1.8–3.5)
GLUCOSE SERPL-MCNC: 106 MG/DL (ref 74–124)
HCT VFR BLD AUTO: 41.3 % (ref 37.5–51)
HGB BLD-MCNC: 12.7 G/DL (ref 13–17.7)
IMM GRANULOCYTES # BLD AUTO: 0.03 10*3/MM3 (ref 0–0.05)
IMM GRANULOCYTES NFR BLD AUTO: 0.4 % (ref 0–0.5)
LYMPHOCYTES # BLD AUTO: 0.97 10*3/MM3 (ref 0.7–3.1)
LYMPHOCYTES NFR BLD AUTO: 11.7 % (ref 19.6–45.3)
MCH RBC QN AUTO: 29.4 PG (ref 26.6–33)
MCHC RBC AUTO-ENTMCNC: 30.8 G/DL (ref 31.5–35.7)
MCV RBC AUTO: 95.6 FL (ref 79–97)
MONOCYTES # BLD AUTO: 0.86 10*3/MM3 (ref 0.1–0.9)
MONOCYTES NFR BLD AUTO: 10.4 % (ref 5–12)
NEUTROPHILS NFR BLD AUTO: 6.1 10*3/MM3 (ref 1.7–7)
NEUTROPHILS NFR BLD AUTO: 73.9 % (ref 42.7–76)
NRBC BLD AUTO-RTO: 0 /100 WBC (ref 0–0.2)
PLATELET # BLD AUTO: 172 10*3/MM3 (ref 140–450)
PMV BLD AUTO: 12.8 FL (ref 6–12)
POTASSIUM SERPL-SCNC: 4.8 MMOL/L (ref 3.5–4.7)
PROT SERPL-MCNC: 5.9 G/DL (ref 6.3–8)
RBC # BLD AUTO: 4.32 10*6/MM3 (ref 4.14–5.8)
SODIUM SERPL-SCNC: 142 MMOL/L (ref 134–145)
T4 FREE SERPL-MCNC: 1.3 NG/DL (ref 0.93–1.7)
TSH SERPL DL<=0.05 MIU/L-ACNC: 4.73 UIU/ML (ref 0.27–4.2)
WBC NRBC COR # BLD: 8.26 10*3/MM3 (ref 3.4–10.8)

## 2022-04-15 PROCEDURE — 25010000002 PEMBROLIZUMAB 100 MG/4ML SOLUTION 4 ML VIAL: Performed by: INTERNAL MEDICINE

## 2022-04-15 PROCEDURE — 80053 COMPREHEN METABOLIC PANEL: CPT

## 2022-04-15 PROCEDURE — 85025 COMPLETE CBC W/AUTO DIFF WBC: CPT

## 2022-04-15 PROCEDURE — 96413 CHEMO IV INFUSION 1 HR: CPT

## 2022-04-15 PROCEDURE — 84439 ASSAY OF FREE THYROXINE: CPT | Performed by: INTERNAL MEDICINE

## 2022-04-15 PROCEDURE — 99215 OFFICE O/P EST HI 40 MIN: CPT | Performed by: INTERNAL MEDICINE

## 2022-04-15 PROCEDURE — 84443 ASSAY THYROID STIM HORMONE: CPT | Performed by: INTERNAL MEDICINE

## 2022-04-15 RX ORDER — SODIUM CHLORIDE 9 MG/ML
250 INJECTION, SOLUTION INTRAVENOUS ONCE
Status: COMPLETED | OUTPATIENT
Start: 2022-04-15 | End: 2022-04-15

## 2022-04-15 RX ORDER — SODIUM CHLORIDE 9 MG/ML
250 INJECTION, SOLUTION INTRAVENOUS ONCE
Status: CANCELLED | OUTPATIENT
Start: 2022-04-15

## 2022-04-15 RX ADMIN — SODIUM CHLORIDE 250 ML: 9 INJECTION, SOLUTION INTRAVENOUS at 12:20

## 2022-04-15 RX ADMIN — SODIUM CHLORIDE 200 MG: 9 INJECTION, SOLUTION INTRAVENOUS at 12:21

## 2022-04-15 NOTE — PROGRESS NOTES
"I was alerted that pt wants to be told about status of his tobias. I looked into it and found out that his balance is roughly $234.  I called his insurance to find out more information about his co-pays, deductibles, etc.  I found out that the Imbruvica is tier 5, which means he pays 5% of total drug once he reaches the catastrophic phase of his Medicare D plan. At this point in time, he has reached that phase.  His co-pay is ~ $790.  I called [facility] to see how to proceed.  They suggested to have the pharmacy submit a \"partial claim\" to exhaust the funds of the tobias. He will then be eligible to renew the tobias since he has not had a second tobias this year.    Dana suggested I call [facility] at next fill to submit a claim for enough pills to exhaust the claim (ie. A claim for $234) to be mailed to the pt. I will then renew the tobias.  Then I will call them when that partial fill is done to ship a full script to pt with the full script being billed to the new tobias information.   "

## 2022-04-22 DIAGNOSIS — C79.51 LUNG CANCER METASTATIC TO BONE: ICD-10-CM

## 2022-04-22 DIAGNOSIS — C34.90 LUNG CANCER METASTATIC TO BONE: ICD-10-CM

## 2022-04-22 RX ORDER — MORPHINE SULFATE 30 MG/1
30 TABLET, FILM COATED, EXTENDED RELEASE ORAL 2 TIMES DAILY
Qty: 60 TABLET | Refills: 0 | Status: SHIPPED | OUTPATIENT
Start: 2022-04-22 | End: 2022-07-25 | Stop reason: SDUPTHER

## 2022-05-03 ENCOUNTER — DOCUMENTATION (OUTPATIENT)
Dept: PHARMACY | Facility: HOSPITAL | Age: 67
End: 2022-05-03

## 2022-05-03 NOTE — PROGRESS NOTES
I called [facility] to ask them to partially bill the Imbruvica. The pharmacist told me they couldn't bc the package couldn't be broken.  This gave the pt a remaining co-pay of $556.45. I looked on [employer] online to see if I could renew the tobias. I was able to obtain a second tobias since the first tobias ran out. The 2nd tobias is for $9300 and will end on 8/27/22.  It has the same billing information. I scanned the letter onto his profile.

## 2022-05-05 ENCOUNTER — DOCUMENTATION (OUTPATIENT)
Dept: PHARMACY | Facility: HOSPITAL | Age: 67
End: 2022-05-05

## 2022-05-05 NOTE — PROGRESS NOTES
I called [facility] to check on refill status for Imbruvica. It was refilled at $0 co-pay. It will be delivered to pt on 5/10/22

## 2022-05-06 ENCOUNTER — APPOINTMENT (OUTPATIENT)
Dept: ONCOLOGY | Facility: HOSPITAL | Age: 67
End: 2022-05-06

## 2022-05-06 ENCOUNTER — SPECIALTY PHARMACY (OUTPATIENT)
Dept: ONCOLOGY | Facility: HOSPITAL | Age: 67
End: 2022-05-06

## 2022-05-06 ENCOUNTER — INFUSION (OUTPATIENT)
Dept: ONCOLOGY | Facility: HOSPITAL | Age: 67
End: 2022-05-06

## 2022-05-06 ENCOUNTER — OFFICE VISIT (OUTPATIENT)
Dept: ONCOLOGY | Facility: CLINIC | Age: 67
End: 2022-05-06

## 2022-05-06 VITALS
BODY MASS INDEX: 24.29 KG/M2 | HEART RATE: 81 BPM | TEMPERATURE: 98.7 F | HEIGHT: 71 IN | DIASTOLIC BLOOD PRESSURE: 78 MMHG | SYSTOLIC BLOOD PRESSURE: 140 MMHG | RESPIRATION RATE: 18 BRPM | WEIGHT: 173.5 LBS | OXYGEN SATURATION: 97 %

## 2022-05-06 DIAGNOSIS — C79.51 METASTASIS TO BONE: ICD-10-CM

## 2022-05-06 DIAGNOSIS — C79.51 LUNG CANCER METASTATIC TO BONE: ICD-10-CM

## 2022-05-06 DIAGNOSIS — Z79.899 HIGH RISK MEDICATION USE: ICD-10-CM

## 2022-05-06 DIAGNOSIS — C91.10 CLL (CHRONIC LYMPHOCYTIC LEUKEMIA): Primary | ICD-10-CM

## 2022-05-06 DIAGNOSIS — C91.10 CLL (CHRONIC LYMPHOCYTIC LEUKEMIA): ICD-10-CM

## 2022-05-06 DIAGNOSIS — C34.90 LUNG CANCER METASTATIC TO BONE: ICD-10-CM

## 2022-05-06 DIAGNOSIS — C34.12 MALIGNANT NEOPLASM OF UPPER LOBE OF LEFT LUNG: ICD-10-CM

## 2022-05-06 DIAGNOSIS — M87.180 OSTEONECROSIS DUE TO DRUGS, JAW: ICD-10-CM

## 2022-05-06 DIAGNOSIS — Z79.899 HIGH RISK MEDICATION USE: Primary | ICD-10-CM

## 2022-05-06 DIAGNOSIS — Z45.2 FITTING AND ADJUSTMENT OF VASCULAR CATHETER: ICD-10-CM

## 2022-05-06 LAB
ALBUMIN SERPL-MCNC: 3.7 G/DL (ref 3.5–5.2)
ALBUMIN/GLOB SERPL: 1.9 G/DL (ref 1.1–2.4)
ALP SERPL-CCNC: 98 U/L (ref 38–116)
ALT SERPL W P-5'-P-CCNC: 14 U/L (ref 0–41)
ANION GAP SERPL CALCULATED.3IONS-SCNC: 10.1 MMOL/L (ref 5–15)
AST SERPL-CCNC: 9 U/L (ref 0–40)
BASOPHILS # BLD AUTO: 0.1 10*3/MM3 (ref 0–0.2)
BASOPHILS NFR BLD AUTO: 0.9 % (ref 0–1.5)
BILIRUB SERPL-MCNC: 0.3 MG/DL (ref 0.2–1.2)
BUN SERPL-MCNC: 15 MG/DL (ref 6–20)
BUN/CREAT SERPL: 13.3 (ref 7.3–30)
CALCIUM SPEC-SCNC: 8.2 MG/DL (ref 8.5–10.2)
CHLORIDE SERPL-SCNC: 106 MMOL/L (ref 98–107)
CO2 SERPL-SCNC: 24.9 MMOL/L (ref 22–29)
CREAT SERPL-MCNC: 1.13 MG/DL (ref 0.7–1.3)
DEPRECATED RDW RBC AUTO: 52.8 FL (ref 37–54)
EGFRCR SERPLBLD CKD-EPI 2021: 71.2 ML/MIN/1.73
EOSINOPHIL # BLD AUTO: 0.2 10*3/MM3 (ref 0–0.4)
EOSINOPHIL NFR BLD AUTO: 1.8 % (ref 0.3–6.2)
ERYTHROCYTE [DISTWIDTH] IN BLOOD BY AUTOMATED COUNT: 15.4 % (ref 12.3–15.4)
GLOBULIN UR ELPH-MCNC: 2 GM/DL (ref 1.8–3.5)
GLUCOSE SERPL-MCNC: 104 MG/DL (ref 74–124)
HCT VFR BLD AUTO: 39.4 % (ref 37.5–51)
HGB BLD-MCNC: 12.3 G/DL (ref 13–17.7)
IMM GRANULOCYTES # BLD AUTO: 0.04 10*3/MM3 (ref 0–0.05)
IMM GRANULOCYTES NFR BLD AUTO: 0.4 % (ref 0–0.5)
LYMPHOCYTES # BLD AUTO: 0.79 10*3/MM3 (ref 0.7–3.1)
LYMPHOCYTES NFR BLD AUTO: 6.9 % (ref 19.6–45.3)
MCH RBC QN AUTO: 29.4 PG (ref 26.6–33)
MCHC RBC AUTO-ENTMCNC: 31.2 G/DL (ref 31.5–35.7)
MCV RBC AUTO: 94 FL (ref 79–97)
MONOCYTES # BLD AUTO: 0.83 10*3/MM3 (ref 0.1–0.9)
MONOCYTES NFR BLD AUTO: 7.3 % (ref 5–12)
NEUTROPHILS NFR BLD AUTO: 82.7 % (ref 42.7–76)
NEUTROPHILS NFR BLD AUTO: 9.42 10*3/MM3 (ref 1.7–7)
NRBC BLD AUTO-RTO: 0 /100 WBC (ref 0–0.2)
PLATELET # BLD AUTO: 259 10*3/MM3 (ref 140–450)
PMV BLD AUTO: 11.5 FL (ref 6–12)
POTASSIUM SERPL-SCNC: 4.3 MMOL/L (ref 3.5–4.7)
PROT SERPL-MCNC: 5.7 G/DL (ref 6.3–8)
RBC # BLD AUTO: 4.19 10*6/MM3 (ref 4.14–5.8)
SODIUM SERPL-SCNC: 141 MMOL/L (ref 134–145)
T4 FREE SERPL-MCNC: 1.45 NG/DL (ref 0.93–1.7)
TSH SERPL DL<=0.05 MIU/L-ACNC: 2.67 UIU/ML (ref 0.27–4.2)
WBC NRBC COR # BLD: 11.38 10*3/MM3 (ref 3.4–10.8)

## 2022-05-06 PROCEDURE — 80053 COMPREHEN METABOLIC PANEL: CPT

## 2022-05-06 PROCEDURE — 25010000002 HEPARIN LOCK FLUSH PER 10 UNITS: Performed by: INTERNAL MEDICINE

## 2022-05-06 PROCEDURE — 85025 COMPLETE CBC W/AUTO DIFF WBC: CPT

## 2022-05-06 PROCEDURE — 84439 ASSAY OF FREE THYROXINE: CPT | Performed by: INTERNAL MEDICINE

## 2022-05-06 PROCEDURE — 25010000002 PEMBROLIZUMAB 100 MG/4ML SOLUTION 4 ML VIAL: Performed by: NURSE PRACTITIONER

## 2022-05-06 PROCEDURE — 96413 CHEMO IV INFUSION 1 HR: CPT

## 2022-05-06 PROCEDURE — 84443 ASSAY THYROID STIM HORMONE: CPT | Performed by: INTERNAL MEDICINE

## 2022-05-06 PROCEDURE — 99214 OFFICE O/P EST MOD 30 MIN: CPT | Performed by: NURSE PRACTITIONER

## 2022-05-06 RX ORDER — SODIUM CHLORIDE 0.9 % (FLUSH) 0.9 %
10 SYRINGE (ML) INJECTION AS NEEDED
Status: DISCONTINUED | OUTPATIENT
Start: 2022-05-06 | End: 2022-05-06 | Stop reason: HOSPADM

## 2022-05-06 RX ORDER — SODIUM CHLORIDE 0.9 % (FLUSH) 0.9 %
10 SYRINGE (ML) INJECTION AS NEEDED
Status: CANCELLED | OUTPATIENT
Start: 2022-05-06

## 2022-05-06 RX ORDER — HEPARIN SODIUM (PORCINE) LOCK FLUSH IV SOLN 100 UNIT/ML 100 UNIT/ML
500 SOLUTION INTRAVENOUS AS NEEDED
Status: CANCELLED | OUTPATIENT
Start: 2022-05-06

## 2022-05-06 RX ORDER — SODIUM CHLORIDE 9 MG/ML
250 INJECTION, SOLUTION INTRAVENOUS ONCE
Status: CANCELLED | OUTPATIENT
Start: 2022-05-06

## 2022-05-06 RX ORDER — SODIUM CHLORIDE 9 MG/ML
250 INJECTION, SOLUTION INTRAVENOUS ONCE
Status: COMPLETED | OUTPATIENT
Start: 2022-05-06 | End: 2022-05-06

## 2022-05-06 RX ORDER — HEPARIN SODIUM (PORCINE) LOCK FLUSH IV SOLN 100 UNIT/ML 100 UNIT/ML
500 SOLUTION INTRAVENOUS AS NEEDED
Status: DISCONTINUED | OUTPATIENT
Start: 2022-05-06 | End: 2022-05-06 | Stop reason: HOSPADM

## 2022-05-06 RX ORDER — MELOXICAM 15 MG/1
15 TABLET ORAL DAILY
Qty: 30 TABLET | Refills: 1 | Status: SHIPPED | OUTPATIENT
Start: 2022-05-06 | End: 2022-07-05

## 2022-05-06 RX ADMIN — Medication 500 UNITS: at 11:32

## 2022-05-06 RX ADMIN — Medication 10 ML: at 11:32

## 2022-05-06 RX ADMIN — SODIUM CHLORIDE 250 ML: 9 INJECTION, SOLUTION INTRAVENOUS at 11:03

## 2022-05-06 RX ADMIN — SODIUM CHLORIDE 200 MG: 9 INJECTION, SOLUTION INTRAVENOUS at 11:03

## 2022-05-06 NOTE — PROGRESS NOTES
Subjective Patient feels he is doing well, slow recovery from osteonecrosis, worsening psoriasis however.                                                                                                                                            REASON FOR FOLLOW UP:  1.  Chronic lymphocytic leukemia with extensive lymphadenopathy and possible pleural involvement. Initiation of Treanda, Rituxan May 1, 2019.  2.  Hypogammaglobulinemia.  Status is post IVIG  3.  Significant response on scans June 27, 2019.  Treatment changed to Imbruvica 420 mg daily.  4.  Metastatic non-small cell lung carcinoma on Keytruda  5.  Patient seen 2/18/2022 with left jaw/gumline swelling pain, associated hematoma?  Osteonecrosis.  Restart of Keytruda 3/4/2022, Xgeva discontinued  6.  Restaging via CT 4/12/2022, continued response, Keytruda continued, referral to dermatology for worsening psoriasis.     HISTORY OF PRESENT ILLNESS:    The patient is a  66 y.o. male with the above mentioned history here today for lab review and evaluation due for cycle 26 Keytruda.  He also continues on Imbruvica 420 mg daily.  Mr. Freitas has been struggling with issues from Osteonecrosis of the jaw.  He has been on three rounds of amoxicillin, finishing the last one this past weekend.  He complains of pain in his jaw.  He takes ibuprofen 600 mg 2-3 times per day.  The helps his jaw pain more than his hydrocodone.  It is also causing difficulty with eating at times. He is trying to eat softer food.    He denies issues with increased shortness of breath.  No diarrhea problems.  His only skin issue continues to be his ongoing problems with psoriasis.        Hematologic/oncologic history:  with the above-mentioned history, returns the office  in anticipation of his 19th dose of Keytruda which he continues to receive every 3 weeks.  He also receives Xgeva every 6 weeks which was given 10/14/2021.  He continues to tolerate therapy remarkably well.  He denies fevers  "or chills, signs or symptoms of infection.  He denies any new adenopathy.  His pain is well controlled though he has begun to have pain involving his right knee that produces  right hamstring pain.  He is without worsening shortness of breath or chest pain.  He does not feel he is in need of thoracentesis.  He is not in need of a refill of his pain medications today.  His bowels are moving without difficulty while on narcotics.  He denies B symptoms, has only mild bruising with Imbruvica.  We have the patient proceed with additional testing undergoing plain views of his right knee showing a right knee effusion with medial compartment narrowing and no suspicious bone lesion.  There is benign periosteal calcification along the distal right femur.  A PET/CT 11/11 went on to demonstrate a complete metabolic response to therapy compared to PET/CT from 9/23/2020.  This includes his primary lung neoplasm left upper lobe, large osseous metastatic lesion the right chest wall region from the eighth rib, small left adrenal metastasis, and no evidence of disease involving the distal left femur.      The patient is seen 11/24/2021 continue to do well though indicating that his right knee is \"something I can manage at the moment\".  He prefers not to have orthopedic assessment at this point.  He is concerned, ever, about skin lesions that are developing primarily on his calf regions and into his right thigh.    The patient was seen by Dr. Regan Damon 11/29/2021 and felt to be consistent with psoriasiform dermatitis treated with intralesional therapy.  Was also started on a moisturizer and lipid therapy.  The patient return for assessment 12/15/2021 and treated with cycle #21 Keytruda, returned 1/7/2022 for cycle #22 and 1/28/2022 for cycle #23.  At that visit he had developed adenopathy several weeks previous and was treated with a Medrol Dosepak.   The patient was next seen 2/18/2022 and indicates that though his adenopathy has " improved after treatment described above that his jaw has become painful swollen and tender with additional bleeding.     The patient was referred to his primary dentist who then had him seen oral surgeon-Dr. Ezequiel Davila-reached at [phone] who felt that this was osteonecrosis and should be treated symptomatically.  As the patient reviewed 3/4/2022, wonderfully, his oral issues have nearly resolved with the gumline returning to essentially its previous state, no swelling, minimal erythema, no discharge and no additional pain.  He does have follow-up with oral surgery in the next several weeks and we discussed restarting his Keytruda scanning him likely in April 2022 in general.   He returns to the office on 3/25/2022 in follow-up in anticipation of cycle 24 Keytruda. He reports the left side of his jaw has improved. However, he is now having issues with the right lower side. He reports seeing oral surgery 4 days ago. At this time, imagining was obtained and sent off for evaluation. He reports he is waiting to hear back from the oral surgeon regarding these x-rays.  He also reports that the oral surgeon Dr. Ezequiel Davila, started him on amoxicillin Monday.  He states the antibiotic has helped to improve the pain in his jaw.  Denies nausea or vomiting.  Denies diarrhea or constipation.    The patient continued his immunotherapy taking Keytruda 3/25/2022 and underwent repeat CT chest abdomen and pelvis 4/12/2022 that is reviewed with him 4/15/2022.  Wonderfully there is no substantial change with a small to moderate right pleural effusion that decreased in size, no additional pulmonary nodule or new metastatic disease, multifocal osteosclerotic metastatic disease without change and no new findings in the abdomen or pelvis.    The patient is seen 4/15/2022 indicating that he is actually feeling well in terms of general symptoms.  This includes lack of progression from mouth pain, ability to eat without discomfort.  He  "is, however, having worsening psoriasis particular in his lower extremities and has been reviewed by dermatology previously.    Past Medical History, Past Surgical History, Social History, Family History have been reviewed and are without significant changes except as mentioned.    Objective      Vitals:    05/06/22 0957   BP: 140/78   Pulse: 81   Resp: 18   Temp: 98.7 °F (37.1 °C)   TempSrc: Temporal   SpO2: 97%   Weight: 78.7 kg (173 lb 8 oz)   Height: 180.3 cm (70.98\")   PainSc:   2     Current Status 4/15/2022   ECOG score 0       Physical Exam  Vitals reviewed.   Constitutional:       General: He is not in acute distress.     Appearance: Normal appearance. He is well-developed.   HENT:      Head: Normocephalic and atraumatic.      Mouth/Throat:      Pharynx: No oropharyngeal exudate.      Comments: Area of exposed bone on the left lower gingiva  Eyes:      Pupils: Pupils are equal, round, and reactive to light.   Cardiovascular:      Rate and Rhythm: Normal rate and regular rhythm.      Heart sounds: Normal heart sounds. No murmur heard.  Pulmonary:      Effort: Pulmonary effort is normal. No respiratory distress.      Breath sounds: Normal breath sounds. No wheezing, rhonchi or rales.   Abdominal:      General: Bowel sounds are normal. There is no distension.      Palpations: Abdomen is soft.   Musculoskeletal:         General: Normal range of motion.      Cervical back: Normal range of motion.   Skin:     General: Skin is warm and dry.      Findings: No rash.   Neurological:      Mental Status: He is alert and oriented to person, place, and time.       RECENT LABS:  Results from last 7 days   Lab Units 05/06/22  0922   WBC 10*3/mm3 11.38*   NEUTROS ABS 10*3/mm3 9.42*   HEMOGLOBIN g/dL 12.3*   HEMATOCRIT % 39.4   PLATELETS 10*3/mm3 259     Results from last 7 days   Lab Units 05/06/22  0922   SODIUM mmol/L 141   POTASSIUM mmol/L 4.3   CHLORIDE mmol/L 106   CO2 mmol/L 24.9   BUN mg/dL 15   CREATININE mg/dL 1.13 "   CALCIUM mg/dL 8.2*   ALBUMIN g/dL 3.70   BILIRUBIN mg/dL 0.3   ALK PHOS U/L 98   ALT (SGPT) U/L 14   AST (SGOT) U/L 9   GLUCOSE mg/dL 104         FISH prognostic panel-Positive for deletion of 13 q. 14.3    3/25/2022- corrected calcium 8.5    Assessment/Plan   1.  CLL presenting with significant lymphocytosis, lymphadenopathy and splenomegaly.     · Positive for deletion of 13 q. 14.3.    · Patient status post 2 cycles of Treanda Rituxan with excellent response.    · Imaging 6/27/2019 with significant decrease in adenopathy.  Treanda Rituxan discontinued   · Imbruvica 420 mg daily initiated 7/25/2019.  · He continues on Imbruvica, without indication of progression of his CLL, without progressive lymphadenopathy on CT scans.  · Patient has resolving adenopathy particularly cervical regions 2/18/2022  · 3/25/2022 patient is without worsening adenopathy on exam today.  Continue Imbruvica.  · 5/6/2022 WBC slightly elevated today although it may be related to recent issues with osteonecrosis of the jaw.  Otherwise continues on Imbruvica tolerating well.    2.  Pulmonary nodules/infiltrates.  He is  status post bronchoscopy.  Is unclear at this time whether these findings are infectious or possibly related to his lymphoproliferative disorder.  This is seen to be improving on latest scans.    3.  Hypogammaglobulinemia-status post IVIG with resolution of sinusitis.      4.  Non-small cell lung carcinoma  · August 26 2020 revealing a new 1.6 cm spiculated nodule within the left upper lobe, 1.2 cm left adrenal nodule, bone windows with a soft tissue mass involving the right eighth rib measuring 3.7 x 2.6 cm.    · Biopsy was consistent with adenocarcinoma CK 7+, CK 20-, lung primary suspected clinically, molecular panel not yet obtained.  · PET/CT 9/23/2020 demonstrating asymmetric uptake within the right parotid gland which is unremarkable appearance on noncontrasted CT, SUV of 6 compared to 2.7.  There is no hilar,  mediastinal or axillary adenopathy, findings of asymmetric moderate to intense FDG uptake within superior aspect the right chest wall anterior to the right first rib with an irregular hypodense lesion measuring 1.8 x 1.6 and SUV 4.9.  This had not been seen June 27, 2019.  There is an intensely FDG avid nodule within the lingula measuring 1.9 x 1.5 history of 12.4, FDG avid left adrenal nodule 1.9 x 1.5 with an issue 21.2.    · Evaluated by radiation oncology therapy September 29 and started on radiation therapy to the right chest wall-35 Gy in 10 fractions.   ·  Plans were also then proceed with SBRT to the solitary left upper lobe lesion pending insurance approval.  · Further testing including TPS of 20% and foundation CDX with a TMB of greater than 10 mutations per megabase (28 mutations per megabase) and the indication that several immunotherapies could be useful in this patient's care.  Additional genomic findings include BRAF G469R/that trametinib could be useful and STK11/that everolimus could be useful.  · Keytruda initiated 10/21/2021   · Reassessment after 2 cycles of Keytruda with enlargement of the right eighth rib lesion,enlargement of spiculated left upper lobe lung mass, moderate to large right-sided pleural effusion, new left adrenal mass and enlarged retrocrural lymph node.?Pseudoprogression  · Xgeva last given 12/30/2020  · Follow-up scans 1/6/2021 demonstrated marked improvement in his right eighth rib lesion, resolution of left upper lobe spiculated mass, residual large right pleural effusion, resolution of left adrenal metastasis.   · Right pleural effusion, with thoracentesis 1/14/2021 with 1500 mils removed.  Pathology consistent with malignant effusion   · CT scans 4/5/2021 with response noted in the left upper lobe density.  · He continues to receive Keytruda every 3 weeks along with Xgeva every 6 weeks.  · Repeat scan 7/15/2021 without evidence of recurrence or progressive disease.  Plans  to continue Keytruda every 3 weeks with repeat scans in 4 to 6 months  · Patient status post PET/CT 11/11/2021 with hyper response, approximate remission status, plan to proceed with cycle #20 Keytruda  · Patient seen 2/18/2022 with Keytruda held while his oral issues are addressed-concern for osteonecrosis of the jaw.  · Patiently diagnosed with osteonecrosis of the jaw, seen by oral surgery, started on antibiotic therapy and Peridex with substantial improvement, Xgeva discontinued as discussed below.  · 3/25/2022 proceed with cycle #24 Keytruda today.  Follow-up CT scans 4/12/2022 without evidence of progressive disease, stable findings including osteosclerotic metastatic disease.  Keytruda continued  · 6/20/2022 due for cycle 26 Keytruda overall tolerating well.    5.  Left knee metastasis  · Evaluated by orthopedic oncology, Dr. Eliu Ochoa  · Admission 1/7/2021 undergoing stabilization of left femoral bone lesion, prophylactic stabilization with intramedullary implants.  · It was suggested we delay Xgeva or Zometa to allow bone healing  · Completed radiation with 10 fractions 2/10/2021  · Xgeva resumed 4/29/2021, he continues to receive this every 6 weeks.     6.  Malignant right pleural effusion  · Ultrasound thoracentesis 1/14/2021 1500 mL removed  · Chest x-ray 2/25/2021 with moderate right pleural effusion  · Progressive symptoms with worsening pain 4/5/2021  · Ultrasound-guided thoracentesis 4/13/2021 with 2050 ml removed.  · Plans for Pleurx catheter with thoracic surgery, however, pleural effusion has not recurred.  Patient seen 11/4/2021 with chest x-ray planned.  · Additional assessment 2/18/2022 with left jaw pain, subsequent diagnosis of osteoporosis, Xgeva discontinued    7.  Cancer related pain  · Pain is most prominent in his right rib, utilizing MS Contin 30 mg 3 times a day  · Hydrocodone/acetaminophen 10/325 as needed for breakthrough pain.  Currently taking 3 to 4 tablets daily  · He is not  currently in need of a refill of pain medications  · Discussed MS Contin at 30 mg p.o. every 8 hours, Norco 10/325 2 p.o. every 6 hours  · He is currently using pain medication as needed for jaw pain.    8.  Insomnia  · Continuing to follow with behavioral oncology  · Continuing Remeron 7.5 mg nightly with good control    9. Health maintenance  · Status post COVID-19 booster, SARS antibody pending today  · The patient is due for Shingrix and Prevnar which will both be administered in the clinic 9/23/2021    10.  Skin lesions  · Uncertain of etiology, unexpected findings for usual skin lesions associated with immunotherapy  · Request dermatologic assessment-psoriasiform dermatitis  · Patient continues to report skin lesions particularly lower extremities that are worsening.  Plan to refer back to Dr. Damon considering they are progressing and are thought to be, in part, related to immunotherapy.    11.  Right knee pain  · Osteoarthritis, orthopedic assessment upon patient's request.   · Resolved.  Denies any knee pain today     12.  Hypocalcemia  · Currently corrected      13.  Osteonecrosis of the jaw: Currently being followed by oral surgeon Dr. Ar Davila.  Discussed with Dr. Parekh, and we recommend the patient start Mobic 15 mg daily for pain (as opposed to ibuprofen)    Plan:  1. Proceed with cycle 26 Keytruda.   2. Continue Imbruvica 420 mg daily.  3. Start meloxicam 15 mg daily.  4. Continue MS Contin 30 mg 3 times a day and hydrocodone/acetaminophen 10/325-2 p.o. every 6 hours as needed for breakthrough pain   5. Continue Remeron 15 mg nightly  6. Return in 3 weeks with repeat labs due for his next dose of Keytruda.  7. Return in 6 weeks for follow-up with Dr. Parekh with repeat labs and Keytruda.    8. Continue to follow with dermatology for psoriasis.  9. Continue to follow with oral surgeon Dr. Ar Davila regarding issues with osteonecrosis of the jaw.    10. Call/return sooner should he develop any new  concerns or problems.      Patient continues on high risk medication requiring close monitoring for toxicity    Naomi Martinez, APRN   5/6/2022

## 2022-05-27 ENCOUNTER — INFUSION (OUTPATIENT)
Dept: ONCOLOGY | Facility: HOSPITAL | Age: 67
End: 2022-05-27

## 2022-05-27 VITALS
SYSTOLIC BLOOD PRESSURE: 165 MMHG | DIASTOLIC BLOOD PRESSURE: 80 MMHG | HEART RATE: 93 BPM | RESPIRATION RATE: 16 BRPM | TEMPERATURE: 98.3 F | BODY MASS INDEX: 24.81 KG/M2 | WEIGHT: 177.8 LBS | OXYGEN SATURATION: 96 %

## 2022-05-27 DIAGNOSIS — Z79.899 HIGH RISK MEDICATION USE: Primary | ICD-10-CM

## 2022-05-27 DIAGNOSIS — C34.90 LUNG CANCER METASTATIC TO BONE: ICD-10-CM

## 2022-05-27 DIAGNOSIS — C91.10 CLL (CHRONIC LYMPHOCYTIC LEUKEMIA): ICD-10-CM

## 2022-05-27 DIAGNOSIS — C79.51 LUNG CANCER METASTATIC TO BONE: ICD-10-CM

## 2022-05-27 DIAGNOSIS — C34.12 MALIGNANT NEOPLASM OF UPPER LOBE OF LEFT LUNG: ICD-10-CM

## 2022-05-27 LAB
ALBUMIN SERPL-MCNC: 3.8 G/DL (ref 3.5–5.2)
ALBUMIN/GLOB SERPL: 1.9 G/DL (ref 1.1–2.4)
ALP SERPL-CCNC: 95 U/L (ref 38–116)
ALT SERPL W P-5'-P-CCNC: 13 U/L (ref 0–41)
ANION GAP SERPL CALCULATED.3IONS-SCNC: 8.1 MMOL/L (ref 5–15)
AST SERPL-CCNC: 11 U/L (ref 0–40)
BASOPHILS # BLD AUTO: 0.06 10*3/MM3 (ref 0–0.2)
BASOPHILS NFR BLD AUTO: 0.7 % (ref 0–1.5)
BILIRUB SERPL-MCNC: 0.3 MG/DL (ref 0.2–1.2)
BUN SERPL-MCNC: 15 MG/DL (ref 6–20)
BUN/CREAT SERPL: 12.5 (ref 7.3–30)
CALCIUM SPEC-SCNC: 8.7 MG/DL (ref 8.5–10.2)
CHLORIDE SERPL-SCNC: 105 MMOL/L (ref 98–107)
CO2 SERPL-SCNC: 25.9 MMOL/L (ref 22–29)
CREAT SERPL-MCNC: 1.2 MG/DL (ref 0.7–1.3)
DEPRECATED RDW RBC AUTO: 55.4 FL (ref 37–54)
EGFRCR SERPLBLD CKD-EPI 2021: 66.3 ML/MIN/1.73
EOSINOPHIL # BLD AUTO: 0.12 10*3/MM3 (ref 0–0.4)
EOSINOPHIL NFR BLD AUTO: 1.3 % (ref 0.3–6.2)
ERYTHROCYTE [DISTWIDTH] IN BLOOD BY AUTOMATED COUNT: 16 % (ref 12.3–15.4)
GLOBULIN UR ELPH-MCNC: 2 GM/DL (ref 1.8–3.5)
GLUCOSE SERPL-MCNC: 135 MG/DL (ref 74–124)
HCT VFR BLD AUTO: 40 % (ref 37.5–51)
HGB BLD-MCNC: 12.8 G/DL (ref 13–17.7)
IMM GRANULOCYTES # BLD AUTO: 0.04 10*3/MM3 (ref 0–0.05)
IMM GRANULOCYTES NFR BLD AUTO: 0.4 % (ref 0–0.5)
LYMPHOCYTES # BLD AUTO: 0.8 10*3/MM3 (ref 0.7–3.1)
LYMPHOCYTES NFR BLD AUTO: 8.8 % (ref 19.6–45.3)
MCH RBC QN AUTO: 30.1 PG (ref 26.6–33)
MCHC RBC AUTO-ENTMCNC: 32 G/DL (ref 31.5–35.7)
MCV RBC AUTO: 94.1 FL (ref 79–97)
MONOCYTES # BLD AUTO: 0.92 10*3/MM3 (ref 0.1–0.9)
MONOCYTES NFR BLD AUTO: 10.1 % (ref 5–12)
NEUTROPHILS NFR BLD AUTO: 7.15 10*3/MM3 (ref 1.7–7)
NEUTROPHILS NFR BLD AUTO: 78.7 % (ref 42.7–76)
NRBC BLD AUTO-RTO: 0 /100 WBC (ref 0–0.2)
PLATELET # BLD AUTO: 195 10*3/MM3 (ref 140–450)
PMV BLD AUTO: 11.7 FL (ref 6–12)
POTASSIUM SERPL-SCNC: 4.3 MMOL/L (ref 3.5–4.7)
PROT SERPL-MCNC: 5.8 G/DL (ref 6.3–8)
RBC # BLD AUTO: 4.25 10*6/MM3 (ref 4.14–5.8)
SODIUM SERPL-SCNC: 139 MMOL/L (ref 134–145)
WBC NRBC COR # BLD: 9.09 10*3/MM3 (ref 3.4–10.8)

## 2022-05-27 PROCEDURE — 80053 COMPREHEN METABOLIC PANEL: CPT

## 2022-05-27 PROCEDURE — 85025 COMPLETE CBC W/AUTO DIFF WBC: CPT

## 2022-05-27 PROCEDURE — 96413 CHEMO IV INFUSION 1 HR: CPT

## 2022-05-27 PROCEDURE — 25010000002 PEMBROLIZUMAB 100 MG/4ML SOLUTION 4 ML VIAL: Performed by: NURSE PRACTITIONER

## 2022-05-27 RX ORDER — SODIUM CHLORIDE 9 MG/ML
250 INJECTION, SOLUTION INTRAVENOUS ONCE
Status: COMPLETED | OUTPATIENT
Start: 2022-05-27 | End: 2022-05-27

## 2022-05-27 RX ADMIN — SODIUM CHLORIDE 200 MG: 9 INJECTION, SOLUTION INTRAVENOUS at 13:28

## 2022-05-27 RX ADMIN — SODIUM CHLORIDE 250 ML: 9 INJECTION, SOLUTION INTRAVENOUS at 13:28

## 2022-06-16 DIAGNOSIS — C34.90 LUNG CANCER METASTATIC TO BONE: ICD-10-CM

## 2022-06-16 DIAGNOSIS — C79.51 LUNG CANCER METASTATIC TO BONE: ICD-10-CM

## 2022-06-17 ENCOUNTER — INFUSION (OUTPATIENT)
Dept: ONCOLOGY | Facility: HOSPITAL | Age: 67
End: 2022-06-17

## 2022-06-17 ENCOUNTER — OFFICE VISIT (OUTPATIENT)
Dept: ONCOLOGY | Facility: CLINIC | Age: 67
End: 2022-06-17

## 2022-06-17 VITALS
TEMPERATURE: 97.3 F | WEIGHT: 171.2 LBS | SYSTOLIC BLOOD PRESSURE: 148 MMHG | HEART RATE: 84 BPM | HEIGHT: 71 IN | DIASTOLIC BLOOD PRESSURE: 85 MMHG | BODY MASS INDEX: 23.97 KG/M2 | RESPIRATION RATE: 18 BRPM | OXYGEN SATURATION: 97 %

## 2022-06-17 DIAGNOSIS — Z79.899 HIGH RISK MEDICATION USE: Primary | ICD-10-CM

## 2022-06-17 DIAGNOSIS — C91.10 CLL (CHRONIC LYMPHOCYTIC LEUKEMIA): ICD-10-CM

## 2022-06-17 DIAGNOSIS — C34.90 LUNG CANCER METASTATIC TO BONE: ICD-10-CM

## 2022-06-17 DIAGNOSIS — D80.1 HYPOGAMMAGLOBULINEMIA: ICD-10-CM

## 2022-06-17 DIAGNOSIS — C34.12 MALIGNANT NEOPLASM OF UPPER LOBE OF LEFT LUNG: ICD-10-CM

## 2022-06-17 DIAGNOSIS — C91.10 CLL (CHRONIC LYMPHOCYTIC LEUKEMIA): Primary | ICD-10-CM

## 2022-06-17 DIAGNOSIS — C79.51 LUNG CANCER METASTATIC TO BONE: ICD-10-CM

## 2022-06-17 DIAGNOSIS — Z79.899 HIGH RISK MEDICATION USE: ICD-10-CM

## 2022-06-17 DIAGNOSIS — Z45.2 FITTING AND ADJUSTMENT OF VASCULAR CATHETER: ICD-10-CM

## 2022-06-17 LAB
ALBUMIN SERPL-MCNC: 4 G/DL (ref 3.5–5.2)
ALBUMIN/GLOB SERPL: 1.7 G/DL (ref 1.1–2.4)
ALP SERPL-CCNC: 104 U/L (ref 38–116)
ALT SERPL W P-5'-P-CCNC: 12 U/L (ref 0–41)
ANION GAP SERPL CALCULATED.3IONS-SCNC: 11.1 MMOL/L (ref 5–15)
AST SERPL-CCNC: 10 U/L (ref 0–40)
BASOPHILS # BLD AUTO: 0.09 10*3/MM3 (ref 0–0.2)
BASOPHILS NFR BLD AUTO: 0.8 % (ref 0–1.5)
BILIRUB SERPL-MCNC: 0.4 MG/DL (ref 0.2–1.2)
BUN SERPL-MCNC: 15 MG/DL (ref 6–20)
BUN/CREAT SERPL: 12.6 (ref 7.3–30)
CALCIUM SPEC-SCNC: 8.5 MG/DL (ref 8.5–10.2)
CHLORIDE SERPL-SCNC: 106 MMOL/L (ref 98–107)
CO2 SERPL-SCNC: 22.9 MMOL/L (ref 22–29)
CREAT SERPL-MCNC: 1.19 MG/DL (ref 0.7–1.3)
DEPRECATED RDW RBC AUTO: 54 FL (ref 37–54)
EGFRCR SERPLBLD CKD-EPI 2021: 67 ML/MIN/1.73
EOSINOPHIL # BLD AUTO: 0.24 10*3/MM3 (ref 0–0.4)
EOSINOPHIL NFR BLD AUTO: 2.1 % (ref 0.3–6.2)
ERYTHROCYTE [DISTWIDTH] IN BLOOD BY AUTOMATED COUNT: 15.5 % (ref 12.3–15.4)
GLOBULIN UR ELPH-MCNC: 2.3 GM/DL (ref 1.8–3.5)
GLUCOSE SERPL-MCNC: 97 MG/DL (ref 74–124)
HCT VFR BLD AUTO: 44.7 % (ref 37.5–51)
HGB BLD-MCNC: 13.5 G/DL (ref 13–17.7)
IMM GRANULOCYTES # BLD AUTO: 0.05 10*3/MM3 (ref 0–0.05)
IMM GRANULOCYTES NFR BLD AUTO: 0.4 % (ref 0–0.5)
LYMPHOCYTES # BLD AUTO: 0.97 10*3/MM3 (ref 0.7–3.1)
LYMPHOCYTES NFR BLD AUTO: 8.4 % (ref 19.6–45.3)
MCH RBC QN AUTO: 28.8 PG (ref 26.6–33)
MCHC RBC AUTO-ENTMCNC: 30.2 G/DL (ref 31.5–35.7)
MCV RBC AUTO: 95.5 FL (ref 79–97)
MONOCYTES # BLD AUTO: 0.84 10*3/MM3 (ref 0.1–0.9)
MONOCYTES NFR BLD AUTO: 7.3 % (ref 5–12)
NEUTROPHILS NFR BLD AUTO: 81 % (ref 42.7–76)
NEUTROPHILS NFR BLD AUTO: 9.3 10*3/MM3 (ref 1.7–7)
NRBC BLD AUTO-RTO: 0 /100 WBC (ref 0–0.2)
PLATELET # BLD AUTO: 296 10*3/MM3 (ref 140–450)
PMV BLD AUTO: 11.5 FL (ref 6–12)
POTASSIUM SERPL-SCNC: 4.4 MMOL/L (ref 3.5–4.7)
PROT SERPL-MCNC: 6.3 G/DL (ref 6.3–8)
RBC # BLD AUTO: 4.68 10*6/MM3 (ref 4.14–5.8)
SODIUM SERPL-SCNC: 140 MMOL/L (ref 134–145)
T4 FREE SERPL-MCNC: 1.69 NG/DL (ref 0.93–1.7)
TSH SERPL DL<=0.05 MIU/L-ACNC: 3.15 UIU/ML (ref 0.27–4.2)
WBC NRBC COR # BLD: 11.49 10*3/MM3 (ref 3.4–10.8)

## 2022-06-17 PROCEDURE — 25010000002 HEPARIN LOCK FLUSH PER 10 UNITS: Performed by: INTERNAL MEDICINE

## 2022-06-17 PROCEDURE — 84443 ASSAY THYROID STIM HORMONE: CPT | Performed by: INTERNAL MEDICINE

## 2022-06-17 PROCEDURE — 85025 COMPLETE CBC W/AUTO DIFF WBC: CPT

## 2022-06-17 PROCEDURE — 99215 OFFICE O/P EST HI 40 MIN: CPT | Performed by: INTERNAL MEDICINE

## 2022-06-17 PROCEDURE — 80053 COMPREHEN METABOLIC PANEL: CPT

## 2022-06-17 PROCEDURE — 84439 ASSAY OF FREE THYROXINE: CPT | Performed by: INTERNAL MEDICINE

## 2022-06-17 PROCEDURE — 96413 CHEMO IV INFUSION 1 HR: CPT

## 2022-06-17 PROCEDURE — 25010000002 PEMBROLIZUMAB 100 MG/4ML SOLUTION 4 ML VIAL: Performed by: INTERNAL MEDICINE

## 2022-06-17 RX ORDER — SODIUM CHLORIDE 9 MG/ML
250 INJECTION, SOLUTION INTRAVENOUS ONCE
Status: CANCELLED | OUTPATIENT
Start: 2022-06-17

## 2022-06-17 RX ORDER — SODIUM CHLORIDE 0.9 % (FLUSH) 0.9 %
10 SYRINGE (ML) INJECTION AS NEEDED
Status: DISCONTINUED | OUTPATIENT
Start: 2022-06-17 | End: 2022-06-17 | Stop reason: HOSPADM

## 2022-06-17 RX ORDER — HEPARIN SODIUM (PORCINE) LOCK FLUSH IV SOLN 100 UNIT/ML 100 UNIT/ML
500 SOLUTION INTRAVENOUS AS NEEDED
Status: CANCELLED | OUTPATIENT
Start: 2022-06-17

## 2022-06-17 RX ORDER — HYDROCODONE BITARTRATE AND ACETAMINOPHEN 10; 325 MG/1; MG/1
1 TABLET ORAL EVERY 4 HOURS PRN
Qty: 100 TABLET | Refills: 0 | Status: SHIPPED | OUTPATIENT
Start: 2022-06-17

## 2022-06-17 RX ORDER — HEPARIN SODIUM (PORCINE) LOCK FLUSH IV SOLN 100 UNIT/ML 100 UNIT/ML
500 SOLUTION INTRAVENOUS AS NEEDED
Status: DISCONTINUED | OUTPATIENT
Start: 2022-06-17 | End: 2022-06-17 | Stop reason: HOSPADM

## 2022-06-17 RX ORDER — SODIUM CHLORIDE 9 MG/ML
250 INJECTION, SOLUTION INTRAVENOUS ONCE
Status: COMPLETED | OUTPATIENT
Start: 2022-06-17 | End: 2022-06-17

## 2022-06-17 RX ORDER — CHLORHEXIDINE GLUCONATE 0.12 MG/ML
RINSE ORAL AS NEEDED
COMMUNITY
Start: 2022-04-18

## 2022-06-17 RX ORDER — SODIUM CHLORIDE 0.9 % (FLUSH) 0.9 %
10 SYRINGE (ML) INJECTION AS NEEDED
Status: CANCELLED | OUTPATIENT
Start: 2022-06-17

## 2022-06-17 RX ADMIN — SODIUM CHLORIDE 250 ML: 9 INJECTION, SOLUTION INTRAVENOUS at 12:11

## 2022-06-17 RX ADMIN — Medication 500 UNITS: at 13:24

## 2022-06-17 RX ADMIN — SODIUM CHLORIDE 200 MG: 9 INJECTION, SOLUTION INTRAVENOUS at 12:46

## 2022-06-17 NOTE — PROGRESS NOTES
Subjective Patient with worsening issues from left jaw osteonecrosis, discussed with oral surgery as assessments ongoing for possible procedures.  Patient to be seen by infectious disease next week, discussed previous effectiveness of improvement/treatment for his hypogammaglobulinemia with IVIG.                                                                                                                                                                                                                       REASON FOR FOLLOW UP:  1.  Chronic lymphocytic leukemia with extensive lymphadenopathy and possible pleural involvement. Initiation of Treanda, Rituxan May 1, 2019.  2.  Hypogammaglobulinemia.  Status is post IVIG  3.  Significant response on scans June 27, 2019.  Treatment changed to Imbruvica 420 mg daily.  4.  Metastatic non-small cell lung carcinoma on Keytruda  5.  Patient seen 2/18/2022 with left jaw/gumline swelling pain, associated hematoma?  Osteonecrosis.  Restart of Keytruda 3/4/2022, Xgeva discontinued  6.  Restaging via CT 4/12/2022, continued response, Keytruda continued, referral to dermatology for worsening psoriasis.  7.  Jaw osteonecrosis, undergoing therapy through oral surgery, IVIG anticipated, infectious disease consult planned         HISTORY OF PRESENT ILLNESS:    The patient is a  66 y.o. male with the above-mentioned history, returns the office  in anticipation of his 19th dose of Keytruda which he continues to receive every 3 weeks.  He also receives Xgeva every 6 weeks which was given 10/14/2021.  He continues to tolerate therapy remarkably well.  He denies fevers or chills, signs or symptoms of infection.  He denies any new adenopathy.  His pain is well controlled though he has begun to have pain involving his right knee that produces  right hamstring pain.  He is without worsening shortness of breath or chest pain.  He does not feel he is in need of thoracentesis.  He is not in need  "of a refill of his pain medications today.  His bowels are moving without difficulty while on narcotics.  He denies B symptoms, has only mild bruising with Imbruvica.  We have the patient proceed with additional testing undergoing plain views of his right knee showing a right knee effusion with medial compartment narrowing and no suspicious bone lesion.  There is benign periosteal calcification along the distal right femur.  A PET/CT 11/11 went on to demonstrate a complete metabolic response to therapy compared to PET/CT from 9/23/2020.  This includes his primary lung neoplasm left upper lobe, large osseous metastatic lesion the right chest wall region from the eighth rib, small left adrenal metastasis, and no evidence of disease involving the distal left femur.      The patient is seen 11/24/2021 continue to do well though indicating that his right knee is \"something I can manage at the moment\".  He prefers not to have orthopedic assessment at this point.  He is concerned, ever, about skin lesions that are developing primarily on his calf regions and into his right thigh.    The patient was seen by Dr. Regan Damon 11/29/2021 and felt to be consistent with psoriasiform dermatitis treated with intralesional therapy.  Was also started on a moisturizer and lipid therapy.  The patient return for assessment 12/15/2021 and treated with cycle #21 Keytruda, returned 1/7/2022 for cycle #22 and 1/28/2022 for cycle #23.  At that visit he had developed adenopathy several weeks previous and was treated with a Medrol Dosepak.   The patient was next seen 2/18/2022 and indicates that though his adenopathy has improved after treatment described above that his jaw has become painful swollen and tender with additional bleeding.     The patient was referred to his primary dentist who then had him seen oral surgeon-Dr. Ezequiel Davila-reached at [phone] who felt that this was osteonecrosis and should be treated symptomatically.  As " the patient reviewed 3/4/2022, wonderfully, his oral issues have nearly resolved with the gumline returning to essentially its previous state, no swelling, minimal erythema, no discharge and no additional pain.  He does have follow-up with oral surgery in the next several weeks and we discussed restarting his Keytruda scanning him likely in April 2022 in general.   He returns to the office on 3/25/2022 in follow-up in anticipation of cycle 24 Keytruda. He reports the left side of his jaw has improved. However, he is now having issues with the right lower side. He reports seeing oral surgery 4 days ago. At this time, imagining was obtained and sent off for evaluation. He reports he is waiting to hear back from the oral surgeon regarding these x-rays.  He also reports that the oral surgeon Dr. Ezequiel Davila, started him on amoxicillin Monday.  He states the antibiotic has helped to improve the pain in his jaw.  Denies nausea or vomiting.  Denies diarrhea or constipation.    The patient continued his immunotherapy taking Keytruda 3/25/2022 and underwent repeat CT chest abdomen and pelvis 4/12/2022 that is reviewed with him 4/15/2022.  Wonderfully there is no substantial change with a small to moderate right pleural effusion that decreased in size, no additional pulmonary nodule or new metastatic disease, multifocal osteosclerotic metastatic disease without change and no new findings in the abdomen or pelvis.    The patient is seen 4/15/2022 indicating that he is actually feeling well in terms of general symptoms.  This includes lack of progression from mouth pain, ability to eat without discomfort.  He is, however, having worsening psoriasis particular in his lower extremities and has been reviewed by dermatology previously.    The patient is reviewed 6/17/2022 having been seen by oral surgery with progression of his osteonecrosis and skin eruption.  He is currently undergoing assessment by infectious disease within the  "next week and we have reviewed that previously he responded to IVIG for in-hospital refractory sinusitis.  As a result he is asked to undergo reassessment for his immunoglobulin levels today and return next week for 400 mg/kg of IVIG infusion which we will continue monthly.    Past Medical History, Past Surgical History, Social History, Family History have been reviewed and are without significant changes except as mentioned.    Objective      Vitals:    06/17/22 1113   BP: 148/85   Pulse: 84   Resp: 18   Temp: 97.3 °F (36.3 °C)   TempSrc: Temporal   SpO2: 97%   Weight: 77.7 kg (171 lb 3.2 oz)   Height: 180.3 cm (70.98\")   PainSc:   2   PainLoc: Jaw     Current Status 6/17/2022   ECOG score 0       GENERAL:  Well-developed, well-nourished in no acute distress.   SKIN:  Warm, dry with erythematous psoriasiform lesions noted primarily around the calf regions bilaterally, mildly pruritic, no tenderness, with a scale component, varying in size  HEAD:  Normocephalic.  EYES:  Pupils equal, round.  EOMs intact.  Conjunctivae normal.  ORAL: Findings of left lower gumline no longer swelling, resolution of hematoma, left submental region with tender skin eruption, clear drainage  NECK: Bilateral cervical adenopathy shoddy  EARS:  Hearing intact.  LYMPHATICS: Without cervical, submandibular, axillary, supraclavicular adenopathy.    CHEST:  Lungs clear to auscultation. Good airflow.  CARDIAC:  Regular rate and rhythm without murmurs. Normal S1,S2.  ABDOMEN:  Soft, nontender with no organomegaly or masses. Bowel sounds present  EXTREMITIES:  No clubbing, cyanosis or edema.  NEUROLOGICAL: No focal neurological deficits.  PSYCHIATRIC:  Normal affect and mood.      I have reexamined the patient and the results are consistent with the previously documented exam. Jostin Parekh MD     RECENT LABS:  Results from last 7 days   Lab Units 06/17/22  1014   WBC 10*3/mm3 11.49*   NEUTROS ABS 10*3/mm3 9.30*   HEMOGLOBIN g/dL 13.5 "   HEMATOCRIT % 44.7   PLATELETS 10*3/mm3 296     Results from last 7 days   Lab Units 06/17/22  1014   SODIUM mmol/L 140   POTASSIUM mmol/L 4.4   CHLORIDE mmol/L 106   CO2 mmol/L 22.9   BUN mg/dL 15   CREATININE mg/dL 1.19   CALCIUM mg/dL 8.5   ALBUMIN g/dL 4.00   BILIRUBIN mg/dL 0.4   ALK PHOS U/L 104   ALT (SGPT) U/L 12   AST (SGOT) U/L 10   GLUCOSE mg/dL 97         FISH prognostic panel-Positive for deletion of 13 q. 14.3        Assessment & Plan   1.  CLL presenting with significant lymphocytosis, lymphadenopathy and splenomegaly.     · Positive for deletion of 13 q. 14.3.    · Patient status post 2 cycles of Treanda Rituxan with excellent response.    · Imaging 6/27/2019 with significant decrease in adenopathy.  Treanda Rituxan discontinued   · Imbruvica 420 mg daily initiated 7/25/2019.  · He continues on Imbruvica, without indication of progression of his CLL, without progressive lymphadenopathy on CT scans.  · Patient has resolving adenopathy particularly cervical regions 2/18/2022  · 3/25/2022 patient is without worsening adenopathy on exam today.  Continue Imbruvica.    2.  Pulmonary nodules/infiltrates.  He is  status post bronchoscopy.  Is unclear at this time whether these findings are infectious or possibly related to his lymphoproliferative disorder.  This is seen to be improving on latest scans.    3.  Hypogammaglobulinemia-status post IVIG with resolution of sinusitis.    · Anticipate trial of IVIG with the patient now having progression of his osteonecrosis and dermal infection.    4.  Non-small cell lung carcinoma  · August 26 2020 revealing a new 1.6 cm spiculated nodule within the left upper lobe, 1.2 cm left adrenal nodule, bone windows with a soft tissue mass involving the right eighth rib measuring 3.7 x 2.6 cm.    · Biopsy was consistent with adenocarcinoma CK 7+, CK 20-, lung primary suspected clinically, molecular panel not yet obtained.  · PET/CT 9/23/2020 demonstrating asymmetric uptake  within the right parotid gland which is unremarkable appearance on noncontrasted CT, SUV of 6 compared to 2.7.  There is no hilar, mediastinal or axillary adenopathy, findings of asymmetric moderate to intense FDG uptake within superior aspect the right chest wall anterior to the right first rib with an irregular hypodense lesion measuring 1.8 x 1.6 and SUV 4.9.  This had not been seen June 27, 2019.  There is an intensely FDG avid nodule within the lingula measuring 1.9 x 1.5 history of 12.4, FDG avid left adrenal nodule 1.9 x 1.5 with an issue 21.2.    · Evaluated by radiation oncology therapy September 29 and started on radiation therapy to the right chest wall-35 Gy in 10 fractions.   ·  Plans were also then proceed with SBRT to the solitary left upper lobe lesion pending insurance approval.  · Further testing including TPS of 20% and foundation CDX with a TMB of greater than 10 mutations per megabase (28 mutations per megabase) and the indication that several immunotherapies could be useful in this patient's care.  Additional genomic findings include BRAF G469R/that trametinib could be useful and STK11/that everolimus could be useful.  · Keytruda initiated 10/21/2021   · Reassessment after 2 cycles of Keytruda with enlargement of the right eighth rib lesion,enlargement of spiculated left upper lobe lung mass, moderate to large right-sided pleural effusion, new left adrenal mass and enlarged retrocrural lymph node.?Pseudoprogression  · Xgeva last given 12/30/2020  · Follow-up scans 1/6/2021 demonstrated marked improvement in his right eighth rib lesion, resolution of left upper lobe spiculated mass, residual large right pleural effusion, resolution of left adrenal metastasis.   · Right pleural effusion, with thoracentesis 1/14/2021 with 1500 mils removed.  Pathology consistent with malignant effusion   · CT scans 4/5/2021 with response noted in the left upper lobe density.  · He continues to receive Keytruda  every 3 weeks along with Xgeva every 6 weeks.  · Repeat scan 7/15/2021 without evidence of recurrence or progressive disease.  Plans to continue Keytruda every 3 weeks with repeat scans in 4 to 6 months  · Patient status post PET/CT 11/11/2021 with hyper response, approximate remission status, plan to proceed with cycle #20 Keytruda  · Patient seen 2/18/2022 with Keytruda held while his oral issues are addressed-concern for osteonecrosis of the jaw.  · Patiently diagnosed with osteonecrosis of the jaw, seen by oral surgery, started on antibiotic therapy and Peridex with substantial improvement, Xgeva discontinued as discussed below.  · 3/25/2022 proceed with cycle #24 Keytruda today.  Follow-up CT scans 4/12/2022 without evidence of progressive disease, stable findings including osteosclerotic metastatic disease.  Keytruda continued  · Patient seen 6/17/2022 with Keytruda infusion.    5.  Left knee metastasis  · Evaluated by orthopedic oncology, Dr. Eliu Ochoa  · Admission 1/7/2021 undergoing stabilization of left femoral bone lesion, prophylactic stabilization with intramedullary implants.  · It was suggested we delay Xgeva or Zometa to allow bone healing  · Completed radiation with 10 fractions 2/10/2021  · Xgeva resumed 4/29/2021, he continues to receive this every 6 weeks.         6.  Malignant right pleural effusion  · Ultrasound thoracentesis 1/14/2021 1500 mL removed  · Chest x-ray 2/25/2021 with moderate right pleural effusion  · Progressive symptoms with worsening pain 4/5/2021  · Ultrasound-guided thoracentesis 4/13/2021 with 2050 ml removed.  · Plans for Pleurx catheter with thoracic surgery, however, pleural effusion has not recurred.  Patient seen 11/4/2021 with chest x-ray planned.  · Additional assessment 2/18/2022 with left jaw pain, subsequent diagnosis of osteoporosis, Xgeva discontinued  · Patient continues follow-up with oral surgery, progression of osteoporosis symptomatically, dermal eruption  left side of mandibular region, IVIG anticipated    7.  Cancer related pain  · Pain is most prominent in his right rib, utilizing MS Contin 30 mg 3 times a day  · Hydrocodone/acetaminophen 10/325 as needed for breakthrough pain.  Currently taking 3 to 4 tablets daily  · He is not currently in need of a refill of pain medications  · Discussed MS Contin at 30 mg p.o. every 8 hours, Norco 10/325 2 p.o. every 6 hours  · He is currently using pain medication as needed for jaw pain.    8.  Insomnia  · Continuing to follow with behavioral oncology  · Continuing Remeron 7.5 mg nightly with good control    9. Health maintenance  · Status post COVID-19 booster, SARS antibody pending today  · The patient is due for Shingrix and Prevnar which will both be administered in the clinic 9/23/2021    10.  Skin lesions  · Uncertain of etiology, unexpected findings for usual skin lesions associated with immunotherapy  · Request dermatologic assessment-psoriasiform dermatitis  · Patient continues to report skin lesions particularly lower extremities that are worsening.  Plan to refer back to Dr. Damon considering they are progressing and are thought to be, in part, related to immunotherapy.    11.  Right knee pain  · Osteoarthritis, orthopedic assessment upon patient's request.   · Resolved.  Denies any knee pain today     12.  Hypocalcemia  · Currently corrected    Plan:  1. Proceed with cycle #28 Keytruda today  2. Continue Imbruvica 420 mg daily.  We will review financial availability through foundation support.  3. Continue MS Contin 30 mg 3 times a day and hydrocodone/acetaminophen 10/325-2 p.o. every 6 hours as needed for breakthrough pain   4. Continue Remeron 15 mg nightly  5. Follow-up with Dr. Damon for his psoriasis  6. Patient is scheduled to be evaluated further by oral surgery, infectious disease consultation the week of 6/29/2022-Dr. Champion  7. Schedule next week-CBC office, 400 mg/kilogram IVIG-30 g  8. Return in 3 weeks  for NP or MD follow-up and Keytruda    I spent 65 minutes caring for Dav on this date of service. This time includes time spent by me in the following activities: preparing for the visit, reviewing tests, obtaining and/or reviewing a separately obtained history, performing a medically appropriate examination and/or evaluation, counseling and educating the patient/family/caregiver, ordering medications, tests, or procedures, referring and communicating with other health care professionals, documenting information in the medical record, independently interpreting results and communicating that information with the patient/family/caregiver and care coordination.     Patient continues on high risk medication requiring close monitoring for toxicity    Jostin Parekh MD   6/17/2022

## 2022-06-21 ENCOUNTER — DOCUMENTATION (OUTPATIENT)
Dept: ONCOLOGY | Facility: CLINIC | Age: 67
End: 2022-06-21

## 2022-06-21 LAB
ALBUMIN SERPL ELPH-MCNC: 3.1 G/DL (ref 2.9–4.4)
ALBUMIN/GLOB SERPL: 1.3 {RATIO} (ref 0.7–1.7)
ALPHA1 GLOB SERPL ELPH-MCNC: 0.3 G/DL (ref 0–0.4)
ALPHA2 GLOB SERPL ELPH-MCNC: 0.9 G/DL (ref 0.4–1)
B-GLOBULIN SERPL ELPH-MCNC: 0.9 G/DL (ref 0.7–1.3)
GAMMA GLOB SERPL ELPH-MCNC: 0.3 G/DL (ref 0.4–1.8)
GLOBULIN SER-MCNC: 2.4 G/DL (ref 2.2–3.9)
IGA SERPL-MCNC: 35 MG/DL (ref 61–437)
IGG SERPL-MCNC: 301 MG/DL (ref 603–1613)
IGM SERPL-MCNC: 12 MG/DL (ref 20–172)
INTERPRETATION SERPL IEP-IMP: ABNORMAL
KAPPA LC FREE SER-MCNC: 13.2 MG/L (ref 3.3–19.4)
KAPPA LC FREE/LAMBDA FREE SER: 1.06 {RATIO} (ref 0.26–1.65)
LABORATORY COMMENT REPORT: ABNORMAL
LAMBDA LC FREE SERPL-MCNC: 12.5 MG/L (ref 5.7–26.3)
M PROTEIN SERPL ELPH-MCNC: 0.1 G/DL
PROT SERPL-MCNC: 5.5 G/DL (ref 6–8.5)

## 2022-06-21 NOTE — PROGRESS NOTES
Per Cortney in pre-certs, pt needs STAT IgG back before IVIG can be complianced. The IgG level has to be <600 for pt to be complianced. Per lab, they will take specimen to hospital for it to be ran STAT.

## 2022-06-24 ENCOUNTER — LAB (OUTPATIENT)
Dept: LAB | Facility: HOSPITAL | Age: 67
End: 2022-06-24

## 2022-06-24 ENCOUNTER — INFUSION (OUTPATIENT)
Dept: ONCOLOGY | Facility: HOSPITAL | Age: 67
End: 2022-06-24

## 2022-06-24 VITALS
HEART RATE: 108 BPM | OXYGEN SATURATION: 96 % | BODY MASS INDEX: 24.33 KG/M2 | TEMPERATURE: 98 F | DIASTOLIC BLOOD PRESSURE: 80 MMHG | RESPIRATION RATE: 16 BRPM | SYSTOLIC BLOOD PRESSURE: 126 MMHG | WEIGHT: 174.4 LBS

## 2022-06-24 DIAGNOSIS — D80.1 HYPOGAMMAGLOBULINEMIA: ICD-10-CM

## 2022-06-24 DIAGNOSIS — C34.90 LUNG CANCER METASTATIC TO BONE: ICD-10-CM

## 2022-06-24 DIAGNOSIS — C91.10 CLL (CHRONIC LYMPHOCYTIC LEUKEMIA): ICD-10-CM

## 2022-06-24 DIAGNOSIS — Z79.899 HIGH RISK MEDICATION USE: Primary | ICD-10-CM

## 2022-06-24 DIAGNOSIS — C79.51 LUNG CANCER METASTATIC TO BONE: ICD-10-CM

## 2022-06-24 DIAGNOSIS — C34.12 MALIGNANT NEOPLASM OF UPPER LOBE OF LEFT LUNG: ICD-10-CM

## 2022-06-24 LAB
BASOPHILS # BLD AUTO: 0.08 10*3/MM3 (ref 0–0.2)
BASOPHILS NFR BLD AUTO: 0.8 % (ref 0–1.5)
DEPRECATED RDW RBC AUTO: 54.2 FL (ref 37–54)
EOSINOPHIL # BLD AUTO: 0.31 10*3/MM3 (ref 0–0.4)
EOSINOPHIL NFR BLD AUTO: 3.1 % (ref 0.3–6.2)
ERYTHROCYTE [DISTWIDTH] IN BLOOD BY AUTOMATED COUNT: 15.6 % (ref 12.3–15.4)
HCT VFR BLD AUTO: 47.7 % (ref 37.5–51)
HGB BLD-MCNC: 14.9 G/DL (ref 13–17.7)
IGA1 MFR SER: 66 MG/DL (ref 70–400)
IGG1 SER-MCNC: 315 MG/DL (ref 700–1600)
IGM SERPL-MCNC: <25 MG/DL (ref 40–230)
IMM GRANULOCYTES # BLD AUTO: 0.02 10*3/MM3 (ref 0–0.05)
IMM GRANULOCYTES NFR BLD AUTO: 0.2 % (ref 0–0.5)
LYMPHOCYTES # BLD AUTO: 1.56 10*3/MM3 (ref 0.7–3.1)
LYMPHOCYTES NFR BLD AUTO: 15.8 % (ref 19.6–45.3)
MCH RBC QN AUTO: 29.6 PG (ref 26.6–33)
MCHC RBC AUTO-ENTMCNC: 31.2 G/DL (ref 31.5–35.7)
MCV RBC AUTO: 94.6 FL (ref 79–97)
MONOCYTES # BLD AUTO: 0.88 10*3/MM3 (ref 0.1–0.9)
MONOCYTES NFR BLD AUTO: 8.9 % (ref 5–12)
NEUTROPHILS NFR BLD AUTO: 7 10*3/MM3 (ref 1.7–7)
NEUTROPHILS NFR BLD AUTO: 71.2 % (ref 42.7–76)
NRBC BLD AUTO-RTO: 0 /100 WBC (ref 0–0.2)
PLATELET # BLD AUTO: 245 10*3/MM3 (ref 140–450)
PMV BLD AUTO: 11.8 FL (ref 6–12)
RBC # BLD AUTO: 5.04 10*6/MM3 (ref 4.14–5.8)
WBC NRBC COR # BLD: 9.85 10*3/MM3 (ref 3.4–10.8)

## 2022-06-24 PROCEDURE — 63710000001 DIPHENHYDRAMINE PER 50 MG: Performed by: INTERNAL MEDICINE

## 2022-06-24 PROCEDURE — 25010000002 IMMUNE GLOBULIN (HUMAN) 30 GM/300ML SOLUTION: Performed by: INTERNAL MEDICINE

## 2022-06-24 PROCEDURE — 85025 COMPLETE CBC W/AUTO DIFF WBC: CPT

## 2022-06-24 PROCEDURE — 82784 ASSAY IGA/IGD/IGG/IGM EACH: CPT | Performed by: INTERNAL MEDICINE

## 2022-06-24 PROCEDURE — 96366 THER/PROPH/DIAG IV INF ADDON: CPT

## 2022-06-24 PROCEDURE — 36415 COLL VENOUS BLD VENIPUNCTURE: CPT

## 2022-06-24 PROCEDURE — 96365 THER/PROPH/DIAG IV INF INIT: CPT

## 2022-06-24 RX ORDER — SODIUM CHLORIDE 9 MG/ML
250 INJECTION, SOLUTION INTRAVENOUS ONCE
Status: COMPLETED | OUTPATIENT
Start: 2022-06-24 | End: 2022-06-24

## 2022-06-24 RX ORDER — DIPHENHYDRAMINE HCL 25 MG
25 CAPSULE ORAL ONCE
Status: COMPLETED | OUTPATIENT
Start: 2022-06-24 | End: 2022-06-24

## 2022-06-24 RX ORDER — ACETAMINOPHEN 325 MG/1
650 TABLET ORAL ONCE
Status: COMPLETED | OUTPATIENT
Start: 2022-06-24 | End: 2022-06-24

## 2022-06-24 RX ADMIN — DIPHENHYDRAMINE HYDROCHLORIDE 25 MG: 25 CAPSULE ORAL at 09:20

## 2022-06-24 RX ADMIN — IMMUNE GLOBULIN INFUSION (HUMAN) 30 G: 100 INJECTION, SOLUTION INTRAVENOUS; SUBCUTANEOUS at 09:50

## 2022-06-24 RX ADMIN — ACETAMINOPHEN 650 MG: 325 TABLET ORAL at 09:20

## 2022-06-24 RX ADMIN — SODIUM CHLORIDE 250 ML: 9 INJECTION, SOLUTION INTRAVENOUS at 09:51

## 2022-06-29 ENCOUNTER — OFFICE VISIT (OUTPATIENT)
Dept: INFECTIOUS DISEASES | Facility: CLINIC | Age: 67
End: 2022-06-29

## 2022-06-29 VITALS
BODY MASS INDEX: 24.33 KG/M2 | WEIGHT: 173.8 LBS | HEIGHT: 71 IN | HEART RATE: 87 BPM | DIASTOLIC BLOOD PRESSURE: 79 MMHG | SYSTOLIC BLOOD PRESSURE: 168 MMHG | RESPIRATION RATE: 18 BRPM | TEMPERATURE: 98.1 F

## 2022-06-29 DIAGNOSIS — M87.9 OSTEONECROSIS OF MANDIBLE: Primary | ICD-10-CM

## 2022-06-29 PROCEDURE — 99205 OFFICE O/P NEW HI 60 MIN: CPT | Performed by: STUDENT IN AN ORGANIZED HEALTH CARE EDUCATION/TRAINING PROGRAM

## 2022-06-29 NOTE — PROGRESS NOTES
"Chief Complaint  NEW PATIENT    Subjective        Dav Freitas presents to [facility]  History of Present Illness    Patient is a 66-year-old male with a past medical history of hypogammaglobinemia, stage IV lung cancer on Keytruda but prior therapy with Xgeva for bony metastasis and CLL in remission who is seen by oral surgery in the setting of medication related osteonecrosis of the mandible and has been on multiple courses of amoxicillin during \"flareups \".  Recently had CT scan that demonstrated periosteal thickening and continue to be consistent with osteonecrosis of the jaw however developed draining extraoral fistula recently and was placed again on amoxicillin.  Referred to ID for conservative management with antibiotics.     Patient reports he has had intermittent drainage over the last 3 to 4 weeks from a left mandibular sinus tract.  He reports mostly the drainage is clear to whitish and occurs mostly at night.  It will happen throughout the day as well and scab over at times.  Denies any pain or tenderness at the site.  Denies any drainage in his mouth.  Denies any pain around his teeth or gums.  Denies any fevers or chills.    He reports he has been on about 5 different courses of amoxicillin since the beginning of the year when this all started.  Most recently he was placed on a Z-Quinton which she completed approximately 3 weeks ago.  He reports with antibiotics the drainage usually becomes less however quickly returns after completion.    Objective   Vital Signs:  /79   Pulse 87   Temp 98.1 °F (36.7 °C)   Resp 18   Ht 180.3 cm (70.98\")   Wt 78.8 kg (173 lb 12.8 oz)   BMI 24.25 kg/m²   Estimated body mass index is 24.25 kg/m² as calculated from the following:    Height as of this encounter: 180.3 cm (70.98\").    Weight as of this encounter: 78.8 kg (173 lb 12.8 oz).    BMI is within normal parameters. No other follow-up for BMI required.      Physical " Exam  Constitutional:       General: He is not in acute distress.     Appearance: Normal appearance. He is normal weight. He is not ill-appearing.   HENT:      Head: Normocephalic and atraumatic.      Comments: Left angle of the mandible with papule that has clear scant drainage and scabbing.  No tenderness to palpation.     Nose: Nose normal. No rhinorrhea.      Mouth/Throat:      Mouth: Mucous membranes are moist.      Pharynx: No oropharyngeal exudate.   Eyes:      General: No scleral icterus.     Extraocular Movements: Extraocular movements intact.      Pupils: Pupils are equal, round, and reactive to light.   Cardiovascular:      Rate and Rhythm: Normal rate and regular rhythm.      Pulses: Normal pulses.      Heart sounds: Normal heart sounds. No murmur heard.  Pulmonary:      Effort: Pulmonary effort is normal.      Breath sounds: Normal breath sounds. No wheezing, rhonchi or rales.   Abdominal:      General: Abdomen is flat. Bowel sounds are normal.      Palpations: Abdomen is soft.      Tenderness: There is no abdominal tenderness. There is no guarding or rebound.   Musculoskeletal:         General: No swelling or tenderness. Normal range of motion.      Cervical back: Normal range of motion and neck supple. No tenderness.      Right lower leg: No edema.      Left lower leg: No edema.   Skin:     General: Skin is warm and dry.      Findings: Rash present. Rash is scaling.   Neurological:      General: No focal deficit present.      Mental Status: He is alert and oriented to person, place, and time.   Psychiatric:         Mood and Affect: Mood normal.         Behavior: Behavior normal.        Result Review :  The following data was reviewed by: Glenn Champion DO on 06/29/2022:  Common labs    Common Labsle 5/27/22 5/27/22 6/17/22 6/17/22 6/17/22 6/24/22    1118 1118 1014 1014 1158    Glucose  135 (A)  97     BUN  15  15     Creatinine  1.20  1.19     Sodium  139  140     Potassium  4.3  4.4      Chloride  105  106     Calcium  8.7  8.5     Total Protein     5.5 (A)    Albumin  3.80  4.00 3.1    Total Bilirubin  0.3  0.4     Alkaline Phosphatase  95  104     AST (SGOT)  11  10     ALT (SGPT)  13  12     WBC 9.09  11.49 (A)   9.85   Hemoglobin 12.8 (A)  13.5   14.9   Hematocrit 40.0  44.7   47.7   Platelets 195  296   245   (A) Abnormal value            Data reviewed: Radiologic studies With reports attached from oral maxillofacial surgery and Consultant notes From oral maxillofacial surgery and oncology          Assessment and Plan   Diagnoses and all orders for this visit:    1. Osteonecrosis of mandible (HCC) (Primary)  -     CT facial bones w wo contrast; Future  -     C-reactive Protein; Future  -     Sedimentation Rate; Future    We had a long discussion today regarding the possible options for treatment of his mandibular osteonecrosis and the implication of infections, as they pertain to osteonecrosis.    We discussed 3 major options: Antibiotics alone, debridement with antibiotics, and osteotomy with grafting.  Patient reports these with 3 options discussed with him at oromaxillofacial surgery.  We talked in detail about antibiotics alone and the success rate that this would likely have.  In terms of related infection, unfortunately the overall success would be on the lower side versus surgical management plus antibiotics.   Continued underlying areas of necrosis would obviously predispose him to underlying infection and I would have concerns as to the ability for antibiotics to penetrate this area, given the lack of blood flow.    After discussing the options he was agreeable to repeat imaging to evaluate the underlying areas of necrosis and the extent before pursuing conservative management with antibiotics alone.  He also reports that he is leaning towards wanting to pursue surgery due to the increased chance of clearance of infection and drainage.  If this were the case, plan would be to refer  him to oral surgery for repeat evaluation.  Plan for repeat surgery of the mandible and close follow-up thereafter.       I spent 68 minutes caring for Dav on this date of service. This time includes time spent by me in the following activities:preparing for the visit, reviewing tests, obtaining and/or reviewing a separately obtained history, performing a medically appropriate examination and/or evaluation , counseling and educating the patient/family/caregiver, ordering medications, tests, or procedures, referring and communicating with other health care professionals , documenting information in the medical record, independently interpreting results and communicating that information with the patient/family/caregiver and care coordination  Follow Up   No follow-ups on file.  Patient was given instructions and counseling regarding his condition or for health maintenance advice. Please see specific information pulled into the AVS if appropriate.

## 2022-06-30 ENCOUNTER — HOSPITAL ENCOUNTER (OUTPATIENT)
Dept: CT IMAGING | Facility: HOSPITAL | Age: 67
Discharge: HOME | End: 2022-06-30

## 2022-06-30 ENCOUNTER — LAB (OUTPATIENT)
Dept: LAB | Facility: HOSPITAL | Age: 67
End: 2022-06-30

## 2022-06-30 DIAGNOSIS — M87.9 OSTEONECROSIS OF MANDIBLE: ICD-10-CM

## 2022-06-30 LAB
CRP SERPL-MCNC: 0.77 MG/DL (ref 0–0.5)
ERYTHROCYTE [SEDIMENTATION RATE] IN BLOOD: 9 MM/HR (ref 0–20)

## 2022-06-30 PROCEDURE — 70488 CT MAXILLOFACIAL W/O & W/DYE: CPT

## 2022-06-30 PROCEDURE — 36415 COLL VENOUS BLD VENIPUNCTURE: CPT

## 2022-06-30 PROCEDURE — 85652 RBC SED RATE AUTOMATED: CPT

## 2022-06-30 PROCEDURE — 0 IOPAMIDOL PER 1 ML: Performed by: STUDENT IN AN ORGANIZED HEALTH CARE EDUCATION/TRAINING PROGRAM

## 2022-06-30 PROCEDURE — 86140 C-REACTIVE PROTEIN: CPT

## 2022-06-30 RX ADMIN — IOPAMIDOL 50 ML: 755 INJECTION, SOLUTION INTRAVENOUS at 08:20

## 2022-07-05 RX ORDER — MELOXICAM 15 MG/1
TABLET ORAL
Qty: 30 TABLET | Refills: 0 | Status: SHIPPED | OUTPATIENT
Start: 2022-07-05 | End: 2022-08-01

## 2022-07-06 ENCOUNTER — OFFICE VISIT (OUTPATIENT)
Dept: INFECTIOUS DISEASES | Facility: CLINIC | Age: 67
End: 2022-07-06

## 2022-07-06 VITALS
TEMPERATURE: 98.1 F | BODY MASS INDEX: 24.84 KG/M2 | DIASTOLIC BLOOD PRESSURE: 79 MMHG | HEART RATE: 86 BPM | RESPIRATION RATE: 18 BRPM | HEIGHT: 71 IN | WEIGHT: 177.4 LBS | SYSTOLIC BLOOD PRESSURE: 155 MMHG

## 2022-07-06 DIAGNOSIS — M87.180 DRUG-INDUCED OSTEONECROSIS OF JAW: Primary | ICD-10-CM

## 2022-07-06 DIAGNOSIS — C34.12 MALIGNANT NEOPLASM OF UPPER LOBE OF LEFT LUNG: ICD-10-CM

## 2022-07-06 PROCEDURE — 99215 OFFICE O/P EST HI 40 MIN: CPT | Performed by: STUDENT IN AN ORGANIZED HEALTH CARE EDUCATION/TRAINING PROGRAM

## 2022-07-06 RX ORDER — AMOXICILLIN AND CLAVULANATE POTASSIUM 875; 125 MG/1; MG/1
1 TABLET, FILM COATED ORAL EVERY 12 HOURS
Qty: 42 TABLET | Refills: 0 | Status: SHIPPED | OUTPATIENT
Start: 2022-07-06 | End: 2022-07-27

## 2022-07-06 RX ORDER — AMOXICILLIN 500 MG/1
1000 CAPSULE ORAL
Qty: 42 CAPSULE | Refills: 0 | Status: SHIPPED | OUTPATIENT
Start: 2022-07-06 | End: 2022-07-27

## 2022-07-06 NOTE — PROGRESS NOTES
"Chief Complaint  Follow-up    Subjective        Dav Freitas presents to [facility] INFECTIOUS DISEASES  History of Present Illness    Patient is a 66-year-old male with a past medical history of hypogammaglobinemia, stage IV lung cancer on Keytruda but prior therapy with Xgeva for bony metastasis and CLL in remission who is seen by oral surgery in the setting of medication related osteonecrosis of the mandible and has been on multiple courses of amoxicillin during \"flareups \".  Recently had CT scan that demonstrated periosteal thickening and continue to be consistent with osteonecrosis of the jaw however developed draining extraoral fistula recently and was placed again on amoxicillin.  Referred to ID for conservative management with antibiotics.     Patient reports that the left mandibular area has had no further drainage over the last couple weeks.  Does report a new area of pain and distention on the right mandible.  Denies any pain at the left mandible site.  Denies any drainage in his mouth.  Does report some pain around the right gumline.  Denies any fevers or chills.        Objective   Vital Signs:  /79   Pulse 86   Temp 98.1 °F (36.7 °C)   Resp 18   Ht 180.3 cm (70.98\")   Wt 80.5 kg (177 lb 6.4 oz)   BMI 24.76 kg/m²   Estimated body mass index is 24.76 kg/m² as calculated from the following:    Height as of this encounter: 180.3 cm (70.98\").    Weight as of this encounter: 80.5 kg (177 lb 6.4 oz).    BMI is within normal parameters. No other follow-up for BMI required.      Physical Exam  Constitutional:       General: He is not in acute distress.     Appearance: Normal appearance. He is normal weight. He is not ill-appearing.   HENT:      Head: Normocephalic and atraumatic.      Comments: Left angle of the mandible with papule that has no drainage today.  No tenderness to palpation.  Right mandible with new erythema and area of protrusion.     Nose: Nose normal. No " rhinorrhea.      Mouth/Throat:      Mouth: Mucous membranes are moist.      Pharynx: Oropharyngeal exudate present.      Comments: Some scant discharge from the right mandibular teeth.  Eyes:      General: No scleral icterus.     Extraocular Movements: Extraocular movements intact.      Pupils: Pupils are equal, round, and reactive to light.   Cardiovascular:      Rate and Rhythm: Normal rate and regular rhythm.      Pulses: Normal pulses.      Heart sounds: Normal heart sounds. No murmur heard.  Pulmonary:      Effort: Pulmonary effort is normal.      Breath sounds: Normal breath sounds. No wheezing, rhonchi or rales.   Abdominal:      General: Abdomen is flat. Bowel sounds are normal.      Palpations: Abdomen is soft.      Tenderness: There is no abdominal tenderness. There is no guarding or rebound.   Musculoskeletal:         General: No swelling or tenderness. Normal range of motion.      Cervical back: Normal range of motion and neck supple. No tenderness.      Right lower leg: No edema.      Left lower leg: No edema.   Skin:     General: Skin is warm and dry.      Findings: Rash present. Rash is scaling.   Neurological:      General: No focal deficit present.      Mental Status: He is alert and oriented to person, place, and time.   Psychiatric:         Mood and Affect: Mood normal.         Behavior: Behavior normal.        Result Review :  The following data was reviewed by: Glenn Cahmpion DO on 06/29/2022:  Common labs    Common Labsle 5/27/22 5/27/22 6/17/22 6/17/22 6/17/22 6/24/22    1118 1118 1014 1014 1158    Glucose  135 (A)  97     BUN  15  15     Creatinine  1.20  1.19     Sodium  139  140     Potassium  4.3  4.4     Chloride  105  106     Calcium  8.7  8.5     Total Protein     5.5 (A)    Albumin  3.80  4.00 3.1    Total Bilirubin  0.3  0.4     Alkaline Phosphatase  95  104     AST (SGOT)  11  10     ALT (SGPT)  13  12     WBC 9.09  11.49 (A)   9.85   Hemoglobin 12.8 (A)  13.5   14.9    Hematocrit 40.0  44.7   47.7   Platelets 195  296   245   (A) Abnormal value            Data reviewed: Radiologic studies With reports attached from oral maxillofacial surgery and Consultant notes From oral maxillofacial surgery and oncology          Assessment and Plan   Diagnoses and all orders for this visit:    1. Drug-induced osteonecrosis of jaw (HCC) (Primary)    2. Malignant neoplasm of upper lobe of left lung (HCC)    Repeat CT was reviewed today and indicates osteonecrosis versus osteomyelitis of bilateral lower mandible.  We discussed antibiotic therapy today and his options for surgery.  I think he would be better served to talk with the surgeon regarding his possible interventions however he is requesting another opinion.  Plan to refer him to oral surgery today.  Also will start Augmentin 875 twice daily plus an extra dose of 1 g of amoxicillin around noon to treat any underlying osteomyelitis as it cannot be differentiated his CT scan.  He also has 2 areas of concern along the mandible fatter worsening in the last couple weeks that could represent worsening infection since he is off antibiotics.  Plan to follow-up in 3 weeks after he is able to see oral surgery and further delineate any antibiotic plan.       I spent 44 minutes caring for Dav on this date of service. This time includes time spent by me in the following activities:preparing for the visit, reviewing tests, obtaining and/or reviewing a separately obtained history, performing a medically appropriate examination and/or evaluation , counseling and educating the patient/family/caregiver, ordering medications, tests, or procedures, referring and communicating with other health care professionals , documenting information in the medical record, independently interpreting results and communicating that information with the patient/family/caregiver and care coordination  Follow Up   No follow-ups on file.  Patient was given instructions and  counseling regarding his condition or for health maintenance advice. Please see specific information pulled into the AVS if appropriate.

## 2022-07-15 ENCOUNTER — INFUSION (OUTPATIENT)
Dept: ONCOLOGY | Facility: HOSPITAL | Age: 67
End: 2022-07-15

## 2022-07-15 ENCOUNTER — OFFICE VISIT (OUTPATIENT)
Dept: ONCOLOGY | Facility: CLINIC | Age: 67
End: 2022-07-15

## 2022-07-15 VITALS
OXYGEN SATURATION: 95 % | BODY MASS INDEX: 25.3 KG/M2 | SYSTOLIC BLOOD PRESSURE: 114 MMHG | WEIGHT: 180.7 LBS | TEMPERATURE: 97.5 F | DIASTOLIC BLOOD PRESSURE: 77 MMHG | RESPIRATION RATE: 16 BRPM | HEART RATE: 90 BPM | HEIGHT: 71 IN

## 2022-07-15 DIAGNOSIS — Z45.2 FITTING AND ADJUSTMENT OF VASCULAR CATHETER: ICD-10-CM

## 2022-07-15 DIAGNOSIS — C34.12 MALIGNANT NEOPLASM OF UPPER LOBE OF LEFT LUNG: ICD-10-CM

## 2022-07-15 DIAGNOSIS — Z79.899 HIGH RISK MEDICATION USE: ICD-10-CM

## 2022-07-15 DIAGNOSIS — C79.51 LUNG CANCER METASTATIC TO BONE: ICD-10-CM

## 2022-07-15 DIAGNOSIS — C91.10 CLL (CHRONIC LYMPHOCYTIC LEUKEMIA): ICD-10-CM

## 2022-07-15 DIAGNOSIS — C34.90 LUNG CANCER METASTATIC TO BONE: ICD-10-CM

## 2022-07-15 DIAGNOSIS — Z79.899 HIGH RISK MEDICATION USE: Primary | ICD-10-CM

## 2022-07-15 DIAGNOSIS — C91.10 CLL (CHRONIC LYMPHOCYTIC LEUKEMIA): Primary | ICD-10-CM

## 2022-07-15 DIAGNOSIS — D80.1 HYPOGAMMAGLOBULINEMIA: ICD-10-CM

## 2022-07-15 LAB
ALBUMIN SERPL-MCNC: 3.7 G/DL (ref 3.5–5.2)
ALBUMIN/GLOB SERPL: 1.5 G/DL (ref 1.1–2.4)
ALP SERPL-CCNC: 99 U/L (ref 38–116)
ALT SERPL W P-5'-P-CCNC: 9 U/L (ref 0–41)
ANION GAP SERPL CALCULATED.3IONS-SCNC: 10.4 MMOL/L (ref 5–15)
AST SERPL-CCNC: 8 U/L (ref 0–40)
BASOPHILS # BLD AUTO: 0.03 10*3/MM3 (ref 0–0.2)
BASOPHILS NFR BLD AUTO: 0.3 % (ref 0–1.5)
BILIRUB SERPL-MCNC: 0.4 MG/DL (ref 0.2–1.2)
BUN SERPL-MCNC: 18 MG/DL (ref 6–20)
BUN/CREAT SERPL: 12.9 (ref 7.3–30)
CALCIUM SPEC-SCNC: 8 MG/DL (ref 8.5–10.2)
CHLORIDE SERPL-SCNC: 105 MMOL/L (ref 98–107)
CO2 SERPL-SCNC: 24.6 MMOL/L (ref 22–29)
CREAT SERPL-MCNC: 1.39 MG/DL (ref 0.7–1.3)
DEPRECATED RDW RBC AUTO: 53.7 FL (ref 37–54)
EGFRCR SERPLBLD CKD-EPI 2021: 55.6 ML/MIN/1.73
EOSINOPHIL # BLD AUTO: 0.42 10*3/MM3 (ref 0–0.4)
EOSINOPHIL NFR BLD AUTO: 4.1 % (ref 0.3–6.2)
ERYTHROCYTE [DISTWIDTH] IN BLOOD BY AUTOMATED COUNT: 15.1 % (ref 12.3–15.4)
GLOBULIN UR ELPH-MCNC: 2.4 GM/DL (ref 1.8–3.5)
GLUCOSE SERPL-MCNC: 120 MG/DL (ref 74–124)
HCT VFR BLD AUTO: 41.6 % (ref 37.5–51)
HGB BLD-MCNC: 13 G/DL (ref 13–17.7)
IMM GRANULOCYTES # BLD AUTO: 0.05 10*3/MM3 (ref 0–0.05)
IMM GRANULOCYTES NFR BLD AUTO: 0.5 % (ref 0–0.5)
LYMPHOCYTES # BLD AUTO: 0.93 10*3/MM3 (ref 0.7–3.1)
LYMPHOCYTES NFR BLD AUTO: 9.1 % (ref 19.6–45.3)
MCH RBC QN AUTO: 29.9 PG (ref 26.6–33)
MCHC RBC AUTO-ENTMCNC: 31.3 G/DL (ref 31.5–35.7)
MCV RBC AUTO: 95.6 FL (ref 79–97)
MONOCYTES # BLD AUTO: 1.02 10*3/MM3 (ref 0.1–0.9)
MONOCYTES NFR BLD AUTO: 10 % (ref 5–12)
NEUTROPHILS NFR BLD AUTO: 7.73 10*3/MM3 (ref 1.7–7)
NEUTROPHILS NFR BLD AUTO: 76 % (ref 42.7–76)
NRBC BLD AUTO-RTO: 0 /100 WBC (ref 0–0.2)
PLATELET # BLD AUTO: 207 10*3/MM3 (ref 140–450)
PMV BLD AUTO: 11.2 FL (ref 6–12)
POTASSIUM SERPL-SCNC: 4.5 MMOL/L (ref 3.5–4.7)
PROT SERPL-MCNC: 6.1 G/DL (ref 6.3–8)
RBC # BLD AUTO: 4.35 10*6/MM3 (ref 4.14–5.8)
SODIUM SERPL-SCNC: 140 MMOL/L (ref 134–145)
WBC NRBC COR # BLD: 10.18 10*3/MM3 (ref 3.4–10.8)

## 2022-07-15 PROCEDURE — 80053 COMPREHEN METABOLIC PANEL: CPT

## 2022-07-15 PROCEDURE — 25010000002 HEPARIN LOCK FLUSH PER 10 UNITS: Performed by: INTERNAL MEDICINE

## 2022-07-15 PROCEDURE — 96413 CHEMO IV INFUSION 1 HR: CPT

## 2022-07-15 PROCEDURE — 25010000002 PEMBROLIZUMAB 100 MG/4ML SOLUTION 4 ML VIAL: Performed by: NURSE PRACTITIONER

## 2022-07-15 PROCEDURE — 85025 COMPLETE CBC W/AUTO DIFF WBC: CPT

## 2022-07-15 PROCEDURE — 99214 OFFICE O/P EST MOD 30 MIN: CPT | Performed by: NURSE PRACTITIONER

## 2022-07-15 RX ORDER — SODIUM CHLORIDE 9 MG/ML
250 INJECTION, SOLUTION INTRAVENOUS ONCE
Status: COMPLETED | OUTPATIENT
Start: 2022-07-15 | End: 2022-07-15

## 2022-07-15 RX ORDER — SODIUM CHLORIDE 0.9 % (FLUSH) 0.9 %
10 SYRINGE (ML) INJECTION AS NEEDED
Status: CANCELLED | OUTPATIENT
Start: 2022-07-15

## 2022-07-15 RX ORDER — HEPARIN SODIUM (PORCINE) LOCK FLUSH IV SOLN 100 UNIT/ML 100 UNIT/ML
500 SOLUTION INTRAVENOUS AS NEEDED
Status: DISCONTINUED | OUTPATIENT
Start: 2022-07-15 | End: 2022-07-15 | Stop reason: HOSPADM

## 2022-07-15 RX ORDER — SODIUM CHLORIDE 9 MG/ML
250 INJECTION, SOLUTION INTRAVENOUS ONCE
Status: CANCELLED | OUTPATIENT
Start: 2022-07-15

## 2022-07-15 RX ORDER — SODIUM CHLORIDE 9 MG/ML
500 INJECTION, SOLUTION INTRAVENOUS ONCE
Status: COMPLETED | OUTPATIENT
Start: 2022-07-15 | End: 2022-07-15

## 2022-07-15 RX ORDER — SODIUM CHLORIDE 0.9 % (FLUSH) 0.9 %
10 SYRINGE (ML) INJECTION AS NEEDED
Status: DISCONTINUED | OUTPATIENT
Start: 2022-07-15 | End: 2022-07-15 | Stop reason: HOSPADM

## 2022-07-15 RX ORDER — HEPARIN SODIUM (PORCINE) LOCK FLUSH IV SOLN 100 UNIT/ML 100 UNIT/ML
500 SOLUTION INTRAVENOUS AS NEEDED
Status: CANCELLED | OUTPATIENT
Start: 2022-07-15

## 2022-07-15 RX ADMIN — Medication 10 ML: at 12:53

## 2022-07-15 RX ADMIN — SODIUM CHLORIDE 500 ML: 9 INJECTION, SOLUTION INTRAVENOUS at 12:26

## 2022-07-15 RX ADMIN — Medication 500 UNITS: at 12:54

## 2022-07-15 RX ADMIN — SODIUM CHLORIDE 250 ML: 9 INJECTION, SOLUTION INTRAVENOUS at 12:00

## 2022-07-15 RX ADMIN — SODIUM CHLORIDE 200 MG: 9 INJECTION, SOLUTION INTRAVENOUS at 12:12

## 2022-07-15 NOTE — NURSING NOTE
Per Naomi VALADEZ, pt to receive 500cc NS today. Pt informed and v/u. Pt given approx 400cc and stated he needed to leave to take wife to a doctor's appt. Pt instructed to increase fluid intake at home as well. He v/u.

## 2022-07-15 NOTE — PROGRESS NOTES
Subjective Patient with worsening issues from left jaw osteonecrosis, discussed with oral surgery as assessments ongoing for possible procedures.  Patient to be seen by infectious disease next week, discussed previous effectiveness of improvement/treatment for his hypogammaglobulinemia with IVIG.                                                                                                                        REASON FOR FOLLOW UP:  1.  Chronic lymphocytic leukemia with extensive lymphadenopathy and possible pleural involvement. Initiation of Treanda, Rituxan May 1, 2019.  2.  Hypogammaglobulinemia.  Status is post IVIG  3.  Significant response on scans June 27, 2019.  Treatment changed to Imbruvica 420 mg daily.  4.  Metastatic non-small cell lung carcinoma on Keytruda  5.  Patient seen 2/18/2022 with left jaw/gumline swelling pain, associated hematoma?  Osteonecrosis.  Restart of Keytruda 3/4/2022, Xgeva discontinued  6.  Restaging via CT 4/12/2022, continued response, Keytruda continued, referral to dermatology for worsening psoriasis.  7.  Jaw osteonecrosis, undergoing therapy through oral surgery, IVIG anticipated, infectious disease consult planned         HISTORY OF PRESENT ILLNESS:    The patient is a  66 y.o. male with the above mentioned history here today for lab review and evaluation due for Keytruda.  Infectious disease currently has him on Augmentin as well as amoxicillin for his osteonecrosis of the jaw.  He is going the first week of August for second opinion.  Patient states that he may possibly need surgery.  He does note occasional pain in his ribs.  This comes and goes.  He has morphine and Norco that he uses for pain control although he admits he does not take either 1 regularly.  He also uses meloxicam intermittently for her pain in his mouth.  He is having difficulty eating due to discomfort in his gums and teeth, although he has actually gained weight since his last visit.  Patient is  "also struggled with skin eruptions but has not seen dermatology recently.  He typically is followed by Dr. Regan Damon.    Patient also continues on Imbruvica 420 mg daily, tolerating well.    Past Medical History, Past Surgical History, Social History, Family History have been reviewed and are without significant changes except as mentioned.    Hematologic/oncologic history:    with the above-mentioned history, returns the office  in anticipation of his 19th dose of Keytruda which he continues to receive every 3 weeks.  He also receives Xgeva every 6 weeks which was given 10/14/2021.  He continues to tolerate therapy remarkably well.  He denies fevers or chills, signs or symptoms of infection.  He denies any new adenopathy.  His pain is well controlled though he has begun to have pain involving his right knee that produces  right hamstring pain.  He is without worsening shortness of breath or chest pain.  He does not feel he is in need of thoracentesis.  He is not in need of a refill of his pain medications today.  His bowels are moving without difficulty while on narcotics.  He denies B symptoms, has only mild bruising with Imbruvica.  We have the patient proceed with additional testing undergoing plain views of his right knee showing a right knee effusion with medial compartment narrowing and no suspicious bone lesion.  There is benign periosteal calcification along the distal right femur.  A PET/CT 11/11 went on to demonstrate a complete metabolic response to therapy compared to PET/CT from 9/23/2020.  This includes his primary lung neoplasm left upper lobe, large osseous metastatic lesion the right chest wall region from the eighth rib, small left adrenal metastasis, and no evidence of disease involving the distal left femur.      The patient is seen 11/24/2021 continue to do well though indicating that his right knee is \"something I can manage at the moment\".  He prefers not to have orthopedic assessment at " this point.  He is concerned, ever, about skin lesions that are developing primarily on his calf regions and into his right thigh.    The patient was seen by Dr. Regan Damon 11/29/2021 and felt to be consistent with psoriasiform dermatitis treated with intralesional therapy.  Was also started on a moisturizer and lipid therapy.  The patient return for assessment 12/15/2021 and treated with cycle #21 Keytruda, returned 1/7/2022 for cycle #22 and 1/28/2022 for cycle #23.  At that visit he had developed adenopathy several weeks previous and was treated with a Medrol Dosepak.   The patient was next seen 2/18/2022 and indicates that though his adenopathy has improved after treatment described above that his jaw has become painful swollen and tender with additional bleeding.     The patient was referred to his primary dentist who then had him seen oral surgeon-Dr. Ezequiel Davila-reached at [phone] who felt that this was osteonecrosis and should be treated symptomatically.  As the patient reviewed 3/4/2022, wonderfully, his oral issues have nearly resolved with the gumline returning to essentially its previous state, no swelling, minimal erythema, no discharge and no additional pain.  He does have follow-up with oral surgery in the next several weeks and we discussed restarting his Keytruda scanning him likely in April 2022 in general.   He returns to the office on 3/25/2022 in follow-up in anticipation of cycle 24 Keytruda. He reports the left side of his jaw has improved. However, he is now having issues with the right lower side. He reports seeing oral surgery 4 days ago. At this time, imagining was obtained and sent off for evaluation. He reports he is waiting to hear back from the oral surgeon regarding these x-rays.  He also reports that the oral surgeon Dr. Ezequiel Davila, started him on amoxicillin Monday.  He states the antibiotic has helped to improve the pain in his jaw.  Denies nausea or vomiting.  Denies  "diarrhea or constipation.    The patient continued his immunotherapy taking Keytruda 3/25/2022 and underwent repeat CT chest abdomen and pelvis 4/12/2022 that is reviewed with him 4/15/2022.  Wonderfully there is no substantial change with a small to moderate right pleural effusion that decreased in size, no additional pulmonary nodule or new metastatic disease, multifocal osteosclerotic metastatic disease without change and no new findings in the abdomen or pelvis.    The patient is seen 4/15/2022 indicating that he is actually feeling well in terms of general symptoms.  This includes lack of progression from mouth pain, ability to eat without discomfort.  He is, however, having worsening psoriasis particular in his lower extremities and has been reviewed by dermatology previously.    The patient is reviewed 6/17/2022 having been seen by oral surgery with progression of his osteonecrosis and skin eruption.  He is currently undergoing assessment by infectious disease within the next week and we have reviewed that previously he responded to IVIG for in-hospital refractory sinusitis.  As a result he is asked to undergo reassessment for his immunoglobulin levels today and return next week for 400 mg/kg of IVIG infusion which we will continue monthly.        Objective      Vitals:    07/15/22 1117   BP: 114/77   Pulse: 90   Resp: 16   Temp: 97.5 °F (36.4 °C)   TempSrc: Temporal   SpO2: 95%   Weight: 82 kg (180 lb 11.2 oz)   Height: 180.3 cm (70.98\")   PainSc: 0-No pain     Current Status 7/15/2022   ECOG score 0       Physical Exam  Vitals and nursing note reviewed.   Constitutional:       Appearance: Normal appearance. He is well-developed.   HENT:      Head: Normocephalic and atraumatic.      Nose: Nose normal.   Eyes:      Pupils: Pupils are equal, round, and reactive to light.   Cardiovascular:      Rate and Rhythm: Normal rate and regular rhythm.      Heart sounds: Normal heart sounds.   Pulmonary:      Effort: " Pulmonary effort is normal. No respiratory distress.      Breath sounds: Normal breath sounds. No wheezing, rhonchi or rales.   Abdominal:      General: Bowel sounds are normal. There is no distension.      Palpations: Abdomen is soft.      Tenderness: There is no abdominal tenderness.   Musculoskeletal:         General: Normal range of motion.      Cervical back: Normal range of motion and neck supple.   Skin:     General: Skin is warm and dry.      Comments: erythematous psoriasiform lesions noted on lower extremities bilaterally, mildly pruritic, no tenderness, with a scale component, varying in size   Neurological:      Mental Status: He is alert and oriented to person, place, and time.   Psychiatric:         Behavior: Behavior normal.         RECENT LABS:  Results from last 7 days   Lab Units 07/15/22  1005   WBC 10*3/mm3 10.18   NEUTROS ABS 10*3/mm3 7.73*   HEMOGLOBIN g/dL 13.0   HEMATOCRIT % 41.6   PLATELETS 10*3/mm3 207     Results from last 7 days   Lab Units 07/15/22  1005   SODIUM mmol/L 140   POTASSIUM mmol/L 4.5   CHLORIDE mmol/L 105   CO2 mmol/L 24.6   BUN mg/dL 18   CREATININE mg/dL 1.39*   CALCIUM mg/dL 8.0*   ALBUMIN g/dL 3.70   BILIRUBIN mg/dL 0.4   ALK PHOS U/L 99   ALT (SGPT) U/L 9   AST (SGOT) U/L 8   GLUCOSE mg/dL 120         FISH prognostic panel-Positive for deletion of 13 q. 14.3        Assessment & Plan   1.  CLL presenting with significant lymphocytosis, lymphadenopathy and splenomegaly.     · Positive for deletion of 13 q. 14.3.    · Patient status post 2 cycles of Treanda Rituxan with excellent response.    · Imaging 6/27/2019 with significant decrease in adenopathy.  Treanda Rituxan discontinued   · Imbruvica 420 mg daily initiated 7/25/2019.  · He continues on Imbruvica, without indication of progression of his CLL, without progressive lymphadenopathy on CT scans.  · Patient has resolving adenopathy particularly cervical regions 2/18/2022  · 3/25/2022 patient is without worsening  adenopathy on exam today.  Continue Imbruvica.  · 7/15/2022 continues on Imbruvica 420 mg daily.    2.  Pulmonary nodules/infiltrates.  He is  status post bronchoscopy.  Is unclear at this time whether these findings are infectious or possibly related to his lymphoproliferative disorder.  This is seen to be improving on latest scans.    3.  Hypogammaglobulinemia-status post IVIG with resolution of sinusitis.   · Anticipate trial of IVIG with the patient now having progression of his osteonecrosis and dermal infection.    4.  Non-small cell lung carcinoma  · August 26 2020 revealing a new 1.6 cm spiculated nodule within the left upper lobe, 1.2 cm left adrenal nodule, bone windows with a soft tissue mass involving the right eighth rib measuring 3.7 x 2.6 cm.    · Biopsy was consistent with adenocarcinoma CK 7+, CK 20-, lung primary suspected clinically, molecular panel not yet obtained.  · PET/CT 9/23/2020 demonstrating asymmetric uptake within the right parotid gland which is unremarkable appearance on noncontrasted CT, SUV of 6 compared to 2.7.  There is no hilar, mediastinal or axillary adenopathy, findings of asymmetric moderate to intense FDG uptake within superior aspect the right chest wall anterior to the right first rib with an irregular hypodense lesion measuring 1.8 x 1.6 and SUV 4.9.  This had not been seen June 27, 2019.  There is an intensely FDG avid nodule within the lingula measuring 1.9 x 1.5 history of 12.4, FDG avid left adrenal nodule 1.9 x 1.5 with an issue 21.2.    · Evaluated by radiation oncology therapy September 29 and started on radiation therapy to the right chest wall-35 Gy in 10 fractions.   ·  Plans were also then proceed with SBRT to the solitary left upper lobe lesion pending insurance approval.  · Further testing including TPS of 20% and foundation CDX with a TMB of greater than 10 mutations per megabase (28 mutations per megabase) and the indication that several immunotherapies  could be useful in this patient's care.  Additional genomic findings include BRAF G469R/that trametinib could be useful and STK11/that everolimus could be useful.  · Keytruda initiated 10/21/2021   · Reassessment after 2 cycles of Keytruda with enlargement of the right eighth rib lesion,enlargement of spiculated left upper lobe lung mass, moderate to large right-sided pleural effusion, new left adrenal mass and enlarged retrocrural lymph node.?Pseudoprogression  · Xgeva last given 12/30/2020  · Follow-up scans 1/6/2021 demonstrated marked improvement in his right eighth rib lesion, resolution of left upper lobe spiculated mass, residual large right pleural effusion, resolution of left adrenal metastasis.   · Right pleural effusion, with thoracentesis 1/14/2021 with 1500 mils removed.  Pathology consistent with malignant effusion   · CT scans 4/5/2021 with response noted in the left upper lobe density.  · He continues to receive Keytruda every 3 weeks along with Xgeva every 6 weeks.  · Repeat scan 7/15/2021 without evidence of recurrence or progressive disease.  Plans to continue Keytruda every 3 weeks with repeat scans in 4 to 6 months  · Patient status post PET/CT 11/11/2021 with hyper response, approximate remission status, plan to proceed with cycle #20 Keytruda  · Patient seen 2/18/2022 with Keytruda held while his oral issues are addressed-concern for osteonecrosis of the jaw.  · Patiently diagnosed with osteonecrosis of the jaw, seen by oral surgery, started on antibiotic therapy and Peridex with substantial improvement, Xgeva discontinued as discussed below.  · 3/25/2022 proceed with cycle #24 Keytruda today.  Follow-up CT scans 4/12/2022 without evidence of progressive disease, stable findings including osteosclerotic metastatic disease.  Keytruda continued  · Patient seen 6/17/2022 with Keytruda infusion.  · 7/15/2022 here for continued Keytruda.    5.  Left knee metastasis  · Evaluated by orthopedic  oncology, Dr. Eliu Ochoa  · Admission 1/7/2021 undergoing stabilization of left femoral bone lesion, prophylactic stabilization with intramedullary implants.  · It was suggested we delay Xgeva or Zometa to allow bone healing  · Completed radiation with 10 fractions 2/10/2021  · Xgeva resumed 4/29/2021, he continues to receive this every 6 weeks.    · Additional assessment 2/18/2022 with left jaw pain, subsequent diagnosis of osteoporosis, Xgeva discontinued  · Patient continues follow-up with oral surgery, progression of osteoporosis symptomatically, dermal eruption left side of mandibular region, IVIG anticipated  · Xgeva DISCONTINUED due to osteonecrosis of the jaw       6.  Malignant right pleural effusion  · Ultrasound thoracentesis 1/14/2021 1500 mL removed  · Chest x-ray 2/25/2021 with moderate right pleural effusion  · Progressive symptoms with worsening pain 4/5/2021  · Ultrasound-guided thoracentesis 4/13/2021 with 2050 ml removed.  · Plans for Pleurx catheter with thoracic surgery, however, pleural effusion has not recurred.  Patient seen 11/4/2021 with chest x-ray planned.        7.  Cancer related pain  · Pain is most prominent in his right rib, utilizing MS Contin 30 mg 3 times a day  · Hydrocodone/acetaminophen 10/325 as needed for breakthrough pain.  Currently taking 3 to 4 tablets daily  · He is not currently in need of a refill of pain medications  · Discussed MS Contin at 30 mg p.o. every 8 hours, Norco 10/325 2 p.o. every 6 hours  · He is currently using pain medication as needed for jaw pain.    8.  Insomnia  · Continuing to follow with behavioral oncology  · Continuing Remeron 7.5 mg nightly with good control    9. Health maintenance  · Status post COVID-19 booster, SARS antibody pending today  · The patient is due for Shingrix and Prevnar which will both be administered in the clinic 9/23/2021    10.  Skin lesions  · Uncertain of etiology, unexpected findings for usual skin lesions associated  with immunotherapy  · Request dermatologic assessment-psoriasiform dermatitis  · Patient continues to report skin lesions particularly lower extremities that are worsening.  Plan to refer back to Dr. Damon considering they are progressing and are thought to be, in part, related to immunotherapy.    11.  Right knee pain  · Osteoarthritis, orthopedic assessment upon patient's request.   · Resolved.  Denies any knee pain today     12.  Hypocalcemia  · Currently corrected    Plan:  1. Proceed with Keytruda today.  2. Continue Imbruvica 420 mg daily.  3. Continue morphine and Norco if needed for pain control.  4. Continue meloxicam.  5. Continue Remeron 15 mg nightly.  6. Patient encouraged to follow-up with Dr. Damon for his psoriasis.  7. Return in 3 weeks for follow-up with MD with repeat labs, reevaluation, due for his next dose of Keytruda.  8. Patient to receive an additional 500 cc of IV normal saline.  9. Continue to follow closely with infectious disease, Dr. Champion and continue antibiotics as prescribed for osteonecrosis of the jaw.    10. Follow-up with oral surgeon, patient getting a second opinion at the beginning of August.    11. Call/return sooner should develop new concerning problem   .     Patient continues on high risk medication requiring close monitoring for toxicity    Naomi Martinez, APRN   7/15/2022

## 2022-07-22 ENCOUNTER — INFUSION (OUTPATIENT)
Dept: ONCOLOGY | Facility: HOSPITAL | Age: 67
End: 2022-07-22

## 2022-07-22 VITALS
WEIGHT: 178.8 LBS | RESPIRATION RATE: 18 BRPM | SYSTOLIC BLOOD PRESSURE: 150 MMHG | TEMPERATURE: 97.2 F | HEART RATE: 67 BPM | OXYGEN SATURATION: 95 % | HEIGHT: 71 IN | DIASTOLIC BLOOD PRESSURE: 79 MMHG | BODY MASS INDEX: 25.03 KG/M2

## 2022-07-22 DIAGNOSIS — D80.1 HYPOGAMMAGLOBULINEMIA: Primary | ICD-10-CM

## 2022-07-22 DIAGNOSIS — Z45.2 FITTING AND ADJUSTMENT OF VASCULAR CATHETER: ICD-10-CM

## 2022-07-22 DIAGNOSIS — C91.10 CLL (CHRONIC LYMPHOCYTIC LEUKEMIA): ICD-10-CM

## 2022-07-22 LAB
BASOPHILS # BLD AUTO: 0.17 10*3/MM3 (ref 0–0.2)
BASOPHILS NFR BLD AUTO: 1.3 % (ref 0–1.5)
DEPRECATED RDW RBC AUTO: 52.2 FL (ref 37–54)
EOSINOPHIL # BLD AUTO: 2.69 10*3/MM3 (ref 0–0.4)
EOSINOPHIL NFR BLD AUTO: 21 % (ref 0.3–6.2)
ERYTHROCYTE [DISTWIDTH] IN BLOOD BY AUTOMATED COUNT: 14.9 % (ref 12.3–15.4)
HCT VFR BLD AUTO: 41.6 % (ref 37.5–51)
HGB BLD-MCNC: 13.3 G/DL (ref 13–17.7)
IGA1 MFR SER: <50 MG/DL (ref 70–400)
IGG1 SER-MCNC: 396 MG/DL (ref 700–1600)
IGM SERPL-MCNC: <25 MG/DL (ref 40–230)
IMM GRANULOCYTES # BLD AUTO: 0.07 10*3/MM3 (ref 0–0.05)
IMM GRANULOCYTES NFR BLD AUTO: 0.5 % (ref 0–0.5)
LYMPHOCYTES # BLD AUTO: 1 10*3/MM3 (ref 0.7–3.1)
LYMPHOCYTES NFR BLD AUTO: 7.8 % (ref 19.6–45.3)
MCH RBC QN AUTO: 30.2 PG (ref 26.6–33)
MCHC RBC AUTO-ENTMCNC: 32 G/DL (ref 31.5–35.7)
MCV RBC AUTO: 94.5 FL (ref 79–97)
MONOCYTES # BLD AUTO: 0.88 10*3/MM3 (ref 0.1–0.9)
MONOCYTES NFR BLD AUTO: 6.9 % (ref 5–12)
NEUTROPHILS NFR BLD AUTO: 62.5 % (ref 42.7–76)
NEUTROPHILS NFR BLD AUTO: 7.98 10*3/MM3 (ref 1.7–7)
NRBC BLD AUTO-RTO: 0 /100 WBC (ref 0–0.2)
PLATELET # BLD AUTO: 231 10*3/MM3 (ref 140–450)
PMV BLD AUTO: 12.1 FL (ref 6–12)
RBC # BLD AUTO: 4.4 10*6/MM3 (ref 4.14–5.8)
WBC NRBC COR # BLD: 12.79 10*3/MM3 (ref 3.4–10.8)

## 2022-07-22 PROCEDURE — 25010000002 HEPARIN LOCK FLUSH PER 10 UNITS: Performed by: INTERNAL MEDICINE

## 2022-07-22 PROCEDURE — 85025 COMPLETE CBC W/AUTO DIFF WBC: CPT

## 2022-07-22 PROCEDURE — 25010000002 IMMUNE GLOBULIN (HUMAN) 30 GM/300ML SOLUTION: Performed by: NURSE PRACTITIONER

## 2022-07-22 PROCEDURE — 82784 ASSAY IGA/IGD/IGG/IGM EACH: CPT

## 2022-07-22 PROCEDURE — 96366 THER/PROPH/DIAG IV INF ADDON: CPT

## 2022-07-22 PROCEDURE — 36415 COLL VENOUS BLD VENIPUNCTURE: CPT

## 2022-07-22 PROCEDURE — 63710000001 DIPHENHYDRAMINE PER 50 MG: Performed by: NURSE PRACTITIONER

## 2022-07-22 PROCEDURE — 96365 THER/PROPH/DIAG IV INF INIT: CPT

## 2022-07-22 RX ORDER — ACETAMINOPHEN 325 MG/1
650 TABLET ORAL ONCE
Status: COMPLETED | OUTPATIENT
Start: 2022-07-22 | End: 2022-07-22

## 2022-07-22 RX ORDER — SODIUM CHLORIDE 9 MG/ML
250 INJECTION, SOLUTION INTRAVENOUS ONCE
Status: COMPLETED | OUTPATIENT
Start: 2022-07-22 | End: 2022-07-22

## 2022-07-22 RX ORDER — SODIUM CHLORIDE 0.9 % (FLUSH) 0.9 %
10 SYRINGE (ML) INJECTION AS NEEDED
Status: CANCELLED | OUTPATIENT
Start: 2022-07-22

## 2022-07-22 RX ORDER — HEPARIN SODIUM (PORCINE) LOCK FLUSH IV SOLN 100 UNIT/ML 100 UNIT/ML
500 SOLUTION INTRAVENOUS AS NEEDED
Status: DISCONTINUED | OUTPATIENT
Start: 2022-07-22 | End: 2022-07-22 | Stop reason: HOSPADM

## 2022-07-22 RX ORDER — SODIUM CHLORIDE 0.9 % (FLUSH) 0.9 %
10 SYRINGE (ML) INJECTION AS NEEDED
Status: DISCONTINUED | OUTPATIENT
Start: 2022-07-22 | End: 2022-07-22 | Stop reason: HOSPADM

## 2022-07-22 RX ORDER — HEPARIN SODIUM (PORCINE) LOCK FLUSH IV SOLN 100 UNIT/ML 100 UNIT/ML
500 SOLUTION INTRAVENOUS AS NEEDED
Status: CANCELLED | OUTPATIENT
Start: 2022-07-22

## 2022-07-22 RX ORDER — DIPHENHYDRAMINE HCL 25 MG
25 CAPSULE ORAL ONCE
Status: COMPLETED | OUTPATIENT
Start: 2022-07-22 | End: 2022-07-22

## 2022-07-22 RX ADMIN — IMMUNE GLOBULIN INFUSION (HUMAN) 30 G: 100 INJECTION, SOLUTION INTRAVENOUS; SUBCUTANEOUS at 08:30

## 2022-07-22 RX ADMIN — Medication 500 UNITS: at 11:09

## 2022-07-22 RX ADMIN — Medication 10 ML: at 11:09

## 2022-07-22 RX ADMIN — ACETAMINOPHEN 650 MG: 325 TABLET ORAL at 08:03

## 2022-07-22 RX ADMIN — SODIUM CHLORIDE 250 ML: 9 INJECTION, SOLUTION INTRAVENOUS at 08:03

## 2022-07-22 RX ADMIN — DIPHENHYDRAMINE HYDROCHLORIDE 25 MG: 25 CAPSULE ORAL at 08:03

## 2022-07-25 DIAGNOSIS — C34.90 LUNG CANCER METASTATIC TO BONE: ICD-10-CM

## 2022-07-25 DIAGNOSIS — C79.51 LUNG CANCER METASTATIC TO BONE: ICD-10-CM

## 2022-07-25 RX ORDER — MORPHINE SULFATE 30 MG/1
30 TABLET, FILM COATED, EXTENDED RELEASE ORAL 2 TIMES DAILY
Qty: 60 TABLET | Refills: 0 | Status: SHIPPED | OUTPATIENT
Start: 2022-07-25

## 2022-07-28 ENCOUNTER — SPECIALTY PHARMACY (OUTPATIENT)
Dept: PHARMACY | Facility: HOSPITAL | Age: 67
End: 2022-07-28

## 2022-07-28 RX ORDER — MIRTAZAPINE 15 MG/1
7.5 TABLET, FILM COATED ORAL NIGHTLY
Qty: 90 TABLET | Refills: 0 | Status: SHIPPED | OUTPATIENT
Start: 2022-07-28 | End: 2022-10-28 | Stop reason: SDUPTHER

## 2022-07-28 NOTE — PROGRESS NOTES
I renewed the [facility] tobias. It's renewed from 8/28/22-8/27/23 for the [facility]

## 2022-08-01 RX ORDER — MELOXICAM 15 MG/1
TABLET ORAL
Qty: 30 TABLET | Refills: 0 | Status: SHIPPED | OUTPATIENT
Start: 2022-08-01

## 2022-08-04 NOTE — PROGRESS NOTES
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                                                                                                                                                           REASON FOR FOLLOW UP:  1.  Chronic lymphocytic leukemia with extensive lymphadenopathy and possible pleural involvement. Initiation of Treanda, Rituxan May 1, 2019.  2.  Hypogammaglobulinemia.  Status is post IVIG  3.  Significant response on scans June 27, 2019.  Treatment changed to Imbruvica 420 mg daily.  4.  Metastatic non-small cell lung carcinoma on Keytruda  5.  Patient seen 2/18/2022 with left jaw/gumline swelling pain, associated hematoma?  Osteonecrosis.  Restart of Keytruda 3/4/2022, Xgeva discontinued  6.  Restaging via CT 4/12/2022, continued response, Keytruda continued, referral to dermatology for worsening psoriasis.  7.  Jaw osteonecrosis, undergoing therapy through oral surgery, IVIG anticipated, infectious disease and oral surgery follow-up         HISTORY OF PRESENT ILLNESS:    The patient is a  66 y.o. male with the above mentioned history here today for lab review and evaluation due for Keytruda. infectious disease currently has him on Augmentin as well as amoxicillin for his osteonecrosis of the jaw and review of the records 7/6/2022.  He is going for an additional opinion 8/8/2022 for oral surgery.  It has been thought that he might require surgery but he appears to be making a gradual improvement with slow resolution of fistulous tracts that had developed and around his osteonecrosis.  He continues to have eating discomfort but is able to maintain his weight.                                                       As concerns his lung malignancy he does note occasional pain in his ribs and infrequent shortness of breath.  This comes and goes.  He has morphine and Norco that he uses for pain control although he admits he does not take  "either 1 regularly.  He also uses meloxicam intermittently for her pain in his mouth.                                             Patient is also struggled with skin eruptions but has not seen dermatology recently.  He typically is followed by Dr. Regan Damon.    Patient also continues on Imbruvica 420 mg daily, tolerating well and he continues this currently.    Past Medical History, Past Surgical History, Social History, Family History have been reviewed and are without significant changes except as mentioned.    Hematologic/oncologic history:    As concerns his CLL history he initially required Treanda Rituxan 5/1/2019 for 2 cycles and then initiated Imbruvica 420 mg daily initiated 7/25/2019.       As concerns stage IV non-small cell lung carcinoma he returns the office continuing Keytruda which he continues to receive every 3 weeks which was initiated 10/21/2020.  He also receives Xgeva every 6 weeks which was initially given 12/2/2020 and discontinued 1/7/2022 secondary to osteonecrosis.      Additional issues that developed during treatment included right knee pain with plain views of his right knee showing a right knee effusion with medial compartment narrowing and no suspicious bone lesion.  There is benign periosteal calcification along the distal right femur.  A PET/CT 11/11 went on to demonstrate a complete metabolic response to therapy compared to PET/CT from 9/23/2020.  This includes his primary lung neoplasm left upper lobe, large osseous metastatic lesion the right chest wall region from the eighth rib, small left adrenal metastasis, and no evidence of disease involving the distal left femur.      The patient was seen 11/24/2021 continue to do well though indicating that his right knee is \"something I can manage at the moment\".  He prefers not to have orthopedic assessment at this point.  He is concerned, ever, about skin lesions that are developing primarily on his calf regions and into his right " thigh.    The patient was seen by Dr. Regan Damon 11/29/2021 and felt to be consistent with psoriasiform dermatitis treated with intralesional therapy.  Was also started on a moisturizer and lipid therapy.  The patient returned for assessment 12/15/2021 and treated with cycle #21 Keytruda, returned 1/7/2022 for cycle #22 and 1/28/2022 for cycle #23.  At that visit he had developed adenopathy several weeks previous and was treated with a Medrol Dosepak.   The patient was next seen 2/18/2022 and indicates that though his adenopathy has improved after treatment described above that his jaw has become painful swollen and tender with additional bleeding.     The patient was referred to his primary dentist who then had him seen oral surgeon-Dr. Ezequiel Davila-reached at [phone] who felt that this was osteonecrosis and should be treated symptomatically.  As the patient reviewed 3/4/2022, wonderfully, his oral issues have nearly resolved with the gumline returning to essentially its previous state, no swelling, minimal erythema, no discharge and no additional pain.  He does have follow-up with oral surgery in the next several weeks and we discussed restarting his Keytruda scanning him likely in April 2022 in general.   He returns to the office on 3/25/2022 in follow-up in anticipation of cycle 24 Keytruda. He reports the left side of his jaw has improved.    However, he was now having issues with the right lower side. He was seen by neurosurgery with additional films and started on oral amoxicillin by his oral surgeon.      The patient continued his immunotherapy taking Keytruda 3/25/2022 and underwent repeat CT chest abdomen and pelvis 4/12/2022 that is reviewed with him 4/15/2022.  Wonderfully there is no substantial change with a small to moderate right pleural effusion that decreased in size, no additional pulmonary nodule or new metastatic disease, multifocal osteosclerotic metastatic disease without change and no  "new findings in the abdomen or pelvis.    The patient was seen 4/15/2022 indicating that he is actually feeling well in terms of general symptoms.  This includes lack of progression from mouth pain, ability to eat without discomfort.  He is, however, having worsening psoriasis particular in his lower extremities and has been reviewed by dermatology previously.    The patient was reviewed 6/17/2022 having been seen by oral surgery with progression of his osteonecrosis and skin eruption.  He is currently undergoing assessment by infectious disease within the next week and we have reviewed that previously he responded to IVIG for in-hospital refractory sinusitis.  As a result he is asked to undergo reassessment for his immunoglobulin levels today and return next week for 400 mg/kg of IVIG infusion which we will continue monthly.    The patient was given IVIG 6/24/2022 and again 7/22/2022.  Assessment 8/5/2022 continue to show a degree of general improvement with oral surgery continue to follow him with a second opinion to be obtained  8/8/2022 when IVIG planned 8/19/2022 and 9/16/2022.      Objective      Vitals:    08/05/22 0754   BP: 119/73   Pulse: 91   Resp: 16   Temp: 98.6 °F (37 °C)   TempSrc: Temporal   SpO2: 95%   Weight: 80.6 kg (177 lb 9.6 oz)   Height: 180.3 cm (70.98\")   PainSc:   1     Current Status 7/15/2022   ECOG score 0       Physical Exam  Vitals and nursing note reviewed.   Constitutional:       Appearance: Normal appearance. He is well-developed.   HENT:      Head: Normocephalic and atraumatic.      Nose: Nose normal.      Mouth/Throat:      Comments: Exposed bone left mandible region, reduced general erythema per lower gumline, no exudate currently    Eyes:      Pupils: Pupils are equal, round, and reactive to light.   Cardiovascular:      Rate and Rhythm: Normal rate and regular rhythm.      Heart sounds: Normal heart sounds.   Pulmonary:      Effort: Pulmonary effort is normal. No respiratory " distress.      Breath sounds: Normal breath sounds. No wheezing, rhonchi or rales.   Abdominal:      General: Bowel sounds are normal. There is no distension.      Palpations: Abdomen is soft.      Tenderness: There is no abdominal tenderness.   Musculoskeletal:         General: Normal range of motion.      Cervical back: Normal range of motion and neck supple.   Skin:     General: Skin is warm and dry.      Comments: erythematous psoriasiform lesions noted on lower extremities bilaterally, mildly pruritic, no tenderness, with a scale component, varying in size   Neurological:      Mental Status: He is alert and oriented to person, place, and time.   Psychiatric:         Behavior: Behavior normal.         RECENT LABS:  Results from last 7 days   Lab Units 08/05/22  0727   WBC 10*3/mm3 9.89   NEUTROS ABS 10*3/mm3 6.41   HEMOGLOBIN g/dL 13.6   HEMATOCRIT % 44.8   PLATELETS 10*3/mm3 239     Results from last 7 days   Lab Units 08/05/22  0727   SODIUM mmol/L 141   POTASSIUM mmol/L 4.6   CHLORIDE mmol/L 105   CO2 mmol/L 24.4   BUN mg/dL 21*   CREATININE mg/dL 1.48*   CALCIUM mg/dL 8.9   ALBUMIN g/dL 3.80   BILIRUBIN mg/dL 0.4   ALK PHOS U/L 93   ALT (SGPT) U/L 8   AST (SGOT) U/L 12   GLUCOSE mg/dL 133*         FISH prognostic panel-Positive for deletion of 13 q. 14.3        Assessment & Plan   1.  CLL presenting with significant lymphocytosis, lymphadenopathy and splenomegaly.     · Positive for deletion of 13 q. 14.3.    · Patient status post 2 cycles of Treanda Rituxan with excellent response.    · Imaging 6/27/2019 with significant decrease in adenopathy.  Treanda Rituxan discontinued   · Imbruvica 420 mg daily initiated 7/25/2019.  · He continues on Imbruvica, without indication of progression of his CLL, without progressive lymphadenopathy on CT scans.  · Patient has resolving adenopathy particularly cervical regions 2/18/2022  · 3/25/2022 patient is without worsening adenopathy on exam today.  Continue  Imbruvica.  · 8/5/2022 continues on Imbruvica 420 mg daily.    2.  Pulmonary nodules/infiltrates.  He is  status post bronchoscopy.  Is unclear at this time whether these findings are infectious or possibly related to his lymphoproliferative disorder.  This is seen to be improving on latest scans.    3.  Hypogammaglobulinemia-status post IVIG with resolution of sinusitis.   · Anticipate trial of IVIG with the patient now having progression of his osteonecrosis and dermal infection.  · Patient seen 8/5/2022 with continue IVIG now scheduled 8/19/2022 and 9/16/2022.    4.  Non-small cell lung carcinoma  · August 26 2020 revealing a new 1.6 cm spiculated nodule within the left upper lobe, 1.2 cm left adrenal nodule, bone windows with a soft tissue mass involving the right eighth rib measuring 3.7 x 2.6 cm.    · Biopsy was consistent with adenocarcinoma CK 7+, CK 20-, lung primary suspected clinically, molecular panel not yet obtained.  · PET/CT 9/23/2020 demonstrating asymmetric uptake within the right parotid gland which is unremarkable appearance on noncontrasted CT, SUV of 6 compared to 2.7.  There is no hilar, mediastinal or axillary adenopathy, findings of asymmetric moderate to intense FDG uptake within superior aspect the right chest wall anterior to the right first rib with an irregular hypodense lesion measuring 1.8 x 1.6 and SUV 4.9.  This had not been seen June 27, 2019.  There is an intensely FDG avid nodule within the lingula measuring 1.9 x 1.5 history of 12.4, FDG avid left adrenal nodule 1.9 x 1.5 with an issue 21.2.    · Evaluated by radiation oncology therapy September 29 and started on radiation therapy to the right chest wall-35 Gy in 10 fractions.   ·  Plans were also then proceed with SBRT to the solitary left upper lobe lesion pending insurance approval.  · Further testing including TPS of 20% and foundation CDX with a TMB of greater than 10 mutations per megabase (28 mutations per megabase) and  the indication that several immunotherapies could be useful in this patient's care.  Additional genomic findings include BRAF [id number]/that trametinib could be useful and STK11/that everolimus could be useful.  · Keytruda initiated 10/21/2021   · Reassessment after 2 cycles of Keytruda with enlargement of the right eighth rib lesion,enlargement of spiculated left upper lobe lung mass, moderate to large right-sided pleural effusion, new left adrenal mass and enlarged retrocrural lymph node.?Pseudoprogression  · Xgeva last given 12/30/2020  · Follow-up scans 1/6/2021 demonstrated marked improvement in his right eighth rib lesion, resolution of left upper lobe spiculated mass, residual large right pleural effusion, resolution of left adrenal metastasis.   · Right pleural effusion, with thoracentesis 1/14/2021 with 1500 mils removed.  Pathology consistent with malignant effusion   · CT scans 4/5/2021 with response noted in the left upper lobe density.  · He continues to receive Keytruda every 3 weeks along with Xgeva every 6 weeks.  · Repeat scan 7/15/2021 without evidence of recurrence or progressive disease.  Plans to continue Keytruda every 3 weeks with repeat scans in 4 to 6 months  · Patient status post PET/CT 11/11/2021 with hyper response, approximate remission status, plan to proceed with cycle #20 Keytruda  · Patient seen 2/18/2022 with Keytruda held while his oral issues are addressed-concern for osteonecrosis of the jaw.  · Patiently diagnosed with osteonecrosis of the jaw, seen by oral surgery, started on antibiotic therapy and Peridex with substantial improvement, Xgeva discontinued as discussed below.  · 3/25/2022 proceed with cycle #24 Keytruda today.  Follow-up CT scans 4/12/2022 without evidence of progressive disease, stable findings including osteosclerotic metastatic disease.  Keytruda continued  · Patient seen 6/17/2022 with Keytruda infusion.  · Patient continued Keytruda every 3 weeks including  8/5/2022, consider repeat scans 6 months from previous-October 2022    5.  Left knee metastasis  · Evaluated by orthopedic oncology, Dr. Eliu Ochoa  · Admission 1/7/2021 undergoing stabilization of left femoral bone lesion, prophylactic stabilization with intramedullary implants.  · It was suggested we delay Xgeva or Zometa to allow bone healing  · Completed radiation with 10 fractions 2/10/2021  · Xgeva resumed 4/29/2021, he continues to receive this every 6 weeks.    · Additional assessment 2/18/2022 with left jaw pain, subsequent diagnosis of osteoporosis, Xgeva discontinued  · Patient continues follow-up with oral surgery, progression of osteoporosis symptomatically, dermal eruption left side of mandibular region, IVIG anticipated  · Xgeva DISCONTINUED due to osteonecrosis of the jaw       6.  Malignant right pleural effusion  · Ultrasound thoracentesis 1/14/2021 1500 mL removed  · Chest x-ray 2/25/2021 with moderate right pleural effusion  · Progressive symptoms with worsening pain 4/5/2021  · Ultrasound-guided thoracentesis 4/13/2021 with 2050 ml removed.  · Plans for Pleurx catheter with thoracic surgery, however, pleural effusion has not recurred.  Patient seen 11/4/2021 with chest x-ray planned.  Subsequent resolution noted on CT scan.        7.  Cancer related pain  · Pain is most prominent in his right rib, utilizing MS Contin 30 mg 3 times a day  · Hydrocodone/acetaminophen 10/325 as needed for breakthrough pain.  Currently taking 3 to 4 tablets daily  · He is not currently in need of a refill of pain medications  · Discussed MS Contin at 30 mg p.o. every 8 hours, Norco 10/325 2 p.o. every 6 hours  · He is currently using pain medication as needed for jaw pain.    8.  Insomnia  · Continuing to follow with behavioral oncology  · Continuing Remeron 7.5 mg nightly with good control    9. Health maintenance  · Status post COVID-19 booster, SARS antibody pending today  · The patient is due for Shingrix and  Prevnar which will both be administered in the clinic 9/23/2021    10.  Skin lesions  · Uncertain of etiology, unexpected findings for usual skin lesions associated with immunotherapy  · Request dermatologic assessment-psoriasiform dermatitis  · Patient continues to report skin lesions particularly lower extremities that are worsening.  Patient follows with Dr. Damon considering they are progressing and are thought to be, in part, related to immunotherapy.    11.  Right knee pain  · Osteoarthritis, orthopedic assessment upon patient's request.   · Resolved.  Denies any knee pain today     12.  Hypocalcemia  · Currently corrected    Plan:  1. Proceed with Keytruda 8/5/2022  2. Continue Imbruvica 420 mg daily.  3. Continue morphine and Norco if needed for pain control.  4. Continue meloxicam.  5. Continue Remeron 15 mg nightly.  6. Patient encouraged to follow-up with Dr. Damon for his psoriasis.  7. Return in 3 weeks for follow-up with NP with repeat labs, reevaluation, due for his next dose of Keytruda.  8. Continue to follow closely with infectious disease, Dr. Champion and continue antibiotics as prescribed for osteonecrosis of the jaw.    9. Follow-up with oral surgeon, patient getting a second opinion approximately August 8, 2022.  10. Plan to continue with IVIG 8/16/2022 and 9/16/2022.  11. Call/return sooner should develop new concerning problem   .   I spent 75 minutes caring for Dav on this date of service. This time includes time spent by me in the following activities: preparing for the visit, reviewing tests, obtaining and/or reviewing a separately obtained history, performing a medically appropriate examination and/or evaluation, counseling and educating the patient/family/caregiver, ordering medications, tests, or procedures, referring and communicating with other health care professionals, documenting information in the medical record, independently interpreting results and communicating that  information with the patient/family/caregiver and care coordination.     Patient continues on high risk medication requiring close monitoring for toxicity    Jostin Parekh MD   8/5/2022

## 2022-08-05 ENCOUNTER — INFUSION (OUTPATIENT)
Dept: ONCOLOGY | Facility: HOSPITAL | Age: 67
End: 2022-08-05

## 2022-08-05 ENCOUNTER — OFFICE VISIT (OUTPATIENT)
Dept: ONCOLOGY | Facility: CLINIC | Age: 67
End: 2022-08-05

## 2022-08-05 VITALS
SYSTOLIC BLOOD PRESSURE: 119 MMHG | WEIGHT: 177.6 LBS | HEART RATE: 91 BPM | RESPIRATION RATE: 16 BRPM | DIASTOLIC BLOOD PRESSURE: 73 MMHG | BODY MASS INDEX: 24.86 KG/M2 | HEIGHT: 71 IN | TEMPERATURE: 98.6 F | OXYGEN SATURATION: 95 %

## 2022-08-05 DIAGNOSIS — Z45.2 FITTING AND ADJUSTMENT OF VASCULAR CATHETER: ICD-10-CM

## 2022-08-05 DIAGNOSIS — C34.90 LUNG CANCER METASTATIC TO BONE: ICD-10-CM

## 2022-08-05 DIAGNOSIS — C91.10 CLL (CHRONIC LYMPHOCYTIC LEUKEMIA): ICD-10-CM

## 2022-08-05 DIAGNOSIS — C79.51 LUNG CANCER METASTATIC TO BONE: ICD-10-CM

## 2022-08-05 DIAGNOSIS — C34.12 MALIGNANT NEOPLASM OF UPPER LOBE OF LEFT LUNG: ICD-10-CM

## 2022-08-05 DIAGNOSIS — C79.51 METASTASIS TO BONE: ICD-10-CM

## 2022-08-05 DIAGNOSIS — Z79.899 HIGH RISK MEDICATION USE: ICD-10-CM

## 2022-08-05 DIAGNOSIS — Z79.899 HIGH RISK MEDICATION USE: Primary | ICD-10-CM

## 2022-08-05 DIAGNOSIS — C91.10 CLL (CHRONIC LYMPHOCYTIC LEUKEMIA): Primary | ICD-10-CM

## 2022-08-05 LAB
ALBUMIN SERPL-MCNC: 3.8 G/DL (ref 3.5–5.2)
ALBUMIN/GLOB SERPL: 1.5 G/DL (ref 1.1–2.4)
ALP SERPL-CCNC: 93 U/L (ref 38–116)
ALT SERPL W P-5'-P-CCNC: 8 U/L (ref 0–41)
ANION GAP SERPL CALCULATED.3IONS-SCNC: 11.6 MMOL/L (ref 5–15)
AST SERPL-CCNC: 12 U/L (ref 0–40)
BASOPHILS # BLD AUTO: 0.09 10*3/MM3 (ref 0–0.2)
BASOPHILS NFR BLD AUTO: 0.9 % (ref 0–1.5)
BILIRUB SERPL-MCNC: 0.4 MG/DL (ref 0.2–1.2)
BUN SERPL-MCNC: 21 MG/DL (ref 6–20)
BUN/CREAT SERPL: 14.2 (ref 7.3–30)
CALCIUM SPEC-SCNC: 8.9 MG/DL (ref 8.5–10.2)
CHLORIDE SERPL-SCNC: 105 MMOL/L (ref 98–107)
CO2 SERPL-SCNC: 24.4 MMOL/L (ref 22–29)
CREAT SERPL-MCNC: 1.48 MG/DL (ref 0.7–1.3)
DEPRECATED RDW RBC AUTO: 50.8 FL (ref 37–54)
EGFRCR SERPLBLD CKD-EPI 2021: 51.5 ML/MIN/1.73
EOSINOPHIL # BLD AUTO: 1.22 10*3/MM3 (ref 0–0.4)
EOSINOPHIL NFR BLD AUTO: 12.3 % (ref 0.3–6.2)
ERYTHROCYTE [DISTWIDTH] IN BLOOD BY AUTOMATED COUNT: 14.6 % (ref 12.3–15.4)
GLOBULIN UR ELPH-MCNC: 2.5 GM/DL (ref 1.8–3.5)
GLUCOSE SERPL-MCNC: 133 MG/DL (ref 74–124)
HCT VFR BLD AUTO: 44.8 % (ref 37.5–51)
HGB BLD-MCNC: 13.6 G/DL (ref 13–17.7)
IMM GRANULOCYTES # BLD AUTO: 0.03 10*3/MM3 (ref 0–0.05)
IMM GRANULOCYTES NFR BLD AUTO: 0.3 % (ref 0–0.5)
LYMPHOCYTES # BLD AUTO: 1.14 10*3/MM3 (ref 0.7–3.1)
LYMPHOCYTES NFR BLD AUTO: 11.5 % (ref 19.6–45.3)
MCH RBC QN AUTO: 29.1 PG (ref 26.6–33)
MCHC RBC AUTO-ENTMCNC: 30.4 G/DL (ref 31.5–35.7)
MCV RBC AUTO: 95.9 FL (ref 79–97)
MONOCYTES # BLD AUTO: 1 10*3/MM3 (ref 0.1–0.9)
MONOCYTES NFR BLD AUTO: 10.1 % (ref 5–12)
NEUTROPHILS NFR BLD AUTO: 6.41 10*3/MM3 (ref 1.7–7)
NEUTROPHILS NFR BLD AUTO: 64.9 % (ref 42.7–76)
NRBC BLD AUTO-RTO: 0 /100 WBC (ref 0–0.2)
PLATELET # BLD AUTO: 239 10*3/MM3 (ref 140–450)
PMV BLD AUTO: 11.7 FL (ref 6–12)
POTASSIUM SERPL-SCNC: 4.6 MMOL/L (ref 3.5–4.7)
PROT SERPL-MCNC: 6.3 G/DL (ref 6.3–8)
RBC # BLD AUTO: 4.67 10*6/MM3 (ref 4.14–5.8)
SODIUM SERPL-SCNC: 141 MMOL/L (ref 134–145)
T4 FREE SERPL-MCNC: 1.47 NG/DL (ref 0.93–1.7)
TSH SERPL DL<=0.05 MIU/L-ACNC: 3.5 UIU/ML (ref 0.27–4.2)
WBC NRBC COR # BLD: 9.89 10*3/MM3 (ref 3.4–10.8)

## 2022-08-05 PROCEDURE — 96413 CHEMO IV INFUSION 1 HR: CPT

## 2022-08-05 PROCEDURE — 85025 COMPLETE CBC W/AUTO DIFF WBC: CPT

## 2022-08-05 PROCEDURE — 84439 ASSAY OF FREE THYROXINE: CPT | Performed by: INTERNAL MEDICINE

## 2022-08-05 PROCEDURE — 99215 OFFICE O/P EST HI 40 MIN: CPT | Performed by: INTERNAL MEDICINE

## 2022-08-05 PROCEDURE — 25010000002 HEPARIN LOCK FLUSH PER 10 UNITS: Performed by: INTERNAL MEDICINE

## 2022-08-05 PROCEDURE — 80053 COMPREHEN METABOLIC PANEL: CPT

## 2022-08-05 PROCEDURE — 84443 ASSAY THYROID STIM HORMONE: CPT | Performed by: INTERNAL MEDICINE

## 2022-08-05 PROCEDURE — 25010000002 PEMBROLIZUMAB 100 MG/4ML SOLUTION 4 ML VIAL: Performed by: INTERNAL MEDICINE

## 2022-08-05 RX ORDER — SODIUM CHLORIDE 0.9 % (FLUSH) 0.9 %
10 SYRINGE (ML) INJECTION AS NEEDED
Status: CANCELLED | OUTPATIENT
Start: 2022-08-05

## 2022-08-05 RX ORDER — SODIUM CHLORIDE 0.9 % (FLUSH) 0.9 %
10 SYRINGE (ML) INJECTION AS NEEDED
Status: DISCONTINUED | OUTPATIENT
Start: 2022-08-05 | End: 2022-08-05 | Stop reason: HOSPADM

## 2022-08-05 RX ORDER — HEPARIN SODIUM (PORCINE) LOCK FLUSH IV SOLN 100 UNIT/ML 100 UNIT/ML
500 SOLUTION INTRAVENOUS AS NEEDED
Status: CANCELLED | OUTPATIENT
Start: 2022-08-05

## 2022-08-05 RX ORDER — SODIUM CHLORIDE 9 MG/ML
250 INJECTION, SOLUTION INTRAVENOUS ONCE
Status: COMPLETED | OUTPATIENT
Start: 2022-08-05 | End: 2022-08-05

## 2022-08-05 RX ORDER — SODIUM CHLORIDE 9 MG/ML
250 INJECTION, SOLUTION INTRAVENOUS ONCE
Status: CANCELLED | OUTPATIENT
Start: 2022-08-05

## 2022-08-05 RX ORDER — HEPARIN SODIUM (PORCINE) LOCK FLUSH IV SOLN 100 UNIT/ML 100 UNIT/ML
500 SOLUTION INTRAVENOUS AS NEEDED
Status: DISCONTINUED | OUTPATIENT
Start: 2022-08-05 | End: 2022-08-05 | Stop reason: HOSPADM

## 2022-08-05 RX ADMIN — Medication 10 ML: at 09:19

## 2022-08-05 RX ADMIN — SODIUM CHLORIDE 200 MG: 9 INJECTION, SOLUTION INTRAVENOUS at 08:45

## 2022-08-05 RX ADMIN — Medication 500 UNITS: at 09:19

## 2022-08-05 RX ADMIN — SODIUM CHLORIDE 250 ML: 9 INJECTION, SOLUTION INTRAVENOUS at 08:45

## 2022-08-10 ENCOUNTER — OFFICE VISIT (OUTPATIENT)
Dept: INFECTIOUS DISEASES | Facility: CLINIC | Age: 67
End: 2022-08-10

## 2022-08-10 VITALS
DIASTOLIC BLOOD PRESSURE: 79 MMHG | HEART RATE: 92 BPM | WEIGHT: 177.4 LBS | BODY MASS INDEX: 24.84 KG/M2 | RESPIRATION RATE: 18 BRPM | HEIGHT: 71 IN | TEMPERATURE: 97.9 F | SYSTOLIC BLOOD PRESSURE: 119 MMHG

## 2022-08-10 DIAGNOSIS — M87.9 OSTEONECROSIS OF MANDIBLE: ICD-10-CM

## 2022-08-10 DIAGNOSIS — M87.180 DRUG-INDUCED OSTEONECROSIS OF JAW: Primary | ICD-10-CM

## 2022-08-10 PROCEDURE — 99214 OFFICE O/P EST MOD 30 MIN: CPT | Performed by: STUDENT IN AN ORGANIZED HEALTH CARE EDUCATION/TRAINING PROGRAM

## 2022-08-10 RX ORDER — AMOXICILLIN AND CLAVULANATE POTASSIUM 875; 125 MG/1; MG/1
1 TABLET, FILM COATED ORAL EVERY 12 HOURS
Qty: 28 TABLET | Refills: 0 | Status: SHIPPED | OUTPATIENT
Start: 2022-08-10 | End: 2022-08-25

## 2022-08-10 RX ORDER — AMOXICILLIN 500 MG/1
1000 CAPSULE ORAL
Qty: 84 CAPSULE | Refills: 0 | Status: SHIPPED | OUTPATIENT
Start: 2022-08-10 | End: 2022-09-22

## 2022-08-10 NOTE — PROGRESS NOTES
"Chief Complaint  Follow-up    Subjective        Dav Freitas presents to [facility] INFECTIOUS DISEASES  History of Present Illness    Patient is a 66-year-old male with a past medical history of hypogammaglobinemia, stage IV lung cancer on Keytruda but prior therapy with Xgeva for bony metastasis and CLL in remission who is seen by oral surgery in the setting of medication related osteonecrosis of the mandible and has been on multiple courses of amoxicillin during \"flareups \".  Recently had CT scan that demonstrated periosteal thickening and continue to be consistent with osteonecrosis of the jaw however developed draining extraoral fistula recently and was placed on amoxicillin/Augmentin combo to complete out 3 weeks that he finished 2 weeks ago.      He reports that while on antibiotics he had great resolution in his fistula which closed up and the right side of his face swelling improved dramatically.  He reports he is back to normal until approximately the beginning of this week when he developed worsening swelling of the left side of the face without any drainage and the right side developed some pain.  He denies any fevers or chills.  He states he has some pain in his mouth with soreness of the left side of his tongue which becomes raw.  States he tolerated the antibiotics very well without any difficulties.    Recently seen by OM who has started a referral process to  for further evaluation of possible bone resection.    Objective   Vital Signs:  /79   Pulse 92   Temp 97.9 °F (36.6 °C)   Resp 18   Ht 180.3 cm (70.98\")   Wt 80.5 kg (177 lb 6.4 oz)   BMI 24.76 kg/m²   Estimated body mass index is 24.76 kg/m² as calculated from the following:    Height as of this encounter: 180.3 cm (70.98\").    Weight as of this encounter: 80.5 kg (177 lb 6.4 oz).    BMI is within normal parameters. No other follow-up for BMI required.      Physical Exam  Constitutional:       General: He is " not in acute distress.     Appearance: Normal appearance. He is normal weight. He is not ill-appearing.   HENT:      Head: Normocephalic and atraumatic.      Comments: Left angle of the mandible with papule that has no drainage today.  No tenderness to palpation.  Right mandible with new erythema and area of protrusion.     Nose: Nose normal. No rhinorrhea.      Mouth/Throat:      Mouth: Mucous membranes are moist.      Pharynx: Posterior oropharyngeal erythema present. No oropharyngeal exudate.      Comments: Some scant discharge from the right mandibular teeth.  Eyes:      General: No scleral icterus.     Extraocular Movements: Extraocular movements intact.      Pupils: Pupils are equal, round, and reactive to light.   Cardiovascular:      Rate and Rhythm: Normal rate and regular rhythm.      Pulses: Normal pulses.      Heart sounds: Normal heart sounds. No murmur heard.  Pulmonary:      Effort: Pulmonary effort is normal.      Breath sounds: Normal breath sounds. No wheezing, rhonchi or rales.   Abdominal:      General: Abdomen is flat. Bowel sounds are normal.      Palpations: Abdomen is soft.      Tenderness: There is no abdominal tenderness. There is no guarding or rebound.   Musculoskeletal:         General: No swelling or tenderness. Normal range of motion.      Cervical back: Normal range of motion and neck supple. No tenderness.      Right lower leg: No edema.      Left lower leg: No edema.   Skin:     General: Skin is warm and dry.      Findings: Erythema and lesion present.   Neurological:      General: No focal deficit present.      Mental Status: He is alert and oriented to person, place, and time.   Psychiatric:         Mood and Affect: Mood normal.         Behavior: Behavior normal.        Result Review :  The following data was reviewed by: Glenn hCampion DO on 06/29/2022:  Common labs    Common Labsle 7/15/22 7/15/22 7/22/22 8/5/22 8/5/22    1005 1005  0727 0727   Glucose  120   133 (A)    BUN  18   21 (A)   Creatinine  1.39 (A)   1.48 (A)   Sodium  140   141   Potassium  4.5   4.6   Chloride  105   105   Calcium  8.0 (A)   8.9   Albumin  3.70   3.80   Total Bilirubin  0.4   0.4   Alkaline Phosphatase  99   93   AST (SGOT)  8   12   ALT (SGPT)  9   8   WBC 10.18  12.79 (A) 9.89    Hemoglobin 13.0  13.3 13.6    Hematocrit 41.6  41.6 44.8    Platelets 207  231 239    (A) Abnormal value            Data reviewed: Radiologic studies With reports attached from oral maxillofacial surgery and Consultant notes From oral maxillofacial surgery and oncology          Assessment and Plan   Diagnoses and all orders for this visit:    1. Drug-induced osteonecrosis of jaw (HCC) (Primary)    2. Osteonecrosis of mandible (HCC)    Other orders  -     amoxicillin-clavulanate (AUGMENTIN) 875-125 MG per tablet; Take 1 tablet by mouth Every 12 (Twelve) Hours for 42 days.  Dispense: 28 tablet; Refill: 0  -     amoxicillin (AMOXIL) 500 MG capsule; Take 2 capsules by mouth Daily Before Lunch for 42 days.  Dispense: 84 capsule; Refill: 0    Patient had improvement on his most recent course of Augmentin/amoxicillin combination.  Now with worsening symptoms again which likely indicates that he may have rebound even with treatment of underlying osteomyelitis.  I think there is a high likelihood that even if we pursue treatment for osteomyelitis that it will be a matter of time before he develops recurrence given the underlying osteonecrosis.    We discussed his options today and given his good response on the prior regimen we will again do a longer course of oral Augmentin 875 twice daily plus amoxicillin 1 g in between the Augmentin doses.  I discussed IV options however at this point this is likely to have a similar efficacy given the overall dose.  He also had good response which is reassuring.  Plan for 6-week course and he is going to follow-up with OMFS in the near future.       I spent 42 minutes caring for Dav on this  date of service. This time includes time spent by me in the following activities:preparing for the visit, reviewing tests, obtaining and/or reviewing a separately obtained history, performing a medically appropriate examination and/or evaluation , counseling and educating the patient/family/caregiver, ordering medications, tests, or procedures, referring and communicating with other health care professionals , documenting information in the medical record, independently interpreting results and communicating that information with the patient/family/caregiver and care coordination  Follow Up   Return in about 6 weeks (around 9/21/2022).  Patient was given instructions and counseling regarding his condition or for health maintenance advice. Please see specific information pulled into the AVS if appropriate.

## 2022-08-19 ENCOUNTER — INFUSION (OUTPATIENT)
Dept: ONCOLOGY | Facility: HOSPITAL | Age: 67
End: 2022-08-19

## 2022-08-19 VITALS
HEART RATE: 79 BPM | BODY MASS INDEX: 24.53 KG/M2 | WEIGHT: 175.2 LBS | TEMPERATURE: 97.9 F | SYSTOLIC BLOOD PRESSURE: 149 MMHG | DIASTOLIC BLOOD PRESSURE: 80 MMHG | HEIGHT: 71 IN | OXYGEN SATURATION: 96 % | RESPIRATION RATE: 18 BRPM

## 2022-08-19 DIAGNOSIS — D80.1 HYPOGAMMAGLOBULINEMIA: Primary | ICD-10-CM

## 2022-08-19 DIAGNOSIS — Z45.2 FITTING AND ADJUSTMENT OF VASCULAR CATHETER: ICD-10-CM

## 2022-08-19 DIAGNOSIS — C91.10 CLL (CHRONIC LYMPHOCYTIC LEUKEMIA): ICD-10-CM

## 2022-08-19 LAB
BASOPHILS # BLD AUTO: 0.05 10*3/MM3 (ref 0–0.2)
BASOPHILS NFR BLD AUTO: 0.4 % (ref 0–1.5)
DEPRECATED RDW RBC AUTO: 47 FL (ref 37–54)
EOSINOPHIL # BLD AUTO: 0.51 10*3/MM3 (ref 0–0.4)
EOSINOPHIL NFR BLD AUTO: 3.8 % (ref 0.3–6.2)
ERYTHROCYTE [DISTWIDTH] IN BLOOD BY AUTOMATED COUNT: 13.8 % (ref 12.3–15.4)
HCT VFR BLD AUTO: 39.4 % (ref 37.5–51)
HGB BLD-MCNC: 12.7 G/DL (ref 13–17.7)
IGA1 MFR SER: <50 MG/DL (ref 70–400)
IGG1 SER-MCNC: 438 MG/DL (ref 700–1600)
IGM SERPL-MCNC: <25 MG/DL (ref 40–230)
IMM GRANULOCYTES # BLD AUTO: 0.08 10*3/MM3 (ref 0–0.05)
IMM GRANULOCYTES NFR BLD AUTO: 0.6 % (ref 0–0.5)
LYMPHOCYTES # BLD AUTO: 0.91 10*3/MM3 (ref 0.7–3.1)
LYMPHOCYTES NFR BLD AUTO: 6.7 % (ref 19.6–45.3)
MCH RBC QN AUTO: 30.1 PG (ref 26.6–33)
MCHC RBC AUTO-ENTMCNC: 32.2 G/DL (ref 31.5–35.7)
MCV RBC AUTO: 93.4 FL (ref 79–97)
MONOCYTES # BLD AUTO: 0.88 10*3/MM3 (ref 0.1–0.9)
MONOCYTES NFR BLD AUTO: 6.5 % (ref 5–12)
NEUTROPHILS NFR BLD AUTO: 11.1 10*3/MM3 (ref 1.7–7)
NEUTROPHILS NFR BLD AUTO: 82 % (ref 42.7–76)
NRBC BLD AUTO-RTO: 0 /100 WBC (ref 0–0.2)
PLATELET # BLD AUTO: 256 10*3/MM3 (ref 140–450)
PMV BLD AUTO: 11.6 FL (ref 6–12)
RBC # BLD AUTO: 4.22 10*6/MM3 (ref 4.14–5.8)
WBC NRBC COR # BLD: 13.53 10*3/MM3 (ref 3.4–10.8)

## 2022-08-19 PROCEDURE — 96365 THER/PROPH/DIAG IV INF INIT: CPT

## 2022-08-19 PROCEDURE — 25010000002 HEPARIN LOCK FLUSH PER 10 UNITS: Performed by: INTERNAL MEDICINE

## 2022-08-19 PROCEDURE — 63710000001 DIPHENHYDRAMINE PER 50 MG: Performed by: NURSE PRACTITIONER

## 2022-08-19 PROCEDURE — 96366 THER/PROPH/DIAG IV INF ADDON: CPT

## 2022-08-19 PROCEDURE — 82784 ASSAY IGA/IGD/IGG/IGM EACH: CPT | Performed by: INTERNAL MEDICINE

## 2022-08-19 PROCEDURE — 85025 COMPLETE CBC W/AUTO DIFF WBC: CPT | Performed by: INTERNAL MEDICINE

## 2022-08-19 PROCEDURE — 25010000002 IMMUNE GLOBULIN (HUMAN) 30 GM/300ML SOLUTION: Performed by: NURSE PRACTITIONER

## 2022-08-19 RX ORDER — HEPARIN SODIUM (PORCINE) LOCK FLUSH IV SOLN 100 UNIT/ML 100 UNIT/ML
500 SOLUTION INTRAVENOUS AS NEEDED
Status: CANCELLED | OUTPATIENT
Start: 2022-08-19

## 2022-08-19 RX ORDER — SODIUM CHLORIDE 9 MG/ML
250 INJECTION, SOLUTION INTRAVENOUS ONCE
Status: COMPLETED | OUTPATIENT
Start: 2022-08-19 | End: 2022-08-19

## 2022-08-19 RX ORDER — HEPARIN SODIUM (PORCINE) LOCK FLUSH IV SOLN 100 UNIT/ML 100 UNIT/ML
500 SOLUTION INTRAVENOUS AS NEEDED
Status: DISCONTINUED | OUTPATIENT
Start: 2022-08-19 | End: 2022-08-19 | Stop reason: HOSPADM

## 2022-08-19 RX ORDER — ACETAMINOPHEN 325 MG/1
650 TABLET ORAL ONCE
Status: COMPLETED | OUTPATIENT
Start: 2022-08-19 | End: 2022-08-19

## 2022-08-19 RX ORDER — SODIUM CHLORIDE 0.9 % (FLUSH) 0.9 %
10 SYRINGE (ML) INJECTION AS NEEDED
Status: DISCONTINUED | OUTPATIENT
Start: 2022-08-19 | End: 2022-08-19 | Stop reason: HOSPADM

## 2022-08-19 RX ORDER — DIPHENHYDRAMINE HCL 25 MG
25 CAPSULE ORAL ONCE
Status: COMPLETED | OUTPATIENT
Start: 2022-08-19 | End: 2022-08-19

## 2022-08-19 RX ORDER — SODIUM CHLORIDE 0.9 % (FLUSH) 0.9 %
10 SYRINGE (ML) INJECTION AS NEEDED
Status: CANCELLED | OUTPATIENT
Start: 2022-08-19

## 2022-08-19 RX ADMIN — Medication 500 UNITS: at 10:52

## 2022-08-19 RX ADMIN — SODIUM CHLORIDE 250 ML: 9 INJECTION, SOLUTION INTRAVENOUS at 07:50

## 2022-08-19 RX ADMIN — DIPHENHYDRAMINE HYDROCHLORIDE 25 MG: 25 CAPSULE ORAL at 08:07

## 2022-08-19 RX ADMIN — IMMUNE GLOBULIN INFUSION (HUMAN) 30 G: 100 INJECTION, SOLUTION INTRAVENOUS; SUBCUTANEOUS at 08:12

## 2022-08-19 RX ADMIN — ACETAMINOPHEN 650 MG: 325 TABLET, FILM COATED ORAL at 08:07

## 2022-08-19 RX ADMIN — Medication 10 ML: at 10:52

## 2022-08-19 NOTE — NURSING NOTE
Patient is tolerating IVIG without difficulty. Verbal order per Dr. Parekh  to continue IVIG as ordered.

## 2022-08-23 NOTE — PROGRESS NOTES
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                                                                                                                                                           REASON FOR FOLLOW UP:  1.  Chronic lymphocytic leukemia with extensive lymphadenopathy and possible pleural involvement. Initiation of Treanda, Rituxan May 1, 2019.  2.  Hypogammaglobulinemia.  Status is post IVIG  3.  Significant response on scans June 27, 2019.  Treatment changed to Imbruvica 420 mg daily.  4.  Metastatic non-small cell lung carcinoma on Keytruda  5.  Patient seen 2/18/2022 with left jaw/gumline swelling pain, associated hematoma?  Osteonecrosis.  Restart of Keytruda 3/4/2022, Xgeva discontinued  6.  Restaging via CT 4/12/2022, continued response, Keytruda continued, referral to dermatology for worsening psoriasis.  7.  Jaw osteonecrosis, undergoing therapy through oral surgery, IVIG anticipated, infectious disease and oral surgery follow-up      HISTORY OF PRESENT ILLNESS:   The patient returns for follow-up and evaluation prior to cycle #31 Keytruda.  He also continues on Imbruvica 120 mg daily.  He has been feeling well.  He is eating and drinking adequately.  He experiences occasional nausea, no emesis, not requiring an antiemetic.  He also experiences occasional diarrhea, not requiring Imodium.  He is prescribed MS Contin 30 mg every 12 hours and Norco 10/325 1 tablet every 4 hours as needed for pain.  He reports not needing his pain medications on a daily basis.  He reports most days he is taking 1 tablet of MS Contin and 1 tablet of Norco daily, however some days not requiring any pain medicine.  He denies fever or chills.    He saw Dr. Hope from oral surgery for second opionion on his jaw.  He was then referred to Dr. Escalante with .  He saw Dr. Escalante on 8/23/2022.  Plan was made for outpatient procedure that would biopsy the affected  jaw bone area to help identify which bacteria the patient has present in his jaw, at which point they would coordinate with ID for antibiotic selection.  He is awaiting surgery date.      ONCOLOGY HISTORY:    The patient is a  66 y.o. male with the above mentioned history here today for lab review and evaluation due for Keytruda. infectious disease currently has him on Augmentin as well as amoxicillin for his osteonecrosis of the jaw and review of the records 7/6/2022.  He is going for an additional opinion 8/8/2022 for oral surgery.  It has been thought that he might require surgery but he appears to be making a gradual improvement with slow resolution of fistulous tracts that had developed and around his osteonecrosis.  He continues to have eating discomfort but is able to maintain his weight.                                                       As concerns his lung malignancy he does note occasional pain in his ribs and infrequent shortness of breath.  This comes and goes.  He has morphine and Norco that he uses for pain control although he admits he does not take either 1 regularly.  He also uses meloxicam intermittently for her pain in his mouth.                                             Patient is also struggled with skin eruptions but has not seen dermatology recently.  He typically is followed by Dr. Regan Damon.    Patient also continues on Imbruvica 420 mg daily, tolerating well and he continues this currently.    Past Medical History, Past Surgical History, Social History, Family History have been reviewed and are without significant changes except as mentioned.    Hematologic/oncologic history:    As concerns his CLL history he initially required Treanda Rituxan 5/1/2019 for 2 cycles and then initiated Imbruvica 420 mg daily initiated 7/25/2019.       As concerns stage IV non-small cell lung carcinoma he returns the office continuing Keytruda which he continues to receive every 3 weeks which was  "initiated 10/21/2020.  He also receives Xgeva every 6 weeks which was initially given 12/2/2020 and discontinued 1/7/2022 secondary to osteonecrosis.      Additional issues that developed during treatment included right knee pain with plain views of his right knee showing a right knee effusion with medial compartment narrowing and no suspicious bone lesion.  There is benign periosteal calcification along the distal right femur.  A PET/CT 11/11 went on to demonstrate a complete metabolic response to therapy compared to PET/CT from 9/23/2020.  This includes his primary lung neoplasm left upper lobe, large osseous metastatic lesion the right chest wall region from the eighth rib, small left adrenal metastasis, and no evidence of disease involving the distal left femur.      The patient was seen 11/24/2021 continue to do well though indicating that his right knee is \"something I can manage at the moment\".  He prefers not to have orthopedic assessment at this point.  He is concerned, ever, about skin lesions that are developing primarily on his calf regions and into his right thigh.    The patient was seen by Dr. Regan Damon 11/29/2021 and felt to be consistent with psoriasiform dermatitis treated with intralesional therapy.  Was also started on a moisturizer and lipid therapy.  The patient returned for assessment 12/15/2021 and treated with cycle #21 Keytruda, returned 1/7/2022 for cycle #22 and 1/28/2022 for cycle #23.  At that visit he had developed adenopathy several weeks previous and was treated with a Medrol Dosepak.   The patient was next seen 2/18/2022 and indicates that though his adenopathy has improved after treatment described above that his jaw has become painful swollen and tender with additional bleeding.     The patient was referred to his primary dentist who then had him seen oral surgeon-Dr. Ezequiel Davila-reached at [phone] who felt that this was osteonecrosis and should be treated " symptomatically.  As the patient reviewed 3/4/2022, wonderfully, his oral issues have nearly resolved with the gumline returning to essentially its previous state, no swelling, minimal erythema, no discharge and no additional pain.  He does have follow-up with oral surgery in the next several weeks and we discussed restarting his Keytruda scanning him likely in April 2022 in general.   He returns to the office on 3/25/2022 in follow-up in anticipation of cycle 24 Keytruda. He reports the left side of his jaw has improved.    However, he was now having issues with the right lower side. He was seen by neurosurgery with additional films and started on oral amoxicillin by his oral surgeon.      The patient continued his immunotherapy taking Keytruda 3/25/2022 and underwent repeat CT chest abdomen and pelvis 4/12/2022 that is reviewed with him 4/15/2022.  Wonderfully there is no substantial change with a small to moderate right pleural effusion that decreased in size, no additional pulmonary nodule or new metastatic disease, multifocal osteosclerotic metastatic disease without change and no new findings in the abdomen or pelvis.    The patient was seen 4/15/2022 indicating that he is actually feeling well in terms of general symptoms.  This includes lack of progression from mouth pain, ability to eat without discomfort.  He is, however, having worsening psoriasis particular in his lower extremities and has been reviewed by dermatology previously.    The patient was reviewed 6/17/2022 having been seen by oral surgery with progression of his osteonecrosis and skin eruption.  He is currently undergoing assessment by infectious disease within the next week and we have reviewed that previously he responded to IVIG for in-hospital refractory sinusitis.  As a result he is asked to undergo reassessment for his immunoglobulin levels today and return next week for 400 mg/kg of IVIG infusion which we will continue monthly.    The  "patient was given IVIG 6/24/2022 and again 7/22/2022.  Assessment 8/5/2022 continue to show a degree of general improvement with oral surgery continue to follow him with a second opinion to be obtained  8/8/2022 when IVIG planned 8/19/2022 and 9/16/2022.      Objective      Vitals:    08/26/22 0831   BP: 112/75   Pulse: 100   Resp: 18   Temp: 97.1 °F (36.2 °C)   TempSrc: Temporal   SpO2: 97%   Weight: 78.5 kg (173 lb)   Height: 180.3 cm (70.98\")   PainSc: 0-No pain     Current Status 8/26/2022   ECOG score 0     Physical Exam  Vitals and nursing note reviewed.   Constitutional:       Appearance: Normal appearance. He is well-developed.   HENT:      Head: Normocephalic and atraumatic.      Nose: Nose normal.      Mouth/Throat:      Comments: Exposed bone left mandible region, reduced general erythema per lower gumline, no exudate currently    Eyes:      Pupils: Pupils are equal, round, and reactive to light.   Cardiovascular:      Rate and Rhythm: Normal rate and regular rhythm.      Heart sounds: Normal heart sounds.   Pulmonary:      Effort: Pulmonary effort is normal. No respiratory distress.      Breath sounds: Normal breath sounds. No wheezing, rhonchi or rales.   Abdominal:      General: Bowel sounds are normal. There is no distension.      Palpations: Abdomen is soft.      Tenderness: There is no abdominal tenderness.   Musculoskeletal:         General: Normal range of motion.      Cervical back: Normal range of motion and neck supple.   Skin:     General: Skin is warm and dry.      Comments: erythematous psoriasiform lesions noted on lower extremities bilaterally, mildly pruritic, no tenderness, with a scale component, varying in size   Neurological:      Mental Status: He is alert and oriented to person, place, and time.   Psychiatric:         Behavior: Behavior normal.       RECENT LABS:  Results from last 7 days   Lab Units 08/26/22  0831   WBC 10*3/mm3 10.36   NEUTROS ABS 10*3/mm3 7.87*   HEMOGLOBIN g/dL " 13.5   HEMATOCRIT % 41.5   PLATELETS 10*3/mm3 270     Results from last 7 days   Lab Units 08/26/22  0831   SODIUM mmol/L 140   POTASSIUM mmol/L 4.4   CHLORIDE mmol/L 106   CO2 mmol/L 24.3   BUN mg/dL 23   CREATININE mg/dL 1.25   CALCIUM mg/dL 8.6   ALBUMIN g/dL 3.50   BILIRUBIN mg/dL 0.2   ALK PHOS U/L 86   ALT (SGPT) U/L 10   AST (SGOT) U/L 11   GLUCOSE mg/dL 133*         FISH prognostic panel-Positive for deletion of 13 q. 14.3    Assessment & Plan   1.  CLL presenting with significant lymphocytosis, lymphadenopathy and splenomegaly.     · Positive for deletion of 13 q. 14.3.    · Patient status post 2 cycles of Treanda Rituxan with excellent response.    · Imaging 6/27/2019 with significant decrease in adenopathy.  Treanda Rituxan discontinued   · Imbruvica 420 mg daily initiated 7/25/2019.  · He continues on Imbruvica, without indication of progression of his CLL, without progressive lymphadenopathy on CT scans.  · Patient has resolving adenopathy particularly cervical regions 2/18/2022  · 3/25/2022 patient is without worsening adenopathy on exam today.  Continue Imbruvica.  · 8/5/2022 continues on Imbruvica 420 mg daily. Denies B-symptoms, no evidence of worsening adenopathy on exam.    2.  Pulmonary nodules/infiltrates.  He is  status post bronchoscopy.  Is unclear at this time whether these findings are infectious or possibly related to his lymphoproliferative disorder.  This is seen to be improving on latest scans.    3.  Hypogammaglobulinemia-status post IVIG with resolution of sinusitis.   · Anticipate trial of IVIG with the patient now having progression of his osteonecrosis and dermal infection.  · Patient will return next for IVIG on 9/16/2022.    4.  Non-small cell lung carcinoma  · August 26 2020 revealing a new 1.6 cm spiculated nodule within the left upper lobe, 1.2 cm left adrenal nodule, bone windows with a soft tissue mass involving the right eighth rib measuring 3.7 x 2.6 cm.    · Biopsy was  consistent with adenocarcinoma CK 7+, CK 20-, lung primary suspected clinically, molecular panel not yet obtained.  · PET/CT 9/23/2020 demonstrating asymmetric uptake within the right parotid gland which is unremarkable appearance on noncontrasted CT, SUV of 6 compared to 2.7.  There is no hilar, mediastinal or axillary adenopathy, findings of asymmetric moderate to intense FDG uptake within superior aspect the right chest wall anterior to the right first rib with an irregular hypodense lesion measuring 1.8 x 1.6 and SUV 4.9.  This had not been seen June 27, 2019.  There is an intensely FDG avid nodule within the lingula measuring 1.9 x 1.5 history of 12.4, FDG avid left adrenal nodule 1.9 x 1.5 with an issue 21.2.    · Evaluated by radiation oncology therapy September 29 and started on radiation therapy to the right chest wall-35 Gy in 10 fractions.   ·  Plans were also then proceed with SBRT to the solitary left upper lobe lesion pending insurance approval.  · Further testing including TPS of 20% and foundation CDX with a TMB of greater than 10 mutations per megabase (28 mutations per megabase) and the indication that several immunotherapies could be useful in this patient's care.  Additional genomic findings include BRAF [id number]/that trametinib could be useful and STK11/that everolimus could be useful.  · Keytruda initiated 10/21/2021   · Reassessment after 2 cycles of Keytruda with enlargement of the right eighth rib lesion,enlargement of spiculated left upper lobe lung mass, moderate to large right-sided pleural effusion, new left adrenal mass and enlarged retrocrural lymph node.?Pseudoprogression  · Xgeva last given 12/30/2020  · Follow-up scans 1/6/2021 demonstrated marked improvement in his right eighth rib lesion, resolution of left upper lobe spiculated mass, residual large right pleural effusion, resolution of left adrenal metastasis.   · Right pleural effusion, with thoracentesis 1/14/2021 with 1500 mils  removed.  Pathology consistent with malignant effusion   · CT scans 4/5/2021 with response noted in the left upper lobe density.  · He continues to receive Keytruda every 3 weeks along with Xgeva every 6 weeks.  · Repeat scan 7/15/2021 without evidence of recurrence or progressive disease.  Plans to continue Keytruda every 3 weeks with repeat scans in 4 to 6 months  · Patient status post PET/CT 11/11/2021 with hyper response, approximate remission status, plan to proceed with cycle #20 Keytruda  · Patient seen 2/18/2022 with Keytruda held while his oral issues are addressed-concern for osteonecrosis of the jaw.  · Patiently diagnosed with osteonecrosis of the jaw, seen by oral surgery, started on antibiotic therapy and Peridex with substantial improvement, Xgeva discontinued as discussed below.  · 3/25/2022 proceed with cycle #24 Keytruda today.  Follow-up CT scans 4/12/2022 without evidence of progressive disease, stable findings including osteosclerotic metastatic disease.  Keytruda continued  · Patient seen 6/17/2022 with Keytruda infusion.  · 8/26/2022, proceed with cycle #31 Keytruda today and continue every 3 weeks.  Patient continues to tolerate well.  We will consider repeat scans 6 months from previous-approximately October 2022.    5.  Left knee metastasis  · Evaluated by orthopedic oncology, Dr. Eliu Ochoa  · Admission 1/7/2021 undergoing stabilization of left femoral bone lesion, prophylactic stabilization with intramedullary implants.  · It was suggested we delay Xgeva or Zometa to allow bone healing  · Completed radiation with 10 fractions 2/10/2021  · Xgeva resumed 4/29/2021, he continues to receive this every 6 weeks.    · Additional assessment 2/18/2022 with left jaw pain, subsequent diagnosis of osteoporosis, Xgeva discontinued  · Patient continues follow-up with oral surgery, progression of osteoporosis symptomatically, dermal eruption left side of mandibular region, IVIG anticipated  · Xgeva  DISCONTINUED due to osteonecrosis of the jaw     6.  Malignant right pleural effusion  · Ultrasound thoracentesis 1/14/2021 1500 mL removed  · Chest x-ray 2/25/2021 with moderate right pleural effusion  · Progressive symptoms with worsening pain 4/5/2021  · Ultrasound-guided thoracentesis 4/13/2021 with 2050 ml removed.  · Plans for Pleurx catheter with thoracic surgery, however, pleural effusion has not recurred.  Patient seen 11/4/2021 with chest x-ray planned.  Subsequent resolution noted on CT scan.    7.  Cancer related pain  · Pain is most prominent in his right rib, utilizing MS Contin 30 mg 3 times a day  · Hydrocodone/acetaminophen 10/325 as needed for breakthrough pain.  Currently taking 3 to 4 tablets daily  · He is not currently in need of a refill of pain medications  · Discussed MS Contin at 30 mg p.o. every 8 hours, Norco 10/325 2 p.o. every 6 hours  · He is currently using pain medication as needed for jaw pain.  Requiring approximately 1 MS Contin and 1 Norco 10/325 daily, however some days not requiring any pain medication.  · Dav Freitas reports a pain score of 0.  Given his pain assessment as noted, treatment options were discussed and the following options were decided upon as a follow-up plan to address the patient's pain: continuation of current treatment plan for pain.    8.  Insomnia  · Continuing to follow with behavioral oncology  · Continuing Remeron 7.5 mg nightly with good control    9. Health maintenance  · Status post COVID-19 booster, SARS antibody pending today  · The patient is due for Shingrix and Prevnar which will both be administered in the clinic 9/23/2021    10.  Skin lesions  · Uncertain of etiology, unexpected findings for usual skin lesions associated with immunotherapy  · Request dermatologic assessment-psoriasiform dermatitis  · Patient continues to report skin lesions particularly lower extremities that are worsening.  Patient follows with Dr. Damon considering they  are progressing and are thought to be, in part, related to immunotherapy.    11.  Right knee pain  · Osteoarthritis, orthopedic assessment upon patient's request.   · Resolved.  Denies any knee pain today     12.  Hypocalcemia  · Currently corrected     13.  Osteonecrosis of the jaw  · Xgeva was discontinued.  · Patient went to second opinion with  with oral surgery.  He was reffered to Dr. Escalante with , with whom he had consult on 8/23/2022.  Dr. Escalante recommened an outpatient procedure that would allow him to biopsy the affected area of his jawbone and identify which bacteria was present, they would then coordinate with ID to identify best antibiotic option.  Patient is agreeable to proceeding with this plan, and is awaiting surgery date.    Plan:   1. Proceed with cycle #31 Keytruda today.  2. Continue Imbruvica 420 mg daily.  3. Continue MS Contin 30 mg every 12 hours and Norco 10/325 every 6 hours as needed for pain.  Taking 1 tablet of MS Contin and 1 tablet of Norco most days.  4. Continue meloxicam.  5. Continue Remeron 15 mg nightly.  6. Patient encouraged to follow-up with Dr. Damon for his psoriasis.  7. Return in 3 weeks for MD follow-up and next dose of Keytruda.  8. Continue to follow closely with infectious disease, Dr. Champion and continue antibiotics as prescribed for osteonecrosis of the jaw.    9. Continue follow-up with Dr. Escalante with , awaiting surgery date.  10. Plan to continue with IVIG on 9/16/2022.  11. Call/return sooner should develop new concerning problem     Patient continues on high risk medication requiring close monitoring for toxicity    Isabel Gomez, RORY   8/26/2022

## 2022-08-25 RX ORDER — AMOXICILLIN AND CLAVULANATE POTASSIUM 875; 125 MG/1; MG/1
1 TABLET, FILM COATED ORAL EVERY 12 HOURS
Qty: 84 TABLET | Refills: 0 | Status: SHIPPED | OUTPATIENT
Start: 2022-08-25 | End: 2022-09-22

## 2022-08-26 ENCOUNTER — INFUSION (OUTPATIENT)
Dept: ONCOLOGY | Facility: HOSPITAL | Age: 67
End: 2022-08-26

## 2022-08-26 ENCOUNTER — OFFICE VISIT (OUTPATIENT)
Dept: ONCOLOGY | Facility: CLINIC | Age: 67
End: 2022-08-26

## 2022-08-26 VITALS
OXYGEN SATURATION: 97 % | HEART RATE: 100 BPM | HEIGHT: 71 IN | SYSTOLIC BLOOD PRESSURE: 112 MMHG | TEMPERATURE: 97.1 F | BODY MASS INDEX: 24.22 KG/M2 | DIASTOLIC BLOOD PRESSURE: 75 MMHG | RESPIRATION RATE: 18 BRPM | WEIGHT: 173 LBS

## 2022-08-26 DIAGNOSIS — Z79.899 HIGH RISK MEDICATION USE: ICD-10-CM

## 2022-08-26 DIAGNOSIS — G89.3 CANCER RELATED PAIN: ICD-10-CM

## 2022-08-26 DIAGNOSIS — C34.90 LUNG CANCER METASTATIC TO BONE: ICD-10-CM

## 2022-08-26 DIAGNOSIS — C91.10 CLL (CHRONIC LYMPHOCYTIC LEUKEMIA): ICD-10-CM

## 2022-08-26 DIAGNOSIS — M87.180 OSTEONECROSIS DUE TO DRUGS, JAW: ICD-10-CM

## 2022-08-26 DIAGNOSIS — Z45.2 FITTING AND ADJUSTMENT OF VASCULAR CATHETER: ICD-10-CM

## 2022-08-26 DIAGNOSIS — C79.51 LUNG CANCER METASTATIC TO BONE: ICD-10-CM

## 2022-08-26 DIAGNOSIS — C34.12 MALIGNANT NEOPLASM OF UPPER LOBE OF LEFT LUNG: ICD-10-CM

## 2022-08-26 DIAGNOSIS — Z79.899 HIGH RISK MEDICATION USE: Primary | ICD-10-CM

## 2022-08-26 DIAGNOSIS — C91.10 CLL (CHRONIC LYMPHOCYTIC LEUKEMIA): Primary | ICD-10-CM

## 2022-08-26 LAB
ALBUMIN SERPL-MCNC: 3.5 G/DL (ref 3.5–5.2)
ALBUMIN/GLOB SERPL: 1.3 G/DL
ALP SERPL-CCNC: 86 U/L (ref 39–117)
ALT SERPL W P-5'-P-CCNC: 10 U/L (ref 1–41)
ANION GAP SERPL CALCULATED.3IONS-SCNC: 9.7 MMOL/L (ref 5–15)
AST SERPL-CCNC: 11 U/L (ref 1–40)
BASOPHILS # BLD AUTO: 0.06 10*3/MM3 (ref 0–0.2)
BASOPHILS NFR BLD AUTO: 0.6 % (ref 0–1.5)
BILIRUB SERPL-MCNC: 0.2 MG/DL (ref 0–1.2)
BUN SERPL-MCNC: 23 MG/DL (ref 8–23)
BUN/CREAT SERPL: 18.4 (ref 7–25)
CALCIUM SPEC-SCNC: 8.6 MG/DL (ref 8.6–10.5)
CHLORIDE SERPL-SCNC: 106 MMOL/L (ref 98–107)
CO2 SERPL-SCNC: 24.3 MMOL/L (ref 22–29)
CREAT SERPL-MCNC: 1.25 MG/DL (ref 0.76–1.27)
DEPRECATED RDW RBC AUTO: 47.5 FL (ref 37–54)
EGFRCR SERPLBLD CKD-EPI 2021: 63.1 ML/MIN/1.73
EOSINOPHIL # BLD AUTO: 0.64 10*3/MM3 (ref 0–0.4)
EOSINOPHIL NFR BLD AUTO: 6.2 % (ref 0.3–6.2)
ERYTHROCYTE [DISTWIDTH] IN BLOOD BY AUTOMATED COUNT: 14.2 % (ref 12.3–15.4)
GLOBULIN UR ELPH-MCNC: 2.6 GM/DL
GLUCOSE SERPL-MCNC: 133 MG/DL (ref 65–99)
HCT VFR BLD AUTO: 41.5 % (ref 37.5–51)
HGB BLD-MCNC: 13.5 G/DL (ref 13–17.7)
IMM GRANULOCYTES # BLD AUTO: 0.03 10*3/MM3 (ref 0–0.05)
IMM GRANULOCYTES NFR BLD AUTO: 0.3 % (ref 0–0.5)
LYMPHOCYTES # BLD AUTO: 1 10*3/MM3 (ref 0.7–3.1)
LYMPHOCYTES NFR BLD AUTO: 9.7 % (ref 19.6–45.3)
MCH RBC QN AUTO: 30.1 PG (ref 26.6–33)
MCHC RBC AUTO-ENTMCNC: 32.5 G/DL (ref 31.5–35.7)
MCV RBC AUTO: 92.4 FL (ref 79–97)
MONOCYTES # BLD AUTO: 0.76 10*3/MM3 (ref 0.1–0.9)
MONOCYTES NFR BLD AUTO: 7.3 % (ref 5–12)
NEUTROPHILS NFR BLD AUTO: 7.87 10*3/MM3 (ref 1.7–7)
NEUTROPHILS NFR BLD AUTO: 75.9 % (ref 42.7–76)
NRBC BLD AUTO-RTO: 0 /100 WBC (ref 0–0.2)
PLATELET # BLD AUTO: 270 10*3/MM3 (ref 140–450)
PMV BLD AUTO: 12 FL (ref 6–12)
POTASSIUM SERPL-SCNC: 4.4 MMOL/L (ref 3.5–5.2)
PROT SERPL-MCNC: 6.1 G/DL (ref 6–8.5)
RBC # BLD AUTO: 4.49 10*6/MM3 (ref 4.14–5.8)
SODIUM SERPL-SCNC: 140 MMOL/L (ref 136–145)
WBC NRBC COR # BLD: 10.36 10*3/MM3 (ref 3.4–10.8)

## 2022-08-26 PROCEDURE — 99214 OFFICE O/P EST MOD 30 MIN: CPT | Performed by: NURSE PRACTITIONER

## 2022-08-26 PROCEDURE — 85025 COMPLETE CBC W/AUTO DIFF WBC: CPT | Performed by: INTERNAL MEDICINE

## 2022-08-26 PROCEDURE — 25010000002 PEMBROLIZUMAB 100 MG/4ML SOLUTION 4 ML VIAL: Performed by: NURSE PRACTITIONER

## 2022-08-26 PROCEDURE — 96413 CHEMO IV INFUSION 1 HR: CPT

## 2022-08-26 PROCEDURE — 80053 COMPREHEN METABOLIC PANEL: CPT | Performed by: INTERNAL MEDICINE

## 2022-08-26 PROCEDURE — 25010000002 HEPARIN LOCK FLUSH PER 10 UNITS: Performed by: INTERNAL MEDICINE

## 2022-08-26 RX ORDER — HEPARIN SODIUM (PORCINE) LOCK FLUSH IV SOLN 100 UNIT/ML 100 UNIT/ML
500 SOLUTION INTRAVENOUS AS NEEDED
Status: CANCELLED | OUTPATIENT
Start: 2022-08-26

## 2022-08-26 RX ORDER — SODIUM CHLORIDE 0.9 % (FLUSH) 0.9 %
10 SYRINGE (ML) INJECTION AS NEEDED
Status: DISCONTINUED | OUTPATIENT
Start: 2022-08-26 | End: 2022-08-26 | Stop reason: HOSPADM

## 2022-08-26 RX ORDER — SODIUM CHLORIDE 9 MG/ML
250 INJECTION, SOLUTION INTRAVENOUS ONCE
Status: CANCELLED | OUTPATIENT
Start: 2022-08-26

## 2022-08-26 RX ORDER — SODIUM CHLORIDE 0.9 % (FLUSH) 0.9 %
10 SYRINGE (ML) INJECTION AS NEEDED
Status: CANCELLED | OUTPATIENT
Start: 2022-08-26

## 2022-08-26 RX ORDER — HEPARIN SODIUM (PORCINE) LOCK FLUSH IV SOLN 100 UNIT/ML 100 UNIT/ML
500 SOLUTION INTRAVENOUS AS NEEDED
Status: DISCONTINUED | OUTPATIENT
Start: 2022-08-26 | End: 2022-08-26 | Stop reason: HOSPADM

## 2022-08-26 RX ORDER — SODIUM CHLORIDE 9 MG/ML
250 INJECTION, SOLUTION INTRAVENOUS ONCE
Status: COMPLETED | OUTPATIENT
Start: 2022-08-26 | End: 2022-08-26

## 2022-08-26 RX ADMIN — Medication 10 ML: at 10:18

## 2022-08-26 RX ADMIN — SODIUM CHLORIDE 250 ML: 9 INJECTION, SOLUTION INTRAVENOUS at 09:48

## 2022-08-26 RX ADMIN — SODIUM CHLORIDE 200 MG: 9 INJECTION, SOLUTION INTRAVENOUS at 09:48

## 2022-08-26 RX ADMIN — Medication 500 UNITS: at 10:18

## 2022-09-14 RX ORDER — IBRUTINIB 420 MG/1
TABLET, FILM COATED ORAL
Qty: 28 TABLET | Refills: 6 | Status: SHIPPED | OUTPATIENT
Start: 2022-09-14

## 2022-09-14 NOTE — TELEPHONE ENCOUNTER
Refill requested from pharmacy. Per last chart note, patient to continue Imbruvica 420 mg daily. Will route to MD for cosignature.    Martin Ram, JoséD, BCOP  9/14/2022 09:19 EDT

## 2022-09-15 DIAGNOSIS — C91.10 CLL (CHRONIC LYMPHOCYTIC LEUKEMIA): ICD-10-CM

## 2022-09-15 DIAGNOSIS — D80.1 HYPOGAMMAGLOBULINEMIA: Primary | ICD-10-CM

## 2022-09-15 RX ORDER — FAMOTIDINE 10 MG/ML
20 INJECTION, SOLUTION INTRAVENOUS AS NEEDED
Status: CANCELLED | OUTPATIENT
Start: 2022-09-16

## 2022-09-15 RX ORDER — SODIUM CHLORIDE 9 MG/ML
250 INJECTION, SOLUTION INTRAVENOUS ONCE
Status: CANCELLED | OUTPATIENT
Start: 2022-09-16

## 2022-09-15 RX ORDER — DIPHENHYDRAMINE HYDROCHLORIDE 50 MG/ML
50 INJECTION INTRAMUSCULAR; INTRAVENOUS AS NEEDED
Status: CANCELLED | OUTPATIENT
Start: 2022-09-16

## 2022-09-15 RX ORDER — ACETAMINOPHEN 325 MG/1
650 TABLET ORAL ONCE
Status: CANCELLED | OUTPATIENT
Start: 2022-09-16

## 2022-09-15 RX ORDER — DIPHENHYDRAMINE HCL 25 MG
25 CAPSULE ORAL ONCE
Status: CANCELLED | OUTPATIENT
Start: 2022-09-16

## 2022-09-16 ENCOUNTER — OFFICE VISIT (OUTPATIENT)
Dept: ONCOLOGY | Facility: CLINIC | Age: 67
End: 2022-09-16

## 2022-09-16 ENCOUNTER — INFUSION (OUTPATIENT)
Dept: ONCOLOGY | Facility: HOSPITAL | Age: 67
End: 2022-09-16

## 2022-09-16 ENCOUNTER — APPOINTMENT (OUTPATIENT)
Dept: ONCOLOGY | Facility: HOSPITAL | Age: 67
End: 2022-09-16

## 2022-09-16 VITALS
WEIGHT: 169 LBS | DIASTOLIC BLOOD PRESSURE: 77 MMHG | RESPIRATION RATE: 18 BRPM | SYSTOLIC BLOOD PRESSURE: 134 MMHG | OXYGEN SATURATION: 95 % | BODY MASS INDEX: 23.66 KG/M2 | HEIGHT: 71 IN | TEMPERATURE: 96.5 F | HEART RATE: 95 BPM

## 2022-09-16 VITALS
BODY MASS INDEX: 23.74 KG/M2 | SYSTOLIC BLOOD PRESSURE: 118 MMHG | HEIGHT: 71 IN | WEIGHT: 169.6 LBS | OXYGEN SATURATION: 95 % | HEART RATE: 81 BPM | TEMPERATURE: 96.5 F | RESPIRATION RATE: 18 BRPM | DIASTOLIC BLOOD PRESSURE: 70 MMHG

## 2022-09-16 DIAGNOSIS — C79.51 LUNG CANCER METASTATIC TO BONE: ICD-10-CM

## 2022-09-16 DIAGNOSIS — C34.12 MALIGNANT NEOPLASM OF UPPER LOBE OF LEFT LUNG: ICD-10-CM

## 2022-09-16 DIAGNOSIS — C91.10 CLL (CHRONIC LYMPHOCYTIC LEUKEMIA): Primary | ICD-10-CM

## 2022-09-16 DIAGNOSIS — Z45.2 FITTING AND ADJUSTMENT OF VASCULAR CATHETER: ICD-10-CM

## 2022-09-16 DIAGNOSIS — D80.1 HYPOGAMMAGLOBULINEMIA: ICD-10-CM

## 2022-09-16 DIAGNOSIS — C34.90 LUNG CANCER METASTATIC TO BONE: ICD-10-CM

## 2022-09-16 DIAGNOSIS — Z79.899 HIGH RISK MEDICATION USE: Primary | ICD-10-CM

## 2022-09-16 DIAGNOSIS — Z79.899 HIGH RISK MEDICATION USE: ICD-10-CM

## 2022-09-16 DIAGNOSIS — C91.10 CLL (CHRONIC LYMPHOCYTIC LEUKEMIA): ICD-10-CM

## 2022-09-16 LAB
ALBUMIN SERPL-MCNC: 3.2 G/DL (ref 3.5–5.2)
ALBUMIN/GLOB SERPL: 1.4 G/DL
ALP SERPL-CCNC: 86 U/L (ref 39–117)
ALT SERPL W P-5'-P-CCNC: 9 U/L (ref 1–41)
ANION GAP SERPL CALCULATED.3IONS-SCNC: 6.2 MMOL/L (ref 5–15)
AST SERPL-CCNC: 9 U/L (ref 1–40)
BASOPHILS # BLD AUTO: 0.06 10*3/MM3 (ref 0–0.2)
BASOPHILS NFR BLD AUTO: 0.7 % (ref 0–1.5)
BILIRUB SERPL-MCNC: 0.4 MG/DL (ref 0–1.2)
BUN SERPL-MCNC: 17 MG/DL (ref 8–23)
BUN/CREAT SERPL: 13.4 (ref 7–25)
CALCIUM SPEC-SCNC: 8.1 MG/DL (ref 8.6–10.5)
CHLORIDE SERPL-SCNC: 106 MMOL/L (ref 98–107)
CO2 SERPL-SCNC: 28.8 MMOL/L (ref 22–29)
CREAT SERPL-MCNC: 1.27 MG/DL (ref 0.76–1.27)
DEPRECATED RDW RBC AUTO: 47.5 FL (ref 37–54)
EGFRCR SERPLBLD CKD-EPI 2021: 61.5 ML/MIN/1.73
EOSINOPHIL # BLD AUTO: 0.62 10*3/MM3 (ref 0–0.4)
EOSINOPHIL NFR BLD AUTO: 7.1 % (ref 0.3–6.2)
ERYTHROCYTE [DISTWIDTH] IN BLOOD BY AUTOMATED COUNT: 14.1 % (ref 12.3–15.4)
GLOBULIN UR ELPH-MCNC: 2.3 GM/DL
GLUCOSE SERPL-MCNC: 111 MG/DL (ref 65–99)
HCT VFR BLD AUTO: 42.5 % (ref 37.5–51)
HGB BLD-MCNC: 13.3 G/DL (ref 13–17.7)
IGA1 MFR SER: <50 MG/DL (ref 70–400)
IGG1 SER-MCNC: 316 MG/DL (ref 700–1600)
IGM SERPL-MCNC: <25 MG/DL (ref 40–230)
IMM GRANULOCYTES # BLD AUTO: 0.04 10*3/MM3 (ref 0–0.05)
IMM GRANULOCYTES NFR BLD AUTO: 0.5 % (ref 0–0.5)
LYMPHOCYTES # BLD AUTO: 0.71 10*3/MM3 (ref 0.7–3.1)
LYMPHOCYTES NFR BLD AUTO: 8.1 % (ref 19.6–45.3)
MCH RBC QN AUTO: 29 PG (ref 26.6–33)
MCHC RBC AUTO-ENTMCNC: 31.3 G/DL (ref 31.5–35.7)
MCV RBC AUTO: 92.6 FL (ref 79–97)
MONOCYTES # BLD AUTO: 1.19 10*3/MM3 (ref 0.1–0.9)
MONOCYTES NFR BLD AUTO: 13.6 % (ref 5–12)
NEUTROPHILS NFR BLD AUTO: 6.11 10*3/MM3 (ref 1.7–7)
NEUTROPHILS NFR BLD AUTO: 70 % (ref 42.7–76)
NRBC BLD AUTO-RTO: 0 /100 WBC (ref 0–0.2)
PLATELET # BLD AUTO: 235 10*3/MM3 (ref 140–450)
PMV BLD AUTO: 11.7 FL (ref 6–12)
POTASSIUM SERPL-SCNC: 4.3 MMOL/L (ref 3.5–5.2)
PROT SERPL-MCNC: 5.5 G/DL (ref 6–8.5)
RBC # BLD AUTO: 4.59 10*6/MM3 (ref 4.14–5.8)
SODIUM SERPL-SCNC: 141 MMOL/L (ref 136–145)
T4 FREE SERPL-MCNC: 1.41 NG/DL (ref 0.93–1.7)
TSH SERPL DL<=0.05 MIU/L-ACNC: 2.59 UIU/ML (ref 0.27–4.2)
WBC NRBC COR # BLD: 8.73 10*3/MM3 (ref 3.4–10.8)

## 2022-09-16 PROCEDURE — 25010000002 IMMUNE GLOBULIN (HUMAN) 30 GM/300ML SOLUTION: Performed by: NURSE PRACTITIONER

## 2022-09-16 PROCEDURE — 85025 COMPLETE CBC W/AUTO DIFF WBC: CPT | Performed by: INTERNAL MEDICINE

## 2022-09-16 PROCEDURE — 80053 COMPREHEN METABOLIC PANEL: CPT | Performed by: INTERNAL MEDICINE

## 2022-09-16 PROCEDURE — 84439 ASSAY OF FREE THYROXINE: CPT | Performed by: INTERNAL MEDICINE

## 2022-09-16 PROCEDURE — 96413 CHEMO IV INFUSION 1 HR: CPT

## 2022-09-16 PROCEDURE — 99214 OFFICE O/P EST MOD 30 MIN: CPT | Performed by: NURSE PRACTITIONER

## 2022-09-16 PROCEDURE — 63710000001 DIPHENHYDRAMINE PER 50 MG: Performed by: NURSE PRACTITIONER

## 2022-09-16 PROCEDURE — 96366 THER/PROPH/DIAG IV INF ADDON: CPT

## 2022-09-16 PROCEDURE — 84443 ASSAY THYROID STIM HORMONE: CPT | Performed by: INTERNAL MEDICINE

## 2022-09-16 PROCEDURE — 25010000002 HEPARIN LOCK FLUSH PER 10 UNITS: Performed by: INTERNAL MEDICINE

## 2022-09-16 PROCEDURE — 25010000002 PEMBROLIZUMAB 100 MG/4ML SOLUTION 4 ML VIAL: Performed by: NURSE PRACTITIONER

## 2022-09-16 PROCEDURE — 96367 TX/PROPH/DG ADDL SEQ IV INF: CPT

## 2022-09-16 PROCEDURE — 82784 ASSAY IGA/IGD/IGG/IGM EACH: CPT | Performed by: NURSE PRACTITIONER

## 2022-09-16 RX ORDER — SODIUM CHLORIDE 9 MG/ML
250 INJECTION, SOLUTION INTRAVENOUS ONCE
Status: COMPLETED | OUTPATIENT
Start: 2022-09-16 | End: 2022-09-16

## 2022-09-16 RX ORDER — ACETAMINOPHEN 325 MG/1
650 TABLET ORAL ONCE
Status: COMPLETED | OUTPATIENT
Start: 2022-09-16 | End: 2022-09-16

## 2022-09-16 RX ORDER — HEPARIN SODIUM (PORCINE) LOCK FLUSH IV SOLN 100 UNIT/ML 100 UNIT/ML
500 SOLUTION INTRAVENOUS AS NEEDED
Status: DISCONTINUED | OUTPATIENT
Start: 2022-09-16 | End: 2022-09-16 | Stop reason: HOSPADM

## 2022-09-16 RX ORDER — DIPHENHYDRAMINE HCL 25 MG
25 CAPSULE ORAL ONCE
Status: COMPLETED | OUTPATIENT
Start: 2022-09-16 | End: 2022-09-16

## 2022-09-16 RX ORDER — HEPARIN SODIUM (PORCINE) LOCK FLUSH IV SOLN 100 UNIT/ML 100 UNIT/ML
500 SOLUTION INTRAVENOUS AS NEEDED
Status: CANCELLED | OUTPATIENT
Start: 2022-09-16

## 2022-09-16 RX ORDER — SODIUM CHLORIDE 0.9 % (FLUSH) 0.9 %
10 SYRINGE (ML) INJECTION AS NEEDED
Status: CANCELLED | OUTPATIENT
Start: 2022-09-16

## 2022-09-16 RX ORDER — SODIUM CHLORIDE 9 MG/ML
250 INJECTION, SOLUTION INTRAVENOUS ONCE
Status: CANCELLED | OUTPATIENT
Start: 2022-09-16

## 2022-09-16 RX ORDER — SODIUM CHLORIDE 0.9 % (FLUSH) 0.9 %
10 SYRINGE (ML) INJECTION AS NEEDED
Status: DISCONTINUED | OUTPATIENT
Start: 2022-09-16 | End: 2022-09-16 | Stop reason: HOSPADM

## 2022-09-16 RX ORDER — SODIUM CHLORIDE 9 MG/ML
250 INJECTION, SOLUTION INTRAVENOUS ONCE
Status: DISCONTINUED | OUTPATIENT
Start: 2022-09-16 | End: 2022-09-16 | Stop reason: HOSPADM

## 2022-09-16 RX ADMIN — DIPHENHYDRAMINE HYDROCHLORIDE 25 MG: 25 CAPSULE ORAL at 09:05

## 2022-09-16 RX ADMIN — Medication 500 UNITS: at 12:48

## 2022-09-16 RX ADMIN — ACETAMINOPHEN 650 MG: 325 TABLET, FILM COATED ORAL at 09:05

## 2022-09-16 RX ADMIN — SODIUM CHLORIDE 200 MG: 9 INJECTION, SOLUTION INTRAVENOUS at 12:14

## 2022-09-16 RX ADMIN — IMMUNE GLOBULIN INFUSION (HUMAN) 30 G: 100 INJECTION, SOLUTION INTRAVENOUS; SUBCUTANEOUS at 09:21

## 2022-09-16 RX ADMIN — Medication 10 ML: at 12:48

## 2022-09-16 RX ADMIN — SODIUM CHLORIDE 250 ML: 9 INJECTION, SOLUTION INTRAVENOUS at 09:05

## 2022-09-16 NOTE — PROGRESS NOTES
Subjective   REASON FOR FOLLOW UP:  1.  Chronic lymphocytic leukemia with extensive lymphadenopathy and possible pleural involvement. Initiation of Treanda, Rituxan May 1, 2019.  2.  Hypogammaglobulinemia.  Status is post IVIG  3.  Significant response on scans June 27, 2019.  Treatment changed to Imbruvica 420 mg daily.  4.  Metastatic non-small cell lung carcinoma on Keytruda  5.  Patient seen 2/18/2022 with left jaw/gumline swelling pain, associated hematoma?  Osteonecrosis.  Restart of Keytruda 3/4/2022, Xgeva discontinued  6.  Restaging via CT 4/12/2022, continued response, Keytruda continued, referral to dermatology for worsening psoriasis.  7.  Jaw osteonecrosis, undergoing therapy through oral surgery, IVIG anticipated, infectious disease and oral surgery follow-up      HISTORY OF PRESENT ILLNESS:    The patient is a 67 y.o. male with the above-mentioned history, who returns the office today in anticipation of his 32nd cycle of Keytruda.  He is also due for his monthly IVIG.  He continues on Imbruvica 420 mg daily.  He overall is tolerating his current regimen very well.  He does report over the past few weeks he has developed worsening shortness of breath with very minimal exertion.  He does recover with rest though the shortness of breath is noticeable.  He is without a cough.  He denies fevers or chills.  He denies pain with inspiration or wheezing.  His greatest concern is ongoing struggles with osteonecrosis of the jaw.  He is hopeful to undergo a surgery late October with biopsies determining the best antimicrobial choice.  In the meantime he continues on Augmentin twice daily.  He reports his bowels are soft though without significant diarrhea.  He does continue to struggle with a fairly significant skin rash.  He was seen by Dr. Damon though has not followed up due to personal preference.  He is not using anything daily.  He reports the rash occasionally itches.  He previously has utilized  prescription hydrocortisone cream without benefit.    ONCOLOGY HISTORY:    The patient is a  67 y.o. male with the above mentioned history here today for lab review and evaluation due for Keytruda. infectious disease currently has him on Augmentin as well as amoxicillin for his osteonecrosis of the jaw and review of the records 7/6/2022.  He is going for an additional opinion 8/8/2022 for oral surgery.  It has been thought that he might require surgery but he appears to be making a gradual improvement with slow resolution of fistulous tracts that had developed and around his osteonecrosis.  He continues to have eating discomfort but is able to maintain his weight.                                                       As concerns his lung malignancy he does note occasional pain in his ribs and infrequent shortness of breath.  This comes and goes.  He has morphine and Norco that he uses for pain control although he admits he does not take either 1 regularly.  He also uses meloxicam intermittently for her pain in his mouth.                                             Patient is also struggled with skin eruptions but has not seen dermatology recently.  He typically is followed by Dr. Regan Damon.    Patient also continues on Imbruvica 420 mg daily, tolerating well and he continues this currently.    Past Medical History, Past Surgical History, Social History, Family History have been reviewed and are without significant changes except as mentioned.    Hematologic/oncologic history:    As concerns his CLL history he initially required Treanda Rituxan 5/1/2019 for 2 cycles and then initiated Imbruvica 420 mg daily initiated 7/25/2019.       As concerns stage IV non-small cell lung carcinoma he returns the office continuing Keytruda which he continues to receive every 3 weeks which was initiated 10/21/2020.  He also receives Xgeva every 6 weeks which was initially given 12/2/2020 and discontinued 1/7/2022 secondary to  "osteonecrosis.      Additional issues that developed during treatment included right knee pain with plain views of his right knee showing a right knee effusion with medial compartment narrowing and no suspicious bone lesion.  There is benign periosteal calcification along the distal right femur.  A PET/CT 11/11 went on to demonstrate a complete metabolic response to therapy compared to PET/CT from 9/23/2020.  This includes his primary lung neoplasm left upper lobe, large osseous metastatic lesion the right chest wall region from the eighth rib, small left adrenal metastasis, and no evidence of disease involving the distal left femur.      The patient was seen 11/24/2021 continue to do well though indicating that his right knee is \"something I can manage at the moment\".  He prefers not to have orthopedic assessment at this point.  He is concerned, ever, about skin lesions that are developing primarily on his calf regions and into his right thigh.    The patient was seen by Dr. Regan Damon 11/29/2021 and felt to be consistent with psoriasiform dermatitis treated with intralesional therapy.  Was also started on a moisturizer and lipid therapy.  The patient returned for assessment 12/15/2021 and treated with cycle #21 Keytruda, returned 1/7/2022 for cycle #22 and 1/28/2022 for cycle #23.  At that visit he had developed adenopathy several weeks previous and was treated with a Medrol Dosepak.   The patient was next seen 2/18/2022 and indicates that though his adenopathy has improved after treatment described above that his jaw has become painful swollen and tender with additional bleeding.     The patient was referred to his primary dentist who then had him seen oral surgeon-Dr. Ezequiel Davila-reached at [phone] who felt that this was osteonecrosis and should be treated symptomatically.  As the patient reviewed 3/4/2022, wonderfully, his oral issues have nearly resolved with the gumline returning to essentially its " previous state, no swelling, minimal erythema, no discharge and no additional pain.  He does have follow-up with oral surgery in the next several weeks and we discussed restarting his Keytruda scanning him likely in April 2022 in general.   He returns to the office on 3/25/2022 in follow-up in anticipation of cycle 24 Keytruda. He reports the left side of his jaw has improved.    However, he was now having issues with the right lower side. He was seen by neurosurgery with additional films and started on oral amoxicillin by his oral surgeon.      The patient continued his immunotherapy taking Keytruda 3/25/2022 and underwent repeat CT chest abdomen and pelvis 4/12/2022 that is reviewed with him 4/15/2022.  Wonderfully there is no substantial change with a small to moderate right pleural effusion that decreased in size, no additional pulmonary nodule or new metastatic disease, multifocal osteosclerotic metastatic disease without change and no new findings in the abdomen or pelvis.    The patient was seen 4/15/2022 indicating that he is actually feeling well in terms of general symptoms.  This includes lack of progression from mouth pain, ability to eat without discomfort.  He is, however, having worsening psoriasis particular in his lower extremities and has been reviewed by dermatology previously.    The patient was reviewed 6/17/2022 having been seen by oral surgery with progression of his osteonecrosis and skin eruption.  He is currently undergoing assessment by infectious disease within the next week and we have reviewed that previously he responded to IVIG for in-hospital refractory sinusitis.  As a result he is asked to undergo reassessment for his immunoglobulin levels today and return next week for 400 mg/kg of IVIG infusion which we will continue monthly.    The patient was given IVIG 6/24/2022 and again 7/22/2022.  Assessment 8/5/2022 continue to show a degree of general improvement with oral surgery  "continue to follow him with a second opinion to be obtained  8/8/2022 when IVIG planned 8/19/2022 and 9/16/2022.      Objective      Vitals:    09/16/22 0934   BP: 134/77   Pulse: 95   Resp: 18   Temp: 96.5 °F (35.8 °C)   SpO2: 95%   Weight: 76.7 kg (169 lb)   Height: 180.3 cm (70.98\")     Current Status 8/26/2022   ECOG score 0     Physical Exam  Vitals and nursing note reviewed.   Constitutional:       Appearance: Normal appearance. He is well-developed.   HENT:      Head: Normocephalic and atraumatic.      Nose: Nose normal.      Mouth/Throat:      Comments: Exposed bone left mandible region, reduced general erythema per lower gumline, no exudate currently    Eyes:      Pupils: Pupils are equal, round, and reactive to light.   Cardiovascular:      Rate and Rhythm: Normal rate and regular rhythm.      Heart sounds: Normal heart sounds.   Pulmonary:      Effort: Pulmonary effort is normal. No respiratory distress.      Breath sounds: Normal breath sounds. No wheezing, rhonchi or rales.   Abdominal:      General: Bowel sounds are normal. There is no distension.      Palpations: Abdomen is soft.      Tenderness: There is no abdominal tenderness.   Musculoskeletal:         General: Normal range of motion.      Cervical back: Normal range of motion and neck supple.   Skin:     General: Skin is warm and dry.      Comments: erythematous psoriasiform lesions noted on lower extremities bilaterally, mildly pruritic, no tenderness, with a scale component, varying in size. This is quite extensive.   Neurological:      Mental Status: He is alert and oriented to person, place, and time.   Psychiatric:         Behavior: Behavior normal.       I have reexamined the patient and the results are consistent with the previously documented exam. RORY Zhao      RECENT LABS:  Results from last 7 days   Lab Units 09/16/22  0840   WBC 10*3/mm3 8.73   NEUTROS ABS 10*3/mm3 6.11   HEMOGLOBIN g/dL 13.3   HEMATOCRIT % 42.5 "   PLATELETS 10*3/mm3 235     Results from last 7 days   Lab Units 09/16/22  0840   SODIUM mmol/L 141   POTASSIUM mmol/L 4.3   CHLORIDE mmol/L 106   CO2 mmol/L 28.8   BUN mg/dL 17   CREATININE mg/dL 1.27   CALCIUM mg/dL 8.1*   ALBUMIN g/dL 3.20*   BILIRUBIN mg/dL 0.4   ALK PHOS U/L 86   ALT (SGPT) U/L 9   AST (SGOT) U/L 9   GLUCOSE mg/dL 111*         FISH prognostic panel-Positive for deletion of 13 q. 14.3    Assessment & Plan   1.  CLL presenting with significant lymphocytosis, lymphadenopathy and splenomegaly.     · Positive for deletion of 13 q. 14.3.    · Patient status post 2 cycles of Treanda Rituxan with excellent response.    · Imaging 6/27/2019 with significant decrease in adenopathy.  Treanda Rituxan discontinued   · Imbruvica 420 mg daily initiated 7/25/2019.  · He continues on Imbruvica, without indication of progression of his CLL, without progressive lymphadenopathy on CT scans.  · Patient has resolving adenopathy particularly cervical regions 2/18/2022  · 3/25/2022 patient is without worsening adenopathy on exam today.  Continue Imbruvica.  · 9/16/2022 continues on Imbruvica 420 mg daily. Denies B-symptoms, no evidence of worsening adenopathy on exam.    2.  Pulmonary nodules/infiltrates.  He is  status post bronchoscopy.  Is unclear at this time whether these findings are infectious or possibly related to his lymphoproliferative disorder.  This is seen to be improving on latest scans.  · The patient does currently have worsening shortness of breath on exertion.  Follow-up CT of the chest in 2 weeks    3.  Hypogammaglobulinemia-status post IVIG with resolution of sinusitis.   · Anticipate trial of IVIG with the patient now having progression of his osteonecrosis and dermal infection.  · The patient is now receiving IVIG monthly, he is due today, 9/16/2022    4.  Non-small cell lung carcinoma  · August 26 2020 revealing a new 1.6 cm spiculated nodule within the left upper lobe, 1.2 cm left adrenal  nodule, bone windows with a soft tissue mass involving the right eighth rib measuring 3.7 x 2.6 cm.    · Biopsy was consistent with adenocarcinoma CK 7+, CK 20-, lung primary suspected clinically, molecular panel not yet obtained.  · PET/CT 9/23/2020 demonstrating asymmetric uptake within the right parotid gland which is unremarkable appearance on noncontrasted CT, SUV of 6 compared to 2.7.  There is no hilar, mediastinal or axillary adenopathy, findings of asymmetric moderate to intense FDG uptake within superior aspect the right chest wall anterior to the right first rib with an irregular hypodense lesion measuring 1.8 x 1.6 and SUV 4.9.  This had not been seen June 27, 2019.  There is an intensely FDG avid nodule within the lingula measuring 1.9 x 1.5 history of 12.4, FDG avid left adrenal nodule 1.9 x 1.5 with an issue 21.2.    · Evaluated by radiation oncology therapy September 29 and started on radiation therapy to the right chest wall-35 Gy in 10 fractions.   ·  Plans were also then proceed with SBRT to the solitary left upper lobe lesion pending insurance approval.  · Further testing including TPS of 20% and foundation CDX with a TMB of greater than 10 mutations per megabase (28 mutations per megabase) and the indication that several immunotherapies could be useful in this patient's care.  Additional genomic findings include BRAF G469R/that trametinib could be useful and STK11/that everolimus could be useful.  · Keytruda initiated 10/21/2021   · Reassessment after 2 cycles of Keytruda with enlargement of the right eighth rib lesion,enlargement of spiculated left upper lobe lung mass, moderate to large right-sided pleural effusion, new left adrenal mass and enlarged retrocrural lymph node.?Pseudoprogression  · Xgeva last given 12/30/2020  · Follow-up scans 1/6/2021 demonstrated marked improvement in his right eighth rib lesion, resolution of left upper lobe spiculated mass, residual large right pleural  effusion, resolution of left adrenal metastasis.   · Right pleural effusion, with thoracentesis 1/14/2021 with 1500 mils removed.  Pathology consistent with malignant effusion   · CT scans 4/5/2021 with response noted in the left upper lobe density.  · He continues to receive Keytruda every 3 weeks along with Xgeva every 6 weeks.  · Repeat scan 7/15/2021 without evidence of recurrence or progressive disease.  Plans to continue Keytruda every 3 weeks with repeat scans in 4 to 6 months  · Patient status post PET/CT 11/11/2021 with hyper response, approximate remission status, plan to proceed with cycle #20 Keytruda  · Patient seen 2/18/2022 with Keytruda held while his oral issues are addressed-concern for osteonecrosis of the jaw.  · Patiently diagnosed with osteonecrosis of the jaw, seen by oral surgery, started on antibiotic therapy and Peridex with substantial improvement, Xgeva discontinued as discussed below.  · 3/25/2022 proceed with cycle #24 Keytruda today.  Follow-up CT scans 4/12/2022 without evidence of progressive disease, stable findings including osteosclerotic metastatic disease.  Keytruda continued  · The patient is due today, 9/16/2022 for cycle #32 Keytruda which is continued every 3 weeks.  He does have worsening exertional shortness of breath with plan CT scans in 2 weeks.    5.  Left knee metastasis  · Evaluated by orthopedic oncology, Dr. Eliu Ochoa  · Admission 1/7/2021 undergoing stabilization of left femoral bone lesion, prophylactic stabilization with intramedullary implants.  · It was suggested we delay Xgeva or Zometa to allow bone healing  · Completed radiation with 10 fractions 2/10/2021  · Xgeva resumed 4/29/2021, he continues to receive this every 6 weeks.    · Additional assessment 2/18/2022 with left jaw pain, subsequent diagnosis of osteoporosis, Xgeva discontinued  · Patient continues follow-up with oral surgery, progression of osteoporosis symptomatically, dermal eruption left  side of mandibular region, IVIG anticipated  · Xgeva DISCONTINUED due to osteonecrosis of the jaw     6.  Malignant right pleural effusion  · Ultrasound thoracentesis 1/14/2021 1500 mL removed  · Chest x-ray 2/25/2021 with moderate right pleural effusion  · Progressive symptoms with worsening pain 4/5/2021  · Ultrasound-guided thoracentesis 4/13/2021 with 2050 ml removed.  · Plans for Pleurx catheter with thoracic surgery, however, pleural effusion has not recurred.  Patient seen 11/4/2021 with chest x-ray planned.  Subsequent resolution noted on CT scan.  · He does currently worsening shortness of breath though significant effusion present on exam.    7.  Cancer related pain  · Pain is most prominent in his right rib, utilizing MS Contin 30 mg 3 times a day  · Hydrocodone/acetaminophen 10/325 as needed for breakthrough pain.  Currently taking 3 to 4 tablets daily  · He is not currently in need of a refill of pain medications  · Discussed MS Contin at 30 mg p.o. every 8 hours, Norco 10/325 2 p.o. every 6 hours  · He is currently using pain medication as needed for jaw pain.  Requiring approximately 1 MS Contin and 1 Norco 10/325 daily, however some days not requiring any pain medication.  · His pain is currently controlled    8.  Insomnia  · Continuing to follow with behavioral oncology  · Continuing Remeron 7.5 mg nightly with good control    9. Health maintenance  · Status post COVID-19 booster, SARS antibody pending today  · The patient is due for Shingrix and Prevnar which will both be administered in the clinic 9/23/2021    10.  Skin lesions  · Uncertain of etiology, unexpected findings for usual skin lesions associated with immunotherapy  · Request dermatologic assessment-psoriasiform dermatitis.  Seen by Dr. Damon though no follow-up due to personal preference  · The lesions on the lower extremities particularly are quite extensive.  We discussed Aquaphor and over-the-counter cortisone    11.  Right knee  pain  · Osteoarthritis, orthopedic assessment upon patient's request.   · Resolved.  Denies any knee pain today     12.  Hypocalcemia  · Calcium currently 8.1     13.  Osteonecrosis of the jaw  · Xgeva was discontinued.  · Patient went to second opinion with  with oral surgery.  He was reffered to Dr. Escalante with , with whom he had consult on 8/23/2022.  Dr. Escalante recommened an outpatient procedure that would allow him to biopsy the affected area of his jawbone and identify which bacteria was present, they would then coordinate with ID to identify best antibiotic option.  Patient is agreeable to proceeding with this plan, and is awaiting surgery date.  · Surgery is planned for late October    Plan:   1. Proceed today with cycle #32 Keytruda  2. Proceed today with IVIG which is continued monthly due to hypogammaglobulinemia and ongoing infection with osteonecrosis  3. Continue Imbruvica 420 mg daily  4. Continue MS Contin 30 mg every 12 hours and Norco 10/325 every 6 hours as needed for pain.  Taking 1 tablet of MS Contin and 1 tablet of Norco most days.  5. Continue meloxicam.  6. Continue Remeron 15 mg nightly.  7. Due to patient's personal preference, he will not follow-up with Dr. Damon, we did discuss a trial of Aquaphor today along with over-the-counter hydrocortisone  8. In 2 weeks, the patient will undergo CT of the chest, abdomen, pelvis to evaluate his disease state  9. Return in 3 weeks for MD follow-up and next dose of Keytruda.  10. Continue to follow closely with infectious disease, Dr. Champion and continue antibiotics as prescribed for osteonecrosis of the jaw.    11. Continue follow-up with Dr. Escalante with , awaiting surgery date.    Patient continues on high risk medication requiring close monitoring for toxicity    Noa Zarate, APRN   9/16/2022

## 2022-09-22 ENCOUNTER — OFFICE VISIT (OUTPATIENT)
Dept: INFECTIOUS DISEASES | Facility: CLINIC | Age: 67
End: 2022-09-22

## 2022-09-22 VITALS
WEIGHT: 168 LBS | SYSTOLIC BLOOD PRESSURE: 119 MMHG | HEART RATE: 116 BPM | HEIGHT: 71 IN | RESPIRATION RATE: 20 BRPM | DIASTOLIC BLOOD PRESSURE: 77 MMHG | BODY MASS INDEX: 23.52 KG/M2 | TEMPERATURE: 97.1 F

## 2022-09-22 DIAGNOSIS — C34.12 MALIGNANT NEOPLASM OF UPPER LOBE OF LEFT LUNG: ICD-10-CM

## 2022-09-22 DIAGNOSIS — M87.180 DRUG-INDUCED OSTEONECROSIS OF JAW: ICD-10-CM

## 2022-09-22 DIAGNOSIS — M87.9 OSTEONECROSIS OF MANDIBLE: Primary | ICD-10-CM

## 2022-09-22 PROCEDURE — 99215 OFFICE O/P EST HI 40 MIN: CPT | Performed by: STUDENT IN AN ORGANIZED HEALTH CARE EDUCATION/TRAINING PROGRAM

## 2022-09-22 RX ORDER — AMOXICILLIN AND CLAVULANATE POTASSIUM 500; 125 MG/1; MG/1
1 TABLET, FILM COATED ORAL 2 TIMES DAILY
Qty: 60 TABLET | Refills: 1 | Status: SHIPPED | OUTPATIENT
Start: 2022-09-22 | End: 2022-10-22

## 2022-09-22 NOTE — PROGRESS NOTES
"Chief Complaint  Follow-up    Subjective        Dav Freitas presents to [facility]  History of Present Illness    Patient is a 66-year-old male with a past medical history of hypogammaglobinemia, stage IV lung cancer on Keytruda but prior therapy with Xgeva for bony metastasis and CLL in remission who is seen by oral surgery in the setting of medication related osteonecrosis of the mandible and has been on multiple courses of amoxicillin during \"flareups \".  Recently had CT scan that demonstrated periosteal thickening and continue to be consistent with osteonecrosis of the jaw however developed draining extraoral fistula recently and was placed on amoxicillin/Augmentin combo to complete out 3 weeks that he finished 2 weeks ago.      He reports that while on antibiotics he had great resolution in his fistula which closed up and the right side of his face swelling improved dramatically.  He reports he is back to normal until approximately the beginning of this week when he developed worsening swelling of the left side of the face without any drainage and the right side developed some pain.  He denies any fevers or chills.  He states he has some pain in his mouth with soreness of the left side of his tongue which becomes raw.  States he tolerated the antibiotics very well without any difficulties.    Recently seen by OMFS at [facility] with plans for fistulectomy on 10/20/2022.    He reports he has been taking his antibiotics without any difficulty.  Denies any nausea, vomiting, diarrhea, fevers or chills.  States he has had some slight pressure in the chest over the last month that has been present since starting the antibiotics.  Denies any acute change in shortness of breath or chest pains.  Reports he continues to have slight drainage from bilateral sinus tracts over the mandibles.  Has pain within his mouth and his tongue remains sore.  Has some discharge on his " "lips after he wakes up in the morning.    Objective   Vital Signs:  /77 (BP Location: Left arm, Patient Position: Sitting, Cuff Size: Adult)   Pulse 116   Temp 97.1 °F (36.2 °C)   Resp 20   Ht 180.3 cm (70.98\")   Wt 76.2 kg (168 lb)   BMI 23.44 kg/m²   Estimated body mass index is 23.44 kg/m² as calculated from the following:    Height as of this encounter: 180.3 cm (70.98\").    Weight as of this encounter: 76.2 kg (168 lb).    BMI is within normal parameters. No other follow-up for BMI required.      Physical Exam  Constitutional:       General: He is not in acute distress.     Appearance: Normal appearance. He is normal weight. He is not ill-appearing.   HENT:      Head: Normocephalic and atraumatic.      Comments: Left angle of the mandible with papule that has no drainage today.  No tenderness to palpation.  Right mandible with new erythema and area of protrusion.     Nose: Nose normal. No rhinorrhea.      Mouth/Throat:      Mouth: Mucous membranes are moist.      Pharynx: No oropharyngeal exudate.      Comments: Inferior molar area with exposed bone.  Eyes:      General: No scleral icterus.     Extraocular Movements: Extraocular movements intact.      Pupils: Pupils are equal, round, and reactive to light.   Cardiovascular:      Rate and Rhythm: Normal rate and regular rhythm.      Pulses: Normal pulses.      Heart sounds: Normal heart sounds. No murmur heard.  Pulmonary:      Effort: Pulmonary effort is normal.      Breath sounds: Normal breath sounds. No wheezing, rhonchi or rales.   Abdominal:      General: Abdomen is flat. Bowel sounds are normal.      Palpations: Abdomen is soft.      Tenderness: There is no abdominal tenderness. There is no guarding or rebound.   Musculoskeletal:         General: No swelling or tenderness. Normal range of motion.      Cervical back: Normal range of motion and neck supple. No tenderness.      Right lower leg: No edema.      Left lower leg: No edema.   Skin:     " General: Skin is warm and dry.      Findings: Erythema and lesion present.   Neurological:      General: No focal deficit present.      Mental Status: He is alert and oriented to person, place, and time.   Psychiatric:         Mood and Affect: Mood normal.         Behavior: Behavior normal.        Result Review :  The following data was reviewed by: Glenn Champion DO on 06/29/2022:  Common labs    Common Labs 8/19/22 8/26/22 8/26/22 9/16/22 9/16/22     0831 [id number] 0840 [id number]   Glucose   133 (A)  111 (A)   BUN   23  17   Creatinine   1.25  1.27   Sodium   140  141   Potassium   4.4  4.3   Chloride   106  106   Calcium   8.6  8.1 (A)   Albumin   3.50  3.20 (A)   Total Bilirubin   0.2  0.4   Alkaline Phosphatase   86  86   AST (SGOT)   11  9   ALT (SGPT)   10  9   WBC 13.53 (A) 10.36  8.73    Hemoglobin 12.7 (A) 13.5  13.3    Hematocrit 39.4 41.5  42.5    Platelets 256 270  235    (A) Abnormal value            Data reviewed: Radiologic studies With reports attached from oral maxillofacial surgery and Consultant notes From oral maxillofacial surgery and oncology          Assessment and Plan   Diagnoses and all orders for this visit:    1. Osteonecrosis of mandible (HCC) (Primary)    2. Drug-induced osteonecrosis of jaw (HCC)    3. Malignant neoplasm of upper lobe of left lung (HCC)    Other orders  -     amoxicillin-clavulanate (Augmentin) 500-125 MG per tablet; Take 1 tablet by mouth 2 (Two) Times a Day for 30 days.  Dispense: 60 tablet; Refill: 1    Continues to be a very difficult situation given the exposed mandibular bone within the oropharynx.  He has responded well to antibiotic therapy and has completed 6 weeks but continues to have draining fistulous tracts bilaterally.  We have somewhat reached an impasse due to his continued seeding of the bone with oral bacteria.  It appears she has plans for fistulectomy with OMFS at  with plans to obtain cultures at that time.      I am concerned that with ongoing  exposure of the bone it is only a small matter of time before he develops recurrent osteomyelitis after cessation of any antibiotics.  I will plan to continue Augmentin 500 mg twice daily as a somewhat suppressive regimen.  Plan to follow-up with him after this procedure to assess cultures at that time.       I spent 44 minutes caring for Dav on this date of service. This time includes time spent by me in the following activities:preparing for the visit, reviewing tests, obtaining and/or reviewing a separately obtained history, performing a medically appropriate examination and/or evaluation , counseling and educating the patient/family/caregiver, ordering medications, tests, or procedures, referring and communicating with other health care professionals , documenting information in the medical record, independently interpreting results and communicating that information with the patient/family/caregiver and care coordination  Follow Up   No follow-ups on file.  Patient was given instructions and counseling regarding his condition or for health maintenance advice. Please see specific information pulled into the AVS if appropriate.

## 2022-10-04 ENCOUNTER — HOSPITAL ENCOUNTER (OUTPATIENT)
Dept: CT IMAGING | Facility: HOSPITAL | Age: 67
Discharge: HOME | End: 2022-10-04
Admitting: NURSE PRACTITIONER

## 2022-10-04 ENCOUNTER — APPOINTMENT (OUTPATIENT)
Dept: ONCOLOGY | Facility: HOSPITAL | Age: 67
End: 2022-10-04

## 2022-10-04 ENCOUNTER — INFUSION (OUTPATIENT)
Dept: ONCOLOGY | Facility: HOSPITAL | Age: 67
End: 2022-10-04

## 2022-10-04 DIAGNOSIS — C34.12 MALIGNANT NEOPLASM OF UPPER LOBE OF LEFT LUNG: ICD-10-CM

## 2022-10-04 DIAGNOSIS — C91.10 CLL (CHRONIC LYMPHOCYTIC LEUKEMIA): ICD-10-CM

## 2022-10-04 PROCEDURE — 71260 CT THORAX DX C+: CPT

## 2022-10-04 PROCEDURE — 74177 CT ABD & PELVIS W/CONTRAST: CPT

## 2022-10-04 PROCEDURE — 25010000002 IOPAMIDOL 61 % SOLUTION: Performed by: NURSE PRACTITIONER

## 2022-10-04 PROCEDURE — 0 DIATRIZOATE MEGLUMINE & SODIUM PER 1 ML: Performed by: NURSE PRACTITIONER

## 2022-10-04 RX ADMIN — IOPAMIDOL 100 ML: 612 INJECTION, SOLUTION INTRAVENOUS at 16:43

## 2022-10-04 RX ADMIN — DIATRIZOATE MEGLUMINE AND DIATRIZOATE SODIUM 30 ML: 660; 100 LIQUID ORAL; RECTAL at 15:25

## 2022-10-06 NOTE — PROGRESS NOTES
Subjective   REASON FOR FOLLOW UP:  1.  Chronic lymphocytic leukemia with extensive lymphadenopathy and possible pleural involvement. Initiation of Treanda, Rituxan May 1, 2019.  2.  Hypogammaglobulinemia.  Status is post IVIG  3.  Significant response on scans June 27, 2019.  Treatment changed to Imbruvica 420 mg daily.  4.  Metastatic non-small cell lung carcinoma on Keytruda  5.  Patient seen 2/18/2022 with left jaw/gumline swelling pain, associated hematoma?  Osteonecrosis.  Restart of Keytruda 3/4/2022, Xgeva discontinued  6.  Restaging via CT 4/12/2022, continued response, Keytruda continued, referral to dermatology for worsening psoriasis.  7.  Jaw osteonecrosis, undergoing therapy through oral surgery, IVIG anticipated, infectious disease and oral surgery follow-up  8.  Patient assessed 10/7/2022 with CT chest and pelvis 10/4/2022 showing stable disease.  Patient describes additional surgery at  planned per his osteonecrosis.      HISTORY OF PRESENT ILLNESS:    The patient is a 67 y.o. male with the above-mentioned history, who returns the office 9/16/2022 in anticipation of his 32nd cycle of Keytruda.  He is also due for his monthly IVIG.  He continues on Imbruvica 420 mg daily.  He overall is tolerating his current regimen very well.                      He reported that he did increase shortness of breath with minimal exertion, greatest issues continue to be osteonecrosis of the jaw with a surgery in late October planned and biopsies.  He continued to have a fairly significant skin rash seen by Dr. Damon and treated topically.    Repeat imaging 10/4/2022 fortunately failed to show progressive disease in the chest abdomen or pelvis.  There is continued evidence of osseous metastasis similar to previous and possibly bladder irritation- incomplete distention versus cystitis.  The patient indicates when seen 10/7/2022 that he is actually been able to maintain his current status and performance levels in  general though he is a candidate for additional surgery at  in the next several weeks-oral maxillary surgery.  His dermatitis is also worsened though he is reluctant, at this point, to approach dermatology again until it clearly worsens.        ONCOLOGY HISTORY:    The patient is a  67 y.o. male with the above mentioned history here today for lab review and evaluation due for Keytruda. infectious disease currently has him on Augmentin as well as amoxicillin for his osteonecrosis of the jaw and review of the records 7/6/2022.  He is going for an additional opinion 8/8/2022 for oral surgery.  It has been thought that he might require surgery but he appears to be making a gradual improvement with slow resolution of fistulous tracts that had developed and around his osteonecrosis.  He continues to have eating discomfort but is able to maintain his weight.                                                       As concerns his lung malignancy he does note occasional pain in his ribs and infrequent shortness of breath.  This comes and goes.  He has morphine and Norco that he uses for pain control although he admits he does not take either 1 regularly.  He also uses meloxicam intermittently for her pain in his mouth.                                             Patient is also struggled with skin eruptions but has not seen dermatology recently.  He typically is followed by Dr. Regan Damon.    Patient also continues on Imbruvica 420 mg daily, tolerating well and he continues this currently.    Past Medical History, Past Surgical History, Social History, Family History have been reviewed and are without significant changes except as mentioned.    Hematologic/oncologic history:    As concerns his CLL history he initially required Treanda Rituxan 5/1/2019 for 2 cycles and then initiated Imbruvica 420 mg daily initiated 7/25/2019.       As concerns stage IV non-small cell lung carcinoma he returns the office continuing  "Keytruda which he continues to receive every 3 weeks which was initiated 10/21/2020.  He also receives Xgeva every 6 weeks which was initially given 12/2/2020 and discontinued 1/7/2022 secondary to osteonecrosis.      Additional issues that developed during treatment included right knee pain with plain views of his right knee showing a right knee effusion with medial compartment narrowing and no suspicious bone lesion.  There is benign periosteal calcification along the distal right femur.  A PET/CT 11/11 went on to demonstrate a complete metabolic response to therapy compared to PET/CT from 9/23/2020.  This includes his primary lung neoplasm left upper lobe, large osseous metastatic lesion the right chest wall region from the eighth rib, small left adrenal metastasis, and no evidence of disease involving the distal left femur.      The patient was seen 11/24/2021 continue to do well though indicating that his right knee is \"something I can manage at the moment\".  He prefers not to have orthopedic assessment at this point.  He is concerned, ever, about skin lesions that are developing primarily on his calf regions and into his right thigh.    The patient was seen by Dr. Regan Damon 11/29/2021 and felt to be consistent with psoriasiform dermatitis treated with intralesional therapy.  Was also started on a moisturizer and lipid therapy.  The patient returned for assessment 12/15/2021 and treated with cycle #21 Keytruda, returned 1/7/2022 for cycle #22 and 1/28/2022 for cycle #23.  At that visit he had developed adenopathy several weeks previous and was treated with a Medrol Dosepak.   The patient was next seen 2/18/2022 and indicates that though his adenopathy has improved after treatment described above that his jaw has become painful swollen and tender with additional bleeding.     The patient was referred to his primary dentist who then had him seen oral surgeon-Dr. Ezequiel Davila-reached at [phone] who felt " that this was osteonecrosis and should be treated symptomatically.  As the patient reviewed 3/4/2022, wonderfully, his oral issues have nearly resolved with the gumline returning to essentially its previous state, no swelling, minimal erythema, no discharge and no additional pain.  He does have follow-up with oral surgery in the next several weeks and we discussed restarting his Keytruda scanning him likely in April 2022 in general.   He returns to the office on 3/25/2022 in follow-up in anticipation of cycle 24 Keytruda. He reports the left side of his jaw has improved.    However, he was now having issues with the right lower side. He was seen by neurosurgery with additional films and started on oral amoxicillin by his oral surgeon.      The patient continued his immunotherapy taking Keytruda 3/25/2022 and underwent repeat CT chest abdomen and pelvis 4/12/2022 that is reviewed with him 4/15/2022.  Wonderfully there is no substantial change with a small to moderate right pleural effusion that decreased in size, no additional pulmonary nodule or new metastatic disease, multifocal osteosclerotic metastatic disease without change and no new findings in the abdomen or pelvis.    The patient was seen 4/15/2022 indicating that he is actually feeling well in terms of general symptoms.  This includes lack of progression from mouth pain, ability to eat without discomfort.  He is, however, having worsening psoriasis particular in his lower extremities and has been reviewed by dermatology previously.    The patient was reviewed 6/17/2022 having been seen by oral surgery with progression of his osteonecrosis and skin eruption.  He is currently undergoing assessment by infectious disease within the next week and we have reviewed that previously he responded to IVIG for in-hospital refractory sinusitis.  As a result he is asked to undergo reassessment for his immunoglobulin levels today and return next week for 400 mg/kg of IVIG  "infusion which we will continue monthly.    The patient was given IVIG 6/24/2022 and again 7/22/2022.  Assessment 8/5/2022 continue to show a degree of general improvement with oral surgery continue to follow him with a second opinion to be obtained  8/8/2022 when IVIG planned 8/19/2022 and 9/16/2022.    Patient seen 10/7/2022 with stable findings per CT scans, ongoing IVIG q. monthly, Keytruda every 3 weeks.  Alert      Objective      Vitals:    10/07/22 1111   BP: 127/83   Pulse: 99   Resp: 18   Temp: 98.2 °F (36.8 °C)   TempSrc: Temporal   SpO2: 95%   Weight: 76.4 kg (168 lb 8 oz)   Height: 180.3 cm (70.98\")   PainSc: 0-No pain     Current Status 10/7/2022   ECOG score 0     Physical Exam  Vitals and nursing note reviewed.   Constitutional:       Appearance: Normal appearance. He is well-developed.   HENT:      Head: Normocephalic and atraumatic.      Nose: Nose normal.      Mouth/Throat:      Comments: Exposed bone left mandible region, reduced general erythema per lower gumline, no exudate currently    Eyes:      Pupils: Pupils are equal, round, and reactive to light.   Cardiovascular:      Rate and Rhythm: Normal rate and regular rhythm.      Heart sounds: Normal heart sounds.   Pulmonary:      Effort: Pulmonary effort is normal. No respiratory distress.      Breath sounds: Normal breath sounds. No wheezing, rhonchi or rales.   Abdominal:      General: Bowel sounds are normal. There is no distension.      Palpations: Abdomen is soft.      Tenderness: There is no abdominal tenderness.   Musculoskeletal:         General: Normal range of motion.      Cervical back: Normal range of motion and neck supple.   Skin:     General: Skin is warm and dry.      Comments: erythematous psoriasiform lesions noted on lower extremities bilaterally, mildly pruritic, no tenderness, with a scale component, varying in size. This is quite extensive.   Neurological:      Mental Status: He is alert and oriented to person, place, and " time.   Psychiatric:         Behavior: Behavior normal.       I have reexamined the patient and the results are consistent with the previously documented exam. Jostin Parekh MD      RECENT LABS:  Results from last 7 days   Lab Units 10/07/22  1048   WBC 10*3/mm3 11.14*   NEUTROS ABS 10*3/mm3 8.20*   HEMOGLOBIN g/dL 14.4   HEMATOCRIT % 45.8   PLATELETS 10*3/mm3 277     Results from last 7 days   Lab Units 10/07/22  1048   SODIUM mmol/L 138   POTASSIUM mmol/L 4.2   CHLORIDE mmol/L 104   CO2 mmol/L 24.3   BUN mg/dL 18   CREATININE mg/dL 1.43*   CALCIUM mg/dL 8.7   ALBUMIN g/dL 3.10*   BILIRUBIN mg/dL 0.3   ALK PHOS U/L 83   ALT (SGPT) U/L 7   AST (SGOT) U/L 8   GLUCOSE mg/dL 151*         FISH prognostic panel-Positive for deletion of 13 q. 14.3    Assessment & Plan   1.  CLL presenting with significant lymphocytosis, lymphadenopathy and splenomegaly.     · Positive for deletion of 13 q. 14.3.    · Patient status post 2 cycles of Treanda Rituxan with excellent response.    · Imaging 6/27/2019 with significant decrease in adenopathy.  Treanda Rituxan discontinued   · Imbruvica 420 mg daily initiated 7/25/2019.  · He continues on Imbruvica, without indication of progression of his CLL, without progressive lymphadenopathy on CT scans.  · Patient has resolving adenopathy particularly cervical regions 2/18/2022  · 3/25/2022 patient is without worsening adenopathy on exam today.  Continue Imbruvica.  · 9/16/2022 continues on Imbruvica 420 mg daily. Denies B-symptoms, no evidence of worsening adenopathy on exam.  · Patient stable per CLL 10/7/2022, Imbruvica continued    2.  Pulmonary nodules/infiltrates.  He is  status post bronchoscopy.  Is unclear at this time whether these findings are infectious or possibly related to his lymphoproliferative disorder.  This is seen to be improving on latest scans.  · The patient does currently have worsening shortness of breath on exertion.  Subsequent scans without additional  findings except for potential inflammatory process that is seen on recent CT scans.    3.  Hypogammaglobulinemia-status post IVIG with resolution of sinusitis.   · Anticipate trial of IVIG with the patient now having progression of his osteonecrosis and dermal infection.  · The patient is now receiving IVIG monthly, he is due today, 9/16/2022  · This will next be due 10/14/2022    4.  Non-small cell lung carcinoma  · August 26 2020 revealing a new 1.6 cm spiculated nodule within the left upper lobe, 1.2 cm left adrenal nodule, bone windows with a soft tissue mass involving the right eighth rib measuring 3.7 x 2.6 cm.    · Biopsy was consistent with adenocarcinoma CK 7+, CK 20-, lung primary suspected clinically, molecular panel not yet obtained.  · PET/CT 9/23/2020 demonstrating asymmetric uptake within the right parotid gland which is unremarkable appearance on noncontrasted CT, SUV of 6 compared to 2.7.  There is no hilar, mediastinal or axillary adenopathy, findings of asymmetric moderate to intense FDG uptake within superior aspect the right chest wall anterior to the right first rib with an irregular hypodense lesion measuring 1.8 x 1.6 and SUV 4.9.  This had not been seen June 27, 2019.  There is an intensely FDG avid nodule within the lingula measuring 1.9 x 1.5 history of 12.4, FDG avid left adrenal nodule 1.9 x 1.5 with an issue 21.2.    · Evaluated by radiation oncology therapy September 29 and started on radiation therapy to the right chest wall-35 Gy in 10 fractions.   ·  Plans were also then proceed with SBRT to the solitary left upper lobe lesion pending insurance approval.  · Further testing including TPS of 20% and foundation CDX with a TMB of greater than 10 mutations per megabase (28 mutations per megabase) and the indication that several immunotherapies could be useful in this patient's care.  Additional genomic findings include BRAF [id number]/that trametinib could be useful and STK11/that everolimus  could be useful.  · Keytruda initiated 10/21/2021   · Reassessment after 2 cycles of Keytruda with enlargement of the right eighth rib lesion,enlargement of spiculated left upper lobe lung mass, moderate to large right-sided pleural effusion, new left adrenal mass and enlarged retrocrural lymph node.?Pseudoprogression  · Xgeva last given 12/30/2020  · Follow-up scans 1/6/2021 demonstrated marked improvement in his right eighth rib lesion, resolution of left upper lobe spiculated mass, residual large right pleural effusion, resolution of left adrenal metastasis.   · Right pleural effusion, with thoracentesis 1/14/2021 with 1500 mils removed.  Pathology consistent with malignant effusion   · CT scans 4/5/2021 with response noted in the left upper lobe density.  · He continues to receive Keytruda every 3 weeks along with Xgeva every 6 weeks.  · Repeat scan 7/15/2021 without evidence of recurrence or progressive disease.  Plans to continue Keytruda every 3 weeks with repeat scans in 4 to 6 months  · Patient status post PET/CT 11/11/2021 with hyper response, approximate remission status, plan to proceed with cycle #20 Keytruda  · Patient seen 2/18/2022 with Keytruda held while his oral issues are addressed-concern for osteonecrosis of the jaw.  · Patiently diagnosed with osteonecrosis of the jaw, seen by oral surgery, started on antibiotic therapy and Peridex with substantial improvement, Xgeva discontinued as discussed below.  · 3/25/2022 proceed with cycle #24 Keytruda today.  Follow-up CT scans 4/12/2022 without evidence of progressive disease, stable findings including osteosclerotic metastatic disease.  Keytruda continued  · The patient is due today, 9/16/2022 for cycle #32 Keytruda which is continued every 3 weeks.  He does have worsening exertional shortness of breath with plan CT scans in 2 weeks.  · Repeat scans 10/4/2022 stable    5.  Left knee metastasis  · Evaluated by orthopedic oncology, Dr. Nowak  Price  · Admission 1/7/2021 undergoing stabilization of left femoral bone lesion, prophylactic stabilization with intramedullary implants.  · It was suggested we delay Xgeva or Zometa to allow bone healing  · Completed radiation with 10 fractions 2/10/2021  · Xgeva resumed 4/29/2021, he continues to receive this every 6 weeks.    · Additional assessment 2/18/2022 with left jaw pain, subsequent diagnosis of osteoporosis, Xgeva discontinued  · Patient continues follow-up with oral surgery, progression of osteoporosis symptomatically, dermal eruption left side of mandibular region, IVIG anticipated  · Xgeva DISCONTINUED due to osteonecrosis of the jaw     6.  Malignant right pleural effusion  · Ultrasound thoracentesis 1/14/2021 1500 mL removed  · Chest x-ray 2/25/2021 with moderate right pleural effusion  · Progressive symptoms with worsening pain 4/5/2021  · Ultrasound-guided thoracentesis 4/13/2021 with 2050 ml removed.  · Plans for Pleurx catheter with thoracic surgery, however, pleural effusion has not recurred.  Patient seen 11/4/2021 with chest x-ray planned.  Subsequent resolution noted on CT scan.  · Patient subsequent shortness of breath mostly improved seen 10/7/2022.    7.  Cancer related pain  · Pain is most prominent in his right rib, utilizing MS Contin 30 mg 3 times a day  · Hydrocodone/acetaminophen 10/325 as needed for breakthrough pain.  Currently taking 3 to 4 tablets daily  · He is not currently in need of a refill of pain medications  · Discussed MS Contin at 30 mg p.o. every 8 hours, Norco 10/325 2 p.o. every 6 hours  · He is currently using pain medication as needed for jaw pain.  Requiring approximately 1 MS Contin and 1 Norco 10/325 daily, however some days not requiring any pain medication.  · His pain is currently controlled    8.  Insomnia  · Continuing to follow with behavioral oncology  · Continuing Remeron 7.5 mg nightly with good control    9. Health maintenance  · Status post COVID-19  booster, SARS antibody pending today  · The patient is due for Shingrix and Prevnar which will both be administered in the clinic 9/23/2021    10.  Skin lesions  · Uncertain of etiology, unexpected findings for usual skin lesions associated with immunotherapy  · Request dermatologic assessment-psoriasiform dermatitis.  Seen by Dr. Damon though no follow-up due to personal preference  · The lesions on the lower extremities particularly are quite extensive.  We discussed Aquaphor and over-the-counter cortisone  · Patient assessed 10/7/2022 at which time we will hold dermatologic assessment until the patient is ready to be reevaluated.    11.  Right knee pain  · Osteoarthritis, orthopedic assessment upon patient's request.   · Resolved.  Denies any knee pain today     12.  Hypocalcemia  · Calcium currently 8. 7     13.  Osteonecrosis of the jaw  · Xgeva was discontinued.  · Patient went to second opinion with  with oral surgery.  He was reffered to Dr. Escalante with , with whom he had consult on 8/23/2022.  Dr. Escalante recommened an outpatient procedure that would allow him to biopsy the affected area of his jawbone and identify which bacteria was present, they would then coordinate with ID to identify best antibiotic option.  Patient is agreeable to proceeding with this plan, and is awaiting surgery date.  · Surgery is planned for late October when seen 10/7/2022-    Plan:   1. Proceed today with cycle #33 Keytruda  2. Proceed 10/14/2022 with IVIG which is continued monthly due to hypogammaglobulinemia and ongoing infection with osteonecrosis  3. Continue Imbruvica 420 mg daily  4. Continue MS Contin 30 mg every 12 hours and Norco 10/325 every 6 hours as needed for pain.  Taking 1 tablet of MS Contin and 1 tablet of Norco most days.  5. Continue meloxicam.  6. Continue Remeron 15 mg nightly.  7. Due to patient's personal preference, he will not currently follow-up with Dr. Damon, we did discuss a trial of  Aquaphor today along with over-the-counter hydrocortisone  8. Return in 3 weeks for NP follow-up and next dose of Keytruda.    Patient continues on high risk medication requiring close monitoring for toxicity    Jostin Parekh MD   10/7/2022 I spent 65 minutes caring for Dav on this date of service. This time includes time spent by me in the following activities: preparing for the visit, reviewing tests, obtaining and/or reviewing a separately obtained history, performing a medically appropriate examination and/or evaluation, counseling and educating the patient/family/caregiver, ordering medications, tests, or procedures, referring and communicating with other health care professionals, documenting information in the medical record, independently interpreting results and communicating that information with the patient/family/caregiver and care coordination.

## 2022-10-07 ENCOUNTER — INFUSION (OUTPATIENT)
Dept: ONCOLOGY | Facility: HOSPITAL | Age: 67
End: 2022-10-07

## 2022-10-07 ENCOUNTER — OFFICE VISIT (OUTPATIENT)
Dept: ONCOLOGY | Facility: CLINIC | Age: 67
End: 2022-10-07

## 2022-10-07 VITALS
BODY MASS INDEX: 23.59 KG/M2 | HEIGHT: 71 IN | SYSTOLIC BLOOD PRESSURE: 127 MMHG | TEMPERATURE: 98.2 F | DIASTOLIC BLOOD PRESSURE: 83 MMHG | RESPIRATION RATE: 18 BRPM | HEART RATE: 99 BPM | WEIGHT: 168.5 LBS | OXYGEN SATURATION: 95 %

## 2022-10-07 DIAGNOSIS — C79.51 LUNG CANCER METASTATIC TO BONE: ICD-10-CM

## 2022-10-07 DIAGNOSIS — C91.10 CLL (CHRONIC LYMPHOCYTIC LEUKEMIA): ICD-10-CM

## 2022-10-07 DIAGNOSIS — C34.12 MALIGNANT NEOPLASM OF UPPER LOBE OF LEFT LUNG: ICD-10-CM

## 2022-10-07 DIAGNOSIS — Z45.2 FITTING AND ADJUSTMENT OF VASCULAR CATHETER: ICD-10-CM

## 2022-10-07 DIAGNOSIS — C34.90 LUNG CANCER METASTATIC TO BONE: ICD-10-CM

## 2022-10-07 DIAGNOSIS — Z79.899 HIGH RISK MEDICATION USE: Primary | ICD-10-CM

## 2022-10-07 DIAGNOSIS — Z79.899 HIGH RISK MEDICATION USE: ICD-10-CM

## 2022-10-07 DIAGNOSIS — D80.1 HYPOGAMMAGLOBULINEMIA: Primary | ICD-10-CM

## 2022-10-07 LAB
ALBUMIN SERPL-MCNC: 3.1 G/DL (ref 3.5–5.2)
ALBUMIN/GLOB SERPL: 1.3 G/DL (ref 1.1–2.4)
ALP SERPL-CCNC: 83 U/L (ref 38–116)
ALT SERPL W P-5'-P-CCNC: 7 U/L (ref 0–41)
ANION GAP SERPL CALCULATED.3IONS-SCNC: 9.7 MMOL/L (ref 5–15)
AST SERPL-CCNC: 8 U/L (ref 0–40)
BASOPHILS # BLD AUTO: 0.08 10*3/MM3 (ref 0–0.2)
BASOPHILS NFR BLD AUTO: 0.7 % (ref 0–1.5)
BILIRUB SERPL-MCNC: 0.3 MG/DL (ref 0.2–1.2)
BUN SERPL-MCNC: 18 MG/DL (ref 6–20)
BUN/CREAT SERPL: 12.6 (ref 7.3–30)
CALCIUM SPEC-SCNC: 8.7 MG/DL (ref 8.5–10.2)
CHLORIDE SERPL-SCNC: 104 MMOL/L (ref 98–107)
CO2 SERPL-SCNC: 24.3 MMOL/L (ref 22–29)
CREAT SERPL-MCNC: 1.43 MG/DL (ref 0.7–1.3)
DEPRECATED RDW RBC AUTO: 48.6 FL (ref 37–54)
EGFRCR SERPLBLD CKD-EPI 2021: 53.4 ML/MIN/1.73
EOSINOPHIL # BLD AUTO: 0.45 10*3/MM3 (ref 0–0.4)
EOSINOPHIL NFR BLD AUTO: 4 % (ref 0.3–6.2)
ERYTHROCYTE [DISTWIDTH] IN BLOOD BY AUTOMATED COUNT: 14.3 % (ref 12.3–15.4)
GLOBULIN UR ELPH-MCNC: 2.4 GM/DL (ref 1.8–3.5)
GLUCOSE SERPL-MCNC: 151 MG/DL (ref 74–124)
HCT VFR BLD AUTO: 45.8 % (ref 37.5–51)
HGB BLD-MCNC: 14.4 G/DL (ref 13–17.7)
IMM GRANULOCYTES # BLD AUTO: 0.04 10*3/MM3 (ref 0–0.05)
IMM GRANULOCYTES NFR BLD AUTO: 0.4 % (ref 0–0.5)
LYMPHOCYTES # BLD AUTO: 1.31 10*3/MM3 (ref 0.7–3.1)
LYMPHOCYTES NFR BLD AUTO: 11.8 % (ref 19.6–45.3)
MCH RBC QN AUTO: 29.4 PG (ref 26.6–33)
MCHC RBC AUTO-ENTMCNC: 31.4 G/DL (ref 31.5–35.7)
MCV RBC AUTO: 93.5 FL (ref 79–97)
MONOCYTES # BLD AUTO: 1.06 10*3/MM3 (ref 0.1–0.9)
MONOCYTES NFR BLD AUTO: 9.5 % (ref 5–12)
NEUTROPHILS NFR BLD AUTO: 73.6 % (ref 42.7–76)
NEUTROPHILS NFR BLD AUTO: 8.2 10*3/MM3 (ref 1.7–7)
NRBC BLD AUTO-RTO: 0 /100 WBC (ref 0–0.2)
PLATELET # BLD AUTO: 277 10*3/MM3 (ref 140–450)
PMV BLD AUTO: 11.6 FL (ref 6–12)
POTASSIUM SERPL-SCNC: 4.2 MMOL/L (ref 3.5–4.7)
PROT SERPL-MCNC: 5.5 G/DL (ref 6.3–8)
RBC # BLD AUTO: 4.9 10*6/MM3 (ref 4.14–5.8)
SODIUM SERPL-SCNC: 138 MMOL/L (ref 134–145)
WBC NRBC COR # BLD: 11.14 10*3/MM3 (ref 3.4–10.8)

## 2022-10-07 PROCEDURE — 96413 CHEMO IV INFUSION 1 HR: CPT

## 2022-10-07 PROCEDURE — 85025 COMPLETE CBC W/AUTO DIFF WBC: CPT

## 2022-10-07 PROCEDURE — 99215 OFFICE O/P EST HI 40 MIN: CPT | Performed by: INTERNAL MEDICINE

## 2022-10-07 PROCEDURE — 25010000002 HEPARIN LOCK FLUSH PER 10 UNITS: Performed by: INTERNAL MEDICINE

## 2022-10-07 PROCEDURE — 25010000002 PEMBROLIZUMAB 100 MG/4ML SOLUTION 4 ML VIAL: Performed by: INTERNAL MEDICINE

## 2022-10-07 PROCEDURE — 80053 COMPREHEN METABOLIC PANEL: CPT

## 2022-10-07 RX ORDER — SODIUM CHLORIDE 9 MG/ML
250 INJECTION, SOLUTION INTRAVENOUS ONCE
Status: COMPLETED | OUTPATIENT
Start: 2022-10-07 | End: 2022-10-07

## 2022-10-07 RX ORDER — SODIUM CHLORIDE 9 MG/ML
250 INJECTION, SOLUTION INTRAVENOUS ONCE
Status: CANCELLED | OUTPATIENT
Start: 2022-10-07

## 2022-10-07 RX ORDER — ACETAMINOPHEN 325 MG/1
650 TABLET ORAL ONCE
Status: CANCELLED | OUTPATIENT
Start: 2022-10-14

## 2022-10-07 RX ORDER — SODIUM CHLORIDE 0.9 % (FLUSH) 0.9 %
10 SYRINGE (ML) INJECTION AS NEEDED
Status: DISCONTINUED | OUTPATIENT
Start: 2022-10-07 | End: 2022-10-07 | Stop reason: HOSPADM

## 2022-10-07 RX ORDER — HEPARIN SODIUM (PORCINE) LOCK FLUSH IV SOLN 100 UNIT/ML 100 UNIT/ML
500 SOLUTION INTRAVENOUS AS NEEDED
Status: DISCONTINUED | OUTPATIENT
Start: 2022-10-07 | End: 2022-10-07 | Stop reason: HOSPADM

## 2022-10-07 RX ORDER — SODIUM CHLORIDE 0.9 % (FLUSH) 0.9 %
10 SYRINGE (ML) INJECTION AS NEEDED
Status: CANCELLED | OUTPATIENT
Start: 2022-10-07

## 2022-10-07 RX ORDER — MEPERIDINE HYDROCHLORIDE 25 MG/ML
25 INJECTION INTRAMUSCULAR; INTRAVENOUS; SUBCUTANEOUS
Status: CANCELLED | OUTPATIENT
Start: 2022-10-14

## 2022-10-07 RX ORDER — HEPARIN SODIUM (PORCINE) LOCK FLUSH IV SOLN 100 UNIT/ML 100 UNIT/ML
500 SOLUTION INTRAVENOUS AS NEEDED
Status: CANCELLED | OUTPATIENT
Start: 2022-10-07

## 2022-10-07 RX ORDER — DIPHENHYDRAMINE HYDROCHLORIDE 50 MG/ML
50 INJECTION INTRAMUSCULAR; INTRAVENOUS AS NEEDED
Status: CANCELLED | OUTPATIENT
Start: 2022-10-14

## 2022-10-07 RX ORDER — SODIUM CHLORIDE 9 MG/ML
250 INJECTION, SOLUTION INTRAVENOUS ONCE
Status: CANCELLED | OUTPATIENT
Start: 2022-10-14

## 2022-10-07 RX ORDER — FAMOTIDINE 10 MG/ML
20 INJECTION, SOLUTION INTRAVENOUS AS NEEDED
Status: CANCELLED | OUTPATIENT
Start: 2022-10-14

## 2022-10-07 RX ORDER — DIPHENHYDRAMINE HCL 25 MG
25 CAPSULE ORAL ONCE
Status: CANCELLED | OUTPATIENT
Start: 2022-10-14

## 2022-10-07 RX ADMIN — Medication 500 UNITS: at 12:44

## 2022-10-07 RX ADMIN — SODIUM CHLORIDE 250 ML: 9 INJECTION, SOLUTION INTRAVENOUS at 12:10

## 2022-10-07 RX ADMIN — Medication 10 ML: at 12:44

## 2022-10-07 RX ADMIN — SODIUM CHLORIDE 200 MG: 9 INJECTION, SOLUTION INTRAVENOUS at 12:11

## 2022-10-14 ENCOUNTER — INFUSION (OUTPATIENT)
Dept: ONCOLOGY | Facility: HOSPITAL | Age: 67
End: 2022-10-14

## 2022-10-14 VITALS
BODY MASS INDEX: 23.36 KG/M2 | RESPIRATION RATE: 18 BRPM | HEART RATE: 92 BPM | OXYGEN SATURATION: 95 % | TEMPERATURE: 97 F | SYSTOLIC BLOOD PRESSURE: 122 MMHG | DIASTOLIC BLOOD PRESSURE: 63 MMHG | WEIGHT: 167.4 LBS

## 2022-10-14 DIAGNOSIS — D80.1 HYPOGAMMAGLOBULINEMIA: Primary | ICD-10-CM

## 2022-10-14 DIAGNOSIS — C91.10 CLL (CHRONIC LYMPHOCYTIC LEUKEMIA): ICD-10-CM

## 2022-10-14 DIAGNOSIS — Z45.2 FITTING AND ADJUSTMENT OF VASCULAR CATHETER: ICD-10-CM

## 2022-10-14 LAB
BASOPHILS # BLD AUTO: 0.07 10*3/MM3 (ref 0–0.2)
BASOPHILS NFR BLD AUTO: 0.7 % (ref 0–1.5)
DEPRECATED RDW RBC AUTO: 48 FL (ref 37–54)
EOSINOPHIL # BLD AUTO: 0.46 10*3/MM3 (ref 0–0.4)
EOSINOPHIL NFR BLD AUTO: 4.8 % (ref 0.3–6.2)
ERYTHROCYTE [DISTWIDTH] IN BLOOD BY AUTOMATED COUNT: 14.5 % (ref 12.3–15.4)
HCT VFR BLD AUTO: 43.6 % (ref 37.5–51)
HGB BLD-MCNC: 13.5 G/DL (ref 13–17.7)
IGA1 MFR SER: <50 MG/DL (ref 70–400)
IGG1 SER-MCNC: <300 MG/DL (ref 700–1600)
IGM SERPL-MCNC: <25 MG/DL (ref 40–230)
IMM GRANULOCYTES # BLD AUTO: 0.04 10*3/MM3 (ref 0–0.05)
IMM GRANULOCYTES NFR BLD AUTO: 0.4 % (ref 0–0.5)
LYMPHOCYTES # BLD AUTO: 0.98 10*3/MM3 (ref 0.7–3.1)
LYMPHOCYTES NFR BLD AUTO: 10.2 % (ref 19.6–45.3)
MCH RBC QN AUTO: 28.4 PG (ref 26.6–33)
MCHC RBC AUTO-ENTMCNC: 31 G/DL (ref 31.5–35.7)
MCV RBC AUTO: 91.8 FL (ref 79–97)
MONOCYTES # BLD AUTO: 0.84 10*3/MM3 (ref 0.1–0.9)
MONOCYTES NFR BLD AUTO: 8.7 % (ref 5–12)
NEUTROPHILS NFR BLD AUTO: 7.25 10*3/MM3 (ref 1.7–7)
NEUTROPHILS NFR BLD AUTO: 75.2 % (ref 42.7–76)
NRBC BLD AUTO-RTO: 0 /100 WBC (ref 0–0.2)
PLATELET # BLD AUTO: 273 10*3/MM3 (ref 140–450)
PMV BLD AUTO: 12.4 FL (ref 6–12)
RBC # BLD AUTO: 4.75 10*6/MM3 (ref 4.14–5.8)
WBC NRBC COR # BLD: 9.64 10*3/MM3 (ref 3.4–10.8)

## 2022-10-14 PROCEDURE — 25010000002 IMMUNE GLOBULIN (HUMAN) 30 GM/300ML SOLUTION: Performed by: INTERNAL MEDICINE

## 2022-10-14 PROCEDURE — 96365 THER/PROPH/DIAG IV INF INIT: CPT

## 2022-10-14 PROCEDURE — 82784 ASSAY IGA/IGD/IGG/IGM EACH: CPT | Performed by: INTERNAL MEDICINE

## 2022-10-14 PROCEDURE — 96366 THER/PROPH/DIAG IV INF ADDON: CPT

## 2022-10-14 PROCEDURE — 85025 COMPLETE CBC W/AUTO DIFF WBC: CPT | Performed by: INTERNAL MEDICINE

## 2022-10-14 PROCEDURE — 63710000001 DIPHENHYDRAMINE PER 50 MG: Performed by: INTERNAL MEDICINE

## 2022-10-14 PROCEDURE — 25010000002 HEPARIN LOCK FLUSH PER 10 UNITS: Performed by: INTERNAL MEDICINE

## 2022-10-14 RX ORDER — HEPARIN SODIUM (PORCINE) LOCK FLUSH IV SOLN 100 UNIT/ML 100 UNIT/ML
500 SOLUTION INTRAVENOUS AS NEEDED
Status: DISCONTINUED | OUTPATIENT
Start: 2022-10-14 | End: 2022-10-14 | Stop reason: HOSPADM

## 2022-10-14 RX ORDER — HEPARIN SODIUM (PORCINE) LOCK FLUSH IV SOLN 100 UNIT/ML 100 UNIT/ML
500 SOLUTION INTRAVENOUS AS NEEDED
Status: CANCELLED | OUTPATIENT
Start: 2022-10-14

## 2022-10-14 RX ORDER — SODIUM CHLORIDE 0.9 % (FLUSH) 0.9 %
10 SYRINGE (ML) INJECTION AS NEEDED
Status: CANCELLED | OUTPATIENT
Start: 2022-10-14

## 2022-10-14 RX ORDER — DIPHENHYDRAMINE HCL 25 MG
25 CAPSULE ORAL ONCE
Status: COMPLETED | OUTPATIENT
Start: 2022-10-14 | End: 2022-10-14

## 2022-10-14 RX ORDER — ACETAMINOPHEN 325 MG/1
650 TABLET ORAL ONCE
Status: COMPLETED | OUTPATIENT
Start: 2022-10-14 | End: 2022-10-14

## 2022-10-14 RX ORDER — SODIUM CHLORIDE 0.9 % (FLUSH) 0.9 %
10 SYRINGE (ML) INJECTION AS NEEDED
Status: DISCONTINUED | OUTPATIENT
Start: 2022-10-14 | End: 2022-10-14 | Stop reason: HOSPADM

## 2022-10-14 RX ORDER — SODIUM CHLORIDE 9 MG/ML
250 INJECTION, SOLUTION INTRAVENOUS ONCE
Status: COMPLETED | OUTPATIENT
Start: 2022-10-14 | End: 2022-10-14

## 2022-10-14 RX ADMIN — Medication 10 ML: at 11:20

## 2022-10-14 RX ADMIN — SODIUM CHLORIDE 250 ML: 9 INJECTION, SOLUTION INTRAVENOUS at 08:17

## 2022-10-14 RX ADMIN — ACETAMINOPHEN 650 MG: 325 TABLET ORAL at 08:17

## 2022-10-14 RX ADMIN — Medication 500 UNITS: at 11:20

## 2022-10-14 RX ADMIN — IMMUNE GLOBULIN INFUSION (HUMAN) 30 G: 100 INJECTION, SOLUTION INTRAVENOUS; SUBCUTANEOUS at 08:34

## 2022-10-14 RX ADMIN — DIPHENHYDRAMINE HYDROCHLORIDE 25 MG: 25 CAPSULE ORAL at 08:17

## 2022-10-27 ENCOUNTER — OFFICE VISIT (OUTPATIENT)
Dept: INFECTIOUS DISEASES | Facility: CLINIC | Age: 67
End: 2022-10-27

## 2022-10-27 ENCOUNTER — LAB (OUTPATIENT)
Dept: LAB | Facility: HOSPITAL | Age: 67
End: 2022-10-27

## 2022-10-27 VITALS
HEART RATE: 103 BPM | DIASTOLIC BLOOD PRESSURE: 72 MMHG | WEIGHT: 167 LBS | TEMPERATURE: 97.1 F | HEIGHT: 71 IN | BODY MASS INDEX: 23.38 KG/M2 | RESPIRATION RATE: 16 BRPM | SYSTOLIC BLOOD PRESSURE: 113 MMHG

## 2022-10-27 DIAGNOSIS — M86.9 OSTEOMYELITIS, UNSPECIFIED SITE, UNSPECIFIED TYPE: ICD-10-CM

## 2022-10-27 DIAGNOSIS — M87.9 OSTEONECROSIS OF MANDIBLE: ICD-10-CM

## 2022-10-27 DIAGNOSIS — M87.180 DRUG-INDUCED OSTEONECROSIS OF JAW: Primary | ICD-10-CM

## 2022-10-27 LAB
BACTERIA UR QL AUTO: ABNORMAL /HPF
BILIRUB UR QL STRIP: NEGATIVE
CLARITY UR: ABNORMAL
COLOR UR: ABNORMAL
CRP SERPL-MCNC: 2.41 MG/DL (ref 0–0.5)
ERYTHROCYTE [SEDIMENTATION RATE] IN BLOOD: 24 MM/HR (ref 0–20)
GLUCOSE UR STRIP-MCNC: NEGATIVE MG/DL
HGB UR QL STRIP.AUTO: ABNORMAL
HYALINE CASTS UR QL AUTO: ABNORMAL /LPF
KETONES UR QL STRIP: ABNORMAL
LEUKOCYTE ESTERASE UR QL STRIP.AUTO: ABNORMAL
NITRITE UR QL STRIP: NEGATIVE
PH UR STRIP.AUTO: 5.5 [PH] (ref 5–8)
PROT UR QL STRIP: ABNORMAL
RBC # UR STRIP: ABNORMAL /HPF
REF LAB TEST METHOD: ABNORMAL
SP GR UR STRIP: 1.02 (ref 1–1.03)
SQUAMOUS #/AREA URNS HPF: ABNORMAL /HPF
UROBILINOGEN UR QL STRIP: ABNORMAL
WBC # UR STRIP: ABNORMAL /HPF

## 2022-10-27 PROCEDURE — 86140 C-REACTIVE PROTEIN: CPT

## 2022-10-27 PROCEDURE — 36415 COLL VENOUS BLD VENIPUNCTURE: CPT

## 2022-10-27 PROCEDURE — 81001 URINALYSIS AUTO W/SCOPE: CPT

## 2022-10-27 PROCEDURE — 87086 URINE CULTURE/COLONY COUNT: CPT

## 2022-10-27 PROCEDURE — 99214 OFFICE O/P EST MOD 30 MIN: CPT | Performed by: STUDENT IN AN ORGANIZED HEALTH CARE EDUCATION/TRAINING PROGRAM

## 2022-10-27 PROCEDURE — 85652 RBC SED RATE AUTOMATED: CPT

## 2022-10-27 RX ORDER — FLUCONAZOLE 200 MG/1
400 TABLET ORAL DAILY
Qty: 84 TABLET | Refills: 0 | Status: SHIPPED | OUTPATIENT
Start: 2022-10-27 | End: 2022-12-08

## 2022-10-27 RX ORDER — LEVOFLOXACIN 750 MG/1
750 TABLET ORAL DAILY
Qty: 42 TABLET | Refills: 0 | Status: SHIPPED | OUTPATIENT
Start: 2022-10-27 | End: 2022-12-08

## 2022-10-27 NOTE — PROGRESS NOTES
"Chief Complaint  Follow-up and Hepatitis    Subjective        Dav Freitas presents to [facility]  History of Present Illness    Patient is a 66-year-old male with a past medical history of hypogammaglobinemia, stage IV lung cancer on Keytruda but prior therapy with Xgeva for bony metastasis and CLL in remission who is seen by oral surgery in the setting of medication related osteonecrosis of the mandible and has been on multiple courses of amoxicillin during \"flareups \".  Recently had CT scan that demonstrated periosteal thickening and continue to be consistent with osteonecrosis of the jaw however developed draining extraoral fistula recently and was placed on amoxicillin/Augmentin combo to complete out 3 weeks that he finished 2 weeks ago.      He reports that while on antibiotics he had great resolution in his fistula which closed up and the right side of his face swelling improved dramatically.  He reports he is back to normal until approximately the beginning of this week when he developed worsening swelling of the left side of the face without any drainage and the right side developed some pain.  He denies any fevers or chills.  He states he has some pain in his mouth with soreness of the left side of his tongue which becomes raw.  States he tolerated the antibiotics very well without any difficulties.    Recently seen by OMFS at [facility] and underwent fistulectomy on 10/20/2022.  With cultures obtained growing Serratia, Candida albicans, strep, staph epidermidis and haemophilus parainfluenza.     Patient reports has been doing very well since his procedure.  Denies any difficulty taking his antibiotics and has remained on Augmentin 500 mg.  States that he has had no further drainage from the left mandible.  Has had a slight bump on the right mandible.  Denies any nausea, vomiting, diarrhea.    Objective   Vital Signs:  /72 (BP Location: Left arm, " "Patient Position: Sitting, Cuff Size: Adult)   Pulse 103   Temp 97.1 °F (36.2 °C)   Resp 16   Ht 180.3 cm (70.98\")   Wt 75.8 kg (167 lb)   BMI 23.30 kg/m²   Estimated body mass index is 23.3 kg/m² as calculated from the following:    Height as of this encounter: 180.3 cm (70.98\").    Weight as of this encounter: 75.8 kg (167 lb).    BMI is within normal parameters. No other follow-up for BMI required.      Physical Exam  Constitutional:       General: He is not in acute distress.     Appearance: Normal appearance. He is normal weight. He is not ill-appearing.   HENT:      Head: Normocephalic and atraumatic.      Comments: Right prominence around the right angle of the mandible with minimal fluctuance.  Left mandible without any pain or tenderness to palpation.  Small area where fistulectomy was performed without drainage.     Nose: Nose normal. No rhinorrhea.      Mouth/Throat:      Mouth: Mucous membranes are moist.      Pharynx: No oropharyngeal exudate.      Comments: Inferior molar area with exposed bone.  Eyes:      General: No scleral icterus.     Extraocular Movements: Extraocular movements intact.      Pupils: Pupils are equal, round, and reactive to light.   Cardiovascular:      Rate and Rhythm: Normal rate and regular rhythm.      Pulses: Normal pulses.      Heart sounds: Normal heart sounds. No murmur heard.  Pulmonary:      Effort: Pulmonary effort is normal.      Breath sounds: Normal breath sounds. No wheezing, rhonchi or rales.   Abdominal:      General: Abdomen is flat. Bowel sounds are normal.      Palpations: Abdomen is soft.      Tenderness: There is no abdominal tenderness. There is no guarding or rebound.   Musculoskeletal:         General: No swelling or tenderness. Normal range of motion.      Cervical back: Normal range of motion and neck supple. No tenderness.      Right lower leg: No edema.      Left lower leg: No edema.   Skin:     General: Skin is warm and dry.      Findings: " Erythema and lesion present.   Neurological:      General: No focal deficit present.      Mental Status: He is alert and oriented to person, place, and time.   Psychiatric:         Mood and Affect: Mood normal.         Behavior: Behavior normal.        Result Review :  The following data was reviewed by: Glenn Champion DO on 06/29/2022:  Common labs    Common Labs 9/16/22 9/16/22 10/7/22 10/7/22 10/14/22    0840 0840 1048 1048    Glucose  111 (A)  151 (A)    BUN  17  18    Creatinine  1.27  1.43 (A)    Sodium  141  138    Potassium  4.3  4.2    Chloride  106  104    Calcium  8.1 (A)  8.7    Albumin  3.20 (A)  3.10 (A)    Total Bilirubin  0.4  0.3    Alkaline Phosphatase  86  83    AST (SGOT)  9  8    ALT (SGPT)  9  7    WBC 8.73  11.14 (A)  9.64   Hemoglobin 13.3  14.4  13.5   Hematocrit 42.5  45.8  43.6   Platelets 235  277  273   (A) Abnormal value            Data reviewed: Radiologic studies With reports attached from oral maxillofacial surgery and Consultant notes From oral maxillofacial surgery and oncology.  Microbiology from recent fistulectomy reviewed.         Assessment and Plan   Diagnoses and all orders for this visit:    1. Drug-induced osteonecrosis of jaw (HCC) (Primary)    2. Osteonecrosis of mandible (HCC)    Bone cultures from the OR have isolated multiple organisms.  At this point I think targeting the Serratia, Candida, strep and haemophilus seems the most reasonable course.  Plan to start oral levofloxacin 750 mg daily plus fluconazole 400 mg daily.  Likely these are both IV equivalent medications negating the necessity for IV therapy.    Plan to obtain baseline labs today with ESR and CRP.  Patient has scheduled labs for CMP and CBC tomorrow prior to his Keytruda infusion which should suffice further monitoring labs.  Plan for total of 6 weeks of therapy for osteomyelitis with the above regimen.  Plan to follow-up after this.    Fluconazole does have some interaction with his ibrutinib  therapy and I will message his oncologist in hopes of adjusting dosing as necessary.       I spent 36 minutes caring for Dav on this date of service. This time includes time spent by me in the following activities:preparing for the visit, reviewing tests, obtaining and/or reviewing a separately obtained history, performing a medically appropriate examination and/or evaluation , counseling and educating the patient/family/caregiver, ordering medications, tests, or procedures, referring and communicating with other health care professionals , documenting information in the medical record, independently interpreting results and communicating that information with the patient/family/caregiver and care coordination  Follow Up   No follow-ups on file.  Patient was given instructions and counseling regarding his condition or for health maintenance advice. Please see specific information pulled into the AVS if appropriate.

## 2022-10-28 ENCOUNTER — INFUSION (OUTPATIENT)
Dept: ONCOLOGY | Facility: HOSPITAL | Age: 67
End: 2022-10-28

## 2022-10-28 ENCOUNTER — OFFICE VISIT (OUTPATIENT)
Dept: ONCOLOGY | Facility: CLINIC | Age: 67
End: 2022-10-28

## 2022-10-28 ENCOUNTER — SPECIALTY PHARMACY (OUTPATIENT)
Dept: ONCOLOGY | Facility: HOSPITAL | Age: 67
End: 2022-10-28

## 2022-10-28 VITALS
WEIGHT: 167.7 LBS | HEART RATE: 99 BPM | OXYGEN SATURATION: 96 % | HEIGHT: 71 IN | BODY MASS INDEX: 23.48 KG/M2 | TEMPERATURE: 97.1 F | DIASTOLIC BLOOD PRESSURE: 77 MMHG | RESPIRATION RATE: 18 BRPM | SYSTOLIC BLOOD PRESSURE: 131 MMHG

## 2022-10-28 VITALS
DIASTOLIC BLOOD PRESSURE: 77 MMHG | BODY MASS INDEX: 23.48 KG/M2 | TEMPERATURE: 97.1 F | RESPIRATION RATE: 18 BRPM | HEIGHT: 71 IN | WEIGHT: 167.7 LBS | SYSTOLIC BLOOD PRESSURE: 131 MMHG | HEART RATE: 99 BPM | OXYGEN SATURATION: 96 %

## 2022-10-28 DIAGNOSIS — Z79.899 HIGH RISK MEDICATION USE: ICD-10-CM

## 2022-10-28 DIAGNOSIS — C34.90 LUNG CANCER METASTATIC TO BONE: Primary | ICD-10-CM

## 2022-10-28 DIAGNOSIS — C34.90 LUNG CANCER METASTATIC TO BONE: ICD-10-CM

## 2022-10-28 DIAGNOSIS — C91.10 CLL (CHRONIC LYMPHOCYTIC LEUKEMIA): ICD-10-CM

## 2022-10-28 DIAGNOSIS — M87.180 OSTEONECROSIS DUE TO DRUGS, JAW: ICD-10-CM

## 2022-10-28 DIAGNOSIS — C34.12 MALIGNANT NEOPLASM OF UPPER LOBE OF LEFT LUNG: ICD-10-CM

## 2022-10-28 DIAGNOSIS — C79.51 LUNG CANCER METASTATIC TO BONE: ICD-10-CM

## 2022-10-28 DIAGNOSIS — D80.1 HYPOGAMMAGLOBULINEMIA: ICD-10-CM

## 2022-10-28 DIAGNOSIS — C79.51 LUNG CANCER METASTATIC TO BONE: Primary | ICD-10-CM

## 2022-10-28 DIAGNOSIS — Z79.899 HIGH RISK MEDICATION USE: Primary | ICD-10-CM

## 2022-10-28 DIAGNOSIS — Z45.2 FITTING AND ADJUSTMENT OF VASCULAR CATHETER: ICD-10-CM

## 2022-10-28 LAB
ALBUMIN SERPL-MCNC: 2.8 G/DL (ref 3.5–5.2)
ALBUMIN/GLOB SERPL: 1.3 G/DL (ref 1.1–2.4)
ALP SERPL-CCNC: 76 U/L (ref 38–116)
ALT SERPL W P-5'-P-CCNC: 7 U/L (ref 0–41)
ANION GAP SERPL CALCULATED.3IONS-SCNC: 10.5 MMOL/L (ref 5–15)
AST SERPL-CCNC: 10 U/L (ref 0–40)
BACTERIA SPEC AEROBE CULT: NO GROWTH
BASOPHILS # BLD AUTO: 0.09 10*3/MM3 (ref 0–0.2)
BASOPHILS NFR BLD AUTO: 0.8 % (ref 0–1.5)
BILIRUB SERPL-MCNC: 0.2 MG/DL (ref 0.2–1.2)
BUN SERPL-MCNC: 25 MG/DL (ref 6–20)
BUN/CREAT SERPL: 17.2 (ref 7.3–30)
CALCIUM SPEC-SCNC: 8.4 MG/DL (ref 8.5–10.2)
CHLORIDE SERPL-SCNC: 105 MMOL/L (ref 98–107)
CO2 SERPL-SCNC: 23.5 MMOL/L (ref 22–29)
CREAT SERPL-MCNC: 1.45 MG/DL (ref 0.7–1.3)
DEPRECATED RDW RBC AUTO: 50.5 FL (ref 37–54)
EGFRCR SERPLBLD CKD-EPI 2021: 52.5 ML/MIN/1.73
EOSINOPHIL # BLD AUTO: 0.58 10*3/MM3 (ref 0–0.4)
EOSINOPHIL NFR BLD AUTO: 5.5 % (ref 0.3–6.2)
ERYTHROCYTE [DISTWIDTH] IN BLOOD BY AUTOMATED COUNT: 14.6 % (ref 12.3–15.4)
GLOBULIN UR ELPH-MCNC: 2.2 GM/DL (ref 1.8–3.5)
GLUCOSE SERPL-MCNC: 147 MG/DL (ref 74–124)
HCT VFR BLD AUTO: 41.7 % (ref 37.5–51)
HGB BLD-MCNC: 12.9 G/DL (ref 13–17.7)
IMM GRANULOCYTES # BLD AUTO: 0.04 10*3/MM3 (ref 0–0.05)
IMM GRANULOCYTES NFR BLD AUTO: 0.4 % (ref 0–0.5)
LYMPHOCYTES # BLD AUTO: 0.83 10*3/MM3 (ref 0.7–3.1)
LYMPHOCYTES NFR BLD AUTO: 7.8 % (ref 19.6–45.3)
MCH RBC QN AUTO: 29.2 PG (ref 26.6–33)
MCHC RBC AUTO-ENTMCNC: 30.9 G/DL (ref 31.5–35.7)
MCV RBC AUTO: 94.3 FL (ref 79–97)
MONOCYTES # BLD AUTO: 1.07 10*3/MM3 (ref 0.1–0.9)
MONOCYTES NFR BLD AUTO: 10.1 % (ref 5–12)
NEUTROPHILS NFR BLD AUTO: 7.99 10*3/MM3 (ref 1.7–7)
NEUTROPHILS NFR BLD AUTO: 75.4 % (ref 42.7–76)
NRBC BLD AUTO-RTO: 0 /100 WBC (ref 0–0.2)
PLATELET # BLD AUTO: 256 10*3/MM3 (ref 140–450)
PMV BLD AUTO: 12 FL (ref 6–12)
POTASSIUM SERPL-SCNC: 4.3 MMOL/L (ref 3.5–4.7)
PROT SERPL-MCNC: 5 G/DL (ref 6.3–8)
RBC # BLD AUTO: 4.42 10*6/MM3 (ref 4.14–5.8)
SODIUM SERPL-SCNC: 139 MMOL/L (ref 134–145)
T4 FREE SERPL-MCNC: 1.45 NG/DL (ref 0.93–1.7)
TSH SERPL DL<=0.05 MIU/L-ACNC: 3.32 UIU/ML (ref 0.27–4.2)
WBC NRBC COR # BLD: 10.6 10*3/MM3 (ref 3.4–10.8)

## 2022-10-28 PROCEDURE — 25010000002 PEMBROLIZUMAB 100 MG/4ML SOLUTION 4 ML VIAL: Performed by: NURSE PRACTITIONER

## 2022-10-28 PROCEDURE — 80053 COMPREHEN METABOLIC PANEL: CPT

## 2022-10-28 PROCEDURE — 85025 COMPLETE CBC W/AUTO DIFF WBC: CPT

## 2022-10-28 PROCEDURE — 84443 ASSAY THYROID STIM HORMONE: CPT | Performed by: INTERNAL MEDICINE

## 2022-10-28 PROCEDURE — 84439 ASSAY OF FREE THYROXINE: CPT | Performed by: INTERNAL MEDICINE

## 2022-10-28 PROCEDURE — 25010000002 HEPARIN LOCK FLUSH PER 10 UNITS: Performed by: INTERNAL MEDICINE

## 2022-10-28 PROCEDURE — 99214 OFFICE O/P EST MOD 30 MIN: CPT | Performed by: NURSE PRACTITIONER

## 2022-10-28 PROCEDURE — 96413 CHEMO IV INFUSION 1 HR: CPT

## 2022-10-28 RX ORDER — SODIUM CHLORIDE 9 MG/ML
250 INJECTION, SOLUTION INTRAVENOUS ONCE
Status: CANCELLED | OUTPATIENT
Start: 2022-10-28

## 2022-10-28 RX ORDER — IBUPROFEN 600 MG/1
600 TABLET ORAL 3 TIMES DAILY
Qty: 30 TABLET | Refills: 0 | COMMUNITY
Start: 2022-10-20 | End: 2022-10-30

## 2022-10-28 RX ORDER — HEPARIN SODIUM (PORCINE) LOCK FLUSH IV SOLN 100 UNIT/ML 100 UNIT/ML
500 SOLUTION INTRAVENOUS AS NEEDED
Status: CANCELLED | OUTPATIENT
Start: 2022-10-28

## 2022-10-28 RX ORDER — IBUPROFEN 600 MG/1
TABLET ORAL
COMMUNITY
Start: 2022-10-20 | End: 2022-12-09

## 2022-10-28 RX ORDER — HYDROCODONE BITARTRATE AND ACETAMINOPHEN 10; 325 MG/1; MG/1
1 TABLET ORAL
COMMUNITY
Start: 2022-06-17 | End: 2022-12-09

## 2022-10-28 RX ORDER — HEPARIN SODIUM (PORCINE) LOCK FLUSH IV SOLN 100 UNIT/ML 100 UNIT/ML
500 SOLUTION INTRAVENOUS AS NEEDED
Status: DISCONTINUED | OUTPATIENT
Start: 2022-10-28 | End: 2022-10-28 | Stop reason: HOSPADM

## 2022-10-28 RX ORDER — MORPHINE SULFATE 30 MG/1
30 TABLET, FILM COATED, EXTENDED RELEASE ORAL
COMMUNITY
Start: 2022-07-25 | End: 2022-12-09

## 2022-10-28 RX ORDER — SODIUM CHLORIDE 0.9 % (FLUSH) 0.9 %
10 SYRINGE (ML) INJECTION AS NEEDED
Status: DISCONTINUED | OUTPATIENT
Start: 2022-10-28 | End: 2022-10-28 | Stop reason: HOSPADM

## 2022-10-28 RX ORDER — OXYCODONE HYDROCHLORIDE 5 MG/1
TABLET ORAL
COMMUNITY
Start: 2022-10-20

## 2022-10-28 RX ORDER — MIRTAZAPINE 15 MG/1
7.5 TABLET, FILM COATED ORAL NIGHTLY
Qty: 90 TABLET | Refills: 0 | Status: SHIPPED | OUTPATIENT
Start: 2022-10-28 | End: 2023-01-20 | Stop reason: SDUPTHER

## 2022-10-28 RX ORDER — SODIUM CHLORIDE 0.9 % (FLUSH) 0.9 %
10 SYRINGE (ML) INJECTION AS NEEDED
Status: CANCELLED | OUTPATIENT
Start: 2022-10-28

## 2022-10-28 RX ORDER — SODIUM CHLORIDE 9 MG/ML
250 INJECTION, SOLUTION INTRAVENOUS ONCE
Status: COMPLETED | OUTPATIENT
Start: 2022-10-28 | End: 2022-10-28

## 2022-10-28 RX ADMIN — Medication 500 UNITS: at 10:38

## 2022-10-28 RX ADMIN — SODIUM CHLORIDE 200 MG: 9 INJECTION, SOLUTION INTRAVENOUS at 10:10

## 2022-10-28 RX ADMIN — SODIUM CHLORIDE 250 ML: 9 INJECTION, SOLUTION INTRAVENOUS at 10:09

## 2022-10-28 RX ADMIN — Medication 10 ML: at 10:38

## 2022-10-28 NOTE — PROGRESS NOTES
Subjective   REASON FOR FOLLOW UP:  1.  Chronic lymphocytic leukemia with extensive lymphadenopathy and possible pleural involvement. Initiation of Treanda, Rituxan May 1, 2019.  2.  Hypogammaglobulinemia.  Status is post IVIG  3.  Significant response on scans June 27, 2019.  Treatment changed to Imbruvica 420 mg daily.  4.  Metastatic non-small cell lung carcinoma on Keytruda  5.  Patient seen 2/18/2022 with left jaw/gumline swelling pain, associated hematoma?  Osteonecrosis.  Restart of Keytruda 3/4/2022, Xgeva discontinued  6.  Restaging via CT 4/12/2022, continued response, Keytruda continued, referral to dermatology for worsening psoriasis.  7.  Jaw osteonecrosis, undergoing therapy through oral surgery, IVIG anticipated, infectious disease and oral surgery follow-up      HISTORY OF PRESENT ILLNESS:    The patient is a 67 y.o. male with the above-mentioned history, who returns the office today in anticipation of his 34th cycle of Keytruda.  He is also due for his monthly IVIG.  He was seen earlier this week by infectious disease, Dr. Champion regarding osteonecrosis of the jaw.  He was placed on antibiotics for 6 weeks including Levaquin and fluconazole.  There is an interaction with his fluconazole and ibrutinib and adjustments will be necessary.  [facility] pharmacy is managing this.  He reports he is overall feeling very well.  He has had improvement in his ability to eat and drink since undergoing surgery on his jaw.  His skin is overall improved with the use of Aquaphor.  He did undergo CT scans prior to cycle 33 Keytruda with stability of his disease.  He continues on current therapy with excellent tolerance.      ONCOLOGY HISTORY:    The patient is a  67 y.o. male with the above mentioned history here today for lab review and evaluation due for Keytruda. infectious disease currently has him on Augmentin as well as amoxicillin for his osteonecrosis of the jaw and review of the records 7/6/2022.  He is going for  an additional opinion 8/8/2022 for oral surgery.  It has been thought that he might require surgery but he appears to be making a gradual improvement with slow resolution of fistulous tracts that had developed and around his osteonecrosis.  He continues to have eating discomfort but is able to maintain his weight.                                                       As concerns his lung malignancy he does note occasional pain in his ribs and infrequent shortness of breath.  This comes and goes.  He has morphine and Norco that he uses for pain control although he admits he does not take either 1 regularly.  He also uses meloxicam intermittently for her pain in his mouth.                                             Patient is also struggled with skin eruptions but has not seen dermatology recently.  He typically is followed by Dr. Regan Damon.    Patient also continues on Imbruvica 420 mg daily, tolerating well and he continues this currently.    Past Medical History, Past Surgical History, Social History, Family History have been reviewed and are without significant changes except as mentioned.    Hematologic/oncologic history:    As concerns his CLL history he initially required Treanda Rituxan 5/1/2019 for 2 cycles and then initiated Imbruvica 420 mg daily initiated 7/25/2019.       As concerns stage IV non-small cell lung carcinoma he returns the office continuing Keytruda which he continues to receive every 3 weeks which was initiated 10/21/2020.  He also receives Xgeva every 6 weeks which was initially given 12/2/2020 and discontinued 1/7/2022 secondary to osteonecrosis.      Additional issues that developed during treatment included right knee pain with plain views of his right knee showing a right knee effusion with medial compartment narrowing and no suspicious bone lesion.  There is benign periosteal calcification along the distal right femur.  A PET/CT 11/11 went on to demonstrate a complete metabolic  "response to therapy compared to PET/CT from 9/23/2020.  This includes his primary lung neoplasm left upper lobe, large osseous metastatic lesion the right chest wall region from the eighth rib, small left adrenal metastasis, and no evidence of disease involving the distal left femur.      The patient was seen 11/24/2021 continue to do well though indicating that his right knee is \"something I can manage at the moment\".  He prefers not to have orthopedic assessment at this point.  He is concerned, ever, about skin lesions that are developing primarily on his calf regions and into his right thigh.    The patient was seen by Dr. eRgan Damon 11/29/2021 and felt to be consistent with psoriasiform dermatitis treated with intralesional therapy.  Was also started on a moisturizer and lipid therapy.  The patient returned for assessment 12/15/2021 and treated with cycle #21 Keytruda, returned 1/7/2022 for cycle #22 and 1/28/2022 for cycle #23.  At that visit he had developed adenopathy several weeks previous and was treated with a Medrol Dosepak.   The patient was next seen 2/18/2022 and indicates that though his adenopathy has improved after treatment described above that his jaw has become painful swollen and tender with additional bleeding.     The patient was referred to his primary dentist who then had him seen oral surgeon-Dr. Ezequiel Davila-reached at [phone] who felt that this was osteonecrosis and should be treated symptomatically.  As the patient reviewed 3/4/2022, wonderfully, his oral issues have nearly resolved with the gumline returning to essentially its previous state, no swelling, minimal erythema, no discharge and no additional pain.  He does have follow-up with oral surgery in the next several weeks and we discussed restarting his Keytruda scanning him likely in April 2022 in general.   He returns to the office on 3/25/2022 in follow-up in anticipation of cycle 24 Keytruda. He reports the left side of " "his jaw has improved.    However, he was now having issues with the right lower side. He was seen by neurosurgery with additional films and started on oral amoxicillin by his oral surgeon.      The patient continued his immunotherapy taking Keytruda 3/25/2022 and underwent repeat CT chest abdomen and pelvis 4/12/2022 that is reviewed with him 4/15/2022.  Wonderfully there is no substantial change with a small to moderate right pleural effusion that decreased in size, no additional pulmonary nodule or new metastatic disease, multifocal osteosclerotic metastatic disease without change and no new findings in the abdomen or pelvis.    The patient was seen 4/15/2022 indicating that he is actually feeling well in terms of general symptoms.  This includes lack of progression from mouth pain, ability to eat without discomfort.  He is, however, having worsening psoriasis particular in his lower extremities and has been reviewed by dermatology previously.    The patient was reviewed 6/17/2022 having been seen by oral surgery with progression of his osteonecrosis and skin eruption.  He is currently undergoing assessment by infectious disease within the next week and we have reviewed that previously he responded to IVIG for in-hospital refractory sinusitis.  As a result he is asked to undergo reassessment for his immunoglobulin levels today and return next week for 400 mg/kg of IVIG infusion which we will continue monthly.    The patient was given IVIG 6/24/2022 and again 7/22/2022.  Assessment 8/5/2022 continue to show a degree of general improvement with oral surgery continue to follow him with a second opinion to be obtained  8/8/2022 when IVIG planned 8/19/2022 and 9/16/2022.      Objective      Vitals:    10/28/22 0833   BP: 131/77   Pulse: 99   Resp: 18   Temp: 97.1 °F (36.2 °C)   TempSrc: Temporal   SpO2: 96%   Weight: 76.1 kg (167 lb 11.2 oz)   Height: 180.3 cm (70.98\")   PainSc: 0-No pain     Current Status 10/28/2022 "   ECOG score 0     Physical Exam  Vitals and nursing note reviewed.   Constitutional:       Appearance: Normal appearance. He is well-developed.   HENT:      Head: Normocephalic and atraumatic.      Nose: Nose normal.      Mouth/Throat:      Comments: Exposed bone left mandible region, reduced general erythema per lower gumline, no exudate currently    Eyes:      Pupils: Pupils are equal, round, and reactive to light.   Cardiovascular:      Rate and Rhythm: Normal rate and regular rhythm.      Heart sounds: Normal heart sounds.   Pulmonary:      Effort: Pulmonary effort is normal. No respiratory distress.      Breath sounds: Normal breath sounds. No wheezing, rhonchi or rales.   Abdominal:      General: Bowel sounds are normal. There is no distension.      Palpations: Abdomen is soft.      Tenderness: There is no abdominal tenderness.   Musculoskeletal:         General: Normal range of motion.      Cervical back: Normal range of motion and neck supple.   Skin:     General: Skin is warm and dry.      Comments: erythematous psoriasiform lesions noted on lower extremities bilaterally, mildly pruritic, no tenderness, with a scale component, varying in size.  He reports today this is slightly improved   Neurological:      Mental Status: He is alert and oriented to person, place, and time.   Psychiatric:         Behavior: Behavior normal.       I have reexamined the patient and the results are consistent with the previously documented exam. RORY Zhao      RECENT LABS:  Results from last 7 days   Lab Units 10/28/22  0819   WBC 10*3/mm3 10.60   NEUTROS ABS 10*3/mm3 7.99*   HEMOGLOBIN g/dL 12.9*   HEMATOCRIT % 41.7   PLATELETS 10*3/mm3 256     Results from last 7 days   Lab Units 10/28/22  0819   SODIUM mmol/L 139   POTASSIUM mmol/L 4.3   CHLORIDE mmol/L 105   CO2 mmol/L 23.5   BUN mg/dL 25*   CREATININE mg/dL 1.45*   CALCIUM mg/dL 8.4*   ALBUMIN g/dL 2.80*   BILIRUBIN mg/dL 0.2   ALK PHOS U/L 76   ALT  (SGPT) U/L 7   AST (SGOT) U/L 10   GLUCOSE mg/dL 147*         FISH prognostic panel-Positive for deletion of 13 q. 14.3    Assessment & Plan   1.  CLL presenting with significant lymphocytosis, lymphadenopathy and splenomegaly.     · Positive for deletion of 13 q. 14.3.    · Patient status post 2 cycles of Treanda Rituxan with excellent response.    · Imaging 6/27/2019 with significant decrease in adenopathy.  Treanda Rituxan discontinued   · Imbruvica 420 mg daily initiated 7/25/2019.  · He continues on Imbruvica, without indication of progression of his CLL, without progressive lymphadenopathy on CT scans.  · Patient has resolving adenopathy particularly cervical regions 2/18/2022  · 3/25/2022 patient is without worsening adenopathy on exam today.  Continue Imbruvica.  · 10/28/2022 continues on Imbruvica 420 mg daily. Denies B-symptoms, no evidence of worsening adenopathy on exam.  CT scans without worsening adenopathy    2.  Pulmonary nodules/infiltrates.  He is  status post bronchoscopy.  Is unclear at this time whether these findings are infectious or possibly related to his lymphoproliferative disorder.  This is seen to be improving on latest scans.    3.  Hypogammaglobulinemia-status post IVIG with resolution of sinusitis.   · Anticipate trial of IVIG with the patient now having progression of his osteonecrosis and dermal infection.  · The patient is now receiving IVIG monthly, he is due again in 2 weeks and will return 11/11/2022 for his next IVIG    4.  Non-small cell lung carcinoma  · August 26 2020 revealing a new 1.6 cm spiculated nodule within the left upper lobe, 1.2 cm left adrenal nodule, bone windows with a soft tissue mass involving the right eighth rib measuring 3.7 x 2.6 cm.    · Biopsy was consistent with adenocarcinoma CK 7+, CK 20-, lung primary suspected clinically, molecular panel not yet obtained.  · PET/CT 9/23/2020 demonstrating asymmetric uptake within the right parotid gland which is  unremarkable appearance on noncontrasted CT, SUV of 6 compared to 2.7.  There is no hilar, mediastinal or axillary adenopathy, findings of asymmetric moderate to intense FDG uptake within superior aspect the right chest wall anterior to the right first rib with an irregular hypodense lesion measuring 1.8 x 1.6 and SUV 4.9.  This had not been seen June 27, 2019.  There is an intensely FDG avid nodule within the lingula measuring 1.9 x 1.5 history of 12.4, FDG avid left adrenal nodule 1.9 x 1.5 with an issue 21.2.    · Evaluated by radiation oncology therapy September 29 and started on radiation therapy to the right chest wall-35 Gy in 10 fractions.   ·  Plans were also then proceed with SBRT to the solitary left upper lobe lesion pending insurance approval.  · Further testing including TPS of 20% and foundation CDX with a TMB of greater than 10 mutations per megabase (28 mutations per megabase) and the indication that several immunotherapies could be useful in this patient's care.  Additional genomic findings include BRAF [id number]/that trametinib could be useful and STK11/that everolimus could be useful.  · Keytruda initiated 10/21/2021   · Reassessment after 2 cycles of Keytruda with enlargement of the right eighth rib lesion,enlargement of spiculated left upper lobe lung mass, moderate to large right-sided pleural effusion, new left adrenal mass and enlarged retrocrural lymph node.?Pseudoprogression  · Xgeva last given 12/30/2020  · Follow-up scans 1/6/2021 demonstrated marked improvement in his right eighth rib lesion, resolution of left upper lobe spiculated mass, residual large right pleural effusion, resolution of left adrenal metastasis.   · Right pleural effusion, with thoracentesis 1/14/2021 with 1500 mils removed.  Pathology consistent with malignant effusion   · CT scans 4/5/2021 with response noted in the left upper lobe density.  · He continues to receive Keytruda every 3 weeks along with Xgeva every 6  weeks.  · Repeat scan 7/15/2021 without evidence of recurrence or progressive disease.  Plans to continue Keytruda every 3 weeks with repeat scans in 4 to 6 months  · Patient status post PET/CT 11/11/2021 with hyper response, approximate remission status, plan to proceed with cycle #20 Keytruda  · Patient seen 2/18/2022 with Keytruda held while his oral issues are addressed-concern for osteonecrosis of the jaw.  · Patiently diagnosed with osteonecrosis of the jaw, seen by oral surgery, started on antibiotic therapy and Peridex with substantial improvement, Xgeva discontinued as discussed below.  · 3/25/2022 proceed with cycle #24 Keytruda today.  Follow-up CT scans 4/12/2022 without evidence of progressive disease, stable findings including osteosclerotic metastatic disease.  Keytruda continued  · Repeat imaging 10/4/2022 with overall stable disease, Keytruda continued  · Proceed today, 10/28/2022 with cycle #34 Keytruda which is continued every 3 weeks    5.  Left knee metastasis  · Evaluated by orthopedic oncology, Dr. Eliu Ochoa  · Admission 1/7/2021 undergoing stabilization of left femoral bone lesion, prophylactic stabilization with intramedullary implants.  · It was suggested we delay Xgeva or Zometa to allow bone healing  · Completed radiation with 10 fractions 2/10/2021  · Xgeva resumed 4/29/2021, he continues to receive this every 6 weeks.    · Additional assessment 2/18/2022 with left jaw pain, subsequent diagnosis of osteoporosis, Xgeva discontinued  · Patient continues follow-up with oral surgery, progression of osteoporosis symptomatically, dermal eruption left side of mandibular region, IVIG anticipated  · Xgeva DISCONTINUED due to osteonecrosis of the jaw     6.  Malignant right pleural effusion  · Ultrasound thoracentesis 1/14/2021 1500 mL removed  · Chest x-ray 2/25/2021 with moderate right pleural effusion  · Progressive symptoms with worsening pain 4/5/2021  · Ultrasound-guided thoracentesis  4/13/2021 with 2050 ml removed.  · Plans for Pleurx catheter with thoracic surgery, however, pleural effusion has not recurred.  Patient seen 11/4/2021 with chest x-ray planned.  Subsequent resolution noted on CT scan.  · Improvement in bilateral pleural effusions on most recent CT scan    7.  Cancer related pain  · Pain is most prominent in his right rib, utilizing MS Contin 30 mg 3 times a day  · Hydrocodone/acetaminophen 10/325 as needed for breakthrough pain.  Currently taking 3 to 4 tablets daily  · He is not currently in need of a refill of pain medications  · Discussed MS Contin at 30 mg p.o. every 8 hours, Norco 10/325 2 p.o. every 6 hours  · He is currently using pain medication as needed for jaw pain.  Requiring approximately 1 MS Contin and 1 Norco 10/325 daily, however some days not requiring any pain medication.  · His pain is currently controlled    8.  Insomnia  · Continuing to follow with behavioral oncology  · Continuing Remeron 7.5 mg nightly with good control he is requesting a refill of Remeron today    9. Health maintenance  · Status post COVID-19 booster, SARS antibody pending today  · The patient is due for Shingrix and Prevnar which will both be administered in the clinic 9/23/2021    10.  Skin lesions  · Uncertain of etiology, unexpected findings for usual skin lesions associated with immunotherapy  · Request dermatologic assessment-psoriasiform dermatitis.  Seen by Dr. Damon though no follow-up due to personal preference  · The lesions on the lower extremities particularly are quite extensive.  We discussed Aquaphor and over-the-counter cortisone.  He notes some improvement with initiation of    11.  Right knee pain  · Osteoarthritis, orthopedic assessment upon patient's request.   · Resolved.  Denies any knee pain today     12.  Hypocalcemia  · Calcium currently 8.1     13.  Osteonecrosis of the jaw  · Xgeva was discontinued.  · Patient went to second opinion with  with oral  surgery.  He was reffered to Dr. Escalante with , with whom he had consult on 8/23/2022.  Dr. Escalante recommened an outpatient procedure that would allow him to biopsy the affected area of his jawbone and identify which bacteria was present, they would then coordinate with ID to identify best antibiotic option.  Patient is agreeable to proceeding with this plan, and is awaiting surgery date.  · The patient underwent surgery 10/7/2022  · Follow-up with Dr. Champion, infectious disease 10/27/2022 with recommendations for 6 weeks of Levaquin and fluconazole    Plan:   1. Proceed today with cycle #34 Keytruda  2. Continue ibrutinib 420 mg daily.  This will require dose reduction of 280 mg daily during the 6-week course of antimicrobials due to interaction with fluconazole  3. Continue Levaquin and fluconazole as outlined by Dr. Champion, infectious disease.  The patient will complete a 6-week course of antibiotics for osteonecrosis of the jaw  4. Continue MS Contin 30 mg every 12 hours and Norco 10/325 every 6 hours as needed for pain.  Taking 1 tablet of MS Contin and 1 tablet of Norco most days.  5. Continue meloxicam.  6. Continue Remeron 15 mg nightly.  This was refilled today, 10/28/2020  7. Continue Aquaphor and over-the-counter hydrocortisone    8. Continue follow-up with Dr. Escalante with .  9. Return in 2 weeks for CBC, IVIG which is continued monthly for hypogammaglobulinemia secondary to CLL  10. MD follow-up in 3 weeks with CBC, CMP, cycle #35 Keytruda    Patient continues on high risk medication requiring close monitoring for toxicity    Noa Zarate, APRN   10/28/2022

## 2022-10-28 NOTE — PROGRESS NOTES
MTM Encounter-Re: Adherence and side effects (Imbruvica)    Today's encounter was conducted in person, face-to-face.     Medication:  Imbruvica 420 mg po daily- also on Keytruda 200 mg IV q 21 days  - Reason for outreach: Routine medication check-in .  - Administration: Patient is taking every day at the same time .  - Missed doses: Patient reports missing 0 doses in the last 30 days.  - Self-administration: Patient demonstrates ability to self-administer medication. No barriers to adherence identified.   - Diagnosis/Indication: CLL and NSCLC. Progress toward achieving therapeutic goals reviewed.   - Patient denies side effects.    - Medication availability/affordability: Patient has had no issues obtaining medication from pharmacy.   - Questions/concerns about medications: He visited ENT yesterday - Diflucan and Levaquin were added for ONJ.  In basket message sent to Dr Parekh to see if he would like to reduce Imbruvica dose to 280 mg po daily and this was also discussed with RORY Zarate.        Completed medication reconciliation today to assess for drug interactions.   Reviewed allergies, medical history, labs, quality of life( recent dx of ONJ), and medication history with patient.   Patient has had the following changes to medication list: addition of Diflucan and Levaquin; patient's chart has been updated to reflect changes. Assessed medication list for interactions, level d ddi with Imbruvica and Diflucan which is discussed above, level c ddi with mobic and imbruvica- he is not reporting any bruising, nosebleeds or blood in urine or stool.  Advised pt to call the clinic if any new medications are started so we can assess for drug-drug interactions.     All questions addressed. Patient had no additional concerns for MTM office.     Randi Rachel RPH  10/28/2022  09:06 EDT

## 2022-10-31 ENCOUNTER — SPECIALTY PHARMACY (OUTPATIENT)
Dept: PHARMACY | Facility: HOSPITAL | Age: 67
End: 2022-10-31

## 2022-10-31 NOTE — PROGRESS NOTES
Emy Ram, [facility]  Randi Rachel [facility]; Darshana Lao; Jessenia Galvez [facility]  Dose reduced script sent to [facility].       Neelam/Jessenia -- please double check me.  Script for 6 wk course of abx.     Thanks!           Previous Messages     ----- Message -----   From: Randi Rachel [facility]   Sent: 10/31/2022   8:35 AM EDT   To: Emy Hanna [facility]     See below.  FYI the patient had just gotten new supply of 420 mg and was worried.. I checked Friday in case it got changed and [facility] has 280 mg in stock   ----- Message -----   From: Jostin Parekh MD   Sent: 10/28/2022   2:26 PM EDT   To: Randi Rachel [facility]     Yes, please, while he is on Diflucan we do need to reduce the dose to 280 mg p.o. daily.  Please send this in for him and thank you.  MELLO   ----- Message -----   From: Randi Rachel [facility]   Sent: 10/28/2022   8:30 AM EDT   To: Jostin Parekh MD, Serenity Dick RN, *     Hi Martin Poe is out today with sick baby-     I noticed they have added Diflucan for ONJ- and there is a drug interaction with Imbruvica.  Typically it is recommended to reduce Imbruvia dose to 280 mg while on Diflucan.   I just wanted to let you know and if you want a dose reduction, I would be happy to send that rx in.       Thanks,   Neelam Baugh sent to [facility].  Paperwork was filled out. Medication will be delivered in two days time.

## 2022-11-01 ENCOUNTER — TELEPHONE (OUTPATIENT)
Dept: INFECTIOUS DISEASES | Facility: CLINIC | Age: 67
End: 2022-11-01

## 2022-11-01 NOTE — TELEPHONE ENCOUNTER
"Received call from patient stating he wanted to clarify Fluconazole as he thought in the office Dr. Champion had indicated he would take 1 tab daily. I read him the med instructions from his recent office visit that Dr. Champion charted as \"Take 2 tablets by mouth Daily\". I called his [facility] & verified that each tab is 200mg & the bottle label is listed to take 2 tabs daily.                   I sent message to Dr. Champion & he asked me to let pt know that patient is to take 2 tabs at the same time daily. I called patient and it went to voice mail. Patient had stated earlier for me to leave a message on his phone if he didn't answer since he had errands to run today and so I left message that per Dr. Champion he is to take 2 tablets of Fluconazole at the same time daily and if he had further questions to please call the office back.  "

## 2022-11-02 ENCOUNTER — SPECIALTY PHARMACY (OUTPATIENT)
Dept: PHARMACY | Facility: HOSPITAL | Age: 67
End: 2022-11-02

## 2022-11-02 NOTE — PROGRESS NOTES
I have received Imbruvica 280 mg from [facility] and placed it in the [facility] at [facility].

## 2022-11-03 ENCOUNTER — DOCUMENTATION (OUTPATIENT)
Dept: ONCOLOGY | Facility: CLINIC | Age: 67
End: 2022-11-03

## 2022-11-03 NOTE — PROGRESS NOTES
Imbruvica supply from [facility] picked up by pt on 11/3/22.     Janelle Irvin  Specialty Pharmacy Technician

## 2022-11-11 ENCOUNTER — INFUSION (OUTPATIENT)
Dept: ONCOLOGY | Facility: HOSPITAL | Age: 67
End: 2022-11-11

## 2022-11-11 VITALS
DIASTOLIC BLOOD PRESSURE: 80 MMHG | BODY MASS INDEX: 23.49 KG/M2 | HEART RATE: 78 BPM | TEMPERATURE: 96.7 F | OXYGEN SATURATION: 98 % | RESPIRATION RATE: 18 BRPM | WEIGHT: 167.8 LBS | HEIGHT: 71 IN | SYSTOLIC BLOOD PRESSURE: 145 MMHG

## 2022-11-11 DIAGNOSIS — C91.10 CLL (CHRONIC LYMPHOCYTIC LEUKEMIA): ICD-10-CM

## 2022-11-11 DIAGNOSIS — D80.1 HYPOGAMMAGLOBULINEMIA: Primary | ICD-10-CM

## 2022-11-11 DIAGNOSIS — Z45.2 FITTING AND ADJUSTMENT OF VASCULAR CATHETER: ICD-10-CM

## 2022-11-11 LAB
BASOPHILS # BLD AUTO: 0.09 10*3/MM3 (ref 0–0.2)
BASOPHILS NFR BLD AUTO: 1 % (ref 0–1.5)
DEPRECATED RDW RBC AUTO: 49.1 FL (ref 37–54)
EOSINOPHIL # BLD AUTO: 0.38 10*3/MM3 (ref 0–0.4)
EOSINOPHIL NFR BLD AUTO: 4.1 % (ref 0.3–6.2)
ERYTHROCYTE [DISTWIDTH] IN BLOOD BY AUTOMATED COUNT: 14.6 % (ref 12.3–15.4)
HCT VFR BLD AUTO: 40.2 % (ref 37.5–51)
HGB BLD-MCNC: 12.3 G/DL (ref 13–17.7)
IGA1 MFR SER: <50 MG/DL (ref 70–400)
IGG1 SER-MCNC: <300 MG/DL (ref 700–1600)
IGM SERPL-MCNC: <25 MG/DL (ref 40–230)
IMM GRANULOCYTES # BLD AUTO: 0.11 10*3/MM3 (ref 0–0.05)
IMM GRANULOCYTES NFR BLD AUTO: 1.2 % (ref 0–0.5)
LYMPHOCYTES # BLD AUTO: 0.88 10*3/MM3 (ref 0.7–3.1)
LYMPHOCYTES NFR BLD AUTO: 9.5 % (ref 19.6–45.3)
MCH RBC QN AUTO: 27.8 PG (ref 26.6–33)
MCHC RBC AUTO-ENTMCNC: 30.6 G/DL (ref 31.5–35.7)
MCV RBC AUTO: 91 FL (ref 79–97)
MONOCYTES # BLD AUTO: 0.87 10*3/MM3 (ref 0.1–0.9)
MONOCYTES NFR BLD AUTO: 9.4 % (ref 5–12)
NEUTROPHILS NFR BLD AUTO: 6.91 10*3/MM3 (ref 1.7–7)
NEUTROPHILS NFR BLD AUTO: 74.8 % (ref 42.7–76)
NRBC BLD AUTO-RTO: 0 /100 WBC (ref 0–0.2)
PLATELET # BLD AUTO: 206 10*3/MM3 (ref 140–450)
PMV BLD AUTO: 11.8 FL (ref 6–12)
RBC # BLD AUTO: 4.42 10*6/MM3 (ref 4.14–5.8)
WBC NRBC COR # BLD: 9.24 10*3/MM3 (ref 3.4–10.8)

## 2022-11-11 PROCEDURE — 96365 THER/PROPH/DIAG IV INF INIT: CPT

## 2022-11-11 PROCEDURE — 96366 THER/PROPH/DIAG IV INF ADDON: CPT

## 2022-11-11 PROCEDURE — 25010000002 IMMUNE GLOBULIN (HUMAN) 30 GM/300ML SOLUTION: Performed by: NURSE PRACTITIONER

## 2022-11-11 PROCEDURE — 82784 ASSAY IGA/IGD/IGG/IGM EACH: CPT | Performed by: INTERNAL MEDICINE

## 2022-11-11 PROCEDURE — 85025 COMPLETE CBC W/AUTO DIFF WBC: CPT | Performed by: INTERNAL MEDICINE

## 2022-11-11 PROCEDURE — 63710000001 DIPHENHYDRAMINE PER 50 MG: Performed by: NURSE PRACTITIONER

## 2022-11-11 PROCEDURE — 25010000002 HEPARIN LOCK FLUSH PER 10 UNITS: Performed by: INTERNAL MEDICINE

## 2022-11-11 RX ORDER — SODIUM CHLORIDE 9 MG/ML
250 INJECTION, SOLUTION INTRAVENOUS ONCE
Status: COMPLETED | OUTPATIENT
Start: 2022-11-11 | End: 2022-11-11

## 2022-11-11 RX ORDER — SODIUM CHLORIDE 0.9 % (FLUSH) 0.9 %
10 SYRINGE (ML) INJECTION AS NEEDED
Status: DISCONTINUED | OUTPATIENT
Start: 2022-11-11 | End: 2022-11-11 | Stop reason: HOSPADM

## 2022-11-11 RX ORDER — HEPARIN SODIUM (PORCINE) LOCK FLUSH IV SOLN 100 UNIT/ML 100 UNIT/ML
500 SOLUTION INTRAVENOUS AS NEEDED
Status: CANCELLED | OUTPATIENT
Start: 2022-11-11

## 2022-11-11 RX ORDER — ACETAMINOPHEN 325 MG/1
650 TABLET ORAL ONCE
Status: COMPLETED | OUTPATIENT
Start: 2022-11-11 | End: 2022-11-11

## 2022-11-11 RX ORDER — HEPARIN SODIUM (PORCINE) LOCK FLUSH IV SOLN 100 UNIT/ML 100 UNIT/ML
500 SOLUTION INTRAVENOUS AS NEEDED
Status: DISCONTINUED | OUTPATIENT
Start: 2022-11-11 | End: 2022-11-11 | Stop reason: HOSPADM

## 2022-11-11 RX ORDER — DIPHENHYDRAMINE HCL 25 MG
25 CAPSULE ORAL ONCE
Status: COMPLETED | OUTPATIENT
Start: 2022-11-11 | End: 2022-11-11

## 2022-11-11 RX ORDER — SODIUM CHLORIDE 0.9 % (FLUSH) 0.9 %
10 SYRINGE (ML) INJECTION AS NEEDED
Status: CANCELLED | OUTPATIENT
Start: 2022-11-11

## 2022-11-11 RX ADMIN — SODIUM CHLORIDE 250 ML: 9 INJECTION, SOLUTION INTRAVENOUS at 09:10

## 2022-11-11 RX ADMIN — Medication 10 ML: at 12:10

## 2022-11-11 RX ADMIN — ACETAMINOPHEN 650 MG: 325 TABLET ORAL at 09:09

## 2022-11-11 RX ADMIN — Medication 500 UNITS: at 12:10

## 2022-11-11 RX ADMIN — IMMUNE GLOBULIN INFUSION (HUMAN) 30 G: 100 INJECTION, SOLUTION INTRAVENOUS; SUBCUTANEOUS at 09:30

## 2022-11-11 RX ADMIN — DIPHENHYDRAMINE HYDROCHLORIDE 25 MG: 25 CAPSULE ORAL at 09:09

## 2022-11-11 NOTE — NURSING NOTE
Patient is tolerating IVIG without difficulty. Verbal order per Isabel VALADEZ to continue IVIG as ordered.

## 2022-11-17 NOTE — PROGRESS NOTES
Subjective Patient currently pleased about his status.  REASON FOR FOLLOW UP:  1.  Chronic lymphocytic leukemia with extensive lymphadenopathy and possible pleural involvement. Initiation of Treanda, Rituxan May 1, 2019.  2.  Hypogammaglobulinemia.  Status is post IVIG  3.  Significant response on scans June 27, 2019.  Treatment changed to Imbruvica 420 mg daily.  4.  Metastatic non-small cell lung carcinoma on Keytruda  5.  Patient seen 2/18/2022 with left jaw/gumline swelling pain, associated hematoma?  Osteonecrosis.  Restart of Keytruda 3/4/2022, Xgeva discontinued  6.  Restaging via CT 4/12/2022, continued response, Keytruda continued, referral to dermatology for worsening psoriasis.  7.  Jaw osteonecrosis, undergoing therapy through oral surgery, IVIG anticipated, infectious disease and oral surgery follow-up  8.  Patient assessed 11/18/2022, Keytruda continued, ibrutinib-dose reduced-continued, patient seen by  physicians, status postdebridement of bone of left mandible with bone biopsy, fistulectomy of left mandible 10/20/2022      HISTORY OF PRESENT ILLNESS:    The patient is a 67 y.o. male with the above-mentioned history, who returns the office  in anticipation of his 35 th cycle of Keytruda.  He was last treated with IVIG 11/11/2022.       At this point he had undergone surgery at [facility] including fistulectomy of the left mandible and debridement of the intraoral bone left mandible.  He he had also been seen by infectious disease, Dr. Champion regarding osteonecrosis of the jaw.  He was placed on antibiotics for 6 weeks including Levaquin and fluconazole.          There is an interaction with his fluconazole and ibrutinib and adjustments will be necessary.  [facility] pharmacy is managing this.  He reports he is overall feeling very well.  He has had improvement in his ability to eat and drink since undergoing surgery on his jaw.  His skin is overall improved with the use of Aquaphor.  He did  "undergo CT scans prior to cycle 33 Keytruda with stability of his disease.  He continues on current therapy with excellent tolerance.     Patient is next seen 11/18/2022.  Fortunately he feels that he is doing  reasonably well with surgery having been successful, pain virtually absent at this point, no additional shortness of breath or cough, appetite remaining fair, stable weight and sleep at least modestly managed.  Unfortunately his wife recently injured her femur and he has become her primary caregiver at this point.        Past Medical History, Past Surgical History, Social History, Family History have been reviewed and are without significant changes except as mentioned.    Hematologic/oncologic history:    As concerns his CLL history he initially required Treanda Rituxan 5/1/2019 for 2 cycles and then initiated Imbruvica 420 mg daily initiated 7/25/2019.       As concerns stage IV non-small cell lung carcinoma he returns the office continuing Keytruda which he continues to receive every 3 weeks which was initiated 10/21/2020.  He also receives Xgeva every 6 weeks which was initially given 12/2/2020 and discontinued 1/7/2022 secondary to osteonecrosis.      Additional issues that developed during treatment included right knee pain with plain views of his right knee showing a right knee effusion with medial compartment narrowing and no suspicious bone lesion.  There is benign periosteal calcification along the distal right femur.  A PET/CT 11/11 went on to demonstrate a complete metabolic response to therapy compared to PET/CT from 9/23/2020.  This includes his primary lung neoplasm left upper lobe, large osseous metastatic lesion the right chest wall region from the eighth rib, small left adrenal metastasis, and no evidence of disease involving the distal left femur.      The patient was seen 11/24/2021 continue to do well though indicating that his right knee is \"something I can manage at the moment\".  He " prefers not to have orthopedic assessment at this point.  He is concerned, ever, about skin lesions that are developing primarily on his calf regions and into his right thigh.    The patient was seen by Dr. Regan Damon 11/29/2021 and felt to be consistent with psoriasiform dermatitis treated with intralesional therapy.  Was also started on a moisturizer and lipid therapy.  The patient returned for assessment 12/15/2021 and treated with cycle #21 Keytruda, returned 1/7/2022 for cycle #22 and 1/28/2022 for cycle #23.  At that visit he had developed adenopathy several weeks previous and was treated with a Medrol Dosepak.   The patient was next seen 2/18/2022 and indicates that though his adenopathy has improved after treatment described above that his jaw has become painful swollen and tender with additional bleeding.     The patient was referred to his primary dentist who then had him seen oral surgeon-Dr. Ezequiel Davila-reached at [phone] who felt that this was osteonecrosis and should be treated symptomatically.  As the patient reviewed 3/4/2022, wonderfully, his oral issues have nearly resolved with the gumline returning to essentially its previous state, no swelling, minimal erythema, no discharge and no additional pain.  He does have follow-up with oral surgery in the next several weeks and we discussed restarting his Keytruda scanning him likely in April 2022 in general.   He returns to the office on 3/25/2022 in follow-up in anticipation of cycle 24 Keytruda. He reports the left side of his jaw has improved.    However, he was now having issues with the right lower side. He was seen by neurosurgery with additional films and started on oral amoxicillin by his oral surgeon.      The patient continued his immunotherapy taking Keytruda 3/25/2022 and underwent repeat CT chest abdomen and pelvis 4/12/2022 that is reviewed with him 4/15/2022.  Wonderfully there is no substantial change with a small to moderate  "right pleural effusion that decreased in size, no additional pulmonary nodule or new metastatic disease, multifocal osteosclerotic metastatic disease without change and no new findings in the abdomen or pelvis.    The patient was seen 4/15/2022 indicating that he is actually feeling well in terms of general symptoms.  This includes lack of progression from mouth pain, ability to eat without discomfort.  He is, however, having worsening psoriasis particular in his lower extremities and has been reviewed by dermatology previously.    The patient was reviewed 6/17/2022 having been seen by oral surgery with progression of his osteonecrosis and skin eruption.  He is currently undergoing assessment by infectious disease within the next week and we have reviewed that previously he responded to IVIG for in-hospital refractory sinusitis.  As a result he is asked to undergo reassessment for his immunoglobulin levels today and return next week for 400 mg/kg of IVIG infusion which we will continue monthly.    The patient was given IVIG 6/24/2022 and again 7/22/2022.  Assessment 8/5/2022 continue to show a degree of general improvement with oral surgery continue to follow him with a second opinion to be obtained  8/8/2022 when IVIG planned 8/19/2022 and 9/16/2022.    The patient slowly continued both assessments by oral surgery and  Scans considering his non-small cell lung cancer including CT studies 10/4/2022 that were essentially stable compared to previous.  Therapies include Keytruda every 3 weeks, daily Imbruvica, monthly IVIG every 4 weeks.    Objective      Vitals:    11/18/22 0932   BP: 129/80   Pulse: 89   Resp: 16   Temp: 98 °F (36.7 °C)   TempSrc: Temporal   SpO2: 94%   Weight: 75.4 kg (166 lb 3.2 oz)   Height: 180.3 cm (70.98\")   PainSc: 0-No pain     Current Status 11/18/2022   ECOG score 0     Physical Exam  Vitals and nursing note reviewed.   Constitutional:       Appearance: Normal appearance. He is " well-developed.   HENT:      Head: Normocephalic and atraumatic.      Nose: Nose normal.      Mouth/Throat:      Comments: Exposed bone left mandible region, reduced general erythema per lower gumline, no exudate currently    Eyes:      Pupils: Pupils are equal, round, and reactive to light.   Cardiovascular:      Rate and Rhythm: Normal rate and regular rhythm.      Heart sounds: Normal heart sounds.   Pulmonary:      Effort: Pulmonary effort is normal. No respiratory distress.      Breath sounds: Normal breath sounds. No wheezing, rhonchi or rales.   Abdominal:      General: Bowel sounds are normal. There is no distension.      Palpations: Abdomen is soft.      Tenderness: There is no abdominal tenderness.   Musculoskeletal:         General: Normal range of motion.      Cervical back: Normal range of motion and neck supple.   Lymphadenopathy:      Cervical: Cervical adenopathy (No additional cervical, infraclavicular, supraclavicular or axillary adenopathy) present.   Skin:     General: Skin is warm and dry.      Comments: erythematous psoriasiform lesions noted on lower extremities bilaterally which are not changed from previous   Neurological:      Mental Status: He is alert and oriented to person, place, and time.   Psychiatric:         Behavior: Behavior normal.       I have reexamined the patient and the results are consistent with the previously documented exam. Jostin Parekh MD      RECENT LABS:  Results from last 7 days   Lab Units 11/18/22  0918   WBC 10*3/mm3 10.67   NEUTROS ABS 10*3/mm3 8.28*   HEMOGLOBIN g/dL 12.1*   HEMATOCRIT % 38.8   PLATELETS 10*3/mm3 202             FISH prognostic panel-Positive for deletion of 13 q. 14.3    Assessment & Plan   1.  CLL presenting with significant lymphocytosis, lymphadenopathy and splenomegaly.     · Positive for deletion of 13 q. 14.3.    · Patient status post 2 cycles of Treanda Rituxan with excellent response.    · Imaging 6/27/2019 with significant  decrease in adenopathy.  Treanda Rituxan discontinued   · Imbruvica 420 mg daily initiated 7/25/2019.  · He continues on Imbruvica, without indication of progression of his CLL, without progressive lymphadenopathy on CT scans.  · Patient has resolving adenopathy particularly cervical regions 2/18/2022  · 3/25/2022 patient is without worsening adenopathy on exam today.  Continue Imbruvica.  · 10/28/2022 continues on Imbruvica 420 mg daily. Denies B-symptoms, no evidence of worsening adenopathy on exam.  CT scans without worsening adenopathy    2.  Pulmonary nodules/infiltrates.  He is  status post bronchoscopy.  Is unclear at this time whether these findings are infectious or possibly related to his lymphoproliferative disorder.  This is seen to be improving on latest scans.                                                                                                                   3.  Hypogammaglobulinemia-status post IVIG with resolution of sinusitis.   · Anticipate trial of IVIG with the patient now having progression of his osteonecrosis and dermal infection.  · The patient is now receiving IVIG monthly with his next infusion due 12/9/2022.    4.  Non-small cell lung carcinoma  · August 26 2020 revealing a new 1.6 cm spiculated nodule within the left upper lobe, 1.2 cm left adrenal nodule, bone windows with a soft tissue mass involving the right eighth rib measuring 3.7 x 2.6 cm.    · Biopsy was consistent with adenocarcinoma CK 7+, CK 20-, lung primary suspected clinically, molecular panel not yet obtained.  · PET/CT 9/23/2020 demonstrating asymmetric uptake within the right parotid gland which is unremarkable appearance on noncontrasted CT, SUV of 6 compared to 2.7.  There is no hilar, mediastinal or axillary adenopathy, findings of asymmetric moderate to intense FDG uptake within superior aspect the right chest wall anterior to the right first rib with an irregular hypodense lesion measuring 1.8 x 1.6 and  SUV 4.9.  This had not been seen June 27, 2019.  There is an intensely FDG avid nodule within the lingula measuring 1.9 x 1.5 history of 12.4, FDG avid left adrenal nodule 1.9 x 1.5 with an issue 21.2.    · Evaluated by radiation oncology therapy September 29 and started on radiation therapy to the right chest wall-35 Gy in 10 fractions.   ·  Plans were also then proceed with SBRT to the solitary left upper lobe lesion pending insurance approval.  · Further testing including TPS of 20% and foundation CDX with a TMB of greater than 10 mutations per megabase (28 mutations per megabase) and the indication that several immunotherapies could be useful in this patient's care.  Additional genomic findings include BRAF [id number]/that trametinib could be useful and STK11/that everolimus could be useful.  · Keytruda initiated 10/21/2021   · Reassessment after 2 cycles of Keytruda with enlargement of the right eighth rib lesion,enlargement of spiculated left upper lobe lung mass, moderate to large right-sided pleural effusion, new left adrenal mass and enlarged retrocrural lymph node.?Pseudoprogression  · Xgeva last given 12/30/2020  · Follow-up scans 1/6/2021 demonstrated marked improvement in his right eighth rib lesion, resolution of left upper lobe spiculated mass, residual large right pleural effusion, resolution of left adrenal metastasis.   · Right pleural effusion, with thoracentesis 1/14/2021 with 1500 mils removed.  Pathology consistent with malignant effusion   · CT scans 4/5/2021 with response noted in the left upper lobe density.  · He continues to receive Keytruda every 3 weeks along with Xgeva every 6 weeks.  · Repeat scan 7/15/2021 without evidence of recurrence or progressive disease.  Plans to continue Keytruda every 3 weeks with repeat scans in 4 to 6 months  · Patient status post PET/CT 11/11/2021 with hyper response, approximate remission status, plan to proceed with cycle #20 Keytruda  · Patient seen  2/18/2022 with Keytruda held while his oral issues are addressed-concern for osteonecrosis of the jaw.  · Patiently diagnosed with osteonecrosis of the jaw, seen by oral surgery, started on antibiotic therapy and Peridex with substantial improvement, Xgeva discontinued as discussed below.  · 3/25/2022 proceed with cycle #24 Keytruda today.  Follow-up CT scans 4/12/2022 without evidence of progressive disease, stable findings including osteosclerotic metastatic disease.  Keytruda continued  · Repeat imaging 10/4/2022 with overall stable disease, Keytruda continued  · Proceed with Keytruda cycle #35 to be given 11/18/2022.    5.  Left knee metastasis  · Evaluated by orthopedic oncology, Dr. Eliu Ochoa  · Admission 1/7/2021 undergoing stabilization of left femoral bone lesion, prophylactic stabilization with intramedullary implants.  · It was suggested we delay Xgeva or Zometa to allow bone healing  · Completed radiation with 10 fractions 2/10/2021  · Xgeva resumed 4/29/2021, he continues to receive this every 6 weeks.    · Additional assessment 2/18/2022 with left jaw pain, subsequent diagnosis of osteoporosis, Xgeva discontinued  · Patient continues follow-up with oral surgery, progression of osteoporosis symptomatically, dermal eruption left side of mandibular region, IVIG anticipated  · Xgeva DISCONTINUED due to osteonecrosis of the jaw  · The patient's pain per his knee is not relapsed.     6.  Malignant right pleural effusion  · Ultrasound thoracentesis 1/14/2021 1500 mL removed  · Chest x-ray 2/25/2021 with moderate right pleural effusion  · Progressive symptoms with worsening pain 4/5/2021  · Ultrasound-guided thoracentesis 4/13/2021 with 2050 ml removed.  · Plans for Pleurx catheter with thoracic surgery, however, pleural effusion has not recurred.  Patient seen 11/4/2021 with chest x-ray planned.  Subsequent resolution noted on CT scan.  · Improvement in bilateral pleural effusions on most recent CT  scan    7.  Cancer related pain  · Pain is most prominent in his right rib, utilizing MS Contin 30 mg 3 times a day  · Hydrocodone/acetaminophen 10/325 as needed for breakthrough pain.  Currently taking 3 to 4 tablets daily  · He is not currently in need of a refill of pain medications  · Discussed MS Contin at 30 mg p.o. every 8 hours, Norco 10/325 2 p.o. every 6 hours  · He is currently using pain medication as needed for jaw pain.  Requiring approximately 1 MS Contin and 1 Norco 10/325 daily, however some days not requiring any pain medication.  · His pain is currently controlled    8.  Insomnia  · Continuing to follow with behavioral oncology  · Continuing Remeron 7.5 mg nightly with fair control.    9. Health maintenance  · Status post COVID-19 booster, SARS antibody pending today  · The patient is due for Shingrix and Prevnar which will both be administered in the clinic 9/23/2021    10.  Skin lesions  · Uncertain of etiology, unexpected findings for usual skin lesions associated with immunotherapy  · Request dermatologic assessment-psoriasiform dermatitis.  Seen by Dr. Damon though no follow-up due to personal preference  · The lesions on the lower extremities particularly are quite extensive.  At present he feels that they are stable enough not to require additional therapy.    11.  Right knee pain  · Osteoarthritis, orthopedic assessment upon patient's request.   · Resolved.  Denies any knee pain today     12.  Hypocalcemia  · Stabilized     13.  Osteonecrosis of the jaw  · Xgeva was discontinued.  · Patient went to second opinion with  with oral surgery.  He was reffered to Dr. Escalante with , with whom he had consult on 8/23/2022.  Dr. Escalante recommened an outpatient procedure that would allow him to biopsy the affected area of his jawbone and identify which bacteria was present, they would then coordinate with ID to identify best antibiotic option.  Patient is agreeable to proceeding with this plan,  and is awaiting surgery date.  · The patient underwent surgery 10/7/2022  · Follow-up with Dr. Champion, infectious disease 10/27/2022 with recommendations for 6 weeks of Levaquin and fluconazole.  This should complete by approximately mid December 2023.    Plan:   1. Proceed today with cycle #35 Keytruda  2. Continue ibrutinib 420 mg daily.  This will require dose reduction of 280 mg daily during the 6-week course of antimicrobials due to interaction with fluconazole.  The patient states this is been working without complication.  3. Continue Levaquin and fluconazole as outlined by Dr. Champion, infectious disease.  The patient will complete a 6-week course of antibiotics for osteonecrosis of the jaw, follow-up 12/8/2022 with Dr. Champion.  4. Patient states currently does not he have to use any of narcotic nor anti-inflammatory medications.  5. Continue Remeron 7.5 mg nightly.   6. Continue Aquaphor and over-the-counter hydrocortisone    7. Continue follow-up with Dr. Escalante with .  8. Return 12/9/2022 for NP, #36 Keytruda, IVIG.  Scans would not be necessary until likely April 2023.        Patient continues on high risk medication requiring close monitoring for toxicity.    I spent 70 minutes caring for Dav on this date of service. This time includes time spent by me in the following activities: preparing for the visit, reviewing tests, obtaining and/or reviewing a separately obtained history, performing a medically appropriate examination and/or evaluation, counseling and educating the patient/family/caregiver, ordering medications, tests, or procedures, referring and communicating with other health care professionals, documenting information in the medical record, independently interpreting results and communicating that information with the patient/family/caregiver and care coordination.     Jostin Parekh MD   11/18/2022

## 2022-11-18 ENCOUNTER — INFUSION (OUTPATIENT)
Dept: ONCOLOGY | Facility: HOSPITAL | Age: 67
End: 2022-11-18

## 2022-11-18 ENCOUNTER — OFFICE VISIT (OUTPATIENT)
Dept: ONCOLOGY | Facility: CLINIC | Age: 67
End: 2022-11-18

## 2022-11-18 VITALS
WEIGHT: 166.2 LBS | HEIGHT: 71 IN | OXYGEN SATURATION: 94 % | DIASTOLIC BLOOD PRESSURE: 80 MMHG | BODY MASS INDEX: 23.27 KG/M2 | RESPIRATION RATE: 16 BRPM | TEMPERATURE: 98 F | HEART RATE: 89 BPM | SYSTOLIC BLOOD PRESSURE: 129 MMHG

## 2022-11-18 DIAGNOSIS — D50.9 IRON DEFICIENCY ANEMIA, UNSPECIFIED IRON DEFICIENCY ANEMIA TYPE: ICD-10-CM

## 2022-11-18 DIAGNOSIS — C79.51 LUNG CANCER METASTATIC TO BONE: ICD-10-CM

## 2022-11-18 DIAGNOSIS — C79.51 METASTASIS TO BONE: ICD-10-CM

## 2022-11-18 DIAGNOSIS — C34.90 LUNG CANCER METASTATIC TO BONE: ICD-10-CM

## 2022-11-18 DIAGNOSIS — C34.12 MALIGNANT NEOPLASM OF UPPER LOBE OF LEFT LUNG: ICD-10-CM

## 2022-11-18 DIAGNOSIS — C91.10 CLL (CHRONIC LYMPHOCYTIC LEUKEMIA): ICD-10-CM

## 2022-11-18 DIAGNOSIS — D80.1 HYPOGAMMAGLOBULINEMIA: ICD-10-CM

## 2022-11-18 DIAGNOSIS — Z79.899 HIGH RISK MEDICATION USE: ICD-10-CM

## 2022-11-18 DIAGNOSIS — C91.10 CLL (CHRONIC LYMPHOCYTIC LEUKEMIA): Primary | ICD-10-CM

## 2022-11-18 DIAGNOSIS — Z79.899 HIGH RISK MEDICATION USE: Primary | ICD-10-CM

## 2022-11-18 LAB
ALBUMIN SERPL-MCNC: 3.1 G/DL (ref 3.5–5.2)
ALBUMIN/GLOB SERPL: 1.3 G/DL (ref 1.1–2.4)
ALP SERPL-CCNC: 74 U/L (ref 38–116)
ALT SERPL W P-5'-P-CCNC: 7 U/L (ref 0–41)
ANION GAP SERPL CALCULATED.3IONS-SCNC: 10.5 MMOL/L (ref 5–15)
AST SERPL-CCNC: 13 U/L (ref 0–40)
BASOPHILS # BLD AUTO: 0.06 10*3/MM3 (ref 0–0.2)
BASOPHILS NFR BLD AUTO: 0.6 % (ref 0–1.5)
BILIRUB SERPL-MCNC: <0.2 MG/DL (ref 0.2–1.2)
BUN SERPL-MCNC: 24 MG/DL (ref 6–20)
BUN/CREAT SERPL: 16.9 (ref 7.3–30)
CALCIUM SPEC-SCNC: 8.7 MG/DL (ref 8.5–10.2)
CHLORIDE SERPL-SCNC: 106 MMOL/L (ref 98–107)
CO2 SERPL-SCNC: 24.5 MMOL/L (ref 22–29)
CREAT SERPL-MCNC: 1.42 MG/DL (ref 0.7–1.3)
DEPRECATED RDW RBC AUTO: 48.8 FL (ref 37–54)
EGFRCR SERPLBLD CKD-EPI 2021: 53.8 ML/MIN/1.73
EOSINOPHIL # BLD AUTO: 0.36 10*3/MM3 (ref 0–0.4)
EOSINOPHIL NFR BLD AUTO: 3.4 % (ref 0.3–6.2)
ERYTHROCYTE [DISTWIDTH] IN BLOOD BY AUTOMATED COUNT: 14.5 % (ref 12.3–15.4)
GLOBULIN UR ELPH-MCNC: 2.3 GM/DL (ref 1.8–3.5)
GLUCOSE SERPL-MCNC: 75 MG/DL (ref 74–124)
HCT VFR BLD AUTO: 38.8 % (ref 37.5–51)
HGB BLD-MCNC: 12.1 G/DL (ref 13–17.7)
IMM GRANULOCYTES # BLD AUTO: 0.04 10*3/MM3 (ref 0–0.05)
IMM GRANULOCYTES NFR BLD AUTO: 0.4 % (ref 0–0.5)
LYMPHOCYTES # BLD AUTO: 0.86 10*3/MM3 (ref 0.7–3.1)
LYMPHOCYTES NFR BLD AUTO: 8.1 % (ref 19.6–45.3)
MCH RBC QN AUTO: 28.5 PG (ref 26.6–33)
MCHC RBC AUTO-ENTMCNC: 31.2 G/DL (ref 31.5–35.7)
MCV RBC AUTO: 91.5 FL (ref 79–97)
MONOCYTES # BLD AUTO: 1.07 10*3/MM3 (ref 0.1–0.9)
MONOCYTES NFR BLD AUTO: 10 % (ref 5–12)
NEUTROPHILS NFR BLD AUTO: 77.5 % (ref 42.7–76)
NEUTROPHILS NFR BLD AUTO: 8.28 10*3/MM3 (ref 1.7–7)
NRBC BLD AUTO-RTO: 0 /100 WBC (ref 0–0.2)
PLATELET # BLD AUTO: 202 10*3/MM3 (ref 140–450)
PMV BLD AUTO: 11.6 FL (ref 6–12)
POTASSIUM SERPL-SCNC: 5 MMOL/L (ref 3.5–4.7)
PROT SERPL-MCNC: 5.4 G/DL (ref 6.3–8)
RBC # BLD AUTO: 4.24 10*6/MM3 (ref 4.14–5.8)
SODIUM SERPL-SCNC: 141 MMOL/L (ref 134–145)
T4 FREE SERPL-MCNC: 1.33 NG/DL (ref 0.93–1.7)
TSH SERPL DL<=0.05 MIU/L-ACNC: 2.81 UIU/ML (ref 0.27–4.2)
WBC NRBC COR # BLD: 10.67 10*3/MM3 (ref 3.4–10.8)

## 2022-11-18 PROCEDURE — 99215 OFFICE O/P EST HI 40 MIN: CPT | Performed by: INTERNAL MEDICINE

## 2022-11-18 PROCEDURE — 96413 CHEMO IV INFUSION 1 HR: CPT

## 2022-11-18 PROCEDURE — G2212 PROLONG OUTPT/OFFICE VIS: HCPCS | Performed by: INTERNAL MEDICINE

## 2022-11-18 PROCEDURE — 80053 COMPREHEN METABOLIC PANEL: CPT

## 2022-11-18 PROCEDURE — 84439 ASSAY OF FREE THYROXINE: CPT | Performed by: INTERNAL MEDICINE

## 2022-11-18 PROCEDURE — 84443 ASSAY THYROID STIM HORMONE: CPT | Performed by: INTERNAL MEDICINE

## 2022-11-18 PROCEDURE — 25010000002 PEMBROLIZUMAB 100 MG/4ML SOLUTION 4 ML VIAL: Performed by: INTERNAL MEDICINE

## 2022-11-18 PROCEDURE — 85025 COMPLETE CBC W/AUTO DIFF WBC: CPT

## 2022-11-18 RX ORDER — SODIUM CHLORIDE 9 MG/ML
250 INJECTION, SOLUTION INTRAVENOUS ONCE
Status: COMPLETED | OUTPATIENT
Start: 2022-11-18 | End: 2022-11-18

## 2022-11-18 RX ORDER — SODIUM CHLORIDE 9 MG/ML
250 INJECTION, SOLUTION INTRAVENOUS ONCE
Status: CANCELLED | OUTPATIENT
Start: 2022-11-18

## 2022-11-18 RX ADMIN — SODIUM CHLORIDE 250 ML: 9 INJECTION, SOLUTION INTRAVENOUS at 10:50

## 2022-11-18 RX ADMIN — SODIUM CHLORIDE 200 MG: 9 INJECTION, SOLUTION INTRAVENOUS at 10:50

## 2022-12-08 ENCOUNTER — OFFICE VISIT (OUTPATIENT)
Dept: INFECTIOUS DISEASES | Facility: CLINIC | Age: 67
End: 2022-12-08

## 2022-12-08 VITALS
TEMPERATURE: 97.1 F | RESPIRATION RATE: 16 BRPM | WEIGHT: 168.2 LBS | DIASTOLIC BLOOD PRESSURE: 85 MMHG | SYSTOLIC BLOOD PRESSURE: 164 MMHG | BODY MASS INDEX: 23.47 KG/M2 | HEART RATE: 80 BPM

## 2022-12-08 DIAGNOSIS — M86.9 OSTEOMYELITIS, UNSPECIFIED SITE, UNSPECIFIED TYPE: Primary | ICD-10-CM

## 2022-12-08 DIAGNOSIS — M87.9 OSTEONECROSIS OF MANDIBLE: ICD-10-CM

## 2022-12-08 PROCEDURE — 99214 OFFICE O/P EST MOD 30 MIN: CPT | Performed by: STUDENT IN AN ORGANIZED HEALTH CARE EDUCATION/TRAINING PROGRAM

## 2022-12-08 RX ORDER — AMOXICILLIN AND CLAVULANATE POTASSIUM 500; 125 MG/1; MG/1
1 TABLET, FILM COATED ORAL 2 TIMES DAILY
Qty: 180 TABLET | Refills: 1 | Status: SHIPPED | OUTPATIENT
Start: 2022-12-08 | End: 2023-03-08

## 2022-12-08 NOTE — PROGRESS NOTES
Subjective Patient currently pleased about his status.  REASON FOR FOLLOW UP:  1.  Chronic lymphocytic leukemia with extensive lymphadenopathy and possible pleural involvement. Initiation of Treanda, Rituxan May 1, 2019.  2.  Hypogammaglobulinemia.  Status is post IVIG  3.  Significant response on scans June 27, 2019.  Treatment changed to Imbruvica 420 mg daily.  4.  Metastatic non-small cell lung carcinoma on Keytruda  5.  Patient seen 2/18/2022 with left jaw/gumline swelling pain, associated hematoma?  Osteonecrosis.  Restart of Keytruda 3/4/2022, Xgeva discontinued  6.  Restaging via CT 4/12/2022, continued response, Keytruda continued, referral to dermatology for worsening psoriasis.  7.  Jaw osteonecrosis, undergoing therapy through oral surgery, IVIG anticipated, infectious disease and oral surgery follow-up  8.  Patient assessed 11/18/2022, Keytruda continued, ibrutinib-dose reduced-continued, patient seen by  physicians, status postdebridement of bone of left mandible with bone biopsy, fistulectomy of left mandible 10/20/2022      HISTORY OF PRESENT ILLNESS:    The patient is a 67 y.o. male with the above-mentioned history, who returns the office  in anticipation of his 35 th cycle of Keytruda.  He was last treated with IVIG 11/11/2022.       At this point he had undergone surgery at [facility] including fistulectomy of the left mandible and debridement of the intraoral bone left mandible.  He he had also been seen by infectious disease, Dr. Champion regarding osteonecrosis of the jaw.  He was placed on antibiotics for 6 weeks including Levaquin and fluconazole.          There is an interaction with his fluconazole and ibrutinib and adjustments will be necessary.  [facility] pharmacy is managing this.  He reports he is overall feeling very well.  He has had improvement in his ability to eat and drink since undergoing surgery on his jaw.  His skin is overall improved with the use of Aquaphor.  He did  undergo CT scans prior to cycle 33 Keytruda with stability of his disease.  He continues on current therapy with excellent tolerance.     Patient is next seen 11/18/2022.  Fortunately he feels that he is doing  reasonably well with surgery having been successful, pain virtually absent at this point, no additional shortness of breath or cough, appetite remaining fair, stable weight and sleep at least modestly managed.  Unfortunately his wife recently injured her femur and he has become her primary caregiver at this point.       The patient is next assessed 12/9/2022.  He is due for cycle #36 Keytruda and IVIG today.  He did not realize he was due for IVIG today, and already scheduled another appointment so would like to reschedule IVIG for next week.  He also continues on Imbruvica, currently at a reduced dose of 280 mg daily, due to interaction with Diflucan.  He was seen by Dr. Champion with infectious disease yesterday, 12/8/2022 and econazole was discontinued as patient has completed his 6-week course.  He was started on preventative Augmentin 500 mg twice daily until his gums grow to close over the exposed area of bone.  He is eating and drinking adequately, weight remains stable.  Bowels moving regularly.  Denies fever or chills.  Denies nausea or vomiting.  Denies new or worsening pain.  Denies skin rash or shortness of breath.  No new concerns today.    Past Medical History, Past Surgical History, Social History, Family History have been reviewed and are without significant changes except as mentioned.    Hematologic/oncologic history:    As concerns his CLL history he initially required Treanda Rituxan 5/1/2019 for 2 cycles and then initiated Imbruvica 420 mg daily initiated 7/25/2019.       As concerns stage IV non-small cell lung carcinoma he returns the office continuing Keytruda which he continues to receive every 3 weeks which was initiated 10/21/2020.  He also receives Xgeva every 6 weeks which was  "initially given 12/2/2020 and discontinued 1/7/2022 secondary to osteonecrosis.      Additional issues that developed during treatment included right knee pain with plain views of his right knee showing a right knee effusion with medial compartment narrowing and no suspicious bone lesion.  There is benign periosteal calcification along the distal right femur.  A PET/CT 11/11 went on to demonstrate a complete metabolic response to therapy compared to PET/CT from 9/23/2020.  This includes his primary lung neoplasm left upper lobe, large osseous metastatic lesion the right chest wall region from the eighth rib, small left adrenal metastasis, and no evidence of disease involving the distal left femur.      The patient was seen 11/24/2021 continue to do well though indicating that his right knee is \"something I can manage at the moment\".  He prefers not to have orthopedic assessment at this point.  He is concerned, ever, about skin lesions that are developing primarily on his calf regions and into his right thigh.    The patient was seen by Dr. Regan Damon 11/29/2021 and felt to be consistent with psoriasiform dermatitis treated with intralesional therapy.  Was also started on a moisturizer and lipid therapy.  The patient returned for assessment 12/15/2021 and treated with cycle #21 Keytruda, returned 1/7/2022 for cycle #22 and 1/28/2022 for cycle #23.  At that visit he had developed adenopathy several weeks previous and was treated with a Medrol Dosepak.   The patient was next seen 2/18/2022 and indicates that though his adenopathy has improved after treatment described above that his jaw has become painful swollen and tender with additional bleeding.     The patient was referred to his primary dentist who then had him seen oral surgeon-Dr. Ezequiel Davila-reached at [phone] who felt that this was osteonecrosis and should be treated symptomatically.  As the patient reviewed 3/4/2022, wonderfully, his oral issues have " nearly resolved with the gumline returning to essentially its previous state, no swelling, minimal erythema, no discharge and no additional pain.  He does have follow-up with oral surgery in the next several weeks and we discussed restarting his Keytruda scanning him likely in April 2022 in general.   He returns to the office on 3/25/2022 in follow-up in anticipation of cycle 24 Keytruda. He reports the left side of his jaw has improved.    However, he was now having issues with the right lower side. He was seen by neurosurgery with additional films and started on oral amoxicillin by his oral surgeon.      The patient continued his immunotherapy taking Keytruda 3/25/2022 and underwent repeat CT chest abdomen and pelvis 4/12/2022 that is reviewed with him 4/15/2022.  Wonderfully there is no substantial change with a small to moderate right pleural effusion that decreased in size, no additional pulmonary nodule or new metastatic disease, multifocal osteosclerotic metastatic disease without change and no new findings in the abdomen or pelvis.    The patient was seen 4/15/2022 indicating that he is actually feeling well in terms of general symptoms.  This includes lack of progression from mouth pain, ability to eat without discomfort.  He is, however, having worsening psoriasis particular in his lower extremities and has been reviewed by dermatology previously.    The patient was reviewed 6/17/2022 having been seen by oral surgery with progression of his osteonecrosis and skin eruption.  He is currently undergoing assessment by infectious disease within the next week and we have reviewed that previously he responded to IVIG for in-hospital refractory sinusitis.  As a result he is asked to undergo reassessment for his immunoglobulin levels today and return next week for 400 mg/kg of IVIG infusion which we will continue monthly.    The patient was given IVIG 6/24/2022 and again 7/22/2022.  Assessment 8/5/2022 continue to  "show a degree of general improvement with oral surgery continue to follow him with a second opinion to be obtained  8/8/2022 when IVIG planned 8/19/2022 and 9/16/2022.    The patient slowly continued both assessments by oral surgery and  Scans considering his non-small cell lung cancer including CT studies 10/4/2022 that were essentially stable compared to previous.  Therapies include Keytruda every 3 weeks, daily Imbruvica, monthly IVIG every 4 weeks.    Objective      Vitals:    12/09/22 1040   BP: 152/84   Pulse: 70   Resp: 16   Temp: 97.5 °F (36.4 °C)   TempSrc: Temporal   SpO2: 98%   Weight: 75.8 kg (167 lb 1.6 oz)   Height: 180.3 cm (70.98\")   PainSc: 0-No pain     Current Status 12/9/2022   ECOG score 0     Physical Exam  Vitals and nursing note reviewed.   Constitutional:       Appearance: Normal appearance. He is well-developed.   HENT:      Head: Normocephalic and atraumatic.      Nose: Nose normal.      Mouth/Throat:      Comments: Exposed bone left mandible region, reduced general erythema per lower gumline, no exudate currently    Eyes:      Pupils: Pupils are equal, round, and reactive to light.   Cardiovascular:      Rate and Rhythm: Normal rate and regular rhythm.      Heart sounds: Normal heart sounds.   Pulmonary:      Effort: Pulmonary effort is normal. No respiratory distress.      Breath sounds: Normal breath sounds. No wheezing, rhonchi or rales.   Abdominal:      General: Bowel sounds are normal. There is no distension.      Palpations: Abdomen is soft.      Tenderness: There is no abdominal tenderness.   Musculoskeletal:         General: Normal range of motion.      Cervical back: Normal range of motion and neck supple.   Lymphadenopathy:      Cervical: Cervical adenopathy (No additional cervical, infraclavicular, supraclavicular or axillary adenopathy) present.   Skin:     General: Skin is warm and dry.      Comments: erythematous psoriasiform lesions noted on lower extremities bilaterally " which are not changed from previous   Neurological:      Mental Status: He is alert and oriented to person, place, and time.   Psychiatric:         Behavior: Behavior normal.       I have reexamined the patient and the results are consistent with the previously documented exam. RORY Morgan      RECENT LABS:  Results from last 7 days   Lab Units 12/09/22  1008   WBC 10*3/mm3 7.47   NEUTROS ABS 10*3/mm3 5.12   HEMOGLOBIN g/dL 12.6*   HEMATOCRIT % 40.6   PLATELETS 10*3/mm3 202             FISH prognostic panel-Positive for deletion of 13 q. 14.3    Assessment & Plan   1.  CLL presenting with significant lymphocytosis, lymphadenopathy and splenomegaly.     · Positive for deletion of 13 q. 14.3.    · Patient status post 2 cycles of Treanda Rituxan with excellent response.    · Imaging 6/27/2019 with significant decrease in adenopathy.  Treanda Rituxan discontinued   · Imbruvica 420 mg daily initiated 7/25/2019.  · He continues on Imbruvica, without indication of progression of his CLL, without progressive lymphadenopathy on CT scans.  · Patient has resolving adenopathy particularly cervical regions 2/18/2022  · 3/25/2022 patient is without worsening adenopathy on exam today.  Continue Imbruvica.  · 10/28/2022 continues on Imbruvica 420 mg daily. Denies B-symptoms, no evidence of worsening adenopathy on exam.  CT scans without worsening adenopathy  · 11/18/2022: Imbruvica reduced to 280 mg daily while patient receiving 6-week course of antimicrobials due to interaction with fluconazole.  · 12/9/2022: He has finished his course of fluconazole, taking his last dose today.  He has a few days left of Imbruvica 280 mg, which he will complete.  He will then increase Imbruvica back to regular dose of 420 mg daily.    2.  Pulmonary nodules/infiltrates.  He is  status post bronchoscopy.  Is unclear at this time whether these findings are infectious or possibly related to his lymphoproliferative disorder.  This is seen to  be improving on latest scans.                                                                                                                   3.  Hypogammaglobulinemia-status post IVIG with resolution of sinusitis.   · Anticipate trial of IVIG with the patient now having progression of his osteonecrosis and dermal infection.  · Continues with monthly IVIG.  Due today, however did not realize he was due for IVIG and would like to reschedule for next week.    4.  Non-small cell lung carcinoma  · August 26 2020 revealing a new 1.6 cm spiculated nodule within the left upper lobe, 1.2 cm left adrenal nodule, bone windows with a soft tissue mass involving the right eighth rib measuring 3.7 x 2.6 cm.    · Biopsy was consistent with adenocarcinoma CK 7+, CK 20-, lung primary suspected clinically, molecular panel not yet obtained.  · PET/CT 9/23/2020 demonstrating asymmetric uptake within the right parotid gland which is unremarkable appearance on noncontrasted CT, SUV of 6 compared to 2.7.  There is no hilar, mediastinal or axillary adenopathy, findings of asymmetric moderate to intense FDG uptake within superior aspect the right chest wall anterior to the right first rib with an irregular hypodense lesion measuring 1.8 x 1.6 and SUV 4.9.  This had not been seen June 27, 2019.  There is an intensely FDG avid nodule within the lingula measuring 1.9 x 1.5 history of 12.4, FDG avid left adrenal nodule 1.9 x 1.5 with an issue 21.2.    · Evaluated by radiation oncology therapy September 29 and started on radiation therapy to the right chest wall-35 Gy in 10 fractions.   ·  Plans were also then proceed with SBRT to the solitary left upper lobe lesion pending insurance approval.  · Further testing including TPS of 20% and foundation CDX with a TMB of greater than 10 mutations per megabase (28 mutations per megabase) and the indication that several immunotherapies could be useful in this patient's care.  Additional genomic  findings include BRAF [id number]/that trametinib could be useful and STK11/that everolimus could be useful.  · Keytruda initiated 10/21/2021   · Reassessment after 2 cycles of Keytruda with enlargement of the right eighth rib lesion,enlargement of spiculated left upper lobe lung mass, moderate to large right-sided pleural effusion, new left adrenal mass and enlarged retrocrural lymph node.?Pseudoprogression  · Xgeva last given 12/30/2020  · Follow-up scans 1/6/2021 demonstrated marked improvement in his right eighth rib lesion, resolution of left upper lobe spiculated mass, residual large right pleural effusion, resolution of left adrenal metastasis.   · Right pleural effusion, with thoracentesis 1/14/2021 with 1500 mils removed.  Pathology consistent with malignant effusion   · CT scans 4/5/2021 with response noted in the left upper lobe density.  · He continues to receive Keytruda every 3 weeks along with Xgeva every 6 weeks.  · Repeat scan 7/15/2021 without evidence of recurrence or progressive disease.  Plans to continue Keytruda every 3 weeks with repeat scans in 4 to 6 months  · Patient status post PET/CT 11/11/2021 with hyper response, approximate remission status, plan to proceed with cycle #20 Keytruda  · Patient seen 2/18/2022 with Keytruda held while his oral issues are addressed-concern for osteonecrosis of the jaw.  · Patiently diagnosed with osteonecrosis of the jaw, seen by oral surgery, started on antibiotic therapy and Peridex with substantial improvement, Xgeva discontinued as discussed below.  · 3/25/2022 proceed with cycle #24 Keytruda today.  Follow-up CT scans 4/12/2022 without evidence of progressive disease, stable findings including osteosclerotic metastatic disease.  Keytruda continued  · Repeat imaging 10/4/2022 with overall stable disease, Keytruda continued  · 12/9/2022: Proceed with cycle #36 Keytruda today.  Plan repeat imaging April 2023.    5.  Left knee metastasis  · Evaluated by  orthopedic oncology, Dr. Eliu Ochoa  · Admission 1/7/2021 undergoing stabilization of left femoral bone lesion, prophylactic stabilization with intramedullary implants.  · It was suggested we delay Xgeva or Zometa to allow bone healing  · Completed radiation with 10 fractions 2/10/2021  · Xgeva resumed 4/29/2021, he continues to receive this every 6 weeks.    · Additional assessment 2/18/2022 with left jaw pain, subsequent diagnosis of osteoporosis, Xgeva discontinued  · Patient continues follow-up with oral surgery, progression of osteoporosis symptomatically, dermal eruption left side of mandibular region, IVIG anticipated  · Xgeva DISCONTINUED due to osteonecrosis of the jaw  · The patient's pain per his knee is not relapsed.     6.  Malignant right pleural effusion  · Ultrasound thoracentesis 1/14/2021 1500 mL removed  · Chest x-ray 2/25/2021 with moderate right pleural effusion  · Progressive symptoms with worsening pain 4/5/2021  · Ultrasound-guided thoracentesis 4/13/2021 with 2050 ml removed.  · Plans for Pleurx catheter with thoracic surgery, however, pleural effusion has not recurred.  Patient seen 11/4/2021 with chest x-ray planned.  Subsequent resolution noted on CT scan.  · Improvement in bilateral pleural effusions on most recent CT scan    7.  Cancer related pain  · Pain is most prominent in his right rib, utilizing MS Contin 30 mg 3 times a day  · Hydrocodone/acetaminophen 10/325 as needed for breakthrough pain.  Currently taking 3 to 4 tablets daily  · He is not currently in need of a refill of pain medications  · Discussed MS Contin at 30 mg p.o. every 8 hours, Norco 10/325 2 p.o. every 6 hours  · He is currently using pain medication as needed for jaw pain.  Requiring approximately 1 MS Contin and 1 Norco 10/325 daily, however some days not requiring any pain medication.  · Reports his pain is well controlled, not currently requiring pain medication.    8.  Insomnia  · Continuing to follow with  behavioral oncology  · Continuing Remeron 7.5 mg nightly with fair control.    9. Health maintenance  · Status post COVID-19 booster, SARS antibody pending today  · The patient is due for Shingrix and Prevnar which will both be administered in the clinic 9/23/2021    10.  Skin lesions  · Uncertain of etiology, unexpected findings for usual skin lesions associated with immunotherapy  · Request dermatologic assessment-psoriasiform dermatitis.  Seen by Dr. Damon though no follow-up due to personal preference  · The lesions on the lower extremities particularly are quite extensive.  At present he feels that they are stable enough not to require additional therapy.    11.  Right knee pain  · Osteoarthritis, orthopedic assessment upon patient's request.   · Resolved.  Denies any knee pain today     12.  Hypocalcemia  · Stabilized     13.  Osteonecrosis of the jaw  · Xgeva was discontinued.  · Patient went to second opinion with  with oral surgery.  He was reffered to Dr. Escalante with , with whom he had consult on 8/23/2022.  Dr. Escalante recommened an outpatient procedure that would allow him to biopsy the affected area of his jawbone and identify which bacteria was present, they would then coordinate with ID to identify best antibiotic option.  Patient is agreeable to proceeding with this plan, and is awaiting surgery date.  · The patient underwent surgery 10/7/2022  · Follow-up with Dr. Champion, infectious disease 10/27/2022 with recommendations for 6 weeks of Levaquin and fluconazole.  This should complete by approximately mid December 2023.  · Was seen by Dr. Champion 12/8/2022 with plans to complete fluconazole.  Decision made to start Augmentin 500 mg twice daily for prevention.    Plan:   1. Proceed today with cycle #36 Keytruda  2. Reschedule IVIG for next week at [place].  3. Patient has completed course of fluconazole.  He has a few days left of Imbruvica 280 mg tablets which she will complete, then  transition back to 420 mg.  4. Continue follow-up with Dr. Champion, neck scheduled in 6 months.  5. Continue Augmentin 500 mg twice daily for prevention per ID.  6. Patient states currently does not he have to use any of narcotic nor anti-inflammatory medications.   7. Continue Remeron 7.5 mg nightly.   8. Continue Aquaphor and over-the-counter hydrocortisone    9. Continue follow-up with Dr. Escalante with .  10. Return in 3 weeks for MD follow-up and Keytruda.  11. Scans would not be necessary until likely April 2023.    Patient continues on high risk medication requiring close monitoring for toxicity.    Isabel Gomez, APRN   12/9/2022

## 2022-12-08 NOTE — PROGRESS NOTES
"Chief Complaint  Follow-up    Subjective        Dav Freitas presents to [facility] INFECTIOUS DISEASES  History of Present Illness    Patient is a 66-year-old male with a past medical history of hypogammaglobinemia, stage IV lung cancer on Keytruda but prior therapy with Xgeva for bony metastasis and CLL in remission who is seen by oral surgery in the setting of medication related osteonecrosis of the mandible and has been on multiple courses of amoxicillin during \"flareups \".  Recently had CT scan that demonstrated periosteal thickening and continue to be consistent with osteonecrosis of the jaw however developed draining extraoral fistula recently and was placed on amoxicillin/Augmentin combo to complete out 3 weeks that he finished 2 weeks ago.      He reports that while on antibiotics he had great resolution in his fistula which closed up and the right side of his face swelling improved dramatically.  He reports he is back to normal until approximately the beginning of this week when he developed worsening swelling of the left side of the face without any drainage and the right side developed some pain.  He denies any fevers or chills.  He states he has some pain in his mouth with soreness of the left side of his tongue which becomes raw.  States he tolerated the antibiotics very well without any difficulties.    Seen by OMFS at [facility] and underwent fistulectomy on 10/20/2022.  With cultures obtained growing Serratia, Candida albicans, strep, staph epidermidis and haemophilus parainfluenza.  He was then seen in follow-up and placed on 6 weeks of daily p.o. levofloxacin 7 or 50 mg plus daily p.o. fluconazole 400 mg for osteomyelitis treatment.    Today he reports he has been doing very well with this therapy and has approximately 4 days left of his fluconazole.  States he has completed all the 6 weeks of Levaquin.  States he did not have any side effects or difficulties and his " "jaw has been feeling much better.  Denies any pain.  States he has a small area of scabbing on the right jaw but that the prior mass has receded significantly.  Denies any fevers or chills.  Denies any drainage in his mouth.  Still has a small area of exposed bone in the left lower mandible on the anterior of the mouth.    Objective   Vital Signs:  /85 (BP Location: Left arm, Patient Position: Sitting, Cuff Size: Adult)   Pulse 80   Temp 97.1 °F (36.2 °C)   Resp 16   Wt 76.3 kg (168 lb 3.2 oz)   BMI 23.47 kg/m²   Estimated body mass index is 23.47 kg/m² as calculated from the following:    Height as of 11/18/22: 180.3 cm (70.98\").    Weight as of this encounter: 76.3 kg (168 lb 3.2 oz).    BMI is within normal parameters. No other follow-up for BMI required.      Physical Exam  Constitutional:       General: He is not in acute distress.     Appearance: Normal appearance. He is normal weight. He is not ill-appearing.   HENT:      Head: Normocephalic and atraumatic.      Comments: Right angle of the mandible with small scabbing lesion with no drainage.  No surrounding erythema.  No tenderness of the mandible bilaterally.     Nose: Nose normal. No rhinorrhea.      Mouth/Throat:      Mouth: Mucous membranes are moist.      Pharynx: No oropharyngeal exudate.      Comments: Left inferior molar area with exposed bone.  Eyes:      General: No scleral icterus.     Extraocular Movements: Extraocular movements intact.      Pupils: Pupils are equal, round, and reactive to light.   Cardiovascular:      Rate and Rhythm: Normal rate and regular rhythm.      Pulses: Normal pulses.      Heart sounds: Normal heart sounds. No murmur heard.  Pulmonary:      Effort: Pulmonary effort is normal.      Breath sounds: Normal breath sounds. No wheezing, rhonchi or rales.   Abdominal:      General: Abdomen is flat. Bowel sounds are normal.      Palpations: Abdomen is soft.      Tenderness: There is no abdominal tenderness. There is " no guarding or rebound.   Musculoskeletal:         General: No swelling or tenderness. Normal range of motion.      Cervical back: Normal range of motion and neck supple. No tenderness.      Right lower leg: No edema.      Left lower leg: No edema.   Skin:     General: Skin is warm and dry.      Findings: Lesion present. No erythema.   Neurological:      General: No focal deficit present.      Mental Status: He is alert and oriented to person, place, and time.   Psychiatric:         Mood and Affect: Mood normal.         Behavior: Behavior normal.        Result Review :  The following data was reviewed by: Glenn Champion DO on 06/29/2022:  Common labs    Common Labs 10/28/22 10/28/22 11/11/22 11/18/22 11/18/22    0819 0819  0918 0918   Glucose  147 (A)   75   BUN  25 (A)   24 (A)   Creatinine  1.45 (A)   1.42 (A)   Sodium  139   141   Potassium  4.3   5.0 (A)   Chloride  105   106   Calcium  8.4 (A)   8.7   Albumin  2.80 (A)   3.10 (A)   Total Bilirubin  0.2   <0.2 (A)   Alkaline Phosphatase  76   74   AST (SGOT)  10   13   ALT (SGPT)  7   7   WBC 10.60  9.24 10.67    Hemoglobin 12.9 (A)  12.3 (A) 12.1 (A)    Hematocrit 41.7  40.2 38.8    Platelets 256  206 202    (A) Abnormal value            Data reviewed: Radiologic studies With reports attached from oral maxillofacial surgery and Consultant notes From oral maxillofacial surgery and oncology.  Microbiology from recent fistulectomy reviewed.         Assessment and Plan   Diagnoses and all orders for this visit:    1. Osteomyelitis, unspecified site, unspecified type (HCC) (Primary)    2. Osteonecrosis of mandible (HCC)    Today we discussed completion of his 6 weeks of antibiotics with levofloxacin and fluconazole.  Plan to complete this therapy and stop these agents.  We discussed his ongoing area of exposed bone in the left lower mandible that gives me some pause.  Given the continued exposure I think continuing a suppressive antibiotic with Augmentin 500  mg twice daily to prevent any further seeding is reasonable.  We discussed stopping his fluconazole today and monitoring as it has caused interactions with his oncologic medications and it is difficult to know how much this is been playing into his overall infection given he has had good response to Augmentin in the past.       I spent 31 minutes caring for Dav on this date of service. This time includes time spent by me in the following activities:preparing for the visit, reviewing tests, obtaining and/or reviewing a separately obtained history, performing a medically appropriate examination and/or evaluation , counseling and educating the patient/family/caregiver, ordering medications, tests, or procedures, referring and communicating with other health care professionals , documenting information in the medical record, independently interpreting results and communicating that information with the patient/family/caregiver and care coordination  Follow Up   No follow-ups on file.  Patient was given instructions and counseling regarding his condition or for health maintenance advice. Please see specific information pulled into the AVS if appropriate.

## 2022-12-09 ENCOUNTER — OFFICE VISIT (OUTPATIENT)
Dept: ONCOLOGY | Facility: CLINIC | Age: 67
End: 2022-12-09

## 2022-12-09 ENCOUNTER — INFUSION (OUTPATIENT)
Dept: ONCOLOGY | Facility: HOSPITAL | Age: 67
End: 2022-12-09

## 2022-12-09 VITALS
SYSTOLIC BLOOD PRESSURE: 152 MMHG | HEART RATE: 70 BPM | WEIGHT: 167.1 LBS | DIASTOLIC BLOOD PRESSURE: 84 MMHG | RESPIRATION RATE: 16 BRPM | TEMPERATURE: 97.5 F | BODY MASS INDEX: 23.4 KG/M2 | OXYGEN SATURATION: 98 % | HEIGHT: 71 IN

## 2022-12-09 DIAGNOSIS — Z79.899 HIGH RISK MEDICATION USE: ICD-10-CM

## 2022-12-09 DIAGNOSIS — C91.10 CLL (CHRONIC LYMPHOCYTIC LEUKEMIA): ICD-10-CM

## 2022-12-09 DIAGNOSIS — C79.51 LUNG CANCER METASTATIC TO BONE: ICD-10-CM

## 2022-12-09 DIAGNOSIS — C34.12 MALIGNANT NEOPLASM OF UPPER LOBE OF LEFT LUNG: ICD-10-CM

## 2022-12-09 DIAGNOSIS — C34.90 LUNG CANCER METASTATIC TO BONE: ICD-10-CM

## 2022-12-09 DIAGNOSIS — D80.1 HYPOGAMMAGLOBULINEMIA: ICD-10-CM

## 2022-12-09 DIAGNOSIS — Z79.899 HIGH RISK MEDICATION USE: Primary | ICD-10-CM

## 2022-12-09 DIAGNOSIS — Z45.2 FITTING AND ADJUSTMENT OF VASCULAR CATHETER: ICD-10-CM

## 2022-12-09 DIAGNOSIS — C34.12 MALIGNANT NEOPLASM OF UPPER LOBE OF LEFT LUNG: Primary | ICD-10-CM

## 2022-12-09 LAB
ALBUMIN SERPL-MCNC: 3.6 G/DL (ref 3.5–5.2)
ALBUMIN/GLOB SERPL: 1.8 G/DL (ref 1.1–2.4)
ALP SERPL-CCNC: 89 U/L (ref 38–116)
ALT SERPL W P-5'-P-CCNC: 8 U/L (ref 0–41)
ANION GAP SERPL CALCULATED.3IONS-SCNC: 9.8 MMOL/L (ref 5–15)
AST SERPL-CCNC: 11 U/L (ref 0–40)
BASOPHILS # BLD AUTO: 0.03 10*3/MM3 (ref 0–0.2)
BASOPHILS NFR BLD AUTO: 0.4 % (ref 0–1.5)
BILIRUB SERPL-MCNC: 0.2 MG/DL (ref 0.2–1.2)
BUN SERPL-MCNC: 17 MG/DL (ref 6–20)
BUN/CREAT SERPL: 13.5 (ref 7.3–30)
CALCIUM SPEC-SCNC: 8.8 MG/DL (ref 8.5–10.2)
CHLORIDE SERPL-SCNC: 105 MMOL/L (ref 98–107)
CO2 SERPL-SCNC: 24.2 MMOL/L (ref 22–29)
CREAT SERPL-MCNC: 1.26 MG/DL (ref 0.7–1.3)
DEPRECATED RDW RBC AUTO: 47.2 FL (ref 37–54)
EGFRCR SERPLBLD CKD-EPI 2021: 62.1 ML/MIN/1.73
EOSINOPHIL # BLD AUTO: 0.48 10*3/MM3 (ref 0–0.4)
EOSINOPHIL NFR BLD AUTO: 6.4 % (ref 0.3–6.2)
ERYTHROCYTE [DISTWIDTH] IN BLOOD BY AUTOMATED COUNT: 14 % (ref 12.3–15.4)
GLOBULIN UR ELPH-MCNC: 2 GM/DL (ref 1.8–3.5)
GLUCOSE SERPL-MCNC: 110 MG/DL (ref 74–124)
HCT VFR BLD AUTO: 40.6 % (ref 37.5–51)
HGB BLD-MCNC: 12.6 G/DL (ref 13–17.7)
IGA1 MFR SER: <50 MG/DL (ref 70–400)
IGG1 SER-MCNC: 355 MG/DL (ref 700–1600)
IGM SERPL-MCNC: <25 MG/DL (ref 40–230)
IMM GRANULOCYTES # BLD AUTO: 0.02 10*3/MM3 (ref 0–0.05)
IMM GRANULOCYTES NFR BLD AUTO: 0.3 % (ref 0–0.5)
LYMPHOCYTES # BLD AUTO: 1.06 10*3/MM3 (ref 0.7–3.1)
LYMPHOCYTES NFR BLD AUTO: 14.2 % (ref 19.6–45.3)
MCH RBC QN AUTO: 28.4 PG (ref 26.6–33)
MCHC RBC AUTO-ENTMCNC: 31 G/DL (ref 31.5–35.7)
MCV RBC AUTO: 91.6 FL (ref 79–97)
MONOCYTES # BLD AUTO: 0.76 10*3/MM3 (ref 0.1–0.9)
MONOCYTES NFR BLD AUTO: 10.2 % (ref 5–12)
NEUTROPHILS NFR BLD AUTO: 5.12 10*3/MM3 (ref 1.7–7)
NEUTROPHILS NFR BLD AUTO: 68.5 % (ref 42.7–76)
NRBC BLD AUTO-RTO: 0 /100 WBC (ref 0–0.2)
PLATELET # BLD AUTO: 202 10*3/MM3 (ref 140–450)
PMV BLD AUTO: 11.5 FL (ref 6–12)
POTASSIUM SERPL-SCNC: 4.6 MMOL/L (ref 3.5–4.7)
PROT SERPL-MCNC: 5.6 G/DL (ref 6.3–8)
RBC # BLD AUTO: 4.43 10*6/MM3 (ref 4.14–5.8)
SODIUM SERPL-SCNC: 139 MMOL/L (ref 134–145)
WBC NRBC COR # BLD: 7.47 10*3/MM3 (ref 3.4–10.8)

## 2022-12-09 PROCEDURE — 85025 COMPLETE CBC W/AUTO DIFF WBC: CPT

## 2022-12-09 PROCEDURE — 25010000002 PEMBROLIZUMAB 100 MG/4ML SOLUTION 4 ML VIAL: Performed by: NURSE PRACTITIONER

## 2022-12-09 PROCEDURE — 99214 OFFICE O/P EST MOD 30 MIN: CPT | Performed by: NURSE PRACTITIONER

## 2022-12-09 PROCEDURE — 25010000002 HEPARIN LOCK FLUSH PER 10 UNITS: Performed by: INTERNAL MEDICINE

## 2022-12-09 PROCEDURE — 80053 COMPREHEN METABOLIC PANEL: CPT

## 2022-12-09 PROCEDURE — 82784 ASSAY IGA/IGD/IGG/IGM EACH: CPT | Performed by: INTERNAL MEDICINE

## 2022-12-09 PROCEDURE — 96413 CHEMO IV INFUSION 1 HR: CPT

## 2022-12-09 RX ORDER — SODIUM CHLORIDE 9 MG/ML
250 INJECTION, SOLUTION INTRAVENOUS ONCE
Status: CANCELLED | OUTPATIENT
Start: 2022-12-09

## 2022-12-09 RX ORDER — HEPARIN SODIUM (PORCINE) LOCK FLUSH IV SOLN 100 UNIT/ML 100 UNIT/ML
500 SOLUTION INTRAVENOUS AS NEEDED
Status: DISCONTINUED | OUTPATIENT
Start: 2022-12-09 | End: 2022-12-09 | Stop reason: HOSPADM

## 2022-12-09 RX ORDER — SODIUM CHLORIDE 0.9 % (FLUSH) 0.9 %
10 SYRINGE (ML) INJECTION AS NEEDED
Status: DISCONTINUED | OUTPATIENT
Start: 2022-12-09 | End: 2022-12-09 | Stop reason: HOSPADM

## 2022-12-09 RX ORDER — SODIUM CHLORIDE 9 MG/ML
250 INJECTION, SOLUTION INTRAVENOUS ONCE
Status: COMPLETED | OUTPATIENT
Start: 2022-12-09 | End: 2022-12-09

## 2022-12-09 RX ORDER — HEPARIN SODIUM (PORCINE) LOCK FLUSH IV SOLN 100 UNIT/ML 100 UNIT/ML
500 SOLUTION INTRAVENOUS AS NEEDED
Status: CANCELLED | OUTPATIENT
Start: 2022-12-09

## 2022-12-09 RX ORDER — SODIUM CHLORIDE 0.9 % (FLUSH) 0.9 %
10 SYRINGE (ML) INJECTION AS NEEDED
Status: CANCELLED | OUTPATIENT
Start: 2022-12-09

## 2022-12-09 RX ADMIN — SODIUM CHLORIDE 200 MG: 9 INJECTION, SOLUTION INTRAVENOUS at 11:35

## 2022-12-09 RX ADMIN — Medication 10 ML: at 12:04

## 2022-12-09 RX ADMIN — Medication 500 UNITS: at 12:04

## 2022-12-09 RX ADMIN — SODIUM CHLORIDE 250 ML: 9 INJECTION, SOLUTION INTRAVENOUS at 11:18

## 2022-12-16 ENCOUNTER — INFUSION (OUTPATIENT)
Dept: ONCOLOGY | Facility: HOSPITAL | Age: 67
End: 2022-12-16

## 2022-12-16 VITALS
WEIGHT: 169.4 LBS | RESPIRATION RATE: 18 BRPM | TEMPERATURE: 97.8 F | DIASTOLIC BLOOD PRESSURE: 80 MMHG | BODY MASS INDEX: 23.64 KG/M2 | OXYGEN SATURATION: 97 % | SYSTOLIC BLOOD PRESSURE: 136 MMHG | HEART RATE: 72 BPM

## 2022-12-16 DIAGNOSIS — D80.1 HYPOGAMMAGLOBULINEMIA: Primary | ICD-10-CM

## 2022-12-16 DIAGNOSIS — Z45.2 FITTING AND ADJUSTMENT OF VASCULAR CATHETER: ICD-10-CM

## 2022-12-16 DIAGNOSIS — C91.10 CLL (CHRONIC LYMPHOCYTIC LEUKEMIA): ICD-10-CM

## 2022-12-16 LAB
BASOPHILS # BLD AUTO: 0.03 10*3/MM3 (ref 0–0.2)
BASOPHILS NFR BLD AUTO: 0.4 % (ref 0–1.5)
DEPRECATED RDW RBC AUTO: 46.5 FL (ref 37–54)
EOSINOPHIL # BLD AUTO: 0.63 10*3/MM3 (ref 0–0.4)
EOSINOPHIL NFR BLD AUTO: 7.8 % (ref 0.3–6.2)
ERYTHROCYTE [DISTWIDTH] IN BLOOD BY AUTOMATED COUNT: 14 % (ref 12.3–15.4)
HCT VFR BLD AUTO: 39.8 % (ref 37.5–51)
HGB BLD-MCNC: 12.3 G/DL (ref 13–17.7)
IMM GRANULOCYTES # BLD AUTO: 0.02 10*3/MM3 (ref 0–0.05)
IMM GRANULOCYTES NFR BLD AUTO: 0.2 % (ref 0–0.5)
LYMPHOCYTES # BLD AUTO: 0.89 10*3/MM3 (ref 0.7–3.1)
LYMPHOCYTES NFR BLD AUTO: 11.1 % (ref 19.6–45.3)
MCH RBC QN AUTO: 28 PG (ref 26.6–33)
MCHC RBC AUTO-ENTMCNC: 30.9 G/DL (ref 31.5–35.7)
MCV RBC AUTO: 90.7 FL (ref 79–97)
MONOCYTES # BLD AUTO: 1.02 10*3/MM3 (ref 0.1–0.9)
MONOCYTES NFR BLD AUTO: 12.7 % (ref 5–12)
NEUTROPHILS NFR BLD AUTO: 5.46 10*3/MM3 (ref 1.7–7)
NEUTROPHILS NFR BLD AUTO: 67.8 % (ref 42.7–76)
NRBC BLD AUTO-RTO: 0 /100 WBC (ref 0–0.2)
PLATELET # BLD AUTO: 204 10*3/MM3 (ref 140–450)
PMV BLD AUTO: 12.5 FL (ref 6–12)
RBC # BLD AUTO: 4.39 10*6/MM3 (ref 4.14–5.8)
WBC NRBC COR # BLD: 8.05 10*3/MM3 (ref 3.4–10.8)

## 2022-12-16 PROCEDURE — 96365 THER/PROPH/DIAG IV INF INIT: CPT

## 2022-12-16 PROCEDURE — 25010000002 HEPARIN LOCK FLUSH PER 10 UNITS: Performed by: INTERNAL MEDICINE

## 2022-12-16 PROCEDURE — 96366 THER/PROPH/DIAG IV INF ADDON: CPT

## 2022-12-16 PROCEDURE — 85025 COMPLETE CBC W/AUTO DIFF WBC: CPT | Performed by: INTERNAL MEDICINE

## 2022-12-16 PROCEDURE — 25010000002 IMMUNE GLOBULIN (HUMAN) 30 GM/300ML SOLUTION: Performed by: INTERNAL MEDICINE

## 2022-12-16 PROCEDURE — 63710000001 DIPHENHYDRAMINE PER 50 MG: Performed by: NURSE PRACTITIONER

## 2022-12-16 RX ORDER — HEPARIN SODIUM (PORCINE) LOCK FLUSH IV SOLN 100 UNIT/ML 100 UNIT/ML
500 SOLUTION INTRAVENOUS AS NEEDED
Status: DISCONTINUED | OUTPATIENT
Start: 2022-12-16 | End: 2022-12-16 | Stop reason: HOSPADM

## 2022-12-16 RX ORDER — SODIUM CHLORIDE 0.9 % (FLUSH) 0.9 %
10 SYRINGE (ML) INJECTION AS NEEDED
Status: DISCONTINUED | OUTPATIENT
Start: 2022-12-16 | End: 2022-12-16 | Stop reason: HOSPADM

## 2022-12-16 RX ORDER — DIPHENHYDRAMINE HCL 25 MG
25 CAPSULE ORAL ONCE
Status: COMPLETED | OUTPATIENT
Start: 2022-12-16 | End: 2022-12-16

## 2022-12-16 RX ORDER — HEPARIN SODIUM (PORCINE) LOCK FLUSH IV SOLN 100 UNIT/ML 100 UNIT/ML
500 SOLUTION INTRAVENOUS AS NEEDED
Status: CANCELLED | OUTPATIENT
Start: 2022-12-16

## 2022-12-16 RX ORDER — SODIUM CHLORIDE 0.9 % (FLUSH) 0.9 %
10 SYRINGE (ML) INJECTION AS NEEDED
Status: CANCELLED | OUTPATIENT
Start: 2022-12-16

## 2022-12-16 RX ORDER — SODIUM CHLORIDE 9 MG/ML
250 INJECTION, SOLUTION INTRAVENOUS ONCE
Status: COMPLETED | OUTPATIENT
Start: 2022-12-16 | End: 2022-12-16

## 2022-12-16 RX ORDER — ACETAMINOPHEN 325 MG/1
650 TABLET ORAL ONCE
Status: COMPLETED | OUTPATIENT
Start: 2022-12-16 | End: 2022-12-16

## 2022-12-16 RX ADMIN — SODIUM CHLORIDE 250 ML: 9 INJECTION, SOLUTION INTRAVENOUS at 08:29

## 2022-12-16 RX ADMIN — ACETAMINOPHEN 650 MG: 325 TABLET, FILM COATED ORAL at 08:29

## 2022-12-16 RX ADMIN — DIPHENHYDRAMINE HYDROCHLORIDE 25 MG: 25 CAPSULE ORAL at 08:29

## 2022-12-16 RX ADMIN — Medication 10 ML: at 11:39

## 2022-12-16 RX ADMIN — IMMUNE GLOBULIN INFUSION (HUMAN) 30 G: 100 INJECTION, SOLUTION INTRAVENOUS; SUBCUTANEOUS at 08:51

## 2022-12-16 RX ADMIN — Medication 500 UNITS: at 11:39

## 2022-12-29 NOTE — PROGRESS NOTES
Subjective Patient continues to improve      REASON FOR FOLLOW UP:  1.  Chronic lymphocytic leukemia with extensive lymphadenopathy and possible pleural involvement. Initiation of Treanda, Rituxan May 1, 2019.  2.  Hypogammaglobulinemia.  Status is post IVIG  3.  Significant response on scans June 27, 2019.  Treatment changed to Imbruvica 420 mg daily.  4.  Metastatic non-small cell lung carcinoma on Keytruda  5.  Patient seen 2/18/2022 with left jaw/gumline swelling pain, associated hematoma?  Osteonecrosis.  Restart of Keytruda 3/4/2022, Xgeva discontinued  6.  Restaging via CT 4/12/2022, continued response, Keytruda continued, referral to dermatology for worsening psoriasis.  7.  Jaw osteonecrosis, undergoing therapy through oral surgery, IVIG anticipated, infectious disease and oral surgery follow-up  8.  Patient assessed 11/18/2022, Keytruda continued, ibrutinib-dose reduced-continued, patient seen by  physicians, status postdebridement of bone of left mandible with bone biopsy, fistulectomy of left mandible 10/20/2022  9.  Patient seen 12/30/2022 continue to improve,?  Urinary tract symptoms      HISTORY OF PRESENT ILLNESS:    The patient is a 67 y.o. male with the above-mentioned history, who returns the office  in anticipation of his 35 th cycle of Keytruda.  He was last treated with IVIG 11/11/2022.       At this point he had undergone surgery at [facility] including fistulectomy of the left mandible and debridement of the intraoral bone left mandible.  He he had also been seen by infectious disease, Dr. Champion regarding osteonecrosis of the jaw.  He was placed on antibiotics for 6 weeks including Levaquin and fluconazole.          There is an interaction with his fluconazole and ibrutinib and adjustments will be necessary.  [facility] pharmacy is managing this.  He reports he is overall feeling very well.  He has had improvement in his ability to eat and drink since undergoing surgery on his jaw.  His  skin is overall improved with the use of Aquaphor.  He did undergo CT scans prior to cycle 33 Keytruda with stability of his disease.  He continues on current therapy with excellent tolerance.     Patient is next seen 11/18/2022.  Fortunately he feels that he is doing  reasonably well with surgery having been successful, pain virtually absent at this point, no additional shortness of breath or cough, appetite remaining fair, stable weight and sleep at least modestly managed.  Unfortunately his wife recently injured her femur and he has become her primary caregiver at this point.       The patient is next assessed 12/9/2022.  He is due for cycle #36 Keytruda and IVIG today.  He did not realize he was due for IVIG today, and already scheduled another appointment so would like to reschedule IVIG for next week.  He also continues on Imbruvica, currently at a reduced dose of 280 mg daily, due to interaction with Diflucan.  He was seen by Dr. Champion with infectious disease yesterday, 12/8/2022 and econazole was discontinued as patient has completed his 6-week course.  He was started on preventative Augmentin 500 mg twice daily until his gums grow to close over the exposed area of bone.  He is eating and drinking adequately, weight remains stable.  Bowels moving regularly.  Denies fever or chills.  Denies nausea or vomiting.  Denies new or worsening pain.  Denies skin rash or shortness of breath.  No new concerns today.    The patient continued Keytruda therapy including 12/9/2022 and IVIG 12/16/2022.  He is next seen 12/30/2022 for continued Keytruda.  We have discussed that he is clearly improving per his osteonecrosis and his completed his surgical therapy and now on prophylactic antibiotic therapy.  We have reviewed that he could discontinue his IVIG, continue his Imbruvica, Keytruda and prophylactic antibiotic therapy.  He is having urinary symptoms?  And a follow-up UA and culture is pending.        Past Medical  "History, Past Surgical History, Social History, Family History have been reviewed and are without significant changes except as mentioned.    Hematologic/oncologic history:    As concerns his CLL history he initially required Treanda Rituxan 5/1/2019 for 2 cycles and then initiated Imbruvica 420 mg daily initiated 7/25/2019.       As concerns stage IV non-small cell lung carcinoma he returns the office continuing Keytruda which he continues to receive every 3 weeks which was initiated 10/21/2020.  He also receives Xgeva every 6 weeks which was initially given 12/2/2020 and discontinued 1/7/2022 secondary to osteonecrosis.      Additional issues that developed during treatment included right knee pain with plain views of his right knee showing a right knee effusion with medial compartment narrowing and no suspicious bone lesion.  There is benign periosteal calcification along the distal right femur.  A PET/CT 11/11 went on to demonstrate a complete metabolic response to therapy compared to PET/CT from 9/23/2020.  This includes his primary lung neoplasm left upper lobe, large osseous metastatic lesion the right chest wall region from the eighth rib, small left adrenal metastasis, and no evidence of disease involving the distal left femur.      The patient was seen 11/24/2021 continue to do well though indicating that his right knee is \"something I can manage at the moment\".  He prefers not to have orthopedic assessment at this point.  He is concerned, ever, about skin lesions that are developing primarily on his calf regions and into his right thigh.    The patient was seen by Dr. Regan Damon 11/29/2021 and felt to be consistent with psoriasiform dermatitis treated with intralesional therapy.  Was also started on a moisturizer and lipid therapy.  The patient returned for assessment 12/15/2021 and treated with cycle #21 Keytruda, returned 1/7/2022 for cycle #22 and 1/28/2022 for cycle #23.  At that visit he had " developed adenopathy several weeks previous and was treated with a Medrol Dosepak.   The patient was next seen 2/18/2022 and indicates that though his adenopathy has improved after treatment described above that his jaw has become painful swollen and tender with additional bleeding.     The patient was referred to his primary dentist who then had him seen oral surgeon-Dr. Ezequiel Davila-reached at [phone] who felt that this was osteonecrosis and should be treated symptomatically.  As the patient reviewed 3/4/2022, wonderfully, his oral issues have nearly resolved with the gumline returning to essentially its previous state, no swelling, minimal erythema, no discharge and no additional pain.  He does have follow-up with oral surgery in the next several weeks and we discussed restarting his Keytruda scanning him likely in April 2022 in general.   He returns to the office on 3/25/2022 in follow-up in anticipation of cycle 24 Keytruda. He reports the left side of his jaw has improved.    However, he was now having issues with the right lower side. He was seen by neurosurgery with additional films and started on oral amoxicillin by his oral surgeon.      The patient continued his immunotherapy taking Keytruda 3/25/2022 and underwent repeat CT chest abdomen and pelvis 4/12/2022 that is reviewed with him 4/15/2022.  Wonderfully there is no substantial change with a small to moderate right pleural effusion that decreased in size, no additional pulmonary nodule or new metastatic disease, multifocal osteosclerotic metastatic disease without change and no new findings in the abdomen or pelvis.    The patient was seen 4/15/2022 indicating that he is actually feeling well in terms of general symptoms.  This includes lack of progression from mouth pain, ability to eat without discomfort.  He is, however, having worsening psoriasis particular in his lower extremities and has been reviewed by dermatology previously.    The  "patient was reviewed 6/17/2022 having been seen by oral surgery with progression of his osteonecrosis and skin eruption.  He is currently undergoing assessment by infectious disease within the next week and we have reviewed that previously he responded to IVIG for in-hospital refractory sinusitis.  As a result he is asked to undergo reassessment for his immunoglobulin levels today and return next week for 400 mg/kg of IVIG infusion which we will continue monthly.    The patient was given IVIG 6/24/2022 and again 7/22/2022.  Assessment 8/5/2022 continue to show a degree of general improvement with oral surgery continue to follow him with a second opinion to be obtained  8/8/2022 when IVIG planned 8/19/2022 and 9/16/2022.    The patient slowly continued both assessments by oral surgery and  Scans considering his non-small cell lung cancer including CT studies 10/4/2022 that were essentially stable compared to previous.  Therapies include Keytruda every 3 weeks, daily Imbruvica, monthly IVIG every 4 weeks.    Objective      Vitals:    12/30/22 0854   BP: 115/74   Pulse: 109   Resp: 18   Temp: 97.3 °F (36.3 °C)   TempSrc: Temporal   SpO2: 96%   Weight: 78.3 kg (172 lb 11.2 oz)   Height: 180.3 cm (70.98\")   PainSc: 0-No pain     Current Status 12/30/2022   ECOG score 0     Physical Exam  Vitals and nursing note reviewed.   Constitutional:       Appearance: Normal appearance. He is well-developed.   HENT:      Head: Normocephalic and atraumatic.      Nose: Nose normal.      Mouth/Throat:      Comments: Exposed bone left mandible region, reduced general erythema per lower gumline, no exudate currently    Eyes:      Pupils: Pupils are equal, round, and reactive to light.   Cardiovascular:      Rate and Rhythm: Normal rate and regular rhythm.      Heart sounds: Normal heart sounds.   Pulmonary:      Effort: Pulmonary effort is normal. No respiratory distress.      Breath sounds: Normal breath sounds. No wheezing, rhonchi or " rales.   Abdominal:      General: Bowel sounds are normal. There is no distension.      Palpations: Abdomen is soft.      Tenderness: There is no abdominal tenderness.   Musculoskeletal:         General: Normal range of motion.      Cervical back: Normal range of motion and neck supple.   Lymphadenopathy:      Cervical: Cervical adenopathy (No additional cervical, infraclavicular, supraclavicular or axillary adenopathy) present.   Skin:     General: Skin is warm and dry.      Comments: erythematous psoriasiform lesions noted on lower extremities bilaterally which are not changed from previous   Neurological:      Mental Status: He is alert and oriented to person, place, and time.   Psychiatric:         Behavior: Behavior normal.       I have reexamined the patient and the results are consistent with the previously documented exam. Jostin Parekh MD      RECENT LABS:  Results from last 7 days   Lab Units 12/30/22  0845   WBC 10*3/mm3 10.75   NEUTROS ABS 10*3/mm3 7.47*   HEMOGLOBIN g/dL 12.8*   HEMATOCRIT % 42.2   PLATELETS 10*3/mm3 256     Results from last 7 days   Lab Units 12/30/22  0845   SODIUM mmol/L 142   POTASSIUM mmol/L 4.3   CHLORIDE mmol/L 108*   CO2 mmol/L 24.3   BUN mg/dL 26*   CREATININE mg/dL 1.34*   CALCIUM mg/dL 8.4*   ALBUMIN g/dL 3.0*   BILIRUBIN mg/dL 0.3   ALK PHOS U/L 74   ALT (SGPT) U/L 10   AST (SGOT) U/L 13   GLUCOSE mg/dL 118         FISH prognostic panel-Positive for deletion of 13 q. 14.3    Assessment & Plan   1.  CLL presenting with significant lymphocytosis, lymphadenopathy and splenomegaly.     · Positive for deletion of 13 q. 14.3.    · Patient status post 2 cycles of Treanda Rituxan with excellent response.    · Imaging 6/27/2019 with significant decrease in adenopathy.  Treanda Rituxan discontinued   · Imbruvica 420 mg daily initiated 7/25/2019.  · He continues on Imbruvica, without indication of progression of his CLL, without progressive lymphadenopathy on CT scans.  · Patient  has resolving adenopathy particularly cervical regions 2/18/2022  · 3/25/2022 patient is without worsening adenopathy on exam today.  Continue Imbruvica.  · 10/28/2022 continues on Imbruvica 420 mg daily. Denies B-symptoms, no evidence of worsening adenopathy on exam.  CT scans without worsening adenopathy  · 11/18/2022: Imbruvica reduced to 280 mg daily while patient receiving 6-week course of antimicrobials due to interaction with fluconazole.  · 12/9/2022: He has finished his course of fluconazole, taking his last dose today.  He has a few days left of Imbruvica 280 mg, which he will complete.  He will then increase Imbruvica back to regular dose of 420 mg daily.  · Patient stable 12/30/2022 on full dose Imbruvica    2.  Pulmonary nodules/infiltrates.  He is  status post bronchoscopy.  Is unclear at this time whether these findings are infectious or possibly related to his lymphoproliferative disorder.  This is seen to be improving on latest scans.                                                                                                                   3.  Hypogammaglobulinemia-status post IVIG with resolution of sinusitis.   · Anticipate trial of IVIG with the patient now having progression of his osteonecrosis and dermal infection.  · Continues with monthly IVIG.  Due today, however did not realize he was due for IVIG and would like to reschedule for next week.  · Reviewed 12/30/2022 with IVIG discontinued    4.  Non-small cell lung carcinoma  · August 26 2020 revealing a new 1.6 cm spiculated nodule within the left upper lobe, 1.2 cm left adrenal nodule, bone windows with a soft tissue mass involving the right eighth rib measuring 3.7 x 2.6 cm.    · Biopsy was consistent with adenocarcinoma CK 7+, CK 20-, lung primary suspected clinically, molecular panel not yet obtained.  · PET/CT 9/23/2020 demonstrating asymmetric uptake within the right parotid gland which is unremarkable appearance on  noncontrasted CT, SUV of 6 compared to 2.7.  There is no hilar, mediastinal or axillary adenopathy, findings of asymmetric moderate to intense FDG uptake within superior aspect the right chest wall anterior to the right first rib with an irregular hypodense lesion measuring 1.8 x 1.6 and SUV 4.9.  This had not been seen June 27, 2019.  There is an intensely FDG avid nodule within the lingula measuring 1.9 x 1.5 history of 12.4, FDG avid left adrenal nodule 1.9 x 1.5 with an issue 21.2.    · Evaluated by radiation oncology therapy September 29 and started on radiation therapy to the right chest wall-35 Gy in 10 fractions.   ·  Plans were also then proceed with SBRT to the solitary left upper lobe lesion pending insurance approval.  · Further testing including TPS of 20% and foundation CDX with a TMB of greater than 10 mutations per megabase (28 mutations per megabase) and the indication that several immunotherapies could be useful in this patient's care.  Additional genomic findings include BRAF G469R/that trametinib could be useful and STK11/that everolimus could be useful.  · Keytruda initiated 10/21/2021   · Reassessment after 2 cycles of Keytruda with enlargement of the right eighth rib lesion,enlargement of spiculated left upper lobe lung mass, moderate to large right-sided pleural effusion, new left adrenal mass and enlarged retrocrural lymph node.?Pseudoprogression  · Xgeva last given 12/30/2020  · Follow-up scans 1/6/2021 demonstrated marked improvement in his right eighth rib lesion, resolution of left upper lobe spiculated mass, residual large right pleural effusion, resolution of left adrenal metastasis.   · Right pleural effusion, with thoracentesis 1/14/2021 with 1500 mils removed.  Pathology consistent with malignant effusion   · CT scans 4/5/2021 with response noted in the left upper lobe density.  · He continues to receive Keytruda every 3 weeks along with Xgeva every 6 weeks.  · Repeat scan 7/15/2021  without evidence of recurrence or progressive disease.  Plans to continue Keytruda every 3 weeks with repeat scans in 4 to 6 months  · Patient status post PET/CT 11/11/2021 with hyper response, approximate remission status, plan to proceed with cycle #20 Keytruda  · Patient seen 2/18/2022 with Keytruda held while his oral issues are addressed-concern for osteonecrosis of the jaw.  · Patiently diagnosed with osteonecrosis of the jaw, seen by oral surgery, started on antibiotic therapy and Peridex with substantial improvement, Xgeva discontinued as discussed below.  · 3/25/2022 proceed with cycle #24 Keytruda today.  Follow-up CT scans 4/12/2022 without evidence of progressive disease, stable findings including osteosclerotic metastatic disease.  Keytruda continued  · Repeat imaging 10/4/2022 with overall stable disease, Keytruda continued  · 12/9/2022: Proceed with cycle #36 Keytruda today.  Plan repeat imaging April 2023.    5.  Left knee metastasis  · Evaluated by orthopedic oncology, Dr. Eliu Ochoa  · Admission 1/7/2021 undergoing stabilization of left femoral bone lesion, prophylactic stabilization with intramedullary implants.  · It was suggested we delay Xgeva or Zometa to allow bone healing  · Completed radiation with 10 fractions 2/10/2021  · Xgeva resumed 4/29/2021, he continues to receive this every 6 weeks.    · Additional assessment 2/18/2022 with left jaw pain, subsequent diagnosis of osteoporosis, Xgeva discontinued  · Patient continues follow-up with oral surgery, progression of osteoporosis symptomatically, dermal eruption left side of mandibular region, IVIG anticipated  · Xgeva DISCONTINUED due to osteonecrosis of the jaw  · The patient's pain per his knee is not relapsed.     6.  Malignant right pleural effusion  · Ultrasound thoracentesis 1/14/2021 1500 mL removed  · Chest x-ray 2/25/2021 with moderate right pleural effusion  · Progressive symptoms with worsening pain  4/5/2021  · Ultrasound-guided thoracentesis 4/13/2021 with 2050 ml removed.  · Plans for Pleurx catheter with thoracic surgery, however, pleural effusion has not recurred.  Patient seen 11/4/2021 with chest x-ray planned.  Subsequent resolution noted on CT scan.  · Improvement in bilateral pleural effusions on most recent CT scan    7.  Cancer related pain  · Pain is most prominent in his right rib, utilizing MS Contin 30 mg 3 times a day  · Hydrocodone/acetaminophen 10/325 as needed for breakthrough pain.  Currently taking 3 to 4 tablets daily  · He is not currently in need of a refill of pain medications  · Discussed MS Contin at 30 mg p.o. every 8 hours, Norco 10/325 2 p.o. every 6 hours  · He is currently using pain medication as needed for jaw pain.  Requiring approximately 1 MS Contin and 1 Norco 10/325 daily, however some days not requiring any pain medication.  · Reports his pain is well controlled, not currently requiring pain medication.    8.  Insomnia  · Continuing to follow with behavioral oncology  · Continuing Remeron 7.5 mg nightly with fair control.  The patient  when assessed 12/30/2022.    9. Health maintenance  · Status post COVID-19 booster, SARS antibody pending today  · The patient is due for Shingrix and Prevnar which will both be administered in the clinic 9/23/2021    10.  Skin lesions  · Uncertain of etiology, unexpected findings for usual skin lesions associated with immunotherapy  · Request dermatologic assessment-psoriasiform dermatitis.  Seen by Dr. Damon though no follow-up due to personal preference  · The lesions on the lower extremities particularly are quite extensive.  At present he feels that they are stable enough not to require additional therapy.    11.  Right knee pain  · Osteoarthritis, orthopedic assessment upon patient's request.   · Resolved.  Denies any knee pain today     12.  Hypocalcemia  · Stabilized     13.  Osteonecrosis of the jaw  · Xgeva was  discontinued.  · Patient went to second opinion with  with oral surgery.  He was reffered to Dr. Escalante with , with whom he had consult on 8/23/2022.  Dr. Escalante recommened an outpatient procedure that would allow him to biopsy the affected area of his jawbone and identify which bacteria was present, they would then coordinate with ID to identify best antibiotic option.  Patient is agreeable to proceeding with this plan, and is awaiting surgery date.  · The patient underwent surgery 10/7/2022  · Follow-up with Dr. Champion, infectious disease 10/27/2022 with recommendations for 6 weeks of Levaquin and fluconazole.  This should complete by approximately mid December 2023.  · Was seen by Dr. Champion 12/8/2022 with plans to complete fluconazole.  Decision made to start Augmentin 500 mg twice daily for prevention.    Plan:   1. Proceed today with cycle #37 Keytruda  2. At this point is continue IVIG  3. Continue Imbruvica at 420 mg p.o. daily  4. Continue follow-up with Dr. Champion, neck scheduled in 6 months.  5. Continue Augmentin 500 mg twice daily for prevention per ID.  6. UA, C&S today, potential additional therapy-consider empiric ciprofloxacin  7. Patient states currently does not he have to use any of narcotic nor anti-inflammatory medications.   8. Continue Remeron 7.5 mg nightly.   9. Continue Aquaphor and over-the-counter hydrocortisone  .  10. Return in 3 weeks for MD follow-up and Keytruda.  11. Scans would not be necessary until likely April 2023.    Patient continues on high risk medication requiring close monitoring for toxicity.    I spent 45 minutes caring for Dav on this date of service. This time includes time spent by me in the following activities: preparing for the visit, reviewing tests, obtaining and/or reviewing a separately obtained history, performing a medically appropriate examination and/or evaluation, counseling and educating the patient/family/caregiver, ordering medications,  tests, or procedures, referring and communicating with other health care professionals, documenting information in the medical record, independently interpreting results and communicating that information with the patient/family/caregiver and care coordination.       Jostin Parekh MD   12/30/2022

## 2022-12-30 ENCOUNTER — INFUSION (OUTPATIENT)
Dept: ONCOLOGY | Facility: HOSPITAL | Age: 67
End: 2022-12-30

## 2022-12-30 ENCOUNTER — OFFICE VISIT (OUTPATIENT)
Dept: ONCOLOGY | Facility: CLINIC | Age: 67
End: 2022-12-30

## 2022-12-30 VITALS
RESPIRATION RATE: 18 BRPM | OXYGEN SATURATION: 96 % | WEIGHT: 172.7 LBS | TEMPERATURE: 97.3 F | SYSTOLIC BLOOD PRESSURE: 115 MMHG | BODY MASS INDEX: 24.18 KG/M2 | HEART RATE: 109 BPM | DIASTOLIC BLOOD PRESSURE: 74 MMHG | HEIGHT: 71 IN

## 2022-12-30 DIAGNOSIS — Z79.899 HIGH RISK MEDICATION USE: ICD-10-CM

## 2022-12-30 DIAGNOSIS — C34.90 LUNG CANCER METASTATIC TO BONE: ICD-10-CM

## 2022-12-30 DIAGNOSIS — C91.10 CLL (CHRONIC LYMPHOCYTIC LEUKEMIA): ICD-10-CM

## 2022-12-30 DIAGNOSIS — C79.51 LUNG CANCER METASTATIC TO BONE: ICD-10-CM

## 2022-12-30 DIAGNOSIS — Z79.899 HIGH RISK MEDICATION USE: Primary | ICD-10-CM

## 2022-12-30 DIAGNOSIS — C91.10 CLL (CHRONIC LYMPHOCYTIC LEUKEMIA): Primary | ICD-10-CM

## 2022-12-30 DIAGNOSIS — C34.12 MALIGNANT NEOPLASM OF UPPER LOBE OF LEFT LUNG: ICD-10-CM

## 2022-12-30 LAB
ALBUMIN SERPL-MCNC: 3 G/DL (ref 3.5–5.2)
ALBUMIN/GLOB SERPL: 1.4 G/DL (ref 1.1–2.4)
ALP SERPL-CCNC: 74 U/L (ref 38–116)
ALT SERPL W P-5'-P-CCNC: 10 U/L (ref 0–41)
ANION GAP SERPL CALCULATED.3IONS-SCNC: 9.7 MMOL/L (ref 5–15)
AST SERPL-CCNC: 13 U/L (ref 0–40)
BACTERIA UR QL AUTO: ABNORMAL /HPF
BASOPHILS # BLD AUTO: 0.06 10*3/MM3 (ref 0–0.2)
BASOPHILS NFR BLD AUTO: 0.6 % (ref 0–1.5)
BILIRUB SERPL-MCNC: 0.3 MG/DL (ref 0.2–1.2)
BILIRUB UR QL STRIP: NEGATIVE
BUN SERPL-MCNC: 26 MG/DL (ref 6–20)
BUN/CREAT SERPL: 19.4 (ref 7.3–30)
CALCIUM SPEC-SCNC: 8.4 MG/DL (ref 8.5–10.2)
CHLORIDE SERPL-SCNC: 108 MMOL/L (ref 98–107)
CLARITY UR: CLEAR
CO2 SERPL-SCNC: 24.3 MMOL/L (ref 22–29)
COLOR UR: YELLOW
CREAT SERPL-MCNC: 1.34 MG/DL (ref 0.7–1.3)
DEPRECATED RDW RBC AUTO: 47.7 FL (ref 37–54)
EGFRCR SERPLBLD CKD-EPI 2021: 57.7 ML/MIN/1.73
EOSINOPHIL # BLD AUTO: 1.18 10*3/MM3 (ref 0–0.4)
EOSINOPHIL NFR BLD AUTO: 11 % (ref 0.3–6.2)
ERYTHROCYTE [DISTWIDTH] IN BLOOD BY AUTOMATED COUNT: 14.3 % (ref 12.3–15.4)
GLOBULIN UR ELPH-MCNC: 2.1 GM/DL (ref 1.8–3.5)
GLUCOSE SERPL-MCNC: 118 MG/DL (ref 74–124)
GLUCOSE UR STRIP-MCNC: NEGATIVE MG/DL
HCT VFR BLD AUTO: 42.2 % (ref 37.5–51)
HGB BLD-MCNC: 12.8 G/DL (ref 13–17.7)
HGB UR QL STRIP.AUTO: ABNORMAL
HYALINE CASTS UR QL AUTO: ABNORMAL /LPF
IMM GRANULOCYTES # BLD AUTO: 0.03 10*3/MM3 (ref 0–0.05)
IMM GRANULOCYTES NFR BLD AUTO: 0.3 % (ref 0–0.5)
KETONES UR QL STRIP: NEGATIVE
LEUKOCYTE ESTERASE UR QL STRIP.AUTO: ABNORMAL
LYMPHOCYTES # BLD AUTO: 1.07 10*3/MM3 (ref 0.7–3.1)
LYMPHOCYTES NFR BLD AUTO: 10 % (ref 19.6–45.3)
MCH RBC QN AUTO: 27.8 PG (ref 26.6–33)
MCHC RBC AUTO-ENTMCNC: 30.3 G/DL (ref 31.5–35.7)
MCV RBC AUTO: 91.5 FL (ref 79–97)
MONOCYTES # BLD AUTO: 0.94 10*3/MM3 (ref 0.1–0.9)
MONOCYTES NFR BLD AUTO: 8.7 % (ref 5–12)
NEUTROPHILS NFR BLD AUTO: 69.4 % (ref 42.7–76)
NEUTROPHILS NFR BLD AUTO: 7.47 10*3/MM3 (ref 1.7–7)
NITRITE UR QL STRIP: NEGATIVE
NRBC BLD AUTO-RTO: 0 /100 WBC (ref 0–0.2)
PH UR STRIP.AUTO: 5.5 [PH] (ref 4.5–8)
PLATELET # BLD AUTO: 256 10*3/MM3 (ref 140–450)
PMV BLD AUTO: 11.7 FL (ref 6–12)
POTASSIUM SERPL-SCNC: 4.3 MMOL/L (ref 3.5–4.7)
PROT SERPL-MCNC: 5.1 G/DL (ref 6.3–8)
PROT UR QL STRIP: ABNORMAL
RBC # BLD AUTO: 4.61 10*6/MM3 (ref 4.14–5.8)
RBC # UR STRIP: ABNORMAL /HPF
REF LAB TEST METHOD: ABNORMAL
SODIUM SERPL-SCNC: 142 MMOL/L (ref 134–145)
SP GR UR STRIP: 1.02 (ref 1–1.03)
SQUAMOUS #/AREA URNS HPF: ABNORMAL /HPF
T4 FREE SERPL-MCNC: 1.26 NG/DL (ref 0.93–1.7)
TSH SERPL DL<=0.05 MIU/L-ACNC: 2.98 UIU/ML (ref 0.27–4.2)
UROBILINOGEN UR QL STRIP: ABNORMAL
WBC # UR STRIP: ABNORMAL /HPF
WBC NRBC COR # BLD: 10.75 10*3/MM3 (ref 3.4–10.8)

## 2022-12-30 PROCEDURE — 85025 COMPLETE CBC W/AUTO DIFF WBC: CPT

## 2022-12-30 PROCEDURE — 84439 ASSAY OF FREE THYROXINE: CPT | Performed by: INTERNAL MEDICINE

## 2022-12-30 PROCEDURE — 99215 OFFICE O/P EST HI 40 MIN: CPT | Performed by: INTERNAL MEDICINE

## 2022-12-30 PROCEDURE — 96413 CHEMO IV INFUSION 1 HR: CPT

## 2022-12-30 PROCEDURE — 25010000002 PEMBROLIZUMAB 100 MG/4ML SOLUTION 4 ML VIAL: Performed by: INTERNAL MEDICINE

## 2022-12-30 PROCEDURE — 81001 URINALYSIS AUTO W/SCOPE: CPT | Performed by: INTERNAL MEDICINE

## 2022-12-30 PROCEDURE — 80053 COMPREHEN METABOLIC PANEL: CPT

## 2022-12-30 PROCEDURE — 87086 URINE CULTURE/COLONY COUNT: CPT | Performed by: INTERNAL MEDICINE

## 2022-12-30 PROCEDURE — 84443 ASSAY THYROID STIM HORMONE: CPT | Performed by: INTERNAL MEDICINE

## 2022-12-30 RX ORDER — SODIUM CHLORIDE 9 MG/ML
250 INJECTION, SOLUTION INTRAVENOUS ONCE
Status: COMPLETED | OUTPATIENT
Start: 2022-12-30 | End: 2022-12-30

## 2022-12-30 RX ORDER — SODIUM CHLORIDE 9 MG/ML
250 INJECTION, SOLUTION INTRAVENOUS ONCE
Status: CANCELLED | OUTPATIENT
Start: 2022-12-30

## 2022-12-30 RX ORDER — LEVOFLOXACIN 500 MG/1
500 TABLET, FILM COATED ORAL DAILY
Qty: 7 TABLET | Refills: 0 | Status: SHIPPED | OUTPATIENT
Start: 2022-12-30 | End: 2023-01-06

## 2022-12-30 RX ADMIN — SODIUM CHLORIDE 200 MG: 9 INJECTION, SOLUTION INTRAVENOUS at 10:22

## 2022-12-30 RX ADMIN — SODIUM CHLORIDE 250 ML: 9 INJECTION, SOLUTION INTRAVENOUS at 10:21

## 2022-12-30 NOTE — NURSING NOTE
Per Dr. Parekh pt is to start levaquin 500 mg daily x5 days. Attempted to call pt to make aware, left message with pt's wife.

## 2022-12-31 LAB — BACTERIA SPEC AEROBE CULT: NO GROWTH

## 2023-01-06 RX ORDER — LEVOFLOXACIN 500 MG/1
500 TABLET, FILM COATED ORAL DAILY
Qty: 7 TABLET | Refills: 0 | OUTPATIENT
Start: 2023-01-06 | End: 2023-01-13

## 2023-01-09 RX ORDER — LEVOFLOXACIN 500 MG/1
500 TABLET, FILM COATED ORAL DAILY
Qty: 7 TABLET | Refills: 0 | OUTPATIENT
Start: 2023-01-09 | End: 2023-01-16

## 2023-01-20 ENCOUNTER — INFUSION (OUTPATIENT)
Dept: ONCOLOGY | Facility: HOSPITAL | Age: 68
End: 2023-01-20
Payer: MEDICARE

## 2023-01-20 ENCOUNTER — OFFICE VISIT (OUTPATIENT)
Dept: ONCOLOGY | Facility: CLINIC | Age: 68
End: 2023-01-20
Payer: MEDICARE

## 2023-01-20 VITALS
HEIGHT: 71 IN | WEIGHT: 174 LBS | TEMPERATURE: 97.8 F | HEART RATE: 93 BPM | BODY MASS INDEX: 24.36 KG/M2 | OXYGEN SATURATION: 95 % | RESPIRATION RATE: 18 BRPM | SYSTOLIC BLOOD PRESSURE: 130 MMHG | DIASTOLIC BLOOD PRESSURE: 77 MMHG

## 2023-01-20 DIAGNOSIS — C34.12 MALIGNANT NEOPLASM OF UPPER LOBE OF LEFT LUNG: ICD-10-CM

## 2023-01-20 DIAGNOSIS — C34.90 LUNG CANCER METASTATIC TO BONE: ICD-10-CM

## 2023-01-20 DIAGNOSIS — C91.10 CLL (CHRONIC LYMPHOCYTIC LEUKEMIA): ICD-10-CM

## 2023-01-20 DIAGNOSIS — D80.1 HYPOGAMMAGLOBULINEMIA: ICD-10-CM

## 2023-01-20 DIAGNOSIS — C79.51 LUNG CANCER METASTATIC TO BONE: ICD-10-CM

## 2023-01-20 DIAGNOSIS — Z79.899 HIGH RISK MEDICATION USE: ICD-10-CM

## 2023-01-20 DIAGNOSIS — Z79.899 HIGH RISK MEDICATION USE: Primary | ICD-10-CM

## 2023-01-20 DIAGNOSIS — C91.10 CLL (CHRONIC LYMPHOCYTIC LEUKEMIA): Primary | ICD-10-CM

## 2023-01-20 DIAGNOSIS — Z45.2 FITTING AND ADJUSTMENT OF VASCULAR CATHETER: ICD-10-CM

## 2023-01-20 DIAGNOSIS — R30.0 DYSURIA: ICD-10-CM

## 2023-01-20 DIAGNOSIS — R30.0 DYSURIA: Primary | ICD-10-CM

## 2023-01-20 LAB
ALBUMIN SERPL-MCNC: 2.8 G/DL (ref 3.5–5.2)
ALBUMIN/GLOB SERPL: 1.6 G/DL (ref 1.1–2.4)
ALP SERPL-CCNC: 71 U/L (ref 38–116)
ALT SERPL W P-5'-P-CCNC: 11 U/L (ref 0–41)
ANION GAP SERPL CALCULATED.3IONS-SCNC: 7.8 MMOL/L (ref 5–15)
AST SERPL-CCNC: 10 U/L (ref 0–40)
BACTERIA UR QL AUTO: ABNORMAL /HPF
BASOPHILS # BLD AUTO: 0.05 10*3/MM3 (ref 0–0.2)
BASOPHILS NFR BLD AUTO: 0.5 % (ref 0–1.5)
BILIRUB SERPL-MCNC: 0.2 MG/DL (ref 0.2–1.2)
BILIRUB UR QL STRIP: NEGATIVE
BUN SERPL-MCNC: 22 MG/DL (ref 6–20)
BUN/CREAT SERPL: 17.5 (ref 7.3–30)
CALCIUM SPEC-SCNC: 7.9 MG/DL (ref 8.5–10.2)
CHLORIDE SERPL-SCNC: 111 MMOL/L (ref 98–107)
CLARITY UR: ABNORMAL
CO2 SERPL-SCNC: 25.2 MMOL/L (ref 22–29)
COLOR UR: YELLOW
CREAT SERPL-MCNC: 1.26 MG/DL (ref 0.7–1.3)
DEPRECATED RDW RBC AUTO: 47.3 FL (ref 37–54)
EGFRCR SERPLBLD CKD-EPI 2021: 62.1 ML/MIN/1.73
EOSINOPHIL # BLD AUTO: 0.92 10*3/MM3 (ref 0–0.4)
EOSINOPHIL NFR BLD AUTO: 9.3 % (ref 0.3–6.2)
ERYTHROCYTE [DISTWIDTH] IN BLOOD BY AUTOMATED COUNT: 14.2 % (ref 12.3–15.4)
GLOBULIN UR ELPH-MCNC: 1.7 GM/DL (ref 1.8–3.5)
GLUCOSE SERPL-MCNC: 100 MG/DL (ref 74–124)
GLUCOSE UR STRIP-MCNC: NEGATIVE MG/DL
HCT VFR BLD AUTO: 39.2 % (ref 37.5–51)
HGB BLD-MCNC: 12 G/DL (ref 13–17.7)
HGB UR QL STRIP.AUTO: ABNORMAL
HYALINE CASTS UR QL AUTO: ABNORMAL /LPF
IMM GRANULOCYTES # BLD AUTO: 0.04 10*3/MM3 (ref 0–0.05)
IMM GRANULOCYTES NFR BLD AUTO: 0.4 % (ref 0–0.5)
KETONES UR QL STRIP: NEGATIVE
LEUKOCYTE ESTERASE UR QL STRIP.AUTO: ABNORMAL
LYMPHOCYTES # BLD AUTO: 0.86 10*3/MM3 (ref 0.7–3.1)
LYMPHOCYTES NFR BLD AUTO: 8.7 % (ref 19.6–45.3)
MCH RBC QN AUTO: 28 PG (ref 26.6–33)
MCHC RBC AUTO-ENTMCNC: 30.6 G/DL (ref 31.5–35.7)
MCV RBC AUTO: 91.4 FL (ref 79–97)
MONOCYTES # BLD AUTO: 1.11 10*3/MM3 (ref 0.1–0.9)
MONOCYTES NFR BLD AUTO: 11.2 % (ref 5–12)
NEUTROPHILS NFR BLD AUTO: 6.96 10*3/MM3 (ref 1.7–7)
NEUTROPHILS NFR BLD AUTO: 69.9 % (ref 42.7–76)
NITRITE UR QL STRIP: NEGATIVE
NRBC BLD AUTO-RTO: 0 /100 WBC (ref 0–0.2)
PH UR STRIP.AUTO: 6 [PH] (ref 4.5–8)
PLATELET # BLD AUTO: 224 10*3/MM3 (ref 140–450)
PMV BLD AUTO: 11.9 FL (ref 6–12)
POTASSIUM SERPL-SCNC: 4.6 MMOL/L (ref 3.5–4.7)
PROT SERPL-MCNC: 4.5 G/DL (ref 6.3–8)
PROT UR QL STRIP: ABNORMAL
RBC # BLD AUTO: 4.29 10*6/MM3 (ref 4.14–5.8)
RBC # UR STRIP: ABNORMAL /HPF
REF LAB TEST METHOD: ABNORMAL
SODIUM SERPL-SCNC: 144 MMOL/L (ref 134–145)
SP GR UR STRIP: 1.02 (ref 1–1.03)
SQUAMOUS #/AREA URNS HPF: ABNORMAL /HPF
UROBILINOGEN UR QL STRIP: ABNORMAL
WBC # UR STRIP: ABNORMAL /HPF
WBC NRBC COR # BLD: 9.94 10*3/MM3 (ref 3.4–10.8)

## 2023-01-20 PROCEDURE — 96413 CHEMO IV INFUSION 1 HR: CPT

## 2023-01-20 PROCEDURE — 99214 OFFICE O/P EST MOD 30 MIN: CPT | Performed by: NURSE PRACTITIONER

## 2023-01-20 PROCEDURE — 81001 URINALYSIS AUTO W/SCOPE: CPT | Performed by: NURSE PRACTITIONER

## 2023-01-20 PROCEDURE — 87086 URINE CULTURE/COLONY COUNT: CPT | Performed by: NURSE PRACTITIONER

## 2023-01-20 PROCEDURE — 25010000002 PEMBROLIZUMAB 100 MG/4ML SOLUTION 4 ML VIAL: Performed by: NURSE PRACTITIONER

## 2023-01-20 PROCEDURE — 80053 COMPREHEN METABOLIC PANEL: CPT

## 2023-01-20 PROCEDURE — 85025 COMPLETE CBC W/AUTO DIFF WBC: CPT

## 2023-01-20 PROCEDURE — 25010000002 HEPARIN LOCK FLUSH PER 10 UNITS: Performed by: INTERNAL MEDICINE

## 2023-01-20 RX ORDER — HEPARIN SODIUM (PORCINE) LOCK FLUSH IV SOLN 100 UNIT/ML 100 UNIT/ML
500 SOLUTION INTRAVENOUS AS NEEDED
Status: CANCELLED | OUTPATIENT
Start: 2023-01-20

## 2023-01-20 RX ORDER — HEPARIN SODIUM (PORCINE) LOCK FLUSH IV SOLN 100 UNIT/ML 100 UNIT/ML
500 SOLUTION INTRAVENOUS AS NEEDED
Status: DISCONTINUED | OUTPATIENT
Start: 2023-01-20 | End: 2023-01-20 | Stop reason: HOSPADM

## 2023-01-20 RX ORDER — SODIUM CHLORIDE 9 MG/ML
250 INJECTION, SOLUTION INTRAVENOUS ONCE
Status: CANCELLED | OUTPATIENT
Start: 2023-01-20

## 2023-01-20 RX ORDER — MIRTAZAPINE 15 MG/1
7.5 TABLET, FILM COATED ORAL NIGHTLY
Qty: 90 TABLET | Refills: 0 | Status: SHIPPED | OUTPATIENT
Start: 2023-01-20

## 2023-01-20 RX ORDER — SODIUM CHLORIDE 0.9 % (FLUSH) 0.9 %
10 SYRINGE (ML) INJECTION AS NEEDED
Status: DISCONTINUED | OUTPATIENT
Start: 2023-01-20 | End: 2023-01-20 | Stop reason: HOSPADM

## 2023-01-20 RX ORDER — SODIUM CHLORIDE 0.9 % (FLUSH) 0.9 %
10 SYRINGE (ML) INJECTION AS NEEDED
Status: CANCELLED | OUTPATIENT
Start: 2023-01-20

## 2023-01-20 RX ORDER — SODIUM CHLORIDE 9 MG/ML
250 INJECTION, SOLUTION INTRAVENOUS ONCE
Status: COMPLETED | OUTPATIENT
Start: 2023-01-20 | End: 2023-01-20

## 2023-01-20 RX ADMIN — SODIUM CHLORIDE 200 MG: 9 INJECTION, SOLUTION INTRAVENOUS at 09:57

## 2023-01-20 RX ADMIN — SODIUM CHLORIDE 250 ML: 9 INJECTION, SOLUTION INTRAVENOUS at 09:57

## 2023-01-20 RX ADMIN — Medication 10 ML: at 10:29

## 2023-01-20 RX ADMIN — Medication 500 UNITS: at 10:29

## 2023-01-20 NOTE — NURSING NOTE
CMP results reviewed. Calcium 7.9. Per Naomi VALADEZ, pt instructed to take 2 Tums daily. Pt v/u.     Lab Results   Component Value Date    GLUCOSE 100 01/20/2023    BUN 22 (H) 01/20/2023    CREATININE 1.26 01/20/2023    EGFRIFNONA 50 (L) 02/18/2022    BCR 17.5 01/20/2023    K 4.6 01/20/2023    CO2 25.2 01/20/2023    CALCIUM 7.9 (L) 01/20/2023    PROTENTOTREF 5.5 (L) 06/17/2022    ALBUMIN 2.8 (L) 01/20/2023    LABIL2 1.3 06/17/2022    AST 10 01/20/2023    ALT 11 01/20/2023

## 2023-01-20 NOTE — PROGRESS NOTES
Subjective       REASON FOR FOLLOW UP:  1.  Chronic lymphocytic leukemia with extensive lymphadenopathy and possible pleural involvement. Initiation of Treanda, Rituxan May 1, 2019.  2.  Hypogammaglobulinemia.  Status is post IVIG  3.  Significant response on scans June 27, 2019.  Treatment changed to Imbruvica 420 mg daily.  4.  Metastatic non-small cell lung carcinoma on Keytruda  5.  Patient seen 2/18/2022 with left jaw/gumline swelling pain, associated hematoma?  Osteonecrosis.  Restart of Keytruda 3/4/2022, Xgeva discontinued  6.  Restaging via CT 4/12/2022, continued response, Keytruda continued, referral to dermatology for worsening psoriasis.  7.  Jaw osteonecrosis, undergoing therapy through oral surgery, IVIG anticipated, infectious disease and oral surgery follow-up  8.  Patient assessed 11/18/2022, Keytruda continued, ibrutinib-dose reduced-continued, patient seen by  physicians, status postdebridement of bone of left mandible with bone biopsy, fistulectomy of left mandible 10/20/2022  9.  Patient seen 12/30/2022 continue to improve,?  Urinary tract symptoms      HISTORY OF PRESENT ILLNESS:    The patient is a 67 y.o. male with the above mentioned history here today for lab review and evaluation prior to cycle 38  Keytruda.  He reports overall he is doing well.  He does continue on antibiotics from Dr. Pak before his osteonecrosis of the jaw.  He does have 1 area of exposed bone on his jaw they are hoping that the gum tissue will grow over this.  He would follow-up with Dr. Champion in June.    Patient also previously been on antibiotics for UTI.  He does report some urgency which is still present.  No fevers or chills.  Denies any issues with increased shortness of breath.    He does report some intermittent pain on the right lateral portion of his chest near the eighth rib.  He states when he moves at times he has a sharp pain that is quick, he describes it as feeling like a strain.  This is only  "happened occasionally over the past few weeks.    Past Medical History, Past Surgical History, Social History, Family History have been reviewed and are without significant changes except as mentioned.    Hematologic/oncologic history:    As concerns his CLL history he initially required Treanda Rituxan 5/1/2019 for 2 cycles and then initiated Imbruvica 420 mg daily initiated 7/25/2019.       As concerns stage IV non-small cell lung carcinoma he returns the office continuing Keytruda which he continues to receive every 3 weeks which was initiated 10/21/2020.  He also receives Xgeva every 6 weeks which was initially given 12/2/2020 and discontinued 1/7/2022 secondary to osteonecrosis.      Additional issues that developed during treatment included right knee pain with plain views of his right knee showing a right knee effusion with medial compartment narrowing and no suspicious bone lesion.  There is benign periosteal calcification along the distal right femur.  A PET/CT 11/11 went on to demonstrate a complete metabolic response to therapy compared to PET/CT from 9/23/2020.  This includes his primary lung neoplasm left upper lobe, large osseous metastatic lesion the right chest wall region from the eighth rib, small left adrenal metastasis, and no evidence of disease involving the distal left femur.      The patient was seen 11/24/2021 continue to do well though indicating that his right knee is \"something I can manage at the moment\".  He prefers not to have orthopedic assessment at this point.  He is concerned, ever, about skin lesions that are developing primarily on his calf regions and into his right thigh.    The patient was seen by Dr. Regan Damon 11/29/2021 and felt to be consistent with psoriasiform dermatitis treated with intralesional therapy.  Was also started on a moisturizer and lipid therapy.  The patient returned for assessment 12/15/2021 and treated with cycle #21 Keytruda, returned 1/7/2022 for " cycle #22 and 1/28/2022 for cycle #23.  At that visit he had developed adenopathy several weeks previous and was treated with a Medrol Dosepak.   The patient was next seen 2/18/2022 and indicates that though his adenopathy has improved after treatment described above that his jaw has become painful swollen and tender with additional bleeding.     The patient was referred to his primary dentist who then had him seen oral surgeon-Dr. Ezequiel Davila-reached at [phone] who felt that this was osteonecrosis and should be treated symptomatically.  As the patient reviewed 3/4/2022, wonderfully, his oral issues have nearly resolved with the gumline returning to essentially its previous state, no swelling, minimal erythema, no discharge and no additional pain.  He does have follow-up with oral surgery in the next several weeks and we discussed restarting his Keytruda scanning him likely in April 2022 in general.   He returns to the office on 3/25/2022 in follow-up in anticipation of cycle 24 Keytruda. He reports the left side of his jaw has improved.    However, he was now having issues with the right lower side. He was seen by neurosurgery with additional films and started on oral amoxicillin by his oral surgeon.      The patient continued his immunotherapy taking Keytruda 3/25/2022 and underwent repeat CT chest abdomen and pelvis 4/12/2022 that is reviewed with him 4/15/2022.  Wonderfully there is no substantial change with a small to moderate right pleural effusion that decreased in size, no additional pulmonary nodule or new metastatic disease, multifocal osteosclerotic metastatic disease without change and no new findings in the abdomen or pelvis.    The patient was seen 4/15/2022 indicating that he is actually feeling well in terms of general symptoms.  This includes lack of progression from mouth pain, ability to eat without discomfort.  He is, however, having worsening psoriasis particular in his lower extremities  and has been reviewed by dermatology previously.    The patient was reviewed 6/17/2022 having been seen by oral surgery with progression of his osteonecrosis and skin eruption.  He is currently undergoing assessment by infectious disease within the next week and we have reviewed that previously he responded to IVIG for in-hospital refractory sinusitis.  As a result he is asked to undergo reassessment for his immunoglobulin levels today and return next week for 400 mg/kg of IVIG infusion which we will continue monthly.    The patient was given IVIG 6/24/2022 and again 7/22/2022.  Assessment 8/5/2022 continue to show a degree of general improvement with oral surgery continue to follow him with a second opinion to be obtained  8/8/2022 when IVIG planned 8/19/2022 and 9/16/2022.    The patient slowly continued both assessments by oral surgery and  Scans considering his non-small cell lung cancer including CT studies 10/4/2022 that were essentially stable compared to previous.  Therapies include Keytruda every 3 weeks, daily Imbruvica, monthly IVIG every 4 weeks.                  with the above-mentioned history, who returns the office  in anticipation of his 35 th cycle of Keytruda.  He was last treated with IVIG 11/11/2022.       At this point he had undergone surgery at [facility] including fistulectomy of the left mandible and debridement of the intraoral bone left mandible.  He he had also been seen by infectious disease, Dr. Champion regarding osteonecrosis of the jaw.  He was placed on antibiotics for 6 weeks including Levaquin and fluconazole.          There is an interaction with his fluconazole and ibrutinib and adjustments will be necessary.  [facility] pharmacy is managing this.  He reports he is overall feeling very well.  He has had improvement in his ability to eat and drink since undergoing surgery on his jaw.  His skin is overall improved with the use of Aquaphor.  He did undergo CT scans prior to cycle  33 Keytruda with stability of his disease.  He continues on current therapy with excellent tolerance.     Patient is next seen 11/18/2022.  Fortunately he feels that he is doing  reasonably well with surgery having been successful, pain virtually absent at this point, no additional shortness of breath or cough, appetite remaining fair, stable weight and sleep at least modestly managed.  Unfortunately his wife recently injured her femur and he has become her primary caregiver at this point.       The patient is next assessed 12/9/2022.  He is due for cycle #36 Keytruda and IVIG today.  He did not realize he was due for IVIG today, and already scheduled another appointment so would like to reschedule IVIG for next week.  He also continues on Imbruvica, currently at a reduced dose of 280 mg daily, due to interaction with Diflucan.  He was seen by Dr. Champion with infectious disease yesterday, 12/8/2022 and econazole was discontinued as patient has completed his 6-week course.  He was started on preventative Augmentin 500 mg twice daily until his gums grow to close over the exposed area of bone.  He is eating and drinking adequately, weight remains stable.  Bowels moving regularly.  Denies fever or chills.  Denies nausea or vomiting.  Denies new or worsening pain.  Denies skin rash or shortness of breath.  No new concerns today.    The patient continued Keytruda therapy including 12/9/2022 and IVIG 12/16/2022.  He is next seen 12/30/2022 for continued Keytruda.  We have discussed that he is clearly improving per his osteonecrosis and his completed his surgical therapy and now on prophylactic antibiotic therapy.  We have reviewed that he could discontinue his IVIG, continue his Imbruvica, Keytruda and prophylactic antibiotic therapy.  He is having urinary symptoms?  And a follow-up UA and culture is pending.        Objective      Vitals:    01/20/23 0903   BP: 130/77   Pulse: 93   Resp: 18   Temp: 97.8 °F (36.6 °C)  "  TempSrc: Temporal   SpO2: 95%   Weight: 78.9 kg (174 lb)   Height: 180.3 cm (70.98\")   PainSc: 0-No pain     Current Status 1/20/2023   ECOG score 0     Physical Exam  Vitals and nursing note reviewed.   Constitutional:       Appearance: Normal appearance. He is well-developed.   HENT:      Head: Normocephalic and atraumatic.      Nose: Nose normal.   Eyes:      Pupils: Pupils are equal, round, and reactive to light.   Cardiovascular:      Rate and Rhythm: Normal rate and regular rhythm.      Heart sounds: Normal heart sounds.   Pulmonary:      Effort: Pulmonary effort is normal. No respiratory distress.      Breath sounds: Normal breath sounds. No wheezing, rhonchi or rales.   Abdominal:      General: Bowel sounds are normal. There is no distension.      Palpations: Abdomen is soft.      Tenderness: There is no abdominal tenderness.   Musculoskeletal:         General: Normal range of motion.      Cervical back: Normal range of motion and neck supple.   Skin:     General: Skin is warm and dry.   Neurological:      Mental Status: He is alert and oriented to person, place, and time.   Psychiatric:         Behavior: Behavior normal.       I have reexamined the patient and the results are consistent with the previously documented exam. RORY Wooten      RECENT LABS:  Results from last 7 days   Lab Units 01/20/23  0852   WBC 10*3/mm3 9.94   NEUTROS ABS 10*3/mm3 6.96   HEMOGLOBIN g/dL 12.0*   HEMATOCRIT % 39.2   PLATELETS 10*3/mm3 224     Results from last 7 days   Lab Units 01/20/23  0852   SODIUM mmol/L 144   POTASSIUM mmol/L 4.6   CHLORIDE mmol/L 111*   CO2 mmol/L 25.2   BUN mg/dL 22*   CREATININE mg/dL 1.26   CALCIUM mg/dL 7.9*   ALBUMIN g/dL 2.8*   BILIRUBIN mg/dL 0.2   ALK PHOS U/L 71   ALT (SGPT) U/L 11   AST (SGOT) U/L 10   GLUCOSE mg/dL 100         FISH prognostic panel-Positive for deletion of 13 q. 14.3    Assessment & Plan   1.  CLL presenting with significant lymphocytosis, lymphadenopathy and " splenomegaly.     · Positive for deletion of 13 q. 14.3.    · Patient status post 2 cycles of Treanda Rituxan with excellent response.    · Imaging 6/27/2019 with significant decrease in adenopathy.  Treanda Rituxan discontinued   · Imbruvica 420 mg daily initiated 7/25/2019.  · He continues on Imbruvica, without indication of progression of his CLL, without progressive lymphadenopathy on CT scans.  · Patient has resolving adenopathy particularly cervical regions 2/18/2022  · 3/25/2022 patient is without worsening adenopathy on exam today.  Continue Imbruvica.  · 10/28/2022 continues on Imbruvica 420 mg daily. Denies B-symptoms, no evidence of worsening adenopathy on exam.  CT scans without worsening adenopathy  · 11/18/2022: Imbruvica reduced to 280 mg daily while patient receiving 6-week course of antimicrobials due to interaction with fluconazole.  · 12/9/2022: He has finished his course of fluconazole, taking his last dose today.  He has a few days left of Imbruvica 280 mg, which he will complete.  He will then increase Imbruvica back to regular dose of 420 mg daily.  · Patient stable 12/30/2022 on full dose Imbruvica    2.  Pulmonary nodules/infiltrates.  He is  status post bronchoscopy.  Is unclear at this time whether these findings are infectious or possibly related to his lymphoproliferative disorder.  This is seen to be improving on latest scans.                                                                                                                   3.  Hypogammaglobulinemia-status post IVIG with resolution of sinusitis.   · Anticipate trial of IVIG with the patient now having progression of his osteonecrosis and dermal infection.  · Continues with monthly IVIG.  Due today, however did not realize he was due for IVIG and would like to reschedule for next week.  · Reviewed 12/30/2022 with IVIG discontinued.    4.  Non-small cell lung carcinoma  · August 26 2020 revealing a new 1.6 cm spiculated  nodule within the left upper lobe, 1.2 cm left adrenal nodule, bone windows with a soft tissue mass involving the right eighth rib measuring 3.7 x 2.6 cm.    · Biopsy was consistent with adenocarcinoma CK 7+, CK 20-, lung primary suspected clinically, molecular panel not yet obtained.  · PET/CT 9/23/2020 demonstrating asymmetric uptake within the right parotid gland which is unremarkable appearance on noncontrasted CT, SUV of 6 compared to 2.7.  There is no hilar, mediastinal or axillary adenopathy, findings of asymmetric moderate to intense FDG uptake within superior aspect the right chest wall anterior to the right first rib with an irregular hypodense lesion measuring 1.8 x 1.6 and SUV 4.9.  This had not been seen June 27, 2019.  There is an intensely FDG avid nodule within the lingula measuring 1.9 x 1.5 history of 12.4, FDG avid left adrenal nodule 1.9 x 1.5 with an issue 21.2.    · Evaluated by radiation oncology therapy September 29 and started on radiation therapy to the right chest wall-35 Gy in 10 fractions.   ·  Plans were also then proceed with SBRT to the solitary left upper lobe lesion pending insurance approval.  · Further testing including TPS of 20% and foundation CDX with a TMB of greater than 10 mutations per megabase (28 mutations per megabase) and the indication that several immunotherapies could be useful in this patient's care.  Additional genomic findings include BRAF G469R/that trametinib could be useful and STK11/that everolimus could be useful.  · Keytruda initiated 10/21/2021   · Reassessment after 2 cycles of Keytruda with enlargement of the right eighth rib lesion,enlargement of spiculated left upper lobe lung mass, moderate to large right-sided pleural effusion, new left adrenal mass and enlarged retrocrural lymph node.?Pseudoprogression  · Xgeva last given 12/30/2020  · Follow-up scans 1/6/2021 demonstrated marked improvement in his right eighth rib lesion, resolution of left upper  lobe spiculated mass, residual large right pleural effusion, resolution of left adrenal metastasis.   · Right pleural effusion, with thoracentesis 1/14/2021 with 1500 mils removed.  Pathology consistent with malignant effusion   · CT scans 4/5/2021 with response noted in the left upper lobe density.  · He continues to receive Keytruda every 3 weeks along with Xgeva every 6 weeks.  · Repeat scan 7/15/2021 without evidence of recurrence or progressive disease.  Plans to continue Keytruda every 3 weeks with repeat scans in 4 to 6 months  · Patient status post PET/CT 11/11/2021 with hyper response, approximate remission status, plan to proceed with cycle #20 Keytruda  · Patient seen 2/18/2022 with Keytruda held while his oral issues are addressed-concern for osteonecrosis of the jaw.  · Patiently diagnosed with osteonecrosis of the jaw, seen by oral surgery, started on antibiotic therapy and Peridex with substantial improvement, Xgeva discontinued as discussed below.  · 3/25/2022 proceed with cycle #24 Keytruda today.  Follow-up CT scans 4/12/2022 without evidence of progressive disease, stable findings including osteosclerotic metastatic disease.  Keytruda continued  · Repeat imaging 10/4/2022 with overall stable disease, Keytruda continued  · 12/9/2022: Proceed with cycle #36 Keytruda today.  Plan repeat imaging April 2023.  · 1/20/2023 cycle 38 Keytruda, continuing to tolerate well.    5.  Left knee metastasis  · Evaluated by orthopedic oncology, Dr. Eliu Ochoa  · Admission 1/7/2021 undergoing stabilization of left femoral bone lesion, prophylactic stabilization with intramedullary implants.  · It was suggested we delay Xgeva or Zometa to allow bone healing  · Completed radiation with 10 fractions 2/10/2021  · Xgeva resumed 4/29/2021, he continues to receive this every 6 weeks.    · Additional assessment 2/18/2022 with left jaw pain, subsequent diagnosis of osteoporosis, Xgeva discontinued  · Patient continues  follow-up with oral surgery, progression of osteoporosis symptomatically, dermal eruption left side of mandibular region, IVIG anticipated  · Xgeva DISCONTINUED due to osteonecrosis of the jaw  · The patient's pain per his knee is not relapsed.     6.  Malignant right pleural effusion  · Ultrasound thoracentesis 1/14/2021 1500 mL removed  · Chest x-ray 2/25/2021 with moderate right pleural effusion  · Progressive symptoms with worsening pain 4/5/2021  · Ultrasound-guided thoracentesis 4/13/2021 with 2050 ml removed.  · Plans for Pleurx catheter with thoracic surgery, however, pleural effusion has not recurred.  Patient seen 11/4/2021 with chest x-ray planned.  Subsequent resolution noted on CT scan.  · Improvement in bilateral pleural effusions on most recent CT scan    7.  Cancer related pain  · Pain is most prominent in his right rib, utilizing MS Contin 30 mg 3 times a day  · Hydrocodone/acetaminophen 10/325 as needed for breakthrough pain.  Currently taking 3 to 4 tablets daily  · He is not currently in need of a refill of pain medications  · Discussed MS Contin at 30 mg p.o. every 8 hours, Norco 10/325 2 p.o. every 6 hours  · He is currently using pain medication as needed for jaw pain.  Requiring approximately 1 MS Contin and 1 Norco 10/325 daily, however some days not requiring any pain medication.  · Reports his pain is well controlled, not currently requiring pain medication.  · As of 1/20/2023 patient continues to report no need for pain medication at this time.    8.  Insomnia  · Continuing to follow with behavioral oncology  · Continuing Remeron 7.5 mg nightly with fair control.  The patient  when assessed 12/30/2022.  · 1/20/2023 continues on Remeron 7.5 mg nightly which will be refilled today.    9. Health maintenance  · Status post COVID-19 booster, SARS antibody pending today  · The patient is due for Shingrix and Prevnar which will both be administered in the clinic 9/23/2021    10.  Skin  lesions  · Uncertain of etiology, unexpected findings for usual skin lesions associated with immunotherapy  · Request dermatologic assessment-psoriasiform dermatitis.  Seen by Dr. Damon though no follow-up due to personal preference  · The lesions on the lower extremities particularly are quite extensive.  At present he feels that they are stable enough not to require additional therapy.    11.  Right knee pain  · Osteoarthritis, orthopedic assessment upon patient's request.   · Resolved.  Denies any knee pain today     12.  Hypocalcemia  · Stabilized  · 1/20/2023 calcium is dropped to 7.9 patient will start taking 2 Tums daily.     13.  Osteonecrosis of the jaw  · Xgeva was discontinued.  · Patient went to second opinion with  with oral surgery.  He was reffered to Dr. Escalante with , with whom he had consult on 8/23/2022.  Dr. Escalante recommened an outpatient procedure that would allow him to biopsy the affected area of his jawbone and identify which bacteria was present, they would then coordinate with ID to identify best antibiotic option.  Patient is agreeable to proceeding with this plan, and is awaiting surgery date.  · The patient underwent surgery 10/7/2022  · Follow-up with Dr. Champion, infectious disease 10/27/2022 with recommendations for 6 weeks of Levaquin and fluconazole.  This should complete by approximately mid December 2023.  · Was seen by Dr. Champion 12/8/2022 with plans to complete fluconazole.  Decision made to start Augmentin 500 mg twice daily for prevention.  · Patient currently being followed by Dr. Champion with reassessment planned in June.        Plan:   1. Proceed with cycle 38 Keytruda today.  2. Continue Imbruvica 420 mg daily.  3. Continue to follow with Dr. Champion for osteoporosis.  4. We will recheck urinalysis today given patient's complaints of continued urgency.  5. Continue Remeron 7.5 mg nightly which will be refilled today.  6. Return in 3 weeks for repeat labs due for his  next dose of Keytruda.  7. Return in 6 weeks for follow-up with Dr. Parekh with repeat labs and continued Keytruda (may have to be pushed to a Monday).  8. Planning reassessment with scans in April.        Patient is on a high risk medication requiring close monitoring for toxicity.      Naomi Martinez, APRN   1/20/2023

## 2023-01-21 LAB — BACTERIA SPEC AEROBE CULT: NO GROWTH

## 2023-02-10 ENCOUNTER — INFUSION (OUTPATIENT)
Dept: ONCOLOGY | Facility: HOSPITAL | Age: 68
End: 2023-02-10
Payer: MEDICARE

## 2023-02-10 VITALS
OXYGEN SATURATION: 94 % | HEART RATE: 84 BPM | RESPIRATION RATE: 18 BRPM | DIASTOLIC BLOOD PRESSURE: 85 MMHG | BODY MASS INDEX: 24.39 KG/M2 | SYSTOLIC BLOOD PRESSURE: 155 MMHG | WEIGHT: 174.8 LBS | TEMPERATURE: 97.7 F

## 2023-02-10 DIAGNOSIS — C91.10 CLL (CHRONIC LYMPHOCYTIC LEUKEMIA): ICD-10-CM

## 2023-02-10 DIAGNOSIS — Z79.899 HIGH RISK MEDICATION USE: Primary | ICD-10-CM

## 2023-02-10 DIAGNOSIS — Z45.2 FITTING AND ADJUSTMENT OF VASCULAR CATHETER: ICD-10-CM

## 2023-02-10 DIAGNOSIS — C34.90 LUNG CANCER METASTATIC TO BONE: ICD-10-CM

## 2023-02-10 DIAGNOSIS — C34.12 MALIGNANT NEOPLASM OF UPPER LOBE OF LEFT LUNG: ICD-10-CM

## 2023-02-10 DIAGNOSIS — C79.51 LUNG CANCER METASTATIC TO BONE: ICD-10-CM

## 2023-02-10 LAB
ALBUMIN SERPL-MCNC: 3.4 G/DL (ref 3.5–5.2)
ALBUMIN/GLOB SERPL: 1.8 G/DL (ref 1.1–2.4)
ALP SERPL-CCNC: 88 U/L (ref 38–116)
ALT SERPL W P-5'-P-CCNC: 8 U/L (ref 0–41)
ANION GAP SERPL CALCULATED.3IONS-SCNC: 9.8 MMOL/L (ref 5–15)
AST SERPL-CCNC: 11 U/L (ref 0–40)
BASOPHILS # BLD AUTO: 0.08 10*3/MM3 (ref 0–0.2)
BASOPHILS NFR BLD AUTO: 0.8 % (ref 0–1.5)
BILIRUB SERPL-MCNC: 0.3 MG/DL (ref 0.2–1.2)
BUN SERPL-MCNC: 21 MG/DL (ref 6–20)
BUN/CREAT SERPL: 16.5 (ref 7.3–30)
CALCIUM SPEC-SCNC: 8.7 MG/DL (ref 8.5–10.2)
CHLORIDE SERPL-SCNC: 107 MMOL/L (ref 98–107)
CO2 SERPL-SCNC: 25.2 MMOL/L (ref 22–29)
CREAT SERPL-MCNC: 1.27 MG/DL (ref 0.7–1.3)
DEPRECATED RDW RBC AUTO: 49.3 FL (ref 37–54)
EGFRCR SERPLBLD CKD-EPI 2021: 61.5 ML/MIN/1.73
EOSINOPHIL # BLD AUTO: 0.94 10*3/MM3 (ref 0–0.4)
EOSINOPHIL NFR BLD AUTO: 9 % (ref 0.3–6.2)
ERYTHROCYTE [DISTWIDTH] IN BLOOD BY AUTOMATED COUNT: 15.1 % (ref 12.3–15.4)
GLOBULIN UR ELPH-MCNC: 1.9 GM/DL (ref 1.8–3.5)
GLUCOSE SERPL-MCNC: 97 MG/DL (ref 74–124)
HCT VFR BLD AUTO: 41.1 % (ref 37.5–51)
HGB BLD-MCNC: 12.7 G/DL (ref 13–17.7)
IMM GRANULOCYTES # BLD AUTO: 0.05 10*3/MM3 (ref 0–0.05)
IMM GRANULOCYTES NFR BLD AUTO: 0.5 % (ref 0–0.5)
LYMPHOCYTES # BLD AUTO: 1.09 10*3/MM3 (ref 0.7–3.1)
LYMPHOCYTES NFR BLD AUTO: 10.5 % (ref 19.6–45.3)
MCH RBC QN AUTO: 27.9 PG (ref 26.6–33)
MCHC RBC AUTO-ENTMCNC: 30.9 G/DL (ref 31.5–35.7)
MCV RBC AUTO: 90.3 FL (ref 79–97)
MONOCYTES # BLD AUTO: 0.91 10*3/MM3 (ref 0.1–0.9)
MONOCYTES NFR BLD AUTO: 8.7 % (ref 5–12)
NEUTROPHILS NFR BLD AUTO: 7.34 10*3/MM3 (ref 1.7–7)
NEUTROPHILS NFR BLD AUTO: 70.5 % (ref 42.7–76)
NRBC BLD AUTO-RTO: 0 /100 WBC (ref 0–0.2)
PLATELET # BLD AUTO: 198 10*3/MM3 (ref 140–450)
PMV BLD AUTO: 11.6 FL (ref 6–12)
POTASSIUM SERPL-SCNC: 4.3 MMOL/L (ref 3.5–4.7)
PROT SERPL-MCNC: 5.3 G/DL (ref 6.3–8)
RBC # BLD AUTO: 4.55 10*6/MM3 (ref 4.14–5.8)
SODIUM SERPL-SCNC: 142 MMOL/L (ref 134–145)
T4 FREE SERPL-MCNC: 1.38 NG/DL (ref 0.93–1.7)
TSH SERPL DL<=0.05 MIU/L-ACNC: 2.49 UIU/ML (ref 0.27–4.2)
WBC NRBC COR # BLD: 10.41 10*3/MM3 (ref 3.4–10.8)

## 2023-02-10 PROCEDURE — 25010000002 PEMBROLIZUMAB 100 MG/4ML SOLUTION 4 ML VIAL: Performed by: NURSE PRACTITIONER

## 2023-02-10 PROCEDURE — 84439 ASSAY OF FREE THYROXINE: CPT | Performed by: INTERNAL MEDICINE

## 2023-02-10 PROCEDURE — 80053 COMPREHEN METABOLIC PANEL: CPT

## 2023-02-10 PROCEDURE — 84443 ASSAY THYROID STIM HORMONE: CPT | Performed by: INTERNAL MEDICINE

## 2023-02-10 PROCEDURE — 25010000002 HEPARIN LOCK FLUSH PER 10 UNITS: Performed by: INTERNAL MEDICINE

## 2023-02-10 PROCEDURE — 96413 CHEMO IV INFUSION 1 HR: CPT

## 2023-02-10 PROCEDURE — 85025 COMPLETE CBC W/AUTO DIFF WBC: CPT

## 2023-02-10 RX ORDER — HEPARIN SODIUM (PORCINE) LOCK FLUSH IV SOLN 100 UNIT/ML 100 UNIT/ML
500 SOLUTION INTRAVENOUS AS NEEDED
Status: CANCELLED | OUTPATIENT
Start: 2023-02-10

## 2023-02-10 RX ORDER — HEPARIN SODIUM (PORCINE) LOCK FLUSH IV SOLN 100 UNIT/ML 100 UNIT/ML
500 SOLUTION INTRAVENOUS AS NEEDED
Status: DISCONTINUED | OUTPATIENT
Start: 2023-02-10 | End: 2023-02-10 | Stop reason: HOSPADM

## 2023-02-10 RX ORDER — SODIUM CHLORIDE 0.9 % (FLUSH) 0.9 %
10 SYRINGE (ML) INJECTION AS NEEDED
Status: CANCELLED | OUTPATIENT
Start: 2023-02-10

## 2023-02-10 RX ORDER — SODIUM CHLORIDE 0.9 % (FLUSH) 0.9 %
10 SYRINGE (ML) INJECTION AS NEEDED
Status: DISCONTINUED | OUTPATIENT
Start: 2023-02-10 | End: 2023-02-10 | Stop reason: HOSPADM

## 2023-02-10 RX ORDER — SODIUM CHLORIDE 9 MG/ML
250 INJECTION, SOLUTION INTRAVENOUS ONCE
Status: COMPLETED | OUTPATIENT
Start: 2023-02-10 | End: 2023-02-10

## 2023-02-10 RX ADMIN — SODIUM CHLORIDE 200 MG: 9 INJECTION, SOLUTION INTRAVENOUS at 15:37

## 2023-02-10 RX ADMIN — Medication 500 UNITS: at 16:08

## 2023-02-10 RX ADMIN — Medication 10 ML: at 16:07

## 2023-02-10 RX ADMIN — SODIUM CHLORIDE 250 ML: 9 INJECTION, SOLUTION INTRAVENOUS at 15:16

## 2023-03-05 NOTE — PROGRESS NOTES
Subjective Patient continues to feel relatively good, periodic urinary symptoms persist but are improved      REASON FOR FOLLOW UP:  1.  Chronic lymphocytic leukemia with extensive lymphadenopathy and possible pleural involvement. Initiation of Treanda, Rituxan May 1, 2019.  2.  Hypogammaglobulinemia.  Status is post IVIG  3.  Significant response on scans June 27, 2019.  Treatment changed to Imbruvica 420 mg daily.  4.  Metastatic non-small cell lung carcinoma on Keytruda  5.  Patient seen 2/18/2022 with left jaw/gumline swelling pain, associated hematoma?  Osteonecrosis.  Restart of Keytruda 3/4/2022, Xgeva discontinued  6.  Restaging via CT 4/12/2022, continued response, Keytruda continued, referral to dermatology for worsening psoriasis.  7.  Jaw osteonecrosis, undergoing therapy through oral surgery, IVIG anticipated, infectious disease and oral surgery follow-up  8.  Patient assessed 11/18/2022, Keytruda continued, ibrutinib-dose reduced-continued, patient seen by  physicians, status postdebridement of bone of left mandible with bone biopsy, fistulectomy of left mandible 10/20/2022  9.  Patient seen 12/30/2022 continue to improve,?  Urinary tract symptoms that have continued to improve.      HISTORY OF PRESENT ILLNESS:    The patient is a 67 y.o. male with the above mentioned history here today for lab review and evaluation prior to cycle 38  Keytruda.  He reports overall he is doing well.  He does continue on antibiotics from Dr. Pak before his osteonecrosis of the jaw.  He does have 1 area of exposed bone on his jaw they are hoping that the gum tissue will grow over this.  He would follow-up with Dr. Champion in June.    Patient also previously been on antibiotics for UTI.  He does report some urgency which is still present.  No fevers or chills.  Denies any issues with increased shortness of breath.    He does report some intermittent pain on the right lateral portion of his chest near the eighth rib.  He  "states when he moves at times he has a sharp pain that is quick, he describes it as feeling like a strain.  This is only happened occasionally over the past few weeks.    The patient is next seen 3/6/2023 without additional chest discomfort and with lessening UTI symptoms.  We have discussed repeat scans in April 2023.    Past Medical History, Past Surgical History, Social History, Family History have been reviewed and are without significant changes except as mentioned.    Hematologic/oncologic history:    As concerns his CLL history he initially required Treanda Rituxan 5/1/2019 for 2 cycles and then initiated Imbruvica 420 mg daily initiated 7/25/2019.       As concerns stage IV non-small cell lung carcinoma he returns the office continuing Keytruda which he continues to receive every 3 weeks which was initiated 10/21/2020.  He also receives Xgeva every 6 weeks which was initially given 12/2/2020 and discontinued 1/7/2022 secondary to osteonecrosis.      Additional issues that developed during treatment included right knee pain with plain views of his right knee showing a right knee effusion with medial compartment narrowing and no suspicious bone lesion.  There is benign periosteal calcification along the distal right femur.  A PET/CT 11/11 went on to demonstrate a complete metabolic response to therapy compared to PET/CT from 9/23/2020.  This includes his primary lung neoplasm left upper lobe, large osseous metastatic lesion the right chest wall region from the eighth rib, small left adrenal metastasis, and no evidence of disease involving the distal left femur.      The patient was seen 11/24/2021 continue to do well though indicating that his right knee is \"something I can manage at the moment\".  He prefers not to have orthopedic assessment at this point.  He is concerned, ever, about skin lesions that are developing primarily on his calf regions and into his right thigh.    The patient was seen by  " Regansharlene Damon 11/29/2021 and felt to be consistent with psoriasiform dermatitis treated with intralesional therapy.  Was also started on a moisturizer and lipid therapy.  The patient returned for assessment 12/15/2021 and treated with cycle #21 Keytruda, returned 1/7/2022 for cycle #22 and 1/28/2022 for cycle #23.  At that visit he had developed adenopathy several weeks previous and was treated with a Medrol Dosepak.   The patient was next seen 2/18/2022 and indicates that though his adenopathy has improved after treatment described above that his jaw has become painful swollen and tender with additional bleeding.     The patient was referred to his primary dentist who then had him seen oral surgeon-Dr. Ezequiel Davila-reached at [phone] who felt that this was osteonecrosis and should be treated symptomatically.  As the patient reviewed 3/4/2022, wonderfully, his oral issues have nearly resolved with the gumline returning to essentially its previous state, no swelling, minimal erythema, no discharge and no additional pain.  He does have follow-up with oral surgery in the next several weeks and we discussed restarting his Keytruda scanning him likely in April 2022 in general.   He returns to the office on 3/25/2022 in follow-up in anticipation of cycle 24 Keytruda. He reports the left side of his jaw has improved.    However, he was now having issues with the right lower side. He was seen by neurosurgery with additional films and started on oral amoxicillin by his oral surgeon.      The patient continued his immunotherapy taking Keytruda 3/25/2022 and underwent repeat CT chest abdomen and pelvis 4/12/2022 that is reviewed with him 4/15/2022.  Wonderfully there is no substantial change with a small to moderate right pleural effusion that decreased in size, no additional pulmonary nodule or new metastatic disease, multifocal osteosclerotic metastatic disease without change and no new findings in the abdomen or  pelvis.    The patient was seen 4/15/2022 indicating that he is actually feeling well in terms of general symptoms.  This includes lack of progression from mouth pain, ability to eat without discomfort.  He is, however, having worsening psoriasis particular in his lower extremities and has been reviewed by dermatology previously.    The patient was reviewed 6/17/2022 having been seen by oral surgery with progression of his osteonecrosis and skin eruption.  He is currently undergoing assessment by infectious disease within the next week and we have reviewed that previously he responded to IVIG for in-hospital refractory sinusitis.  As a result he is asked to undergo reassessment for his immunoglobulin levels today and return next week for 400 mg/kg of IVIG infusion which we will continue monthly.    The patient was given IVIG 6/24/2022 and again 7/22/2022.  Assessment 8/5/2022 continue to show a degree of general improvement with oral surgery continue to follow him with a second opinion to be obtained  8/8/2022 when IVIG planned 8/19/2022 and 9/16/2022.    The patient slowly continued both assessments by oral surgery and  Scans considering his non-small cell lung cancer including CT studies 10/4/2022 that were essentially stable compared to previous.  Therapies include Keytruda every 3 weeks, daily Imbruvica, monthly IVIG every 4 weeks.    He next presentedwith the above-mentioned history, who returns the office  in anticipation of his 35 th cycle of Keytruda.  He was last treated with IVIG 11/11/2022.       At this point he had undergone surgery at [facility] including fistulectomy of the left mandible and debridement of the intraoral bone left mandible.  He he had also been seen by infectious disease, Dr. Champion regarding osteonecrosis of the jaw.  He was placed on antibiotics for 6 weeks including Levaquin and fluconazole.          There is an interaction with his fluconazole and ibrutinib and adjustments  will be necessary.  [facility] pharmacy is managing this.  He reports he is overall feeling very well.  He has had improvement in his ability to eat and drink since undergoing surgery on his jaw.  His skin is overall improved with the use of Aquaphor.  He did undergo CT scans prior to cycle 33 Keytruda with stability of his disease.  He continues on current therapy with excellent tolerance.     Patient is next seen 11/18/2022.  Fortunately he feels that he is doing  reasonably well with surgery having been successful, pain virtually absent at this point, no additional shortness of breath or cough, appetite remaining fair, stable weight and sleep at least modestly managed.  Unfortunately his wife recently injured her femur and he has become her primary caregiver at this point.       The patient is next assessed 12/9/2022.  He is due for cycle #36 Keytruda and IVIG today.  He did not realize he was due for IVIG today, and already scheduled another appointment so would like to reschedule IVIG for next week.  He also continues on Imbruvica, currently at a reduced dose of 280 mg daily, due to interaction with Diflucan.  He was seen by Dr. Champion with infectious disease yesterday, 12/8/2022 and econazole was discontinued as patient has completed his 6-week course.  He was started on preventative Augmentin 500 mg twice daily until his gums grow to close over the exposed area of bone.  He is eating and drinking adequately, weight remains stable.  Bowels moving regularly.  Denies fever or chills.  Denies nausea or vomiting.  Denies new or worsening pain.  Denies skin rash or shortness of breath.  No new concerns today.    The patient continued Keytruda therapy including 12/9/2022 and IVIG 12/16/2022.  He is next seen 12/30/2022 for continued Keytruda.  We have discussed that he is clearly improving per his osteonecrosis and his completed his surgical therapy and now on prophylactic antibiotic therapy.  We have reviewed that he  "could discontinue his IVIG, continue his Imbruvica, Keytruda and prophylactic antibiotic therapy.  He is having urinary symptoms?  And a follow-up UA and culture is pending.    Note that the patient's urine cultures were negative we proceeded 1/20/2023 with Keytruda, Imbruvica with Keytruda given 2/10/2023 and the patient presented 3/6/2023 for his next treatment.  We do plan repeat CT scans in April 2023.    Objective      Vitals:    03/06/23 1056   BP: 126/76   Pulse: 87   Resp: 18   Temp: 98.6 °F (37 °C)   TempSrc: Temporal   SpO2: 96%   Weight: 79.1 kg (174 lb 4.8 oz)   Height: 180.3 cm (70.98\")   PainSc: 0-No pain     Current Status 3/6/2023   ECOG score 0     Physical Exam  Vitals and nursing note reviewed.   Constitutional:       Appearance: Normal appearance. He is well-developed.   HENT:      Head: Normocephalic and atraumatic.      Nose: Nose normal.   Eyes:      Pupils: Pupils are equal, round, and reactive to light.   Cardiovascular:      Rate and Rhythm: Normal rate and regular rhythm.      Heart sounds: Normal heart sounds.   Pulmonary:      Effort: Pulmonary effort is normal. No respiratory distress.      Breath sounds: Normal breath sounds. No wheezing, rhonchi or rales.   Abdominal:      General: Bowel sounds are normal. There is no distension.      Palpations: Abdomen is soft.      Tenderness: There is no abdominal tenderness.   Musculoskeletal:         General: Normal range of motion.      Cervical back: Normal range of motion and neck supple.   Skin:     General: Skin is warm and dry.   Neurological:      Mental Status: He is alert and oriented to person, place, and time.   Psychiatric:         Behavior: Behavior normal.       I have reexamined the patient and the results are consistent with the previously documented exam. Jostin Parekh MD      RECENT LABS:  Results from last 7 days   Lab Units 03/06/23  1021   WBC 10*3/mm3 9.71   NEUTROS ABS 10*3/mm3 6.29   HEMOGLOBIN g/dL 12.5* "   HEMATOCRIT % 40.4   PLATELETS 10*3/mm3 219     Results from last 7 days   Lab Units 03/06/23  1021   SODIUM mmol/L 140   POTASSIUM mmol/L 3.8   CHLORIDE mmol/L 105   CO2 mmol/L 23.5   BUN mg/dL 19   CREATININE mg/dL 1.44*   CALCIUM mg/dL 8.2*   ALBUMIN g/dL 3.3*   BILIRUBIN mg/dL 0.4   ALK PHOS U/L 86   ALT (SGPT) U/L 7   AST (SGOT) U/L 10   GLUCOSE mg/dL 155*         FISH prognostic panel-Positive for deletion of 13 q. 14.3    Assessment & Plan   1.  CLL presenting with significant lymphocytosis, lymphadenopathy and splenomegaly.     · Positive for deletion of 13 q. 14.3.    · Patient status post 2 cycles of Treanda Rituxan with excellent response.    · Imaging 6/27/2019 with significant decrease in adenopathy.  Treanda Rituxan discontinued   · Imbruvica 420 mg daily initiated 7/25/2019.  · He continues on Imbruvica, without indication of progression of his CLL, without progressive lymphadenopathy on CT scans.  · Patient has resolving adenopathy particularly cervical regions 2/18/2022  · 3/25/2022 patient is without worsening adenopathy on exam today.  Continue Imbruvica.  · 10/28/2022 continues on Imbruvica 420 mg daily. Denies B-symptoms, no evidence of worsening adenopathy on exam.  CT scans without worsening adenopathy  · 11/18/2022: Imbruvica reduced to 280 mg daily while patient receiving 6-week course of antimicrobials due to interaction with fluconazole.  · 12/9/2022: He has finished his course of fluconazole, taking his last dose today.  He has a few days left of Imbruvica 280 mg, which he will complete.  He will then increase Imbruvica back to regular dose of 420 mg daily.  · Patient stable 3/6/2023    2.  Pulmonary nodules/infiltrates.  He is  status post bronchoscopy.  Is unclear at this time whether these findings are infectious or possibly related to his lymphoproliferative disorder.  This is seen to be improving on latest scans.                                                                                              3.  Hypogammaglobulinemia-status post IVIG with resolution of sinusitis.   · Anticipate trial of IVIG with the patient now having progression of his osteonecrosis and dermal infection.  · Continues with monthly IVIG.  Due today, however did not realize he was due for IVIG and would like to reschedule for next week.  · Reviewed 12/30/2022 with IVIG discontinued.    4.  Non-small cell lung carcinoma  · August 26 2020 revealing a new 1.6 cm spiculated nodule within the left upper lobe, 1.2 cm left adrenal nodule, bone windows with a soft tissue mass involving the right eighth rib measuring 3.7 x 2.6 cm.    · Biopsy was consistent with adenocarcinoma CK 7+, CK 20-, lung primary suspected clinically, molecular panel not yet obtained.  · PET/CT 9/23/2020 demonstrating asymmetric uptake within the right parotid gland which is unremarkable appearance on noncontrasted CT, SUV of 6 compared to 2.7.  There is no hilar, mediastinal or axillary adenopathy, findings of asymmetric moderate to intense FDG uptake within superior aspect the right chest wall anterior to the right first rib with an irregular hypodense lesion measuring 1.8 x 1.6 and SUV 4.9.  This had not been seen June 27, 2019.  There is an intensely FDG avid nodule within the lingula measuring 1.9 x 1.5 history of 12.4, FDG avid left adrenal nodule 1.9 x 1.5 with an issue 21.2.    · Evaluated by radiation oncology therapy September 29 and started on radiation therapy to the right chest wall-35 Gy in 10 fractions.   ·  Plans were also then proceed with SBRT to the solitary left upper lobe lesion pending insurance approval.  · Further testing including TPS of 20% and foundation CDX with a TMB of greater than 10 mutations per megabase (28 mutations per megabase) and the indication that several immunotherapies could be useful in this patient's care.  Additional genomic findings include BRAF [id number]/that trametinib could be useful and STK11/that  everolimus could be useful.  · Keytruda initiated 10/21/2021   · Reassessment after 2 cycles of Keytruda with enlargement of the right eighth rib lesion,enlargement of spiculated left upper lobe lung mass, moderate to large right-sided pleural effusion, new left adrenal mass and enlarged retrocrural lymph node.?Pseudoprogression  · Xgeva last given 12/30/2020  · Follow-up scans 1/6/2021 demonstrated marked improvement in his right eighth rib lesion, resolution of left upper lobe spiculated mass, residual large right pleural effusion, resolution of left adrenal metastasis.   · Right pleural effusion, with thoracentesis 1/14/2021 with 1500 mils removed.  Pathology consistent with malignant effusion   · CT scans 4/5/2021 with response noted in the left upper lobe density.  · He continues to receive Keytruda every 3 weeks along with Xgeva every 6 weeks.  · Repeat scan 7/15/2021 without evidence of recurrence or progressive disease.  Plans to continue Keytruda every 3 weeks with repeat scans in 4 to 6 months  · Patient status post PET/CT 11/11/2021 with hyper response, approximate remission status, plan to proceed with cycle #20 Keytruda  · Patient seen 2/18/2022 with Keytruda held while his oral issues are addressed-concern for osteonecrosis of the jaw.  · Patiently diagnosed with osteonecrosis of the jaw, seen by oral surgery, started on antibiotic therapy and Peridex with substantial improvement, Xgeva discontinued as discussed below.  · 3/25/2022 proceed with cycle #24 Keytruda today.  Follow-up CT scans 4/12/2022 without evidence of progressive disease, stable findings including osteosclerotic metastatic disease.  Keytruda continued  · Repeat imaging 10/4/2022 with overall stable disease, Keytruda continued  · 12/9/2022: Proceed with cycle #36 Keytruda today.  Plan repeat imaging April 2023.  · 3/6/2023 continued with 41st cycle of Keytruda    5.  Left knee metastasis  · Evaluated by orthopedic oncology, Dr. Nowak  Price  · Admission 1/7/2021 undergoing stabilization of left femoral bone lesion, prophylactic stabilization with intramedullary implants.  · It was suggested we delay Xgeva or Zometa to allow bone healing  · Completed radiation with 10 fractions 2/10/2021  · Xgeva resumed 4/29/2021, he continues to receive this every 6 weeks.    · Additional assessment 2/18/2022 with left jaw pain, subsequent diagnosis of osteoporosis, Xgeva discontinued  · Patient continues follow-up with oral surgery, progression of osteoporosis symptomatically, dermal eruption left side of mandibular region, IVIG anticipated  · Xgeva DISCONTINUED due to osteonecrosis of the jaw  · The patient's pain per his knee is not relapsed.     6.  Malignant right pleural effusion  · Ultrasound thoracentesis 1/14/2021 1500 mL removed  · Chest x-ray 2/25/2021 with moderate right pleural effusion  · Progressive symptoms with worsening pain 4/5/2021  · Ultrasound-guided thoracentesis 4/13/2021 with 2050 ml removed.  · Plans for Pleurx catheter with thoracic surgery, however, pleural effusion has not recurred.  Patient seen 11/4/2021 with chest x-ray planned.  Subsequent resolution noted on CT scan.  · Improvement in bilateral pleural effusions on most recent CT scan    7.  Cancer related pain  · Pain is most prominent in his right rib, utilizing MS Contin 30 mg 3 times a day  · Hydrocodone/acetaminophen 10/325 as needed for breakthrough pain.  Currently taking 3 to 4 tablets daily  · He is not currently in need of a refill of pain medications  · Discussed MS Contin at 30 mg p.o. every 8 hours, Norco 10/325 2 p.o. every 6 hours  · He is currently using pain medication as needed for jaw pain.  Requiring approximately 1 MS Contin and 1 Norco 10/325 daily, however some days not requiring any pain medication.  · Reports his pain is well controlled, not currently requiring pain medication.  · As of 3/6/2023 patient continues to report no need for pain medication at  this time.    8.  Insomnia  · Continuing to follow with behavioral oncology  · Continuing Remeron 7.5 mg nightly with fair control.  The patient  when assessed 12/30/2022.  · 1/20/2023 continues on Remeron 7.5 mg nightly which will be refilled today.    9. Health maintenance  · Status post COVID-19 booster, SARS antibody pending today  · The patient is due for Shingrix and Prevnar which will both be administered in the clinic 9/23/2021    10.  Skin lesions  · Uncertain of etiology, unexpected findings for usual skin lesions associated with immunotherapy  · Request dermatologic assessment-psoriasiform dermatitis.  Seen by Dr. Damon though no follow-up due to personal preference  · The lesions on the lower extremities particularly are quite extensive.  At present he feels that they are stable enough not to require additional therapy.    11.  Right knee pain  · Osteoarthritis, orthopedic assessment upon patient's request.   · Resolved.  Denies any knee pain today     12.  Hypocalcemia  · Stabilized  · 1/20/2023 calcium is dropped to 7.9 patient will start taking 2 Tums daily.     13.  Osteonecrosis of the jaw  · Xgeva was discontinued.  · Patient went to second opinion with  with oral surgery.  He was reffered to Dr. Escalante with , with whom he had consult on 8/23/2022.  Dr. Escalante recommened an outpatient procedure that would allow him to biopsy the affected area of his jawbone and identify which bacteria was present, they would then coordinate with ID to identify best antibiotic option.  Patient is agreeable to proceeding with this plan, and is awaiting surgery date.  · The patient underwent surgery 10/7/2022  · Follow-up with Dr. Champion, infectious disease 10/27/2022 with recommendations for 6 weeks of Levaquin and fluconazole.  This should complete by approximately mid December 2023.  · Was seen by Dr. Champion 12/8/2022 with plans to complete fluconazole.  Decision made to start Augmentin 500 mg twice daily  for prevention.  · Patient currently being followed by Dr. Champion with reassessment planned in June 2023        Plan:   1. Proceed with cycle 41st Keytruda today.  2. Return in 3 weeks for NP, Keytruda, follow-up scans to be scheduled  3. Continue Imbruvica 420 mg daily.  4. Continue to follow with Dr. Champion for osteoporosis.  5. Azo trial  6. Continue Remeron 7.5 mg nightly, consider Quviviq?  7. Planning reassessment with scans in April.        Patient is on a high risk medication requiring close monitoring for toxicity.      Jostin Parekh MD   3/6/2023

## 2023-03-06 ENCOUNTER — INFUSION (OUTPATIENT)
Dept: ONCOLOGY | Facility: HOSPITAL | Age: 68
End: 2023-03-06
Payer: MEDICARE

## 2023-03-06 ENCOUNTER — OFFICE VISIT (OUTPATIENT)
Dept: ONCOLOGY | Facility: CLINIC | Age: 68
End: 2023-03-06
Payer: MEDICARE

## 2023-03-06 VITALS
BODY MASS INDEX: 24.4 KG/M2 | SYSTOLIC BLOOD PRESSURE: 126 MMHG | TEMPERATURE: 98.6 F | OXYGEN SATURATION: 96 % | DIASTOLIC BLOOD PRESSURE: 76 MMHG | WEIGHT: 174.3 LBS | HEIGHT: 71 IN | HEART RATE: 87 BPM | RESPIRATION RATE: 18 BRPM

## 2023-03-06 DIAGNOSIS — C34.90 LUNG CANCER METASTATIC TO BONE: ICD-10-CM

## 2023-03-06 DIAGNOSIS — Z79.899 HIGH RISK MEDICATION USE: Primary | ICD-10-CM

## 2023-03-06 DIAGNOSIS — Z79.899 HIGH RISK MEDICATION USE: ICD-10-CM

## 2023-03-06 DIAGNOSIS — Z45.2 FITTING AND ADJUSTMENT OF VASCULAR CATHETER: ICD-10-CM

## 2023-03-06 DIAGNOSIS — C34.12 MALIGNANT NEOPLASM OF UPPER LOBE OF LEFT LUNG: ICD-10-CM

## 2023-03-06 DIAGNOSIS — C91.10 CLL (CHRONIC LYMPHOCYTIC LEUKEMIA): ICD-10-CM

## 2023-03-06 DIAGNOSIS — C79.51 LUNG CANCER METASTATIC TO BONE: ICD-10-CM

## 2023-03-06 DIAGNOSIS — C91.10 CLL (CHRONIC LYMPHOCYTIC LEUKEMIA): Primary | ICD-10-CM

## 2023-03-06 LAB
ALBUMIN SERPL-MCNC: 3.3 G/DL (ref 3.5–5.2)
ALBUMIN/GLOB SERPL: 1.9 G/DL (ref 1.1–2.4)
ALP SERPL-CCNC: 86 U/L (ref 38–116)
ALT SERPL W P-5'-P-CCNC: 7 U/L (ref 0–41)
ANION GAP SERPL CALCULATED.3IONS-SCNC: 11.5 MMOL/L (ref 5–15)
AST SERPL-CCNC: 10 U/L (ref 0–40)
BASOPHILS # BLD AUTO: 0.1 10*3/MM3 (ref 0–0.2)
BASOPHILS NFR BLD AUTO: 1 % (ref 0–1.5)
BILIRUB SERPL-MCNC: 0.4 MG/DL (ref 0.2–1.2)
BUN SERPL-MCNC: 19 MG/DL (ref 6–20)
BUN/CREAT SERPL: 13.2 (ref 7.3–30)
CALCIUM SPEC-SCNC: 8.2 MG/DL (ref 8.5–10.2)
CHLORIDE SERPL-SCNC: 105 MMOL/L (ref 98–107)
CO2 SERPL-SCNC: 23.5 MMOL/L (ref 22–29)
CREAT SERPL-MCNC: 1.44 MG/DL (ref 0.7–1.3)
DEPRECATED RDW RBC AUTO: 53 FL (ref 37–54)
EGFRCR SERPLBLD CKD-EPI 2021: 52.9 ML/MIN/1.73
EOSINOPHIL # BLD AUTO: 1.17 10*3/MM3 (ref 0–0.4)
EOSINOPHIL NFR BLD AUTO: 12 % (ref 0.3–6.2)
ERYTHROCYTE [DISTWIDTH] IN BLOOD BY AUTOMATED COUNT: 16 % (ref 12.3–15.4)
GLOBULIN UR ELPH-MCNC: 1.7 GM/DL (ref 1.8–3.5)
GLUCOSE SERPL-MCNC: 155 MG/DL (ref 74–124)
HCT VFR BLD AUTO: 40.4 % (ref 37.5–51)
HGB BLD-MCNC: 12.5 G/DL (ref 13–17.7)
IMM GRANULOCYTES # BLD AUTO: 0.05 10*3/MM3 (ref 0–0.05)
IMM GRANULOCYTES NFR BLD AUTO: 0.5 % (ref 0–0.5)
LYMPHOCYTES # BLD AUTO: 0.9 10*3/MM3 (ref 0.7–3.1)
LYMPHOCYTES NFR BLD AUTO: 9.3 % (ref 19.6–45.3)
MCH RBC QN AUTO: 28.2 PG (ref 26.6–33)
MCHC RBC AUTO-ENTMCNC: 30.9 G/DL (ref 31.5–35.7)
MCV RBC AUTO: 91.2 FL (ref 79–97)
MONOCYTES # BLD AUTO: 1.2 10*3/MM3 (ref 0.1–0.9)
MONOCYTES NFR BLD AUTO: 12.4 % (ref 5–12)
NEUTROPHILS NFR BLD AUTO: 6.29 10*3/MM3 (ref 1.7–7)
NEUTROPHILS NFR BLD AUTO: 64.8 % (ref 42.7–76)
NRBC BLD AUTO-RTO: 0 /100 WBC (ref 0–0.2)
PLATELET # BLD AUTO: 219 10*3/MM3 (ref 140–450)
PMV BLD AUTO: 12.3 FL (ref 6–12)
POTASSIUM SERPL-SCNC: 3.8 MMOL/L (ref 3.5–4.7)
PROT SERPL-MCNC: 5 G/DL (ref 6.3–8)
RBC # BLD AUTO: 4.43 10*6/MM3 (ref 4.14–5.8)
SODIUM SERPL-SCNC: 140 MMOL/L (ref 134–145)
WBC NRBC COR # BLD: 9.71 10*3/MM3 (ref 3.4–10.8)

## 2023-03-06 PROCEDURE — 25010000002 PEMBROLIZUMAB 100 MG/4ML SOLUTION 4 ML VIAL: Performed by: INTERNAL MEDICINE

## 2023-03-06 PROCEDURE — 99214 OFFICE O/P EST MOD 30 MIN: CPT | Performed by: INTERNAL MEDICINE

## 2023-03-06 PROCEDURE — 85025 COMPLETE CBC W/AUTO DIFF WBC: CPT

## 2023-03-06 PROCEDURE — 96413 CHEMO IV INFUSION 1 HR: CPT

## 2023-03-06 PROCEDURE — 25010000002 HEPARIN LOCK FLUSH PER 10 UNITS: Performed by: INTERNAL MEDICINE

## 2023-03-06 PROCEDURE — 80053 COMPREHEN METABOLIC PANEL: CPT

## 2023-03-06 RX ORDER — SODIUM CHLORIDE 0.9 % (FLUSH) 0.9 %
10 SYRINGE (ML) INJECTION AS NEEDED
Status: DISCONTINUED | OUTPATIENT
Start: 2023-03-06 | End: 2023-03-06 | Stop reason: HOSPADM

## 2023-03-06 RX ORDER — SODIUM CHLORIDE 0.9 % (FLUSH) 0.9 %
10 SYRINGE (ML) INJECTION AS NEEDED
Status: CANCELLED | OUTPATIENT
Start: 2023-03-06

## 2023-03-06 RX ORDER — SODIUM CHLORIDE 9 MG/ML
250 INJECTION, SOLUTION INTRAVENOUS ONCE
Status: COMPLETED | OUTPATIENT
Start: 2023-03-06 | End: 2023-03-06

## 2023-03-06 RX ORDER — HEPARIN SODIUM (PORCINE) LOCK FLUSH IV SOLN 100 UNIT/ML 100 UNIT/ML
500 SOLUTION INTRAVENOUS AS NEEDED
Status: CANCELLED | OUTPATIENT
Start: 2023-03-06

## 2023-03-06 RX ORDER — HEPARIN SODIUM (PORCINE) LOCK FLUSH IV SOLN 100 UNIT/ML 100 UNIT/ML
500 SOLUTION INTRAVENOUS AS NEEDED
Status: DISCONTINUED | OUTPATIENT
Start: 2023-03-06 | End: 2023-03-06 | Stop reason: HOSPADM

## 2023-03-06 RX ORDER — SODIUM CHLORIDE 9 MG/ML
250 INJECTION, SOLUTION INTRAVENOUS ONCE
Status: CANCELLED | OUTPATIENT
Start: 2023-03-06

## 2023-03-06 RX ADMIN — SODIUM CHLORIDE 200 MG: 9 INJECTION, SOLUTION INTRAVENOUS at 11:57

## 2023-03-06 RX ADMIN — SODIUM CHLORIDE 250 ML: 9 INJECTION, SOLUTION INTRAVENOUS at 11:54

## 2023-03-06 RX ADMIN — Medication 10 ML: at 12:27

## 2023-03-06 RX ADMIN — Medication 500 UNITS: at 12:27

## 2023-03-24 NOTE — PROGRESS NOTES
Subjective     REASON FOR FOLLOW UP:  1.  Chronic lymphocytic leukemia with extensive lymphadenopathy and possible pleural involvement. Initiation of Treanda, Rituxan May 1, 2019.  2.  Hypogammaglobulinemia.  Status is post IVIG  3.  Significant response on scans June 27, 2019.  Treatment changed to Imbruvica 420 mg daily.  4.  Metastatic non-small cell lung carcinoma on Keytruda  5.  Patient seen 2/18/2022 with left jaw/gumline swelling pain, associated hematoma?  Osteonecrosis.  Restart of Keytruda 3/4/2022, Xgeva discontinued  6.  Restaging via CT 4/12/2022, continued response, Keytruda continued, referral to dermatology for worsening psoriasis.  7.  Jaw osteonecrosis, undergoing therapy through oral surgery, IVIG anticipated, infectious disease and oral surgery follow-up  8.  Patient assessed 11/18/2022, Keytruda continued, ibrutinib-dose reduced-continued, patient seen by  physicians, status postdebridement of bone of left mandible with bone biopsy, fistulectomy of left mandible 10/20/2022        HISTORY OF PRESENT ILLNESS:    The patient is a 67 y.o. male with the above-mentioned street, who returns the office today in anticipation of Keytruda which she continues to receive every 3 weeks.  He also continues on Imbruvica 420 mg daily for his CLL.  He continues to tolerate immunotherapy very well.  He denies any changes in his shortness of breath.  He continues to follow-up with oral surgery regarding his previous osteonecrosis of the jaw.  Historically he was struggling with dysuria and bladder spasms.  He reports he recently completed antibiotics and has actually had resolution of his urinary symptoms over the past few weeks.  He continues to have a rash on his lower extremities though is not consistent with utilizing creams as instructed.  His pain remains well controlled hydrocodone as needed.  He denies narcotic induced constipation.  He denies fevers or chills, signs or symptoms of infection.    Past  "Medical History, Past Surgical History, Social History, Family History have been reviewed and are without significant changes except as mentioned.    Hematologic/oncologic history:    As concerns his CLL history he initially required Treanda Rituxan 5/1/2019 for 2 cycles and then initiated Imbruvica 420 mg daily initiated 7/25/2019.       As concerns stage IV non-small cell lung carcinoma he returns the office continuing Keytruda which he continues to receive every 3 weeks which was initiated 10/21/2020.  He also receives Xgeva every 6 weeks which was initially given 12/2/2020 and discontinued 1/7/2022 secondary to osteonecrosis.      Additional issues that developed during treatment included right knee pain with plain views of his right knee showing a right knee effusion with medial compartment narrowing and no suspicious bone lesion.  There is benign periosteal calcification along the distal right femur.  A PET/CT 11/11 went on to demonstrate a complete metabolic response to therapy compared to PET/CT from 9/23/2020.  This includes his primary lung neoplasm left upper lobe, large osseous metastatic lesion the right chest wall region from the eighth rib, small left adrenal metastasis, and no evidence of disease involving the distal left femur.      The patient was seen 11/24/2021 continue to do well though indicating that his right knee is \"something I can manage at the moment\".  He prefers not to have orthopedic assessment at this point.  He is concerned, ever, about skin lesions that are developing primarily on his calf regions and into his right thigh.    The patient was seen by Dr. Regan Damon 11/29/2021 and felt to be consistent with psoriasiform dermatitis treated with intralesional therapy.  Was also started on a moisturizer and lipid therapy.  The patient returned for assessment 12/15/2021 and treated with cycle #21 Keytruda, returned 1/7/2022 for cycle #22 and 1/28/2022 for cycle #23.  At that visit he " had developed adenopathy several weeks previous and was treated with a Medrol Dosepak.   The patient was next seen 2/18/2022 and indicates that though his adenopathy has improved after treatment described above that his jaw has become painful swollen and tender with additional bleeding.     The patient was referred to his primary dentist who then had him seen oral surgeon-Dr. Ezequiel Davila-reached at [phone] who felt that this was osteonecrosis and should be treated symptomatically.  As the patient reviewed 3/4/2022, wonderfully, his oral issues have nearly resolved with the gumline returning to essentially its previous state, no swelling, minimal erythema, no discharge and no additional pain.  He does have follow-up with oral surgery in the next several weeks and we discussed restarting his Keytruda scanning him likely in April 2022 in general.   He returns to the office on 3/25/2022 in follow-up in anticipation of cycle 24 Keytruda. He reports the left side of his jaw has improved.    However, he was now having issues with the right lower side. He was seen by neurosurgery with additional films and started on oral amoxicillin by his oral surgeon.      The patient continued his immunotherapy taking Keytruda 3/25/2022 and underwent repeat CT chest abdomen and pelvis 4/12/2022 that is reviewed with him 4/15/2022.  Wonderfully there is no substantial change with a small to moderate right pleural effusion that decreased in size, no additional pulmonary nodule or new metastatic disease, multifocal osteosclerotic metastatic disease without change and no new findings in the abdomen or pelvis.    The patient was seen 4/15/2022 indicating that he is actually feeling well in terms of general symptoms.  This includes lack of progression from mouth pain, ability to eat without discomfort.  He is, however, having worsening psoriasis particular in his lower extremities and has been reviewed by dermatology previously.    The  patient was reviewed 6/17/2022 having been seen by oral surgery with progression of his osteonecrosis and skin eruption.  He is currently undergoing assessment by infectious disease within the next week and we have reviewed that previously he responded to IVIG for in-hospital refractory sinusitis.  As a result he is asked to undergo reassessment for his immunoglobulin levels today and return next week for 400 mg/kg of IVIG infusion which we will continue monthly.    The patient was given IVIG 6/24/2022 and again 7/22/2022.  Assessment 8/5/2022 continue to show a degree of general improvement with oral surgery continue to follow him with a second opinion to be obtained  8/8/2022 when IVIG planned 8/19/2022 and 9/16/2022.    The patient slowly continued both assessments by oral surgery and  Scans considering his non-small cell lung cancer including CT studies 10/4/2022 that were essentially stable compared to previous.  Therapies include Keytruda every 3 weeks, daily Imbruvica, monthly IVIG every 4 weeks.    He next presentedwith the above-mentioned history, who returns the office  in anticipation of his 35 th cycle of Keytruda.  He was last treated with IVIG 11/11/2022.       At this point he had undergone surgery at [facility] including fistulectomy of the left mandible and debridement of the intraoral bone left mandible.  He he had also been seen by infectious disease, Dr. Champion regarding osteonecrosis of the jaw.  He was placed on antibiotics for 6 weeks including Levaquin and fluconazole.          There is an interaction with his fluconazole and ibrutinib and adjustments will be necessary.  [facility] is managing this.  He reports he is overall feeling very well.  He has had improvement in his ability to eat and drink since undergoing surgery on his jaw.  His skin is overall improved with the use of Aquaphor.  He did undergo CT scans prior to cycle 33 Keytruda with stability of his disease.  He  continues on current therapy with excellent tolerance.     Patient is next seen 11/18/2022.  Fortunately he feels that he is doing  reasonably well with surgery having been successful, pain virtually absent at this point, no additional shortness of breath or cough, appetite remaining fair, stable weight and sleep at least modestly managed.  Unfortunately his wife recently injured her femur and he has become her primary caregiver at this point.       The patient is next assessed 12/9/2022.  He is due for cycle #36 Keytruda and IVIG today.  He did not realize he was due for IVIG today, and already scheduled another appointment so would like to reschedule IVIG for next week.  He also continues on Imbruvica, currently at a reduced dose of 280 mg daily, due to interaction with Diflucan.  He was seen by Dr. Champion with infectious disease yesterday, 12/8/2022 and econazole was discontinued as patient has completed his 6-week course.  He was started on preventative Augmentin 500 mg twice daily until his gums grow to close over the exposed area of bone.  He is eating and drinking adequately, weight remains stable.  Bowels moving regularly.  Denies fever or chills.  Denies nausea or vomiting.  Denies new or worsening pain.  Denies skin rash or shortness of breath.  No new concerns today.    The patient continued Keytruda therapy including 12/9/2022 and IVIG 12/16/2022.  He is next seen 12/30/2022 for continued Keytruda.  We have discussed that he is clearly improving per his osteonecrosis and his completed his surgical therapy and now on prophylactic antibiotic therapy.  We have reviewed that he could discontinue his IVIG, continue his Imbruvica, Keytruda and prophylactic antibiotic therapy.  He is having urinary symptoms?  And a follow-up UA and culture is pending.    Note that the patient's urine cultures were negative we proceeded 1/20/2023 with Keytruda, Imbruvica with Keytruda given 2/10/2023 and the patient presented  "3/6/2023 for his next treatment.  We do plan repeat CT scans in April 2023.    Objective      Vitals:    03/27/23 1025   BP: 131/75   Pulse: 97   Resp: 16   Temp: 97.1 °F (36.2 °C)   TempSrc: Infrared   SpO2: 96%   Weight: 79.1 kg (174 lb 4.8 oz)   Height: 180.3 cm (70.98\")   PainSc: 0-No pain     Body mass index is 24.32 kg/m².     Current Status 3/27/2023   ECOG score 0     Physical Exam  Vitals and nursing note reviewed.   Constitutional:       Appearance: Normal appearance. He is well-developed.   HENT:      Head: Normocephalic and atraumatic.      Nose: Nose normal.   Eyes:      Pupils: Pupils are equal, round, and reactive to light.   Cardiovascular:      Rate and Rhythm: Normal rate and regular rhythm.      Heart sounds: Normal heart sounds.   Pulmonary:      Effort: Pulmonary effort is normal. No respiratory distress.      Breath sounds: Normal breath sounds. No wheezing, rhonchi or rales.   Abdominal:      General: Bowel sounds are normal. There is no distension.      Palpations: Abdomen is soft.      Tenderness: There is no abdominal tenderness.   Musculoskeletal:         General: Normal range of motion.      Cervical back: Normal range of motion and neck supple.   Skin:     General: Skin is warm and dry.      Findings: Rash (erythematous dry plaques scattered throughout the legs) present.   Neurological:      Mental Status: He is alert and oriented to person, place, and time.   Psychiatric:         Behavior: Behavior normal.       I have reexamined the patient and the results are consistent with the previously documented exam. RORY Zhao      RECENT LABS:  Results from last 7 days   Lab Units 03/27/23  1020   WBC 10*3/mm3 12.59*   NEUTROS ABS 10*3/mm3 10.26*   HEMOGLOBIN g/dL 12.6*   HEMATOCRIT % 41.4   PLATELETS 10*3/mm3 249     Results from last 7 days   Lab Units 03/27/23  1020   SODIUM mmol/L 140   POTASSIUM mmol/L 4.6   CHLORIDE mmol/L 103   CO2 mmol/L 26.1   BUN mg/dL 20 "   CREATININE mg/dL 1.45*   CALCIUM mg/dL 8.6   ALBUMIN g/dL 3.5   BILIRUBIN mg/dL 0.4   ALK PHOS U/L 83   ALT (SGPT) U/L 8   AST (SGOT) U/L 9   GLUCOSE mg/dL 119         FISH prognostic panel-Positive for deletion of 13 q. 14.3    Assessment & Plan   1.  CLL presenting with significant lymphocytosis, lymphadenopathy and splenomegaly.     · Positive for deletion of 13 q. 14.3.    · Patient status post 2 cycles of Treanda Rituxan with excellent response.    · Imaging 6/27/2019 with significant decrease in adenopathy.  Treanda Rituxan discontinued   · Imbruvica 420 mg daily initiated 7/25/2019.  · He continues on Imbruvica, without indication of progression of his CLL, without progressive lymphadenopathy on CT scans.  · Patient has resolving adenopathy particularly cervical regions 2/18/2022  · 3/25/2022 patient is without worsening adenopathy on exam today.  Continue Imbruvica.  · 10/28/2022 continues on Imbruvica 420 mg daily. Denies B-symptoms, no evidence of worsening adenopathy on exam.  CT scans without worsening adenopathy  · 11/18/2022: Imbruvica reduced to 280 mg daily while patient receiving 6-week course of antimicrobials due to interaction with fluconazole.  · 12/9/2022: He has finished his course of fluconazole, taking his last dose.  He has a few days left of Imbruvica 280 mg, which he will complete.  He will then increase Imbruvica back to regular dose of 420 mg daily.  · Excellent tolerance to Imbruvica with overall stability of his CLL    2.  Pulmonary nodules/infiltrates.  He is  status post bronchoscopy.  Is unclear at this time whether these findings are infectious or possibly related to his lymphoproliferative disorder.  This is seen to be improving on latest scans.      3.  Hypogammaglobulinemia-status post IVIG with resolution of sinusitis.   · Anticipate trial of IVIG with the patient now having progression of his osteonecrosis and dermal infection.  · Continues with monthly IVIG.   · Reviewed  12/30/2022 with IVIG discontinued.    4.  Non-small cell lung carcinoma  · August 26 2020 revealing a new 1.6 cm spiculated nodule within the left upper lobe, 1.2 cm left adrenal nodule, bone windows with a soft tissue mass involving the right eighth rib measuring 3.7 x 2.6 cm.    · Biopsy was consistent with adenocarcinoma CK 7+, CK 20-, lung primary suspected clinically, molecular panel not yet obtained.  · PET/CT 9/23/2020 demonstrating asymmetric uptake within the right parotid gland which is unremarkable appearance on noncontrasted CT, SUV of 6 compared to 2.7.  There is no hilar, mediastinal or axillary adenopathy, findings of asymmetric moderate to intense FDG uptake within superior aspect the right chest wall anterior to the right first rib with an irregular hypodense lesion measuring 1.8 x 1.6 and SUV 4.9.  This had not been seen June 27, 2019.  There is an intensely FDG avid nodule within the lingula measuring 1.9 x 1.5 history of 12.4, FDG avid left adrenal nodule 1.9 x 1.5 with an issue 21.2.    · Evaluated by radiation oncology therapy September 29 and started on radiation therapy to the right chest wall-35 Gy in 10 fractions.   ·  Plans were also then proceed with SBRT to the solitary left upper lobe lesion pending insurance approval.  · Further testing including TPS of 20% and foundation CDX with a TMB of greater than 10 mutations per megabase (28 mutations per megabase) and the indication that several immunotherapies could be useful in this patient's care.  Additional genomic findings include BRAF G469R/that trametinib could be useful and STK11/that everolimus could be useful.  · Keytruda initiated 10/21/2021   · Reassessment after 2 cycles of Keytruda with enlargement of the right eighth rib lesion,enlargement of spiculated left upper lobe lung mass, moderate to large right-sided pleural effusion, new left adrenal mass and enlarged retrocrural lymph node.?Pseudoprogression  · Xgeva last given  12/30/2020  · Follow-up scans 1/6/2021 demonstrated marked improvement in his right eighth rib lesion, resolution of left upper lobe spiculated mass, residual large right pleural effusion, resolution of left adrenal metastasis.   · Right pleural effusion, with thoracentesis 1/14/2021 with 1500 mils removed.  Pathology consistent with malignant effusion   · CT scans 4/5/2021 with response noted in the left upper lobe density.  · He continues to receive Keytruda every 3 weeks along with Xgeva every 6 weeks.  · Repeat scan 7/15/2021 without evidence of recurrence or progressive disease.  Plans to continue Keytruda every 3 weeks with repeat scans in 4 to 6 months  · Patient status post PET/CT 11/11/2021 with hyper response, approximate remission status, plan to proceed with cycle #20 Keytruda  · Patient seen 2/18/2022 with Keytruda held while his oral issues are addressed-concern for osteonecrosis of the jaw.  · Patiently diagnosed with osteonecrosis of the jaw, seen by oral surgery, started on antibiotic therapy and Peridex with substantial improvement, Xgeva discontinued as discussed below.  · 3/25/2022 proceed with cycle #24 Keytruda today.  Follow-up CT scans 4/12/2022 without evidence of progressive disease, stable findings including osteosclerotic metastatic disease.  Keytruda continued  · Repeat imaging 10/4/2022 with overall stable disease, Keytruda continued  · 12/9/2022: Proceed with cycle #36 Keytruda today.  Plan repeat imaging April 2023.  · Proceed today, 3/27/2023 with cycle 41 Keytruda which is continued every 3 weeks    5.  Left knee metastasis  · Evaluated by orthopedic oncology, Dr. Eliu Ochoa  · Admission 1/7/2021 undergoing stabilization of left femoral bone lesion, prophylactic stabilization with intramedullary implants.  · It was suggested we delay Xgeva or Zometa to allow bone healing  · Completed radiation with 10 fractions 2/10/2021  · Xgeva resumed 4/29/2021, he continues to receive this  every 6 weeks.    · Additional assessment 2/18/2022 with left jaw pain, subsequent diagnosis of osteoporosis, Xgeva discontinued  · Patient continues follow-up with oral surgery, progression of osteoporosis symptomatically, dermal eruption left side of mandibular region, IVIG anticipated  · Xgeva DISCONTINUED due to osteonecrosis of the jaw  · The patient's pain per his knee is not relapsed.     6.  Malignant right pleural effusion  · Ultrasound thoracentesis 1/14/2021 1500 mL removed  · Chest x-ray 2/25/2021 with moderate right pleural effusion  · Progressive symptoms with worsening pain 4/5/2021  · Ultrasound-guided thoracentesis 4/13/2021 with 2050 ml removed.  · Plans for Pleurx catheter with thoracic surgery, however, pleural effusion has not recurred.  Patient seen 11/4/2021 with chest x-ray planned.  Subsequent resolution noted on CT scan.  · Improvement in bilateral pleural effusions on most recent CT scan    7.  Cancer related pain  · Pain is most prominent in his right rib, utilizing MS Contin 30 mg 3 times a day  · Hydrocodone/acetaminophen 10/325 as needed for breakthrough pain.  Currently taking 3 to 4 tablets daily  · He is not currently in need of a refill of pain medications  · Discussed MS Contin at 30 mg p.o. every 8 hours, Norco 10/325 2 p.o. every 6 hours  · He is currently using pain medication as needed for jaw pain.  Requiring approximately 1 MS Contin and 1 Norco 10/325 daily, however some days not requiring any pain medication.  · Reports his pain is well controlled, not currently requiring pain medication.  · As of 3/27/2023 patient continues to report no need for pain medication at this time.    8.  Insomnia  · Continuing to follow with behavioral oncology  · Continuing Remeron 7.5 mg nightly with fair control.  The patient  when assessed 12/30/2022.  · 1/20/2023 continues on Remeron 7.5 mg nightly.    9. Health maintenance  · Status post COVID-19 booster, SARS antibody pending  today  · The patient is due for Shingrix and Prevnar which will both be administered in the clinic 9/23/2021    10.  Skin lesions  · Uncertain of etiology, unexpected findings for usual skin lesions associated with immunotherapy  · Request dermatologic assessment-psoriasiform dermatitis.  Seen by Dr. Damon though no follow-up due to personal preference  · The lesions on the lower extremities particularly are quite extensive.  At present he feels that they are stable enough not to require additional therapy.    11.  Right knee pain  · Osteoarthritis, orthopedic assessment upon patient's request.   · Resolved.  Denies any knee pain today     12.  Hypocalcemia  · Stabilized  · 1/20/2023 calcium is dropped to 7.9 patient will start taking 2 Tums daily.     13.  Osteonecrosis of the jaw  · Xgeva was discontinued.  · Patient went to second opinion with  with oral surgery.  He was reffered to Dr. Escalante with , with whom he had consult on 8/23/2022.  Dr. Escalante recommened an outpatient procedure that would allow him to biopsy the affected area of his jawbone and identify which bacteria was present, they would then coordinate with ID to identify best antibiotic option.  Patient is agreeable to proceeding with this plan, and is awaiting surgery date.  · The patient underwent surgery 10/7/2022  · Follow-up with Dr. Champion, infectious disease 10/27/2022 with recommendations for 6 weeks of Levaquin and fluconazole.  This should complete by approximately mid December 2023.  · Was seen by Dr. Champion 12/8/2022 with plans to complete fluconazole.  Decision made to start Augmentin 500 mg twice daily for prevention.  · Patient currently being followed by Dr. Champion with reassessment planned in June 2023        Plan:   1. Proceed with cycle 41st Keytruda today.  2. Continue Imbruvica 420 mg daily.  3. Patient was reminded the importance of hydrating emollient creams to his legs.  4. Continue to follow with Dr. Champion regarding  osteonecrosis of the jaw.  5. Continue Remeron 7.5 mg nightly.  He denies needing adjustments today  6. 2 weeks, the patient will undergo CT of the chest abdomen and pelvis to evaluate his disease state  7. MD follow-up with Dr. Parekh in 3 weeks with CBC CMP, CT scan review and possible cycle 42 Keytruda pending scan results.    Patient is on a high risk medication requiring close monitoring for toxicity.      Noa Zarate, APRN   3/27/2023

## 2023-03-27 ENCOUNTER — INFUSION (OUTPATIENT)
Dept: ONCOLOGY | Facility: HOSPITAL | Age: 68
End: 2023-03-27
Payer: MEDICARE

## 2023-03-27 ENCOUNTER — OFFICE VISIT (OUTPATIENT)
Dept: ONCOLOGY | Facility: CLINIC | Age: 68
End: 2023-03-27
Payer: MEDICARE

## 2023-03-27 VITALS
BODY MASS INDEX: 24.4 KG/M2 | WEIGHT: 174.3 LBS | HEIGHT: 71 IN | SYSTOLIC BLOOD PRESSURE: 131 MMHG | HEART RATE: 97 BPM | RESPIRATION RATE: 16 BRPM | OXYGEN SATURATION: 96 % | DIASTOLIC BLOOD PRESSURE: 75 MMHG | TEMPERATURE: 97.1 F

## 2023-03-27 DIAGNOSIS — Z79.899 HIGH RISK MEDICATION USE: ICD-10-CM

## 2023-03-27 DIAGNOSIS — Z45.2 FITTING AND ADJUSTMENT OF VASCULAR CATHETER: ICD-10-CM

## 2023-03-27 DIAGNOSIS — C34.90 LUNG CANCER METASTATIC TO BONE: ICD-10-CM

## 2023-03-27 DIAGNOSIS — Z79.899 HIGH RISK MEDICATION USE: Primary | ICD-10-CM

## 2023-03-27 DIAGNOSIS — C91.10 CLL (CHRONIC LYMPHOCYTIC LEUKEMIA): ICD-10-CM

## 2023-03-27 DIAGNOSIS — C34.12 MALIGNANT NEOPLASM OF UPPER LOBE OF LEFT LUNG: ICD-10-CM

## 2023-03-27 DIAGNOSIS — C79.51 LUNG CANCER METASTATIC TO BONE: ICD-10-CM

## 2023-03-27 DIAGNOSIS — C34.12 MALIGNANT NEOPLASM OF UPPER LOBE OF LEFT LUNG: Primary | ICD-10-CM

## 2023-03-27 DIAGNOSIS — M87.180 OSTEONECROSIS DUE TO DRUGS, JAW: ICD-10-CM

## 2023-03-27 LAB
ALBUMIN SERPL-MCNC: 3.5 G/DL (ref 3.5–5.2)
ALBUMIN/GLOB SERPL: 1.9 G/DL (ref 1.1–2.4)
ALP SERPL-CCNC: 83 U/L (ref 38–116)
ALT SERPL W P-5'-P-CCNC: 8 U/L (ref 0–41)
ANION GAP SERPL CALCULATED.3IONS-SCNC: 10.9 MMOL/L (ref 5–15)
AST SERPL-CCNC: 9 U/L (ref 0–40)
BASOPHILS # BLD AUTO: 0.08 10*3/MM3 (ref 0–0.2)
BASOPHILS NFR BLD AUTO: 0.6 % (ref 0–1.5)
BILIRUB SERPL-MCNC: 0.4 MG/DL (ref 0.2–1.2)
BUN SERPL-MCNC: 20 MG/DL (ref 6–20)
BUN/CREAT SERPL: 13.8 (ref 7.3–30)
CALCIUM SPEC-SCNC: 8.6 MG/DL (ref 8.5–10.2)
CHLORIDE SERPL-SCNC: 103 MMOL/L (ref 98–107)
CO2 SERPL-SCNC: 26.1 MMOL/L (ref 22–29)
CREAT SERPL-MCNC: 1.45 MG/DL (ref 0.7–1.3)
DEPRECATED RDW RBC AUTO: 52.3 FL (ref 37–54)
EGFRCR SERPLBLD CKD-EPI 2021: 52.5 ML/MIN/1.73
EOSINOPHIL # BLD AUTO: 0.33 10*3/MM3 (ref 0–0.4)
EOSINOPHIL NFR BLD AUTO: 2.6 % (ref 0.3–6.2)
ERYTHROCYTE [DISTWIDTH] IN BLOOD BY AUTOMATED COUNT: 15.8 % (ref 12.3–15.4)
GLOBULIN UR ELPH-MCNC: 1.8 GM/DL (ref 1.8–3.5)
GLUCOSE SERPL-MCNC: 119 MG/DL (ref 74–124)
HCT VFR BLD AUTO: 41.4 % (ref 37.5–51)
HGB BLD-MCNC: 12.6 G/DL (ref 13–17.7)
IMM GRANULOCYTES # BLD AUTO: 0.07 10*3/MM3 (ref 0–0.05)
IMM GRANULOCYTES NFR BLD AUTO: 0.6 % (ref 0–0.5)
LYMPHOCYTES # BLD AUTO: 0.79 10*3/MM3 (ref 0.7–3.1)
LYMPHOCYTES NFR BLD AUTO: 6.3 % (ref 19.6–45.3)
MCH RBC QN AUTO: 27.6 PG (ref 26.6–33)
MCHC RBC AUTO-ENTMCNC: 30.4 G/DL (ref 31.5–35.7)
MCV RBC AUTO: 90.6 FL (ref 79–97)
MONOCYTES # BLD AUTO: 1.06 10*3/MM3 (ref 0.1–0.9)
MONOCYTES NFR BLD AUTO: 8.4 % (ref 5–12)
NEUTROPHILS NFR BLD AUTO: 10.26 10*3/MM3 (ref 1.7–7)
NEUTROPHILS NFR BLD AUTO: 81.5 % (ref 42.7–76)
NRBC BLD AUTO-RTO: 0 /100 WBC (ref 0–0.2)
PLATELET # BLD AUTO: 249 10*3/MM3 (ref 140–450)
PMV BLD AUTO: 11.6 FL (ref 6–12)
POTASSIUM SERPL-SCNC: 4.6 MMOL/L (ref 3.5–4.7)
PROT SERPL-MCNC: 5.3 G/DL (ref 6.3–8)
RBC # BLD AUTO: 4.57 10*6/MM3 (ref 4.14–5.8)
SODIUM SERPL-SCNC: 140 MMOL/L (ref 134–145)
T4 FREE SERPL-MCNC: 1.49 NG/DL (ref 0.93–1.7)
TSH SERPL DL<=0.05 MIU/L-ACNC: 2.38 UIU/ML (ref 0.27–4.2)
WBC NRBC COR # BLD: 12.59 10*3/MM3 (ref 3.4–10.8)

## 2023-03-27 PROCEDURE — 80053 COMPREHEN METABOLIC PANEL: CPT

## 2023-03-27 PROCEDURE — 1160F RVW MEDS BY RX/DR IN RCRD: CPT | Performed by: NURSE PRACTITIONER

## 2023-03-27 PROCEDURE — 25010000002 HEPARIN LOCK FLUSH PER 10 UNITS: Performed by: INTERNAL MEDICINE

## 2023-03-27 PROCEDURE — 96413 CHEMO IV INFUSION 1 HR: CPT

## 2023-03-27 PROCEDURE — 1126F AMNT PAIN NOTED NONE PRSNT: CPT | Performed by: NURSE PRACTITIONER

## 2023-03-27 PROCEDURE — 1159F MED LIST DOCD IN RCRD: CPT | Performed by: NURSE PRACTITIONER

## 2023-03-27 PROCEDURE — 25010000002 PEMBROLIZUMAB 100 MG/4ML SOLUTION 4 ML VIAL: Performed by: NURSE PRACTITIONER

## 2023-03-27 PROCEDURE — 99214 OFFICE O/P EST MOD 30 MIN: CPT | Performed by: NURSE PRACTITIONER

## 2023-03-27 PROCEDURE — 84443 ASSAY THYROID STIM HORMONE: CPT | Performed by: INTERNAL MEDICINE

## 2023-03-27 PROCEDURE — 85025 COMPLETE CBC W/AUTO DIFF WBC: CPT

## 2023-03-27 PROCEDURE — 84439 ASSAY OF FREE THYROXINE: CPT | Performed by: INTERNAL MEDICINE

## 2023-03-27 RX ORDER — SODIUM CHLORIDE 9 MG/ML
250 INJECTION, SOLUTION INTRAVENOUS ONCE
Status: CANCELLED | OUTPATIENT
Start: 2023-03-27

## 2023-03-27 RX ORDER — HEPARIN SODIUM (PORCINE) LOCK FLUSH IV SOLN 100 UNIT/ML 100 UNIT/ML
500 SOLUTION INTRAVENOUS AS NEEDED
OUTPATIENT
Start: 2023-03-27

## 2023-03-27 RX ORDER — SODIUM CHLORIDE 9 MG/ML
250 INJECTION, SOLUTION INTRAVENOUS ONCE
Status: COMPLETED | OUTPATIENT
Start: 2023-03-27 | End: 2023-03-27

## 2023-03-27 RX ORDER — SODIUM CHLORIDE 0.9 % (FLUSH) 0.9 %
10 SYRINGE (ML) INJECTION AS NEEDED
Status: DISCONTINUED | OUTPATIENT
Start: 2023-03-27 | End: 2023-03-27 | Stop reason: HOSPADM

## 2023-03-27 RX ORDER — HEPARIN SODIUM (PORCINE) LOCK FLUSH IV SOLN 100 UNIT/ML 100 UNIT/ML
500 SOLUTION INTRAVENOUS AS NEEDED
Status: DISCONTINUED | OUTPATIENT
Start: 2023-03-27 | End: 2023-03-27 | Stop reason: HOSPADM

## 2023-03-27 RX ORDER — SODIUM CHLORIDE 0.9 % (FLUSH) 0.9 %
10 SYRINGE (ML) INJECTION AS NEEDED
OUTPATIENT
Start: 2023-03-27

## 2023-03-27 RX ADMIN — SODIUM CHLORIDE 200 MG: 9 INJECTION, SOLUTION INTRAVENOUS at 11:43

## 2023-03-27 RX ADMIN — SODIUM CHLORIDE 250 ML: 9 INJECTION, SOLUTION INTRAVENOUS at 11:43

## 2023-03-27 RX ADMIN — Medication 500 UNITS: at 12:19

## 2023-03-27 RX ADMIN — Medication 10 ML: at 12:18

## 2023-04-12 ENCOUNTER — INFUSION (OUTPATIENT)
Dept: ONCOLOGY | Facility: HOSPITAL | Age: 68
End: 2023-04-12
Payer: MEDICARE

## 2023-04-12 ENCOUNTER — HOSPITAL ENCOUNTER (OUTPATIENT)
Dept: CT IMAGING | Facility: HOSPITAL | Age: 68
Discharge: HOME | End: 2023-04-12
Admitting: NURSE PRACTITIONER
Payer: MEDICARE

## 2023-04-12 DIAGNOSIS — C91.10 CLL (CHRONIC LYMPHOCYTIC LEUKEMIA): ICD-10-CM

## 2023-04-12 DIAGNOSIS — C34.12 MALIGNANT NEOPLASM OF UPPER LOBE OF LEFT LUNG: ICD-10-CM

## 2023-04-12 PROCEDURE — 71260 CT THORAX DX C+: CPT

## 2023-04-12 PROCEDURE — 0 DIATRIZOATE MEGLUMINE & SODIUM PER 1 ML: Performed by: NURSE PRACTITIONER

## 2023-04-12 PROCEDURE — 25510000001 IOPAMIDOL 61 % SOLUTION: Performed by: NURSE PRACTITIONER

## 2023-04-12 PROCEDURE — 74177 CT ABD & PELVIS W/CONTRAST: CPT

## 2023-04-12 RX ORDER — AMOXICILLIN 500 MG/1
1000 CAPSULE ORAL
Qty: 84 CAPSULE | Refills: 0 | Status: SHIPPED | OUTPATIENT
Start: 2023-04-12 | End: 2023-05-24

## 2023-04-12 RX ADMIN — IOPAMIDOL 90 ML: 612 INJECTION, SOLUTION INTRAVENOUS at 11:24

## 2023-04-12 RX ADMIN — DIATRIZOATE MEGLUMINE AND DIATRIZOATE SODIUM 30 ML: 660; 100 LIQUID ORAL; RECTAL at 10:20

## 2023-04-14 NOTE — PROGRESS NOTES
Subjective Patient continues to feel relatively good, periodic urinary symptoms persist but are improved      REASON FOR FOLLOW UP:  1.  Chronic lymphocytic leukemia with extensive lymphadenopathy and possible pleural involvement. Initiation of Treanda, Rituxan May 1, 2019.  2.  Hypogammaglobulinemia.  Status is post IVIG  3.  Significant response on scans June 27, 2019.  Treatment changed to Imbruvica 420 mg daily.  4.  Metastatic non-small cell lung carcinoma on Keytruda  5.  Patient seen 2/18/2022 with left jaw/gumline swelling pain, associated hematoma?  Osteonecrosis.  Restart of Keytruda 3/4/2022, Xgeva discontinued  6.  Restaging via CT 4/12/2022, continued response, Keytruda continued, referral to dermatology for worsening psoriasis.  7.  Jaw osteonecrosis, undergoing therapy through oral surgery, IVIG anticipated, infectious disease and oral surgery follow-up  8.  Patient assessed 11/18/2022, Keytruda continued, ibrutinib-dose reduced-continued, patient seen by  physicians, status postdebridement of bone of left mandible with bone biopsy, fistulectomy of left mandible 10/20/2022  9.  Patient seen 12/30/2022 continue to improve,?  Urinary tract symptoms that have continued to improve.  10.  Repeat scans 4/12/2023 without evidence of recurrence.  11.  Patient seen 4/17/2023, Keytruda therapy held, urinary symptoms improving?      HISTORY OF PRESENT ILLNESS:    The patient is a 67 y.o. male with the above mentioned history here today for lab review and evaluation prior to cycle 38  Keytruda.  He reports overall he is doing well.  He does continue on antibiotics from Dr. Pak before his osteonecrosis of the jaw.  He does have 1 area of exposed bone on his jaw they are hoping that the gum tissue will grow over this.  He would follow-up with Dr. Champion in June.    Patient also previously been on antibiotics for UTI.  He does report some urgency which is still present.  No fevers or chills.  Denies any issues  with increased shortness of breath.    He does report some intermittent pain on the right lateral portion of his chest near the eighth rib.  He states when he moves at times he has a sharp pain that is quick, he describes it as feeling like a strain.  This is only happened occasionally over the past few weeks.    The patient is next seen 3/6/2023 without additional chest discomfort and with lessening UTI symptoms.  We have discussed repeat scans in April 2023.    The patient did proceed to repeat CT chest abdomen pelvis 4/12/2023 with stable findings overall including a small right pleural effusion, right rib metastatic lesion with increase sclerosis, overlying soft tissue thickening without interval change in additional osseous lesions over the interval change.  Patient evaluated 3/27/23 with general improvement though difficulty with dysuria and bladder spasm requiring additional antibiotic therapies.    These findings are discussed with the patient/17/23 and he feels that his urinary symptoms improved when he stopped his antibiotic therapy used for his osteonecrosis- amoxicillin.  We discussed that his findings including his long-term improvement/remission and, potentially, his urinary symptoms, in part, could be from continued immunotherapy.  This would be an opportunity to now discontinue that therapy and observe him expectantly.        Past Medical History, Past Surgical History, Social History, Family History have been reviewed and are without significant changes except as mentioned.    Hematologic/oncologic history:    As concerns his CLL history he initially required Treanda Rituxan 5/1/2019 for 2 cycles and then initiated Imbruvica 420 mg daily initiated 7/25/2019.       As concerns stage IV non-small cell lung carcinoma he returns the office continuing Keytruda which he continues to receive every 3 weeks which was initiated 10/21/2020.  He also receives Xgeva every 6 weeks which was initially given  "12/2/2020 and discontinued 1/7/2022 secondary to osteonecrosis.      Additional issues that developed during treatment included right knee pain with plain views of his right knee showing a right knee effusion with medial compartment narrowing and no suspicious bone lesion.  There is benign periosteal calcification along the distal right femur.  A PET/CT 11/11 went on to demonstrate a complete metabolic response to therapy compared to PET/CT from 9/23/2020.  This includes his primary lung neoplasm left upper lobe, large osseous metastatic lesion the right chest wall region from the eighth rib, small left adrenal metastasis, and no evidence of disease involving the distal left femur.      The patient was seen 11/24/2021 continue to do well though indicating that his right knee is \"something I can manage at the moment\".  He prefers not to have orthopedic assessment at this point.  He is concerned, ever, about skin lesions that are developing primarily on his calf regions and into his right thigh.    The patient was seen by Dr. Regan Damon 11/29/2021 and felt to be consistent with psoriasiform dermatitis treated with intralesional therapy.  Was also started on a moisturizer and lipid therapy.  The patient returned for assessment 12/15/2021 and treated with cycle #21 Keytruda, returned 1/7/2022 for cycle #22 and 1/28/2022 for cycle #23.  At that visit he had developed adenopathy several weeks previous and was treated with a Medrol Dosepak.   The patient was next seen 2/18/2022 and indicates that though his adenopathy has improved after treatment described above that his jaw has become painful swollen and tender with additional bleeding.     The patient was referred to his primary dentist who then had him seen oral surgeon-Dr. Ezequiel Davila-reached at [phone] who felt that this was osteonecrosis and should be treated symptomatically.  As the patient reviewed 3/4/2022, wonderfully, his oral issues have nearly resolved " with the gumline returning to essentially its previous state, no swelling, minimal erythema, no discharge and no additional pain.  He does have follow-up with oral surgery in the next several weeks and we discussed restarting his Keytruda scanning him likely in April 2022 in general.   He returns to the office on 3/25/2022 in follow-up in anticipation of cycle 24 Keytruda. He reports the left side of his jaw has improved.    However, he was now having issues with the right lower side. He was seen by neurosurgery with additional films and started on oral amoxicillin by his oral surgeon.      The patient continued his immunotherapy taking Keytruda 3/25/2022 and underwent repeat CT chest abdomen and pelvis 4/12/2022 that is reviewed with him 4/15/2022.  Wonderfully there is no substantial change with a small to moderate right pleural effusion that decreased in size, no additional pulmonary nodule or new metastatic disease, multifocal osteosclerotic metastatic disease without change and no new findings in the abdomen or pelvis.    The patient was seen 4/15/2022 indicating that he is actually feeling well in terms of general symptoms.  This includes lack of progression from mouth pain, ability to eat without discomfort.  He is, however, having worsening psoriasis particular in his lower extremities and has been reviewed by dermatology previously.    The patient was reviewed 6/17/2022 having been seen by oral surgery with progression of his osteonecrosis and skin eruption.  He is currently undergoing assessment by infectious disease within the next week and we have reviewed that previously he responded to IVIG for in-hospital refractory sinusitis.  As a result he is asked to undergo reassessment for his immunoglobulin levels today and return next week for 400 mg/kg of IVIG infusion which we will continue monthly.    The patient was given IVIG 6/24/2022 and again 7/22/2022.  Assessment 8/5/2022 continue to show a degree of  general improvement with oral surgery continue to follow him with a second opinion to be obtained  8/8/2022 when IVIG planned 8/19/2022 and 9/16/2022.    The patient slowly continued both assessments by oral surgery and  Scans considering his non-small cell lung cancer including CT studies 10/4/2022 that were essentially stable compared to previous.  Therapies include Keytruda every 3 weeks, daily Imbruvica, monthly IVIG every 4 weeks.    He next presentedwith the above-mentioned history, who returns the office  in anticipation of his 35 th cycle of Keytruda.  He was last treated with IVIG 11/11/2022.       At this point he had undergone surgery at [facility] including fistulectomy of the left mandible and debridement of the intraoral bone left mandible.  He he had also been seen by infectious disease, Dr. Champion regarding osteonecrosis of the jaw.  He was placed on antibiotics for 6 weeks including Levaquin and fluconazole.          There is an interaction with his fluconazole and ibrutinib and adjustments will be necessary.  [facility] [place] pharmacy is managing this.  He reports he is overall feeling very well.  He has had improvement in his ability to eat and drink since undergoing surgery on his jaw.  His skin is overall improved with the use of Aquaphor.  He did undergo CT scans prior to cycle 33 Keytruda with stability of his disease.  He continues on current therapy with excellent tolerance.     Patient is next seen 11/18/2022.  Fortunately he feels that he is doing  reasonably well with surgery having been successful, pain virtually absent at this point, no additional shortness of breath or cough, appetite remaining fair, stable weight and sleep at least modestly managed.  Unfortunately his wife recently injured her femur and he has become her primary caregiver at this point.       The patient is next assessed 12/9/2022.  He is due for cycle #36 Keytruda and IVIG today.  He did not realize he was due for IVIG  today, and already scheduled another appointment so would like to reschedule IVIG for next week.  He also continues on Imbruvica, currently at a reduced dose of 280 mg daily, due to interaction with Diflucan.  He was seen by Dr. Champion with infectious disease yesterday, 12/8/2022 and econazole was discontinued as patient has completed his 6-week course.  He was started on preventative Augmentin 500 mg twice daily until his gums grow to close over the exposed area of bone.  He is eating and drinking adequately, weight remains stable.  Bowels moving regularly.  Denies fever or chills.  Denies nausea or vomiting.  Denies new or worsening pain.  Denies skin rash or shortness of breath.  No new concerns today.    The patient continued Keytruda therapy including 12/9/2022 and IVIG 12/16/2022.  He is next seen 12/30/2022 for continued Keytruda.  We have discussed that he is clearly improving per his osteonecrosis and his completed his surgical therapy and now on prophylactic antibiotic therapy.  We have reviewed that he could discontinue his IVIG, continue his Imbruvica, Keytruda and prophylactic antibiotic therapy.  He is having urinary symptoms?  And a follow-up UA and culture is pending.    Note that the patient's urine cultures were negative we proceeded 1/20/2023 with Keytruda, Imbruvica with Keytruda given 2/10/2023 and the patient presented 3/6/2023 for his next treatment.  We do plan repeat CT scans in April 2023.    Repeat scans 4/12/2023-stable CT chest and pelvis with small right pleural effusion, parenchymal scarring right lower lobe, right rib metastatic lesion with increase sclerosis, overlying soft tissue thickening without interval change in osseous lesions additionally without change.    Patient seen 4/17/2023 at which time immunotherapy was held and patient placed on observation status.  This included some concern about whether his urinary symptoms could be related to ongoing immunotherapy.  This would be  "observed off Keytruda.    Objective      Vitals:    04/17/23 1143   BP: 116/68   Pulse: 89   Resp: 16   Temp: 97.1 °F (36.2 °C)   TempSrc: Temporal   SpO2: 96%   Weight: 79.9 kg (176 lb 3.2 oz)   Height: 180.3 cm (70.98\")   PainSc: 0-No pain         4/17/2023    11:44 AM   Current Status   ECOG score 0     Physical Exam  Vitals and nursing note reviewed.   Constitutional:       Appearance: Normal appearance. He is well-developed.   HENT:      Head: Normocephalic and atraumatic.      Nose: Nose normal.   Eyes:      Pupils: Pupils are equal, round, and reactive to light.   Cardiovascular:      Rate and Rhythm: Normal rate and regular rhythm.      Heart sounds: Normal heart sounds.   Pulmonary:      Effort: Pulmonary effort is normal. No respiratory distress.      Breath sounds: Normal breath sounds. No wheezing, rhonchi or rales.   Abdominal:      General: Bowel sounds are normal. There is no distension.      Palpations: Abdomen is soft.      Tenderness: There is no abdominal tenderness.   Musculoskeletal:         General: Normal range of motion.      Cervical back: Normal range of motion and neck supple.   Skin:     General: Skin is warm and dry.   Neurological:      Mental Status: He is alert and oriented to person, place, and time.   Psychiatric:         Behavior: Behavior normal.       I have reexamined the patient and the results are consistent with the previously documented exam. Jostin Parekh MD      RECENT LABS:  Results from last 7 days   Lab Units 04/17/23  1107   WBC 10*3/mm3 8.00   NEUTROS ABS 10*3/mm3 5.89   HEMOGLOBIN g/dL 12.8*   HEMATOCRIT % 41.9   PLATELETS 10*3/mm3 238     Results from last 7 days   Lab Units 04/17/23  1107   SODIUM mmol/L 138   POTASSIUM mmol/L 4.6   CHLORIDE mmol/L 105   CO2 mmol/L 23.8   BUN mg/dL 20   CREATININE mg/dL 1.47*   CALCIUM mg/dL 8.1*   ALBUMIN g/dL 3.4*   BILIRUBIN mg/dL 0.4   ALK PHOS U/L 95   ALT (SGPT) U/L 9   AST (SGOT) U/L 12   GLUCOSE mg/dL 106         FISH " prognostic panel-Positive for deletion of 13 q. 14.3    Assessment & Plan   1.  CLL presenting with significant lymphocytosis, lymphadenopathy and splenomegaly.     · Positive for deletion of 13 q. 14.3.    · Patient status post 2 cycles of Treanda Rituxan with excellent response.    · Imaging 6/27/2019 with significant decrease in adenopathy.  Treanda Rituxan discontinued   · Imbruvica 420 mg daily initiated 7/25/2019.  · He continues on Imbruvica, without indication of progression of his CLL, without progressive lymphadenopathy on CT scans.  · Patient has resolving adenopathy particularly cervical regions 2/18/2022  · 3/25/2022 patient is without worsening adenopathy on exam today.  Continue Imbruvica.  · 10/28/2022 continues on Imbruvica 420 mg daily. Denies B-symptoms, no evidence of worsening adenopathy on exam.  CT scans without worsening adenopathy  · 11/18/2022: Imbruvica reduced to 280 mg daily while patient receiving 6-week course of antimicrobials due to interaction with fluconazole.  · 12/9/2022: He has finished his course of fluconazole, taking his last dose today.  He has a few days left of Imbruvica 280 mg, which he will complete.  He will then increase Imbruvica back to regular dose of 420 mg daily.  · Patient stable/17/23, 4/17/2023    2.  Pulmonary nodules/infiltrates.  He is  status post bronchoscopy.  Is unclear at this time whether these findings are infectious or possibly related to his lymphoproliferative disorder.  This is seen to be improving on latest scans- 4/12/2023                                                                                           3.  Hypogammaglobulinemia-status post IVIG with resolution of sinusitis.   · Anticipate trial of IVIG with the patient now having progression of his osteonecrosis and dermal infection.  · Continues with monthly IVIG.  Due today, however did not realize he was due for IVIG and would like to reschedule for next week.  · Reviewed 12/30/2022  with IVIG discontinued.    4.  Non-small cell lung carcinoma  · August 26 2020 revealing a new 1.6 cm spiculated nodule within the left upper lobe, 1.2 cm left adrenal nodule, bone windows with a soft tissue mass involving the right eighth rib measuring 3.7 x 2.6 cm.    · Biopsy was consistent with adenocarcinoma CK 7+, CK 20-, lung primary suspected clinically, molecular panel not yet obtained.  · PET/CT 9/23/2020 demonstrating asymmetric uptake within the right parotid gland which is unremarkable appearance on noncontrasted CT, SUV of 6 compared to 2.7.  There is no hilar, mediastinal or axillary adenopathy, findings of asymmetric moderate to intense FDG uptake within superior aspect the right chest wall anterior to the right first rib with an irregular hypodense lesion measuring 1.8 x 1.6 and SUV 4.9.  This had not been seen June 27, 2019.  There is an intensely FDG avid nodule within the lingula measuring 1.9 x 1.5 history of 12.4, FDG avid left adrenal nodule 1.9 x 1.5 with an issue 21.2.    · Evaluated by radiation oncology therapy September 29 and started on radiation therapy to the right chest wall-35 Gy in 10 fractions.   ·  Plans were also then proceed with SBRT to the solitary left upper lobe lesion pending insurance approval.  · Further testing including TPS of 20% and foundation CDX with a TMB of greater than 10 mutations per megabase (28 mutations per megabase) and the indication that several immunotherapies could be useful in this patient's care.  Additional genomic findings include BRAF [id number]/that trametinib could be useful and STK11/that everolimus could be useful.  · Keytruda initiated 10/21/2021   · Reassessment after 2 cycles of Keytruda with enlargement of the right eighth rib lesion,enlargement of spiculated left upper lobe lung mass, moderate to large right-sided pleural effusion, new left adrenal mass and enlarged retrocrural lymph node.?Pseudoprogression  · Xgeva last given  12/30/2020  · Follow-up scans 1/6/2021 demonstrated marked improvement in his right eighth rib lesion, resolution of left upper lobe spiculated mass, residual large right pleural effusion, resolution of left adrenal metastasis.   · Right pleural effusion, with thoracentesis 1/14/2021 with 1500 mils removed.  Pathology consistent with malignant effusion   · CT scans 4/5/2021 with response noted in the left upper lobe density.  · He continues to receive Keytruda every 3 weeks along with Xgeva every 6 weeks.  · Repeat scan 7/15/2021 without evidence of recurrence or progressive disease.  Plans to continue Keytruda every 3 weeks with repeat scans in 4 to 6 months  · Patient status post PET/CT 11/11/2021 with hyper response, approximate remission status, plan to proceed with cycle #20 Keytruda  · Patient seen 2/18/2022 with Keytruda held while his oral issues are addressed-concern for osteonecrosis of the jaw.  · Patiently diagnosed with osteonecrosis of the jaw, seen by oral surgery, started on antibiotic therapy and Peridex with substantial improvement, Xgeva discontinued as discussed below.  · 3/25/2022 proceed with cycle #24 Keytruda today.  Follow-up CT scans 4/12/2022 without evidence of progressive disease, stable findings including osteosclerotic metastatic disease.  Keytruda continued  · Repeat imaging 10/4/2022 with overall stable disease, Keytruda continued  · 12/9/2022: Proceed with cycle #36 Keytruda today.  Plan repeat imaging April 2023.  · 3/6/2023 continued with 41st cycle of Keytruda  · Repeat scans 4/12/23 without evidence of progressive disease.  Discussion as to the discontinuance of immunotherapy.  · Patient seen 4/17/2023 with Keytruda held, follow-up Guardant Reveal planned.    5.  Left knee metastasis  · Evaluated by orthopedic oncology, Dr. Eliu Ochoa  · Admission 1/7/2021 undergoing stabilization of left femoral bone lesion, prophylactic stabilization with intramedullary implants.  · It was  suggested we delay Xgeva or Zometa to allow bone healing  · Completed radiation with 10 fractions 2/10/2021  · Xgeva resumed 4/29/2021, he continues to receive this every 6 weeks.    · Additional assessment 2/18/2022 with left jaw pain, subsequent diagnosis of osteoporosis, Xgeva discontinued  · Patient continues follow-up with oral surgery, progression of osteoporosis symptomatically, dermal eruption left side of mandibular region, IVIG anticipated  · Xgeva DISCONTINUED due to osteonecrosis of the jaw  · The patient's pain per his knee is not relapsed.     6.  Malignant right pleural effusion  · Ultrasound thoracentesis 1/14/2021 1500 mL removed  · Chest x-ray 2/25/2021 with moderate right pleural effusion  · Progressive symptoms with worsening pain 4/5/2021  · Ultrasound-guided thoracentesis 4/13/2021 with 2050 ml removed.  · Plans for Pleurx catheter with thoracic surgery, however, pleural effusion has not recurred.  Patient seen 11/4/2021 with chest x-ray planned.  Subsequent resolution noted on CT scan.  · Improvement in bilateral pleural effusions on most recent CT scan  · Additional assessment 4/12/23 with stable small right pleural effusion    7.  Cancer related pain  · Pain is most prominent in his right rib, utilizing MS Contin 30 mg 3 times a day  · Hydrocodone/acetaminophen 10/325 as needed for breakthrough pain.  Currently taking 3 to 4 tablets daily  · He is not currently in need of a refill of pain medications  · Discussed MS Contin at 30 mg p.o. every 8 hours, Norco 10/325 2 p.o. every 6 hours  · He is currently using pain medication as needed for jaw pain.  Requiring approximately 1 MS Contin and 1 Norco 10/325 daily, however some days not requiring any pain medication.  · Reports his pain is well controlled, not currently requiring pain medication.  · As of 3/6/2023 patient continues to report no need for pain medication at this time.    8.  Insomnia  · Continuing to follow with behavioral  oncology  · Continuing Remeron 7.5 mg nightly with fair control.  The patient  when assessed 12/30/2022.  · 1/20/2023 continues on Remeron 7.5 mg nightly which will be refilled today.    9. Health maintenance  · Status post COVID-19 booster, SARS antibody pending today  · The patient is due for Shingrix and Prevnar which will both be administered in the clinic 9/23/2021    10.  Skin lesions  · Uncertain of etiology, unexpected findings for usual skin lesions associated with immunotherapy  · Request dermatologic assessment-psoriasiform dermatitis.  Seen by Dr. Damon though no follow-up due to personal preference  · The lesions on the lower extremities particularly are quite extensive.  At present he feels that they are stable enough not to require additional therapy.    11.  Right knee pain  · Osteoarthritis, orthopedic assessment upon patient's request.   · Resolved.  Denies any knee pain today     12.  Hypocalcemia  · Stabilized  · 1/20/2023 calcium is dropped to 7.9 patient will start taking 2 Tums daily.     13.  Osteonecrosis of the jaw  · Xgeva was discontinued.  · Patient went to second opinion with  with oral surgery.  He was reffered to Dr. Escalante with , with whom he had consult on 8/23/2022.  Dr. Escalante recommened an outpatient procedure that would allow him to biopsy the affected area of his jawbone and identify which bacteria was present, they would then coordinate with ID to identify best antibiotic option.  Patient is agreeable to proceeding with this plan, and is awaiting surgery date.  · The patient underwent surgery 10/7/2022  · Follow-up with Dr. Champion, infectious disease 10/27/2022 with recommendations for 6 weeks of Levaquin and fluconazole.  This should complete by approximately mid December 2023.  · Was seen by Dr. Champion 12/8/2022 with plans to complete fluconazole.  Decision made to start Augmentin 500 mg twice daily for prevention.  · Patient currently being followed by Dr. Champion  with reassessment planned in June 2023        Plan:   1. Hold immunotherapy-Keytruda  2. Continue Imbruvica 420 mg daily.  3. Continue to follow with Dr. Champion for osteoporosis.  Ampicillin to continue for trial.,  Patient to observe association with urinary symptoms  4. Continue Remeron 7.5 mg nightly, consider Quviviq?  5. 3-month Guardant Reveal, CMP, CBC, LDH  6. 15 weeks MD  7. /Every 6 weeks    I spent 65 minutes caring for Dav on this date of service. This time includes time spent by me in the following activities: preparing for the visit, reviewing tests, obtaining and/or reviewing a separately obtained history, performing a medically appropriate examination and/or evaluation, counseling and educating the patient/family/caregiver, ordering medications, tests, or procedures, referring and communicating with other health care professionals, documenting information in the medical record, independently interpreting results and communicating that information with the patient/family/caregiver and care coordination.            Patient is on a high risk medication requiring close monitoring for toxicity.      Jostin Parekh MD   4/17/2023

## 2023-04-17 ENCOUNTER — OFFICE VISIT (OUTPATIENT)
Dept: ONCOLOGY | Facility: CLINIC | Age: 68
End: 2023-04-17
Payer: MEDICARE

## 2023-04-17 ENCOUNTER — INFUSION (OUTPATIENT)
Dept: ONCOLOGY | Facility: HOSPITAL | Age: 68
End: 2023-04-17
Payer: MEDICARE

## 2023-04-17 VITALS
RESPIRATION RATE: 16 BRPM | WEIGHT: 176.2 LBS | OXYGEN SATURATION: 96 % | HEART RATE: 89 BPM | HEIGHT: 71 IN | DIASTOLIC BLOOD PRESSURE: 68 MMHG | BODY MASS INDEX: 24.67 KG/M2 | TEMPERATURE: 97.1 F | SYSTOLIC BLOOD PRESSURE: 116 MMHG

## 2023-04-17 DIAGNOSIS — C79.51 METASTASIS TO BONE: ICD-10-CM

## 2023-04-17 DIAGNOSIS — C34.12 MALIGNANT NEOPLASM OF UPPER LOBE OF LEFT LUNG: ICD-10-CM

## 2023-04-17 DIAGNOSIS — C34.90 LUNG CANCER METASTATIC TO BONE: ICD-10-CM

## 2023-04-17 DIAGNOSIS — C79.51 LUNG CANCER METASTATIC TO BONE: ICD-10-CM

## 2023-04-17 DIAGNOSIS — Z79.899 HIGH RISK MEDICATION USE: Primary | ICD-10-CM

## 2023-04-17 DIAGNOSIS — C91.10 CLL (CHRONIC LYMPHOCYTIC LEUKEMIA): ICD-10-CM

## 2023-04-17 DIAGNOSIS — C91.10 CLL (CHRONIC LYMPHOCYTIC LEUKEMIA): Primary | ICD-10-CM

## 2023-04-17 DIAGNOSIS — Z79.899 HIGH RISK MEDICATION USE: ICD-10-CM

## 2023-04-17 DIAGNOSIS — Z45.2 FITTING AND ADJUSTMENT OF VASCULAR CATHETER: ICD-10-CM

## 2023-04-17 LAB
ALBUMIN SERPL-MCNC: 3.4 G/DL (ref 3.5–5.2)
ALBUMIN/GLOB SERPL: 1.7 G/DL (ref 1.1–2.4)
ALP SERPL-CCNC: 95 U/L (ref 38–116)
ALT SERPL W P-5'-P-CCNC: 9 U/L (ref 0–41)
ANION GAP SERPL CALCULATED.3IONS-SCNC: 9.2 MMOL/L (ref 5–15)
AST SERPL-CCNC: 12 U/L (ref 0–40)
BASOPHILS # BLD AUTO: 0.03 10*3/MM3 (ref 0–0.2)
BASOPHILS NFR BLD AUTO: 0.4 % (ref 0–1.5)
BILIRUB SERPL-MCNC: 0.4 MG/DL (ref 0.2–1.2)
BUN SERPL-MCNC: 20 MG/DL (ref 6–20)
BUN/CREAT SERPL: 13.6 (ref 7.3–30)
CALCIUM SPEC-SCNC: 8.1 MG/DL (ref 8.5–10.2)
CHLORIDE SERPL-SCNC: 105 MMOL/L (ref 98–107)
CO2 SERPL-SCNC: 23.8 MMOL/L (ref 22–29)
CREAT SERPL-MCNC: 1.47 MG/DL (ref 0.7–1.3)
DEPRECATED RDW RBC AUTO: 51.2 FL (ref 37–54)
EGFRCR SERPLBLD CKD-EPI 2021: 51.6 ML/MIN/1.73
EOSINOPHIL # BLD AUTO: 0.4 10*3/MM3 (ref 0–0.4)
EOSINOPHIL NFR BLD AUTO: 5 % (ref 0.3–6.2)
ERYTHROCYTE [DISTWIDTH] IN BLOOD BY AUTOMATED COUNT: 15.3 % (ref 12.3–15.4)
GLOBULIN UR ELPH-MCNC: 2 GM/DL (ref 1.8–3.5)
GLUCOSE SERPL-MCNC: 106 MG/DL (ref 74–124)
HCT VFR BLD AUTO: 41.9 % (ref 37.5–51)
HGB BLD-MCNC: 12.8 G/DL (ref 13–17.7)
IMM GRANULOCYTES # BLD AUTO: 0.03 10*3/MM3 (ref 0–0.05)
IMM GRANULOCYTES NFR BLD AUTO: 0.4 % (ref 0–0.5)
LYMPHOCYTES # BLD AUTO: 0.73 10*3/MM3 (ref 0.7–3.1)
LYMPHOCYTES NFR BLD AUTO: 9.1 % (ref 19.6–45.3)
MCH RBC QN AUTO: 27.9 PG (ref 26.6–33)
MCHC RBC AUTO-ENTMCNC: 30.5 G/DL (ref 31.5–35.7)
MCV RBC AUTO: 91.5 FL (ref 79–97)
MONOCYTES # BLD AUTO: 0.92 10*3/MM3 (ref 0.1–0.9)
MONOCYTES NFR BLD AUTO: 11.5 % (ref 5–12)
NEUTROPHILS NFR BLD AUTO: 5.89 10*3/MM3 (ref 1.7–7)
NEUTROPHILS NFR BLD AUTO: 73.6 % (ref 42.7–76)
NRBC BLD AUTO-RTO: 0 /100 WBC (ref 0–0.2)
PLATELET # BLD AUTO: 238 10*3/MM3 (ref 140–450)
PMV BLD AUTO: 11.8 FL (ref 6–12)
POTASSIUM SERPL-SCNC: 4.6 MMOL/L (ref 3.5–4.7)
PROT SERPL-MCNC: 5.4 G/DL (ref 6.3–8)
RBC # BLD AUTO: 4.58 10*6/MM3 (ref 4.14–5.8)
SODIUM SERPL-SCNC: 138 MMOL/L (ref 134–145)
WBC NRBC COR # BLD: 8 10*3/MM3 (ref 3.4–10.8)

## 2023-04-17 PROCEDURE — 80053 COMPREHEN METABOLIC PANEL: CPT

## 2023-04-17 PROCEDURE — 36591 DRAW BLOOD OFF VENOUS DEVICE: CPT

## 2023-04-17 PROCEDURE — 85025 COMPLETE CBC W/AUTO DIFF WBC: CPT

## 2023-04-17 PROCEDURE — 25010000002 HEPARIN LOCK FLUSH PER 10 UNITS: Performed by: INTERNAL MEDICINE

## 2023-04-17 RX ORDER — HEPARIN SODIUM (PORCINE) LOCK FLUSH IV SOLN 100 UNIT/ML 100 UNIT/ML
500 SOLUTION INTRAVENOUS AS NEEDED
Status: DISCONTINUED | OUTPATIENT
Start: 2023-04-17 | End: 2023-04-17 | Stop reason: HOSPADM

## 2023-04-17 RX ORDER — SODIUM CHLORIDE 0.9 % (FLUSH) 0.9 %
10 SYRINGE (ML) INJECTION AS NEEDED
Status: DISCONTINUED | OUTPATIENT
Start: 2023-04-17 | End: 2023-04-17 | Stop reason: HOSPADM

## 2023-04-17 RX ORDER — SODIUM CHLORIDE 0.9 % (FLUSH) 0.9 %
10 SYRINGE (ML) INJECTION AS NEEDED
OUTPATIENT
Start: 2023-04-17

## 2023-04-17 RX ORDER — HEPARIN SODIUM (PORCINE) LOCK FLUSH IV SOLN 100 UNIT/ML 100 UNIT/ML
500 SOLUTION INTRAVENOUS AS NEEDED
OUTPATIENT
Start: 2023-04-17

## 2023-04-17 RX ADMIN — Medication 10 ML: at 12:27

## 2023-04-17 RX ADMIN — Medication 500 UNITS: at 12:27

## 2023-04-17 NOTE — LETTER
April 17, 2023     Juan Iyer MD  [id number] [facility] [address]    Patient: Dav Freitas   YOB: 1955   Date of Visit: 4/17/2023       Dear Dr. Shireen MD:    Thank you for referring Dav Freitas to me for evaluation. Below are the relevant portions of my assessment and plan of care.    If you have questions, please do not hesitate to call me. I look forward to following Dav along with you.         Sincerely,        Jostin Parekh MD        CC: No Recipients    Jostin Parekh MD  04/17/23 1256  Signed  Subjective  Patient continues to feel relatively good, periodic urinary symptoms persist but are improved      REASON FOR FOLLOW UP:  1.  Chronic lymphocytic leukemia with extensive lymphadenopathy and possible pleural involvement. Initiation of Treanda, Rituxan May 1, 2019.  2.  Hypogammaglobulinemia.  Status is post IVIG  3.  Significant response on scans June 27, 2019.  Treatment changed to Imbruvica 420 mg daily.  4.  Metastatic non-small cell lung carcinoma on Keytruda  5.  Patient seen 2/18/2022 with left jaw/gumline swelling pain, associated hematoma?  Osteonecrosis.  Restart of Keytruda 3/4/2022, Xgeva discontinued  6.  Restaging via CT 4/12/2022, continued response, Keytruda continued, referral to dermatology for worsening psoriasis.  7.  Jaw osteonecrosis, undergoing therapy through oral surgery, IVIG anticipated, infectious disease and oral surgery follow-up  8.  Patient assessed 11/18/2022, Keytruda continued, ibrutinib-dose reduced-continued, patient seen by  physicians, status postdebridement of bone of left mandible with bone biopsy, fistulectomy of left mandible 10/20/2022  9.  Patient seen 12/30/2022 continue to improve,?  Urinary tract symptoms that have continued to improve.  10.  Repeat scans 4/12/2023 without evidence of recurrence.  11.  Patient seen 4/17/2023, Keytruda therapy held, urinary symptoms improving?      HISTORY OF PRESENT ILLNESS:    The  patient is a 67 y.o. male with the above mentioned history here today for lab review and evaluation prior to cycle 38  Keytruda.  He reports overall he is doing well.  He does continue on antibiotics from Dr. Pak before his osteonecrosis of the jaw.  He does have 1 area of exposed bone on his jaw they are hoping that the gum tissue will grow over this.  He would follow-up with Dr. Champion in June.    Patient also previously been on antibiotics for UTI.  He does report some urgency which is still present.  No fevers or chills.  Denies any issues with increased shortness of breath.    He does report some intermittent pain on the right lateral portion of his chest near the eighth rib.  He states when he moves at times he has a sharp pain that is quick, he describes it as feeling like a strain.  This is only happened occasionally over the past few weeks.    The patient is next seen 3/6/2023 without additional chest discomfort and with lessening UTI symptoms.  We have discussed repeat scans in April 2023.    The patient did proceed to repeat CT chest abdomen pelvis 4/12/2023 with stable findings overall including a small right pleural effusion, right rib metastatic lesion with increase sclerosis, overlying soft tissue thickening without interval change in additional osseous lesions over the interval change.  Patient evaluated 3/27/23 with general improvement though difficulty with dysuria and bladder spasm requiring additional antibiotic therapies.    These findings are discussed with the patient/17/23 and he feels that his urinary symptoms improved when he stopped his antibiotic therapy used for his osteonecrosis- amoxicillin.  We discussed that his findings including his long-term improvement/remission and, potentially, his urinary symptoms, in part, could be from continued immunotherapy.  This would be an opportunity to now discontinue that therapy and observe him expectantly.        Past Medical History, Past  "Surgical History, Social History, Family History have been reviewed and are without significant changes except as mentioned.    Hematologic/oncologic history:    As concerns his CLL history he initially required Treanda Rituxan 5/1/2019 for 2 cycles and then initiated Imbruvica 420 mg daily initiated 7/25/2019.       As concerns stage IV non-small cell lung carcinoma he returns the office continuing Keytruda which he continues to receive every 3 weeks which was initiated 10/21/2020.  He also receives Xgeva every 6 weeks which was initially given 12/2/2020 and discontinued 1/7/2022 secondary to osteonecrosis.      Additional issues that developed during treatment included right knee pain with plain views of his right knee showing a right knee effusion with medial compartment narrowing and no suspicious bone lesion.  There is benign periosteal calcification along the distal right femur.  A PET/CT 11/11 went on to demonstrate a complete metabolic response to therapy compared to PET/CT from 9/23/2020.  This includes his primary lung neoplasm left upper lobe, large osseous metastatic lesion the right chest wall region from the eighth rib, small left adrenal metastasis, and no evidence of disease involving the distal left femur.      The patient was seen 11/24/2021 continue to do well though indicating that his right knee is \"something I can manage at the moment\".  He prefers not to have orthopedic assessment at this point.  He is concerned, ever, about skin lesions that are developing primarily on his calf regions and into his right thigh.    The patient was seen by Dr. Regan Damon 11/29/2021 and felt to be consistent with psoriasiform dermatitis treated with intralesional therapy.  Was also started on a moisturizer and lipid therapy.  The patient returned for assessment 12/15/2021 and treated with cycle #21 Keytruda, returned 1/7/2022 for cycle #22 and 1/28/2022 for cycle #23.  At that visit he had developed " adenopathy several weeks previous and was treated with a Medrol Dosepak.   The patient was next seen 2/18/2022 and indicates that though his adenopathy has improved after treatment described above that his jaw has become painful swollen and tender with additional bleeding.     The patient was referred to his primary dentist who then had him seen oral surgeon-Dr. Ezequiel Davila-reached at [phone] who felt that this was osteonecrosis and should be treated symptomatically.  As the patient reviewed 3/4/2022, wonderfully, his oral issues have nearly resolved with the gumline returning to essentially its previous state, no swelling, minimal erythema, no discharge and no additional pain.  He does have follow-up with oral surgery in the next several weeks and we discussed restarting his Keytruda scanning him likely in April 2022 in general.   He returns to the office on 3/25/2022 in follow-up in anticipation of cycle 24 Keytruda. He reports the left side of his jaw has improved.    However, he was now having issues with the right lower side. He was seen by neurosurgery with additional films and started on oral amoxicillin by his oral surgeon.      The patient continued his immunotherapy taking Keytruda 3/25/2022 and underwent repeat CT chest abdomen and pelvis 4/12/2022 that is reviewed with him 4/15/2022.  Wonderfully there is no substantial change with a small to moderate right pleural effusion that decreased in size, no additional pulmonary nodule or new metastatic disease, multifocal osteosclerotic metastatic disease without change and no new findings in the abdomen or pelvis.    The patient was seen 4/15/2022 indicating that he is actually feeling well in terms of general symptoms.  This includes lack of progression from mouth pain, ability to eat without discomfort.  He is, however, having worsening psoriasis particular in his lower extremities and has been reviewed by dermatology previously.    The patient was  reviewed 6/17/2022 having been seen by oral surgery with progression of his osteonecrosis and skin eruption.  He is currently undergoing assessment by infectious disease within the next week and we have reviewed that previously he responded to IVIG for in-hospital refractory sinusitis.  As a result he is asked to undergo reassessment for his immunoglobulin levels today and return next week for 400 mg/kg of IVIG infusion which we will continue monthly.    The patient was given IVIG 6/24/2022 and again 7/22/2022.  Assessment 8/5/2022 continue to show a degree of general improvement with oral surgery continue to follow him with a second opinion to be obtained  8/8/2022 when IVIG planned 8/19/2022 and 9/16/2022.    The patient slowly continued both assessments by oral surgery and  Scans considering his non-small cell lung cancer including CT studies 10/4/2022 that were essentially stable compared to previous.  Therapies include Keytruda every 3 weeks, daily Imbruvica, monthly IVIG every 4 weeks.    He next presentedwith the above-mentioned history, who returns the office  in anticipation of his 35 th cycle of Keytruda.  He was last treated with IVIG 11/11/2022.       At this point he had undergone surgery at [facility] including fistulectomy of the left mandible and debridement of the intraoral bone left mandible.  He he had also been seen by infectious disease, Dr. Champion regarding osteonecrosis of the jaw.  He was placed on antibiotics for 6 weeks including Levaquin and fluconazole.          There is an interaction with his fluconazole and ibrutinib and adjustments will be necessary.  [facility] pharmacy is managing this.  He reports he is overall feeling very well.  He has had improvement in his ability to eat and drink since undergoing surgery on his jaw.  His skin is overall improved with the use of Aquaphor.  He did undergo CT scans prior to cycle 33 Keytruda with stability of his disease.  He continues on  current therapy with excellent tolerance.     Patient is next seen 11/18/2022.  Fortunately he feels that he is doing  reasonably well with surgery having been successful, pain virtually absent at this point, no additional shortness of breath or cough, appetite remaining fair, stable weight and sleep at least modestly managed.  Unfortunately his wife recently injured her femur and he has become her primary caregiver at this point.       The patient is next assessed 12/9/2022.  He is due for cycle #36 Keytruda and IVIG today.  He did not realize he was due for IVIG today, and already scheduled another appointment so would like to reschedule IVIG for next week.  He also continues on Imbruvica, currently at a reduced dose of 280 mg daily, due to interaction with Diflucan.  He was seen by Dr. Champion with infectious disease yesterday, 12/8/2022 and econazole was discontinued as patient has completed his 6-week course.  He was started on preventative Augmentin 500 mg twice daily until his gums grow to close over the exposed area of bone.  He is eating and drinking adequately, weight remains stable.  Bowels moving regularly.  Denies fever or chills.  Denies nausea or vomiting.  Denies new or worsening pain.  Denies skin rash or shortness of breath.  No new concerns today.    The patient continued Keytruda therapy including 12/9/2022 and IVIG 12/16/2022.  He is next seen 12/30/2022 for continued Keytruda.  We have discussed that he is clearly improving per his osteonecrosis and his completed his surgical therapy and now on prophylactic antibiotic therapy.  We have reviewed that he could discontinue his IVIG, continue his Imbruvica, Keytruda and prophylactic antibiotic therapy.  He is having urinary symptoms?  And a follow-up UA and culture is pending.    Note that the patient's urine cultures were negative we proceeded 1/20/2023 with Keytruda, Imbruvica with Keytruda given 2/10/2023 and the patient presented 3/6/2023 for  "his next treatment.  We do plan repeat CT scans in April 2023.    Repeat scans 4/12/2023-stable CT chest and pelvis with small right pleural effusion, parenchymal scarring right lower lobe, right rib metastatic lesion with increase sclerosis, overlying soft tissue thickening without interval change in osseous lesions additionally without change.    Patient seen 4/17/2023 at which time immunotherapy was held and patient placed on observation status.  This included some concern about whether his urinary symptoms could be related to ongoing immunotherapy.  This would be observed off Keytruda.    Objective       Vitals:    04/17/23 1143   BP: 116/68   Pulse: 89   Resp: 16   Temp: 97.1 °F (36.2 °C)   TempSrc: Temporal   SpO2: 96%   Weight: 79.9 kg (176 lb 3.2 oz)   Height: 180.3 cm (70.98\")   PainSc: 0-No pain         4/17/2023    11:44 AM   Current Status   ECOG score 0     Physical Exam  Vitals and nursing note reviewed.   Constitutional:       Appearance: Normal appearance. He is well-developed.   HENT:      Head: Normocephalic and atraumatic.      Nose: Nose normal.   Eyes:      Pupils: Pupils are equal, round, and reactive to light.   Cardiovascular:      Rate and Rhythm: Normal rate and regular rhythm.      Heart sounds: Normal heart sounds.   Pulmonary:      Effort: Pulmonary effort is normal. No respiratory distress.      Breath sounds: Normal breath sounds. No wheezing, rhonchi or rales.   Abdominal:      General: Bowel sounds are normal. There is no distension.      Palpations: Abdomen is soft.      Tenderness: There is no abdominal tenderness.   Musculoskeletal:         General: Normal range of motion.      Cervical back: Normal range of motion and neck supple.   Skin:     General: Skin is warm and dry.   Neurological:      Mental Status: He is alert and oriented to person, place, and time.   Psychiatric:         Behavior: Behavior normal.       I have reexamined the patient and the results are consistent with " the previously documented exam. Jostin Parekh MD      RECENT LABS:  Results from last 7 days   Lab Units 04/17/23  1107   WBC 10*3/mm3 8.00   NEUTROS ABS 10*3/mm3 5.89   HEMOGLOBIN g/dL 12.8*   HEMATOCRIT % 41.9   PLATELETS 10*3/mm3 238     Results from last 7 days   Lab Units 04/17/23  1107   SODIUM mmol/L 138   POTASSIUM mmol/L 4.6   CHLORIDE mmol/L 105   CO2 mmol/L 23.8   BUN mg/dL 20   CREATININE mg/dL 1.47*   CALCIUM mg/dL 8.1*   ALBUMIN g/dL 3.4*   BILIRUBIN mg/dL 0.4   ALK PHOS U/L 95   ALT (SGPT) U/L 9   AST (SGOT) U/L 12   GLUCOSE mg/dL 106         FISH prognostic panel-Positive for deletion of 13 q. 14.3    Assessment & Plan   1.  CLL presenting with significant lymphocytosis, lymphadenopathy and splenomegaly.     · Positive for deletion of 13 q. 14.3.    · Patient status post 2 cycles of Treanda Rituxan with excellent response.    · Imaging 6/27/2019 with significant decrease in adenopathy.  Treanda Rituxan discontinued   · Imbruvica 420 mg daily initiated 7/25/2019.  · He continues on Imbruvica, without indication of progression of his CLL, without progressive lymphadenopathy on CT scans.  · Patient has resolving adenopathy particularly cervical regions 2/18/2022  · 3/25/2022 patient is without worsening adenopathy on exam today.  Continue Imbruvica.  · 10/28/2022 continues on Imbruvica 420 mg daily. Denies B-symptoms, no evidence of worsening adenopathy on exam.  CT scans without worsening adenopathy  · 11/18/2022: Imbruvica reduced to 280 mg daily while patient receiving 6-week course of antimicrobials due to interaction with fluconazole.  · 12/9/2022: He has finished his course of fluconazole, taking his last dose today.  He has a few days left of Imbruvica 280 mg, which he will complete.  He will then increase Imbruvica back to regular dose of 420 mg daily.  · Patient stable/17/23, 4/17/2023    2.  Pulmonary nodules/infiltrates.  He is  status post bronchoscopy.  Is unclear at this time whether  these findings are infectious or possibly related to his lymphoproliferative disorder.  This is seen to be improving on latest scans- 4/12/2023                                                                                           3.  Hypogammaglobulinemia-status post IVIG with resolution of sinusitis.   · Anticipate trial of IVIG with the patient now having progression of his osteonecrosis and dermal infection.  · Continues with monthly IVIG.  Due today, however did not realize he was due for IVIG and would like to reschedule for next week.  · Reviewed 12/30/2022 with IVIG discontinued.    4.  Non-small cell lung carcinoma  · August 26 2020 revealing a new 1.6 cm spiculated nodule within the left upper lobe, 1.2 cm left adrenal nodule, bone windows with a soft tissue mass involving the right eighth rib measuring 3.7 x 2.6 cm.    · Biopsy was consistent with adenocarcinoma CK 7+, CK 20-, lung primary suspected clinically, molecular panel not yet obtained.  · PET/CT 9/23/2020 demonstrating asymmetric uptake within the right parotid gland which is unremarkable appearance on noncontrasted CT, SUV of 6 compared to 2.7.  There is no hilar, mediastinal or axillary adenopathy, findings of asymmetric moderate to intense FDG uptake within superior aspect the right chest wall anterior to the right first rib with an irregular hypodense lesion measuring 1.8 x 1.6 and SUV 4.9.  This had not been seen June 27, 2019.  There is an intensely FDG avid nodule within the lingula measuring 1.9 x 1.5 history of 12.4, FDG avid left adrenal nodule 1.9 x 1.5 with an issue 21.2.    · Evaluated by radiation oncology therapy September 29 and started on radiation therapy to the right chest wall-35 Gy in 10 fractions.   ·  Plans were also then proceed with SBRT to the solitary left upper lobe lesion pending insurance approval.  · Further testing including TPS of 20% and foundation CDX with a TMB of greater than 10 mutations per megabase (28  mutations per megabase) and the indication that several immunotherapies could be useful in this patient's care.  Additional genomic findings include BRAF [id number]/that trametinib could be useful and STK11/that everolimus could be useful.  · Keytruda initiated 10/21/2021   · Reassessment after 2 cycles of Keytruda with enlargement of the right eighth rib lesion,enlargement of spiculated left upper lobe lung mass, moderate to large right-sided pleural effusion, new left adrenal mass and enlarged retrocrural lymph node.?Pseudoprogression  · Xgeva last given 12/30/2020  · Follow-up scans 1/6/2021 demonstrated marked improvement in his right eighth rib lesion, resolution of left upper lobe spiculated mass, residual large right pleural effusion, resolution of left adrenal metastasis.   · Right pleural effusion, with thoracentesis 1/14/2021 with 1500 mils removed.  Pathology consistent with malignant effusion   · CT scans 4/5/2021 with response noted in the left upper lobe density.  · He continues to receive Keytruda every 3 weeks along with Xgeva every 6 weeks.  · Repeat scan 7/15/2021 without evidence of recurrence or progressive disease.  Plans to continue Keytruda every 3 weeks with repeat scans in 4 to 6 months  · Patient status post PET/CT 11/11/2021 with hyper response, approximate remission status, plan to proceed with cycle #20 Keytruda  · Patient seen 2/18/2022 with Keytruda held while his oral issues are addressed-concern for osteonecrosis of the jaw.  · Patiently diagnosed with osteonecrosis of the jaw, seen by oral surgery, started on antibiotic therapy and Peridex with substantial improvement, Xgeva discontinued as discussed below.  · 3/25/2022 proceed with cycle #24 Keytruda today.  Follow-up CT scans 4/12/2022 without evidence of progressive disease, stable findings including osteosclerotic metastatic disease.  Keytruda continued  · Repeat imaging 10/4/2022 with overall stable disease, Keytruda  continued  · 12/9/2022: Proceed with cycle #36 Keytruda today.  Plan repeat imaging April 2023.  · 3/6/2023 continued with 41st cycle of Keytruda  · Repeat scans 4/12/23 without evidence of progressive disease.  Discussion as to the discontinuance of immunotherapy.  · Patient seen 4/17/2023 with Keytruda held, follow-up Guardant Reveal planned.    5.  Left knee metastasis  · Evaluated by orthopedic oncology, Dr. Eliu Ochoa  · Admission 1/7/2021 undergoing stabilization of left femoral bone lesion, prophylactic stabilization with intramedullary implants.  · It was suggested we delay Xgeva or Zometa to allow bone healing  · Completed radiation with 10 fractions 2/10/2021  · Xgeva resumed 4/29/2021, he continues to receive this every 6 weeks.    · Additional assessment 2/18/2022 with left jaw pain, subsequent diagnosis of osteoporosis, Xgeva discontinued  · Patient continues follow-up with oral surgery, progression of osteoporosis symptomatically, dermal eruption left side of mandibular region, IVIG anticipated  · Xgeva DISCONTINUED due to osteonecrosis of the jaw  · The patient's pain per his knee is not relapsed.     6.  Malignant right pleural effusion  · Ultrasound thoracentesis 1/14/2021 1500 mL removed  · Chest x-ray 2/25/2021 with moderate right pleural effusion  · Progressive symptoms with worsening pain 4/5/2021  · Ultrasound-guided thoracentesis 4/13/2021 with 2050 ml removed.  · Plans for Pleurx catheter with thoracic surgery, however, pleural effusion has not recurred.  Patient seen 11/4/2021 with chest x-ray planned.  Subsequent resolution noted on CT scan.  · Improvement in bilateral pleural effusions on most recent CT scan  · Additional assessment 4/12/23 with stable small right pleural effusion    7.  Cancer related pain  · Pain is most prominent in his right rib, utilizing MS Contin 30 mg 3 times a day  · Hydrocodone/acetaminophen 10/325 as needed for breakthrough pain.  Currently taking 3 to 4  tablets daily  · He is not currently in need of a refill of pain medications  · Discussed MS Contin at 30 mg p.o. every 8 hours, Norco 10/325 2 p.o. every 6 hours  · He is currently using pain medication as needed for jaw pain.  Requiring approximately 1 MS Contin and 1 Norco 10/325 daily, however some days not requiring any pain medication.  · Reports his pain is well controlled, not currently requiring pain medication.  · As of 3/6/2023 patient continues to report no need for pain medication at this time.    8.  Insomnia  · Continuing to follow with behavioral oncology  · Continuing Remeron 7.5 mg nightly with fair control.  The patient  when assessed 12/30/2022.  · 1/20/2023 continues on Remeron 7.5 mg nightly which will be refilled today.    9. Health maintenance  · Status post COVID-19 booster, SARS antibody pending today  · The patient is due for Shingrix and Prevnar which will both be administered in the clinic 9/23/2021    10.  Skin lesions  · Uncertain of etiology, unexpected findings for usual skin lesions associated with immunotherapy  · Request dermatologic assessment-psoriasiform dermatitis.  Seen by Dr. Damon though no follow-up due to personal preference  · The lesions on the lower extremities particularly are quite extensive.  At present he feels that they are stable enough not to require additional therapy.    11.  Right knee pain  · Osteoarthritis, orthopedic assessment upon patient's request.   · Resolved.  Denies any knee pain today     12.  Hypocalcemia  · Stabilized  · 1/20/2023 calcium is dropped to 7.9 patient will start taking 2 Tums daily.     13.  Osteonecrosis of the jaw  · Xgeva was discontinued.  · Patient went to second opinion with  with oral surgery.  He was reffered to Dr. Escalante with , with whom he had consult on 8/23/2022.  Dr. Escalante recommened an outpatient procedure that would allow him to biopsy the affected area of his jawbone and identify which bacteria was present,  they would then coordinate with ID to identify best antibiotic option.  Patient is agreeable to proceeding with this plan, and is awaiting surgery date.  · The patient underwent surgery 10/7/2022  · Follow-up with Dr. Champion, infectious disease 10/27/2022 with recommendations for 6 weeks of Levaquin and fluconazole.  This should complete by approximately mid December 2023.  · Was seen by Dr. Champion 12/8/2022 with plans to complete fluconazole.  Decision made to start Augmentin 500 mg twice daily for prevention.  · Patient currently being followed by Dr. Champion with reassessment planned in June 2023        Plan:   1. Hold immunotherapy-Keytruda  2. Continue Imbruvica 420 mg daily.  3. Continue to follow with Dr. Champion for osteoporosis.  Ampicillin to continue for trial.,  Patient to observe association with urinary symptoms  4. Continue Remeron 7.5 mg nightly, consider Quviviq?  5. 3-month Guardant Reveal, CMP, CBC, LDH  6. 15 weeks MD  7. /Every 6 weeks    I spent 65 minutes caring for Dav on this date of service. This time includes time spent by me in the following activities: preparing for the visit, reviewing tests, obtaining and/or reviewing a separately obtained history, performing a medically appropriate examination and/or evaluation, counseling and educating the patient/family/caregiver, ordering medications, tests, or procedures, referring and communicating with other health care professionals, documenting information in the medical record, independently interpreting results and communicating that information with the patient/family/caregiver and care coordination.            Patient is on a high risk medication requiring close monitoring for toxicity.      Jostin Parekh MD   4/17/2023

## 2023-04-19 ENCOUNTER — SPECIALTY PHARMACY (OUTPATIENT)
Dept: PHARMACY | Facility: HOSPITAL | Age: 68
End: 2023-04-19
Payer: MEDICARE

## 2023-04-19 NOTE — PROGRESS NOTES
Specialty Pharmacy Patient Management Program  Oncology 6-Month Clinical Assessment       Dav Freitas is a 67 y.o. male with CLL called today to assess adherence and side effects.    Regimen: Imbruvica 420 mg daily    Reason for Outreach: Routine medication check-in .       Problem list reviewed by Emy Ram RPH on 4/19/2023 at  1:30 PM  Medicines reviewed by Emy Ram RPH on 4/19/2023 at  1:30 PM  Allergies reviewed by Emy Ram RPH on 4/19/2023 at  1:29 PM     Goals     • Specialty Pharmacy General Goal      Progression free survival           Medication Assessment:  • Medication Assessment  o Follow Up Clinical Assessment  o Medication(s) assessed: Imbruvica  o Therapeutic appropriateness: Appropriate  o Medication tolerability: Tolerating with no to minimal ADRs  o Medication plan: Continue therapy with normal follow-up  o Quality of Life Improvement Scale: No change  o Administration: Patient is taking every day at the same time  and as prescribed .   o Patient can self administer medications: yes  o Medication Follow-Up Plan: Next clinical assessment  • Lab Review: The labs listed below have been reviewed. No dose adjustments are needed for the oral specialty medication(s) based on the labs.    Lab Results   Component Value Date    GLUCOSE 106 04/17/2023    CALCIUM 8.1 (L) 04/17/2023     04/17/2023    K 4.6 04/17/2023    CO2 23.8 04/17/2023     04/17/2023    BUN 20 04/17/2023    CREATININE 1.47 (H) 04/17/2023    EGFRIFNONA 50 (L) 02/18/2022    BCR 13.6 04/17/2023    ANIONGAP 9.2 04/17/2023     Lab Results   Component Value Date    WBC 8.00 04/17/2023    RBC 4.58 04/17/2023    HGB 12.8 (L) 04/17/2023    HCT 41.9 04/17/2023    MCV 91.5 04/17/2023    MCH 27.9 04/17/2023    MCHC 30.5 (L) 04/17/2023    RDW 15.3 04/17/2023    RDWSD 51.2 04/17/2023    MPV 11.8 04/17/2023     04/17/2023    NEUTRORELPCT 73.6 04/17/2023    LYMPHORELPCT 9.1 (L) 04/17/2023    MONORELPCT  11.5 04/17/2023    EOSRELPCT 5.0 04/17/2023    BASORELPCT 0.4 04/17/2023    AUTOIGPER 0.4 04/17/2023    NEUTROABS 5.89 04/17/2023    LYMPHSABS 0.73 04/17/2023    MONOSABS 0.92 (H) 04/17/2023    EOSABS 0.40 04/17/2023    BASOSABS 0.03 04/17/2023    AUTOIGNUM 0.03 04/17/2023    NRBC 0.0 04/17/2023     • Drug-drug interactions  o Completed medication reconciliation today to assess for drug interactions. Patient denies starting or stopping any medications.    o Assessed medication list for interactions, no significant drug interactions noted.   o Advised patient to call the clinic if any new medications are started so we can assess for drug-drug interactions.  • Drug-food interactions discussed: none  • Vaccines are coordinated by the patient's oncologist and primary care provider.    Allergies  Known allergies and reactions were discussed with the patient. The patient's chart has been reviewed for allergy information and updated as necessary.   No Known Allergies     Hospitalizations and Urgent Care Visits Since Last Assessment:  • Unplanned hospitalizations or inpatient admissions: no  • ED Visits: no  • Urgent Office Visits: no    Adherence Assessment:  Adherence Questions  Medication(s) assessed: Imbruvican  On average, how many doses/injections does the patient miss per month?: 1  What are the identified reasons for non-adherence or missed doses? : no problems identfied  What is the estimated medication adherence level?: %  Based on the patient/caregiver response and refill history, does this patient require an MTP to track adherence improvements?: no    Quality of Life Assessment:  Quality of Life Assessment  Quality of Life Improvement Scale: No change  -- Quality of Life: 5/10    Financial Assessment:  Medication availability/affordability: Patient has had no issues obtaining medication from pharmacy.      All questions addressed and patient had no additional concerns. Patient has pharmacy contact  information.    Name/Credentials: Martin Ram PharmD, CANDICE      4/19/2023  13:31 EDT

## 2023-05-15 ENCOUNTER — SPECIALTY PHARMACY (OUTPATIENT)
Dept: PHARMACY | Facility: HOSPITAL | Age: 68
End: 2023-05-15
Payer: MEDICARE

## 2023-05-15 RX ORDER — IBRUTINIB 420 MG/1
TABLET, FILM COATED ORAL
Qty: 28 TABLET | Refills: 6 | Status: SHIPPED | OUTPATIENT
Start: 2023-05-15

## 2023-05-15 NOTE — TELEPHONE ENCOUNTER
Refill requested from pharmacy. Per last chart note, patient to continue Imbruvica 420 mg daily. Will route to MD for cosignature.    Martin Ram, PharmD, BCOP

## 2023-05-15 NOTE — PROGRESS NOTES
Re: Refills of Oral Specialty Medication - Imbruvica (ibrutinib)    • Drug-Drug Interactions: The current medication list was reviewed and there are no relevant drug-drug interactions.  • Medication Allergies: The patient has NKDA  • Review of Labs/Dose Adjustments: NO DOSE CHANGE - I reviewed the most recent note and labs and the patient will continue without any dose changes.  I sent refills as described below.    Drug: Imbruvica (ibrutinib)  Strength: 420 mg  Directions: Take 1 tablet by mouth daily  Quantity: 28  Refills: 6  Pharmacy prescription sent to: [facility]    Name/Credentials: Martin Ram PharmD, CANDICE      5/15/2023  12:40 EDT     Completed independent double check on medication order/RX. Randi Rachel RPh, BCOP

## 2023-06-01 ENCOUNTER — INFUSION (OUTPATIENT)
Dept: ONCOLOGY | Facility: HOSPITAL | Age: 68
End: 2023-06-01

## 2023-06-01 DIAGNOSIS — C91.10 CLL (CHRONIC LYMPHOCYTIC LEUKEMIA): ICD-10-CM

## 2023-06-01 DIAGNOSIS — Z45.2 FITTING AND ADJUSTMENT OF VASCULAR CATHETER: Primary | ICD-10-CM

## 2023-06-01 PROCEDURE — 96523 IRRIG DRUG DELIVERY DEVICE: CPT

## 2023-06-01 PROCEDURE — 25010000002 HEPARIN LOCK FLUSH PER 10 UNITS: Performed by: INTERNAL MEDICINE

## 2023-06-01 RX ORDER — HEPARIN SODIUM (PORCINE) LOCK FLUSH IV SOLN 100 UNIT/ML 100 UNIT/ML
500 SOLUTION INTRAVENOUS AS NEEDED
Status: DISCONTINUED | OUTPATIENT
Start: 2023-06-01 | End: 2023-06-01 | Stop reason: HOSPADM

## 2023-06-01 RX ORDER — SODIUM CHLORIDE 0.9 % (FLUSH) 0.9 %
10 SYRINGE (ML) INJECTION AS NEEDED
OUTPATIENT
Start: 2023-06-01

## 2023-06-01 RX ORDER — SODIUM CHLORIDE 0.9 % (FLUSH) 0.9 %
10 SYRINGE (ML) INJECTION AS NEEDED
Status: DISCONTINUED | OUTPATIENT
Start: 2023-06-01 | End: 2023-06-01 | Stop reason: HOSPADM

## 2023-06-01 RX ORDER — HEPARIN SODIUM (PORCINE) LOCK FLUSH IV SOLN 100 UNIT/ML 100 UNIT/ML
500 SOLUTION INTRAVENOUS AS NEEDED
OUTPATIENT
Start: 2023-06-01

## 2023-06-01 RX ADMIN — Medication 500 UNITS: at 14:11

## 2023-06-01 RX ADMIN — Medication 10 ML: at 14:11

## 2023-06-08 ENCOUNTER — OFFICE VISIT (OUTPATIENT)
Dept: INFECTIOUS DISEASES | Facility: CLINIC | Age: 68
End: 2023-06-08
Payer: MEDICARE

## 2023-06-08 VITALS
WEIGHT: 182 LBS | DIASTOLIC BLOOD PRESSURE: 92 MMHG | TEMPERATURE: 97.8 F | SYSTOLIC BLOOD PRESSURE: 163 MMHG | RESPIRATION RATE: 18 BRPM | BODY MASS INDEX: 25.48 KG/M2 | HEIGHT: 71 IN | HEART RATE: 84 BPM

## 2023-06-08 DIAGNOSIS — M86.9 OSTEOMYELITIS, UNSPECIFIED SITE, UNSPECIFIED TYPE: ICD-10-CM

## 2023-06-08 DIAGNOSIS — M87.180 DRUG-INDUCED OSTEONECROSIS OF JAW: ICD-10-CM

## 2023-06-08 DIAGNOSIS — M87.9 OSTEONECROSIS OF MANDIBLE: Primary | ICD-10-CM

## 2023-06-08 PROCEDURE — 99213 OFFICE O/P EST LOW 20 MIN: CPT | Performed by: STUDENT IN AN ORGANIZED HEALTH CARE EDUCATION/TRAINING PROGRAM

## 2023-06-08 NOTE — PROGRESS NOTES
"Chief Complaint  Follow-up    Subjective        Dav Freitas presents to [facility] INFECTIOUS DISEASES  History of Present Illness    Patient is a 66-year-old male with a past medical history of hypogammaglobinemia, stage IV lung cancer on Keytruda but prior therapy with Xgeva for bony metastasis and CLL in remission who is seen by oral surgery in the setting of medication related osteonecrosis of the mandible and has been on multiple courses of amoxicillin during \"flareups \".  Then had CT scan that demonstrated periosteal thickening and continue to be consistent with osteonecrosis of the jaw however developed draining extraoral fistula recently and was placed on amoxicillin/Augmentin combo to complete out 3 weeks.    Seen by OMFS at [facility] and underwent fistulectomy on 10/20/2022.  With cultures obtained growing Serratia, Candida albicans, strep, staph epidermidis and haemophilus parainfluenza.  He was then seen in follow-up and placed on 6 weeks of daily p.o. levofloxacin 750 mg plus daily p.o. fluconazole 400 mg for osteomyelitis treatment.  After completing treatment he continued to have some exposed bone in the jaw for which she was continued on amoxicillin 500 mg twice daily to prevent further seeding of the bone.    Today he reports that he has been off antibiotics for some time due to urinary symptoms.  Patient reports that he was on the amoxicillin and noticed that when he stopped the medication he developed resolution in his urinary symptoms which included dysuria and frequency.  States he did this twice and symptoms resolved most times.  States he has been off antibiotics since that time and had no pain in his jaw or discharge from prior fistulas.  States he continues to have exposed bone in his mouth.    Objective   Vital Signs:  /92 (BP Location: Right arm, Patient Position: Sitting, Cuff Size: Adult)   Pulse 84   Temp 97.8 °F (36.6 °C) (Oral)   Resp 18   Ht " "180.3 cm (70.98\")   Wt 82.6 kg (182 lb)   BMI 25.40 kg/m²   Estimated body mass index is 25.4 kg/m² as calculated from the following:    Height as of this encounter: 180.3 cm (70.98\").    Weight as of this encounter: 82.6 kg (182 lb).    BMI is within normal parameters. No other follow-up for BMI required.      Physical Exam  Constitutional:       General: He is not in acute distress.     Appearance: Normal appearance. He is normal weight. He is not ill-appearing.   HENT:      Head: Normocephalic and atraumatic.      Comments: Right angle of the mandible with small scabbing lesion with no drainage.  No surrounding erythema.  No tenderness of the mandible bilaterally.     Nose: Nose normal. No rhinorrhea.      Mouth/Throat:      Mouth: Mucous membranes are moist.      Pharynx: No oropharyngeal exudate.      Comments: Left inferior molar area with exposed bone.  Eyes:      General: No scleral icterus.     Extraocular Movements: Extraocular movements intact.      Pupils: Pupils are equal, round, and reactive to light.   Cardiovascular:      Rate and Rhythm: Normal rate and regular rhythm.      Pulses: Normal pulses.      Heart sounds: Normal heart sounds. No murmur heard.  Pulmonary:      Effort: Pulmonary effort is normal.      Breath sounds: Normal breath sounds. No wheezing, rhonchi or rales.   Abdominal:      General: Abdomen is flat. Bowel sounds are normal.      Palpations: Abdomen is soft.      Tenderness: There is no abdominal tenderness. There is no guarding or rebound.   Musculoskeletal:         General: No swelling or tenderness. Normal range of motion.      Cervical back: Normal range of motion and neck supple. No tenderness.      Right lower leg: No edema.      Left lower leg: No edema.   Skin:     General: Skin is warm and dry.      Findings: Lesion present. No erythema.   Neurological:      General: No focal deficit present.      Mental Status: He is alert and oriented to person, place, and time. "   Psychiatric:         Mood and Affect: Mood normal.         Behavior: Behavior normal.      Result Review :  The following data was reviewed by: Glenn Champion DO on 06/29/2022:  Common labs          3/6/2023    10:21 3/27/2023    10:20 4/17/2023    11:07   Common Labs   Glucose 155  119  106    BUN 19  20  20    Creatinine 1.44  1.45  1.47    Sodium 140  140  138    Potassium 3.8  4.6  4.6    Chloride 105  103  105    Calcium 8.2  8.6  8.1    Albumin 3.3  3.5  3.4    Total Bilirubin 0.4  0.4  0.4    Alkaline Phosphatase 86  83  95    AST (SGOT) 10  9  12    ALT (SGPT) 7  8  9    WBC 9.71  12.59  8.00    Hemoglobin 12.5  12.6  12.8    Hematocrit 40.4  41.4  41.9    Platelets 219  249  238    Data reviewed: Radiologic studies With reports attached from oral maxillofacial surgery and Consultant notes From oral maxillofacial surgery and oncology .  Microbiology from recent fistulectomy reviewed.         Assessment and Plan   Diagnoses and all orders for this visit:    1. Osteonecrosis of mandible (Primary)    2. Osteomyelitis, unspecified site, unspecified type    3. Drug-induced osteonecrosis of jaw    Patient is doing very well and has been off antibiotics after self discontinuing them.  He reports he has not had any symptoms in terms of discharge from his mouth or prior fistulas and the area within the mouth looks clean and intact.  We discussed continuing amoxicillin versus holding off on further therapy for now and monitoring.  Patient is in agreement to monitor for now and follow-up in 6 months.  He will contact us if he notices any worsening.     I spent 26 minutes caring for Dav on this date of service. This time includes time spent by me in the following activities:preparing for the visit, reviewing tests, obtaining and/or reviewing a separately obtained history, performing a medically appropriate examination and/or evaluation , counseling and educating the patient/family/caregiver, ordering  medications, tests, or procedures, referring and communicating with other health care professionals , documenting information in the medical record, independently interpreting results and communicating that information with the patient/family/caregiver and care coordination  Follow Up   No follow-ups on file.  Patient was given instructions and counseling regarding his condition or for health maintenance advice. Please see specific information pulled into the AVS if appropriate.

## 2023-07-31 ENCOUNTER — OFFICE VISIT (OUTPATIENT)
Dept: ONCOLOGY | Facility: CLINIC | Age: 68
End: 2023-07-31
Payer: MEDICARE

## 2023-07-31 VITALS
WEIGHT: 185.1 LBS | RESPIRATION RATE: 16 BRPM | SYSTOLIC BLOOD PRESSURE: 161 MMHG | HEART RATE: 69 BPM | OXYGEN SATURATION: 96 % | HEIGHT: 71 IN | BODY MASS INDEX: 25.91 KG/M2 | TEMPERATURE: 98.2 F | DIASTOLIC BLOOD PRESSURE: 84 MMHG

## 2023-07-31 DIAGNOSIS — C91.10 CLL (CHRONIC LYMPHOCYTIC LEUKEMIA): Primary | ICD-10-CM

## 2023-07-31 DIAGNOSIS — C79.51 METASTASIS TO BONE: ICD-10-CM

## 2023-07-31 DIAGNOSIS — C34.12 MALIGNANT NEOPLASM OF UPPER LOBE OF LEFT LUNG: ICD-10-CM

## 2023-07-31 PROCEDURE — 1126F AMNT PAIN NOTED NONE PRSNT: CPT | Performed by: INTERNAL MEDICINE

## 2023-07-31 PROCEDURE — 99214 OFFICE O/P EST MOD 30 MIN: CPT | Performed by: INTERNAL MEDICINE

## 2023-08-04 ENCOUNTER — SPECIALTY PHARMACY (OUTPATIENT)
Dept: PHARMACY | Facility: HOSPITAL | Age: 68
End: 2023-08-04
Payer: MEDICARE

## 2023-08-07 ENCOUNTER — SPECIALTY PHARMACY (OUTPATIENT)
Dept: PHARMACY | Facility: HOSPITAL | Age: 68
End: 2023-08-07
Payer: MEDICARE

## 2023-08-07 NOTE — PROGRESS NOTES
Renewed the PAN CLL tobias today.  Billing information is below.    BIN: [id number]  PCN: [id number]  Group: [id number]  ID: [id number]  Start date: 8/28/23  End date: 8/27/24  Amount:$9700     Darshana Lao  Specialty Pharmacy Technician

## 2023-08-21 ENCOUNTER — INFUSION (OUTPATIENT)
Dept: ONCOLOGY | Facility: HOSPITAL | Age: 68
End: 2023-08-21
Payer: MEDICARE

## 2023-08-21 ENCOUNTER — APPOINTMENT (OUTPATIENT)
Dept: ONCOLOGY | Facility: HOSPITAL | Age: 68
End: 2023-08-21
Payer: MEDICARE

## 2023-08-21 DIAGNOSIS — Z45.2 FITTING AND ADJUSTMENT OF VASCULAR CATHETER: ICD-10-CM

## 2023-08-21 DIAGNOSIS — C91.10 CLL (CHRONIC LYMPHOCYTIC LEUKEMIA): Primary | ICD-10-CM

## 2023-08-21 PROCEDURE — 25010000002 HEPARIN LOCK FLUSH PER 10 UNITS: Performed by: INTERNAL MEDICINE

## 2023-08-21 PROCEDURE — 96523 IRRIG DRUG DELIVERY DEVICE: CPT

## 2023-08-21 RX ORDER — SODIUM CHLORIDE 0.9 % (FLUSH) 0.9 %
10 SYRINGE (ML) INJECTION AS NEEDED
OUTPATIENT
Start: 2023-08-21

## 2023-08-21 RX ORDER — HEPARIN SODIUM (PORCINE) LOCK FLUSH IV SOLN 100 UNIT/ML 100 UNIT/ML
500 SOLUTION INTRAVENOUS AS NEEDED
Status: DISCONTINUED | OUTPATIENT
Start: 2023-08-21 | End: 2023-08-21 | Stop reason: HOSPADM

## 2023-08-21 RX ORDER — SODIUM CHLORIDE 0.9 % (FLUSH) 0.9 %
10 SYRINGE (ML) INJECTION AS NEEDED
Status: DISCONTINUED | OUTPATIENT
Start: 2023-08-21 | End: 2023-08-21 | Stop reason: HOSPADM

## 2023-08-21 RX ORDER — HEPARIN SODIUM (PORCINE) LOCK FLUSH IV SOLN 100 UNIT/ML 100 UNIT/ML
500 SOLUTION INTRAVENOUS AS NEEDED
OUTPATIENT
Start: 2023-08-21

## 2023-08-21 RX ADMIN — Medication 500 UNITS: at 13:58

## 2023-08-21 RX ADMIN — Medication 10 ML: at 13:58

## 2023-10-04 ENCOUNTER — TELEPHONE (OUTPATIENT)
Dept: ONCOLOGY | Facility: CLINIC | Age: 68
End: 2023-10-04
Payer: MEDICARE

## 2023-10-04 NOTE — TELEPHONE ENCOUNTER
"    Caller: Dav Freitas \"HERMINIA\"    Relationship to patient: Self    Best call back number: [phone]     Chief complaint: PT MISSED PORT FLUSH AND NEEDS TO R/S     Type of visit: PORT FLUSH    Requested date: FIRST AVAILABLE.     If rescheduling, when is the original appointment: 10/02/23    Additional notes: PLEASE CALL WITH NEW APPT DATE/TIME        "

## 2023-10-13 ENCOUNTER — SPECIALTY PHARMACY (OUTPATIENT)
Dept: PHARMACY | Facility: HOSPITAL | Age: 68
End: 2023-10-13
Payer: MEDICARE

## 2023-10-23 ENCOUNTER — SPECIALTY PHARMACY (OUTPATIENT)
Dept: ONCOLOGY | Facility: HOSPITAL | Age: 68
End: 2023-10-23
Payer: MEDICARE

## 2023-10-23 ENCOUNTER — INFUSION (OUTPATIENT)
Dept: ONCOLOGY | Facility: HOSPITAL | Age: 68
End: 2023-10-23
Payer: MEDICARE

## 2023-10-23 ENCOUNTER — LAB (OUTPATIENT)
Dept: OTHER | Facility: HOSPITAL | Age: 68
End: 2023-10-23
Payer: MEDICARE

## 2023-10-23 VITALS — WEIGHT: 186.7 LBS | BODY MASS INDEX: 26.14 KG/M2 | HEIGHT: 71 IN

## 2023-10-23 DIAGNOSIS — C91.10 CLL (CHRONIC LYMPHOCYTIC LEUKEMIA): ICD-10-CM

## 2023-10-23 DIAGNOSIS — Z45.2 FITTING AND ADJUSTMENT OF VASCULAR CATHETER: ICD-10-CM

## 2023-10-23 DIAGNOSIS — C91.10 CLL (CHRONIC LYMPHOCYTIC LEUKEMIA): Primary | ICD-10-CM

## 2023-10-23 DIAGNOSIS — C79.51 METASTASIS TO BONE: ICD-10-CM

## 2023-10-23 DIAGNOSIS — C34.12 MALIGNANT NEOPLASM OF UPPER LOBE OF LEFT LUNG: ICD-10-CM

## 2023-10-23 LAB
ALBUMIN SERPL-MCNC: 4.1 G/DL (ref 3.5–5.2)
ALBUMIN/GLOB SERPL: 2.2 G/DL
ALP SERPL-CCNC: 92 U/L (ref 39–117)
ALT SERPL W P-5'-P-CCNC: 9 U/L (ref 1–41)
ANION GAP SERPL CALCULATED.3IONS-SCNC: 10.8 MMOL/L (ref 5–15)
AST SERPL-CCNC: 10 U/L (ref 1–40)
BASOPHILS # BLD AUTO: 0.07 10*3/MM3 (ref 0–0.2)
BASOPHILS NFR BLD AUTO: 0.9 % (ref 0–1.5)
BILIRUB SERPL-MCNC: 0.8 MG/DL (ref 0–1.2)
BUN SERPL-MCNC: 19 MG/DL (ref 8–23)
BUN/CREAT SERPL: 14.6 (ref 7–25)
CALCIUM SPEC-SCNC: 9.2 MG/DL (ref 8.6–10.5)
CHLORIDE SERPL-SCNC: 102 MMOL/L (ref 98–107)
CO2 SERPL-SCNC: 26.2 MMOL/L (ref 22–29)
CREAT SERPL-MCNC: 1.3 MG/DL (ref 0.76–1.27)
DEPRECATED RDW RBC AUTO: 53 FL (ref 37–54)
EGFRCR SERPLBLD CKD-EPI 2021: 59.5 ML/MIN/1.73
EOSINOPHIL # BLD AUTO: 0.21 10*3/MM3 (ref 0–0.4)
EOSINOPHIL NFR BLD AUTO: 2.6 % (ref 0.3–6.2)
ERYTHROCYTE [DISTWIDTH] IN BLOOD BY AUTOMATED COUNT: 15.7 % (ref 12.3–15.4)
GLOBULIN UR ELPH-MCNC: 1.9 GM/DL
GLUCOSE SERPL-MCNC: 85 MG/DL (ref 65–99)
HCT VFR BLD AUTO: 44.1 % (ref 37.5–51)
HGB BLD-MCNC: 13.7 G/DL (ref 13–17.7)
IMM GRANULOCYTES # BLD AUTO: 0.06 10*3/MM3 (ref 0–0.05)
IMM GRANULOCYTES NFR BLD AUTO: 0.7 % (ref 0–0.5)
LYMPHOCYTES # BLD AUTO: 0.84 10*3/MM3 (ref 0.7–3.1)
LYMPHOCYTES NFR BLD AUTO: 10.5 % (ref 19.6–45.3)
MCH RBC QN AUTO: 28.5 PG (ref 26.6–33)
MCHC RBC AUTO-ENTMCNC: 31.1 G/DL (ref 31.5–35.7)
MCV RBC AUTO: 91.9 FL (ref 79–97)
MONOCYTES # BLD AUTO: 1.03 10*3/MM3 (ref 0.1–0.9)
MONOCYTES NFR BLD AUTO: 12.8 % (ref 5–12)
NEUTROPHILS NFR BLD AUTO: 5.82 10*3/MM3 (ref 1.7–7)
NEUTROPHILS NFR BLD AUTO: 72.5 % (ref 42.7–76)
NRBC BLD AUTO-RTO: 0 /100 WBC (ref 0–0.2)
PLATELET # BLD AUTO: 163 10*3/MM3 (ref 140–450)
PMV BLD AUTO: 13 FL (ref 6–12)
POTASSIUM SERPL-SCNC: 4.4 MMOL/L (ref 3.5–5.2)
PROT SERPL-MCNC: 6 G/DL (ref 6–8.5)
RBC # BLD AUTO: 4.8 10*6/MM3 (ref 4.14–5.8)
SODIUM SERPL-SCNC: 139 MMOL/L (ref 136–145)
WBC NRBC COR # BLD: 8.03 10*3/MM3 (ref 3.4–10.8)

## 2023-10-23 PROCEDURE — 36591 DRAW BLOOD OFF VENOUS DEVICE: CPT

## 2023-10-23 PROCEDURE — 85025 COMPLETE CBC W/AUTO DIFF WBC: CPT | Performed by: INTERNAL MEDICINE

## 2023-10-23 PROCEDURE — 80053 COMPREHEN METABOLIC PANEL: CPT | Performed by: INTERNAL MEDICINE

## 2023-10-23 RX ORDER — HEPARIN SODIUM (PORCINE) LOCK FLUSH IV SOLN 100 UNIT/ML 100 UNIT/ML
500 SOLUTION INTRAVENOUS AS NEEDED
Status: DISCONTINUED | OUTPATIENT
Start: 2023-10-23 | End: 2023-10-23 | Stop reason: HOSPADM

## 2023-10-23 RX ORDER — SODIUM CHLORIDE 0.9 % (FLUSH) 0.9 %
10 SYRINGE (ML) INJECTION AS NEEDED
OUTPATIENT
Start: 2023-10-23

## 2023-10-23 RX ORDER — HEPARIN SODIUM (PORCINE) LOCK FLUSH IV SOLN 100 UNIT/ML 100 UNIT/ML
500 SOLUTION INTRAVENOUS AS NEEDED
OUTPATIENT
Start: 2023-10-23

## 2023-10-23 RX ORDER — SODIUM CHLORIDE 0.9 % (FLUSH) 0.9 %
10 SYRINGE (ML) INJECTION AS NEEDED
Status: DISCONTINUED | OUTPATIENT
Start: 2023-10-23 | End: 2023-10-23 | Stop reason: HOSPADM

## 2023-10-23 NOTE — PROGRESS NOTES
Specialty Pharmacy Patient Management Program  Oncology 6-Month Clinical Assessment       Dav Freitas is a 68 y.o. male with CLL seen today to assess adherence and side effects.    Regimen: Imbruvica 420 mg daily    Reason for Outreach: Routine medication check-in .       Problem list reviewed by Emy Ram RPH on 10/23/2023 at  1:34 PM  Medicines reviewed by Emy Ram RPH on 10/23/2023 at  1:33 PM  Allergies reviewed by Emy Ram RPH on 10/23/2023 at  1:33 PM     Goals Addressed Today        Specialty Pharmacy General Goal      Progression free survival             Medication Assessment:  Medication Assessment  Follow Up Clinical Assessment  Linked Medication(s) Assessed: Ibrutinib  Therapeutic appropriateness: Appropriate  Medication tolerability: Tolerating with no to minimal ADRs  Medication plan: Continue therapy with normal follow-up  Quality of Life Improvement Scale: No change  Administration: Patient is taking every day at the same time .   Patient can self administer medications: yes  Medication Follow-Up Plan: Next clinical assessment  Lab Review: The labs listed below have been reviewed. No dose adjustments are needed for the oral specialty medication(s) based on the labs.    Lab Results   Component Value Date    GLUCOSE 83 07/10/2023    CALCIUM 8.9 07/10/2023     07/10/2023    K 4.2 07/10/2023    CO2 27.2 07/10/2023     07/10/2023    BUN 19 07/10/2023    CREATININE 1.36 (H) 07/10/2023    EGFRIFNONA 50 (L) 02/18/2022    BCR 14.0 07/10/2023    ANIONGAP 7.8 07/10/2023     Lab Results   Component Value Date    WBC 7.02 07/10/2023    RBC 4.69 07/10/2023    HGB 12.8 (L) 07/10/2023    HCT 41.6 07/10/2023    MCV 88.7 07/10/2023    MCH 27.3 07/10/2023    MCHC 30.8 (L) 07/10/2023    RDW 15.3 07/10/2023    RDWSD 49.1 07/10/2023    MPV 13.0 (H) 07/10/2023     07/10/2023    NEUTRORELPCT 70.8 07/10/2023    LYMPHORELPCT 14.8 (L) 07/10/2023    MONORELPCT 9.8 07/10/2023     EOSRELPCT 2.8 07/10/2023    BASORELPCT 1.1 07/10/2023    AUTOIGPER 0.7 (H) 07/10/2023    NEUTROABS 4.96 07/10/2023    LYMPHSABS 1.04 07/10/2023    MONOSABS 0.69 07/10/2023    EOSABS 0.20 07/10/2023    BASOSABS 0.08 07/10/2023    AUTOIGNUM 0.05 07/10/2023    NRBC 0.0 07/10/2023     Drug-drug interactions  Completed medication reconciliation today to assess for drug interactions. Patient denies starting or stopping any medications.    Assessed medication list for interactions, no significant drug interactions noted.   Advised patient to call the clinic if any new medications are started so we can assess for drug-drug interactions.  Drug-food interactions discussed:  none today  Vaccines are coordinated by the patient's oncologist and primary care provider.    Allergies  Known allergies and reactions were discussed with the patient. The patient's chart has been reviewed for allergy information and updated as necessary.   No Known Allergies     Hospitalizations and Urgent Care Visits Since Last Assessment:  Unplanned hospitalizations or inpatient admissions: no  ED Visits: no  Urgent Office Visits: no    Adherence Assessment:  Adherence Questions  Linked Medication(s) Assessed: Ibrutinib  On average, how many doses/injections does the patient miss per month?: 0  What are the identified reasons for non-adherence or missed doses? : no problems identfied  What is the estimated medication adherence level?: %  Based on the patient/caregiver response and refill history, does this patient require an MTP to track adherence improvements?: no    Quality of Life Assessment:  Quality of Life Assessment  Quality of Life Improvement Scale: No change  -- Quality of Life: 5/10    Financial Assessment:  Medication availability/affordability: Patient has had no issues obtaining medication from pharmacy.    Attestation     I attest the patient was actively involved in and has agreed to the above plan of care.  If the prescribed  therapy is at any point deemed not appropriate based on the current or future assessments, a consultation will be initiated with the patient's specialty care provider to determine the best course of action. The revised plan of therapy will be documented along with any required assessments and/or additional patient education provided.       All questions addressed and patient had no additional concerns. Patient has pharmacy contact information.    Name/Credentials: Martin Ram PharmD, CANDICE    10/23/2023  13:34 EDT

## 2023-10-30 RX ORDER — OXYCODONE HYDROCHLORIDE 5 MG/1
TABLET ORAL
OUTPATIENT
Start: 2023-10-30

## 2023-11-02 LAB — REF LAB TEST METHOD: NORMAL

## 2023-11-08 DIAGNOSIS — C79.51 LUNG CANCER METASTATIC TO BONE: Primary | ICD-10-CM

## 2023-11-08 DIAGNOSIS — C34.90 LUNG CANCER METASTATIC TO BONE: Primary | ICD-10-CM

## 2023-11-08 RX ORDER — ALPRAZOLAM 0.5 MG/1
0.5 TABLET ORAL 3 TIMES DAILY PRN
Qty: 30 TABLET | Refills: 0 | Status: SHIPPED | OUTPATIENT
Start: 2023-11-08

## 2023-11-11 NOTE — PROGRESS NOTES
Subjective Patient with good performance status    REASON FOR FOLLOW UP:  1.  Chronic lymphocytic leukemia with extensive lymphadenopathy and possible pleural involvement. Initiation of Treanda, Rituxan May 1, 2019.  2.  Hypogammaglobulinemia.  Status is post IVIG  3.  Significant response on scans June 27, 2019.  Treatment changed to Imbruvica 420 mg daily.  4.  Metastatic non-small cell lung carcinoma on Keytruda  5.  Patient seen 2/18/2022 with left jaw/gumline swelling pain, associated hematoma?  Osteonecrosis.  Restart of Keytruda 3/4/2022, Xgeva discontinued  6.  Restaging via CT 4/12/2022, continued response, Keytruda continued, referral to dermatology for worsening psoriasis.  7.  Jaw osteonecrosis, undergoing therapy through oral surgery, IVIG anticipated, infectious disease and oral surgery follow-up  8.  Patient assessed 11/18/2022, Keytruda continued, ibrutinib-dose reduced-continued, patient seen by  physicians, status postdebridement of bone of left mandible with bone biopsy, fistulectomy of left mandible 10/20/2022  9.  Patient seen 12/30/2022 continue to improve,?  Urinary tract symptoms that have continued to improve.  10.  Repeat scans 4/12/2023 without evidence of recurrence.  11.  Patient seen 4/17/2023, Keytruda therapy held, urinary symptoms improving?  12.  Patient with good performance status, current reveal negative ctDNA  13.  Patient with ctDNA recheck at less than 0.5%, reassessment at 3 months, ongoing issues with the patient's wife developing end-stage liver disease      HISTORY OF PRESENT ILLNESS:    The patient is a 68 y.o. male with the above mentioned history here today for lab review and evaluation prior to cycle 38  Keytruda.  He reports overall he is doing well.  He does continue on antibiotics from Dr. Pak before his osteonecrosis of the jaw.  He does have 1 area of exposed bone on his jaw they are hoping that the gum tissue will grow over this.  He would follow-up with   [facility] in June.    Patient also previously been on antibiotics for UTI.  He does report some urgency which is still present.  No fevers or chills.  Denies any issues with increased shortness of breath.    He does report some intermittent pain on the right lateral portion of his chest near the eighth rib.  He states when he moves at times he has a sharp pain that is quick, he describes it as feeling like a strain.  This is only happened occasionally over the past few weeks.    The patient is next seen 3/6/2023 without additional chest discomfort and with lessening UTI symptoms.  We have discussed repeat scans in April 2023.    The patient did proceed to repeat CT chest abdomen pelvis 4/12/2023 with stable findings overall including a small right pleural effusion, right rib metastatic lesion with increase sclerosis, overlying soft tissue thickening without interval change in additional osseous lesions over the interval change.  Patient evaluated 3/27/23 with general improvement though difficulty with dysuria and bladder spasm requiring additional antibiotic therapies.    These findings are discussed with the patient 4/17/23 and he feels that his urinary symptoms improved when he stopped his antibiotic therapy used for his osteonecrosis- amoxicillin.  We discussed that his findings including his long-term improvement/remission and, potentially, his urinary symptoms, in part, could be from continued immunotherapy.  This would be an opportunity to now discontinue that therapy and observe him expectantly.    The patient had follow-up assessment by infectious disease 6/8/2023.  Have been off amoxicillin with subsequent resolution urinary symptoms that include dysuria and frequency.    As an alternative method of follow-up the patient underwent a Guardant Reveal that was sent 7/10/2023 negative for ctDNA.  This corresponds to his relatively good performance status when seen 7/31/2023.  This includes resolution of the bony  fragment that had been noted in his lower jawline that recently loosened and ejected with normal mucosa at the site    The patient returns for assessment 11/13/2023.  His recent CBC 10/23/2023 was normal with H&H of 13.2 and 44.1, white count of 8030, platelet count 163,000 with a normal differential, CMP with being creatinine 19 and 1.30 and Guardant Reveal with ctDNA at less than 0.05%  Unfortunately his wife has developed cirrhosis and is demonstrating end-stage liver disease.  He is her primary caregiver.    We have discussed how to proceed and a repeat ctDNA assessment with Guardant Reveal be requested in approximately 3 months.      Past Medical History, Past Surgical History, Social History, Family History have been reviewed and are without significant changes except as mentioned.    Hematologic/oncologic history:    As concerns his CLL history he initially required Treanda Rituxan 5/1/2019 for 2 cycles and then initiated Imbruvica 420 mg daily initiated 7/25/2019.       As concerns stage IV non-small cell lung carcinoma he returns the office continuing Keytruda which he continues to receive every 3 weeks which was initiated 10/21/2020.  He also receives Xgeva every 6 weeks which was initially given 12/2/2020 and discontinued 1/7/2022 secondary to osteonecrosis.      Additional issues that developed during treatment included right knee pain with plain views of his right knee showing a right knee effusion with medial compartment narrowing and no suspicious bone lesion.  There is benign periosteal calcification along the distal right femur.  A PET/CT 11/11 went on to demonstrate a complete metabolic response to therapy compared to PET/CT from 9/23/2020.  This includes his primary lung neoplasm left upper lobe, large osseous metastatic lesion the right chest wall region from the eighth rib, small left adrenal metastasis, and no evidence of disease involving the distal left femur.      The patient was seen  "11/24/2021 continue to do well though indicating that his right knee is \"something I can manage at the moment\".  He prefers not to have orthopedic assessment at this point.  He is concerned, ever, about skin lesions that are developing primarily on his calf regions and into his right thigh.    The patient was seen by Dr. Regan Damon 11/29/2021 and felt to be consistent with psoriasiform dermatitis treated with intralesional therapy.  Was also started on a moisturizer and lipid therapy.  The patient returned for assessment 12/15/2021 and treated with cycle #21 Keytruda, returned 1/7/2022 for cycle #22 and 1/28/2022 for cycle #23.  At that visit he had developed adenopathy several weeks previous and was treated with a Medrol Dosepak.   The patient was next seen 2/18/2022 and indicates that though his adenopathy has improved after treatment described above that his jaw has become painful swollen and tender with additional bleeding.     The patient was referred to his primary dentist who then had him seen oral surgeon-Dr. Ezequiel Davila-reached at [phone] who felt that this was osteonecrosis and should be treated symptomatically.  As the patient reviewed 3/4/2022, wonderfully, his oral issues have nearly resolved with the gumline returning to essentially its previous state, no swelling, minimal erythema, no discharge and no additional pain.  He does have follow-up with oral surgery in the next several weeks and we discussed restarting his Keytruda scanning him likely in April 2022 in general.   He returns to the office on 3/25/2022 in follow-up in anticipation of cycle 24 Keytruda. He reports the left side of his jaw has improved.    However, he was now having issues with the right lower side. He was seen by neurosurgery with additional films and started on oral amoxicillin by his oral surgeon.      The patient continued his immunotherapy taking Keytruda 3/25/2022 and underwent repeat CT chest abdomen and pelvis " 4/12/2022 that is reviewed with him 4/15/2022.  Wonderfully there is no substantial change with a small to moderate right pleural effusion that decreased in size, no additional pulmonary nodule or new metastatic disease, multifocal osteosclerotic metastatic disease without change and no new findings in the abdomen or pelvis.    The patient was seen 4/15/2022 indicating that he is actually feeling well in terms of general symptoms.  This includes lack of progression from mouth pain, ability to eat without discomfort.  He is, however, having worsening psoriasis particular in his lower extremities and has been reviewed by dermatology previously.    The patient was reviewed 6/17/2022 having been seen by oral surgery with progression of his osteonecrosis and skin eruption.  He is currently undergoing assessment by infectious disease within the next week and we have reviewed that previously he responded to IVIG for in-hospital refractory sinusitis.  As a result he is asked to undergo reassessment for his immunoglobulin levels today and return next week for 400 mg/kg of IVIG infusion which we will continue monthly.    The patient was given IVIG 6/24/2022 and again 7/22/2022.  Assessment 8/5/2022 continue to show a degree of general improvement with oral surgery continue to follow him with a second opinion to be obtained  8/8/2022 when IVIG planned 8/19/2022 and 9/16/2022.    The patient slowly continued both assessments by oral surgery and  Scans considering his non-small cell lung cancer including CT studies 10/4/2022 that were essentially stable compared to previous.  Therapies include Keytruda every 3 weeks, daily Imbruvica, monthly IVIG every 4 weeks.    He next presentedwith the above-mentioned history, who returns the office  in anticipation of his 35 th cycle of Keytruda.  He was last treated with IVIG 11/11/2022.       At this point he had undergone surgery at [facility] including fistulectomy of the left  mandible and debridement of the intraoral bone left mandible.  He he had also been seen by infectious disease, Dr. Champion regarding osteonecrosis of the jaw.  He was placed on antibiotics for 6 weeks including Levaquin and fluconazole.          There is an interaction with his fluconazole and ibrutinib and adjustments will be necessary.  [facility] pharmacy is managing this.  He reports he is overall feeling very well.  He has had improvement in his ability to eat and drink since undergoing surgery on his jaw.  His skin is overall improved with the use of Aquaphor.  He did undergo CT scans prior to cycle 33 Keytruda with stability of his disease.  He continues on current therapy with excellent tolerance.     Patient is next seen 11/18/2022.  Fortunately he feels that he is doing  reasonably well with surgery having been successful, pain virtually absent at this point, no additional shortness of breath or cough, appetite remaining fair, stable weight and sleep at least modestly managed.  Unfortunately his wife recently injured her femur and he has become her primary caregiver at this point.       The patient is next assessed 12/9/2022.  He is due for cycle #36 Keytruda and IVIG today.  He did not realize he was due for IVIG today, and already scheduled another appointment so would like to reschedule IVIG for next week.  He also continues on Imbruvica, currently at a reduced dose of 280 mg daily, due to interaction with Diflucan.  He was seen by Dr. Champion with infectious disease yesterday, 12/8/2022 and econazole was discontinued as patient has completed his 6-week course.  He was started on preventative Augmentin 500 mg twice daily until his gums grow to close over the exposed area of bone.  He is eating and drinking adequately, weight remains stable.  Bowels moving regularly.  Denies fever or chills.  Denies nausea or vomiting.  Denies new or worsening pain.  Denies skin rash or shortness of breath.  No new concerns  today.    The patient continued Keytruda therapy including 12/9/2022 and IVIG 12/16/2022.  He is next seen 12/30/2022 for continued Keytruda.  We have discussed that he is clearly improving per his osteonecrosis and his completed his surgical therapy and now on prophylactic antibiotic therapy.  We have reviewed that he could discontinue his IVIG, continue his Imbruvica, Keytruda and prophylactic antibiotic therapy.  He is having urinary symptoms?  And a follow-up UA and culture is pending.    Note that the patient's urine cultures were negative we proceeded 1/20/2023 with Keytruda, Imbruvica with Keytruda given 2/10/2023 and the patient presented 3/6/2023 for his next treatment.  We do plan repeat CT scans in April 2023.    Repeat scans 4/12/2023-stable CT chest and pelvis with small right pleural effusion, parenchymal scarring right lower lobe, right rib metastatic lesion with increase sclerosis, overlying soft tissue thickening without interval change in osseous lesions additionally without change.    Patient seen 4/17/2023 at which time immunotherapy was held and patient placed on observation status.  This included some concern about whether his urinary symptoms could be related to ongoing immunotherapy.  This would be observed off Keytruda.    The patient returns for assessment 11/13/2023.  His recent CBC 10/23/2023 was normal with H&H of 13.2 and 44.1, white count of 8030, platelet count 163,000 with a normal differential, CMP with being creatinine 19 and 1.30 and Guardant Reveal with ctDNA at less than 0.05%  Unfortunately his wife has developed cirrhosis and is demonstrating end-stage liver disease.  He is her primary caregiver.    We have discussed how to proceed and a repeat ctDNA assessment with Guardant Reveal be requested in approximately 3 months.      Objective      Vitals:    11/13/23 1250   BP: 169/84   Pulse: 80   Resp: 16   Temp: 97.8 °F (36.6 °C)   TempSrc: Temporal   SpO2: 95%   Weight: 84 kg  "(185 lb 3.2 oz)   Height: 180.3 cm (70.98\")   PainSc: 0-No pain           11/13/2023    12:50 PM   Current Status   ECOG score 0     Physical Exam  Vitals and nursing note reviewed.   Constitutional:       Appearance: Normal appearance. He is well-developed.   HENT:      Head: Normocephalic and atraumatic.      Nose: Nose normal.      Mouth/Throat:      Comments: No further exposed bone left lower jawline.  Eyes:      Pupils: Pupils are equal, round, and reactive to light.   Cardiovascular:      Rate and Rhythm: Normal rate and regular rhythm.      Heart sounds: Normal heart sounds.   Pulmonary:      Effort: Pulmonary effort is normal. No respiratory distress.      Breath sounds: Normal breath sounds. No wheezing, rhonchi or rales.   Abdominal:      General: Bowel sounds are normal. There is no distension.      Palpations: Abdomen is soft.      Tenderness: There is no abdominal tenderness.   Musculoskeletal:         General: Normal range of motion.      Cervical back: Normal range of motion and neck supple.   Skin:     General: Skin is warm and dry.   Neurological:      Mental Status: He is alert and oriented to person, place, and time.   Psychiatric:         Behavior: Behavior normal.       I have reexamined the patient and the results are consistent with the previously documented exam. Jostin Parekh MD      RECENT LABS:                  FISH prognostic panel-Positive for deletion of 13 q. 14.3    Assessment & Plan   1.  CLL presenting with significant lymphocytosis, lymphadenopathy and splenomegaly.     Positive for deletion of 13 q. 14.3.    Patient status post 2 cycles of Treanda Rituxan with excellent response.    Imaging 6/27/2019 with significant decrease in adenopathy.  Treanda Rituxan discontinued   Imbruvica 420 mg daily initiated 7/25/2019.  He continues on Imbruvica, without indication of progression of his CLL, without progressive lymphadenopathy on CT scans.  Patient has resolving adenopathy " particularly cervical regions 2/18/2022  3/25/2022 patient is without worsening adenopathy on exam today.  Continue Imbruvica.  10/28/2022 continues on Imbruvica 420 mg daily. Denies B-symptoms, no evidence of worsening adenopathy on exam.  CT scans without worsening adenopathy  11/18/2022: Imbruvica reduced to 280 mg daily while patient receiving 6-week course of antimicrobials due to interaction with fluconazole.  12/9/2022: He has finished his course of fluconazole, taking his last dose today.  He has a few days left of Imbruvica 280 mg, which he will complete.  He will then increase Imbruvica back to regular dose of 420 mg daily.  Patient stable/17/23, 4/17/2023, 7/13/2023, 11/13/2023    2.  Pulmonary nodules/infiltrates.  He is  status post bronchoscopy.  Is unclear at this time whether these findings are infectious or possibly related to his lymphoproliferative disorder.  This is seen to be improving on latest scans- 4/12/2023                                                                                           3.  Hypogammaglobulinemia-status post IVIG with resolution of sinusitis.   Anticipate trial of IVIG with the patient now having progression of his osteonecrosis and dermal infection.  Continues with monthly IVIG.  Due today, however did not realize he was due for IVIG and would like to reschedule for next week.  Reviewed 12/30/2022 with IVIG discontinued.    4.  Non-small cell lung carcinoma  August 26 2020 revealing a new 1.6 cm spiculated nodule within the left upper lobe, 1.2 cm left adrenal nodule, bone windows with a soft tissue mass involving the right eighth rib measuring 3.7 x 2.6 cm.    Biopsy was consistent with adenocarcinoma CK 7+, CK 20-, lung primary suspected clinically, molecular panel not yet obtained.  PET/CT 9/23/2020 demonstrating asymmetric uptake within the right parotid gland which is unremarkable appearance on noncontrasted CT, SUV of 6 compared to 2.7.  There is no hilar,  mediastinal or axillary adenopathy, findings of asymmetric moderate to intense FDG uptake within superior aspect the right chest wall anterior to the right first rib with an irregular hypodense lesion measuring 1.8 x 1.6 and SUV 4.9.  This had not been seen June 27, 2019.  There is an intensely FDG avid nodule within the lingula measuring 1.9 x 1.5 history of 12.4, FDG avid left adrenal nodule 1.9 x 1.5 with an issue 21.2.    Evaluated by radiation oncology therapy September 29 and started on radiation therapy to the right chest wall-35 Gy in 10 fractions.    Plans were also then proceed with SBRT to the solitary left upper lobe lesion pending insurance approval.  Further testing including TPS of 20% and foundation CDX with a TMB of greater than 10 mutations per megabase (28 mutations per megabase) and the indication that several immunotherapies could be useful in this patient's care.  Additional genomic findings include BRAF G469R/that trametinib could be useful and STK11/that everolimus could be useful.  Keytruda initiated 10/21/2021   Reassessment after 2 cycles of Keytruda with enlargement of the right eighth rib lesion,enlargement of spiculated left upper lobe lung mass, moderate to large right-sided pleural effusion, new left adrenal mass and enlarged retrocrural lymph node.?Pseudoprogression  Xgeva last given 12/30/2020  Follow-up scans 1/6/2021 demonstrated marked improvement in his right eighth rib lesion, resolution of left upper lobe spiculated mass, residual large right pleural effusion, resolution of left adrenal metastasis.   Right pleural effusion, with thoracentesis 1/14/2021 with 1500 mils removed.  Pathology consistent with malignant effusion   CT scans 4/5/2021 with response noted in the left upper lobe density.  He continues to receive Keytruda every 3 weeks along with Xgeva every 6 weeks.  Repeat scan 7/15/2021 without evidence of recurrence or progressive disease.  Plans to continue Keytruda  every 3 weeks with repeat scans in 4 to 6 months  Patient status post PET/CT 11/11/2021 with hyper response, approximate remission status, plan to proceed with cycle #20 Keytruda  Patient seen 2/18/2022 with Keytruda held while his oral issues are addressed-concern for osteonecrosis of the jaw.  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  3/25/2022 proceed with cycle #24 Keytruda today.  Follow-up CT scans 4/12/2022 without evidence of progressive disease, stable findings including osteosclerotic metastatic disease.  Keytruda continued  Repeat imaging 10/4/2022 with overall stable disease, Keytruda continued  12/9/2022: Proceed with cycle #36 Keytruda today.  Plan repeat imaging April 2023.  3/6/2023 continued with 41st cycle of Keytruda  Repeat scans 4/12/23 without evidence of progressive disease.  Discussion as to the discontinuance of immunotherapy.  Patient seen 4/17/2023 with Keytruda held, follow-up Guardant Reveal planned.  Subcu Guardant Reveal 7/13/2023 negative ctDNA, repeat planned in 12 weeks.  The patient returns for assessment 11/13/2023.  His recent CBC 10/23/2023 was normal with H&H of 13.2 and 44.1, white count of 8030, platelet count 163,000 with a normal differential, CMP with being creatinine 19 and 1.30 and Guardant Reveal with ctDNA at less than 0.05%  Unfortunately his wife has developed cirrhosis and is demonstrating end-stage liver disease.  He is her primary caregiver.  We have discussed how to proceed and a repeat ctDNA assessment with Guardant Reveal be requested in approximately 3 months.      5.  Left knee metastasis  Evaluated by orthopedic oncology, Dr. Eliu Ochoa  Admission 1/7/2021 undergoing stabilization of left femoral bone lesion, prophylactic stabilization with intramedullary implants.  It was suggested we delay Xgeva or Zometa to allow  bone healing  Completed radiation with 10 fractions 2/10/2021  Xgeva resumed 4/29/2021, he continues to receive this every 6 weeks.    Additional assessment 2/18/2022 with left jaw pain, subsequent diagnosis of osteoporosis, Xgeva discontinued  Patient continues follow-up with oral surgery, progression of osteoporosis symptomatically, dermal eruption left side of mandibular region, IVIG anticipated  Xgeva DISCONTINUED due to osteonecrosis of the jaw  The patient's pain per his knee is not relapsed.     6.  Malignant right pleural effusion  Ultrasound thoracentesis 1/14/2021 1500 mL removed  Chest x-ray 2/25/2021 with moderate right pleural effusion  Progressive symptoms with worsening pain 4/5/2021  Ultrasound-guided thoracentesis 4/13/2021 with 2050 ml removed.  Plans for Pleurx catheter with thoracic surgery, however, pleural effusion has not recurred.  Patient seen 11/4/2021 with chest x-ray planned.  Subsequent resolution noted on CT scan.  Improvement in bilateral pleural effusions on most recent CT scan  Additional assessment 4/12/23 with stable small right pleural effusion    7.  Cancer related pain  Pain is most prominent in his right rib, utilizing MS Contin 30 mg 3 times a day  Hydrocodone/acetaminophen 10/325 as needed for breakthrough pain.  Currently taking 3 to 4 tablets daily  He is not currently in need of a refill of pain medications  Discussed MS Contin at 30 mg p.o. every 8 hours, Norco 10/325 2 p.o. every 6 hours  He is currently using pain medication as needed for jaw pain.  Requiring approximately 1 MS Contin and 1 Norco 10/325 daily, however some days not requiring any pain medication.  Reports his pain is well controlled, not currently requiring pain medication.  As of 3/6/2023 patient continues to report no need for pain medication at this time.    8.  Insomnia  Continuing to follow with behavioral oncology  Continuing Remeron 7.5 mg nightly with fair control.  The patient  when assessed  12/30/2022.  1/20/2023 continues on Remeron 7.5 mg nightly which will be refilled today.    9. Health maintenance  Status post COVID-19 booster, SARS antibody pending today  The patient is due for Shingrix and Prevnar which will both be administered in the clinic 9/23/2021    10.  Skin lesions  Uncertain of etiology, unexpected findings for usual skin lesions associated with immunotherapy  Request dermatologic assessment-psoriasiform dermatitis.  Seen by Dr. Damon though no follow-up due to personal preference  The lesions on the lower extremities particularly are quite extensive.  At present he feels that they are stable enough not to require additional therapy.    11.  Right knee pain  Osteoarthritis, orthopedic assessment upon patient's request.   Resolved.  Denies any knee pain today     12.  Osteonecrosis of the jaw  Xgeva was discontinued.  Patient went to second opinion with  with oral surgery.  He was reffered to Dr. Escalante with , with whom he had consult on 8/23/2022.  Dr. Escalante recommened an outpatient procedure that would allow him to biopsy the affected area of his jawbone and identify which bacteria was present, they would then coordinate with ID to identify best antibiotic option.  Patient is agreeable to proceeding with this plan, and is awaiting surgery date.  The patient underwent surgery 10/7/2022  Follow-up with Dr. Champion, infectious disease 10/27/2022 with recommendations for 6 weeks of Levaquin and fluconazole.  This should complete by approximately mid December 2023.  Was seen by Dr. Champion 12/8/2022 with plans to complete fluconazole.  Decision made to start Augmentin 500 mg twice daily for prevention.  Patient currently being followed by Dr. Champion with reassessment planned in June 2023  Resolved status post oral surgery assessment, infectious disease        Plan:   Hold immunotherapy-Keytruda  Continue Imbruvica 420 mg daily.  Continue Remeron 15 mg p.o. nightly  Repeat Guardant  Reveal 9 weeks with CMP and CBC  12 weeks MD  Xanax 0.5 mg-1 tab 3 times daily as needed E scribed to pharmacy 11/8/2023 after interval visit with patient.           Patient is on a high risk medication requiring close monitoring for toxicity.      Jostin Parekh MD   11/13/2023

## 2023-11-13 ENCOUNTER — OFFICE VISIT (OUTPATIENT)
Dept: ONCOLOGY | Facility: CLINIC | Age: 68
End: 2023-11-13
Payer: MEDICARE

## 2023-11-13 VITALS
RESPIRATION RATE: 16 BRPM | OXYGEN SATURATION: 95 % | DIASTOLIC BLOOD PRESSURE: 84 MMHG | WEIGHT: 185.2 LBS | HEART RATE: 80 BPM | TEMPERATURE: 97.8 F | BODY MASS INDEX: 25.93 KG/M2 | SYSTOLIC BLOOD PRESSURE: 169 MMHG | HEIGHT: 71 IN

## 2023-11-13 DIAGNOSIS — C79.51 METASTASIS TO BONE: ICD-10-CM

## 2023-11-13 DIAGNOSIS — C79.51 LUNG CANCER METASTATIC TO BONE: ICD-10-CM

## 2023-11-13 DIAGNOSIS — C34.12 MALIGNANT NEOPLASM OF UPPER LOBE OF LEFT LUNG: ICD-10-CM

## 2023-11-13 DIAGNOSIS — C91.10 CLL (CHRONIC LYMPHOCYTIC LEUKEMIA): Primary | ICD-10-CM

## 2023-11-13 DIAGNOSIS — C34.90 LUNG CANCER METASTATIC TO BONE: ICD-10-CM

## 2023-11-13 PROCEDURE — 1126F AMNT PAIN NOTED NONE PRSNT: CPT | Performed by: INTERNAL MEDICINE

## 2023-11-13 PROCEDURE — 99214 OFFICE O/P EST MOD 30 MIN: CPT | Performed by: INTERNAL MEDICINE

## 2023-11-13 RX ORDER — HEPARIN SODIUM (PORCINE) LOCK FLUSH IV SOLN 100 UNIT/ML 100 UNIT/ML
500 SOLUTION INTRAVENOUS AS NEEDED
OUTPATIENT
Start: 2023-11-13

## 2023-11-13 RX ORDER — SODIUM CHLORIDE 0.9 % (FLUSH) 0.9 %
10 SYRINGE (ML) INJECTION AS NEEDED
OUTPATIENT
Start: 2023-11-13

## 2023-11-13 NOTE — LETTER
November 13, 2023     Juan Iyer MD  [id number]  [facility] [address]    Patient: Dav Freitas   YOB: 1955   Date of Visit: 11/13/2023     Dear Juan Iyer MD:       Thank you for referring Dav Freitas to me for evaluation. Below are the relevant portions of my assessment and plan of care.    If you have questions, please do not hesitate to call me. I look forward to following Dav along with you.         Sincerely,        Jostin Parekh MD        CC: No Recipients    Jostin Parekh MD  11/13/23 7217  Sign when Signing Visit  SubjectivePatient with good performance status    REASON FOR FOLLOW UP:  1.  Chronic lymphocytic leukemia with extensive lymphadenopathy and possible pleural involvement. Initiation of Treanda, Rituxan May 1, 2019.  2.  Hypogammaglobulinemia.  Status is post IVIG  3.  Significant response on scans June 27, 2019.  Treatment changed to Imbruvica 420 mg daily.  4.  Metastatic non-small cell lung carcinoma on Keytruda  5.  Patient seen 2/18/2022 with left jaw/gumline swelling pain, associated hematoma?  Osteonecrosis.  Restart of Keytruda 3/4/2022, Xgeva discontinued  6.  Restaging via CT 4/12/2022, continued response, Keytruda continued, referral to dermatology for worsening psoriasis.  7.  Jaw osteonecrosis, undergoing therapy through oral surgery, IVIG anticipated, infectious disease and oral surgery follow-up  8.  Patient assessed 11/18/2022, Keytruda continued, ibrutinib-dose reduced-continued, patient seen by  physicians, status postdebridement of bone of left mandible with bone biopsy, fistulectomy of left mandible 10/20/2022  9.  Patient seen 12/30/2022 continue to improve,?  Urinary tract symptoms that have continued to improve.  10.  Repeat scans 4/12/2023 without evidence of recurrence.  11.  Patient seen 4/17/2023, Keytruda therapy held, urinary symptoms improving?  12.  Patient with good performance status, current reveal negative  ctDNA  13.  Patient with ctDNA recheck at less than 0.5%, reassessment at 3 months, ongoing issues with the patient's wife developing end-stage liver disease      HISTORY OF PRESENT ILLNESS:    The patient is a 68 y.o. male with the above mentioned history here today for lab review and evaluation prior to cycle 38  Keytruda.  He reports overall he is doing well.  He does continue on antibiotics from Dr. Pak before his osteonecrosis of the jaw.  He does have 1 area of exposed bone on his jaw they are hoping that the gum tissue will grow over this.  He would follow-up with Dr. Champion in June.    Patient also previously been on antibiotics for UTI.  He does report some urgency which is still present.  No fevers or chills.  Denies any issues with increased shortness of breath.    He does report some intermittent pain on the right lateral portion of his chest near the eighth rib.  He states when he moves at times he has a sharp pain that is quick, he describes it as feeling like a strain.  This is only happened occasionally over the past few weeks.    The patient is next seen 3/6/2023 without additional chest discomfort and with lessening UTI symptoms.  We have discussed repeat scans in April 2023.    The patient did proceed to repeat CT chest abdomen pelvis 4/12/2023 with stable findings overall including a small right pleural effusion, right rib metastatic lesion with increase sclerosis, overlying soft tissue thickening without interval change in additional osseous lesions over the interval change.  Patient evaluated 3/27/23 with general improvement though difficulty with dysuria and bladder spasm requiring additional antibiotic therapies.    These findings are discussed with the patient 4/17/23 and he feels that his urinary symptoms improved when he stopped his antibiotic therapy used for his osteonecrosis- amoxicillin.  We discussed that his findings including his long-term improvement/remission and, potentially,  his urinary symptoms, in part, could be from continued immunotherapy.  This would be an opportunity to now discontinue that therapy and observe him expectantly.    The patient had follow-up assessment by infectious disease 6/8/2023.  Have been off amoxicillin with subsequent resolution urinary symptoms that include dysuria and frequency.    As an alternative method of follow-up the patient underwent a Guardant Reveal that was sent 7/10/2023 negative for ctDNA.  This corresponds to his relatively good performance status when seen 7/31/2023.  This includes resolution of the bony fragment that had been noted in his lower jawline that recently loosened and ejected with normal mucosa at the site    The patient returns for assessment 11/13/2023.  His recent CBC 10/23/2023 was normal with H&H of 13.2 and 44.1, white count of 8030, platelet count 163,000 with a normal differential, CMP with being creatinine 19 and 1.30 and Guardant Reveal with ctDNA at less than 0.05%  Unfortunately his wife has developed cirrhosis and is demonstrating end-stage liver disease.  He is her primary caregiver.    We have discussed how to proceed and a repeat ctDNA assessment with Guardant Reveal be requested in approximately 3 months.      Past Medical History, Past Surgical History, Social History, Family History have been reviewed and are without significant changes except as mentioned.    Hematologic/oncologic history:    As concerns his CLL history he initially required Treanda Rituxan 5/1/2019 for 2 cycles and then initiated Imbruvica 420 mg daily initiated 7/25/2019.       As concerns stage IV non-small cell lung carcinoma he returns the office continuing Keytruda which he continues to receive every 3 weeks which was initiated 10/21/2020.  He also receives Xgeva every 6 weeks which was initially given 12/2/2020 and discontinued 1/7/2022 secondary to osteonecrosis.      Additional issues that developed during treatment included right knee  "pain with plain views of his right knee showing a right knee effusion with medial compartment narrowing and no suspicious bone lesion.  There is benign periosteal calcification along the distal right femur.  A PET/CT 11/11 went on to demonstrate a complete metabolic response to therapy compared to PET/CT from 9/23/2020.  This includes his primary lung neoplasm left upper lobe, large osseous metastatic lesion the right chest wall region from the eighth rib, small left adrenal metastasis, and no evidence of disease involving the distal left femur.      The patient was seen 11/24/2021 continue to do well though indicating that his right knee is \"something I can manage at the moment\".  He prefers not to have orthopedic assessment at this point.  He is concerned, ever, about skin lesions that are developing primarily on his calf regions and into his right thigh.    The patient was seen by Dr. Regan Damon 11/29/2021 and felt to be consistent with psoriasiform dermatitis treated with intralesional therapy.  Was also started on a moisturizer and lipid therapy.  The patient returned for assessment 12/15/2021 and treated with cycle #21 Keytruda, returned 1/7/2022 for cycle #22 and 1/28/2022 for cycle #23.  At that visit he had developed adenopathy several weeks previous and was treated with a Medrol Dosepak.   The patient was next seen 2/18/2022 and indicates that though his adenopathy has improved after treatment described above that his jaw has become painful swollen and tender with additional bleeding.     The patient was referred to his primary dentist who then had him seen oral surgeon-Dr. Ezequiel Davila-reached at [phone] who felt that this was osteonecrosis and should be treated symptomatically.  As the patient reviewed 3/4/2022, wonderfully, his oral issues have nearly resolved with the gumline returning to essentially its previous state, no swelling, minimal erythema, no discharge and no additional pain.  He does " have follow-up with oral surgery in the next several weeks and we discussed restarting his Keytruda scanning him likely in April 2022 in general.   He returns to the office on 3/25/2022 in follow-up in anticipation of cycle 24 Keytruda. He reports the left side of his jaw has improved.    However, he was now having issues with the right lower side. He was seen by neurosurgery with additional films and started on oral amoxicillin by his oral surgeon.      The patient continued his immunotherapy taking Keytruda 3/25/2022 and underwent repeat CT chest abdomen and pelvis 4/12/2022 that is reviewed with him 4/15/2022.  Wonderfully there is no substantial change with a small to moderate right pleural effusion that decreased in size, no additional pulmonary nodule or new metastatic disease, multifocal osteosclerotic metastatic disease without change and no new findings in the abdomen or pelvis.    The patient was seen 4/15/2022 indicating that he is actually feeling well in terms of general symptoms.  This includes lack of progression from mouth pain, ability to eat without discomfort.  He is, however, having worsening psoriasis particular in his lower extremities and has been reviewed by dermatology previously.    The patient was reviewed 6/17/2022 having been seen by oral surgery with progression of his osteonecrosis and skin eruption.  He is currently undergoing assessment by infectious disease within the next week and we have reviewed that previously he responded to IVIG for in-hospital refractory sinusitis.  As a result he is asked to undergo reassessment for his immunoglobulin levels today and return next week for 400 mg/kg of IVIG infusion which we will continue monthly.    The patient was given IVIG 6/24/2022 and again 7/22/2022.  Assessment 8/5/2022 continue to show a degree of general improvement with oral surgery continue to follow him with a second opinion to be obtained  8/8/2022 when IVIG planned 8/19/2022  and 9/16/2022.    The patient slowly continued both assessments by oral surgery and  Scans considering his non-small cell lung cancer including CT studies 10/4/2022 that were essentially stable compared to previous.  Therapies include Keytruda every 3 weeks, daily Imbruvica, monthly IVIG every 4 weeks.    He next presentedwith the above-mentioned history, who returns the office  in anticipation of his 35 th cycle of Keytruda.  He was last treated with IVIG 11/11/2022.       At this point he had undergone surgery at [facility] including fistulectomy of the left mandible and debridement of the intraoral bone left mandible.  He he had also been seen by infectious disease, Dr. Champion regarding osteonecrosis of the jaw.  He was placed on antibiotics for 6 weeks including Levaquin and fluconazole.          There is an interaction with his fluconazole and ibrutinib and adjustments will be necessary.  [facility] pharmacy is managing this.  He reports he is overall feeling very well.  He has had improvement in his ability to eat and drink since undergoing surgery on his jaw.  His skin is overall improved with the use of Aquaphor.  He did undergo CT scans prior to cycle 33 Keytruda with stability of his disease.  He continues on current therapy with excellent tolerance.     Patient is next seen 11/18/2022.  Fortunately he feels that he is doing  reasonably well with surgery having been successful, pain virtually absent at this point, no additional shortness of breath or cough, appetite remaining fair, stable weight and sleep at least modestly managed.  Unfortunately his wife recently injured her femur and he has become her primary caregiver at this point.       The patient is next assessed 12/9/2022.  He is due for cycle #36 Keytruda and IVIG today.  He did not realize he was due for IVIG today, and already scheduled another appointment so would like to reschedule IVIG for next week.  He also continues on Imbruvica,  currently at a reduced dose of 280 mg daily, due to interaction with Diflucan.  He was seen by Dr. Champion with infectious disease yesterday, 12/8/2022 and econazole was discontinued as patient has completed his 6-week course.  He was started on preventative Augmentin 500 mg twice daily until his gums grow to close over the exposed area of bone.  He is eating and drinking adequately, weight remains stable.  Bowels moving regularly.  Denies fever or chills.  Denies nausea or vomiting.  Denies new or worsening pain.  Denies skin rash or shortness of breath.  No new concerns today.    The patient continued Keytruda therapy including 12/9/2022 and IVIG 12/16/2022.  He is next seen 12/30/2022 for continued Keytruda.  We have discussed that he is clearly improving per his osteonecrosis and his completed his surgical therapy and now on prophylactic antibiotic therapy.  We have reviewed that he could discontinue his IVIG, continue his Imbruvica, Keytruda and prophylactic antibiotic therapy.  He is having urinary symptoms?  And a follow-up UA and culture is pending.    Note that the patient's urine cultures were negative we proceeded 1/20/2023 with Keytruda, Imbruvica with Keytruda given 2/10/2023 and the patient presented 3/6/2023 for his next treatment.  We do plan repeat CT scans in April 2023.    Repeat scans 4/12/2023-stable CT chest and pelvis with small right pleural effusion, parenchymal scarring right lower lobe, right rib metastatic lesion with increase sclerosis, overlying soft tissue thickening without interval change in osseous lesions additionally without change.    Patient seen 4/17/2023 at which time immunotherapy was held and patient placed on observation status.  This included some concern about whether his urinary symptoms could be related to ongoing immunotherapy.  This would be observed off Keytruda.    The patient returns for assessment 11/13/2023.  His recent CBC 10/23/2023 was normal with H&H of 13.2  "and 44.1, white count of 8030, platelet count 163,000 with a normal differential, CMP with being creatinine 19 and 1.30 and Guardant Reveal with ctDNA at less than 0.05%  Unfortunately his wife has developed cirrhosis and is demonstrating end-stage liver disease.  He is her primary caregiver.    We have discussed how to proceed and a repeat ctDNA assessment with Guardant Reveal be requested in approximately 3 months.      Objective     Vitals:    11/13/23 1250   BP: 169/84   Pulse: 80   Resp: 16   Temp: 97.8 °F (36.6 °C)   TempSrc: Temporal   SpO2: 95%   Weight: 84 kg (185 lb 3.2 oz)   Height: 180.3 cm (70.98\")   PainSc: 0-No pain           11/13/2023    12:50 PM   Current Status   ECOG score 0     Physical Exam  Vitals and nursing note reviewed.   Constitutional:       Appearance: Normal appearance. He is well-developed.   HENT:      Head: Normocephalic and atraumatic.      Nose: Nose normal.      Mouth/Throat:      Comments: No further exposed bone left lower jawline.  Eyes:      Pupils: Pupils are equal, round, and reactive to light.   Cardiovascular:      Rate and Rhythm: Normal rate and regular rhythm.      Heart sounds: Normal heart sounds.   Pulmonary:      Effort: Pulmonary effort is normal. No respiratory distress.      Breath sounds: Normal breath sounds. No wheezing, rhonchi or rales.   Abdominal:      General: Bowel sounds are normal. There is no distension.      Palpations: Abdomen is soft.      Tenderness: There is no abdominal tenderness.   Musculoskeletal:         General: Normal range of motion.      Cervical back: Normal range of motion and neck supple.   Skin:     General: Skin is warm and dry.   Neurological:      Mental Status: He is alert and oriented to person, place, and time.   Psychiatric:         Behavior: Behavior normal.       I have reexamined the patient and the results are consistent with the previously documented exam. Jostin Parekh MD      RECENT LABS:                  FISH " prognostic panel-Positive for deletion of 13 q. 14.3    Assessment & Plan  1.  CLL presenting with significant lymphocytosis, lymphadenopathy and splenomegaly.     Positive for deletion of 13 q. 14.3.    Patient status post 2 cycles of Treanda Rituxan with excellent response.    Imaging 6/27/2019 with significant decrease in adenopathy.  Treanda Rituxan discontinued   Imbruvica 420 mg daily initiated 7/25/2019.  He continues on Imbruvica, without indication of progression of his CLL, without progressive lymphadenopathy on CT scans.  Patient has resolving adenopathy particularly cervical regions 2/18/2022  3/25/2022 patient is without worsening adenopathy on exam today.  Continue Imbruvica.  10/28/2022 continues on Imbruvica 420 mg daily. Denies B-symptoms, no evidence of worsening adenopathy on exam.  CT scans without worsening adenopathy  11/18/2022: Imbruvica reduced to 280 mg daily while patient receiving 6-week course of antimicrobials due to interaction with fluconazole.  12/9/2022: He has finished his course of fluconazole, taking his last dose today.  He has a few days left of Imbruvica 280 mg, which he will complete.  He will then increase Imbruvica back to regular dose of 420 mg daily.  Patient stable/17/23, 4/17/2023, 7/13/2023, 11/13/2023    2.  Pulmonary nodules/infiltrates.  He is  status post bronchoscopy.  Is unclear at this time whether these findings are infectious or possibly related to his lymphoproliferative disorder.  This is seen to be improving on latest scans- 4/12/2023                                                                                           3.  Hypogammaglobulinemia-status post IVIG with resolution of sinusitis.   Anticipate trial of IVIG with the patient now having progression of his osteonecrosis and dermal infection.  Continues with monthly IVIG.  Due today, however did not realize he was due for IVIG and would like to reschedule for next week.  Reviewed 12/30/2022 with  IVIG discontinued.    4.  Non-small cell lung carcinoma  August 26 2020 revealing a new 1.6 cm spiculated nodule within the left upper lobe, 1.2 cm left adrenal nodule, bone windows with a soft tissue mass involving the right eighth rib measuring 3.7 x 2.6 cm.    Biopsy was consistent with adenocarcinoma CK 7+, CK 20-, lung primary suspected clinically, molecular panel not yet obtained.  PET/CT 9/23/2020 demonstrating asymmetric uptake within the right parotid gland which is unremarkable appearance on noncontrasted CT, SUV of 6 compared to 2.7.  There is no hilar, mediastinal or axillary adenopathy, findings of asymmetric moderate to intense FDG uptake within superior aspect the right chest wall anterior to the right first rib with an irregular hypodense lesion measuring 1.8 x 1.6 and SUV 4.9.  This had not been seen June 27, 2019.  There is an intensely FDG avid nodule within the lingula measuring 1.9 x 1.5 history of 12.4, FDG avid left adrenal nodule 1.9 x 1.5 with an issue 21.2.    Evaluated by radiation oncology therapy September 29 and started on radiation therapy to the right chest wall-35 Gy in 10 fractions.    Plans were also then proceed with SBRT to the solitary left upper lobe lesion pending insurance approval.  Further testing including TPS of 20% and foundation CDX with a TMB of greater than 10 mutations per megabase (28 mutations per megabase) and the indication that several immunotherapies could be useful in this patient's care.  Additional genomic findings include BRAF G469R/that trametinib could be useful and STK11/that everolimus could be useful.  Keytruda initiated 10/21/2021   Reassessment after 2 cycles of Keytruda with enlargement of the right eighth rib lesion,enlargement of spiculated left upper lobe lung mass, moderate to large right-sided pleural effusion, new left adrenal mass and enlarged retrocrural lymph node.?Pseudoprogression  Xgeva last given 12/30/2020  Follow-up scans 1/6/2021  demonstrated marked improvement in his right eighth rib lesion, resolution of left upper lobe spiculated mass, residual large right pleural effusion, resolution of left adrenal metastasis.   Right pleural effusion, with thoracentesis 1/14/2021 with 1500 mils removed.  Pathology consistent with malignant effusion   CT scans 4/5/2021 with response noted in the left upper lobe density.  He continues to receive Keytruda every 3 weeks along with Xgeva every 6 weeks.  Repeat scan 7/15/2021 without evidence of recurrence or progressive disease.  Plans to continue Keytruda every 3 weeks with repeat scans in 4 to 6 months  Patient status post PET/CT 11/11/2021 with hyper response, approximate remission status, plan to proceed with cycle #20 Keytruda  Patient seen 2/18/2022 with Keytruda held while his oral issues are addressed-concern for osteonecrosis of the jaw.  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  3/25/2022 proceed with cycle #24 Keytruda today.  Follow-up CT scans 4/12/2022 without evidence of progressive disease, stable findings including osteosclerotic metastatic disease.  Keytruda continued  Repeat imaging 10/4/2022 with overall stable disease, Keytruda continued  12/9/2022: Proceed with cycle #36 Keytruda today.  Plan repeat imaging April 2023.  3/6/2023 continued with 41st cycle of Keytruda  Repeat scans 4/12/23 without evidence of progressive disease.  Discussion as to the discontinuance of immunotherapy.  Patient seen 4/17/2023 with Keytruda held, follow-up Guardant Reveal planned.  Subcu Guardant Reveal 7/13/2023 negative ctDNA, repeat planned in 12 weeks.  The patient returns for assessment 11/13/2023.  His recent CBC 10/23/2023 was normal with H&H of 13.2 and 44.1, white count of 8030, platelet count 163,000 with a normal differential, CMP with being creatinine 19  and 1.30 and Guardant Reveal with ctDNA at less than 0.05%  Unfortunately his wife has developed cirrhosis and is demonstrating end-stage liver disease.  He is her primary caregiver.  We have discussed how to proceed and a repeat ctDNA assessment with Guardant Reveal be requested in approximately 3 months.      5.  Left knee metastasis  Evaluated by orthopedic oncology, Dr. Eliu Ochoa  Admission 1/7/2021 undergoing stabilization of left femoral bone lesion, prophylactic stabilization with intramedullary implants.  It was suggested we delay Xgeva or Zometa to allow bone healing  Completed radiation with 10 fractions 2/10/2021  Xgeva resumed 4/29/2021, he continues to receive this every 6 weeks.    Additional assessment 2/18/2022 with left jaw pain, subsequent diagnosis of osteoporosis, Xgeva discontinued  Patient continues follow-up with oral surgery, progression of osteoporosis symptomatically, dermal eruption left side of mandibular region, IVIG anticipated  Xgeva DISCONTINUED due to osteonecrosis of the jaw  The patient's pain per his knee is not relapsed.     6.  Malignant right pleural effusion  Ultrasound thoracentesis 1/14/2021 1500 mL removed  Chest x-ray 2/25/2021 with moderate right pleural effusion  Progressive symptoms with worsening pain 4/5/2021  Ultrasound-guided thoracentesis 4/13/2021 with 2050 ml removed.  Plans for Pleurx catheter with thoracic surgery, however, pleural effusion has not recurred.  Patient seen 11/4/2021 with chest x-ray planned.  Subsequent resolution noted on CT scan.  Improvement in bilateral pleural effusions on most recent CT scan  Additional assessment 4/12/23 with stable small right pleural effusion    7.  Cancer related pain  Pain is most prominent in his right rib, utilizing MS Contin 30 mg 3 times a day  Hydrocodone/acetaminophen 10/325 as needed for breakthrough pain.  Currently taking 3 to 4 tablets daily  He is not currently in need of a refill of pain  medications  Discussed MS Contin at 30 mg p.o. every 8 hours, Norco 10/325 2 p.o. every 6 hours  He is currently using pain medication as needed for jaw pain.  Requiring approximately 1 MS Contin and 1 Norco 10/325 daily, however some days not requiring any pain medication.  Reports his pain is well controlled, not currently requiring pain medication.  As of 3/6/2023 patient continues to report no need for pain medication at this time.    8.  Insomnia  Continuing to follow with behavioral oncology  Continuing Remeron 7.5 mg nightly with fair control.  The patient  when assessed 12/30/2022.  1/20/2023 continues on Remeron 7.5 mg nightly which will be refilled today.    9. Health maintenance  Status post COVID-19 booster, SARS antibody pending today  The patient is due for Shingrix and Prevnar which will both be administered in the clinic 9/23/2021    10.  Skin lesions  Uncertain of etiology, unexpected findings for usual skin lesions associated with immunotherapy  Request dermatologic assessment-psoriasiform dermatitis.  Seen by Dr. Damon though no follow-up due to personal preference  The lesions on the lower extremities particularly are quite extensive.  At present he feels that they are stable enough not to require additional therapy.    11.  Right knee pain  Osteoarthritis, orthopedic assessment upon patient's request.   Resolved.  Denies any knee pain today     12.  Osteonecrosis of the jaw  Xgeva was discontinued.  Patient went to second opinion with  with oral surgery.  He was reffered to Dr. Escalante with , with whom he had consult on 8/23/2022.  Dr. Escalante recommened an outpatient procedure that would allow him to biopsy the affected area of his jawbone and identify which bacteria was present, they would then coordinate with ID to identify best antibiotic option.  Patient is agreeable to proceeding with this plan, and is awaiting surgery date.  The patient underwent surgery 10/7/2022  Follow-up with   [facility]ira, infectious disease 10/27/2022 with recommendations for 6 weeks of Levaquin and fluconazole.  This should complete by approximately mid December 2023.  Was seen by Dr. Champion 12/8/2022 with plans to complete fluconazole.  Decision made to start Augmentin 500 mg twice daily for prevention.  Patient currently being followed by Dr. Champion with reassessment planned in June 2023  Resolved status post oral surgery assessment, infectious disease        Plan:   Hold immunotherapy-Keytruda  Continue Imbruvica 420 mg daily.  Continue Remeron 15 mg p.o. nightly  Repeat Guardant Reveal 9 weeks with CMP and CBC  12 weeks MD  Xanax 0.5 mg-1 tab 3 times daily as needed E scribed to pharmacy 11/8/2023 after interval visit with patient.           Patient is on a high risk medication requiring close monitoring for toxicity.      Jostin Parekh MD   11/13/2023

## 2023-11-15 ENCOUNTER — SPECIALTY PHARMACY (OUTPATIENT)
Dept: PHARMACY | Facility: HOSPITAL | Age: 68
End: 2023-11-15
Payer: MEDICARE

## 2023-12-11 ENCOUNTER — INFUSION (OUTPATIENT)
Dept: ONCOLOGY | Facility: HOSPITAL | Age: 68
End: 2023-12-11
Payer: MEDICARE

## 2023-12-11 VITALS — RESPIRATION RATE: 16 BRPM | TEMPERATURE: 98.6 F

## 2023-12-11 DIAGNOSIS — C91.10 CLL (CHRONIC LYMPHOCYTIC LEUKEMIA): Primary | ICD-10-CM

## 2023-12-11 DIAGNOSIS — Z45.2 FITTING AND ADJUSTMENT OF VASCULAR CATHETER: ICD-10-CM

## 2023-12-11 PROCEDURE — 96523 IRRIG DRUG DELIVERY DEVICE: CPT

## 2023-12-11 PROCEDURE — 25010000002 HEPARIN LOCK FLUSH PER 10 UNITS: Performed by: INTERNAL MEDICINE

## 2023-12-11 RX ORDER — SODIUM CHLORIDE 0.9 % (FLUSH) 0.9 %
10 SYRINGE (ML) INJECTION AS NEEDED
Status: DISCONTINUED | OUTPATIENT
Start: 2023-12-11 | End: 2023-12-11 | Stop reason: HOSPADM

## 2023-12-11 RX ORDER — HEPARIN SODIUM (PORCINE) LOCK FLUSH IV SOLN 100 UNIT/ML 100 UNIT/ML
500 SOLUTION INTRAVENOUS AS NEEDED
Status: DISCONTINUED | OUTPATIENT
Start: 2023-12-11 | End: 2023-12-11 | Stop reason: HOSPADM

## 2023-12-11 RX ORDER — HEPARIN SODIUM (PORCINE) LOCK FLUSH IV SOLN 100 UNIT/ML 100 UNIT/ML
500 SOLUTION INTRAVENOUS AS NEEDED
OUTPATIENT
Start: 2023-12-11

## 2023-12-11 RX ORDER — SODIUM CHLORIDE 0.9 % (FLUSH) 0.9 %
10 SYRINGE (ML) INJECTION AS NEEDED
OUTPATIENT
Start: 2023-12-11

## 2023-12-11 RX ADMIN — Medication 10 ML: at 13:29

## 2023-12-11 RX ADMIN — Medication 500 UNITS: at 13:29

## 2023-12-14 ENCOUNTER — SPECIALTY PHARMACY (OUTPATIENT)
Dept: PHARMACY | Facility: HOSPITAL | Age: 68
End: 2023-12-14
Payer: MEDICARE

## 2023-12-14 RX ORDER — IBRUTINIB 420 MG/1
TABLET, FILM COATED ORAL
Qty: 28 TABLET | Refills: 6 | Status: SHIPPED | OUTPATIENT
Start: 2023-12-14

## 2023-12-14 NOTE — PROGRESS NOTES
Re: Refills of Oral Specialty Medication - Imbruvica (ibrutinib)    Drug-Drug Interactions: The current medication list was reviewed and there are no relevant drug-drug interactions with the specialty medication.  Medication Allergies: The patient has NKDA  Review of Labs/Dose Adjustments: NO DOSE CHANGE - I reviewed the most recent note and labs and the patient will continue without any dose changes.  I sent refills as described below.    Drug: Imbruvica (ibrutinib)  Strength: 420 mg  Directions: Take 1 tablet by mouth daily  Quantity: 28  Refills: 6  Pharmacy prescription sent to: [facility]    Name/Credentials: Martin Ram, PharmD, BCOP  Clinical Oncology Pharmacist    12/14/2023  10:49 EST

## 2023-12-14 NOTE — PROGRESS NOTES
Drug: Imbruvica (ibrutinib)  Strength: 420 mg  Directions: Take 1 tablet by mouth daily  Quantity: 28  Refills: 6  Pharmacy prescription sent to: [facility]  Completed independent double check on medication order/RX.  Randi Rachel RPh, BCOP

## 2023-12-14 NOTE — TELEPHONE ENCOUNTER
Refill requested from pharmacy. Per last chart note, patient to continue Imbruvica 420 mg daily. Will route to MD for cosignature.    Martin Ram, PharmD, BCOP  Clinical Oncology Pharmacist

## 2024-01-15 ENCOUNTER — INFUSION (OUTPATIENT)
Dept: ONCOLOGY | Facility: HOSPITAL | Age: 69
End: 2024-01-15
Payer: MEDICARE

## 2024-01-15 DIAGNOSIS — C34.12 MALIGNANT NEOPLASM OF UPPER LOBE OF LEFT LUNG: ICD-10-CM

## 2024-01-15 DIAGNOSIS — M25.562 LEFT KNEE PAIN, UNSPECIFIED CHRONICITY: Primary | ICD-10-CM

## 2024-01-15 DIAGNOSIS — C79.51 METASTASIS TO BONE: ICD-10-CM

## 2024-01-15 DIAGNOSIS — C91.10 CLL (CHRONIC LYMPHOCYTIC LEUKEMIA): ICD-10-CM

## 2024-01-15 DIAGNOSIS — C79.51 LUNG CANCER METASTATIC TO BONE: ICD-10-CM

## 2024-01-15 DIAGNOSIS — C34.90 LUNG CANCER METASTATIC TO BONE: ICD-10-CM

## 2024-01-15 DIAGNOSIS — Z45.2 FITTING AND ADJUSTMENT OF VASCULAR CATHETER: Primary | ICD-10-CM

## 2024-01-15 DIAGNOSIS — M79.605 LEFT LEG PAIN: ICD-10-CM

## 2024-01-15 LAB
ALBUMIN SERPL-MCNC: 4.3 G/DL (ref 3.5–5.2)
ALBUMIN/GLOB SERPL: 2.3 G/DL
ALP SERPL-CCNC: 94 U/L (ref 39–117)
ALT SERPL W P-5'-P-CCNC: 14 U/L (ref 1–41)
ANION GAP SERPL CALCULATED.3IONS-SCNC: 9.9 MMOL/L (ref 5–15)
AST SERPL-CCNC: 14 U/L (ref 1–40)
BASOPHILS # BLD AUTO: 0.09 10*3/MM3 (ref 0–0.2)
BASOPHILS NFR BLD AUTO: 1.1 % (ref 0–1.5)
BILIRUB SERPL-MCNC: 0.6 MG/DL (ref 0–1.2)
BUN SERPL-MCNC: 17 MG/DL (ref 8–23)
BUN/CREAT SERPL: 12 (ref 7–25)
CALCIUM SPEC-SCNC: 9 MG/DL (ref 8.6–10.5)
CHLORIDE SERPL-SCNC: 103 MMOL/L (ref 98–107)
CO2 SERPL-SCNC: 26.1 MMOL/L (ref 22–29)
CREAT SERPL-MCNC: 1.42 MG/DL (ref 0.76–1.27)
DEPRECATED RDW RBC AUTO: 48.9 FL (ref 37–54)
EGFRCR SERPLBLD CKD-EPI 2021: 53.5 ML/MIN/1.73
EOSINOPHIL # BLD AUTO: 0.12 10*3/MM3 (ref 0–0.4)
EOSINOPHIL NFR BLD AUTO: 1.5 % (ref 0.3–6.2)
ERYTHROCYTE [DISTWIDTH] IN BLOOD BY AUTOMATED COUNT: 14.3 % (ref 12.3–15.4)
GLOBULIN UR ELPH-MCNC: 1.9 GM/DL
GLUCOSE SERPL-MCNC: 81 MG/DL (ref 65–99)
HCT VFR BLD AUTO: 46 % (ref 37.5–51)
HGB BLD-MCNC: 14.8 G/DL (ref 13–17.7)
IMM GRANULOCYTES # BLD AUTO: 0.09 10*3/MM3 (ref 0–0.05)
IMM GRANULOCYTES NFR BLD AUTO: 1.1 % (ref 0–0.5)
LYMPHOCYTES # BLD AUTO: 1.04 10*3/MM3 (ref 0.7–3.1)
LYMPHOCYTES NFR BLD AUTO: 13 % (ref 19.6–45.3)
MCH RBC QN AUTO: 30 PG (ref 26.6–33)
MCHC RBC AUTO-ENTMCNC: 32.2 G/DL (ref 31.5–35.7)
MCV RBC AUTO: 93.1 FL (ref 79–97)
MONOCYTES # BLD AUTO: 0.79 10*3/MM3 (ref 0.1–0.9)
MONOCYTES NFR BLD AUTO: 9.9 % (ref 5–12)
NEUTROPHILS NFR BLD AUTO: 5.89 10*3/MM3 (ref 1.7–7)
NEUTROPHILS NFR BLD AUTO: 73.4 % (ref 42.7–76)
NRBC BLD AUTO-RTO: 0 /100 WBC (ref 0–0.2)
PLATELET # BLD AUTO: 179 10*3/MM3 (ref 140–450)
PMV BLD AUTO: 13.2 FL (ref 6–12)
POTASSIUM SERPL-SCNC: 4.2 MMOL/L (ref 3.5–5.2)
PROT SERPL-MCNC: 6.2 G/DL (ref 6–8.5)
RBC # BLD AUTO: 4.94 10*6/MM3 (ref 4.14–5.8)
SODIUM SERPL-SCNC: 139 MMOL/L (ref 136–145)
WBC NRBC COR # BLD AUTO: 8.02 10*3/MM3 (ref 3.4–10.8)

## 2024-01-15 PROCEDURE — 85025 COMPLETE CBC W/AUTO DIFF WBC: CPT | Performed by: INTERNAL MEDICINE

## 2024-01-15 PROCEDURE — 25010000002 HEPARIN LOCK FLUSH PER 10 UNITS: Performed by: INTERNAL MEDICINE

## 2024-01-15 PROCEDURE — 80053 COMPREHEN METABOLIC PANEL: CPT | Performed by: INTERNAL MEDICINE

## 2024-01-15 PROCEDURE — 36591 DRAW BLOOD OFF VENOUS DEVICE: CPT

## 2024-01-15 RX ORDER — HEPARIN SODIUM (PORCINE) LOCK FLUSH IV SOLN 100 UNIT/ML 100 UNIT/ML
500 SOLUTION INTRAVENOUS AS NEEDED
OUTPATIENT
Start: 2024-01-15

## 2024-01-15 RX ORDER — HEPARIN SODIUM (PORCINE) LOCK FLUSH IV SOLN 100 UNIT/ML 100 UNIT/ML
500 SOLUTION INTRAVENOUS AS NEEDED
Status: DISCONTINUED | OUTPATIENT
Start: 2024-01-15 | End: 2024-01-15 | Stop reason: HOSPADM

## 2024-01-15 RX ORDER — SODIUM CHLORIDE 0.9 % (FLUSH) 0.9 %
10 SYRINGE (ML) INJECTION AS NEEDED
Status: DISCONTINUED | OUTPATIENT
Start: 2024-01-15 | End: 2024-01-15 | Stop reason: HOSPADM

## 2024-01-15 RX ORDER — SODIUM CHLORIDE 0.9 % (FLUSH) 0.9 %
10 SYRINGE (ML) INJECTION AS NEEDED
OUTPATIENT
Start: 2024-01-15

## 2024-01-15 RX ADMIN — Medication 500 UNITS: at 13:22

## 2024-01-15 RX ADMIN — Medication 10 ML: at 13:22

## 2024-01-15 NOTE — NURSING NOTE
Patient to port room for port labs prior to follow-up with Dr Parekh next month. He is having new pain on the upper left leg (from knee to hip). States it is aching, then sharp, then dull pain. Says it may be muscular but isn't sure. Does have hx of knee mets on that side. Notified Dr Parekh and order for bone scan. Patient notified of plan of care and verbalizes understanding.     Message sent to Brenda Salazar (scheduling) to schedule and call patient with appointment.

## 2024-01-23 ENCOUNTER — HOSPITAL ENCOUNTER (OUTPATIENT)
Dept: NUCLEAR MEDICINE | Facility: HOSPITAL | Age: 69
Discharge: HOME | End: 2024-01-23
Payer: MEDICARE

## 2024-01-23 DIAGNOSIS — M25.562 LEFT KNEE PAIN, UNSPECIFIED CHRONICITY: ICD-10-CM

## 2024-01-23 DIAGNOSIS — C34.12 MALIGNANT NEOPLASM OF UPPER LOBE OF LEFT LUNG: ICD-10-CM

## 2024-01-23 DIAGNOSIS — M79.605 LEFT LEG PAIN: ICD-10-CM

## 2024-01-23 PROCEDURE — A9503 TC99M MEDRONATE: HCPCS | Performed by: INTERNAL MEDICINE

## 2024-01-23 PROCEDURE — 78306 BONE IMAGING WHOLE BODY: CPT

## 2024-01-23 PROCEDURE — 0 TECHNETIUM MEDRONATE KIT: Performed by: INTERNAL MEDICINE

## 2024-01-23 RX ORDER — TC 99M MEDRONATE 20 MG/10ML
20 INJECTION, POWDER, LYOPHILIZED, FOR SOLUTION INTRAVENOUS
Status: COMPLETED | OUTPATIENT
Start: 2024-01-23 | End: 2024-01-23

## 2024-01-23 RX ADMIN — Medication 20 MILLICURIE: at 08:52

## 2024-02-13 ENCOUNTER — OFFICE VISIT (OUTPATIENT)
Dept: ONCOLOGY | Facility: CLINIC | Age: 69
End: 2024-02-13
Payer: MEDICARE

## 2024-02-13 ENCOUNTER — HOSPITAL ENCOUNTER (OUTPATIENT)
Dept: GENERAL RADIOLOGY | Facility: HOSPITAL | Age: 69
Discharge: HOME | End: 2024-02-13
Admitting: INTERNAL MEDICINE
Payer: MEDICARE

## 2024-02-13 VITALS
HEIGHT: 71 IN | WEIGHT: 198.7 LBS | BODY MASS INDEX: 27.82 KG/M2 | RESPIRATION RATE: 16 BRPM | SYSTOLIC BLOOD PRESSURE: 192 MMHG | TEMPERATURE: 98.4 F | HEART RATE: 78 BPM | DIASTOLIC BLOOD PRESSURE: 97 MMHG | OXYGEN SATURATION: 95 %

## 2024-02-13 DIAGNOSIS — G89.29 CHRONIC PAIN OF LEFT KNEE: ICD-10-CM

## 2024-02-13 DIAGNOSIS — M25.562 CHRONIC PAIN OF LEFT KNEE: ICD-10-CM

## 2024-02-13 DIAGNOSIS — M79.604 LEG PAIN, RIGHT: Primary | ICD-10-CM

## 2024-02-13 PROCEDURE — 99214 OFFICE O/P EST MOD 30 MIN: CPT | Performed by: INTERNAL MEDICINE

## 2024-02-13 PROCEDURE — 73552 X-RAY EXAM OF FEMUR 2/>: CPT

## 2024-02-13 PROCEDURE — 1125F AMNT PAIN NOTED PAIN PRSNT: CPT | Performed by: INTERNAL MEDICINE

## 2024-02-14 ENCOUNTER — SPECIALTY PHARMACY (OUTPATIENT)
Dept: PHARMACY | Facility: HOSPITAL | Age: 69
End: 2024-02-14
Payer: MEDICARE

## 2024-02-15 ENCOUNTER — TELEPHONE (OUTPATIENT)
Dept: ONCOLOGY | Facility: CLINIC | Age: 69
End: 2024-02-15
Payer: MEDICARE

## 2024-02-27 ENCOUNTER — TRANSCRIBE ORDERS (OUTPATIENT)
Dept: ADMINISTRATIVE | Facility: HOSPITAL | Age: 69
End: 2024-02-27
Payer: MEDICARE

## 2024-02-27 DIAGNOSIS — M79.652 LEFT THIGH PAIN: Primary | ICD-10-CM

## 2024-03-12 ENCOUNTER — OFFICE VISIT (OUTPATIENT)
Dept: ONCOLOGY | Facility: CLINIC | Age: 69
End: 2024-03-12
Payer: MEDICARE

## 2024-03-12 ENCOUNTER — INFUSION (OUTPATIENT)
Dept: ONCOLOGY | Facility: HOSPITAL | Age: 69
End: 2024-03-12
Payer: MEDICARE

## 2024-03-12 VITALS
WEIGHT: 203.1 LBS | OXYGEN SATURATION: 98 % | TEMPERATURE: 98.4 F | BODY MASS INDEX: 28.43 KG/M2 | RESPIRATION RATE: 16 BRPM | HEIGHT: 71 IN | HEART RATE: 80 BPM | SYSTOLIC BLOOD PRESSURE: 184 MMHG | DIASTOLIC BLOOD PRESSURE: 81 MMHG

## 2024-03-12 DIAGNOSIS — C91.10 CLL (CHRONIC LYMPHOCYTIC LEUKEMIA): Primary | ICD-10-CM

## 2024-03-12 DIAGNOSIS — M79.604 LEG PAIN, RIGHT: ICD-10-CM

## 2024-03-12 DIAGNOSIS — M25.562 CHRONIC PAIN OF LEFT KNEE: ICD-10-CM

## 2024-03-12 DIAGNOSIS — G89.29 CHRONIC PAIN OF LEFT KNEE: ICD-10-CM

## 2024-03-12 DIAGNOSIS — Z45.2 FITTING AND ADJUSTMENT OF VASCULAR CATHETER: ICD-10-CM

## 2024-03-12 DIAGNOSIS — C79.51 LUNG CANCER METASTATIC TO BONE: ICD-10-CM

## 2024-03-12 DIAGNOSIS — C34.90 LUNG CANCER METASTATIC TO BONE: ICD-10-CM

## 2024-03-12 LAB
ALBUMIN SERPL-MCNC: 4.2 G/DL (ref 3.5–5.2)
ALBUMIN/GLOB SERPL: 2.2 G/DL
ALP SERPL-CCNC: 89 U/L (ref 39–117)
ALT SERPL W P-5'-P-CCNC: 11 U/L (ref 1–41)
ANION GAP SERPL CALCULATED.3IONS-SCNC: 8.5 MMOL/L (ref 5–15)
AST SERPL-CCNC: 12 U/L (ref 1–40)
BASOPHILS # BLD AUTO: 0.08 10*3/MM3 (ref 0–0.2)
BASOPHILS NFR BLD AUTO: 0.9 % (ref 0–1.5)
BILIRUB SERPL-MCNC: 0.3 MG/DL (ref 0–1.2)
BUN SERPL-MCNC: 18 MG/DL (ref 8–23)
BUN/CREAT SERPL: 13.6 (ref 7–25)
CALCIUM SPEC-SCNC: 9.1 MG/DL (ref 8.6–10.5)
CHLORIDE SERPL-SCNC: 106 MMOL/L (ref 98–107)
CO2 SERPL-SCNC: 27.5 MMOL/L (ref 22–29)
CREAT SERPL-MCNC: 1.32 MG/DL (ref 0.76–1.27)
DEPRECATED RDW RBC AUTO: 48.4 FL (ref 37–54)
EGFRCR SERPLBLD CKD-EPI 2021: 58.4 ML/MIN/1.73
EOSINOPHIL # BLD AUTO: 0.09 10*3/MM3 (ref 0–0.4)
EOSINOPHIL NFR BLD AUTO: 1 % (ref 0.3–6.2)
ERYTHROCYTE [DISTWIDTH] IN BLOOD BY AUTOMATED COUNT: 13.9 % (ref 12.3–15.4)
GLOBULIN UR ELPH-MCNC: 1.9 GM/DL
GLUCOSE SERPL-MCNC: 97 MG/DL (ref 65–99)
HCT VFR BLD AUTO: 45.3 % (ref 37.5–51)
HGB BLD-MCNC: 14.7 G/DL (ref 13–17.7)
IMM GRANULOCYTES # BLD AUTO: 0.09 10*3/MM3 (ref 0–0.05)
IMM GRANULOCYTES NFR BLD AUTO: 1 % (ref 0–0.5)
LYMPHOCYTES # BLD AUTO: 1.12 10*3/MM3 (ref 0.7–3.1)
LYMPHOCYTES NFR BLD AUTO: 12.8 % (ref 19.6–45.3)
MCH RBC QN AUTO: 30.9 PG (ref 26.6–33)
MCHC RBC AUTO-ENTMCNC: 32.5 G/DL (ref 31.5–35.7)
MCV RBC AUTO: 95.2 FL (ref 79–97)
MONOCYTES # BLD AUTO: 0.75 10*3/MM3 (ref 0.1–0.9)
MONOCYTES NFR BLD AUTO: 8.6 % (ref 5–12)
NEUTROPHILS NFR BLD AUTO: 6.62 10*3/MM3 (ref 1.7–7)
NEUTROPHILS NFR BLD AUTO: 75.7 % (ref 42.7–76)
NRBC BLD AUTO-RTO: 0 /100 WBC (ref 0–0.2)
PLATELET # BLD AUTO: 177 10*3/MM3 (ref 140–450)
PMV BLD AUTO: 12.7 FL (ref 6–12)
POTASSIUM SERPL-SCNC: 4.2 MMOL/L (ref 3.5–5.2)
PROT SERPL-MCNC: 6.1 G/DL (ref 6–8.5)
RBC # BLD AUTO: 4.76 10*6/MM3 (ref 4.14–5.8)
SODIUM SERPL-SCNC: 142 MMOL/L (ref 136–145)
WBC NRBC COR # BLD AUTO: 8.75 10*3/MM3 (ref 3.4–10.8)

## 2024-03-12 PROCEDURE — 80053 COMPREHEN METABOLIC PANEL: CPT | Performed by: INTERNAL MEDICINE

## 2024-03-12 PROCEDURE — 36591 DRAW BLOOD OFF VENOUS DEVICE: CPT

## 2024-03-12 PROCEDURE — 99214 OFFICE O/P EST MOD 30 MIN: CPT | Performed by: INTERNAL MEDICINE

## 2024-03-12 PROCEDURE — 85025 COMPLETE CBC W/AUTO DIFF WBC: CPT | Performed by: INTERNAL MEDICINE

## 2024-03-12 PROCEDURE — 25010000002 HEPARIN LOCK FLUSH PER 10 UNITS: Performed by: INTERNAL MEDICINE

## 2024-03-12 PROCEDURE — 1126F AMNT PAIN NOTED NONE PRSNT: CPT | Performed by: INTERNAL MEDICINE

## 2024-03-12 RX ORDER — HEPARIN SODIUM (PORCINE) LOCK FLUSH IV SOLN 100 UNIT/ML 100 UNIT/ML
500 SOLUTION INTRAVENOUS AS NEEDED
OUTPATIENT
Start: 2024-03-12

## 2024-03-12 RX ORDER — HEPARIN SODIUM (PORCINE) LOCK FLUSH IV SOLN 100 UNIT/ML 100 UNIT/ML
500 SOLUTION INTRAVENOUS AS NEEDED
Status: DISCONTINUED | OUTPATIENT
Start: 2024-03-12 | End: 2024-03-12 | Stop reason: HOSPADM

## 2024-03-12 RX ORDER — SODIUM CHLORIDE 0.9 % (FLUSH) 0.9 %
10 SYRINGE (ML) INJECTION AS NEEDED
Status: DISCONTINUED | OUTPATIENT
Start: 2024-03-12 | End: 2024-03-12 | Stop reason: HOSPADM

## 2024-03-12 RX ORDER — SODIUM CHLORIDE 0.9 % (FLUSH) 0.9 %
10 SYRINGE (ML) INJECTION AS NEEDED
OUTPATIENT
Start: 2024-03-12

## 2024-03-12 RX ADMIN — Medication 500 UNITS: at 13:19

## 2024-03-12 RX ADMIN — Medication 10 ML: at 13:18

## 2024-03-13 ENCOUNTER — SPECIALTY PHARMACY (OUTPATIENT)
Dept: PHARMACY | Facility: HOSPITAL | Age: 69
End: 2024-03-13
Payer: MEDICARE

## 2024-03-14 ENCOUNTER — HOSPITAL ENCOUNTER (OUTPATIENT)
Dept: MRI IMAGING | Facility: HOSPITAL | Age: 69
Discharge: HOME | End: 2024-03-14
Admitting: ORTHOPAEDIC SURGERY
Payer: MEDICARE

## 2024-03-14 DIAGNOSIS — M79.652 LEFT THIGH PAIN: ICD-10-CM

## 2024-03-14 PROCEDURE — 73718 MRI LOWER EXTREMITY W/O DYE: CPT

## 2024-03-28 RX ORDER — MIRTAZAPINE 15 MG/1
TABLET, FILM COATED ORAL
Qty: 45 TABLET | Refills: 1 | Status: SHIPPED | OUTPATIENT
Start: 2024-03-28

## 2024-04-01 LAB — REF LAB TEST METHOD: NORMAL

## 2024-04-16 ENCOUNTER — SPECIALTY PHARMACY (OUTPATIENT)
Dept: PHARMACY | Facility: HOSPITAL | Age: 69
End: 2024-04-16
Payer: COMMERCIAL

## 2024-04-16 NOTE — PROGRESS NOTES
Specialty Pharmacy Patient Management Program  Oncology 6-Month Clinical Assessment       Dav Freitas is a 68 y.o. male with CLL called today to assess adherence and side effects.    Regimen: Imbruvica 420 mg once daily    Reason for Outreach: Routine medication check-in .       Problem list reviewed by Emy Ram RPH on 4/16/2024 at 10:24 AM  Medicines reviewed by Emy Ram RPH on 4/16/2024 at 10:22 AM  Allergies reviewed by Emy Ram RPH on 4/16/2024 at 10:22 AM     Goals Addressed Today        Specialty Pharmacy General Goal      Progression free survival             Medication Assessment:  Medication Assessment  Follow Up Clinical Assessment  Linked Medication(s) Assessed: Ibrutinib  Therapeutic appropriateness: Appropriate  Medication tolerability: Tolerating with no to minimal ADRs  Medication plan: Continue therapy with normal follow-up  Quality of Life Improvement Scale: 5-No change  Administration: Patient is taking every day at the same time  and as prescribed .   Patient can self administer medications: yes  Medication Follow-Up Plan: Next clinical assessment  Lab Review: The labs listed below have been reviewed. No dose adjustments are needed for the oral specialty medication(s) based on the labs.    Lab Results   Component Value Date    GLUCOSE 97 03/12/2024    CALCIUM 9.1 03/12/2024     03/12/2024    K 4.2 03/12/2024    CO2 27.5 03/12/2024     03/12/2024    BUN 18 03/12/2024    CREATININE 1.32 (H) 03/12/2024    EGFRIFNONA 50 (L) 02/18/2022    BCR 13.6 03/12/2024    ANIONGAP 8.5 03/12/2024     Lab Results   Component Value Date    WBC 8.75 03/12/2024    RBC 4.76 03/12/2024    HGB 14.7 03/12/2024    HCT 45.3 03/12/2024    MCV 95.2 03/12/2024    MCH 30.9 03/12/2024    MCHC 32.5 03/12/2024    RDW 13.9 03/12/2024    RDWSD 48.4 03/12/2024    MPV 12.7 (H) 03/12/2024     03/12/2024    NEUTRORELPCT 75.7 03/12/2024    LYMPHORELPCT 12.8 (L) 03/12/2024     MONORELPCT 8.6 03/12/2024    EOSRELPCT 1.0 03/12/2024    BASORELPCT 0.9 03/12/2024    AUTOIGPER 1.0 (H) 03/12/2024    NEUTROABS 6.62 03/12/2024    LYMPHSABS 1.12 03/12/2024    MONOSABS 0.75 03/12/2024    EOSABS 0.09 03/12/2024    BASOSABS 0.08 03/12/2024    AUTOIGNUM 0.09 (H) 03/12/2024    NRBC 0.0 03/12/2024     Drug-drug interactions  Completed medication reconciliation today to assess for drug interactions. Patient denies starting or stopping any medications.    Assessed medication list for interactions, no significant drug interactions noted.   Advised patient to call the clinic if any new medications are started so we can assess for drug-drug interactions.  Drug-food interactions discussed:  none today  Vaccines are coordinated by the patient's oncologist and primary care provider.    Allergies  Known allergies and reactions were discussed with the patient. The patient's chart has been reviewed for allergy information and updated as necessary.   No Known Allergies     Hospitalizations and Urgent Care Visits Since Last Assessment:  Unplanned hospitalizations or inpatient admissions: no  ED Visits: no  Urgent Office Visits: no    Adherence Assessment:  Adherence Questions  Linked Medication(s) Assessed: Ibrutinib  On average, how many doses/injections does the patient miss per month?: 0  What are the identified reasons for non-adherence or missed doses? : no problems identified  What is the estimated medication adherence level?: %  Based on the patient/caregiver response and refill history, does this patient require an MTP to track adherence improvements?: no    Quality of Life Assessment:  Quality of Life Assessment  Quality of Life Improvement Scale: 5-No change  -- Quality of Life: 5/10    Financial Assessment:  Medication availability/affordability: Patient has had no issues obtaining medication from pharmacy.    Attestation     I attest the patient was actively involved in and has agreed to the above  plan of care.  If the prescribed therapy is at any point deemed not appropriate based on the current or future assessments, a consultation will be initiated with the patient's specialty care provider to determine the best course of action. The revised plan of therapy will be documented along with any required assessments and/or additional patient education provided.       All questions addressed and patient had no additional concerns. Patient has pharmacy contact information.    Name/Credentials: Martin Ram PharmD, [facility]  Clinical Oncology Pharmacist    4/16/2024  10:25 EDT

## 2024-04-23 ENCOUNTER — INFUSION (OUTPATIENT)
Dept: ONCOLOGY | Facility: HOSPITAL | Age: 69
End: 2024-04-23
Payer: MEDICARE

## 2024-04-23 DIAGNOSIS — C91.10 CLL (CHRONIC LYMPHOCYTIC LEUKEMIA): Primary | ICD-10-CM

## 2024-04-23 DIAGNOSIS — Z45.2 FITTING AND ADJUSTMENT OF VASCULAR CATHETER: ICD-10-CM

## 2024-04-23 PROCEDURE — 25010000002 HEPARIN LOCK FLUSH PER 10 UNITS: Performed by: INTERNAL MEDICINE

## 2024-04-23 PROCEDURE — 96523 IRRIG DRUG DELIVERY DEVICE: CPT

## 2024-04-23 RX ORDER — HEPARIN SODIUM (PORCINE) LOCK FLUSH IV SOLN 100 UNIT/ML 100 UNIT/ML
500 SOLUTION INTRAVENOUS AS NEEDED
Status: DISCONTINUED | OUTPATIENT
Start: 2024-04-23 | End: 2024-04-23 | Stop reason: HOSPADM

## 2024-04-23 RX ORDER — HEPARIN SODIUM (PORCINE) LOCK FLUSH IV SOLN 100 UNIT/ML 100 UNIT/ML
500 SOLUTION INTRAVENOUS AS NEEDED
OUTPATIENT
Start: 2024-04-23

## 2024-04-23 RX ORDER — SODIUM CHLORIDE 0.9 % (FLUSH) 0.9 %
10 SYRINGE (ML) INJECTION AS NEEDED
Status: DISCONTINUED | OUTPATIENT
Start: 2024-04-23 | End: 2024-04-23 | Stop reason: HOSPADM

## 2024-04-23 RX ORDER — SODIUM CHLORIDE 0.9 % (FLUSH) 0.9 %
10 SYRINGE (ML) INJECTION AS NEEDED
OUTPATIENT
Start: 2024-04-23

## 2024-04-23 RX ADMIN — Medication 500 UNITS: at 13:38

## 2024-04-23 RX ADMIN — Medication 10 ML: at 13:38

## 2024-06-11 ENCOUNTER — INFUSION (OUTPATIENT)
Dept: ONCOLOGY | Facility: HOSPITAL | Age: 69
End: 2024-06-11
Payer: MEDICARE

## 2024-06-11 DIAGNOSIS — C79.51 LUNG CANCER METASTATIC TO BONE: ICD-10-CM

## 2024-06-11 DIAGNOSIS — C34.90 LUNG CANCER METASTATIC TO BONE: ICD-10-CM

## 2024-06-11 DIAGNOSIS — Z45.2 FITTING AND ADJUSTMENT OF VASCULAR CATHETER: ICD-10-CM

## 2024-06-11 DIAGNOSIS — C91.10 CLL (CHRONIC LYMPHOCYTIC LEUKEMIA): Primary | ICD-10-CM

## 2024-06-11 LAB
ALBUMIN SERPL-MCNC: 4.2 G/DL (ref 3.5–5.2)
ALBUMIN/GLOB SERPL: 2.3 G/DL
ALP SERPL-CCNC: 99 U/L (ref 39–117)
ALT SERPL W P-5'-P-CCNC: 11 U/L (ref 1–41)
ANION GAP SERPL CALCULATED.3IONS-SCNC: 8.2 MMOL/L (ref 5–15)
AST SERPL-CCNC: 13 U/L (ref 1–40)
BASOPHILS # BLD AUTO: 0.07 10*3/MM3 (ref 0–0.2)
BASOPHILS NFR BLD AUTO: 1 % (ref 0–1.5)
BILIRUB SERPL-MCNC: 0.5 MG/DL (ref 0–1.2)
BUN SERPL-MCNC: 19 MG/DL (ref 8–23)
BUN/CREAT SERPL: 13.9 (ref 7–25)
CALCIUM SPEC-SCNC: 8.9 MG/DL (ref 8.6–10.5)
CHLORIDE SERPL-SCNC: 103 MMOL/L (ref 98–107)
CO2 SERPL-SCNC: 28.8 MMOL/L (ref 22–29)
CREAT SERPL-MCNC: 1.37 MG/DL (ref 0.76–1.27)
DEPRECATED RDW RBC AUTO: 44.3 FL (ref 37–54)
EGFRCR SERPLBLD CKD-EPI 2021: 55.8 ML/MIN/1.73
EOSINOPHIL # BLD AUTO: 0.11 10*3/MM3 (ref 0–0.4)
EOSINOPHIL NFR BLD AUTO: 1.5 % (ref 0.3–6.2)
ERYTHROCYTE [DISTWIDTH] IN BLOOD BY AUTOMATED COUNT: 12.8 % (ref 12.3–15.4)
GLOBULIN UR ELPH-MCNC: 1.8 GM/DL
GLUCOSE SERPL-MCNC: 93 MG/DL (ref 65–99)
HCT VFR BLD AUTO: 46.8 % (ref 37.5–51)
HGB BLD-MCNC: 15.2 G/DL (ref 13–17.7)
IMM GRANULOCYTES # BLD AUTO: 0.07 10*3/MM3 (ref 0–0.05)
IMM GRANULOCYTES NFR BLD AUTO: 1 % (ref 0–0.5)
LYMPHOCYTES # BLD AUTO: 1.22 10*3/MM3 (ref 0.7–3.1)
LYMPHOCYTES NFR BLD AUTO: 16.6 % (ref 19.6–45.3)
MCH RBC QN AUTO: 30.8 PG (ref 26.6–33)
MCHC RBC AUTO-ENTMCNC: 32.5 G/DL (ref 31.5–35.7)
MCV RBC AUTO: 94.7 FL (ref 79–97)
MONOCYTES # BLD AUTO: 0.71 10*3/MM3 (ref 0.1–0.9)
MONOCYTES NFR BLD AUTO: 9.7 % (ref 5–12)
NEUTROPHILS NFR BLD AUTO: 5.15 10*3/MM3 (ref 1.7–7)
NEUTROPHILS NFR BLD AUTO: 70.2 % (ref 42.7–76)
NRBC BLD AUTO-RTO: 0 /100 WBC (ref 0–0.2)
PLATELET # BLD AUTO: 182 10*3/MM3 (ref 140–450)
PMV BLD AUTO: 13.3 FL (ref 6–12)
POTASSIUM SERPL-SCNC: 4.1 MMOL/L (ref 3.5–5.2)
PROT SERPL-MCNC: 6 G/DL (ref 6–8.5)
RBC # BLD AUTO: 4.94 10*6/MM3 (ref 4.14–5.8)
SODIUM SERPL-SCNC: 140 MMOL/L (ref 136–145)
WBC NRBC COR # BLD AUTO: 7.33 10*3/MM3 (ref 3.4–10.8)

## 2024-06-11 PROCEDURE — 85025 COMPLETE CBC W/AUTO DIFF WBC: CPT | Performed by: INTERNAL MEDICINE

## 2024-06-11 PROCEDURE — 25010000002 HEPARIN LOCK FLUSH PER 10 UNITS: Performed by: INTERNAL MEDICINE

## 2024-06-11 PROCEDURE — 80053 COMPREHEN METABOLIC PANEL: CPT | Performed by: INTERNAL MEDICINE

## 2024-06-11 PROCEDURE — 36591 DRAW BLOOD OFF VENOUS DEVICE: CPT

## 2024-06-11 RX ORDER — SODIUM CHLORIDE 0.9 % (FLUSH) 0.9 %
10 SYRINGE (ML) INJECTION AS NEEDED
Status: DISCONTINUED | OUTPATIENT
Start: 2024-06-11 | End: 2024-06-11 | Stop reason: HOSPADM

## 2024-06-11 RX ORDER — SODIUM CHLORIDE 0.9 % (FLUSH) 0.9 %
10 SYRINGE (ML) INJECTION AS NEEDED
OUTPATIENT
Start: 2024-06-11

## 2024-06-11 RX ORDER — HEPARIN SODIUM (PORCINE) LOCK FLUSH IV SOLN 100 UNIT/ML 100 UNIT/ML
500 SOLUTION INTRAVENOUS AS NEEDED
Status: DISCONTINUED | OUTPATIENT
Start: 2024-06-11 | End: 2024-06-11 | Stop reason: HOSPADM

## 2024-06-11 RX ORDER — HEPARIN SODIUM (PORCINE) LOCK FLUSH IV SOLN 100 UNIT/ML 100 UNIT/ML
500 SOLUTION INTRAVENOUS AS NEEDED
OUTPATIENT
Start: 2024-06-11

## 2024-06-11 RX ADMIN — Medication 500 UNITS: at 13:35

## 2024-06-11 RX ADMIN — Medication 10 ML: at 13:35

## 2024-06-19 LAB — REF LAB TEST METHOD: NORMAL

## 2024-07-01 ENCOUNTER — SPECIALTY PHARMACY (OUTPATIENT)
Dept: PHARMACY | Facility: HOSPITAL | Age: 69
End: 2024-07-01
Payer: MEDICARE

## 2024-07-01 RX ORDER — IBRUTINIB 420 MG/1
TABLET, FILM COATED ORAL
Qty: 28 TABLET | Refills: 5 | Status: SHIPPED | OUTPATIENT
Start: 2024-07-01

## 2024-07-01 NOTE — PROGRESS NOTES
Drug: Imbruvica (ibrutinib)  Strength: 420 mg  Directions: Take one tablet by mouth daily  Quantity: 28  Refills: 5  Pharmacy prescription sent to: [facility]    Completed independent double check on medication order/RX.  Martin Ram, JoséD, BCOP  Clinical Oncology Pharmacist

## 2024-07-01 NOTE — PROGRESS NOTES
Re: Refills of Oral Specialty Medication - Imbruvica (ibrutinib)    Drug-Drug Interactions: The current medication list was reviewed and there are no relevant drug-drug interactions with the specialty medication.  Medication Allergies: The patient has NKDA  Review of Labs/Dose Adjustments: NO DOSE CHANGE - I reviewed the most recent note and labs and the patient will continue without any dose changes.  I sent refills as described below.    Drug: Imbruvica (ibrutinib)  Strength: 420 mg  Directions: Take one tablet by mouth daily  Quantity: 28  Refills: 5  Pharmacy prescription sent to: [facility]    Name/Credentials: Jessenia Galvez, JoséD, BCPS    7/1/2024  15:56 EDT

## 2024-07-02 ENCOUNTER — HOSPITAL ENCOUNTER (OUTPATIENT)
Dept: GENERAL RADIOLOGY | Facility: HOSPITAL | Age: 69
Discharge: HOME | End: 2024-07-02
Admitting: INTERNAL MEDICINE
Payer: MEDICARE

## 2024-07-02 ENCOUNTER — OFFICE VISIT (OUTPATIENT)
Dept: ONCOLOGY | Facility: CLINIC | Age: 69
End: 2024-07-02
Payer: MEDICARE

## 2024-07-02 VITALS
BODY MASS INDEX: 29.01 KG/M2 | OXYGEN SATURATION: 95 % | HEART RATE: 89 BPM | WEIGHT: 207.2 LBS | HEIGHT: 71 IN | TEMPERATURE: 98.8 F | RESPIRATION RATE: 16 BRPM | DIASTOLIC BLOOD PRESSURE: 85 MMHG | SYSTOLIC BLOOD PRESSURE: 156 MMHG

## 2024-07-02 DIAGNOSIS — C34.12 MALIGNANT NEOPLASM OF UPPER LOBE OF LEFT LUNG: ICD-10-CM

## 2024-07-02 DIAGNOSIS — C91.10 CLL (CHRONIC LYMPHOCYTIC LEUKEMIA): ICD-10-CM

## 2024-07-02 DIAGNOSIS — J44.9 CHRONIC OBSTRUCTIVE PULMONARY DISEASE, UNSPECIFIED COPD TYPE: Primary | ICD-10-CM

## 2024-07-02 DIAGNOSIS — C34.90 LUNG CANCER METASTATIC TO BONE: ICD-10-CM

## 2024-07-02 DIAGNOSIS — C79.51 LUNG CANCER METASTATIC TO BONE: ICD-10-CM

## 2024-07-02 PROCEDURE — 1126F AMNT PAIN NOTED NONE PRSNT: CPT | Performed by: INTERNAL MEDICINE

## 2024-07-02 PROCEDURE — 99214 OFFICE O/P EST MOD 30 MIN: CPT | Performed by: INTERNAL MEDICINE

## 2024-07-02 PROCEDURE — 71046 X-RAY EXAM CHEST 2 VIEWS: CPT

## 2024-07-02 NOTE — PROGRESS NOTES
Subjective Patient with improved pain per left femur.    REASON FOR FOLLOW UP:  1.  Chronic lymphocytic leukemia with extensive lymphadenopathy and possible pleural involvement. Initiation of Treanda, Rituxan May 1, 2019.  2.  Hypogammaglobulinemia.  Status is post IVIG  3.  Significant response on scans June 27, 2019.  Treatment changed to Imbruvica 420 mg daily.  4.  Metastatic non-small cell lung carcinoma on Keytruda  5.  Patient seen 2/18/2022 with left jaw/gumline swelling pain, associated hematoma?  Osteonecrosis.  Restart of Keytruda 3/4/2022, Xgeva discontinued  6.  Restaging via CT 4/12/2022, continued response, Keytruda continued, referral to dermatology for worsening psoriasis.  7.  Jaw osteonecrosis, undergoing therapy through oral surgery, IVIG anticipated, infectious disease and oral surgery follow-up  8.  Patient assessed 11/18/2022, Keytruda continued, ibrutinib-dose reduced-continued, patient seen by  physicians, status postdebridement of bone of left mandible with bone biopsy, fistulectomy of left mandible 10/20/2022  9.  Patient seen 12/30/2022 continue to improve,?  Urinary tract symptoms that have continued to improve.  10.  Repeat scans 4/12/2023 without evidence of recurrence.  11.  Patient seen 4/17/2023, Keytruda therapy held, urinary symptoms improving?  12.  Patient with good performance status, current reveal negative ctDNA  13.  Patient with ctDNA recheck at less than 0.5%, reassessment at 3 months, ongoing issues with the patient's wife developing end-stage liver disease  14.  Patient seen 2/13/2024-Guardant Reveal-negative ctDNA, 0%, increasing left femur pain, follow-up plain films, orthopedic referral  15.  Subsequent assessment orthopedics negative, MRI pending  16.  Patient assessed 7/2/2024 stable      HISTORY OF PRESENT ILLNESS:    The patient is a 68 y.o. male with the above mentioned history here today for lab review and evaluation prior to cycle 38  Keytruda.  He reports  overall he is doing well.  He does continue on antibiotics from Dr. Pak before his osteonecrosis of the jaw.  He does have 1 area of exposed bone on his jaw they are hoping that the gum tissue will grow over this.  He would follow-up with Dr. Champion in June.    Patient also previously been on antibiotics for UTI.  He does report some urgency which is still present.  No fevers or chills.  Denies any issues with increased shortness of breath.    He does report some intermittent pain on the right lateral portion of his chest near the eighth rib.  He states when he moves at times he has a sharp pain that is quick, he describes it as feeling like a strain.  This is only happened occasionally over the past few weeks.    The patient is next seen 3/6/2023 without additional chest discomfort and with lessening UTI symptoms.  We have discussed repeat scans in April 2023.    The patient did proceed to repeat CT chest abdomen pelvis 4/12/2023 with stable findings overall including a small right pleural effusion, right rib metastatic lesion with increase sclerosis, overlying soft tissue thickening without interval change in additional osseous lesions over the interval change.  Patient evaluated 3/27/23 with general improvement though difficulty with dysuria and bladder spasm requiring additional antibiotic therapies.    These findings are discussed with the patient 4/17/23 and he feels that his urinary symptoms improved when he stopped his antibiotic therapy used for his osteonecrosis- amoxicillin.  We discussed that his findings including his long-term improvement/remission and, potentially, his urinary symptoms, in part, could be from continued immunotherapy.  This would be an opportunity to now discontinue that therapy and observe him expectantly.    The patient had follow-up assessment by infectious disease 6/8/2023.  Have been off amoxicillin with subsequent resolution urinary symptoms that include dysuria and  "frequency.    As an alternative method of follow-up the patient underwent a Guardant Reveal that was sent 7/10/2023 negative for ctDNA.  This corresponds to his relatively good performance status when seen 7/31/2023.  This includes resolution of the bony fragment that had been noted in his lower jawline that recently loosened and ejected with normal mucosa at the site    The patient returns for assessment 11/13/2023.  His recent CBC 10/23/2023 was normal with H&H of 13.2 and 44.1, white count of 8030, platelet count 163,000 with a normal differential, CMP with being creatinine 19 and 1.30 and Guardant Reveal with ctDNA at less than 0.05%  Unfortunately his wife has developed cirrhosis and is demonstrating end-stage liver disease.  He is her primary caregiver.    We have discussed how to proceed and a repeat ctDNA assessment with Guardant Reveal be requested in approximately 3 months.    He is next seen 7/2/2024.  His follow-up guardant ctDNA again is negative for ctDNA and 0%.  Fortunately his performance status remains reasonably good except he does \"feel heaviness in his chest\" periodically.      Past Medical History, Past Surgical History, Social History, Family History have been reviewed and are without significant changes except as mentioned.    Hematologic/oncologic history:    As concerns his CLL history he initially required Treanda Rituxan 5/1/2019 for 2 cycles and then initiated Imbruvica 420 mg daily initiated 7/25/2019.       As concerns stage IV non-small cell lung carcinoma he returns the office continuing Keytruda which he continues to receive every 3 weeks which was initiated 10/21/2020.  He also receives Xgeva every 6 weeks which was initially given 12/2/2020 and discontinued 1/7/2022 secondary to osteonecrosis.      Additional issues that developed during treatment included right knee pain with plain views of his right knee showing a right knee effusion with medial compartment narrowing and no " "suspicious bone lesion.  There is benign periosteal calcification along the distal right femur.  A PET/CT 11/11 went on to demonstrate a complete metabolic response to therapy compared to PET/CT from 9/23/2020.  This includes his primary lung neoplasm left upper lobe, large osseous metastatic lesion the right chest wall region from the eighth rib, small left adrenal metastasis, and no evidence of disease involving the distal left femur.      The patient was seen 11/24/2021 continue to do well though indicating that his right knee is \"something I can manage at the moment\".  He prefers not to have orthopedic assessment at this point.  He is concerned, ever, about skin lesions that are developing primarily on his calf regions and into his right thigh.    The patient was seen by Dr. Regan Damon 11/29/2021 and felt to be consistent with psoriasiform dermatitis treated with intralesional therapy.  Was also started on a moisturizer and lipid therapy.  The patient returned for assessment 12/15/2021 and treated with cycle #21 Keytruda, returned 1/7/2022 for cycle #22 and 1/28/2022 for cycle #23.  At that visit he had developed adenopathy several weeks previous and was treated with a Medrol Dosepak.   The patient was next seen 2/18/2022 and indicates that though his adenopathy has improved after treatment described above that his jaw has become painful swollen and tender with additional bleeding.     The patient was referred to his primary dentist who then had him seen oral surgeon-Dr. Ezequiel Davila-reached at [phone] who felt that this was osteonecrosis and should be treated symptomatically.  As the patient reviewed 3/4/2022, wonderfully, his oral issues have nearly resolved with the gumline returning to essentially its previous state, no swelling, minimal erythema, no discharge and no additional pain.  He does have follow-up with oral surgery in the next several weeks and we discussed restarting his Keytruda scanning " him likely in April 2022 in general.   He returns to the office on 3/25/2022 in follow-up in anticipation of cycle 24 Keytruda. He reports the left side of his jaw has improved.    However, he was now having issues with the right lower side. He was seen by neurosurgery with additional films and started on oral amoxicillin by his oral surgeon.      The patient continued his immunotherapy taking Keytruda 3/25/2022 and underwent repeat CT chest abdomen and pelvis 4/12/2022 that is reviewed with him 4/15/2022.  Wonderfully there is no substantial change with a small to moderate right pleural effusion that decreased in size, no additional pulmonary nodule or new metastatic disease, multifocal osteosclerotic metastatic disease without change and no new findings in the abdomen or pelvis.    The patient was seen 4/15/2022 indicating that he is actually feeling well in terms of general symptoms.  This includes lack of progression from mouth pain, ability to eat without discomfort.  He is, however, having worsening psoriasis particular in his lower extremities and has been reviewed by dermatology previously.    The patient was reviewed 6/17/2022 having been seen by oral surgery with progression of his osteonecrosis and skin eruption.  He is currently undergoing assessment by infectious disease within the next week and we have reviewed that previously he responded to IVIG for in-hospital refractory sinusitis.  As a result he is asked to undergo reassessment for his immunoglobulin levels today and return next week for 400 mg/kg of IVIG infusion which we will continue monthly.    The patient was given IVIG 6/24/2022 and again 7/22/2022.  Assessment 8/5/2022 continue to show a degree of general improvement with oral surgery continue to follow him with a second opinion to be obtained  8/8/2022 when IVIG planned 8/19/2022 and 9/16/2022.    The patient slowly continued both assessments by oral surgery and  Scans considering his  non-small cell lung cancer including CT studies 10/4/2022 that were essentially stable compared to previous.  Therapies include Keytruda every 3 weeks, daily Imbruvica, monthly IVIG every 4 weeks.    He next presentedwith the above-mentioned history, who returns the office  in anticipation of his 35 th cycle of Keytruda.  He was last treated with IVIG 11/11/2022.       At this point he had undergone surgery at [facility] including fistulectomy of the left mandible and debridement of the intraoral bone left mandible.  He he had also been seen by infectious disease, Dr. Champion regarding osteonecrosis of the jaw.  He was placed on antibiotics for 6 weeks including Levaquin and fluconazole.          There is an interaction with his fluconazole and ibrutinib and adjustments will be necessary.  [facility] pharmacy is managing this.  He reports he is overall feeling very well.  He has had improvement in his ability to eat and drink since undergoing surgery on his jaw.  His skin is overall improved with the use of Aquaphor.  He did undergo CT scans prior to cycle 33 Keytruda with stability of his disease.  He continues on current therapy with excellent tolerance.     Patient is next seen 11/18/2022.  Fortunately he feels that he is doing  reasonably well with surgery having been successful, pain virtually absent at this point, no additional shortness of breath or cough, appetite remaining fair, stable weight and sleep at least modestly managed.  Unfortunately his wife recently injured her femur and he has become her primary caregiver at this point.       The patient is next assessed 12/9/2022.  He is due for cycle #36 Keytruda and IVIG today.  He did not realize he was due for IVIG today, and already scheduled another appointment so would like to reschedule IVIG for next week.  He also continues on Imbruvica, currently at a reduced dose of 280 mg daily, due to interaction with Diflucan.  He was seen by Dr. Champion with  infectious disease yesterday, 12/8/2022 and econazole was discontinued as patient has completed his 6-week course.  He was started on preventative Augmentin 500 mg twice daily until his gums grow to close over the exposed area of bone.  He is eating and drinking adequately, weight remains stable.  Bowels moving regularly.  Denies fever or chills.  Denies nausea or vomiting.  Denies new or worsening pain.  Denies skin rash or shortness of breath.  No new concerns today.    The patient continued Keytruda therapy including 12/9/2022 and IVIG 12/16/2022.  He is next seen 12/30/2022 for continued Keytruda.  We have discussed that he is clearly improving per his osteonecrosis and his completed his surgical therapy and now on prophylactic antibiotic therapy.  We have reviewed that he could discontinue his IVIG, continue his Imbruvica, Keytruda and prophylactic antibiotic therapy.  He is having urinary symptoms?  And a follow-up UA and culture is pending.    Note that the patient's urine cultures were negative we proceeded 1/20/2023 with Keytruda, Imbruvica with Keytruda given 2/10/2023 and the patient presented 3/6/2023 for his next treatment.  We do plan repeat CT scans in April 2023.    Repeat scans 4/12/2023-stable CT chest and pelvis with small right pleural effusion, parenchymal scarring right lower lobe, right rib metastatic lesion with increase sclerosis, overlying soft tissue thickening without interval change in osseous lesions additionally without change.    Patient seen 4/17/2023 at which time immunotherapy was held and patient placed on observation status.  This included some concern about whether his urinary symptoms could be related to ongoing immunotherapy.  This would be observed off Keytruda.    The patient returns for assessment 11/13/2023.  His recent CBC 10/23/2023 was normal with H&H of 13.2 and 44.1, white count of 8030, platelet count 163,000 with a normal differential, CMP with being creatinine 19  "and 1.30 and Guardant Reveal with ctDNA at less than 0.05%  Unfortunately his wife has developed cirrhosis and is demonstrating end-stage liver disease.  He is her primary caregiver.    We have discussed how to proceed and a repeat ctDNA assessment with Guardant Reveal be requested in approximately 3 months.    Patient next seen 2/13/2024.  He indicates that in the last several weeks to months he has been having increasing pain in his left femur that while not completely debilitating is increasingly uncomfortable.  We have discussed plain films today and reevaluation through orthopedic oncology.    The patient underwent plain films of the femur 2/13/2024 with postoperative changes related to the previous implant with no bone lesions seen and normal soft tissue.  Patient was reviewed by orthopedics undergoing MRI of the femur now schedule 3/14/2024.  The patient is seen 3/12/2024 indicating that his pain and is markedly improved.  Wonderfully appears to be in continued remission from his malignancies at this point.        Objective      Vitals:    07/02/24 1348   BP: 156/85   Pulse: 89   Resp: 16   Temp: 98.8 °F (37.1 °C)   TempSrc: Oral   SpO2: 95%   Weight: 94 kg (207 lb 3.2 oz)   Height: 180 cm (70.87\")   PainSc: 0-No pain                 7/2/2024     1:49 PM   Current Status   ECOG score 0     Physical Exam  Vitals and nursing note reviewed.   Constitutional:       Appearance: Normal appearance. He is well-developed.   HENT:      Head: Normocephalic and atraumatic.      Nose: Nose normal.      Mouth/Throat:      Comments: No further exposed bone left lower jawline.  Eyes:      Pupils: Pupils are equal, round, and reactive to light.   Cardiovascular:      Rate and Rhythm: Normal rate and regular rhythm.      Heart sounds: Normal heart sounds.   Pulmonary:      Effort: Pulmonary effort is normal. No respiratory distress.      Breath sounds: Normal breath sounds. No wheezing, rhonchi or rales.   Abdominal:      " General: Bowel sounds are normal. There is no distension.      Palpations: Abdomen is soft.      Tenderness: There is no abdominal tenderness.   Musculoskeletal:         General: Normal range of motion.      Cervical back: Normal range of motion and neck supple.   Skin:     General: Skin is warm and dry.   Neurological:      Mental Status: He is alert and oriented to person, place, and time.   Psychiatric:         Behavior: Behavior normal.       I have reexamined the patient and the results are consistent with the previously documented exam. Jostin Parekh MD      RECENT LABS:  WBC   Date Value Ref Range Status   06/11/2024 7.33 3.40 - 10.80 10*3/mm3 Final     RBC   Date Value Ref Range Status   06/11/2024 4.94 4.14 - 5.80 10*6/mm3 Final     Hemoglobin   Date Value Ref Range Status   06/11/2024 15.2 13.0 - 17.7 g/dL Final     Hematocrit   Date Value Ref Range Status   06/11/2024 46.8 37.5 - 51.0 % Final     MCV   Date Value Ref Range Status   06/11/2024 94.7 79.0 - 97.0 fL Final     MCH   Date Value Ref Range Status   06/11/2024 30.8 26.6 - 33.0 pg Final     MCHC   Date Value Ref Range Status   06/11/2024 32.5 31.5 - 35.7 g/dL Final     RDW   Date Value Ref Range Status   06/11/2024 12.8 12.3 - 15.4 % Final     RDW-SD   Date Value Ref Range Status   06/11/2024 44.3 37.0 - 54.0 fl Final     MPV   Date Value Ref Range Status   06/11/2024 13.3 (H) 6.0 - 12.0 fL Final     Platelets   Date Value Ref Range Status   06/11/2024 182 140 - 450 10*3/mm3 Final     Neutrophil %   Date Value Ref Range Status   06/11/2024 70.2 42.7 - 76.0 % Final     Lymphocyte %   Date Value Ref Range Status   06/11/2024 16.6 (L) 19.6 - 45.3 % Final     Monocyte %   Date Value Ref Range Status   06/11/2024 9.7 5.0 - 12.0 % Final     Eosinophil %   Date Value Ref Range Status   06/11/2024 1.5 0.3 - 6.2 % Final     Basophil %   Date Value Ref Range Status   06/11/2024 1.0 0.0 - 1.5 % Final     Immature Grans %   Date Value Ref Range Status    06/11/2024 1.0 (H) 0.0 - 0.5 % Final     Neutrophils, Absolute   Date Value Ref Range Status   06/11/2024 5.15 1.70 - 7.00 10*3/mm3 Final     Lymphocytes, Absolute   Date Value Ref Range Status   06/11/2024 1.22 0.70 - 3.10 10*3/mm3 Final     Monocytes, Absolute   Date Value Ref Range Status   06/11/2024 0.71 0.10 - 0.90 10*3/mm3 Final     Eosinophils, Absolute   Date Value Ref Range Status   06/11/2024 0.11 0.00 - 0.40 10*3/mm3 Final     Basophils, Absolute   Date Value Ref Range Status   06/11/2024 0.07 0.00 - 0.20 10*3/mm3 Final     Immature Grans, Absolute   Date Value Ref Range Status   06/11/2024 0.07 (H) 0.00 - 0.05 10*3/mm3 Final     nRBC   Date Value Ref Range Status   06/11/2024 0.0 0.0 - 0.2 /100 WBC Final       FISH prognostic panel-Positive for deletion of 13 q. 14.3    Assessment & Plan   1.  CLL presenting with significant lymphocytosis, lymphadenopathy and splenomegaly.     Positive for deletion of 13 q. 14.3.    Patient status post 2 cycles of Treanda Rituxan with excellent response.    Imaging 6/27/2019 with significant decrease in adenopathy.  Treanda Rituxan discontinued   Imbruvica 420 mg daily initiated 7/25/2019.  He continues on Imbruvica, without indication of progression of his CLL, without progressive lymphadenopathy on CT scans.  Patient has resolving adenopathy particularly cervical regions 2/18/2022  3/25/2022 patient is without worsening adenopathy on exam today.  Continue Imbruvica.  10/28/2022 continues on Imbruvica 420 mg daily. Denies B-symptoms, no evidence of worsening adenopathy on exam.  CT scans without worsening adenopathy  11/18/2022: Imbruvica reduced to 280 mg daily while patient receiving 6-week course of antimicrobials due to interaction with fluconazole.  12/9/2022: He has finished his course of fluconazole, taking his last dose today.  He has a few days left of Imbruvica 280 mg, which he will complete.  He will then increase Imbruvica back to regular dose of 420 mg  daily.  Patient stable/17/23, 4/17/2023, 7/13/2023, 11/13/2023  Repeat assessment 2/13/2024 with H&H of 14.8 46.0, white count of 8020, platelet count 179,000 with normal automated differential  Stable 3/12/2024, 7/2/2024    2.  Pulmonary nodules/infiltrates.  He is  status post bronchoscopy.  Is unclear at this time whether these findings are infectious or possibly related to his lymphoproliferative disorder.  This is seen to be improving on latest scans- 4/12/2023  Follow-up chest x-ray 7/2/2024                                                                                                                                                3.  Hypogammaglobulinemia-status post IVIG with resolution of sinusitis.   Anticipate trial of IVIG with the patient now having progression of his osteonecrosis and dermal infection.  Continues with monthly IVIG.  Due today, however did not realize he was due for IVIG and would like to reschedule for next week.  Reviewed 12/30/2022 with IVIG discontinued.    4.  Non-small cell lung carcinoma  August 26 2020 revealing a new 1.6 cm spiculated nodule within the left upper lobe, 1.2 cm left adrenal nodule, bone windows with a soft tissue mass involving the right eighth rib measuring 3.7 x 2.6 cm.    Biopsy was consistent with adenocarcinoma CK 7+, CK 20-, lung primary suspected clinically, molecular panel not yet obtained.  PET/CT 9/23/2020 demonstrating asymmetric uptake within the right parotid gland which is unremarkable appearance on noncontrasted CT, SUV of 6 compared to 2.7.  There is no hilar, mediastinal or axillary adenopathy, findings of asymmetric moderate to intense FDG uptake within superior aspect the right chest wall anterior to the right first rib with an irregular hypodense lesion measuring 1.8 x 1.6 and SUV 4.9.  This had not been seen June 27, 2019.  There is an intensely FDG avid nodule within the lingula measuring 1.9 x 1.5 history of 12.4, FDG avid left adrenal  nodule 1.9 x 1.5 with an issue 21.2.    Evaluated by radiation oncology therapy September 29 and started on radiation therapy to the right chest wall-35 Gy in 10 fractions.    Plans were also then proceed with SBRT to the solitary left upper lobe lesion pending insurance approval.  Further testing including TPS of 20% and foundation CDX with a TMB of greater than 10 mutations per megabase (28 mutations per megabase) and the indication that several immunotherapies could be useful in this patient's care.  Additional genomic findings include BRAF [id number]/that trametinib could be useful and STK11/that everolimus could be useful.  Keytruda initiated 10/21/2021   Reassessment after 2 cycles of Keytruda with enlargement of the right eighth rib lesion,enlargement of spiculated left upper lobe lung mass, moderate to large right-sided pleural effusion, new left adrenal mass and enlarged retrocrural lymph node.?Pseudoprogression  Xgeva last given 12/30/2020  Follow-up scans 1/6/2021 demonstrated marked improvement in his right eighth rib lesion, resolution of left upper lobe spiculated mass, residual large right pleural effusion, resolution of left adrenal metastasis.   Right pleural effusion, with thoracentesis 1/14/2021 with 1500 mils removed.  Pathology consistent with malignant effusion   CT scans 4/5/2021 with response noted in the left upper lobe density.  He continues to receive Keytruda every 3 weeks along with Xgeva every 6 weeks.  Repeat scan 7/15/2021 without evidence of recurrence or progressive disease.  Plans to continue Keytruda every 3 weeks with repeat scans in 4 to 6 months  Patient status post PET/CT 11/11/2021 with hyper response, approximate remission status, plan to proceed with cycle #20 Keytruda  Patient seen 2/18/2022 with Keytruda held while his oral issues are addressed-concern for osteonecrosis of the jaw.  Patiently diagnosed with osteonecrosis of the jaw, seen by oral surgery, started on antibiotic  therapy and Peridex with substantial improvement, Xgeva discontinued as discussed below.  3/25/2022 proceed with cycle #24 Keytruda today.  Follow-up CT scans 4/12/2022 without evidence of progressive disease, stable findings including osteosclerotic metastatic disease.  Keytruda continued  Repeat imaging 10/4/2022 with overall stable disease, Keytruda continued  12/9/2022: Proceed with cycle #36 Keytruda today.  Plan repeat imaging April 2023.  3/6/2023 continued with 41st cycle of Keytruda  Repeat scans 4/12/23 without evidence of progressive disease.  Discussion as to the discontinuance of immunotherapy.  Patient seen 4/17/2023 with Keytruda held, follow-up Guardant Reveal planned.  Subcu Guardant Reveal 7/13/2023 negative ctDNA, repeat planned in 12 weeks.  The patient returns for assessment 11/13/2023.  His recent CBC 10/23/2023 was normal with H&H of 13.2 and 44.1, white count of 8030, platelet count 163,000 with a normal differential, CMP with being creatinine 19 and 1.30 and Guardant Reveal with ctDNA at less than 0.05%  Unfortunately his wife has developed cirrhosis and is demonstrating end-stage liver disease.  He is her primary caregiver.  We have discussed how to proceed and a repeat ctDNA assessment with Guardant Reveal be requested in approximately 3 months.  Repeat Guardant Reveal 1/15/2024 with negative ctDNA, 0%  Reviewed findings 3/12/2024 with plans for repeat cardiac revealed an approximately 4 months.  7/2/2024, Guardant Reveal-negative ctDNA, 0% fraction      5.  Left knee metastasis  Evaluated by orthopedic oncology, Dr. Eliu Ochoa  Admission 1/7/2021 undergoing stabilization of left femoral bone lesion, prophylactic stabilization with intramedullary implants.  It was suggested we delay Xgeva or Zometa to allow bone healing  Completed radiation with 10 fractions 2/10/2021  Xgeva resumed 4/29/2021, he continues to receive this every 6 weeks.    Additional assessment 2/18/2022 with left jaw  pain, subsequent diagnosis of osteoporosis, Xgeva discontinued  Patient continues follow-up with oral surgery, progression of osteoporosis symptomatically, dermal eruption left side of mandibular region, IVIG anticipated  Xgeva DISCONTINUED due to osteonecrosis of the jaw  Increasing pain left mid femur 2/13/2024 plain films planned, referral to orthopedics  Subsequent testing ongoing when seen 3/12/2024 with MRI pending     6.  Malignant right pleural effusion  Ultrasound thoracentesis 1/14/2021 1500 mL removed  Chest x-ray 2/25/2021 with moderate right pleural effusion  Progressive symptoms with worsening pain 4/5/2021  Ultrasound-guided thoracentesis 4/13/2021 with 2050 ml removed.  Plans for Pleurx catheter with thoracic surgery, however, pleural effusion has not recurred.  Patient seen 11/4/2021 with chest x-ray planned.  Subsequent resolution noted on CT scan.  Improvement in bilateral pleural effusions on most recent CT scan  Additional assessment 4/12/23 with stable small right pleural effusion  Follow-up chest x-ray 7/2/2024-pending    7.  Cancer related pain  Pain is most prominent in his right rib, utilizing MS Contin 30 mg 3 times a day  Hydrocodone/acetaminophen 10/325 as needed for breakthrough pain.  Currently taking 3 to 4 tablets daily  He is not currently in need of a refill of pain medications  Discussed MS Contin at 30 mg p.o. every 8 hours, Norco 10/325 2 p.o. every 6 hours  He is currently using pain medication as needed for jaw pain.  Requiring approximately 1 MS Contin and 1 Norco 10/325 daily, however some days not requiring any pain medication.  Reports his pain is well controlled, not currently requiring pain medication.  As of 3/6/2023, 7/2/2024 patient continues to report no need for pain medication at this time.    8.  Insomnia  Continuing to follow with behavioral oncology  Continuing Remeron 7.5 mg nightly with fair control.  The patient  when assessed 12/30/2022.  1/20/2023 continues  on Remeron 7.5 mg nightly which will be refilled     9. Health maintenance  Status post COVID-19 booster, SARS antibody pending today  The patient is due for Shingrix and Prevnar which will both be administered in the clinic 9/23/2021    10.  Skin lesions  Uncertain of etiology, unexpected findings for usual skin lesions associated with immunotherapy  Request dermatologic assessment-psoriasiform dermatitis.  Seen by Dr. Damon though no follow-up due to personal preference  The lesions on the lower extremities particularly are quite extensive.  At present he feels that they are stable enough not to require additional therapy.  These are also considerably improved 3/12/2024    11.  Right knee pain  Osteoarthritis, orthopedic assessment upon patient's request.   Resolved.  Denies any knee pain today     12.  Osteonecrosis of the jaw  Xgeva was discontinued.  Patient went to second opinion with  with oral surgery.  He was reffered to Dr. Escalante with , with whom he had consult on 8/23/2022.  Dr. Escalante recommened an outpatient procedure that would allow him to biopsy the affected area of his jawbone and identify which bacteria was present, they would then coordinate with ID to identify best antibiotic option.  Patient is agreeable to proceeding with this plan, and is awaiting surgery date.  The patient underwent surgery 10/7/2022  Follow-up with Dr. Champion, infectious disease 10/27/2022 with recommendations for 6 weeks of Levaquin and fluconazole.  This should complete by approximately mid December 2023.  Was seen by Dr. Champion 12/8/2022 with plans to complete fluconazole.  Decision made to start Augmentin 500 mg twice daily for prevention.  Patient currently being followed by Dr. Champion with reassessment planned in June 2023  Resolved status post oral surgery assessment, infectious disease        Plan:   Again, hold any further immunotherapy-Keytruda  Continue Imbruvica 420 mg daily.  Continue Remeron 15 mg p.o.  nightly.  22 weeks-Guardant Reveal-lung-CBC, CMP  24 weeks MD  Every 6 week port flush  Chest x-ray today-[place] location         Patient is on a high risk medication requiring close monitoring for toxicity.      Jostin Parekh MD   7/2/2024

## 2024-07-08 ENCOUNTER — SPECIALTY PHARMACY (OUTPATIENT)
Dept: PHARMACY | Facility: HOSPITAL | Age: 69
End: 2024-07-08
Payer: MEDICARE

## 2024-07-08 NOTE — PROGRESS NOTES
Specialty Pharmacy Note: Imbruvica (ibrutinib)    Dav Freitas is a 68 y.o. male with CLL was seen 7/2/24 by Dr. Parekh. Per provider dictation, no changes to oral oncology regimen Imbruvica (ibrutinib).  Labs Review: The CMP and CBC from 6/11/24 have been reviewed. No dose adjustments are needed for the oral specialty medication(s) based on the labs.    Specialty pharmacy will continue to follow patient.    Martin Ram, PharmD, [facility]  Clinical Oncology Pharmacist    7/8/2024  13:49 EDT

## 2024-07-16 ENCOUNTER — OFFICE VISIT (OUTPATIENT)
Dept: FAMILY MEDICINE CLINIC | Facility: CLINIC | Age: 69
End: 2024-07-16
Payer: MEDICARE

## 2024-07-16 VITALS
HEART RATE: 94 BPM | WEIGHT: 205.5 LBS | DIASTOLIC BLOOD PRESSURE: 80 MMHG | TEMPERATURE: 98 F | SYSTOLIC BLOOD PRESSURE: 134 MMHG | RESPIRATION RATE: 16 BRPM | OXYGEN SATURATION: 95 % | BODY MASS INDEX: 28.77 KG/M2 | HEIGHT: 71 IN

## 2024-07-16 DIAGNOSIS — B30.9 VIRAL CONJUNCTIVITIS OF LEFT EYE: Primary | ICD-10-CM

## 2024-07-16 PROCEDURE — 1126F AMNT PAIN NOTED NONE PRSNT: CPT | Performed by: INTERNAL MEDICINE

## 2024-07-16 PROCEDURE — 99203 OFFICE O/P NEW LOW 30 MIN: CPT | Performed by: INTERNAL MEDICINE

## 2024-07-16 RX ORDER — OLOPATADINE HYDROCHLORIDE 1 MG/ML
1 SOLUTION/ DROPS OPHTHALMIC 2 TIMES DAILY
Qty: 5 ML | Refills: 2 | Status: SHIPPED | OUTPATIENT
Start: 2024-07-16

## 2024-07-16 NOTE — PROGRESS NOTES
Chief Complaint   Patient presents with    Eye Drainage    Establish Care     Pt here to establish care, eyes have been watering excessively for a month    History of Present Illness:  Patient presented to the clinic today to establish care.  He reports redness & watery discharge from the left eye for the past 1 month.  Symptom have gradually became worse since onset.  He reports excessive clear watery drainage from the eye associated with crusting that makes it difficult to open his eyes in the morning and swelling of the eyelid. No itching or FB sensation. No history of trauma prior to onset of symptoms.  Does not wear eye contact. No h/o glaucoma or recent eye surgery.    Outpatient Medications Prior to Visit   Medication Sig Dispense Refill    albuterol (PROAIR RESPICLICK) 108 (90 Base) MCG/ACT inhaler Inhale 2 puffs Every 4 (Four) Hours As Needed for Wheezing. 1 inhaler 0    ALBUTEROL IN Inhale.      ALPRAZolam (XANAX) 0.5 MG tablet Take 1 tablet by mouth 3 (Three) Times a Day As Needed for Anxiety. 30 tablet 0    HYDROcodone-acetaminophen (NORCO)  MG per tablet Take 1 tablet by mouth Every 4 (Four) Hours As Needed for Moderate Pain . 100 tablet 0    ibrutinib (Imbruvica) 420 MG tablet tablet TAKE 1 TABLET BY MOUTH ONCE  DAILY WITH A FULL GLASS OF WATER 28 tablet 5    meloxicam (MOBIC) 15 MG tablet TAKE 1 TABLET BY MOUTH EVERY DAY (Patient taking differently: 1 tablet As Needed.) 30 tablet 0    mirtazapine (REMERON) 15 MG tablet TAKE HALF (1/2) A TABLET BY MOUTH EVERY NIGHT. 45 tablet 1    Morphine (MS CONTIN) 30 MG 12 hr tablet Take 1 tablet by mouth 2 (Two) Times a Day. 60 tablet 0    oxyCODONE (ROXICODONE) 5 MG immediate release tablet       chlorhexidine (PERIDEX) 0.12 % solution As Needed.       Facility-Administered Medications Prior to Visit   Medication Dose Route Frequency Provider Last Rate Last Admin    heparin injection 500 Units  500 Units Intravenous PRN Jostin Parekh MD   500  "Units at 02/18/22 0836    sodium chloride 0.9 % flush 10 mL  10 mL Intravenous PRN Jostin Parekh MD   10 mL at 02/18/22 0836      No Known Allergies  Past Surgical History:   Procedure Laterality Date    BRONCHOSCOPY Bilateral 4/29/2019    Procedure: BRONCHOSCOPY WITH WASHING ENDOBRONCHIAL ULTRASOUND WITH FNA'S;  Surgeon: Selina Lloyd MD;  Location: [facility] ENDOSCOPY;  Service: Pulmonary    COLONOSCOPY N/A 3/19/2021    Procedure: COLONOSCOPY INTO CECUM WITH POLYPECTOMY;  Surgeon: Miguel Torres MD;  Location: [facility] ENDOSCOPY;  Service: Gastroenterology;  Laterality: N/A;  pre: screening  post: polyp, hemorrhoids    FEMUR IM NAILING RETROGRADE Left 1/7/2021    Procedure: LEFT FEMUR INTRALESIONAL CURRETTAGE AND PROPHYLACTIC STABLILIZATION;  Surgeon: Eliu Ochoa MD;  Location: [facility] MAIN OR;  Service: Orthopedics;  Laterality: Left;    INGUINAL HERNIA REPAIR Bilateral 11/23/2020    Procedure: Davinci assisted laparoscopic bilateral  inguinal hernia repair,  ;  Surgeon: Lloyd Magaña Jr., MD;  Location: [facility] OR;  Service: DaVinci;  Laterality: Bilateral;    KNEE SURGERY Left 01/2021    VENOUS ACCESS DEVICE (PORT) INSERTION N/A 10/15/2020    Procedure: MEDIPORT PLACEMENT;  Surgeon: Herlinda Magaña Jr., MD;  Location: [facility] OR;  Service: Vascular;  Laterality: N/A;     family history includes Arthritis in his mother; Lung cancer in his father.   reports that he quit smoking about 5 years ago. His smoking use included cigarettes. He started smoking about 45 years ago. He has a 40 pack-year smoking history. He has been exposed to tobacco smoke. He has never used smokeless tobacco. He reports that he does not currently use alcohol. He reports that he does not currently use drugs.     /80 (BP Location: Right arm, Patient Position: Sitting, Cuff Size: Adult)   Pulse 94   Temp 98 °F (36.7 °C) (Oral)   Resp 16   Ht 180 cm (70.87\")   Wt 93.2 kg (205 lb 8 oz)   SpO2 95%   BMI 28.77 " kg/m²   Physical Exam  Eyes:      General:         Left eye: Discharge present.No foreign body or hordeolum.      Extraocular Movements: Extraocular movements intact.      Conjunctiva/sclera:      Left eye: Left conjunctiva is injected.      Pupils: Pupils are equal, round, and reactive to light.      Comments: Erythematous conjunctiva with clear watery discharge                   Diagnoses and all orders for this visit:    1. Viral conjunctivitis of left eye (Primary)  -     olopatadine (PATANOL) 0.1 % ophthalmic solution; Administer 1 drop into the left eye 2 (Two) Times a Day.  Dispense: 5 mL; Refill: 2             No follow-ups on file.

## 2024-07-23 ENCOUNTER — INFUSION (OUTPATIENT)
Dept: ONCOLOGY | Facility: HOSPITAL | Age: 69
End: 2024-07-23
Payer: MEDICARE

## 2024-07-23 DIAGNOSIS — Z45.2 FITTING AND ADJUSTMENT OF VASCULAR CATHETER: ICD-10-CM

## 2024-07-23 DIAGNOSIS — C91.10 CLL (CHRONIC LYMPHOCYTIC LEUKEMIA): Primary | ICD-10-CM

## 2024-07-23 PROCEDURE — 25010000002 HEPARIN LOCK FLUSH PER 10 UNITS: Performed by: INTERNAL MEDICINE

## 2024-07-23 PROCEDURE — 96523 IRRIG DRUG DELIVERY DEVICE: CPT

## 2024-07-23 RX ORDER — SODIUM CHLORIDE 0.9 % (FLUSH) 0.9 %
10 SYRINGE (ML) INJECTION AS NEEDED
Status: DISCONTINUED | OUTPATIENT
Start: 2024-07-23 | End: 2024-07-23 | Stop reason: HOSPADM

## 2024-07-23 RX ORDER — HEPARIN SODIUM (PORCINE) LOCK FLUSH IV SOLN 100 UNIT/ML 100 UNIT/ML
500 SOLUTION INTRAVENOUS AS NEEDED
OUTPATIENT
Start: 2024-07-23

## 2024-07-23 RX ORDER — SODIUM CHLORIDE 0.9 % (FLUSH) 0.9 %
10 SYRINGE (ML) INJECTION AS NEEDED
OUTPATIENT
Start: 2024-07-23

## 2024-07-23 RX ORDER — HEPARIN SODIUM (PORCINE) LOCK FLUSH IV SOLN 100 UNIT/ML 100 UNIT/ML
500 SOLUTION INTRAVENOUS AS NEEDED
Status: DISCONTINUED | OUTPATIENT
Start: 2024-07-23 | End: 2024-07-23 | Stop reason: HOSPADM

## 2024-07-23 RX ADMIN — Medication 10 ML: at 13:56

## 2024-07-23 RX ADMIN — Medication 500 UNITS: at 13:56

## 2024-07-29 ENCOUNTER — SPECIALTY PHARMACY (OUTPATIENT)
Dept: PHARMACY | Facility: HOSPITAL | Age: 69
End: 2024-07-29
Payer: MEDICARE

## 2024-07-29 NOTE — PROGRESS NOTES
I called [facility] and spoke with Judith.  Judith confirmed that the patient's insurance has been paying the full cost of the medication, Imbruvica, since May 2024.  Patient doesn't need to have the PAN CLL tobias renewed at this time.  I will put in a task at the beginning of the year to look into financial assistance.      Darshana Lao  Specialty Pharmacy Technician

## 2024-07-30 ENCOUNTER — SPECIALTY PHARMACY (OUTPATIENT)
Dept: PHARMACY | Facility: HOSPITAL | Age: 69
End: 2024-07-30
Payer: MEDICARE

## 2024-09-03 ENCOUNTER — INFUSION (OUTPATIENT)
Dept: ONCOLOGY | Facility: HOSPITAL | Age: 69
End: 2024-09-03
Payer: MEDICARE

## 2024-09-03 DIAGNOSIS — C91.10 CLL (CHRONIC LYMPHOCYTIC LEUKEMIA): Primary | ICD-10-CM

## 2024-09-03 DIAGNOSIS — Z45.2 FITTING AND ADJUSTMENT OF VASCULAR CATHETER: ICD-10-CM

## 2024-09-03 PROCEDURE — 96523 IRRIG DRUG DELIVERY DEVICE: CPT

## 2024-09-03 PROCEDURE — 25010000002 HEPARIN LOCK FLUSH PER 10 UNITS: Performed by: INTERNAL MEDICINE

## 2024-09-03 RX ORDER — HEPARIN SODIUM (PORCINE) LOCK FLUSH IV SOLN 100 UNIT/ML 100 UNIT/ML
500 SOLUTION INTRAVENOUS AS NEEDED
OUTPATIENT
Start: 2024-09-03

## 2024-09-03 RX ORDER — SODIUM CHLORIDE 0.9 % (FLUSH) 0.9 %
10 SYRINGE (ML) INJECTION AS NEEDED
Status: DISCONTINUED | OUTPATIENT
Start: 2024-09-03 | End: 2024-09-03 | Stop reason: HOSPADM

## 2024-09-03 RX ORDER — SODIUM CHLORIDE 0.9 % (FLUSH) 0.9 %
10 SYRINGE (ML) INJECTION AS NEEDED
OUTPATIENT
Start: 2024-09-03

## 2024-09-03 RX ORDER — HEPARIN SODIUM (PORCINE) LOCK FLUSH IV SOLN 100 UNIT/ML 100 UNIT/ML
500 SOLUTION INTRAVENOUS AS NEEDED
Status: DISCONTINUED | OUTPATIENT
Start: 2024-09-03 | End: 2024-09-03 | Stop reason: HOSPADM

## 2024-09-03 RX ADMIN — Medication 500 UNITS: at 13:57

## 2024-09-03 RX ADMIN — Medication 10 ML: at 13:57

## 2024-09-26 RX ORDER — MIRTAZAPINE 15 MG/1
TABLET, FILM COATED ORAL
Qty: 45 TABLET | Refills: 1 | Status: SHIPPED | OUTPATIENT
Start: 2024-09-26

## 2024-10-15 ENCOUNTER — INFUSION (OUTPATIENT)
Dept: ONCOLOGY | Facility: HOSPITAL | Age: 69
End: 2024-10-15
Payer: MEDICARE

## 2024-10-15 DIAGNOSIS — C91.10 CLL (CHRONIC LYMPHOCYTIC LEUKEMIA): Primary | ICD-10-CM

## 2024-10-15 DIAGNOSIS — Z45.2 FITTING AND ADJUSTMENT OF VASCULAR CATHETER: ICD-10-CM

## 2024-10-15 PROCEDURE — 25010000002 HEPARIN LOCK FLUSH PER 10 UNITS: Performed by: INTERNAL MEDICINE

## 2024-10-15 PROCEDURE — 96523 IRRIG DRUG DELIVERY DEVICE: CPT

## 2024-10-15 RX ORDER — HEPARIN SODIUM (PORCINE) LOCK FLUSH IV SOLN 100 UNIT/ML 100 UNIT/ML
500 SOLUTION INTRAVENOUS AS NEEDED
Status: DISCONTINUED | OUTPATIENT
Start: 2024-10-15 | End: 2024-10-15 | Stop reason: HOSPADM

## 2024-10-15 RX ORDER — HEPARIN SODIUM (PORCINE) LOCK FLUSH IV SOLN 100 UNIT/ML 100 UNIT/ML
500 SOLUTION INTRAVENOUS AS NEEDED
OUTPATIENT
Start: 2024-10-15

## 2024-10-15 RX ORDER — SODIUM CHLORIDE 0.9 % (FLUSH) 0.9 %
10 SYRINGE (ML) INJECTION AS NEEDED
Status: DISCONTINUED | OUTPATIENT
Start: 2024-10-15 | End: 2024-10-15 | Stop reason: HOSPADM

## 2024-10-15 RX ORDER — SODIUM CHLORIDE 0.9 % (FLUSH) 0.9 %
10 SYRINGE (ML) INJECTION AS NEEDED
OUTPATIENT
Start: 2024-10-15

## 2024-10-15 RX ADMIN — Medication 10 ML: at 14:05

## 2024-10-15 RX ADMIN — Medication 500 UNITS: at 14:05

## 2024-12-03 ENCOUNTER — INFUSION (OUTPATIENT)
Dept: ONCOLOGY | Facility: HOSPITAL | Age: 69
End: 2024-12-03
Payer: MEDICARE

## 2024-12-03 DIAGNOSIS — C34.12 MALIGNANT NEOPLASM OF UPPER LOBE OF LEFT LUNG: ICD-10-CM

## 2024-12-03 DIAGNOSIS — Z45.2 FITTING AND ADJUSTMENT OF VASCULAR CATHETER: Primary | ICD-10-CM

## 2024-12-03 DIAGNOSIS — C91.10 CLL (CHRONIC LYMPHOCYTIC LEUKEMIA): Primary | ICD-10-CM

## 2024-12-03 DIAGNOSIS — C79.51 LUNG CANCER METASTATIC TO BONE: ICD-10-CM

## 2024-12-03 DIAGNOSIS — J44.9 CHRONIC OBSTRUCTIVE PULMONARY DISEASE, UNSPECIFIED COPD TYPE: ICD-10-CM

## 2024-12-03 DIAGNOSIS — C34.90 LUNG CANCER METASTATIC TO BONE: ICD-10-CM

## 2024-12-03 DIAGNOSIS — C91.10 CLL (CHRONIC LYMPHOCYTIC LEUKEMIA): ICD-10-CM

## 2024-12-03 LAB
ALBUMIN SERPL-MCNC: 4 G/DL (ref 3.5–5.2)
ALBUMIN/GLOB SERPL: 1.8 G/DL
ALP SERPL-CCNC: 105 U/L (ref 39–117)
ALT SERPL W P-5'-P-CCNC: 17 U/L (ref 1–41)
ANION GAP SERPL CALCULATED.3IONS-SCNC: 6 MMOL/L (ref 5–15)
AST SERPL-CCNC: 13 U/L (ref 1–40)
BASOPHILS # BLD AUTO: 0.07 10*3/MM3 (ref 0–0.2)
BASOPHILS NFR BLD AUTO: 0.8 % (ref 0–1.5)
BILIRUB SERPL-MCNC: 0.6 MG/DL (ref 0–1.2)
BUN SERPL-MCNC: 19 MG/DL (ref 8–23)
BUN/CREAT SERPL: 13.5 (ref 7–25)
CALCIUM SPEC-SCNC: 8.9 MG/DL (ref 8.6–10.5)
CHLORIDE SERPL-SCNC: 102 MMOL/L (ref 98–107)
CO2 SERPL-SCNC: 30 MMOL/L (ref 22–29)
CREAT SERPL-MCNC: 1.41 MG/DL (ref 0.76–1.27)
DEPRECATED RDW RBC AUTO: 46.4 FL (ref 37–54)
EGFRCR SERPLBLD CKD-EPI 2021: 53.6 ML/MIN/1.73
EOSINOPHIL # BLD AUTO: 0.16 10*3/MM3 (ref 0–0.4)
EOSINOPHIL NFR BLD AUTO: 1.8 % (ref 0.3–6.2)
ERYTHROCYTE [DISTWIDTH] IN BLOOD BY AUTOMATED COUNT: 13.2 % (ref 12.3–15.4)
GLOBULIN UR ELPH-MCNC: 2.2 GM/DL
GLUCOSE SERPL-MCNC: 85 MG/DL (ref 65–99)
HCT VFR BLD AUTO: 48 % (ref 37.5–51)
HGB BLD-MCNC: 15.2 G/DL (ref 13–17.7)
IMM GRANULOCYTES # BLD AUTO: 0.08 10*3/MM3 (ref 0–0.05)
IMM GRANULOCYTES NFR BLD AUTO: 0.9 % (ref 0–0.5)
LYMPHOCYTES # BLD AUTO: 1.33 10*3/MM3 (ref 0.7–3.1)
LYMPHOCYTES NFR BLD AUTO: 14.7 % (ref 19.6–45.3)
MCH RBC QN AUTO: 30.4 PG (ref 26.6–33)
MCHC RBC AUTO-ENTMCNC: 31.7 G/DL (ref 31.5–35.7)
MCV RBC AUTO: 96 FL (ref 79–97)
MONOCYTES # BLD AUTO: 0.94 10*3/MM3 (ref 0.1–0.9)
MONOCYTES NFR BLD AUTO: 10.4 % (ref 5–12)
NEUTROPHILS NFR BLD AUTO: 6.44 10*3/MM3 (ref 1.7–7)
NEUTROPHILS NFR BLD AUTO: 71.4 % (ref 42.7–76)
NRBC BLD AUTO-RTO: 0 /100 WBC (ref 0–0.2)
PLATELET # BLD AUTO: 175 10*3/MM3 (ref 140–450)
PMV BLD AUTO: 12.7 FL (ref 6–12)
POTASSIUM SERPL-SCNC: 4.5 MMOL/L (ref 3.5–5.2)
PROT SERPL-MCNC: 6.2 G/DL (ref 6–8.5)
RBC # BLD AUTO: 5 10*6/MM3 (ref 4.14–5.8)
SODIUM SERPL-SCNC: 138 MMOL/L (ref 136–145)
WBC NRBC COR # BLD AUTO: 9.02 10*3/MM3 (ref 3.4–10.8)

## 2024-12-03 PROCEDURE — 80053 COMPREHEN METABOLIC PANEL: CPT | Performed by: INTERNAL MEDICINE

## 2024-12-03 PROCEDURE — 36591 DRAW BLOOD OFF VENOUS DEVICE: CPT

## 2024-12-03 PROCEDURE — 25010000002 HEPARIN LOCK FLUSH PER 10 UNITS: Performed by: NURSE PRACTITIONER

## 2024-12-03 PROCEDURE — 85025 COMPLETE CBC W/AUTO DIFF WBC: CPT | Performed by: INTERNAL MEDICINE

## 2024-12-03 PROCEDURE — 36415 COLL VENOUS BLD VENIPUNCTURE: CPT

## 2024-12-03 RX ORDER — SODIUM CHLORIDE 0.9 % (FLUSH) 0.9 %
10 SYRINGE (ML) INJECTION AS NEEDED
Status: DISCONTINUED | OUTPATIENT
Start: 2024-12-03 | End: 2024-12-03 | Stop reason: HOSPADM

## 2024-12-03 RX ORDER — HEPARIN SODIUM (PORCINE) LOCK FLUSH IV SOLN 100 UNIT/ML 100 UNIT/ML
500 SOLUTION INTRAVENOUS AS NEEDED
Status: DISCONTINUED | OUTPATIENT
Start: 2024-12-03 | End: 2024-12-03 | Stop reason: HOSPADM

## 2024-12-03 RX ADMIN — Medication 10 ML: at 11:02

## 2024-12-03 RX ADMIN — Medication 500 UNITS: at 11:02

## 2024-12-16 LAB — REF LAB TEST RESULTS: NORMAL

## 2024-12-17 ENCOUNTER — OFFICE VISIT (OUTPATIENT)
Dept: ONCOLOGY | Facility: CLINIC | Age: 69
End: 2024-12-17
Payer: MEDICARE

## 2024-12-17 ENCOUNTER — SPECIALTY PHARMACY (OUTPATIENT)
Dept: ONCOLOGY | Facility: HOSPITAL | Age: 69
End: 2024-12-17
Payer: MEDICARE

## 2024-12-17 VITALS
HEIGHT: 71 IN | RESPIRATION RATE: 16 BRPM | HEART RATE: 87 BPM | BODY MASS INDEX: 29.71 KG/M2 | OXYGEN SATURATION: 95 % | TEMPERATURE: 98.5 F | WEIGHT: 212.2 LBS | DIASTOLIC BLOOD PRESSURE: 92 MMHG | SYSTOLIC BLOOD PRESSURE: 168 MMHG

## 2024-12-17 DIAGNOSIS — C34.90 LUNG CANCER METASTATIC TO BONE: ICD-10-CM

## 2024-12-17 DIAGNOSIS — C34.12 MALIGNANT NEOPLASM OF UPPER LOBE OF LEFT LUNG: ICD-10-CM

## 2024-12-17 DIAGNOSIS — C91.10 CLL (CHRONIC LYMPHOCYTIC LEUKEMIA): Primary | ICD-10-CM

## 2024-12-17 DIAGNOSIS — C79.51 LUNG CANCER METASTATIC TO BONE: ICD-10-CM

## 2024-12-17 PROCEDURE — 99214 OFFICE O/P EST MOD 30 MIN: CPT | Performed by: INTERNAL MEDICINE

## 2024-12-17 PROCEDURE — 1126F AMNT PAIN NOTED NONE PRSNT: CPT | Performed by: INTERNAL MEDICINE

## 2024-12-17 NOTE — PROGRESS NOTES
REASON FOR FOLLOW UP:  1.  Chronic lymphocytic leukemia with extensive lymphadenopathy and possible pleural involvement. Initiation of Treanda, Rituxan May 1, 2019.  2.  Hypogammaglobulinemia.  Status is post IVIG  3.  Significant response on scans June 27, 2019.  Treatment changed to Imbruvica 420 mg daily.  4.  Metastatic non-small cell lung carcinoma on Keytruda  5.  Patient seen 2/18/2022 with left jaw/gumline swelling pain, associated hematoma?  Osteonecrosis.  Restart of Keytruda 3/4/2022, Xgeva discontinued  6.  Restaging via CT 4/12/2022, continued response, Keytruda continued, referral to dermatology for worsening psoriasis.  7.  Jaw osteonecrosis, undergoing therapy through oral surgery, IVIG anticipated, infectious disease and oral surgery follow-up  8.  Patient assessed 11/18/2022, Keytruda continued, ibrutinib-dose reduced-continued, patient seen by  physicians, status postdebridement of bone of left mandible with bone biopsy, fistulectomy of left mandible 10/20/2022  9.  Patient seen 12/30/2022 continue to improve,?  Urinary tract symptoms that have continued to improve.  10.  Repeat scans 4/12/2023 without evidence of recurrence.  11.  Patient seen 4/17/2023, Keytruda therapy held, urinary symptoms improving?  12.  Patient with good performance status, current reveal negative ctDNA  13.  Patient with ctDNA recheck at less than 0.5%, reassessment at 3 months, ongoing issues with the patient's wife developing end-stage liver disease  14.  Patient seen 2/13/2024-Guardant Reveal-negative ctDNA, 0%, increasing left femur pain, follow-up plain films, orthopedic referral  15.  Subsequent assessment orthopedics negative, MRI pending  16.  Patient assessed 7/2/2024, 12/17/2024 stable      HISTORY OF PRESENT ILLNESS:    The patient is a 69 y.o. male with the above mentioned history here today for lab review and evaluation prior to cycle 38  Keytruda.  He reports overall he is doing well.  He does continue  on antibiotics from Dr. Pak before his osteonecrosis of the jaw.  He does have 1 area of exposed bone on his jaw they are hoping that the gum tissue will grow over this.  He would follow-up with Dr. Champion in June.    Patient also previously been on antibiotics for UTI.  He does report some urgency which is still present.  No fevers or chills.  Denies any issues with increased shortness of breath.    He does report some intermittent pain on the right lateral portion of his chest near the eighth rib.  He states when he moves at times he has a sharp pain that is quick, he describes it as feeling like a strain.  This is only happened occasionally over the past few weeks.    The patient is next seen 3/6/2023 without additional chest discomfort and with lessening UTI symptoms.  We have discussed repeat scans in April 2023.    The patient did proceed to repeat CT chest abdomen pelvis 4/12/2023 with stable findings overall including a small right pleural effusion, right rib metastatic lesion with increase sclerosis, overlying soft tissue thickening without interval change in additional osseous lesions over the interval change.  Patient evaluated 3/27/23 with general improvement though difficulty with dysuria and bladder spasm requiring additional antibiotic therapies.    These findings are discussed with the patient 4/17/23 and he feels that his urinary symptoms improved when he stopped his antibiotic therapy used for his osteonecrosis- amoxicillin.  We discussed that his findings including his long-term improvement/remission and, potentially, his urinary symptoms, in part, could be from continued immunotherapy.  This would be an opportunity to now discontinue that therapy and observe him expectantly.    The patient had follow-up assessment by infectious disease 6/8/2023.  Have been off amoxicillin with subsequent resolution urinary symptoms that include dysuria and frequency.    As an alternative method of follow-up the  "patient underwent a Guardant Reveal that was sent 7/10/2023 negative for ctDNA.  This corresponds to his relatively good performance status when seen 7/31/2023.  This includes resolution of the bony fragment that had been noted in his lower jawline that recently loosened and ejected with normal mucosa at the site    The patient returns for assessment 11/13/2023.  His recent CBC 10/23/2023 was normal with H&H of 13.2 and 44.1, white count of 8030, platelet count 163,000 with a normal differential, CMP with being creatinine 19 and 1.30 and Guardant Reveal with ctDNA at less than 0.05%  Unfortunately his wife has developed cirrhosis and is demonstrating end-stage liver disease.  He is her primary caregiver.    We have discussed how to proceed and a repeat ctDNA assessment with Guardant Reveal be requested in approximately 3 months.    He is next seen 7/2/2024.  His follow-up guardant ctDNA again is negative for ctDNA and 0%.  Fortunately his performance status remains reasonably good except he does \"feel heaviness in his chest\" periodically.    Patient is next reviewed 12/17/2024-guardant reveal 12/3/2024 again with negative ctDNA and 0% tumor fraction, CBC with H&H of 15.2 and 48.0, white count 9020 and platelet count 175,000 with normal differential and BUN/creatinine of 19 and 1.41, normal LFTs.  He continues to feel well except for degree of fatigue that is, in part, related to decreased exercise.  Plans were made for 6-month follow-up plans made for 6-month follow-up          Past Medical History, Past Surgical History, Social History, Family History have been reviewed and are without significant changes except as mentioned.    Hematologic/oncologic history:    As concerns his CLL history he initially required Treanda Rituxan 5/1/2019 for 2 cycles and then initiated Imbruvica 420 mg daily initiated 7/25/2019.       As concerns stage IV non-small cell lung carcinoma he returns the office continuing Keytruda which " "he continues to receive every 3 weeks which was initiated 10/21/2020.  He also receives Xgeva every 6 weeks which was initially given 12/2/2020 and discontinued 1/7/2022 secondary to osteonecrosis.      Additional issues that developed during treatment included right knee pain with plain views of his right knee showing a right knee effusion with medial compartment narrowing and no suspicious bone lesion.  There is benign periosteal calcification along the distal right femur.  A PET/CT 11/11 went on to demonstrate a complete metabolic response to therapy compared to PET/CT from 9/23/2020.  This includes his primary lung neoplasm left upper lobe, large osseous metastatic lesion the right chest wall region from the eighth rib, small left adrenal metastasis, and no evidence of disease involving the distal left femur.      The patient was seen 11/24/2021 continue to do well though indicating that his right knee is \"something I can manage at the moment\".  He prefers not to have orthopedic assessment at this point.  He is concerned, ever, about skin lesions that are developing primarily on his calf regions and into his right thigh.    The patient was seen by Dr. Regan Damon 11/29/2021 and felt to be consistent with psoriasiform dermatitis treated with intralesional therapy.  Was also started on a moisturizer and lipid therapy.  The patient returned for assessment 12/15/2021 and treated with cycle #21 Keytruda, returned 1/7/2022 for cycle #22 and 1/28/2022 for cycle #23.  At that visit he had developed adenopathy several weeks previous and was treated with a Medrol Dosepak.   The patient was next seen 2/18/2022 and indicates that though his adenopathy has improved after treatment described above that his jaw has become painful swollen and tender with additional bleeding.     The patient was referred to his primary dentist who then had him seen oral surgeon-Dr. Ezequiel Davila-reached at [phone] who felt that this was " osteonecrosis and should be treated symptomatically.  As the patient reviewed 3/4/2022, wonderfully, his oral issues have nearly resolved with the gumline returning to essentially its previous state, no swelling, minimal erythema, no discharge and no additional pain.  He does have follow-up with oral surgery in the next several weeks and we discussed restarting his Keytruda scanning him likely in April 2022 in general.   He returns to the office on 3/25/2022 in follow-up in anticipation of cycle 24 Keytruda. He reports the left side of his jaw has improved.    However, he was now having issues with the right lower side. He was seen by neurosurgery with additional films and started on oral amoxicillin by his oral surgeon.      The patient continued his immunotherapy taking Keytruda 3/25/2022 and underwent repeat CT chest abdomen and pelvis 4/12/2022 that is reviewed with him 4/15/2022.  Wonderfully there is no substantial change with a small to moderate right pleural effusion that decreased in size, no additional pulmonary nodule or new metastatic disease, multifocal osteosclerotic metastatic disease without change and no new findings in the abdomen or pelvis.    The patient was seen 4/15/2022 indicating that he is actually feeling well in terms of general symptoms.  This includes lack of progression from mouth pain, ability to eat without discomfort.  He is, however, having worsening psoriasis particular in his lower extremities and has been reviewed by dermatology previously.    The patient was reviewed 6/17/2022 having been seen by oral surgery with progression of his osteonecrosis and skin eruption.  He is currently undergoing assessment by infectious disease within the next week and we have reviewed that previously he responded to IVIG for in-hospital refractory sinusitis.  As a result he is asked to undergo reassessment for his immunoglobulin levels today and return next week for 400 mg/kg of IVIG infusion  which we will continue monthly.    The patient was given IVIG 6/24/2022 and again 7/22/2022.  Assessment 8/5/2022 continue to show a degree of general improvement with oral surgery continue to follow him with a second opinion to be obtained  8/8/2022 when IVIG planned 8/19/2022 and 9/16/2022.    The patient slowly continued both assessments by oral surgery and  Scans considering his non-small cell lung cancer including CT studies 10/4/2022 that were essentially stable compared to previous.  Therapies include Keytruda every 3 weeks, daily Imbruvica, monthly IVIG every 4 weeks.    He next presentedwith the above-mentioned history, who returns the office  in anticipation of his 35 th cycle of Keytruda.  He was last treated with IVIG 11/11/2022.       At this point he had undergone surgery at [facility] including fistulectomy of the left mandible and debridement of the intraoral bone left mandible.  He he had also been seen by infectious disease, Dr. Champion regarding osteonecrosis of the jaw.  He was placed on antibiotics for 6 weeks including Levaquin and fluconazole.          There is an interaction with his fluconazole and ibrutinib and adjustments will be necessary.  [facility] pharmacy is managing this.  He reports he is overall feeling very well.  He has had improvement in his ability to eat and drink since undergoing surgery on his jaw.  His skin is overall improved with the use of Aquaphor.  He did undergo CT scans prior to cycle 33 Keytruda with stability of his disease.  He continues on current therapy with excellent tolerance.     Patient is next seen 11/18/2022.  Fortunately he feels that he is doing  reasonably well with surgery having been successful, pain virtually absent at this point, no additional shortness of breath or cough, appetite remaining fair, stable weight and sleep at least modestly managed.  Unfortunately his wife recently injured her femur and he has become her primary caregiver at this  point.       The patient is next assessed 12/9/2022.  He is due for cycle #36 Keytruda and IVIG today.  He did not realize he was due for IVIG today, and already scheduled another appointment so would like to reschedule IVIG for next week.  He also continues on Imbruvica, currently at a reduced dose of 280 mg daily, due to interaction with Diflucan.  He was seen by Dr. Champion with infectious disease yesterday, 12/8/2022 and econazole was discontinued as patient has completed his 6-week course.  He was started on preventative Augmentin 500 mg twice daily until his gums grow to close over the exposed area of bone.  He is eating and drinking adequately, weight remains stable.  Bowels moving regularly.  Denies fever or chills.  Denies nausea or vomiting.  Denies new or worsening pain.  Denies skin rash or shortness of breath.  No new concerns today.    The patient continued Keytruda therapy including 12/9/2022 and IVIG 12/16/2022.  He is next seen 12/30/2022 for continued Keytruda.  We have discussed that he is clearly improving per his osteonecrosis and his completed his surgical therapy and now on prophylactic antibiotic therapy.  We have reviewed that he could discontinue his IVIG, continue his Imbruvica, Keytruda and prophylactic antibiotic therapy.  He is having urinary symptoms?  And a follow-up UA and culture is pending.    Note that the patient's urine cultures were negative we proceeded 1/20/2023 with Keytruda, Imbruvica with Keytruda given 2/10/2023 and the patient presented 3/6/2023 for his next treatment.  We do plan repeat CT scans in April 2023.    Repeat scans 4/12/2023-stable CT chest and pelvis with small right pleural effusion, parenchymal scarring right lower lobe, right rib metastatic lesion with increase sclerosis, overlying soft tissue thickening without interval change in osseous lesions additionally without change.    Patient seen 4/17/2023 at which time immunotherapy was held and patient placed  on observation status.  This included some concern about whether his urinary symptoms could be related to ongoing immunotherapy.  This would be observed off Keytruda.    The patient returns for assessment 11/13/2023.  His recent CBC 10/23/2023 was normal with H&H of 13.2 and 44.1, white count of 8030, platelet count 163,000 with a normal differential, CMP with being creatinine 19 and 1.30 and Guardant Reveal with ctDNA at less than 0.05%  Unfortunately his wife has developed cirrhosis and is demonstrating end-stage liver disease.  He is her primary caregiver.    We have discussed how to proceed and a repeat ctDNA assessment with Guardant Reveal be requested in approximately 3 months.    Patient next seen 2/13/2024.  He indicates that in the last several weeks to months he has been having increasing pain in his left femur that while not completely debilitating is increasingly uncomfortable.  We have discussed plain films today and reevaluation through orthopedic oncology.    The patient underwent plain films of the femur 2/13/2024 with postoperative changes related to the previous implant with no bone lesions seen and normal soft tissue.  Patient was reviewed by orthopedics undergoing MRI of the femur now schedule 3/14/2024.  The patient is seen 3/12/2024 indicating that his pain and is markedly improved.  Wonderfully appears to be in continued remission from his malignancies at this point.    PA lateral chest x-ray 7/2/2024 with subsegmental atelectasis or scarring, small right pleural effusion.    Patient is next reviewed 12/17/2024-guardant reveal 12/3/2024 again with negative ctDNA and 0% tumor fraction, CBC with H&H of 15.2 and 48.0, white count 9020 and platelet count 175,000 with normal differential and BUN/creatinine of 19 and 1.41, normal LFTs.  Plans made for 6-month follow-up    Objective      Vitals:    12/17/24 1343   BP: 168/92   Pulse: 87   Resp: 16   Temp: 98.5 °F (36.9 °C)   TempSrc: Oral   SpO2: 95%  "  Weight: 96.3 kg (212 lb 3.2 oz)   Height: 180 cm (70.87\")   PainSc: 0-No pain                   12/17/2024     1:44 PM   Current Status   ECOG score 0     Physical Exam  Vitals and nursing note reviewed.   Constitutional:       Appearance: Normal appearance. He is well-developed.   HENT:      Head: Normocephalic and atraumatic.      Nose: Nose normal.      Mouth/Throat:      Comments: No further exposed bone left lower jawline.  Eyes:      Pupils: Pupils are equal, round, and reactive to light.   Cardiovascular:      Rate and Rhythm: Normal rate and regular rhythm.      Heart sounds: Normal heart sounds.   Pulmonary:      Effort: Pulmonary effort is normal. No respiratory distress.      Breath sounds: Normal breath sounds. No wheezing, rhonchi or rales.   Abdominal:      General: Bowel sounds are normal. There is no distension.      Palpations: Abdomen is soft.      Tenderness: There is no abdominal tenderness.   Musculoskeletal:         General: Normal range of motion.      Cervical back: Normal range of motion and neck supple.   Skin:     General: Skin is warm and dry.   Neurological:      Mental Status: He is alert and oriented to person, place, and time.   Psychiatric:         Behavior: Behavior normal.       I have reexamined the patient and the results are consistent with the previously documented exam. Jostin Parekh MD      RECENT LABS:  WBC   Date Value Ref Range Status   12/03/2024 9.02 3.40 - 10.80 10*3/mm3 Final     RBC   Date Value Ref Range Status   12/03/2024 5.00 4.14 - 5.80 10*6/mm3 Final     Hemoglobin   Date Value Ref Range Status   12/03/2024 15.2 13.0 - 17.7 g/dL Final     Hematocrit   Date Value Ref Range Status   12/03/2024 48.0 37.5 - 51.0 % Final     MCV   Date Value Ref Range Status   12/03/2024 96.0 79.0 - 97.0 fL Final     MCH   Date Value Ref Range Status   12/03/2024 30.4 26.6 - 33.0 pg Final     MCHC   Date Value Ref Range Status   12/03/2024 31.7 31.5 - 35.7 g/dL Final     RDW "   Date Value Ref Range Status   12/03/2024 13.2 12.3 - 15.4 % Final     RDW-SD   Date Value Ref Range Status   12/03/2024 46.4 37.0 - 54.0 fl Final     MPV   Date Value Ref Range Status   12/03/2024 12.7 (H) 6.0 - 12.0 fL Final     Platelets   Date Value Ref Range Status   12/03/2024 175 140 - 450 10*3/mm3 Final     Neutrophil %   Date Value Ref Range Status   12/03/2024 71.4 42.7 - 76.0 % Final     Lymphocyte %   Date Value Ref Range Status   12/03/2024 14.7 (L) 19.6 - 45.3 % Final     Monocyte %   Date Value Ref Range Status   12/03/2024 10.4 5.0 - 12.0 % Final     Eosinophil %   Date Value Ref Range Status   12/03/2024 1.8 0.3 - 6.2 % Final     Basophil %   Date Value Ref Range Status   12/03/2024 0.8 0.0 - 1.5 % Final     Immature Grans %   Date Value Ref Range Status   12/03/2024 0.9 (H) 0.0 - 0.5 % Final     Neutrophils, Absolute   Date Value Ref Range Status   12/03/2024 6.44 1.70 - 7.00 10*3/mm3 Final     Lymphocytes, Absolute   Date Value Ref Range Status   12/03/2024 1.33 0.70 - 3.10 10*3/mm3 Final     Monocytes, Absolute   Date Value Ref Range Status   12/03/2024 0.94 (H) 0.10 - 0.90 10*3/mm3 Final     Eosinophils, Absolute   Date Value Ref Range Status   12/03/2024 0.16 0.00 - 0.40 10*3/mm3 Final     Basophils, Absolute   Date Value Ref Range Status   12/03/2024 0.07 0.00 - 0.20 10*3/mm3 Final     Immature Grans, Absolute   Date Value Ref Range Status   12/03/2024 0.08 (H) 0.00 - 0.05 10*3/mm3 Final     nRBC   Date Value Ref Range Status   12/03/2024 0.0 0.0 - 0.2 /100 WBC Final       FISH prognostic panel-Positive for deletion of 13 q. 14.3    Assessment & Plan   1.  CLL presenting with significant lymphocytosis, lymphadenopathy and splenomegaly.     Positive for deletion of 13 q. 14.3.    Patient status post 2 cycles of Treanda Rituxan with excellent response.    Imaging 6/27/2019 with significant decrease in adenopathy.  Treanda Rituxan discontinued   Imbruvica 420 mg daily initiated 7/25/2019.  He  continues on Imbruvica, without indication of progression of his CLL, without progressive lymphadenopathy on CT scans.  Patient has resolving adenopathy particularly cervical regions 2/18/2022  3/25/2022 patient is without worsening adenopathy on exam today.  Continue Imbruvica.  10/28/2022 continues on Imbruvica 420 mg daily. Denies B-symptoms, no evidence of worsening adenopathy on exam.  CT scans without worsening adenopathy  11/18/2022: Imbruvica reduced to 280 mg daily while patient receiving 6-week course of antimicrobials due to interaction with fluconazole.  12/9/2022: He has finished his course of fluconazole, taking his last dose today.  He has a few days left of Imbruvica 280 mg, which he will complete.  He will then increase Imbruvica back to regular dose of 420 mg daily.  Patient stable/17/23, 4/17/2023, 7/13/2023, 11/13/2023  Repeat assessment 2/13/2024 with H&H of 14.8 46.0, white count of 8020, platelet count 179,000 with normal automated differential  Stable 3/12/2024, 7/2/2024, 12/17/2024    2.  Pulmonary nodules/infiltrates.  He is  status post bronchoscopy.  Is unclear at this time whether these findings are infectious or possibly related to his lymphoproliferative disorder.  This is seen to be improving on latest scans- 4/12/2023  Follow-up chest x-ray 7/2/2024                                                                                                                                                3.  Hypogammaglobulinemia-status post IVIG with resolution of sinusitis.   Anticipate trial of IVIG with the patient now having progression of his osteonecrosis and dermal infection.  Continues with monthly IVIG.  Due today, however did not realize he was due for IVIG and would like to reschedule for next week.  Reviewed 12/30/2022 with IVIG discontinued.    4.  Non-small cell lung carcinoma  August 26 2020 revealing a new 1.6 cm spiculated nodule within the left upper lobe, 1.2 cm left adrenal  nodule, bone windows with a soft tissue mass involving the right eighth rib measuring 3.7 x 2.6 cm.    Biopsy was consistent with adenocarcinoma CK 7+, CK 20-, lung primary suspected clinically, molecular panel not yet obtained.  PET/CT 9/23/2020 demonstrating asymmetric uptake within the right parotid gland which is unremarkable appearance on noncontrasted CT, SUV of 6 compared to 2.7.  There is no hilar, mediastinal or axillary adenopathy, findings of asymmetric moderate to intense FDG uptake within superior aspect the right chest wall anterior to the right first rib with an irregular hypodense lesion measuring 1.8 x 1.6 and SUV 4.9.  This had not been seen June 27, 2019.  There is an intensely FDG avid nodule within the lingula measuring 1.9 x 1.5 history of 12.4, FDG avid left adrenal nodule 1.9 x 1.5 with an issue 21.2.    Evaluated by radiation oncology therapy September 29 and started on radiation therapy to the right chest wall-35 Gy in 10 fractions.    Plans were also then proceed with SBRT to the solitary left upper lobe lesion pending insurance approval.  Further testing including TPS of 20% and foundation CDX with a TMB of greater than 10 mutations per megabase (28 mutations per megabase) and the indication that several immunotherapies could be useful in this patient's care.  Additional genomic findings include BRAF [id number]/that trametinib could be useful and STK11/that everolimus could be useful.  Keytruda initiated 10/21/2021   Reassessment after 2 cycles of Keytruda with enlargement of the right eighth rib lesion,enlargement of spiculated left upper lobe lung mass, moderate to large right-sided pleural effusion, new left adrenal mass and enlarged retrocrural lymph node.?Pseudoprogression  Xgeva last given 12/30/2020  Follow-up scans 1/6/2021 demonstrated marked improvement in his right eighth rib lesion, resolution of left upper lobe spiculated mass, residual large right pleural effusion, resolution of  left adrenal metastasis.   Right pleural effusion, with thoracentesis 1/14/2021 with 1500 mils removed.  Pathology consistent with malignant effusion   CT scans 4/5/2021 with response noted in the left upper lobe density.  He continues to receive Keytruda every 3 weeks along with Xgeva every 6 weeks.  Repeat scan 7/15/2021 without evidence of recurrence or progressive disease.  Plans to continue Keytruda every 3 weeks with repeat scans in 4 to 6 months  Patient status post PET/CT 11/11/2021 with hyper response, approximate remission status, plan to proceed with cycle #20 Keytruda  Patient seen 2/18/2022 with Keytruda held while his oral issues are addressed-concern for osteonecrosis of the jaw.  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  3/25/2022 proceed with cycle #24 Keytruda today.  Follow-up CT scans 4/12/2022 without evidence of progressive disease, stable findings including osteosclerotic metastatic disease.  Keytruda continued  Repeat imaging 10/4/2022 with overall stable disease, Keytruda continued  12/9/2022: Proceed with cycle #36 Keytruda today.  Plan repeat imaging April 2023.  3/6/2023 continued with 41st cycle of Keytruda  Repeat scans 4/12/23 without evidence of progressive disease.  Discussion as to the discontinuance of immunotherapy.  Patient seen 4/17/2023 with Keytruda held, follow-up Guardant Reveal planned.  Subcu Guardant Reveal 7/13/2023 negative ctDNA, repeat planned in 12 weeks.  The patient returns for assessment 11/13/2023.  His recent CBC 10/23/2023 was normal with H&H of 13.2 and 44.1, white count of 8030, platelet count 163,000 with a normal differential, CMP with being creatinine 19 and 1.30 and Guardant Reveal with ctDNA at less than 0.05%  Unfortunately his wife has developed cirrhosis and is demonstrating end-stage liver disease.  He is her  primary caregiver.  We have discussed how to proceed and a repeat ctDNA assessment with Guardant Reveal be requested in approximately 3 months.  Repeat Guardant Reveal 1/15/2024 with negative ctDNA, 0%  Reviewed findings 3/12/2024 with plans for repeat cardiac revealed an approximately 4 months.  7/2/2024, Guardant Reveal-negative ctDNA, 0% fraction  Patient is next reviewed 12/17/2024-guardant reveal 12/3/2024 again with negative ctDNA and 0% tumor fraction, CBC with H&H of 15.2 and 48.0, white count 9020 and platelet count 175,000 with normal differential and BUN/creatinine of 19 and 1.41, normal LFTs.        5.  Left knee metastasis  Evaluated by orthopedic oncology, Dr. Eliu Ochoa  Admission 1/7/2021 undergoing stabilization of left femoral bone lesion, prophylactic stabilization with intramedullary implants.  It was suggested we delay Xgeva or Zometa to allow bone healing  Completed radiation with 10 fractions 2/10/2021  Xgeva resumed 4/29/2021, he continues to receive this every 6 weeks.    Additional assessment 2/18/2022 with left jaw pain, subsequent diagnosis of osteoporosis, Xgeva discontinued  Patient continues follow-up with oral surgery, progression of osteoporosis symptomatically, dermal eruption left side of mandibular region, IVIG anticipated  Xgeva DISCONTINUED due to osteonecrosis of the jaw  Increasing pain left mid femur 2/13/2024 plain films planned, referral to orthopedics  Subsequent testing ongoing when seen 3/12/2024 with MRI performed 3/14/2024 without clear new abnormalities     6.  Malignant right pleural effusion  Ultrasound thoracentesis 1/14/2021 1500 mL removed  Chest x-ray 2/25/2021 with moderate right pleural effusion  Progressive symptoms with worsening pain 4/5/2021  Ultrasound-guided thoracentesis 4/13/2021 with 2050 ml removed.  Plans for Pleurx catheter with thoracic surgery, however, pleural effusion has not recurred.  Patient seen 11/4/2021 with chest x-ray planned.   Subsequent resolution noted on CT scan.  Improvement in bilateral pleural effusions on most recent CT scan  Additional assessment 4/12/23 with stable small right pleural effusion  Follow-up chest x-ray 7/2/2024 without change    7.  Cancer related pain  Pain is most prominent in his right rib, utilizing MS Contin 30 mg 3 times a day  Hydrocodone/acetaminophen 10/325 as needed for breakthrough pain.  Currently taking 3 to 4 tablets daily  He is not currently in need of a refill of pain medications  Discussed MS Contin at 30 mg p.o. every 8 hours, Norco 10/325 2 p.o. every 6 hours  He is currently using pain medication as needed for jaw pain.  Requiring approximately 1 MS Contin and 1 Norco 10/325 daily, however some days not requiring any pain medication.  Reports his pain is well controlled, not currently requiring pain medication.  As of 3/6/2023, 7/2/2024, 12/17/2024 patient continues to report no need for pain medication at this time.    8.  Insomnia  Continuing to follow with behavioral oncology  Continuing Remeron 7.5 mg nightly with fair control.  The patient  when assessed 12/30/2022.  1/20/2023 continues on Remeron 7.5 mg nightly which will be refilled     9. Health maintenance  Status post COVID-19 booster, SARS antibody pending today  The patient is due for Shingrix and Prevnar which will both be administered in the clinic 9/23/2021    10.  Skin lesions  Uncertain of etiology, unexpected findings for usual skin lesions associated with immunotherapy  Request dermatologic assessment-psoriasiform dermatitis.  Seen by Dr. Damon though no follow-up due to personal preference  The lesions on the lower extremities particularly are quite extensive.  At present he feels that they are stable enough not to require additional therapy.  These are also considerably improved 3/12/2024    11.  Right knee pain  Osteoarthritis, orthopedic assessment upon patient's request.   Resolved.  Denies any knee pain today     12.   Osteonecrosis of the jaw  Xgeva was discontinued.  Patient went to second opinion with  with oral surgery.  He was reffered to Dr. Escalante with , with whom he had consult on 8/23/2022.  Dr. Escalante recommened an outpatient procedure that would allow him to biopsy the affected area of his jawbone and identify which bacteria was present, they would then coordinate with ID to identify best antibiotic option.  Patient is agreeable to proceeding with this plan, and is awaiting surgery date.  The patient underwent surgery 10/7/2022  Follow-up with Dr. Champion, infectious disease 10/27/2022 with recommendations for 6 weeks of Levaquin and fluconazole.  This should complete by approximately mid December 2023.  Was seen by Dr. Champion 12/8/2022 with plans to complete fluconazole.  Decision made to start Augmentin 500 mg twice daily for prevention.  Patient currently being followed by Dr. Champion with reassessment planned in June 2023  Resolved status post oral surgery assessment, infectious disease  Gradual recovery-resolved by 12/17/2024        Plan:   Again, hold any further immunotherapy-Keytruda  Continue Imbruvica 420 mg daily.  Continue Remeron 15 mg p.o. nightly.  22 weeks-Guardant Reveal-lung-CBC, CMP  24 weeks MD  Every 6 week port flush           Patient is on a high risk medication requiring close monitoring for toxicity.      Jostin Parekh MD   12/17/2024

## 2024-12-17 NOTE — PROGRESS NOTES
Specialty Pharmacy Patient Management Program  Oncology Reassessment     Dav Freitas was referred by an their provider to the Oncology Patient Management program offered by [facility] for CLL. A follow-up outreach was conducted, including assessment of continued therapy appropriateness, medication adherence, and side effect incidence and management for Imbruvica (ibrutinib).    Changes to Insurance Coverage or Financial Support  None    Relevant Past Medical History and Comorbidities  Relevant medical history and concomitant health conditions were discussed with the patient. The patient's chart has been reviewed for relevant past medical history and comorbid health conditions and updated as necessary.   Past Medical History:   Diagnosis Date    Anxiety     Breast cancer     Bruises easily     SIDE EFFECT D/T CHEMO TABLET     CLL (chronic lymphocytic leukemia) 2019    LAST CHEMO 2019    Constipation     COPD (chronic obstructive pulmonary disease)     Dyspnea     ED (erectile dysfunction)     H/O splenomegaly     Ileus 2020    Lung cancer 2020    WITH BONE METS  - LAST RADIATION TREATMENT 10/13/20    On home O2     3L NC AT NIGHT     Pleural effusion 2021    right side    Sleep related hypoxia     USES O2 AT NIGHT    Tumor     LEFT LEG NEAR KNEE    Varicose vein of leg      Social History     Socioeconomic History    Marital status:      Spouse name: Nikky   Tobacco Use    Smoking status: Former     Current packs/day: 0.00     Average packs/day: 1 pack/day for 40.0 years (40.0 ttl pk-yrs)     Types: Cigarettes     Start date: 1979     Quit date: 2019     Years since quittin.6     Passive exposure: Past    Smokeless tobacco: Never   Vaping Use    Vaping status: Never Used   Substance and Sexual Activity    Alcohol use: Not Currently     Comment: RARE    Drug use: Not Currently    Sexual activity: Defer     Problem list reviewed by Emy Ram  SELVIN on 12/17/2024 at  2:12 PM    Hospitalizations and Urgent Care Since Last Assessment  ED Visits, Admissions, or Hospitalizations: none reported  Urgent Office Visits: yes -- for burn on foot. Patient reports spot has healed.     Allergies  Known allergies and reactions were discussed with the patient. The patient's chart has been reviewed for allergy information and updated as necessary.   No Known Allergies  Allergies reviewed by Emy Ram RPH on 12/17/2024 at  2:12 PM    Relevant Laboratory Values  Relevant laboratory values were discussed with the patient. The following specialty medication dose adjustment(s) are recommended: No dose adjustments are needed for the oral specialty medication(s) based on the labs.    Lab Results   Component Value Date    GLUCOSE 85 12/03/2024    CALCIUM 8.9 12/03/2024     12/03/2024    K 4.5 12/03/2024    CO2 30.0 (H) 12/03/2024     12/03/2024    BUN 19 12/03/2024    CREATININE 1.41 (H) 12/03/2024    EGFRIFNONA 50 (L) 02/18/2022    BCR 13.5 12/03/2024    ANIONGAP 6.0 12/03/2024     Lab Results   Component Value Date    WBC 9.02 12/03/2024    RBC 5.00 12/03/2024    HGB 15.2 12/03/2024    HCT 48.0 12/03/2024    MCV 96.0 12/03/2024    MCH 30.4 12/03/2024    MCHC 31.7 12/03/2024    RDW 13.2 12/03/2024    RDWSD 46.4 12/03/2024    MPV 12.7 (H) 12/03/2024     12/03/2024    NEUTRORELPCT 71.4 12/03/2024    LYMPHORELPCT 14.7 (L) 12/03/2024    MONORELPCT 10.4 12/03/2024    EOSRELPCT 1.8 12/03/2024    BASORELPCT 0.8 12/03/2024    AUTOIGPER 0.9 (H) 12/03/2024    NEUTROABS 6.44 12/03/2024    LYMPHSABS 1.33 12/03/2024    MONOSABS 0.94 (H) 12/03/2024    EOSABS 0.16 12/03/2024    BASOSABS 0.07 12/03/2024    AUTOIGNUM 0.08 (H) 12/03/2024    NRBC 0.0 12/03/2024       Current Medication List  This medication list has been reviewed with the patient and evaluated for any interactions or necessary modifications/recommendations, and updated to include all prescription  medications, OTC medications, and supplements the patient is currently taking.  This list reflects what is contained in the patient's profile, which has also been marked as reviewed to communicate to other providers it is the most up to date version of the patient's current medication therapy.     Current Outpatient Medications:     albuterol (PROAIR RESPICLICK) 108 (90 Base) MCG/ACT inhaler, Inhale 2 puffs Every 4 (Four) Hours As Needed for Wheezing., Disp: 1 inhaler, Rfl: 0    ALBUTEROL IN, Inhale., Disp: , Rfl:     ibrutinib (Imbruvica) 420 MG tablet tablet, TAKE 1 TABLET BY MOUTH ONCE  DAILY WITH A FULL GLASS OF WATER, Disp: 28 tablet, Rfl: 5    mirtazapine (REMERON) 15 MG tablet, TAKE HALF (1/2) A TABLET BY MOUTH EVERY NIGHT., Disp: 45 tablet, Rfl: 1  No current facility-administered medications for this visit.    Facility-Administered Medications Ordered in Other Visits:     heparin injection 500 Units, 500 Units, Intravenous, PRN, Jsotin Parekh MD, 500 Units at 02/18/22 0836    sodium chloride 0.9 % flush 10 mL, 10 mL, Intravenous, PRN, Jostin Parekh MD, 10 mL at 02/18/22 0836    Medicines reviewed by Emy Ram [facility] on 12/17/2024 at  2:12 PM    Drug Interactions  Assessed medication list for interactions, no significant drug interactions noted.   Advised patient to call the clinic if any new medications are started so we can assess for drug-drug interactions.  Drug-food interactions discussed:  no foods or drinks.    Adverse Drug Reactions  Medication tolerability: Tolerating with no to minimal ADRs  Medication plan: Continue therapy with normal follow-up  Plan for ADR Management: N/A    Adherence, Self-Administration, and Current Therapy Problems  Adherence related to the patient's specialty therapy was discussed with the patient. The Adherence segment of this outreach has been reviewed and updated.     Adherence Questions  Linked Medication(s) Assessed: Ibrutinib (Imbruvica)  On  average, how many doses/injections does the patient miss per month?: 1  What are the identified reasons for non-adherence or missed doses? : no problems identified  What is the estimated medication adherence level?: %  Based on the patient/caregiver response and refill history, does this patient require an MTP to track adherence improvements?: no    Additional Barriers to Patient Self-Administration: none  Methods for Supporting Patient Self-Administration: none needed at this time  Patient has had no issues obtaining medication from pharmacy.    Open Medication Therapy Problems  CLL (chronic lymphocytic leukemia)   1 Current Medication: Imbruvica 420 MG tablet tablet (Discontinued)   Current Medication Sig: TAKE 1 TABLET BY MOUTH ONCE DAILY WITH A FULL GLASS OF  WATER   Rationale: Medication interaction - Dosage too high - Safety   Recommendation: Decrease Dose - Imbruvica 420 MG tablet - Imbruvica 420 mg po daily interactis with new order of Diflucan- recommend dose reduction to 280 mg while on Diflucan   Identified Date: 10/28/2022   Note: In basket message sent to Dr Parekh and discussed with Noa Zarate             Goals of Therapy  Goals related to the patient's specialty therapy were discussed with the patient. The Patient Goals segment of this outreach has been reviewed and updated.   Goals Addressed Today        Specialty Pharmacy General Goal      Progression free survival   12/17/24: WBC 9.02, Hgb 15.2, platelets 175.  Patient reports tolerating therapy well. No plans to oral therapy plan at this time.              Quality of Life Assessment   Quality of Life related to the patient's enrollment in the patient management program and services provided was discussed with the patient. The QOL segment of this outreach has been reviewed and updated.  Quality of Life Improvement Scale: 5-No change    Discussed aforementioned material with patient via telemedicine.     Reassessment Plan &  Follow-Up  1. Medication Therapy Changes: none  2. Related Plans, Therapy Recommendations, or Issues to Be Addressed: none  3. Pharmacist to perform regular assessments no more than (6) months from the previous assessment.       Attestation  Therapeutic appropriateness: Appropriate   I attest the patient was actively involved in and has agreed to the above plan of care.  If the prescribed therapy is at any point deemed not appropriate based on the current or future assessments, a consultation will be initiated with the patient's specialty care provider to determine the best course of action. The revised plan of therapy will be documented along with any required assessments and/or additional patient education provided.     Martin Ram, Viviana, [facility]  Clinical Specialty Pharmacist, Oncology  12/17/2024  14:16 EST

## 2024-12-17 NOTE — LETTER
December 17, 2024     Christi Mccarthy MD  [address] [address]  [facility] [address]    Patient: Dav Freitas   YOB: 1955   Date of Visit: 12/17/2024     Dear Christi Mccarthy MD:       Thank you for referring Dav Freitas to me for evaluation. Below are the relevant portions of my assessment and plan of care.    If you have questions, please do not hesitate to call me. I look forward to following Dav along with you.         Sincerely,        Jostin Parekh MD        CC: MD Izabella Johnson Michael D., MD  12/17/24 4725  Sign when Signing Visit      REASON FOR FOLLOW UP:  1.  Chronic lymphocytic leukemia with extensive lymphadenopathy and possible pleural involvement. Initiation of Treanda, Rituxan May 1, 2019.  2.  Hypogammaglobulinemia.  Status is post IVIG  3.  Significant response on scans June 27, 2019.  Treatment changed to Imbruvica 420 mg daily.  4.  Metastatic non-small cell lung carcinoma on Keytruda  5.  Patient seen 2/18/2022 with left jaw/gumline swelling pain, associated hematoma?  Osteonecrosis.  Restart of Keytruda 3/4/2022, Xgeva discontinued  6.  Restaging via CT 4/12/2022, continued response, Keytruda continued, referral to dermatology for worsening psoriasis.  7.  Jaw osteonecrosis, undergoing therapy through oral surgery, IVIG anticipated, infectious disease and oral surgery follow-up  8.  Patient assessed 11/18/2022, Keytruda continued, ibrutinib-dose reduced-continued, patient seen by  physicians, status postdebridement of bone of left mandible with bone biopsy, fistulectomy of left mandible 10/20/2022  9.  Patient seen 12/30/2022 continue to improve,?  Urinary tract symptoms that have continued to improve.  10.  Repeat scans 4/12/2023 without evidence of recurrence.  11.  Patient seen 4/17/2023, Keytruda therapy held, urinary symptoms improving?  12.  Patient with good performance status, current reveal negative ctDNA  13.  Patient with ctDNA recheck  at less than 0.5%, reassessment at 3 months, ongoing issues with the patient's wife developing end-stage liver disease  14.  Patient seen 2/13/2024-Guardant Reveal-negative ctDNA, 0%, increasing left femur pain, follow-up plain films, orthopedic referral  15.  Subsequent assessment orthopedics negative, MRI pending  16.  Patient assessed 7/2/2024, 12/17/2024 stable      HISTORY OF PRESENT ILLNESS:    The patient is a 69 y.o. male with the above mentioned history here today for lab review and evaluation prior to cycle 38  Keytruda.  He reports overall he is doing well.  He does continue on antibiotics from Dr. Pak before his osteonecrosis of the jaw.  He does have 1 area of exposed bone on his jaw they are hoping that the gum tissue will grow over this.  He would follow-up with Dr. Champion in June.    Patient also previously been on antibiotics for UTI.  He does report some urgency which is still present.  No fevers or chills.  Denies any issues with increased shortness of breath.    He does report some intermittent pain on the right lateral portion of his chest near the eighth rib.  He states when he moves at times he has a sharp pain that is quick, he describes it as feeling like a strain.  This is only happened occasionally over the past few weeks.    The patient is next seen 3/6/2023 without additional chest discomfort and with lessening UTI symptoms.  We have discussed repeat scans in April 2023.    The patient did proceed to repeat CT chest abdomen pelvis 4/12/2023 with stable findings overall including a small right pleural effusion, right rib metastatic lesion with increase sclerosis, overlying soft tissue thickening without interval change in additional osseous lesions over the interval change.  Patient evaluated 3/27/23 with general improvement though difficulty with dysuria and bladder spasm requiring additional antibiotic therapies.    These findings are discussed with the patient 4/17/23 and he feels  "that his urinary symptoms improved when he stopped his antibiotic therapy used for his osteonecrosis- amoxicillin.  We discussed that his findings including his long-term improvement/remission and, potentially, his urinary symptoms, in part, could be from continued immunotherapy.  This would be an opportunity to now discontinue that therapy and observe him expectantly.    The patient had follow-up assessment by infectious disease 6/8/2023.  Have been off amoxicillin with subsequent resolution urinary symptoms that include dysuria and frequency.    As an alternative method of follow-up the patient underwent a Guardant Reveal that was sent 7/10/2023 negative for ctDNA.  This corresponds to his relatively good performance status when seen 7/31/2023.  This includes resolution of the bony fragment that had been noted in his lower jawline that recently loosened and ejected with normal mucosa at the site    The patient returns for assessment 11/13/2023.  His recent CBC 10/23/2023 was normal with H&H of 13.2 and 44.1, white count of 8030, platelet count 163,000 with a normal differential, CMP with being creatinine 19 and 1.30 and Guardant Reveal with ctDNA at less than 0.05%  Unfortunately his wife has developed cirrhosis and is demonstrating end-stage liver disease.  He is her primary caregiver.    We have discussed how to proceed and a repeat ctDNA assessment with Guardant Reveal be requested in approximately 3 months.    He is next seen 7/2/2024.  His follow-up guardant ctDNA again is negative for ctDNA and 0%.  Fortunately his performance status remains reasonably good except he does \"feel heaviness in his chest\" periodically.    Patient is next reviewed 12/17/2024-guardant reveal 12/3/2024 again with negative ctDNA and 0% tumor fraction, CBC with H&H of 15.2 and 48.0, white count 9020 and platelet count 175,000 with normal differential and BUN/creatinine of 19 and 1.41, normal LFTs.  He continues to feel well except " "for degree of fatigue that is, in part, related to decreased exercise.  Plans were made for 6-month follow-up plans made for 6-month follow-up          Past Medical History, Past Surgical History, Social History, Family History have been reviewed and are without significant changes except as mentioned.    Hematologic/oncologic history:    As concerns his CLL history he initially required Treanda Rituxan 5/1/2019 for 2 cycles and then initiated Imbruvica 420 mg daily initiated 7/25/2019.       As concerns stage IV non-small cell lung carcinoma he returns the office continuing Keytruda which he continues to receive every 3 weeks which was initiated 10/21/2020.  He also receives Xgeva every 6 weeks which was initially given 12/2/2020 and discontinued 1/7/2022 secondary to osteonecrosis.      Additional issues that developed during treatment included right knee pain with plain views of his right knee showing a right knee effusion with medial compartment narrowing and no suspicious bone lesion.  There is benign periosteal calcification along the distal right femur.  A PET/CT 11/11 went on to demonstrate a complete metabolic response to therapy compared to PET/CT from 9/23/2020.  This includes his primary lung neoplasm left upper lobe, large osseous metastatic lesion the right chest wall region from the eighth rib, small left adrenal metastasis, and no evidence of disease involving the distal left femur.      The patient was seen 11/24/2021 continue to do well though indicating that his right knee is \"something I can manage at the moment\".  He prefers not to have orthopedic assessment at this point.  He is concerned, ever, about skin lesions that are developing primarily on his calf regions and into his right thigh.    The patient was seen by Dr. Regan Damon 11/29/2021 and felt to be consistent with psoriasiform dermatitis treated with intralesional therapy.  Was also started on a moisturizer and lipid therapy.  The " patient returned for assessment 12/15/2021 and treated with cycle #21 Keytruda, returned 1/7/2022 for cycle #22 and 1/28/2022 for cycle #23.  At that visit he had developed adenopathy several weeks previous and was treated with a Medrol Dosepak.   The patient was next seen 2/18/2022 and indicates that though his adenopathy has improved after treatment described above that his jaw has become painful swollen and tender with additional bleeding.     The patient was referred to his primary dentist who then had him seen oral surgeon-Dr. Ezequiel Davila-reached at [phone] who felt that this was osteonecrosis and should be treated symptomatically.  As the patient reviewed 3/4/2022, wonderfully, his oral issues have nearly resolved with the gumline returning to essentially its previous state, no swelling, minimal erythema, no discharge and no additional pain.  He does have follow-up with oral surgery in the next several weeks and we discussed restarting his Keytruda scanning him likely in April 2022 in general.   He returns to the office on 3/25/2022 in follow-up in anticipation of cycle 24 Keytruda. He reports the left side of his jaw has improved.    However, he was now having issues with the right lower side. He was seen by neurosurgery with additional films and started on oral amoxicillin by his oral surgeon.      The patient continued his immunotherapy taking Keytruda 3/25/2022 and underwent repeat CT chest abdomen and pelvis 4/12/2022 that is reviewed with him 4/15/2022.  Wonderfully there is no substantial change with a small to moderate right pleural effusion that decreased in size, no additional pulmonary nodule or new metastatic disease, multifocal osteosclerotic metastatic disease without change and no new findings in the abdomen or pelvis.    The patient was seen 4/15/2022 indicating that he is actually feeling well in terms of general symptoms.  This includes lack of progression from mouth pain, ability to eat  without discomfort.  He is, however, having worsening psoriasis particular in his lower extremities and has been reviewed by dermatology previously.    The patient was reviewed 6/17/2022 having been seen by oral surgery with progression of his osteonecrosis and skin eruption.  He is currently undergoing assessment by infectious disease within the next week and we have reviewed that previously he responded to IVIG for in-hospital refractory sinusitis.  As a result he is asked to undergo reassessment for his immunoglobulin levels today and return next week for 400 mg/kg of IVIG infusion which we will continue monthly.    The patient was given IVIG 6/24/2022 and again 7/22/2022.  Assessment 8/5/2022 continue to show a degree of general improvement with oral surgery continue to follow him with a second opinion to be obtained  8/8/2022 when IVIG planned 8/19/2022 and 9/16/2022.    The patient slowly continued both assessments by oral surgery and  Scans considering his non-small cell lung cancer including CT studies 10/4/2022 that were essentially stable compared to previous.  Therapies include Keytruda every 3 weeks, daily Imbruvica, monthly IVIG every 4 weeks.    He next presentedwith the above-mentioned history, who returns the office  in anticipation of his 35 th cycle of Keytruda.  He was last treated with IVIG 11/11/2022.       At this point he had undergone surgery at [facility] including fistulectomy of the left mandible and debridement of the intraoral bone left mandible.  He he had also been seen by infectious disease, Dr. Champion regarding osteonecrosis of the jaw.  He was placed on antibiotics for 6 weeks including Levaquin and fluconazole.          There is an interaction with his fluconazole and ibrutinib and adjustments will be necessary.  [facility] pharmacy is managing this.  He reports he is overall feeling very well.  He has had improvement in his ability to eat and drink since undergoing surgery on  his jaw.  His skin is overall improved with the use of Aquaphor.  He did undergo CT scans prior to cycle 33 Keytruda with stability of his disease.  He continues on current therapy with excellent tolerance.     Patient is next seen 11/18/2022.  Fortunately he feels that he is doing  reasonably well with surgery having been successful, pain virtually absent at this point, no additional shortness of breath or cough, appetite remaining fair, stable weight and sleep at least modestly managed.  Unfortunately his wife recently injured her femur and he has become her primary caregiver at this point.       The patient is next assessed 12/9/2022.  He is due for cycle #36 Keytruda and IVIG today.  He did not realize he was due for IVIG today, and already scheduled another appointment so would like to reschedule IVIG for next week.  He also continues on Imbruvica, currently at a reduced dose of 280 mg daily, due to interaction with Diflucan.  He was seen by Dr. Champion with infectious disease yesterday, 12/8/2022 and econazole was discontinued as patient has completed his 6-week course.  He was started on preventative Augmentin 500 mg twice daily until his gums grow to close over the exposed area of bone.  He is eating and drinking adequately, weight remains stable.  Bowels moving regularly.  Denies fever or chills.  Denies nausea or vomiting.  Denies new or worsening pain.  Denies skin rash or shortness of breath.  No new concerns today.    The patient continued Keytruda therapy including 12/9/2022 and IVIG 12/16/2022.  He is next seen 12/30/2022 for continued Keytruda.  We have discussed that he is clearly improving per his osteonecrosis and his completed his surgical therapy and now on prophylactic antibiotic therapy.  We have reviewed that he could discontinue his IVIG, continue his Imbruvica, Keytruda and prophylactic antibiotic therapy.  He is having urinary symptoms?  And a follow-up UA and culture is pending.    Note  that the patient's urine cultures were negative we proceeded 1/20/2023 with Keytruda, Imbruvica with Keytruda given 2/10/2023 and the patient presented 3/6/2023 for his next treatment.  We do plan repeat CT scans in April 2023.    Repeat scans 4/12/2023-stable CT chest and pelvis with small right pleural effusion, parenchymal scarring right lower lobe, right rib metastatic lesion with increase sclerosis, overlying soft tissue thickening without interval change in osseous lesions additionally without change.    Patient seen 4/17/2023 at which time immunotherapy was held and patient placed on observation status.  This included some concern about whether his urinary symptoms could be related to ongoing immunotherapy.  This would be observed off Keytruda.    The patient returns for assessment 11/13/2023.  His recent CBC 10/23/2023 was normal with H&H of 13.2 and 44.1, white count of 8030, platelet count 163,000 with a normal differential, CMP with being creatinine 19 and 1.30 and Guardant Reveal with ctDNA at less than 0.05%  Unfortunately his wife has developed cirrhosis and is demonstrating end-stage liver disease.  He is her primary caregiver.    We have discussed how to proceed and a repeat ctDNA assessment with Guardant Reveal be requested in approximately 3 months.    Patient next seen 2/13/2024.  He indicates that in the last several weeks to months he has been having increasing pain in his left femur that while not completely debilitating is increasingly uncomfortable.  We have discussed plain films today and reevaluation through orthopedic oncology.    The patient underwent plain films of the femur 2/13/2024 with postoperative changes related to the previous implant with no bone lesions seen and normal soft tissue.  Patient was reviewed by orthopedics undergoing MRI of the femur now schedule 3/14/2024.  The patient is seen 3/12/2024 indicating that his pain and is markedly improved.  Wonderfully appears to be  "in continued remission from his malignancies at this point.    PA lateral chest x-ray 7/2/2024 with subsegmental atelectasis or scarring, small right pleural effusion.    Patient is next reviewed 12/17/2024-guardant reveal 12/3/2024 again with negative ctDNA and 0% tumor fraction, CBC with H&H of 15.2 and 48.0, white count 9020 and platelet count 175,000 with normal differential and BUN/creatinine of 19 and 1.41, normal LFTs.  Plans made for 6-month follow-up    Objective     Vitals:    12/17/24 1343   BP: 168/92   Pulse: 87   Resp: 16   Temp: 98.5 °F (36.9 °C)   TempSrc: Oral   SpO2: 95%   Weight: 96.3 kg (212 lb 3.2 oz)   Height: 180 cm (70.87\")   PainSc: 0-No pain                   12/17/2024     1:44 PM   Current Status   ECOG score 0     Physical Exam  Vitals and nursing note reviewed.   Constitutional:       Appearance: Normal appearance. He is well-developed.   HENT:      Head: Normocephalic and atraumatic.      Nose: Nose normal.      Mouth/Throat:      Comments: No further exposed bone left lower jawline.  Eyes:      Pupils: Pupils are equal, round, and reactive to light.   Cardiovascular:      Rate and Rhythm: Normal rate and regular rhythm.      Heart sounds: Normal heart sounds.   Pulmonary:      Effort: Pulmonary effort is normal. No respiratory distress.      Breath sounds: Normal breath sounds. No wheezing, rhonchi or rales.   Abdominal:      General: Bowel sounds are normal. There is no distension.      Palpations: Abdomen is soft.      Tenderness: There is no abdominal tenderness.   Musculoskeletal:         General: Normal range of motion.      Cervical back: Normal range of motion and neck supple.   Skin:     General: Skin is warm and dry.   Neurological:      Mental Status: He is alert and oriented to person, place, and time.   Psychiatric:         Behavior: Behavior normal.       I have reexamined the patient and the results are consistent with the previously documented exam. Jostin Parekh, " MD      RECENT LABS:  WBC   Date Value Ref Range Status   12/03/2024 9.02 3.40 - 10.80 10*3/mm3 Final     RBC   Date Value Ref Range Status   12/03/2024 5.00 4.14 - 5.80 10*6/mm3 Final     Hemoglobin   Date Value Ref Range Status   12/03/2024 15.2 13.0 - 17.7 g/dL Final     Hematocrit   Date Value Ref Range Status   12/03/2024 48.0 37.5 - 51.0 % Final     MCV   Date Value Ref Range Status   12/03/2024 96.0 79.0 - 97.0 fL Final     MCH   Date Value Ref Range Status   12/03/2024 30.4 26.6 - 33.0 pg Final     MCHC   Date Value Ref Range Status   12/03/2024 31.7 31.5 - 35.7 g/dL Final     RDW   Date Value Ref Range Status   12/03/2024 13.2 12.3 - 15.4 % Final     RDW-SD   Date Value Ref Range Status   12/03/2024 46.4 37.0 - 54.0 fl Final     MPV   Date Value Ref Range Status   12/03/2024 12.7 (H) 6.0 - 12.0 fL Final     Platelets   Date Value Ref Range Status   12/03/2024 175 140 - 450 10*3/mm3 Final     Neutrophil %   Date Value Ref Range Status   12/03/2024 71.4 42.7 - 76.0 % Final     Lymphocyte %   Date Value Ref Range Status   12/03/2024 14.7 (L) 19.6 - 45.3 % Final     Monocyte %   Date Value Ref Range Status   12/03/2024 10.4 5.0 - 12.0 % Final     Eosinophil %   Date Value Ref Range Status   12/03/2024 1.8 0.3 - 6.2 % Final     Basophil %   Date Value Ref Range Status   12/03/2024 0.8 0.0 - 1.5 % Final     Immature Grans %   Date Value Ref Range Status   12/03/2024 0.9 (H) 0.0 - 0.5 % Final     Neutrophils, Absolute   Date Value Ref Range Status   12/03/2024 6.44 1.70 - 7.00 10*3/mm3 Final     Lymphocytes, Absolute   Date Value Ref Range Status   12/03/2024 1.33 0.70 - 3.10 10*3/mm3 Final     Monocytes, Absolute   Date Value Ref Range Status   12/03/2024 0.94 (H) 0.10 - 0.90 10*3/mm3 Final     Eosinophils, Absolute   Date Value Ref Range Status   12/03/2024 0.16 0.00 - 0.40 10*3/mm3 Final     Basophils, Absolute   Date Value Ref Range Status   12/03/2024 0.07 0.00 - 0.20 10*3/mm3 Final     Immature Grans,  Absolute   Date Value Ref Range Status   12/03/2024 0.08 (H) 0.00 - 0.05 10*3/mm3 Final     nRBC   Date Value Ref Range Status   12/03/2024 0.0 0.0 - 0.2 /100 WBC Final       FISH prognostic panel-Positive for deletion of 13 q. 14.3    Assessment & Plan  1.  CLL presenting with significant lymphocytosis, lymphadenopathy and splenomegaly.     Positive for deletion of 13 q. 14.3.    Patient status post 2 cycles of Treanda Rituxan with excellent response.    Imaging 6/27/2019 with significant decrease in adenopathy.  Treanda Rituxan discontinued   Imbruvica 420 mg daily initiated 7/25/2019.  He continues on Imbruvica, without indication of progression of his CLL, without progressive lymphadenopathy on CT scans.  Patient has resolving adenopathy particularly cervical regions 2/18/2022  3/25/2022 patient is without worsening adenopathy on exam today.  Continue Imbruvica.  10/28/2022 continues on Imbruvica 420 mg daily. Denies B-symptoms, no evidence of worsening adenopathy on exam.  CT scans without worsening adenopathy  11/18/2022: Imbruvica reduced to 280 mg daily while patient receiving 6-week course of antimicrobials due to interaction with fluconazole.  12/9/2022: He has finished his course of fluconazole, taking his last dose today.  He has a few days left of Imbruvica 280 mg, which he will complete.  He will then increase Imbruvica back to regular dose of 420 mg daily.  Patient stable/17/23, 4/17/2023, 7/13/2023, 11/13/2023  Repeat assessment 2/13/2024 with H&H of 14.8 46.0, white count of 8020, platelet count 179,000 with normal automated differential  Stable 3/12/2024, 7/2/2024, 12/17/2024    2.  Pulmonary nodules/infiltrates.  He is  status post bronchoscopy.  Is unclear at this time whether these findings are infectious or possibly related to his lymphoproliferative disorder.  This is seen to be improving on latest scans- 4/12/2023  Follow-up chest x-ray 7/2/2024                                                                                                                                                 3.  Hypogammaglobulinemia-status post IVIG with resolution of sinusitis.   Anticipate trial of IVIG with the patient now having progression of his osteonecrosis and dermal infection.  Continues with monthly IVIG.  Due today, however did not realize he was due for IVIG and would like to reschedule for next week.  Reviewed 12/30/2022 with IVIG discontinued.    4.  Non-small cell lung carcinoma  August 26 2020 revealing a new 1.6 cm spiculated nodule within the left upper lobe, 1.2 cm left adrenal nodule, bone windows with a soft tissue mass involving the right eighth rib measuring 3.7 x 2.6 cm.    Biopsy was consistent with adenocarcinoma CK 7+, CK 20-, lung primary suspected clinically, molecular panel not yet obtained.  PET/CT 9/23/2020 demonstrating asymmetric uptake within the right parotid gland which is unremarkable appearance on noncontrasted CT, SUV of 6 compared to 2.7.  There is no hilar, mediastinal or axillary adenopathy, findings of asymmetric moderate to intense FDG uptake within superior aspect the right chest wall anterior to the right first rib with an irregular hypodense lesion measuring 1.8 x 1.6 and SUV 4.9.  This had not been seen June 27, 2019.  There is an intensely FDG avid nodule within the lingula measuring 1.9 x 1.5 history of 12.4, FDG avid left adrenal nodule 1.9 x 1.5 with an issue 21.2.    Evaluated by radiation oncology therapy September 29 and started on radiation therapy to the right chest wall-35 Gy in 10 fractions.    Plans were also then proceed with SBRT to the solitary left upper lobe lesion pending insurance approval.  Further testing including TPS of 20% and foundation CDX with a TMB of greater than 10 mutations per megabase (28 mutations per megabase) and the indication that several immunotherapies could be useful in this patient's care.  Additional genomic findings include BRAF  [id number]/that trametinib could be useful and STK11/that everolimus could be useful.  Keytruda initiated 10/21/2021   Reassessment after 2 cycles of Keytruda with enlargement of the right eighth rib lesion,enlargement of spiculated left upper lobe lung mass, moderate to large right-sided pleural effusion, new left adrenal mass and enlarged retrocrural lymph node.?Pseudoprogression  Xgeva last given 12/30/2020  Follow-up scans 1/6/2021 demonstrated marked improvement in his right eighth rib lesion, resolution of left upper lobe spiculated mass, residual large right pleural effusion, resolution of left adrenal metastasis.   Right pleural effusion, with thoracentesis 1/14/2021 with 1500 mils removed.  Pathology consistent with malignant effusion   CT scans 4/5/2021 with response noted in the left upper lobe density.  He continues to receive Keytruda every 3 weeks along with Xgeva every 6 weeks.  Repeat scan 7/15/2021 without evidence of recurrence or progressive disease.  Plans to continue Keytruda every 3 weeks with repeat scans in 4 to 6 months  Patient status post PET/CT 11/11/2021 with hyper response, approximate remission status, plan to proceed with cycle #20 Keytruda  Patient seen 2/18/2022 with Keytruda held while his oral issues are addressed-concern for osteonecrosis of the jaw.  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  3/25/2022 proceed with cycle #24 Keytruda today.  Follow-up CT scans 4/12/2022 without evidence of progressive disease, stable findings including osteosclerotic metastatic disease.  Keytruda continued  Repeat imaging 10/4/2022 with overall stable disease, Keytruda continued  12/9/2022: Proceed with cycle #36 Keytruda today.  Plan repeat imaging April 2023.  3/6/2023 continued with 41st cycle of Keytruda  Repeat scans 4/12/23 without evidence of progressive  disease.  Discussion as to the discontinuance of immunotherapy.  Patient seen 4/17/2023 with Keytruda held, follow-up Guardant Reveal planned.  Subcu Guardant Reveal 7/13/2023 negative ctDNA, repeat planned in 12 weeks.  The patient returns for assessment 11/13/2023.  His recent CBC 10/23/2023 was normal with H&H of 13.2 and 44.1, white count of 8030, platelet count 163,000 with a normal differential, CMP with being creatinine 19 and 1.30 and Guardant Reveal with ctDNA at less than 0.05%  Unfortunately his wife has developed cirrhosis and is demonstrating end-stage liver disease.  He is her primary caregiver.  We have discussed how to proceed and a repeat ctDNA assessment with Guardant Reveal be requested in approximately 3 months.  Repeat Guardant Reveal 1/15/2024 with negative ctDNA, 0%  Reviewed findings 3/12/2024 with plans for repeat cardiac revealed an approximately 4 months.  7/2/2024, Guardant Reveal-negative ctDNA, 0% fraction  Patient is next reviewed 12/17/2024-guardant reveal 12/3/2024 again with negative ctDNA and 0% tumor fraction, CBC with H&H of 15.2 and 48.0, white count 9020 and platelet count 175,000 with normal differential and BUN/creatinine of 19 and 1.41, normal LFTs.        5.  Left knee metastasis  Evaluated by orthopedic oncology, Dr. Eliu Ochoa  Admission 1/7/2021 undergoing stabilization of left femoral bone lesion, prophylactic stabilization with intramedullary implants.  It was suggested we delay Xgeva or Zometa to allow bone healing  Completed radiation with 10 fractions 2/10/2021  Xgeva resumed 4/29/2021, he continues to receive this every 6 weeks.    Additional assessment 2/18/2022 with left jaw pain, subsequent diagnosis of osteoporosis, Xgeva discontinued  Patient continues follow-up with oral surgery, progression of osteoporosis symptomatically, dermal eruption left side of mandibular region, IVIG anticipated  Xgeva DISCONTINUED due to osteonecrosis of the jaw  Increasing pain  left mid femur 2/13/2024 plain films planned, referral to orthopedics  Subsequent testing ongoing when seen 3/12/2024 with MRI performed 3/14/2024 without clear new abnormalities     6.  Malignant right pleural effusion  Ultrasound thoracentesis 1/14/2021 1500 mL removed  Chest x-ray 2/25/2021 with moderate right pleural effusion  Progressive symptoms with worsening pain 4/5/2021  Ultrasound-guided thoracentesis 4/13/2021 with 2050 ml removed.  Plans for Pleurx catheter with thoracic surgery, however, pleural effusion has not recurred.  Patient seen 11/4/2021 with chest x-ray planned.  Subsequent resolution noted on CT scan.  Improvement in bilateral pleural effusions on most recent CT scan  Additional assessment 4/12/23 with stable small right pleural effusion  Follow-up chest x-ray 7/2/2024 without change    7.  Cancer related pain  Pain is most prominent in his right rib, utilizing MS Contin 30 mg 3 times a day  Hydrocodone/acetaminophen 10/325 as needed for breakthrough pain.  Currently taking 3 to 4 tablets daily  He is not currently in need of a refill of pain medications  Discussed MS Contin at 30 mg p.o. every 8 hours, Norco 10/325 2 p.o. every 6 hours  He is currently using pain medication as needed for jaw pain.  Requiring approximately 1 MS Contin and 1 Norco 10/325 daily, however some days not requiring any pain medication.  Reports his pain is well controlled, not currently requiring pain medication.  As of 3/6/2023, 7/2/2024, 12/17/2024 patient continues to report no need for pain medication at this time.    8.  Insomnia  Continuing to follow with behavioral oncology  Continuing Remeron 7.5 mg nightly with fair control.  The patient  when assessed 12/30/2022.  1/20/2023 continues on Remeron 7.5 mg nightly which will be refilled     9. Health maintenance  Status post COVID-19 booster, SARS antibody pending today  The patient is due for Shingrix and Prevnar which will both be administered in the clinic  9/23/2021    10.  Skin lesions  Uncertain of etiology, unexpected findings for usual skin lesions associated with immunotherapy  Request dermatologic assessment-psoriasiform dermatitis.  Seen by Dr. Damon though no follow-up due to personal preference  The lesions on the lower extremities particularly are quite extensive.  At present he feels that they are stable enough not to require additional therapy.  These are also considerably improved 3/12/2024    11.  Right knee pain  Osteoarthritis, orthopedic assessment upon patient's request.   Resolved.  Denies any knee pain today     12.  Osteonecrosis of the jaw  Xgeva was discontinued.  Patient went to second opinion with  with oral surgery.  He was reffered to Dr. Escalante with , with whom he had consult on 8/23/2022.  Dr. Escalante recommened an outpatient procedure that would allow him to biopsy the affected area of his jawbone and identify which bacteria was present, they would then coordinate with ID to identify best antibiotic option.  Patient is agreeable to proceeding with this plan, and is awaiting surgery date.  The patient underwent surgery 10/7/2022  Follow-up with Dr. Champion, infectious disease 10/27/2022 with recommendations for 6 weeks of Levaquin and fluconazole.  This should complete by approximately mid December 2023.  Was seen by Dr. Champion 12/8/2022 with plans to complete fluconazole.  Decision made to start Augmentin 500 mg twice daily for prevention.  Patient currently being followed by Dr. Champion with reassessment planned in June 2023  Resolved status post oral surgery assessment, infectious disease  Gradual recovery-resolved by 12/17/2024        Plan:   Again, hold any further immunotherapy-Keytruda  Continue Imbruvica 420 mg daily.  Continue Remeron 15 mg p.o. nightly.  22 weeks-Guardant Reveal-lung-CBC, CMP  24 weeks MD  Every 6 week port flush           Patient is on a high risk medication requiring close monitoring for  toxicity.      Jostin Parekh MD   12/17/2024

## 2024-12-18 ENCOUNTER — SPECIALTY PHARMACY (OUTPATIENT)
Dept: PHARMACY | Facility: HOSPITAL | Age: 69
End: 2024-12-18
Payer: MEDICARE

## 2024-12-18 NOTE — PROGRESS NOTES
Specialty Pharmacy Note: Imbruvica (ibrutinib)    Dav Freitas is a 69 y.o. male with CLL was seen 12/17/24 by Dr. Parekh. Per provider dictation, no changes to oral oncology regimen  Imbruvica 420 mg daily .  Labs Review: The CMP and CBC from 12/3/24 have been reviewed. No dose adjustments are needed for the oral specialty medication(s) based on the labs.    Specialty pharmacy will continue to follow patient.    Martin Ram, Viviana, [facility]  Clinical Oncology Pharmacist    12/18/2024  11:33 EST

## 2024-12-27 RX ORDER — IBRUTINIB 420 MG/1
TABLET, FILM COATED ORAL
Qty: 28 TABLET | Refills: 5 | Status: SHIPPED | OUTPATIENT
Start: 2024-12-27

## 2024-12-27 NOTE — TELEPHONE ENCOUNTER
Specialty Pharmacy Patient Management Program  Per Protocol Prescription Order or Refill       Requested Prescriptions     Signed Prescriptions Disp Refills    ibrutinib (Imbruvica) 420 MG tablet tablet 28 tablet 5     Sig: TAKE 1 TABLET BY MOUTH ONCE  DAILY WITH A FULL GLASS OF WATER     Authorizing Provider: CHRISTELLE JAVED     Ordering User: SANYA EWING     Prescription orders above were sent to [facility] per Collaborative Care Agreement Protocol.     Completed independent double check on medication order/RX.    Neelam Rachel Rph, BCOP  Clinical Specialty Pharmacist, Oncology  12/27/2024  12:52 EST

## 2024-12-27 NOTE — TELEPHONE ENCOUNTER
Specialty Pharmacy Patient Management Program  Per Protocol Prescription Order or Refill     Patient will be filling or currently fills medications at [facility] and is enrolled in the Patient Management Program.    Requested Prescriptions     Pending Prescriptions Disp Refills    ibrutinib (Imbruvica) 420 MG tablet tablet [Pharmacy Med Name: IMBRUVICA TAB 420MG] 28 tablet 5     Sig: TAKE 1 TABLET BY MOUTH ONCE  DAILY WITH A FULL GLASS OF WATER     Prescription orders above were sent to the pharmacy per Collaborative Care Agreement Protocol.     Last Office Visit: 12/17/24  Next Office Visit: 6/3/25    Martin Ram, Viviana, BCOP  Clinical Specialty Pharmacist, Oncology  12/27/2024  12:45 EST

## 2025-01-10 ENCOUNTER — SPECIALTY PHARMACY (OUTPATIENT)
Dept: PHARMACY | Facility: HOSPITAL | Age: 70
End: 2025-01-10
Payer: MEDICARE

## 2025-01-10 NOTE — PROGRESS NOTES
Task due today to update [employer]License for assistance in 2025. Pt has an active tobias through [facility] with a balance of $3800 until 8/27/2025 for his Imbruvica.    I have provided the information to Lyla Cherry, Rep at [facility]    ID-[id number]  [facility]-[id number]  [id number]  [facility]   START DATE: 8/28/2024  END DATE: 8/27/2025    Janelle Irvin, Pharmacy Technician  01/10/25  11:40 EST

## 2025-01-14 ENCOUNTER — INFUSION (OUTPATIENT)
Dept: ONCOLOGY | Facility: HOSPITAL | Age: 70
End: 2025-01-14
Payer: MEDICARE

## 2025-01-14 DIAGNOSIS — C91.10 CLL (CHRONIC LYMPHOCYTIC LEUKEMIA): ICD-10-CM

## 2025-01-14 DIAGNOSIS — Z45.2 FITTING AND ADJUSTMENT OF VASCULAR CATHETER: Primary | ICD-10-CM

## 2025-01-14 PROCEDURE — 25010000002 HEPARIN LOCK FLUSH PER 10 UNITS: Performed by: NURSE PRACTITIONER

## 2025-01-14 PROCEDURE — 96523 IRRIG DRUG DELIVERY DEVICE: CPT

## 2025-01-14 RX ORDER — HEPARIN SODIUM (PORCINE) LOCK FLUSH IV SOLN 100 UNIT/ML 100 UNIT/ML
500 SOLUTION INTRAVENOUS AS NEEDED
Status: DISCONTINUED | OUTPATIENT
Start: 2025-01-14 | End: 2025-01-14 | Stop reason: HOSPADM

## 2025-01-14 RX ORDER — SODIUM CHLORIDE 0.9 % (FLUSH) 0.9 %
10 SYRINGE (ML) INJECTION AS NEEDED
Status: DISCONTINUED | OUTPATIENT
Start: 2025-01-14 | End: 2025-01-14 | Stop reason: HOSPADM

## 2025-01-14 RX ADMIN — Medication 10 ML: at 13:22

## 2025-01-14 RX ADMIN — Medication 500 UNITS: at 13:22

## 2025-02-25 ENCOUNTER — INFUSION (OUTPATIENT)
Dept: ONCOLOGY | Facility: HOSPITAL | Age: 70
End: 2025-02-25
Payer: MEDICARE

## 2025-02-25 DIAGNOSIS — C91.10 CLL (CHRONIC LYMPHOCYTIC LEUKEMIA): ICD-10-CM

## 2025-02-25 DIAGNOSIS — Z45.2 FITTING AND ADJUSTMENT OF VASCULAR CATHETER: Primary | ICD-10-CM

## 2025-02-25 PROCEDURE — 96523 IRRIG DRUG DELIVERY DEVICE: CPT

## 2025-02-25 PROCEDURE — 25010000002 HEPARIN LOCK FLUSH PER 10 UNITS: Performed by: NURSE PRACTITIONER

## 2025-02-25 RX ORDER — HEPARIN SODIUM (PORCINE) LOCK FLUSH IV SOLN 100 UNIT/ML 100 UNIT/ML
500 SOLUTION INTRAVENOUS AS NEEDED
Status: DISCONTINUED | OUTPATIENT
Start: 2025-02-25 | End: 2025-02-25 | Stop reason: HOSPADM

## 2025-02-25 RX ORDER — SODIUM CHLORIDE 0.9 % (FLUSH) 0.9 %
10 SYRINGE (ML) INJECTION AS NEEDED
Status: DISCONTINUED | OUTPATIENT
Start: 2025-02-25 | End: 2025-02-25 | Stop reason: HOSPADM

## 2025-02-25 RX ADMIN — Medication 500 UNITS: at 13:17

## 2025-02-25 RX ADMIN — Medication 10 ML: at 13:17

## 2025-02-27 ENCOUNTER — SPECIALTY PHARMACY (OUTPATIENT)
Dept: PHARMACY | Facility: HOSPITAL | Age: 70
End: 2025-02-27
Payer: MEDICARE

## 2025-03-03 NOTE — PROGRESS NOTES
MTM Encounter-Re: Adherence and side effects (Imbruvica)    Today's encounter was conducted in person, face-to-face.     Medication:  Imbruvica 420 mg by mouth daily  - Reason for outreach: Routine medication check-in .  - Administration: Patient is taking every day at the same time  and as prescribed .  - Missed doses: Patient reports missing 2 doses in the last 30 days. and Counseled on missed dose management. This is not typical for patient and he states not planning to miss any moving forward.   - Self-administration: Patient demonstrates ability to self-administer medication. No barriers to adherence identified.   - Diagnosis/Indication: CLL. Progress toward achieving therapeutic goals reviewed.   - Patient denies side effects.    - Medication availability/affordability: Patient has had no issues obtaining medication from pharmacy.   - Questions/concerns about medications: pt has osteonecrosis of the jaw after xgeva use; he states he has had 2-3 rounds of amoxicillin for this and takes ibuprofen 600 mg up to 2-3 times per day for pain plus ice packs. He states when this pain is occurring, it is not helped by opioid therapy, just NSAID. Wants to know if there is stronger NSAID he can take to help with the pain and advised pt I would make APRN aware to d/w them.        Completed medication reconciliation today to assess for drug interactions.   Reviewed allergies, medical history, labs, quality of life, and medication history with patient.   Patient has had the following changes to medication list: he has stopped symbicort; patient's chart has been updated to reflect changes. Assessed medication list for interactions, no significant drug interactions noted.   Advised pt to call the clinic if any new medications are started so we can assess for drug-drug interactions.     All questions addressed. Patient had no additional concerns for MTM office.     Dana Da Silva, Pharmacist  5/6/2022  12:15 EDT    SPOKT TO PT ABOUT DENIAL ADVISED PT TO APPLY FOR SFS PT AGREED AND WILL F/U -YJ

## 2025-04-07 RX ORDER — MIRTAZAPINE 15 MG/1
TABLET, FILM COATED ORAL
Qty: 45 TABLET | Refills: 1 | Status: SHIPPED | OUTPATIENT
Start: 2025-04-07

## 2025-04-08 ENCOUNTER — INFUSION (OUTPATIENT)
Dept: ONCOLOGY | Facility: HOSPITAL | Age: 70
End: 2025-04-08
Payer: MEDICARE

## 2025-04-08 DIAGNOSIS — C91.10 CLL (CHRONIC LYMPHOCYTIC LEUKEMIA): ICD-10-CM

## 2025-04-08 DIAGNOSIS — Z45.2 FITTING AND ADJUSTMENT OF VASCULAR CATHETER: Primary | ICD-10-CM

## 2025-04-08 PROCEDURE — 96523 IRRIG DRUG DELIVERY DEVICE: CPT

## 2025-04-08 PROCEDURE — 25010000002 HEPARIN LOCK FLUSH PER 10 UNITS: Performed by: INTERNAL MEDICINE

## 2025-04-08 RX ORDER — SODIUM CHLORIDE 0.9 % (FLUSH) 0.9 %
10 SYRINGE (ML) INJECTION AS NEEDED
Status: DISCONTINUED | OUTPATIENT
Start: 2025-04-08 | End: 2025-04-08 | Stop reason: HOSPADM

## 2025-04-08 RX ORDER — HEPARIN SODIUM (PORCINE) LOCK FLUSH IV SOLN 100 UNIT/ML 100 UNIT/ML
500 SOLUTION INTRAVENOUS AS NEEDED
Status: DISCONTINUED | OUTPATIENT
Start: 2025-04-08 | End: 2025-04-08 | Stop reason: HOSPADM

## 2025-04-08 RX ADMIN — Medication 500 UNITS: at 13:34

## 2025-04-08 RX ADMIN — Medication 10 ML: at 13:34

## 2025-05-02 ENCOUNTER — TELEPHONE (OUTPATIENT)
Dept: ONCOLOGY | Facility: CLINIC | Age: 70
End: 2025-05-02
Payer: MEDICARE

## 2025-05-02 DIAGNOSIS — C91.10 CLL (CHRONIC LYMPHOCYTIC LEUKEMIA): Primary | ICD-10-CM

## 2025-05-02 NOTE — TELEPHONE ENCOUNTER
Returned call to pt. He states that since Wednesday, he has developed swelling on both sides of his neck, the R side begin slightly worse. He states he does have a productive cough but no other symptoms. Denies fevers. Denies pain. Denies SOA. Would like to have someone evaluate. D/W Isabel Gomez NP. Per Isabel, pt can come in one day next week and be evaluated, CBC, CMP to be drawn. Informed pt and he v/u.

## 2025-05-02 NOTE — TELEPHONE ENCOUNTER
"  Caller: Dav Freitas \"HERMINIA\"    Relationship: Self    Best call back number: [phone]    Who are you requesting to speak with (clinical staff, provider,  specific staff member): CLINICAL    What was the call regarding: PT WANTING TO SCHEDULE AN APPT WITH THE NP, HE HAS SWELLING ON EITHER SIDE OF HIS NECK SINCE WEDNESDAY AND IS CONCERNED, WOULD LIKE TO BE LOOKED AT.  PT IS AVAILABLE ANY DAY/TIME    "

## 2025-05-05 ENCOUNTER — APPOINTMENT (OUTPATIENT)
Dept: CT IMAGING | Facility: HOSPITAL | Age: 70
End: 2025-05-05
Payer: MEDICARE

## 2025-05-05 ENCOUNTER — OFFICE VISIT (OUTPATIENT)
Dept: ONCOLOGY | Facility: CLINIC | Age: 70
End: 2025-05-05
Payer: MEDICARE

## 2025-05-05 ENCOUNTER — HOSPITAL ENCOUNTER (OUTPATIENT)
Dept: CT IMAGING | Facility: HOSPITAL | Age: 70
Discharge: HOME | End: 2025-05-05
Admitting: NURSE PRACTITIONER
Payer: MEDICARE

## 2025-05-05 ENCOUNTER — INFUSION (OUTPATIENT)
Dept: ONCOLOGY | Facility: HOSPITAL | Age: 70
End: 2025-05-05
Payer: MEDICARE

## 2025-05-05 VITALS
BODY MASS INDEX: 30.04 KG/M2 | HEIGHT: 71 IN | RESPIRATION RATE: 17 BRPM | OXYGEN SATURATION: 96 % | SYSTOLIC BLOOD PRESSURE: 144 MMHG | TEMPERATURE: 97.8 F | DIASTOLIC BLOOD PRESSURE: 84 MMHG | HEART RATE: 83 BPM | WEIGHT: 214.6 LBS

## 2025-05-05 DIAGNOSIS — C34.12 MALIGNANT NEOPLASM OF UPPER LOBE OF LEFT LUNG: ICD-10-CM

## 2025-05-05 DIAGNOSIS — C91.10 CLL (CHRONIC LYMPHOCYTIC LEUKEMIA): Primary | ICD-10-CM

## 2025-05-05 DIAGNOSIS — Z45.2 FITTING AND ADJUSTMENT OF VASCULAR CATHETER: Primary | ICD-10-CM

## 2025-05-05 DIAGNOSIS — R22.1 LOCALIZED SWELLING, MASS AND LUMP, NECK: ICD-10-CM

## 2025-05-05 DIAGNOSIS — C91.10 CLL (CHRONIC LYMPHOCYTIC LEUKEMIA): ICD-10-CM

## 2025-05-05 LAB
ALBUMIN SERPL-MCNC: 4.3 G/DL (ref 3.5–5.2)
ALBUMIN/GLOB SERPL: 2 G/DL
ALP SERPL-CCNC: 105 U/L (ref 39–117)
ALT SERPL W P-5'-P-CCNC: 20 U/L (ref 1–41)
ANION GAP SERPL CALCULATED.3IONS-SCNC: 10.1 MMOL/L (ref 5–15)
AST SERPL-CCNC: 14 U/L (ref 1–40)
BASOPHILS # BLD AUTO: 0.08 10*3/MM3 (ref 0–0.2)
BASOPHILS NFR BLD AUTO: 0.9 % (ref 0–1.5)
BILIRUB SERPL-MCNC: 0.4 MG/DL (ref 0–1.2)
BUN SERPL-MCNC: 24 MG/DL (ref 8–23)
BUN/CREAT SERPL: 17.1 (ref 7–25)
CALCIUM SPEC-SCNC: 9.4 MG/DL (ref 8.6–10.5)
CHLORIDE SERPL-SCNC: 103 MMOL/L (ref 98–107)
CO2 SERPL-SCNC: 28.9 MMOL/L (ref 22–29)
CREAT SERPL-MCNC: 1.4 MG/DL (ref 0.76–1.27)
DEPRECATED RDW RBC AUTO: 45.4 FL (ref 37–54)
EGFRCR SERPLBLD CKD-EPI 2021: 54.1 ML/MIN/1.73
EOSINOPHIL # BLD AUTO: 0.17 10*3/MM3 (ref 0–0.4)
EOSINOPHIL NFR BLD AUTO: 1.8 % (ref 0.3–6.2)
ERYTHROCYTE [DISTWIDTH] IN BLOOD BY AUTOMATED COUNT: 13 % (ref 12.3–15.4)
GLOBULIN UR ELPH-MCNC: 2.1 GM/DL
GLUCOSE SERPL-MCNC: 94 MG/DL (ref 65–99)
HCT VFR BLD AUTO: 49.5 % (ref 37.5–51)
HGB BLD-MCNC: 15.9 G/DL (ref 13–17.7)
IMM GRANULOCYTES # BLD AUTO: 0.09 10*3/MM3 (ref 0–0.05)
IMM GRANULOCYTES NFR BLD AUTO: 1 % (ref 0–0.5)
LDH SERPL-CCNC: 154 U/L (ref 135–225)
LYMPHOCYTES # BLD AUTO: 1.21 10*3/MM3 (ref 0.7–3.1)
LYMPHOCYTES NFR BLD AUTO: 13 % (ref 19.6–45.3)
MCH RBC QN AUTO: 30.5 PG (ref 26.6–33)
MCHC RBC AUTO-ENTMCNC: 32.1 G/DL (ref 31.5–35.7)
MCV RBC AUTO: 95 FL (ref 79–97)
MONOCYTES # BLD AUTO: 0.94 10*3/MM3 (ref 0.1–0.9)
MONOCYTES NFR BLD AUTO: 10.1 % (ref 5–12)
NEUTROPHILS NFR BLD AUTO: 6.79 10*3/MM3 (ref 1.7–7)
NEUTROPHILS NFR BLD AUTO: 73.2 % (ref 42.7–76)
NRBC BLD AUTO-RTO: 0 /100 WBC (ref 0–0.2)
PLATELET # BLD AUTO: 181 10*3/MM3 (ref 140–450)
PMV BLD AUTO: 12.3 FL (ref 6–12)
POTASSIUM SERPL-SCNC: 4.6 MMOL/L (ref 3.5–5.2)
PROT SERPL-MCNC: 6.4 G/DL (ref 6–8.5)
RBC # BLD AUTO: 5.21 10*6/MM3 (ref 4.14–5.8)
SODIUM SERPL-SCNC: 142 MMOL/L (ref 136–145)
WBC NRBC COR # BLD AUTO: 9.28 10*3/MM3 (ref 3.4–10.8)

## 2025-05-05 PROCEDURE — 71260 CT THORAX DX C+: CPT

## 2025-05-05 PROCEDURE — 83615 LACTATE (LD) (LDH) ENZYME: CPT | Performed by: INTERNAL MEDICINE

## 2025-05-05 PROCEDURE — 80053 COMPREHEN METABOLIC PANEL: CPT | Performed by: INTERNAL MEDICINE

## 2025-05-05 PROCEDURE — 99215 OFFICE O/P EST HI 40 MIN: CPT | Performed by: NURSE PRACTITIONER

## 2025-05-05 PROCEDURE — 74177 CT ABD & PELVIS W/CONTRAST: CPT

## 2025-05-05 PROCEDURE — 25010000002 HEPARIN LOCK FLUSH PER 10 UNITS: Performed by: NURSE PRACTITIONER

## 2025-05-05 PROCEDURE — 85025 COMPLETE CBC W/AUTO DIFF WBC: CPT | Performed by: INTERNAL MEDICINE

## 2025-05-05 PROCEDURE — 96523 IRRIG DRUG DELIVERY DEVICE: CPT

## 2025-05-05 PROCEDURE — 25510000001 IOPAMIDOL 61 % SOLUTION: Performed by: NURSE PRACTITIONER

## 2025-05-05 PROCEDURE — G2211 COMPLEX E/M VISIT ADD ON: HCPCS | Performed by: NURSE PRACTITIONER

## 2025-05-05 PROCEDURE — 1126F AMNT PAIN NOTED NONE PRSNT: CPT | Performed by: NURSE PRACTITIONER

## 2025-05-05 RX ORDER — SODIUM CHLORIDE 0.9 % (FLUSH) 0.9 %
10 SYRINGE (ML) INJECTION AS NEEDED
OUTPATIENT
Start: 2025-05-05

## 2025-05-05 RX ORDER — IOPAMIDOL 612 MG/ML
100 INJECTION, SOLUTION INTRAVASCULAR
Status: COMPLETED | OUTPATIENT
Start: 2025-05-05 | End: 2025-05-05

## 2025-05-05 RX ORDER — HEPARIN SODIUM (PORCINE) LOCK FLUSH IV SOLN 100 UNIT/ML 100 UNIT/ML
500 SOLUTION INTRAVENOUS AS NEEDED
Status: DISCONTINUED | OUTPATIENT
Start: 2025-05-05 | End: 2025-05-05 | Stop reason: HOSPADM

## 2025-05-05 RX ORDER — SODIUM CHLORIDE 0.9 % (FLUSH) 0.9 %
10 SYRINGE (ML) INJECTION AS NEEDED
Status: DISCONTINUED | OUTPATIENT
Start: 2025-05-05 | End: 2025-05-05 | Stop reason: HOSPADM

## 2025-05-05 RX ORDER — HEPARIN SODIUM (PORCINE) LOCK FLUSH IV SOLN 100 UNIT/ML 100 UNIT/ML
500 SOLUTION INTRAVENOUS AS NEEDED
OUTPATIENT
Start: 2025-05-05

## 2025-05-05 RX ADMIN — Medication 500 UNITS: at 09:37

## 2025-05-05 RX ADMIN — IOPAMIDOL 100 ML: 612 INJECTION, SOLUTION INTRAVENOUS at 14:23

## 2025-05-05 RX ADMIN — Medication 10 ML: at 09:37

## 2025-05-05 NOTE — PROGRESS NOTES
REASON FOR FOLLOW UP:  1.  Chronic lymphocytic leukemia with extensive lymphadenopathy and possible pleural involvement. Initiation of Treanda, Rituxan May 1, 2019.  2.  Hypogammaglobulinemia.  Status is post IVIG  3.  Significant response on scans June 27, 2019.  Treatment changed to Imbruvica 420 mg daily.  4.  Metastatic non-small cell lung carcinoma on Keytruda  5.  Patient seen 2/18/2022 with left jaw/gumline swelling pain, associated hematoma?  Osteonecrosis.  Restart of Keytruda 3/4/2022, Xgeva discontinued  6.  Restaging via CT 4/12/2022, continued response, Keytruda continued, referral to dermatology for worsening psoriasis.  7.  Jaw osteonecrosis, undergoing therapy through oral surgery, IVIG anticipated, infectious disease and oral surgery follow-up  8.  Patient assessed 11/18/2022, Keytruda continued, ibrutinib-dose reduced-continued, patient seen by  physicians, status postdebridement of bone of left mandible with bone biopsy, fistulectomy of left mandible 10/20/2022  9.  Patient seen 12/30/2022 continue to improve,?  Urinary tract symptoms that have continued to improve.  10.  Repeat scans 4/12/2023 without evidence of recurrence.  11.  Patient seen 4/17/2023, Keytruda therapy held, urinary symptoms improving?  12.  Patient with good performance status, current reveal negative ctDNA  13.  Patient with ctDNA recheck at less than 0.5%, reassessment at 3 months, ongoing issues with the patient's wife developing end-stage liver disease  14.  Patient seen 2/13/2024-Guardant Reveal-negative ctDNA, 0%, increasing left femur pain, follow-up plain films, orthopedic referral  15.  Subsequent assessment orthopedics negative, MRI pending  16.  Patient assessed 7/2/2024, 12/17/2024 stable      HISTORY OF PRESENT ILLNESS:    The patient is a 69 y.o. male with the above mentioned history here today for lab review and evaluation prior to cycle 38  Keytruda.  He reports overall he is doing well.  He does continue  on antibiotics from Dr. Pak before his osteonecrosis of the jaw.  He does have 1 area of exposed bone on his jaw they are hoping that the gum tissue will grow over this.  He would follow-up with Dr. Champion in June.    Patient also previously been on antibiotics for UTI.  He does report some urgency which is still present.  No fevers or chills.  Denies any issues with increased shortness of breath.    He does report some intermittent pain on the right lateral portion of his chest near the eighth rib.  He states when he moves at times he has a sharp pain that is quick, he describes it as feeling like a strain.  This is only happened occasionally over the past few weeks.    The patient is next seen 3/6/2023 without additional chest discomfort and with lessening UTI symptoms.  We have discussed repeat scans in April 2023.    The patient did proceed to repeat CT chest abdomen pelvis 4/12/2023 with stable findings overall including a small right pleural effusion, right rib metastatic lesion with increase sclerosis, overlying soft tissue thickening without interval change in additional osseous lesions over the interval change.  Patient evaluated 3/27/23 with general improvement though difficulty with dysuria and bladder spasm requiring additional antibiotic therapies.    These findings are discussed with the patient 4/17/23 and he feels that his urinary symptoms improved when he stopped his antibiotic therapy used for his osteonecrosis- amoxicillin.  We discussed that his findings including his long-term improvement/remission and, potentially, his urinary symptoms, in part, could be from continued immunotherapy.  This would be an opportunity to now discontinue that therapy and observe him expectantly.    The patient had follow-up assessment by infectious disease 6/8/2023.  Have been off amoxicillin with subsequent resolution urinary symptoms that include dysuria and frequency.    As an alternative method of follow-up the  "patient underwent a Guardant Reveal that was sent 7/10/2023 negative for ctDNA.  This corresponds to his relatively good performance status when seen 7/31/2023.  This includes resolution of the bony fragment that had been noted in his lower jawline that recently loosened and ejected with normal mucosa at the site    The patient returns for assessment 11/13/2023.  His recent CBC 10/23/2023 was normal with H&H of 13.2 and 44.1, white count of 8030, platelet count 163,000 with a normal differential, CMP with being creatinine 19 and 1.30 and Guardant Reveal with ctDNA at less than 0.05%  Unfortunately his wife has developed cirrhosis and is demonstrating end-stage liver disease.  He is her primary caregiver.    We have discussed how to proceed and a repeat ctDNA assessment with Guardant Reveal be requested in approximately 3 months.    He is next seen 7/2/2024.  His follow-up guardant ctDNA again is negative for ctDNA and 0%.  Fortunately his performance status remains reasonably good except he does \"feel heaviness in his chest\" periodically.    Patient is next reviewed 12/17/2024-guardant reveal 12/3/2024 again with negative ctDNA and 0% tumor fraction, CBC with H&H of 15.2 and 48.0, white count 9020 and platelet count 175,000 with normal differential and BUN/creatinine of 19 and 1.41, normal LFTs.  He continues to feel well except for degree of fatigue that is, in part, related to decreased exercise.  Plans were made for 6-month follow-up plans made for 6-month follow-up    Interval Follow-Up:  Patient is seen back today for triage visit.  He called at the end of last week reporting new swelling on both sides of his neck, right slightly worse.  Patient did endorse productive cough but no other symptoms.  As he is now seen in clinic, he has very obvious/prominent swelling in bilateral supraclavicular regions.  He also notes more of a feeling of heaviness in his chest.  Denies any fevers or shortness of breath.  No " "further cough.  Reports good appetite.  Has felt more tired lately.  Continues to be compliant with his Imbruvica.        Past Medical History, Past Surgical History, Social History, Family History have been reviewed and are without significant changes except as mentioned.    Hematologic/oncologic history:    As concerns his CLL history he initially required Treanda Rituxan 5/1/2019 for 2 cycles and then initiated Imbruvica 420 mg daily initiated 7/25/2019.       As concerns stage IV non-small cell lung carcinoma he returns the office continuing Keytruda which he continues to receive every 3 weeks which was initiated 10/21/2020.  He also receives Xgeva every 6 weeks which was initially given 12/2/2020 and discontinued 1/7/2022 secondary to osteonecrosis.      Additional issues that developed during treatment included right knee pain with plain views of his right knee showing a right knee effusion with medial compartment narrowing and no suspicious bone lesion.  There is benign periosteal calcification along the distal right femur.  A PET/CT 11/11 went on to demonstrate a complete metabolic response to therapy compared to PET/CT from 9/23/2020.  This includes his primary lung neoplasm left upper lobe, large osseous metastatic lesion the right chest wall region from the eighth rib, small left adrenal metastasis, and no evidence of disease involving the distal left femur.      The patient was seen 11/24/2021 continue to do well though indicating that his right knee is \"something I can manage at the moment\".  He prefers not to have orthopedic assessment at this point.  He is concerned, ever, about skin lesions that are developing primarily on his calf regions and into his right thigh.    The patient was seen by Dr. Regan Damon 11/29/2021 and felt to be consistent with psoriasiform dermatitis treated with intralesional therapy.  Was also started on a moisturizer and lipid therapy.  The patient returned for assessment " 12/15/2021 and treated with cycle #21 Keytruda, returned 1/7/2022 for cycle #22 and 1/28/2022 for cycle #23.  At that visit he had developed adenopathy several weeks previous and was treated with a Medrol Dosepak.   The patient was next seen 2/18/2022 and indicates that though his adenopathy has improved after treatment described above that his jaw has become painful swollen and tender with additional bleeding.     The patient was referred to his primary dentist who then had him seen oral surgeon-Dr. Ezequiel Davila-reached at [phone] who felt that this was osteonecrosis and should be treated symptomatically.  As the patient reviewed 3/4/2022, wonderfully, his oral issues have nearly resolved with the gumline returning to essentially its previous state, no swelling, minimal erythema, no discharge and no additional pain.  He does have follow-up with oral surgery in the next several weeks and we discussed restarting his Keytruda scanning him likely in April 2022 in general.   He returns to the office on 3/25/2022 in follow-up in anticipation of cycle 24 Keytruda. He reports the left side of his jaw has improved.    However, he was now having issues with the right lower side. He was seen by neurosurgery with additional films and started on oral amoxicillin by his oral surgeon.      The patient continued his immunotherapy taking Keytruda 3/25/2022 and underwent repeat CT chest abdomen and pelvis 4/12/2022 that is reviewed with him 4/15/2022.  Wonderfully there is no substantial change with a small to moderate right pleural effusion that decreased in size, no additional pulmonary nodule or new metastatic disease, multifocal osteosclerotic metastatic disease without change and no new findings in the abdomen or pelvis.    The patient was seen 4/15/2022 indicating that he is actually feeling well in terms of general symptoms.  This includes lack of progression from mouth pain, ability to eat without discomfort.  He is,  however, having worsening psoriasis particular in his lower extremities and has been reviewed by dermatology previously.    The patient was reviewed 6/17/2022 having been seen by oral surgery with progression of his osteonecrosis and skin eruption.  He is currently undergoing assessment by infectious disease within the next week and we have reviewed that previously he responded to IVIG for in-hospital refractory sinusitis.  As a result he is asked to undergo reassessment for his immunoglobulin levels today and return next week for 400 mg/kg of IVIG infusion which we will continue monthly.    The patient was given IVIG 6/24/2022 and again 7/22/2022.  Assessment 8/5/2022 continue to show a degree of general improvement with oral surgery continue to follow him with a second opinion to be obtained  8/8/2022 when IVIG planned 8/19/2022 and 9/16/2022.    The patient slowly continued both assessments by oral surgery and  Scans considering his non-small cell lung cancer including CT studies 10/4/2022 that were essentially stable compared to previous.  Therapies include Keytruda every 3 weeks, daily Imbruvica, monthly IVIG every 4 weeks.    He next presentedwith the above-mentioned history, who returns the office  in anticipation of his 35 th cycle of Keytruda.  He was last treated with IVIG 11/11/2022.       At this point he had undergone surgery at [facility] including fistulectomy of the left mandible and debridement of the intraoral bone left mandible.  He he had also been seen by infectious disease, Dr. Champion regarding osteonecrosis of the jaw.  He was placed on antibiotics for 6 weeks including Levaquin and fluconazole.          There is an interaction with his fluconazole and ibrutinib and adjustments will be necessary.  [facility] pharmacy is managing this.  He reports he is overall feeling very well.  He has had improvement in his ability to eat and drink since undergoing surgery on his jaw.  His skin is overall  improved with the use of Aquaphor.  He did undergo CT scans prior to cycle 33 Keytruda with stability of his disease.  He continues on current therapy with excellent tolerance.     Patient is next seen 11/18/2022.  Fortunately he feels that he is doing  reasonably well with surgery having been successful, pain virtually absent at this point, no additional shortness of breath or cough, appetite remaining fair, stable weight and sleep at least modestly managed.  Unfortunately his wife recently injured her femur and he has become her primary caregiver at this point.       The patient is next assessed 12/9/2022.  He is due for cycle #36 Keytruda and IVIG today.  He did not realize he was due for IVIG today, and already scheduled another appointment so would like to reschedule IVIG for next week.  He also continues on Imbruvica, currently at a reduced dose of 280 mg daily, due to interaction with Diflucan.  He was seen by Dr. Champion with infectious disease yesterday, 12/8/2022 and econazole was discontinued as patient has completed his 6-week course.  He was started on preventative Augmentin 500 mg twice daily until his gums grow to close over the exposed area of bone.  He is eating and drinking adequately, weight remains stable.  Bowels moving regularly.  Denies fever or chills.  Denies nausea or vomiting.  Denies new or worsening pain.  Denies skin rash or shortness of breath.  No new concerns today.    The patient continued Keytruda therapy including 12/9/2022 and IVIG 12/16/2022.  He is next seen 12/30/2022 for continued Keytruda.  We have discussed that he is clearly improving per his osteonecrosis and his completed his surgical therapy and now on prophylactic antibiotic therapy.  We have reviewed that he could discontinue his IVIG, continue his Imbruvica, Keytruda and prophylactic antibiotic therapy.  He is having urinary symptoms?  And a follow-up UA and culture is pending.    Note that the patient's urine  cultures were negative we proceeded 1/20/2023 with Keytruda, Imbruvica with Keytruda given 2/10/2023 and the patient presented 3/6/2023 for his next treatment.  We do plan repeat CT scans in April 2023.    Repeat scans 4/12/2023-stable CT chest and pelvis with small right pleural effusion, parenchymal scarring right lower lobe, right rib metastatic lesion with increase sclerosis, overlying soft tissue thickening without interval change in osseous lesions additionally without change.    Patient seen 4/17/2023 at which time immunotherapy was held and patient placed on observation status.  This included some concern about whether his urinary symptoms could be related to ongoing immunotherapy.  This would be observed off Keytruda.    The patient returns for assessment 11/13/2023.  His recent CBC 10/23/2023 was normal with H&H of 13.2 and 44.1, white count of 8030, platelet count 163,000 with a normal differential, CMP with being creatinine 19 and 1.30 and Guardant Reveal with ctDNA at less than 0.05%  Unfortunately his wife has developed cirrhosis and is demonstrating end-stage liver disease.  He is her primary caregiver.    We have discussed how to proceed and a repeat ctDNA assessment with Guardant Reveal be requested in approximately 3 months.    Patient next seen 2/13/2024.  He indicates that in the last several weeks to months he has been having increasing pain in his left femur that while not completely debilitating is increasingly uncomfortable.  We have discussed plain films today and reevaluation through orthopedic oncology.    The patient underwent plain films of the femur 2/13/2024 with postoperative changes related to the previous implant with no bone lesions seen and normal soft tissue.  Patient was reviewed by orthopedics undergoing MRI of the femur now schedule 3/14/2024.  The patient is seen 3/12/2024 indicating that his pain and is markedly improved.  Wonderfully appears to be in continued remission  "from his malignancies at this point.    PA lateral chest x-ray 7/2/2024 with subsegmental atelectasis or scarring, small right pleural effusion.    Patient is next reviewed 12/17/2024-guardant reveal 12/3/2024 again with negative ctDNA and 0% tumor fraction, CBC with H&H of 15.2 and 48.0, white count 9020 and platelet count 175,000 with normal differential and BUN/creatinine of 19 and 1.41, normal LFTs.  Plans made for 6-month follow-up    Objective      Vitals:    05/05/25 1009   BP: 144/84   Pulse: 83   Resp: 17   Temp: 97.8 °F (36.6 °C)   TempSrc: Oral   SpO2: 96%   Weight: 97.3 kg (214 lb 9.6 oz)   Height: 180 cm (70.87\")   PainSc: 0-No pain           5/5/2025    10:09 AM   Current Status   ECOG score 0     Physical Exam  Vitals and nursing note reviewed.   Constitutional:       Appearance: Normal appearance. He is well-developed.   HENT:      Head: Normocephalic and atraumatic.      Nose: Nose normal.      Mouth/Throat:      Comments: No further exposed bone left lower jawline.  Eyes:      Pupils: Pupils are equal, round, and reactive to light.   Cardiovascular:      Rate and Rhythm: Normal rate and regular rhythm.      Heart sounds: Normal heart sounds.   Pulmonary:      Effort: Pulmonary effort is normal. No respiratory distress.      Breath sounds: Normal breath sounds. No wheezing, rhonchi or rales.   Abdominal:      General: Bowel sounds are normal. There is no distension.      Palpations: Abdomen is soft.      Tenderness: There is no abdominal tenderness.   Musculoskeletal:         General: Normal range of motion.      Cervical back: Normal range of motion and neck supple.   Lymphadenopathy:      Upper Body:      Right upper body: Supraclavicular adenopathy and axillary adenopathy present.      Left upper body: Supraclavicular adenopathy present.   Skin:     General: Skin is warm and dry.   Neurological:      Mental Status: He is alert and oriented to person, place, and time.   Psychiatric:         " Behavior: Behavior normal.         RECENT LABS:  WBC   Date Value Ref Range Status   05/05/2025 9.28 3.40 - 10.80 10*3/mm3 Final     RBC   Date Value Ref Range Status   05/05/2025 5.21 4.14 - 5.80 10*6/mm3 Final     Hemoglobin   Date Value Ref Range Status   05/05/2025 15.9 13.0 - 17.7 g/dL Final     Hematocrit   Date Value Ref Range Status   05/05/2025 49.5 37.5 - 51.0 % Final     MCV   Date Value Ref Range Status   05/05/2025 95.0 79.0 - 97.0 fL Final     MCH   Date Value Ref Range Status   05/05/2025 30.5 26.6 - 33.0 pg Final     MCHC   Date Value Ref Range Status   05/05/2025 32.1 31.5 - 35.7 g/dL Final     RDW   Date Value Ref Range Status   05/05/2025 13.0 12.3 - 15.4 % Final     RDW-SD   Date Value Ref Range Status   05/05/2025 45.4 37.0 - 54.0 fl Final     MPV   Date Value Ref Range Status   05/05/2025 12.3 (H) 6.0 - 12.0 fL Final     Platelets   Date Value Ref Range Status   05/05/2025 181 140 - 450 10*3/mm3 Final     Neutrophil %   Date Value Ref Range Status   05/05/2025 73.2 42.7 - 76.0 % Final     Lymphocyte %   Date Value Ref Range Status   05/05/2025 13.0 (L) 19.6 - 45.3 % Final     Monocyte %   Date Value Ref Range Status   05/05/2025 10.1 5.0 - 12.0 % Final     Eosinophil %   Date Value Ref Range Status   05/05/2025 1.8 0.3 - 6.2 % Final     Basophil %   Date Value Ref Range Status   05/05/2025 0.9 0.0 - 1.5 % Final     Immature Grans %   Date Value Ref Range Status   05/05/2025 1.0 (H) 0.0 - 0.5 % Final     Neutrophils, Absolute   Date Value Ref Range Status   05/05/2025 6.79 1.70 - 7.00 10*3/mm3 Final     Lymphocytes, Absolute   Date Value Ref Range Status   05/05/2025 1.21 0.70 - 3.10 10*3/mm3 Final     Monocytes, Absolute   Date Value Ref Range Status   05/05/2025 0.94 (H) 0.10 - 0.90 10*3/mm3 Final     Eosinophils, Absolute   Date Value Ref Range Status   05/05/2025 0.17 0.00 - 0.40 10*3/mm3 Final     Basophils, Absolute   Date Value Ref Range Status   05/05/2025 0.08 0.00 - 0.20 10*3/mm3 Final      Immature Grans, Absolute   Date Value Ref Range Status   05/05/2025 0.09 (H) 0.00 - 0.05 10*3/mm3 Final     nRBC   Date Value Ref Range Status   05/05/2025 0.0 0.0 - 0.2 /100 WBC Final       FISH prognostic panel-Positive for deletion of 13 q. 14.3    Assessment & Plan   1.  CLL presenting with significant lymphocytosis, lymphadenopathy and splenomegaly.     Positive for deletion of 13 q. 14.3.    Patient status post 2 cycles of Treanda Rituxan with excellent response.    Imaging 6/27/2019 with significant decrease in adenopathy.  Treanda Rituxan discontinued   Imbruvica 420 mg daily initiated 7/25/2019.  He continues on Imbruvica, without indication of progression of his CLL, without progressive lymphadenopathy on CT scans.  Patient has resolving adenopathy particularly cervical regions 2/18/2022  3/25/2022 patient is without worsening adenopathy on exam today.  Continue Imbruvica.  10/28/2022 continues on Imbruvica 420 mg daily. Denies B-symptoms, no evidence of worsening adenopathy on exam.  CT scans without worsening adenopathy  11/18/2022: Imbruvica reduced to 280 mg daily while patient receiving 6-week course of antimicrobials due to interaction with fluconazole.  12/9/2022: He has finished his course of fluconazole, taking his last dose today.  He has a few days left of Imbruvica 280 mg, which he will complete.  He will then increase Imbruvica back to regular dose of 420 mg daily.  Patient stable/17/23, 4/17/2023, 7/13/2023, 11/13/2023  Repeat assessment 2/13/2024 with H&H of 14.8 46.0, white count of 8020, platelet count 179,000 with normal automated differential  5/5/2025 triage visit today for new bilateral supraclavicular swelling, right greater than left.  Though no circumscribed adenopathy palpated, swelling is significant to where it is difficult to fully assess. Almost has the appearance of SVC syndrome.  He is also noting a heaviness in his chest. Also has some fullness in right axilla. Notes  more fatigue recently.  No fevers, sweats or weight loss.  Has been compliant with Imbruvica.  Discussed the need to obtain stat CT imaging for further evaluation.  Patient in agreement.    2.  Pulmonary nodules/infiltrates.    He is status post bronchoscopy.  Is unclear at this time whether these findings are infectious or possibly related to his lymphoproliferative disorder.  This is seen to be improving on latest scans- 4/12/2023  Follow-up chest x-ray 7/2/2024 with continued note of obstructive airway disease; linear left mid and lower lung opacities most consistent with with subsegmental atelectasis or scarring.                                                                                                                                             3.  Hypogammaglobulinemia-status post IVIG with resolution of sinusitis.   Anticipate trial of IVIG with the patient now having progression of his osteonecrosis and dermal infection.  Continues with monthly IVIG.  Due today, however did not realize he was due for IVIG and would like to reschedule for next week.  Reviewed 12/30/2022 with IVIG discontinued.    4.  Non-small cell lung carcinoma  August 26 2020 revealing a new 1.6 cm spiculated nodule within the left upper lobe, 1.2 cm left adrenal nodule, bone windows with a soft tissue mass involving the right eighth rib measuring 3.7 x 2.6 cm.    Biopsy was consistent with adenocarcinoma CK 7+, CK 20-, lung primary suspected clinically, molecular panel not yet obtained.  PET/CT 9/23/2020 demonstrating asymmetric uptake within the right parotid gland which is unremarkable appearance on noncontrasted CT, SUV of 6 compared to 2.7.  There is no hilar, mediastinal or axillary adenopathy, findings of asymmetric moderate to intense FDG uptake within superior aspect the right chest wall anterior to the right first rib with an irregular hypodense lesion measuring 1.8 x 1.6 and SUV 4.9.  This had not been seen June 27, 2019.   There is an intensely FDG avid nodule within the lingula measuring 1.9 x 1.5 history of 12.4, FDG avid left adrenal nodule 1.9 x 1.5 with an issue 21.2.    Evaluated by radiation oncology therapy September 29 and started on radiation therapy to the right chest wall-35 Gy in 10 fractions.    Plans were also then proceed with SBRT to the solitary left upper lobe lesion pending insurance approval.  Further testing including TPS of 20% and foundation CDX with a TMB of greater than 10 mutations per megabase (28 mutations per megabase) and the indication that several immunotherapies could be useful in this patient's care.  Additional genomic findings include BRAF G469R/that trametinib could be useful and STK11/that everolimus could be useful.  Keytruda initiated 10/21/2021   Reassessment after 2 cycles of Keytruda with enlargement of the right eighth rib lesion,enlargement of spiculated left upper lobe lung mass, moderate to large right-sided pleural effusion, new left adrenal mass and enlarged retrocrural lymph node.?Pseudoprogression  Xgeva last given 12/30/2020  Follow-up scans 1/6/2021 demonstrated marked improvement in his right eighth rib lesion, resolution of left upper lobe spiculated mass, residual large right pleural effusion, resolution of left adrenal metastasis.   Right pleural effusion, with thoracentesis 1/14/2021 with 1500 mils removed.  Pathology consistent with malignant effusion   CT scans 4/5/2021 with response noted in the left upper lobe density.  He continues to receive Keytruda every 3 weeks along with Xgeva every 6 weeks.  Repeat scan 7/15/2021 without evidence of recurrence or progressive disease.  Plans to continue Keytruda every 3 weeks with repeat scans in 4 to 6 months  Patient status post PET/CT 11/11/2021 with hyper response, approximate remission status, plan to proceed with cycle #20 Keytruda  Patient seen 2/18/2022 with Keytruda held while his oral issues are addressed-concern for  osteonecrosis of the jaw.  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  3/25/2022 proceed with cycle #24 Keytruda today.  Follow-up CT scans 4/12/2022 without evidence of progressive disease, stable findings including osteosclerotic metastatic disease.  Keytruda continued  Repeat imaging 10/4/2022 with overall stable disease, Keytruda continued  12/9/2022: Proceed with cycle #36 Keytruda today.  Plan repeat imaging April 2023.  3/6/2023 continued with 41st cycle of Keytruda  Repeat scans 4/12/23 without evidence of progressive disease.  Discussion as to the discontinuance of immunotherapy.  Patient seen 4/17/2023 with Keytruda held, follow-up Guardant Reveal planned.  Subcu Guardant Reveal 7/13/2023 negative ctDNA, repeat planned in 12 weeks.  The patient returns for assessment 11/13/2023.  His recent CBC 10/23/2023 was normal with H&H of 13.2 and 44.1, white count of 8030, platelet count 163,000 with a normal differential, CMP with being creatinine 19 and 1.30 and Guardant Reveal with ctDNA at less than 0.05%  Unfortunately his wife has developed cirrhosis and is demonstrating end-stage liver disease.  He is her primary caregiver.  We have discussed how to proceed and a repeat ctDNA assessment with Guardant Reveal be requested in approximately 3 months.  Repeat Guardant Reveal 1/15/2024 with negative ctDNA, 0%  Reviewed findings 3/12/2024 with plans for repeat cardiac revealed an approximately 4 months.  7/2/2024, Guardant Reveal-negative ctDNA, 0% fraction  Patient is next reviewed 12/17/2024-guardant reveal 12/3/2024 again with negative ctDNA and 0% tumor fraction, CBC with H&H of 15.2 and 48.0, white count 9020 and platelet count 175,000 with normal differential and BUN/creatinine of 19 and 1.41, normal LFTs.  5/5/2025 per above, new bilateral supraclavicular swelling and  reports of chest heaviness and increased fatigue. Obtaining STAT CT imaging.    5.  Left knee metastasis  Evaluated by orthopedic oncology, Dr. Eliu Ochoa  Admission 1/7/2021 undergoing stabilization of left femoral bone lesion, prophylactic stabilization with intramedullary implants.  It was suggested we delay Xgeva or Zometa to allow bone healing  Completed radiation with 10 fractions 2/10/2021  Xgeva resumed 4/29/2021, he continues to receive this every 6 weeks.    Additional assessment 2/18/2022 with left jaw pain, subsequent diagnosis of osteoporosis, Xgeva discontinued  Patient continues follow-up with oral surgery, progression of osteoporosis symptomatically, dermal eruption left side of mandibular region, IVIG anticipated  Xgeva DISCONTINUED due to osteonecrosis of the jaw  Increasing pain left mid femur 2/13/2024 plain films planned, referral to orthopedics  Subsequent testing ongoing when seen 3/12/2024 with MRI performed 3/14/2024 without clear new abnormalities     6.  Malignant right pleural effusion  Ultrasound thoracentesis 1/14/2021 1500 mL removed  Chest x-ray 2/25/2021 with moderate right pleural effusion  Progressive symptoms with worsening pain 4/5/2021  Ultrasound-guided thoracentesis 4/13/2021 with 2050 ml removed.  Plans for Pleurx catheter with thoracic surgery, however, pleural effusion has not recurred.  Patient seen 11/4/2021 with chest x-ray planned.  Subsequent resolution noted on CT scan.  Improvement in bilateral pleural effusions on most recent CT scan  Additional assessment 4/12/23 with stable small right pleural effusion  Follow-up chest x-ray 7/2/2024 without change    7.  Cancer related pain  Pain is most prominent in his right rib, utilizing MS Contin 30 mg 3 times a day  Hydrocodone/acetaminophen 10/325 as needed for breakthrough pain.  Currently taking 3 to 4 tablets daily  He is not currently in need of a refill of pain medications  Discussed MS Contin at 30 mg p.o. every 8  hours, Norco 10/325 2 p.o. every 6 hours  He is currently using pain medication as needed for jaw pain.  Requiring approximately 1 MS Contin and 1 Norco 10/325 daily, however some days not requiring any pain medication.  Reports his pain is well controlled, not currently requiring pain medication.  5/5/2025 denies pain today.    8.  Insomnia  Continuing to follow with behavioral oncology  Continuing Remeron 7.5 mg nightly with fair control.  The patient  when assessed 12/30/2022.  1/20/2023 continues on Remeron 7.5 mg nightly which will be refilled     9. Health maintenance  Status post COVID-19 booster, SARS antibody pending today  9/2021 patient receiving Shingrix and Prevnar vaccines.    10.  Skin lesions  Uncertain of etiology, unexpected findings for usual skin lesions associated with immunotherapy  Request dermatologic assessment-psoriasiform dermatitis.  Seen by Dr. Damon though no follow-up due to personal preference  The lesions on the lower extremities particularly are quite extensive.  At present he feels that they are stable enough not to require additional therapy.  These are also considerably improved 3/12/2024  Stable    11.  Right knee pain  Osteoarthritis, orthopedic assessment upon patient's request.   Resolved.  Denies any knee pain today     12.  Osteonecrosis of the jaw  Xgeva was discontinued.  Patient went to second opinion with  with oral surgery.  He was reffered to Dr. Escalante with , with whom he had consult on 8/23/2022.  Dr. Escalante recommened an outpatient procedure that would allow him to biopsy the affected area of his jawbone and identify which bacteria was present, they would then coordinate with ID to identify best antibiotic option.  Patient is agreeable to proceeding with this plan, and is awaiting surgery date.  The patient underwent surgery 10/7/2022  Follow-up with Dr. Champion, infectious disease 10/27/2022 with recommendations for 6 weeks of Levaquin and fluconazole.  This  should complete by approximately mid December 2023.  Was seen by Dr. Champion 12/8/2022 with plans to complete fluconazole.  Decision made to start Augmentin 500 mg twice daily for prevention.  Patient currently being followed by Dr. Champion with reassessment planned in June 2023  Resolved status post oral surgery assessment, infectious disease  Gradual recovery-resolved by 12/17/2024        Plan:   Proceed with stat CT scans neck, chest, abdomen, pelvis in the setting of CLL, lung cancer, and new bilateral neck supraclavicular swelling, chest heaviness, and increasing fatigue.  Add LDH to lab work from today.  For now continue Imbruvica 420 mg daily.  Await CT scan results and once available we will call patient and discuss further implications.  Patient tentatively otherwise has follow-up with Dr. Parekh in June with repeat guardant reveal planned 1 week prior.      Patient is on a high risk medication requiring close monitoring for toxicity.    I spent 55 minutes caring for Dav on this date of service. This time includes time spent by me in the following activities: preparing for the visit, reviewing tests, performing a medically appropriate examination and/or evaluation, counseling and educating the patient/family/caregiver, referring and communicating with other health care professionals, documenting information in the medical record, independently interpreting results and communicating that information with the patient/family/caregiver, care coordination, ordering test(s), obtaining a separately obtained history, and reviewing a separately obtained history        Lexi Bangura, RORY   5/5/2025

## 2025-05-08 ENCOUNTER — HOSPITAL ENCOUNTER (OUTPATIENT)
Dept: CT IMAGING | Facility: HOSPITAL | Age: 70
Discharge: HOME | End: 2025-05-08
Admitting: NURSE PRACTITIONER
Payer: MEDICARE

## 2025-05-08 PROCEDURE — 70491 CT SOFT TISSUE NECK W/DYE: CPT

## 2025-05-08 PROCEDURE — 25510000001 IOPAMIDOL PER 1 ML: Performed by: NURSE PRACTITIONER

## 2025-05-08 RX ORDER — IOPAMIDOL 755 MG/ML
100 INJECTION, SOLUTION INTRAVASCULAR
Status: COMPLETED | OUTPATIENT
Start: 2025-05-08 | End: 2025-05-08

## 2025-05-08 RX ADMIN — IOPAMIDOL 100 ML: 755 INJECTION, SOLUTION INTRAVENOUS at 12:53

## 2025-05-09 ENCOUNTER — TELEPHONE (OUTPATIENT)
Dept: ONCOLOGY | Facility: CLINIC | Age: 70
End: 2025-05-09
Payer: MEDICARE

## 2025-05-09 NOTE — TELEPHONE ENCOUNTER
Per Miryam, call pt and let him know his scans looked good, although she is still going to talk to Dr. Parekh about his issues next week. Called pt and explained this to him and he v/u. Advised him we will follow up with him next week.

## 2025-05-20 ENCOUNTER — INFUSION (OUTPATIENT)
Dept: ONCOLOGY | Facility: HOSPITAL | Age: 70
End: 2025-05-20
Payer: MEDICARE

## 2025-05-20 DIAGNOSIS — C79.51 LUNG CANCER METASTATIC TO BONE: ICD-10-CM

## 2025-05-20 DIAGNOSIS — C34.90 LUNG CANCER METASTATIC TO BONE: ICD-10-CM

## 2025-05-20 DIAGNOSIS — C34.12 MALIGNANT NEOPLASM OF UPPER LOBE OF LEFT LUNG: ICD-10-CM

## 2025-05-20 DIAGNOSIS — Z45.2 FITTING AND ADJUSTMENT OF VASCULAR CATHETER: ICD-10-CM

## 2025-05-20 DIAGNOSIS — C91.10 CLL (CHRONIC LYMPHOCYTIC LEUKEMIA): Primary | ICD-10-CM

## 2025-05-20 LAB
ALBUMIN SERPL-MCNC: 4.2 G/DL (ref 3.5–5.2)
ALBUMIN/GLOB SERPL: 2.2 G/DL
ALP SERPL-CCNC: 91 U/L (ref 39–117)
ALT SERPL W P-5'-P-CCNC: 14 U/L (ref 1–41)
ANION GAP SERPL CALCULATED.3IONS-SCNC: 9 MMOL/L (ref 5–15)
AST SERPL-CCNC: 14 U/L (ref 1–40)
BASOPHILS # BLD AUTO: 0.09 10*3/MM3 (ref 0–0.2)
BASOPHILS NFR BLD AUTO: 1.2 % (ref 0–1.5)
BILIRUB SERPL-MCNC: 0.5 MG/DL (ref 0–1.2)
BUN SERPL-MCNC: 17 MG/DL (ref 8–23)
BUN/CREAT SERPL: 11.5 (ref 7–25)
CALCIUM SPEC-SCNC: 9.2 MG/DL (ref 8.6–10.5)
CHLORIDE SERPL-SCNC: 103 MMOL/L (ref 98–107)
CO2 SERPL-SCNC: 29 MMOL/L (ref 22–29)
CREAT SERPL-MCNC: 1.48 MG/DL (ref 0.76–1.27)
DEPRECATED RDW RBC AUTO: 46.7 FL (ref 37–54)
EGFRCR SERPLBLD CKD-EPI 2021: 50.6 ML/MIN/1.73
EOSINOPHIL # BLD AUTO: 0.18 10*3/MM3 (ref 0–0.4)
EOSINOPHIL NFR BLD AUTO: 2.4 % (ref 0.3–6.2)
ERYTHROCYTE [DISTWIDTH] IN BLOOD BY AUTOMATED COUNT: 13.2 % (ref 12.3–15.4)
GLOBULIN UR ELPH-MCNC: 1.9 GM/DL
GLUCOSE SERPL-MCNC: 86 MG/DL (ref 65–99)
HCT VFR BLD AUTO: 45.9 % (ref 37.5–51)
HGB BLD-MCNC: 14.8 G/DL (ref 13–17.7)
IMM GRANULOCYTES # BLD AUTO: 0.06 10*3/MM3 (ref 0–0.05)
IMM GRANULOCYTES NFR BLD AUTO: 0.8 % (ref 0–0.5)
LYMPHOCYTES # BLD AUTO: 1.23 10*3/MM3 (ref 0.7–3.1)
LYMPHOCYTES NFR BLD AUTO: 16.2 % (ref 19.6–45.3)
MCH RBC QN AUTO: 30.8 PG (ref 26.6–33)
MCHC RBC AUTO-ENTMCNC: 32.2 G/DL (ref 31.5–35.7)
MCV RBC AUTO: 95.4 FL (ref 79–97)
MONOCYTES # BLD AUTO: 0.91 10*3/MM3 (ref 0.1–0.9)
MONOCYTES NFR BLD AUTO: 12 % (ref 5–12)
NEUTROPHILS NFR BLD AUTO: 5.14 10*3/MM3 (ref 1.7–7)
NEUTROPHILS NFR BLD AUTO: 67.4 % (ref 42.7–76)
NRBC BLD AUTO-RTO: 0 /100 WBC (ref 0–0.2)
PLATELET # BLD AUTO: 166 10*3/MM3 (ref 140–450)
PMV BLD AUTO: 12.6 FL (ref 6–12)
POTASSIUM SERPL-SCNC: 4.4 MMOL/L (ref 3.5–5.2)
PROT SERPL-MCNC: 6.1 G/DL (ref 6–8.5)
RBC # BLD AUTO: 4.81 10*6/MM3 (ref 4.14–5.8)
SODIUM SERPL-SCNC: 141 MMOL/L (ref 136–145)
WBC NRBC COR # BLD AUTO: 7.61 10*3/MM3 (ref 3.4–10.8)

## 2025-05-20 PROCEDURE — 36591 DRAW BLOOD OFF VENOUS DEVICE: CPT

## 2025-05-20 PROCEDURE — 25010000002 HEPARIN LOCK FLUSH PER 10 UNITS: Performed by: NURSE PRACTITIONER

## 2025-05-20 PROCEDURE — 85025 COMPLETE CBC W/AUTO DIFF WBC: CPT | Performed by: INTERNAL MEDICINE

## 2025-05-20 PROCEDURE — 80053 COMPREHEN METABOLIC PANEL: CPT | Performed by: INTERNAL MEDICINE

## 2025-05-20 PROCEDURE — 96523 IRRIG DRUG DELIVERY DEVICE: CPT

## 2025-05-20 RX ORDER — HEPARIN SODIUM (PORCINE) LOCK FLUSH IV SOLN 100 UNIT/ML 100 UNIT/ML
500 SOLUTION INTRAVENOUS AS NEEDED
Status: DISCONTINUED | OUTPATIENT
Start: 2025-05-20 | End: 2025-05-20 | Stop reason: HOSPADM

## 2025-05-20 RX ORDER — HEPARIN SODIUM (PORCINE) LOCK FLUSH IV SOLN 100 UNIT/ML 100 UNIT/ML
500 SOLUTION INTRAVENOUS AS NEEDED
OUTPATIENT
Start: 2025-05-20

## 2025-05-20 RX ORDER — SODIUM CHLORIDE 0.9 % (FLUSH) 0.9 %
10 SYRINGE (ML) INJECTION AS NEEDED
Status: DISCONTINUED | OUTPATIENT
Start: 2025-05-20 | End: 2025-05-20 | Stop reason: HOSPADM

## 2025-05-20 RX ORDER — SODIUM CHLORIDE 0.9 % (FLUSH) 0.9 %
10 SYRINGE (ML) INJECTION AS NEEDED
OUTPATIENT
Start: 2025-05-20

## 2025-05-20 RX ADMIN — Medication 10 ML: at 13:34

## 2025-05-20 RX ADMIN — Medication 500 UNITS: at 13:36

## 2025-05-28 LAB — REF LAB TEST RESULTS: NORMAL

## 2025-06-02 NOTE — PROGRESS NOTES
REASON FOR FOLLOW UP:  1.  Chronic lymphocytic leukemia with extensive lymphadenopathy and possible pleural involvement. Initiation of Treanda, Rituxan May 1, 2019.  2.  Hypogammaglobulinemia.  Status is post IVIG  3.  Significant response on scans June 27, 2019.  Treatment changed to Imbruvica 420 mg daily.  4.  Metastatic non-small cell lung carcinoma on Keytruda  5.  Patient seen 2/18/2022 with left jaw/gumline swelling pain, associated hematoma?  Osteonecrosis.  Restart of Keytruda 3/4/2022, Xgeva discontinued  6.  Restaging via CT 4/12/2022, continued response, Keytruda continued, referral to dermatology for worsening psoriasis.  7.  Jaw osteonecrosis, undergoing therapy through oral surgery, IVIG anticipated, infectious disease and oral surgery follow-up  8.  Patient assessed 11/18/2022, Keytruda continued, ibrutinib-dose reduced-continued, patient seen by  physicians, status postdebridement of bone of left mandible with bone biopsy, fistulectomy of left mandible 10/20/2022  9.  Patient seen 12/30/2022 continue to improve,?  Urinary tract symptoms that have continued to improve.  10.  Repeat scans 4/12/2023 without evidence of recurrence.  11.  Patient seen 4/17/2023, Keytruda therapy held, urinary symptoms improving?  12.  Patient with good performance status, current reveal negative ctDNA  13.  Patient with ctDNA recheck at less than 0.5%, reassessment at 3 months, ongoing issues with the patient's wife developing end-stage liver disease  14.  Patient seen 2/13/2024-Guardant Reveal-negative ctDNA, 0%, increasing left femur pain, follow-up plain films, orthopedic referral  15.  Subsequent assessment orthopedics negative, MRI pending  16.  Patient assessed 7/2/2024, 12/17/2024 stable  17.  Supraclavicular swelling noted leading to repeat imaging 5/5 and 5/8/2025 negative for evidence of recurrence.  Subsequent plans to reassess by guardant reveal every 6 months for additional 2 years.      HISTORY OF  PRESENT ILLNESS:    The patient is a 69 y.o. male with the above mentioned history here today for lab review and evaluation prior to cycle 38  Keytruda.  He reports overall he is doing well.  He does continue on antibiotics from Dr. Pak before his osteonecrosis of the jaw.  He does have 1 area of exposed bone on his jaw they are hoping that the gum tissue will grow over this.  He would follow-up with Dr. Champion in June.    Patient also previously been on antibiotics for UTI.  He does report some urgency which is still present.  No fevers or chills.  Denies any issues with increased shortness of breath.    He does report some intermittent pain on the right lateral portion of his chest near the eighth rib.  He states when he moves at times he has a sharp pain that is quick, he describes it as feeling like a strain.  This is only happened occasionally over the past few weeks.    The patient is next seen 3/6/2023 without additional chest discomfort and with lessening UTI symptoms.  We have discussed repeat scans in April 2023.    The patient did proceed to repeat CT chest abdomen pelvis 4/12/2023 with stable findings overall including a small right pleural effusion, right rib metastatic lesion with increase sclerosis, overlying soft tissue thickening without interval change in additional osseous lesions over the interval change.  Patient evaluated 3/27/23 with general improvement though difficulty with dysuria and bladder spasm requiring additional antibiotic therapies.    These findings are discussed with the patient 4/17/23 and he feels that his urinary symptoms improved when he stopped his antibiotic therapy used for his osteonecrosis- amoxicillin.  We discussed that his findings including his long-term improvement/remission and, potentially, his urinary symptoms, in part, could be from continued immunotherapy.  This would be an opportunity to now discontinue that therapy and observe him expectantly.    The patient  "had follow-up assessment by infectious disease 6/8/2023.  Have been off amoxicillin with subsequent resolution urinary symptoms that include dysuria and frequency.    As an alternative method of follow-up the patient underwent a Guardant Reveal that was sent 7/10/2023 negative for ctDNA.  This corresponds to his relatively good performance status when seen 7/31/2023.  This includes resolution of the bony fragment that had been noted in his lower jawline that recently loosened and ejected with normal mucosa at the site    The patient returns for assessment 11/13/2023.  His recent CBC 10/23/2023 was normal with H&H of 13.2 and 44.1, white count of 8030, platelet count 163,000 with a normal differential, CMP with being creatinine 19 and 1.30 and Guardant Reveal with ctDNA at less than 0.05%  Unfortunately his wife has developed cirrhosis and is demonstrating end-stage liver disease.  He is her primary caregiver.    We have discussed how to proceed and a repeat ctDNA assessment with Guardant Reveal be requested in approximately 3 months.    He is next seen 7/2/2024.  His follow-up guardant ctDNA again is negative for ctDNA and 0%.  Fortunately his performance status remains reasonably good except he does \"feel heaviness in his chest\" periodically.    Patient is next reviewed 12/17/2024-guardant reveal 12/3/2024 again with negative ctDNA and 0% tumor fraction, CBC with H&H of 15.2 and 48.0, white count 9020 and platelet count 175,000 with normal differential and BUN/creatinine of 19 and 1.41, normal LFTs.  He continues to feel well except for degree of fatigue that is, in part, related to decreased exercise.  Plans were made for 6-month follow-up plans made for 6-month follow-up    Patient was seen back 5/5/2025 for triage visit.  He called at the end of last week reporting new swelling on both sides of his neck, right slightly worse.  Patient did endorse productive cough but no other symptoms.  As he is now seen in " clinic, he has very obvious/prominent swelling in bilateral supraclavicular regions.  He also notes more of a feeling of heaviness in his chest.  Denies any fevers or shortness of breath.  No further cough.  Reports good appetite.  Has felt more tired lately.  Continues to be compliant with his Imbruvica.    He underwent repeat scans 5/8/2025 revealing, wonderfully, no evidence of additional disease including CT of neck, CT of chest abdomen pelvis finding a sub-5 mm left lung noncalcified pulmonary nodule without change, right lateral eighth rib lesion and sclerotic lesion without change and no new bony lesion,    He is seen formally 6/3/2025 fortunately feeling generally well with a good performance status.  He is encouraged to increase his level of activity however.        Past Medical History, Past Surgical History, Social History, Family History have been reviewed and are without significant changes except as mentioned.    Hematologic/oncologic history:    As concerns his CLL history he initially required Treanda Rituxan 5/1/2019 for 2 cycles and then initiated Imbruvica 420 mg daily initiated 7/25/2019.       As concerns stage IV non-small cell lung carcinoma he returns the office continuing Keytruda which he continues to receive every 3 weeks which was initiated 10/21/2020.  He also receives Xgeva every 6 weeks which was initially given 12/2/2020 and discontinued 1/7/2022 secondary to osteonecrosis.      Additional issues that developed during treatment included right knee pain with plain views of his right knee showing a right knee effusion with medial compartment narrowing and no suspicious bone lesion.  There is benign periosteal calcification along the distal right femur.  A PET/CT 11/11 went on to demonstrate a complete metabolic response to therapy compared to PET/CT from 9/23/2020.  This includes his primary lung neoplasm left upper lobe, large osseous metastatic lesion the right chest wall region from  "the eighth rib, small left adrenal metastasis, and no evidence of disease involving the distal left femur.      The patient was seen 11/24/2021 continue to do well though indicating that his right knee is \"something I can manage at the moment\".  He prefers not to have orthopedic assessment at this point.  He is concerned, ever, about skin lesions that are developing primarily on his calf regions and into his right thigh.    The patient was seen by Dr. Regan Damon 11/29/2021 and felt to be consistent with psoriasiform dermatitis treated with intralesional therapy.  Was also started on a moisturizer and lipid therapy.  The patient returned for assessment 12/15/2021 and treated with cycle #21 Keytruda, returned 1/7/2022 for cycle #22 and 1/28/2022 for cycle #23.  At that visit he had developed adenopathy several weeks previous and was treated with a Medrol Dosepak.   The patient was next seen 2/18/2022 and indicates that though his adenopathy has improved after treatment described above that his jaw has become painful swollen and tender with additional bleeding.     The patient was referred to his primary dentist who then had him seen oral surgeon-Dr. Ezequiel Davila-reached at [phone] who felt that this was osteonecrosis and should be treated symptomatically.  As the patient reviewed 3/4/2022, wonderfully, his oral issues have nearly resolved with the gumline returning to essentially its previous state, no swelling, minimal erythema, no discharge and no additional pain.  He does have follow-up with oral surgery in the next several weeks and we discussed restarting his Keytruda scanning him likely in April 2022 in general.   He returns to the office on 3/25/2022 in follow-up in anticipation of cycle 24 Keytruda. He reports the left side of his jaw has improved.    However, he was now having issues with the right lower side. He was seen by neurosurgery with additional films and started on oral amoxicillin by his oral " surgeon.      The patient continued his immunotherapy taking Keytruda 3/25/2022 and underwent repeat CT chest abdomen and pelvis 4/12/2022 that is reviewed with him 4/15/2022.  Wonderfully there is no substantial change with a small to moderate right pleural effusion that decreased in size, no additional pulmonary nodule or new metastatic disease, multifocal osteosclerotic metastatic disease without change and no new findings in the abdomen or pelvis.    The patient was seen 4/15/2022 indicating that he is actually feeling well in terms of general symptoms.  This includes lack of progression from mouth pain, ability to eat without discomfort.  He is, however, having worsening psoriasis particular in his lower extremities and has been reviewed by dermatology previously.    The patient was reviewed 6/17/2022 having been seen by oral surgery with progression of his osteonecrosis and skin eruption.  He is currently undergoing assessment by infectious disease within the next week and we have reviewed that previously he responded to IVIG for in-hospital refractory sinusitis.  As a result he is asked to undergo reassessment for his immunoglobulin levels today and return next week for 400 mg/kg of IVIG infusion which we will continue monthly.    The patient was given IVIG 6/24/2022 and again 7/22/2022.  Assessment 8/5/2022 continue to show a degree of general improvement with oral surgery continue to follow him with a second opinion to be obtained  8/8/2022 when IVIG planned 8/19/2022 and 9/16/2022.    The patient slowly continued both assessments by oral surgery and  Scans considering his non-small cell lung cancer including CT studies 10/4/2022 that were essentially stable compared to previous.  Therapies include Keytruda every 3 weeks, daily Imbruvica, monthly IVIG every 4 weeks.    He next presentedwith the above-mentioned history, who returns the office  in anticipation of his 35 th cycle of Keytruda.  He was last  treated with IVIG 11/11/2022.       At this point he had undergone surgery at [facility] including fistulectomy of the left mandible and debridement of the intraoral bone left mandible.  He he had also been seen by infectious disease, Dr. Champion regarding osteonecrosis of the jaw.  He was placed on antibiotics for 6 weeks including Levaquin and fluconazole.          There is an interaction with his fluconazole and ibrutinib and adjustments will be necessary.  [facility] pharmacy is managing this.  He reports he is overall feeling very well.  He has had improvement in his ability to eat and drink since undergoing surgery on his jaw.  His skin is overall improved with the use of Aquaphor.  He did undergo CT scans prior to cycle 33 Keytruda with stability of his disease.  He continues on current therapy with excellent tolerance.     Patient is next seen 11/18/2022.  Fortunately he feels that he is doing  reasonably well with surgery having been successful, pain virtually absent at this point, no additional shortness of breath or cough, appetite remaining fair, stable weight and sleep at least modestly managed.  Unfortunately his wife recently injured her femur and he has become her primary caregiver at this point.       The patient is next assessed 12/9/2022.  He is due for cycle #36 Keytruda and IVIG today.  He did not realize he was due for IVIG today, and already scheduled another appointment so would like to reschedule IVIG for next week.  He also continues on Imbruvica, currently at a reduced dose of 280 mg daily, due to interaction with Diflucan.  He was seen by Dr. Champion with infectious disease yesterday, 12/8/2022 and econazole was discontinued as patient has completed his 6-week course.  He was started on preventative Augmentin 500 mg twice daily until his gums grow to close over the exposed area of bone.  He is eating and drinking adequately, weight remains stable.  Bowels moving regularly.  Denies fever  or chills.  Denies nausea or vomiting.  Denies new or worsening pain.  Denies skin rash or shortness of breath.  No new concerns today.    The patient continued Keytruda therapy including 12/9/2022 and IVIG 12/16/2022.  He is next seen 12/30/2022 for continued Keytruda.  We have discussed that he is clearly improving per his osteonecrosis and his completed his surgical therapy and now on prophylactic antibiotic therapy.  We have reviewed that he could discontinue his IVIG, continue his Imbruvica, Keytruda and prophylactic antibiotic therapy.  He is having urinary symptoms?  And a follow-up UA and culture is pending.    Note that the patient's urine cultures were negative we proceeded 1/20/2023 with Keytruda, Imbruvica with Keytruda given 2/10/2023 and the patient presented 3/6/2023 for his next treatment.  We do plan repeat CT scans in April 2023.    Repeat scans 4/12/2023-stable CT chest and pelvis with small right pleural effusion, parenchymal scarring right lower lobe, right rib metastatic lesion with increase sclerosis, overlying soft tissue thickening without interval change in osseous lesions additionally without change.    Patient seen 4/17/2023 at which time immunotherapy was held and patient placed on observation status.  This included some concern about whether his urinary symptoms could be related to ongoing immunotherapy.  This would be observed off Keytruda.    The patient returns for assessment 11/13/2023.  His recent CBC 10/23/2023 was normal with H&H of 13.2 and 44.1, white count of 8030, platelet count 163,000 with a normal differential, CMP with being creatinine 19 and 1.30 and Guardant Reveal with ctDNA at less than 0.05%  Unfortunately his wife has developed cirrhosis and is demonstrating end-stage liver disease.  He is her primary caregiver.    We have discussed how to proceed and a repeat ctDNA assessment with Guardant Reveal be requested in approximately 3 months.    Patient next seen  2/13/2024.  He indicates that in the last several weeks to months he has been having increasing pain in his left femur that while not completely debilitating is increasingly uncomfortable.  We have discussed plain films today and reevaluation through orthopedic oncology.    The patient underwent plain films of the femur 2/13/2024 with postoperative changes related to the previous implant with no bone lesions seen and normal soft tissue.  Patient was reviewed by orthopedics undergoing MRI of the femur now schedule 3/14/2024.  The patient is seen 3/12/2024 indicating that his pain and is markedly improved.  Wonderfully appears to be in continued remission from his malignancies at this point.    PA lateral chest x-ray 7/2/2024 with subsegmental atelectasis or scarring, small right pleural effusion.    Patient is next reviewed 12/17/2024-guardant reveal 12/3/2024 again with negative ctDNA and 0% tumor fraction, CBC with H&H of 15.2 and 48.0, white count 9020 and platelet count 175,000 with normal differential and BUN/creatinine of 19 and 1.41, normal LFTs.  Plans made for 6-month follow-up    He underwent repeat scans 5/8/2025 revealing, wonderfully, no evidence of additional disease including CT of neck, CT of chest abdomen pelvis finding a sub-5 mm left lung noncalcified pulmonary nodule without change, right lateral eighth rib lesion and sclerotic lesion without change and no new bony lesion    He underwent repeat scans 5/8/2025 revealing, wonderfully, no evidence of additional disease including CT of neck, CT of chest abdomen pelvis finding a sub-5 mm left lung noncalcified pulmonary nodule without change, right lateral eighth rib lesion and sclerotic lesion without change and no new bony lesion,    He is seen formally 6/3/2025 fortunately feeling generally well with a good performance status.  He is encouraged to increase his level of activity however.      Objective      Vitals:    06/03/25 1423   BP: 134/79  "  Pulse: 102   Resp: 16   Temp: 98.2 °F (36.8 °C)   TempSrc: Oral   SpO2: 93%   Weight: 96.4 kg (212 lb 8 oz)   Height: 180 cm (70.87\")   PainSc: 0-No pain             6/3/2025     2:27 PM   Current Status   ECOG score 0     Physical Exam  Vitals and nursing note reviewed.   Constitutional:       Appearance: Normal appearance. He is well-developed.   HENT:      Head: Normocephalic and atraumatic.      Nose: Nose normal.      Mouth/Throat:      Comments: No further exposed bone left lower jawline.  Eyes:      Pupils: Pupils are equal, round, and reactive to light.   Cardiovascular:      Rate and Rhythm: Normal rate and regular rhythm.      Heart sounds: Normal heart sounds.   Pulmonary:      Effort: Pulmonary effort is normal. No respiratory distress.      Breath sounds: Normal breath sounds. No wheezing, rhonchi or rales.   Abdominal:      General: Bowel sounds are normal. There is no distension.      Palpations: Abdomen is soft.      Tenderness: There is no abdominal tenderness.   Musculoskeletal:         General: Normal range of motion.      Cervical back: Normal range of motion and neck supple.   Lymphadenopathy:      Upper Body:      Right upper body: Supraclavicular adenopathy and axillary adenopathy present.      Left upper body: Supraclavicular adenopathy present.   Skin:     General: Skin is warm and dry.   Neurological:      Mental Status: He is alert and oriented to person, place, and time.   Psychiatric:         Behavior: Behavior normal.         RECENT LABS:  WBC   Date Value Ref Range Status   05/20/2025 7.61 3.40 - 10.80 10*3/mm3 Final     RBC   Date Value Ref Range Status   05/20/2025 4.81 4.14 - 5.80 10*6/mm3 Final     Hemoglobin   Date Value Ref Range Status   05/20/2025 14.8 13.0 - 17.7 g/dL Final     Hematocrit   Date Value Ref Range Status   05/20/2025 45.9 37.5 - 51.0 % Final     MCV   Date Value Ref Range Status   05/20/2025 95.4 79.0 - 97.0 fL Final     MCH   Date Value Ref Range Status "   05/20/2025 30.8 26.6 - 33.0 pg Final     MCHC   Date Value Ref Range Status   05/20/2025 32.2 31.5 - 35.7 g/dL Final     RDW   Date Value Ref Range Status   05/20/2025 13.2 12.3 - 15.4 % Final     RDW-SD   Date Value Ref Range Status   05/20/2025 46.7 37.0 - 54.0 fl Final     MPV   Date Value Ref Range Status   05/20/2025 12.6 (H) 6.0 - 12.0 fL Final     Platelets   Date Value Ref Range Status   05/20/2025 166 140 - 450 10*3/mm3 Final     Neutrophil %   Date Value Ref Range Status   05/20/2025 67.4 42.7 - 76.0 % Final     Lymphocyte %   Date Value Ref Range Status   05/20/2025 16.2 (L) 19.6 - 45.3 % Final     Monocyte %   Date Value Ref Range Status   05/20/2025 12.0 5.0 - 12.0 % Final     Eosinophil %   Date Value Ref Range Status   05/20/2025 2.4 0.3 - 6.2 % Final     Basophil %   Date Value Ref Range Status   05/20/2025 1.2 0.0 - 1.5 % Final     Immature Grans %   Date Value Ref Range Status   05/20/2025 0.8 (H) 0.0 - 0.5 % Final     Neutrophils, Absolute   Date Value Ref Range Status   05/20/2025 5.14 1.70 - 7.00 10*3/mm3 Final     Lymphocytes, Absolute   Date Value Ref Range Status   05/20/2025 1.23 0.70 - 3.10 10*3/mm3 Final     Monocytes, Absolute   Date Value Ref Range Status   05/20/2025 0.91 (H) 0.10 - 0.90 10*3/mm3 Final     Eosinophils, Absolute   Date Value Ref Range Status   05/20/2025 0.18 0.00 - 0.40 10*3/mm3 Final     Basophils, Absolute   Date Value Ref Range Status   05/20/2025 0.09 0.00 - 0.20 10*3/mm3 Final     Immature Grans, Absolute   Date Value Ref Range Status   05/20/2025 0.06 (H) 0.00 - 0.05 10*3/mm3 Final     nRBC   Date Value Ref Range Status   05/20/2025 0.0 0.0 - 0.2 /100 WBC Final       FISH prognostic panel-Positive for deletion of 13 q. 14.3    Assessment & Plan   1.  CLL presenting with significant lymphocytosis, lymphadenopathy and splenomegaly.     Positive for deletion of 13 q. 14.3.    Patient status post 2 cycles of Treanda Rituxan with excellent response.    Imaging  6/27/2019 with significant decrease in adenopathy.  Nestor Pereira discontinued   Imbruvica 420 mg daily initiated 7/25/2019.  He continues on Imbruvica, without indication of progression of his CLL, without progressive lymphadenopathy on CT scans.  Patient has resolving adenopathy particularly cervical regions 2/18/2022  3/25/2022 patient is without worsening adenopathy on exam today.  Continue Imbruvica.  10/28/2022 continues on Imbruvica 420 mg daily. Denies B-symptoms, no evidence of worsening adenopathy on exam.  CT scans without worsening adenopathy  11/18/2022: Imbruvica reduced to 280 mg daily while patient receiving 6-week course of antimicrobials due to interaction with fluconazole.  12/9/2022: He has finished his course of fluconazole, taking his last dose today.  He has a few days left of Imbruvica 280 mg, which he will complete.  He will then increase Imbruvica back to regular dose of 420 mg daily.  Patient stable/17/23, 4/17/2023, 7/13/2023, 11/13/2023  Repeat assessment 2/13/2024 with H&H of 14.8 46.0, white count of 8020, platelet count 179,000 with normal automated differential  5/5/2025 triage visit today for new bilateral supraclavicular swelling, right greater than left.  Though no circumscribed adenopathy palpated, swelling is significant to where it is difficult to fully assess. Almost has the appearance of SVC syndrome.  He is also noting a heaviness in his chest. Also has some fullness in right axilla. Notes more fatigue recently.  No fevers, sweats or weight loss.  Has been compliant with Imbruvica.  Discussed the need to obtain stat CT imaging for further evaluation.  Patient in agreement.  He underwent repeat scans 5/8/2025 revealing, wonderfully, no evidence of additional disease including CT of neck, CT of chest abdomen pelvis finding a sub-5 mm left lung noncalcified pulmonary nodule without change, right lateral eighth rib lesion and sclerotic lesion without change and no new bony  lesion  Patient reviewed 6/3/2025 felt stable.  [facility] found negative for ctDNA and 0% tumor fraction.    2.  Pulmonary nodules/infiltrates.    He is status post bronchoscopy.  Is unclear at this time whether these findings are infectious or possibly related to his lymphoproliferative disorder.  This is seen to be improving on latest scans- 4/12/2023  Follow-up chest x-ray 7/2/2024 with continued note of obstructive airway disease; linear left mid and lower lung opacities most consistent with with subsegmental atelectasis or scarring.                                                                                                                                             3.  Hypogammaglobulinemia-status post IVIG with resolution of sinusitis.   Anticipate trial of IVIG with the patient now having progression of his osteonecrosis and dermal infection.  Continues with monthly IVIG.  Due today, however did not realize he was due for IVIG and would like to reschedule for next week.  Reviewed 12/30/2022 with IVIG discontinued.    4.  Non-small cell lung carcinoma  August 26 2020 revealing a new 1.6 cm spiculated nodule within the left upper lobe, 1.2 cm left adrenal nodule, bone windows with a soft tissue mass involving the right eighth rib measuring 3.7 x 2.6 cm.    Biopsy was consistent with adenocarcinoma CK 7+, CK 20-, lung primary suspected clinically, molecular panel not yet obtained.  PET/CT 9/23/2020 demonstrating asymmetric uptake within the right parotid gland which is unremarkable appearance on noncontrasted CT, SUV of 6 compared to 2.7.  There is no hilar, mediastinal or axillary adenopathy, findings of asymmetric moderate to intense FDG uptake within superior aspect the right chest wall anterior to the right first rib with an irregular hypodense lesion measuring 1.8 x 1.6 and SUV 4.9.  This had not been seen June 27, 2019.  There is an intensely FDG  avid nodule within the lingula measuring 1.9 x 1.5 history of 12.4, FDG avid left adrenal nodule 1.9 x 1.5 with an issue 21.2.    Evaluated by radiation oncology therapy September 29 and started on radiation therapy to the right chest wall-35 Gy in 10 fractions.    Plans were also then proceed with SBRT to the solitary left upper lobe lesion pending insurance approval.  Further testing including TPS of 20% and foundation CDX with a TMB of greater than 10 mutations per megabase (28 mutations per megabase) and the indication that several immunotherapies could be useful in this patient's care.  Additional genomic findings include BRAF G469R/that trametinib could be useful and STK11/that everolimus could be useful.  Keytruda initiated 10/21/2021   Reassessment after 2 cycles of Keytruda with enlargement of the right eighth rib lesion,enlargement of spiculated left upper lobe lung mass, moderate to large right-sided pleural effusion, new left adrenal mass and enlarged retrocrural lymph node.?Pseudoprogression  Xgeva last given 12/30/2020  Follow-up scans 1/6/2021 demonstrated marked improvement in his right eighth rib lesion, resolution of left upper lobe spiculated mass, residual large right pleural effusion, resolution of left adrenal metastasis.   Right pleural effusion, with thoracentesis 1/14/2021 with 1500 mils removed.  Pathology consistent with malignant effusion   CT scans 4/5/2021 with response noted in the left upper lobe density.  He continues to receive Keytruda every 3 weeks along with Xgeva every 6 weeks.  Repeat scan 7/15/2021 without evidence of recurrence or progressive disease.  Plans to continue Keytruda every 3 weeks with repeat scans in 4 to 6 months  Patient status post PET/CT 11/11/2021 with hyper response, approximate remission status, plan to proceed with cycle #20 Keytruda  Patient seen 2/18/2022 with Keytruda held while his oral issues are addressed-concern for osteonecrosis of the  jaw.  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  3/25/2022 proceed with cycle #24 Keytruda today.  Follow-up CT scans 4/12/2022 without evidence of progressive disease, stable findings including osteosclerotic metastatic disease.  Keytruda continued  Repeat imaging 10/4/2022 with overall stable disease, Keytruda continued  12/9/2022: Proceed with cycle #36 Keytruda today.  Plan repeat imaging April 2023.  3/6/2023 continued with 41st cycle of Keytruda  Repeat scans 4/12/23 without evidence of progressive disease.  Discussion as to the discontinuance of immunotherapy.  Patient seen 4/17/2023 with Keytruda held, follow-up Guardant Reveal planned.  Subcu Guardant Reveal 7/13/2023 negative ctDNA, repeat planned in 12 weeks.  The patient returns for assessment 11/13/2023.  His recent CBC 10/23/2023 was normal with H&H of 13.2 and 44.1, white count of 8030, platelet count 163,000 with a normal differential, CMP with being creatinine 19 and 1.30 and Guardant Reveal with ctDNA at less than 0.05%  Unfortunately his wife has developed cirrhosis and is demonstrating end-stage liver disease.  He is her primary caregiver.  We have discussed how to proceed and a repeat ctDNA assessment with Guardant Reveal be requested in approximately 3 months.  Repeat Guardant Reveal 1/15/2024 with negative ctDNA, 0%  Reviewed findings 3/12/2024 with plans for repeat cardiac revealed an approximately 4 months.  7/2/2024, Guardant Reveal-negative ctDNA, 0% fraction  Patient is next reviewed 12/17/2024-guardant reveal 12/3/2024 again with negative ctDNA and 0% tumor fraction, CBC with H&H of 15.2 and 48.0, white count 9020 and platelet count 175,000 with normal differential and BUN/creatinine of 19 and 1.41, normal LFTs.  5/5/2025 per above, new bilateral supraclavicular swelling and reports of chest  heaviness and increased fatigue. Obtaining STAT CT imaging.  He underwent repeat scans 5/8/2025 revealing, wonderfully, no evidence of additional disease including CT of neck, CT of chest abdomen pelvis finding a sub-5 mm left lung noncalcified pulmonary nodule without change, right lateral eighth rib lesion and sclerotic lesion without change and no new bony lesion  Patient seen 6/3/2025, Guardant Reveal plan every 6 months.    5.  Left knee metastasis  Evaluated by orthopedic oncology, Dr. Eliu Ochoa  Admission 1/7/2021 undergoing stabilization of left femoral bone lesion, prophylactic stabilization with intramedullary implants.  It was suggested we delay Xgeva or Zometa to allow bone healing  Completed radiation with 10 fractions 2/10/2021  Xgeva resumed 4/29/2021, he continues to receive this every 6 weeks.    Additional assessment 2/18/2022 with left jaw pain, subsequent diagnosis of osteoporosis, Xgeva discontinued  Patient continues follow-up with oral surgery, progression of osteoporosis symptomatically, dermal eruption left side of mandibular region, IVIG anticipated  Xgeva DISCONTINUED due to osteonecrosis of the jaw  Increasing pain left mid femur 2/13/2024 plain films planned, referral to orthopedics  Subsequent testing ongoing when seen 3/12/2024 with MRI performed 3/14/2024 without clear new abnormalities     6.  Malignant right pleural effusion  Ultrasound thoracentesis 1/14/2021 1500 mL removed  Chest x-ray 2/25/2021 with moderate right pleural effusion  Progressive symptoms with worsening pain 4/5/2021  Ultrasound-guided thoracentesis 4/13/2021 with 2050 ml removed.  Plans for Pleurx catheter with thoracic surgery, however, pleural effusion has not recurred.  Patient seen 11/4/2021 with chest x-ray planned.  Subsequent resolution noted on CT scan.  Improvement in bilateral pleural effusions on most recent CT scan  Additional assessment 4/12/23 with stable small right pleural effusion  Follow-up  chest x-ray 7/2/2024 without change    7.  Cancer related pain  Pain is most prominent in his right rib, utilizing MS Contin 30 mg 3 times a day  Hydrocodone/acetaminophen 10/325 as needed for breakthrough pain.  Currently taking 3 to 4 tablets daily  He is not currently in need of a refill of pain medications  Discussed MS Contin at 30 mg p.o. every 8 hours, Norco 10/325 2 p.o. every 6 hours  He is currently using pain medication as needed for jaw pain.  Requiring approximately 1 MS Contin and 1 Norco 10/325 daily, however some days not requiring any pain medication.  Reports his pain is well controlled, not currently requiring pain medication.  5/5/2025 denies pain today.    8.  Insomnia  Continuing to follow with behavioral oncology  Continuing Remeron 7.5 mg nightly with fair control.  The patient  when assessed 12/30/2022.  1/20/2023 continues on Remeron 7.5 mg nightly which will be refilled     9. Health maintenance  Status post COVID-19 booster, SARS antibody pending today  9/2021 patient receiving Shingrix and Prevnar vaccines.    10.  Skin lesions  Uncertain of etiology, unexpected findings for usual skin lesions associated with immunotherapy  Request dermatologic assessment-psoriasiform dermatitis.  Seen by Dr. Damon though no follow-up due to personal preference  The lesions on the lower extremities particularly are quite extensive.  At present he feels that they are stable enough not to require additional therapy.  These are also considerably improved 3/12/2024  Stable    11.  Right knee pain  Osteoarthritis, orthopedic assessment upon patient's request.   Resolved.  Denies any knee pain today     12.  Osteonecrosis of the jaw  Xgeva was discontinued.  Patient went to second opinion with  with oral surgery.  He was reffered to Dr. Escalante with , with whom he had consult on 8/23/2022.  Dr. Escalante recommened an outpatient procedure that would allow him to biopsy the affected area of his jawbone and  identify which bacteria was present, they would then coordinate with ID to identify best antibiotic option.  Patient is agreeable to proceeding with this plan, and is awaiting surgery date.  The patient underwent surgery 10/7/2022  Follow-up with Dr. Champion, infectious disease 10/27/2022 with recommendations for 6 weeks of Levaquin and fluconazole.  This should complete by approximately mid December 2023.  Was seen by Dr. Champion 12/8/2022 with plans to complete fluconazole.  Decision made to start Augmentin 500 mg twice daily for prevention.  Patient currently being followed by Dr. Champion with reassessment planned in June 2023  Resolved status post oral surgery assessment, infectious disease  Gradual recovery-resolved by 12/17/2024        Plan:   Patient is to return 22 weeks-Guardant Reveal, CBC, CMP, COLLETTE, PE, serum free light chains, LDH  24 weeks MD Jostin Parekh MD   6/3/2025

## 2025-06-03 ENCOUNTER — OFFICE VISIT (OUTPATIENT)
Dept: ONCOLOGY | Facility: CLINIC | Age: 70
End: 2025-06-03
Payer: MEDICARE

## 2025-06-03 VITALS
DIASTOLIC BLOOD PRESSURE: 79 MMHG | OXYGEN SATURATION: 93 % | HEART RATE: 102 BPM | TEMPERATURE: 98.2 F | BODY MASS INDEX: 29.75 KG/M2 | WEIGHT: 212.5 LBS | HEIGHT: 71 IN | RESPIRATION RATE: 16 BRPM | SYSTOLIC BLOOD PRESSURE: 134 MMHG

## 2025-06-03 DIAGNOSIS — C91.10 CLL (CHRONIC LYMPHOCYTIC LEUKEMIA): Primary | ICD-10-CM

## 2025-06-03 DIAGNOSIS — C79.51 LUNG CANCER METASTATIC TO BONE: ICD-10-CM

## 2025-06-03 DIAGNOSIS — C34.12 MALIGNANT NEOPLASM OF UPPER LOBE OF LEFT LUNG: ICD-10-CM

## 2025-06-03 DIAGNOSIS — C34.90 LUNG CANCER METASTATIC TO BONE: ICD-10-CM

## 2025-06-03 PROCEDURE — 1126F AMNT PAIN NOTED NONE PRSNT: CPT | Performed by: INTERNAL MEDICINE

## 2025-06-03 PROCEDURE — 99214 OFFICE O/P EST MOD 30 MIN: CPT | Performed by: INTERNAL MEDICINE

## 2025-06-04 ENCOUNTER — SPECIALTY PHARMACY (OUTPATIENT)
Dept: PHARMACY | Facility: HOSPITAL | Age: 70
End: 2025-06-04
Payer: MEDICARE

## 2025-06-04 NOTE — PROGRESS NOTES
Specialty Pharmacy Note: Lupis Freitas is a 69 y.o. male with CLL was seen 6/3/25 by Dr. Parekh. Per provider dictation, no changes to oral oncology regimen Imbruvica 420 mg daily.  Labs Review: The CMP and CBC from 5/20/25 have been reviewed. No dose adjustments are needed for the oral specialty medication(s) based on the labs.    Specialty pharmacy will continue to follow patient.    Martin Ram, Viviana, [facility]  Clinical Oncology Pharmacist    6/4/2025  10:30 EDT

## 2025-06-10 ENCOUNTER — SPECIALTY PHARMACY (OUTPATIENT)
Dept: PHARMACY | Facility: HOSPITAL | Age: 70
End: 2025-06-10
Payer: MEDICARE

## 2025-06-10 RX ORDER — IBRUTINIB 420 MG/1
TABLET, FILM COATED ORAL
Qty: 28 TABLET | Refills: 5 | Status: SHIPPED | OUTPATIENT
Start: 2025-06-10

## 2025-06-10 NOTE — TELEPHONE ENCOUNTER
Specialty Pharmacy Patient Management Program  Per Protocol Prescription Order or Refill     Patient will be filling or currently fills medications at [facility] and is enrolled in the Patient Management Program.    Requested Prescriptions     Pending Prescriptions Disp Refills    ibrutinib (Imbruvica) 420 MG tablet tablet [Pharmacy Med Name: IMBRUVICA TAB 420MG] 28 tablet 5     Sig: TAKE 1 TABLET BY MOUTH ONCE  DAILY WITH A FULL GLASS OF WATER     Prescription orders above were sent to the pharmacy per Collaborative Care Agreement Protocol.     Jessenia Galvez, PharmD, [facility]  Clinical Specialty Pharmacist, Oncology  6/10/2025  07:53 EDT

## 2025-06-10 NOTE — TELEPHONE ENCOUNTER
Specialty Pharmacy Patient Management Program  Per Protocol Prescription Order or Refill       Requested Prescriptions     Signed Prescriptions Disp Refills    ibrutinib (Imbruvica) 420 MG tablet tablet 28 tablet 5     Sig: TAKE 1 TABLET BY MOUTH ONCE  DAILY WITH A FULL GLASS OF WATER     Authorizing Provider: CHRISTELLE JAVED     Ordering User: BUBBA SWARTZ     Prescription orders above were sent to [facility] per Collaborative Care Agreement Protocol.     Completed independent double check on medication order/RX.    Neelam Rachel Rph, BCOP  Clinical Specialty Pharmacist, Oncology  6/10/2025  08:26 EDT

## 2025-06-17 ENCOUNTER — SPECIALTY PHARMACY (OUTPATIENT)
Dept: PHARMACY | Facility: HOSPITAL | Age: 70
End: 2025-06-17
Payer: MEDICARE

## 2025-06-17 NOTE — PROGRESS NOTES
Specialty Pharmacy Patient Management Program  Oncology Reassessment     Dav Freitas was referred by an their provider to the Oncology Patient Management program offered by [facility] for CLL. A follow-up outreach was conducted, including assessment of continued therapy appropriateness, medication adherence, and side effect incidence and management for Imbruvica (ibrutinib).    Changes to Insurance Coverage or Financial Support  None.     Relevant Past Medical History and Comorbidities  Relevant medical history and concomitant health conditions were discussed with the patient. The patient's chart has been reviewed for relevant past medical history and comorbid health conditions and updated as necessary.   Past Medical History:   Diagnosis Date    Anxiety     Breast cancer     Bruises easily     SIDE EFFECT D/T CHEMO TABLET     CLL (chronic lymphocytic leukemia) 2019    LAST CHEMO 2019    Constipation     COPD (chronic obstructive pulmonary disease)     Dyspnea     ED (erectile dysfunction)     H/O splenomegaly     Ileus 2020    Lung cancer 2020    WITH BONE METS  - LAST RADIATION TREATMENT 10/13/20    On home O2     3L NC AT NIGHT     Pleural effusion 2021    right side    Sleep related hypoxia     USES O2 AT NIGHT    Tumor     LEFT LEG NEAR KNEE    Varicose vein of leg      Social History     Socioeconomic History    Marital status:      Spouse name: Nikky   Tobacco Use    Smoking status: Former     Current packs/day: 0.00     Average packs/day: 1 pack/day for 40.0 years (40.0 ttl pk-yrs)     Types: Cigarettes     Start date: 1979     Quit date: 2019     Years since quittin.1     Passive exposure: Past    Smokeless tobacco: Never   Vaping Use    Vaping status: Never Used   Substance and Sexual Activity    Alcohol use: Not Currently     Comment: RARE    Drug use: Not Currently    Sexual activity: Defer     Problem list reviewed by Emy Ram  SELVIN on 6/17/2025 at  3:31 PM    Hospitalizations and Urgent Care Since Last Assessment  ED Visits, Admissions, or Hospitalizations: none  Urgent Office Visits: none    Allergies  Known allergies and reactions were discussed with the patient. The patient's chart has been reviewed for allergy information and updated as necessary.   No Known Allergies  Allergies reviewed by Emy Ram RPH on 6/17/2025 at  3:31 PM    Relevant Laboratory Values  Relevant laboratory values were discussed with the patient. The following specialty medication dose adjustment(s) are recommended: No dose adjustments are needed for the oral specialty medication(s) based on the labs.    Lab Results   Component Value Date    GLUCOSE 86 05/20/2025    CALCIUM 9.2 05/20/2025     05/20/2025    K 4.4 05/20/2025    CO2 29.0 05/20/2025     05/20/2025    BUN 17 05/20/2025    CREATININE 1.48 (H) 05/20/2025    EGFRIFNONA 50 (L) 02/18/2022    BCR 11.5 05/20/2025    ANIONGAP 9.0 05/20/2025     Lab Results   Component Value Date    WBC 7.61 05/20/2025    RBC 4.81 05/20/2025    HGB 14.8 05/20/2025    HCT 45.9 05/20/2025    MCV 95.4 05/20/2025    MCH 30.8 05/20/2025    MCHC 32.2 05/20/2025    RDW 13.2 05/20/2025    RDWSD 46.7 05/20/2025    MPV 12.6 (H) 05/20/2025     05/20/2025    NEUTRORELPCT 67.4 05/20/2025    LYMPHORELPCT 16.2 (L) 05/20/2025    MONORELPCT 12.0 05/20/2025    EOSRELPCT 2.4 05/20/2025    BASORELPCT 1.2 05/20/2025    AUTOIGPER 0.8 (H) 05/20/2025    NEUTROABS 5.14 05/20/2025    LYMPHSABS 1.23 05/20/2025    MONOSABS 0.91 (H) 05/20/2025    EOSABS 0.18 05/20/2025    BASOSABS 0.09 05/20/2025    AUTOIGNUM 0.06 (H) 05/20/2025    NRBC 0.0 05/20/2025       Current Medication List  This medication list has been reviewed with the patient and evaluated for any interactions or necessary modifications/recommendations, and updated to include all prescription medications, OTC medications, and supplements the patient is currently  taking.  This list reflects what is contained in the patient's profile, which has also been marked as reviewed to communicate to other providers it is the most up to date version of the patient's current medication therapy.     Current Outpatient Medications:     ibrutinib (Imbruvica) 420 MG tablet tablet, TAKE 1 TABLET BY MOUTH ONCE  DAILY WITH A FULL GLASS OF WATER, Disp: 28 tablet, Rfl: 5    mirtazapine (REMERON) 15 MG tablet, TAKE HALF (1/2) A TABLET BY MOUTH EVERY NIGHT., Disp: 45 tablet, Rfl: 1  No current facility-administered medications for this visit.    Facility-Administered Medications Ordered in Other Visits:     heparin injection 500 Units, 500 Units, Intravenous, PRN, Jostin Parekh MD, 500 Units at 02/18/22 0836    sodium chloride 0.9 % flush 10 mL, 10 mL, Intravenous, PRN, Jostin Parekh MD, 10 mL at 02/18/22 0836    Medicines reviewed by Emy Ram [facility] on 6/17/2025 at  3:31 PM    Drug Interactions  Assessed medication list for interactions, no significant drug interactions noted.   Advised patient to call the clinic if any new medications are started so we can assess for drug-drug interactions.  Drug-food interactions discussed: none today    Adverse Drug Reactions  Medication tolerability: Tolerating with no to minimal ADRs  Medication plan: Continue therapy with normal follow-up  Plan for ADR Management: N/A    Adherence, Self-Administration, and Current Therapy Problems  Adherence related to the patient's specialty therapy was discussed with the patient. The Adherence segment of this outreach has been reviewed and updated.     Adherence Questions  Linked Medication(s) Assessed: Ibrutinib (Imbruvica)  On average, how many doses/injections does the patient miss per month?: 0  What are the identified reasons for non-adherence or missed doses? : no problems identified  What is the estimated medication adherence level?: %  Based on the patient/caregiver response and refill  history, does this patient require an MTP to track adherence improvements?: no    Additional Barriers to Patient Self-Administration: none  Methods for Supporting Patient Self-Administration: none  Patient has had no issues obtaining medication from pharmacy.    Open Medication Therapy Problems  CLL (chronic lymphocytic leukemia)   1 Current Medication: Imbruvica 420 MG tablet tablet (Discontinued)   Current Medication Sig: TAKE 1 TABLET BY MOUTH ONCE DAILY WITH A FULL GLASS OF  WATER   Rationale: Medication interaction - Dosage too high - Safety   Recommendation: Decrease Dose - Imbruvica 420 MG tablet - Imbruvica 420 mg po daily interactis with new order of Diflucan- recommend dose reduction to 280 mg while on Diflucan   Identified Date: 10/28/2022   Note: In basket message sent to Dr Parekh and discussed with Noa Zarate          Goals of Therapy  Goals related to the patient's specialty therapy were discussed with the patient. The Patient Goals segment of this outreach has been reviewed and updated.   Goals Addressed Today        Specialty Pharmacy General Goal      Progression free survival   12/17/24: WBC 9.02, Hgb 15.2, platelets 175.  Patient reports tolerating therapy well. No plans to oral therapy plan at this time.  6/17/25: Most recent labs WNL. Patient tolerating well. No changes to therapy plan at this time.              Quality of Life Assessment   Quality of Life related to the patient's enrollment in the patient management program and services provided was discussed with the patient. The QOL segment of this outreach has been reviewed and updated.  Quality of Life Improvement Scale: 5-No change    Discussed aforementioned material with patient over the phone.     Reassessment Plan & Follow-Up  1. Medication Therapy Changes: none  2. Related Plans, Therapy Recommendations, or Issues to Be Addressed: none  3. Pharmacist to perform regular assessments no more than (6) months from the previous  assessment.       Attestation  Therapeutic appropriateness: Appropriate   I attest the patient was actively involved in and has agreed to the above plan of care.  If the prescribed therapy is at any point deemed not appropriate based on the current or future assessments, a consultation will be initiated with the patient's specialty care provider to determine the best course of action. The revised plan of therapy will be documented along with any required assessments and/or additional patient education provided.     Martin Ram, Viviana, BCOP  Clinical Specialty Pharmacist, Oncology  6/17/2025  15:33 EDT

## 2025-07-15 ENCOUNTER — INFUSION (OUTPATIENT)
Dept: ONCOLOGY | Facility: HOSPITAL | Age: 70
End: 2025-07-15
Payer: MEDICARE

## 2025-07-15 DIAGNOSIS — Z45.2 FITTING AND ADJUSTMENT OF VASCULAR CATHETER: ICD-10-CM

## 2025-07-15 DIAGNOSIS — C91.10 CLL (CHRONIC LYMPHOCYTIC LEUKEMIA): Primary | ICD-10-CM

## 2025-07-15 PROCEDURE — 96523 IRRIG DRUG DELIVERY DEVICE: CPT

## 2025-07-15 PROCEDURE — 25010000002 HEPARIN LOCK FLUSH PER 10 UNITS: Performed by: NURSE PRACTITIONER

## 2025-07-15 RX ORDER — HEPARIN SODIUM (PORCINE) LOCK FLUSH IV SOLN 100 UNIT/ML 100 UNIT/ML
500 SOLUTION INTRAVENOUS AS NEEDED
Status: DISCONTINUED | OUTPATIENT
Start: 2025-07-15 | End: 2025-07-15 | Stop reason: HOSPADM

## 2025-07-15 RX ORDER — SODIUM CHLORIDE 0.9 % (FLUSH) 0.9 %
10 SYRINGE (ML) INJECTION AS NEEDED
OUTPATIENT
Start: 2025-07-15

## 2025-07-15 RX ORDER — HEPARIN SODIUM (PORCINE) LOCK FLUSH IV SOLN 100 UNIT/ML 100 UNIT/ML
500 SOLUTION INTRAVENOUS AS NEEDED
OUTPATIENT
Start: 2025-07-15

## 2025-07-15 RX ORDER — SODIUM CHLORIDE 0.9 % (FLUSH) 0.9 %
10 SYRINGE (ML) INJECTION AS NEEDED
Status: DISCONTINUED | OUTPATIENT
Start: 2025-07-15 | End: 2025-07-15 | Stop reason: HOSPADM

## 2025-07-15 RX ADMIN — Medication 500 UNITS: at 13:55

## 2025-07-15 RX ADMIN — Medication 10 ML: at 13:54

## 2025-08-26 ENCOUNTER — INFUSION (OUTPATIENT)
Dept: ONCOLOGY | Facility: HOSPITAL | Age: 70
End: 2025-08-26
Payer: MEDICARE

## 2025-08-26 DIAGNOSIS — C91.10 CLL (CHRONIC LYMPHOCYTIC LEUKEMIA): Primary | ICD-10-CM

## 2025-08-26 DIAGNOSIS — Z45.2 FITTING AND ADJUSTMENT OF VASCULAR CATHETER: ICD-10-CM

## 2025-08-26 PROCEDURE — 96523 IRRIG DRUG DELIVERY DEVICE: CPT

## 2025-08-26 PROCEDURE — 25010000002 HEPARIN LOCK FLUSH PER 10 UNITS: Performed by: NURSE PRACTITIONER

## 2025-08-26 RX ORDER — SODIUM CHLORIDE 0.9 % (FLUSH) 0.9 %
10 SYRINGE (ML) INJECTION AS NEEDED
OUTPATIENT
Start: 2025-08-26

## 2025-08-26 RX ORDER — HEPARIN SODIUM (PORCINE) LOCK FLUSH IV SOLN 100 UNIT/ML 100 UNIT/ML
500 SOLUTION INTRAVENOUS AS NEEDED
OUTPATIENT
Start: 2025-08-26

## 2025-08-26 RX ORDER — HEPARIN SODIUM (PORCINE) LOCK FLUSH IV SOLN 100 UNIT/ML 100 UNIT/ML
500 SOLUTION INTRAVENOUS AS NEEDED
Status: DISCONTINUED | OUTPATIENT
Start: 2025-08-26 | End: 2025-08-26 | Stop reason: HOSPADM

## 2025-08-26 RX ORDER — SODIUM CHLORIDE 0.9 % (FLUSH) 0.9 %
10 SYRINGE (ML) INJECTION AS NEEDED
Status: DISCONTINUED | OUTPATIENT
Start: 2025-08-26 | End: 2025-08-26 | Stop reason: HOSPADM

## 2025-08-26 RX ADMIN — Medication 10 ML: at 13:56

## 2025-08-26 RX ADMIN — Medication 500 UNITS: at 13:56

## (undated) DEVICE — VITAL SIGNS™ JACKSON-REES CIRCUITS: Brand: VITAL SIGNS™

## (undated) DEVICE — SUT PROLN 2/0 SH 36IN 8523H

## (undated) DEVICE — BITEBLOCK OMNI BLOC

## (undated) DEVICE — WEBRIL* CAST PADDING: Brand: DEROYAL

## (undated) DEVICE — 3M™ STERI-STRIP™ REINFORCED ADHESIVE SKIN CLOSURES, R1547, 1/2 IN X 4 IN (12 MM X 100 MM), 6 STRIPS/ENVELOPE: Brand: 3M™ STERI-STRIP™

## (undated) DEVICE — CANNULA SEAL

## (undated) DEVICE — 3M™ STERI-STRIP™ COMPOUND BENZOIN TINCTURE 40 BAGS/CARTON 4 CARTONS/CASE C1544: Brand: 3M™ STERI-STRIP™

## (undated) DEVICE — DRAPE,REIN 53X77,STERILE: Brand: MEDLINE

## (undated) DEVICE — GLV SURG SENSICARE PI MIC PF SZ7.5 LF STRL

## (undated) DEVICE — DRP C/ARMOR

## (undated) DEVICE — DISPOSABLE SURGICAL INSTRUMENT WITH 11MM TIP FOR MANUALLY CUTTING A HOLE INTO BONE ALLOWING ACCESS TO CANCELLOUS BONE.: Brand: AVITUS PILOT HOLE CREATOR, DISPOSABLE - 11MM

## (undated) DEVICE — SOL NACL 0.9PCT 100ML SGL

## (undated) DEVICE — Device

## (undated) DEVICE — ADAPT CLN BIOGUARD AIR/H2O DISP

## (undated) DEVICE — SINGLE USE ASPIRATION NEEDLE: Brand: SINGLE USE ASPIRATION NEEDLE

## (undated) DEVICE — LN SMPL O2 NASL/ORL SMART/CAPNOLINE PLS A/

## (undated) DEVICE — STERILE, DISPOSABLE SURGICAL INSTRUMENT WITH 8MM CUTTING TIP FOR LARGE VOLUME, CANCELLOUS BONE AND MARROW HARVESTING WITH MARROW SEPARATION INSERT.: Brand: AVITUS BONE HARVESTER

## (undated) DEVICE — SOL ANTISTICK CAUTRY ELECTROLUBE LF

## (undated) DEVICE — ANTIBACTERIAL UNDYED BRAIDED (POLYGLACTIN 910), SYNTHETIC ABSORBABLE SUTURE: Brand: COATED VICRYL

## (undated) DEVICE — SPNG GZ WOVN 4X4IN 12PLY 10/BX STRL

## (undated) DEVICE — T-DRAPE,EXTREMITY,STERILE: Brand: MEDLINE

## (undated) DEVICE — SUT MNCRYL PLS ANTIB UD 4/0 PS2 18IN

## (undated) DEVICE — THE TORRENT IRRIGATION SCOPE CONNECTOR IS USED WITH THE TORRENT IRRIGATION TUBING TO PROVIDE IRRIGATION FLUIDS SUCH AS STERILE WATER DURING GASTROINTESTINAL ENDOSCOPIC PROCEDURES WHEN USED IN CONJUNCTION WITH AN IRRIGATION PUMP (OR ELECTROSURGICAL UNIT).: Brand: TORRENT

## (undated) DEVICE — MAT FLR ABSORBENT LG 4FT 10 2.5FT

## (undated) DEVICE — TRAP,MUCUS SPECIMEN, 80CC: Brand: MEDLINE

## (undated) DEVICE — MSK AIRWY LARYNG LMA PILOT SZ4

## (undated) DEVICE — STPLR SKIN VISISTAT WD 35CT

## (undated) DEVICE — LOU GENERAL ROBOT: Brand: MEDLINE INDUSTRIES, INC.

## (undated) DEVICE — GLV SURG PREMIERPRO ORTHO LTX PF SZ7.5 BRN

## (undated) DEVICE — NDL HYPO PRECISIONGLIDE REG 25G 1 1/2

## (undated) DEVICE — LOU MINOR PROCEDURE: Brand: MEDLINE INDUSTRIES, INC.

## (undated) DEVICE — 1010 S-DRAPE TOWEL DRAPE 10/BX: Brand: STERI-DRAPE™

## (undated) DEVICE — COLUMN DRAPE

## (undated) DEVICE — TUBING, SUCTION, 1/4" X 10', STRAIGHT: Brand: MEDLINE

## (undated) DEVICE — ARM DRAPE

## (undated) DEVICE — DRP C/ARM 41X74IN

## (undated) DEVICE — SOL ISO/ALC RUB 70PCT 4OZ

## (undated) DEVICE — LN SMPL CO2 SHTRM SD STREAM W/M LUER

## (undated) DEVICE — SNAR POLYP CAPTIVATOR RND STFF 2.4 240CM 10MM 1P/U

## (undated) DEVICE — DRSNG SURESITE WNDW 4X4.5

## (undated) DEVICE — PK ORTHO MAJ 40

## (undated) DEVICE — TOTAL TRAY, 16FR 10ML SIL FOLEY, URN: Brand: MEDLINE

## (undated) DEVICE — PENCL E/S ULTRAVAC TELESCP NOSE HOLSTR 10FT

## (undated) DEVICE — DECANT BG O JET

## (undated) DEVICE — TIP COVER ACCESSORY

## (undated) DEVICE — INTENDED FOR TISSUE SEPARATION, AND OTHER PROCEDURES THAT REQUIRE A SHARP SURGICAL BLADE TO PUNCTURE OR CUT.: Brand: BARD-PARKER ® CARBON RIB-BACK BLADES

## (undated) DEVICE — THE SINGLE USE ETRAP – POLYP TRAP IS USED FOR SUCTION RETRIEVAL OF ENDOSCOPICALLY REMOVED POLYPS.: Brand: ETRAP

## (undated) DEVICE — BLADELESS OBTURATOR: Brand: WECK VISTA

## (undated) DEVICE — LAPAROSCOPIC SMOKE FILTRATION SYSTEM: Brand: PALL LAPAROSHIELD® PLUS LAPAROSCOPIC SMOKE FILTRATION SYSTEM

## (undated) DEVICE — GLV SURG SENSICARE PI LF PF 8 GRN STRL

## (undated) DEVICE — TRAP FLD MINIVAC MEGADYNE 100ML

## (undated) DEVICE — CVR PROB 96IN LF STRL

## (undated) DEVICE — 3M™ STERI-DRAPE™ U-DRAPE 1015: Brand: STERI-DRAPE™

## (undated) DEVICE — SUT VIC 2/0 CT2 27IN J269H

## (undated) DEVICE — APPL CHLORAPREP HI/LITE 26ML ORNG

## (undated) DEVICE — CANN O2 ETCO2 FITS ALL CONN CO2 SMPL A/ 7IN DISP LF

## (undated) DEVICE — ADAPT SWVL FIBROPTIC BRONCH

## (undated) DEVICE — KT ORCA ORCAPOD DISP STRL

## (undated) DEVICE — LARGE BORE STOPCOCK WITH EXTENSION SET, MALE LUER LOCK ADAPTER WITH RETRACTABLE COLLAR

## (undated) DEVICE — DRSNG TELFA PAD NONADH STR 1S 3X8IN

## (undated) DEVICE — VIOLET BRAIDED (POLYGLACTIN 910), SYNTHETIC ABSORBABLE SUTURE: Brand: COATED VICRYL

## (undated) DEVICE — SOL NACL 0.9PCT 1000ML

## (undated) DEVICE — SYR LUERLOK 20CC BX/50

## (undated) DEVICE — SINGLE USE BIOPSY VALVE MAJ-210: Brand: SINGLE USE BIOPSY VALVE (STERILE)

## (undated) DEVICE — Device: Brand: BALLOON

## (undated) DEVICE — BNDG ELAS CO-FLEX SLF ADHR 6IN 5YD LF STRL

## (undated) DEVICE — SINGLE USE SUCTION VALVE MAJ-209: Brand: SINGLE USE SUCTION VALVE (STERILE)

## (undated) DEVICE — SENSR O2 OXIMAX FNGR A/ 18IN NONSTR